# Patient Record
Sex: FEMALE | Race: BLACK OR AFRICAN AMERICAN | NOT HISPANIC OR LATINO | Employment: OTHER | URBAN - METROPOLITAN AREA
[De-identification: names, ages, dates, MRNs, and addresses within clinical notes are randomized per-mention and may not be internally consistent; named-entity substitution may affect disease eponyms.]

---

## 2017-01-16 ENCOUNTER — HOSPITAL ENCOUNTER (OUTPATIENT)
Dept: RADIOLOGY | Facility: HOSPITAL | Age: 62
Discharge: HOME | End: 2017-01-16
Attending: INTERNAL MEDICINE
Payer: COMMERCIAL

## 2017-01-16 ENCOUNTER — OFFICE VISIT (OUTPATIENT)
Dept: INTERNAL MEDICINE | Facility: CLINIC | Age: 62
End: 2017-01-16
Payer: COMMERCIAL

## 2017-01-16 VITALS
BODY MASS INDEX: 35.88 KG/M2 | SYSTOLIC BLOOD PRESSURE: 104 MMHG | WEIGHT: 215.38 LBS | HEART RATE: 84 BPM | DIASTOLIC BLOOD PRESSURE: 62 MMHG | TEMPERATURE: 98 F | HEIGHT: 65 IN

## 2017-01-16 DIAGNOSIS — R50.9 FEVER AND CHILLS: ICD-10-CM

## 2017-01-16 DIAGNOSIS — R05.9 COUGH: ICD-10-CM

## 2017-01-16 DIAGNOSIS — J32.9 SINOBRONCHITIS: ICD-10-CM

## 2017-01-16 DIAGNOSIS — M79.645 THUMB PAIN, LEFT: ICD-10-CM

## 2017-01-16 DIAGNOSIS — R05.9 COUGH: Primary | ICD-10-CM

## 2017-01-16 DIAGNOSIS — R20.2 NUMBNESS AND TINGLING OF LEFT THUMB: ICD-10-CM

## 2017-01-16 DIAGNOSIS — E11.9 TYPE 2 DIABETES MELLITUS WITHOUT COMPLICATION, WITHOUT LONG-TERM CURRENT USE OF INSULIN: Chronic | ICD-10-CM

## 2017-01-16 DIAGNOSIS — I10 ESSENTIAL HYPERTENSION: Chronic | ICD-10-CM

## 2017-01-16 DIAGNOSIS — R20.0 NUMBNESS AND TINGLING OF LEFT THUMB: ICD-10-CM

## 2017-01-16 DIAGNOSIS — J40 SINOBRONCHITIS: ICD-10-CM

## 2017-01-16 PROCEDURE — 99214 OFFICE O/P EST MOD 30 MIN: CPT | Mod: 25,S$GLB,, | Performed by: INTERNAL MEDICINE

## 2017-01-16 PROCEDURE — 3074F SYST BP LT 130 MM HG: CPT | Mod: S$GLB,,, | Performed by: INTERNAL MEDICINE

## 2017-01-16 PROCEDURE — 3060F POS MICROALBUMINURIA REV: CPT | Mod: S$GLB,,, | Performed by: INTERNAL MEDICINE

## 2017-01-16 PROCEDURE — 1159F MED LIST DOCD IN RCRD: CPT | Mod: S$GLB,,, | Performed by: INTERNAL MEDICINE

## 2017-01-16 PROCEDURE — 3078F DIAST BP <80 MM HG: CPT | Mod: S$GLB,,, | Performed by: INTERNAL MEDICINE

## 2017-01-16 PROCEDURE — 99999 PR PBB SHADOW E&M-EST. PATIENT-LVL IV: CPT | Mod: PBBFAC,,, | Performed by: INTERNAL MEDICINE

## 2017-01-16 PROCEDURE — 2022F DILAT RTA XM EVC RTNOPTHY: CPT | Mod: S$GLB,,, | Performed by: INTERNAL MEDICINE

## 2017-01-16 PROCEDURE — 73130 X-RAY EXAM OF HAND: CPT | Mod: 26,LT,, | Performed by: RADIOLOGY

## 2017-01-16 PROCEDURE — 71020 XR CHEST PA AND LATERAL: CPT | Mod: 26,,, | Performed by: RADIOLOGY

## 2017-01-16 PROCEDURE — 94640 AIRWAY INHALATION TREATMENT: CPT | Mod: 51,S$GLB,, | Performed by: INTERNAL MEDICINE

## 2017-01-16 PROCEDURE — 71020 XR CHEST PA AND LATERAL: CPT | Mod: TC,PO

## 2017-01-16 PROCEDURE — 3044F HG A1C LEVEL LT 7.0%: CPT | Mod: S$GLB,,, | Performed by: INTERNAL MEDICINE

## 2017-01-16 PROCEDURE — 73130 X-RAY EXAM OF HAND: CPT | Mod: TC,PO,LT

## 2017-01-16 PROCEDURE — 96372 THER/PROPH/DIAG INJ SC/IM: CPT | Mod: S$GLB,,, | Performed by: INTERNAL MEDICINE

## 2017-01-16 RX ORDER — ALBUTEROL SULFATE 0.83 MG/ML
2.5 SOLUTION RESPIRATORY (INHALATION)
Status: COMPLETED | OUTPATIENT
Start: 2017-01-16 | End: 2017-01-16

## 2017-01-16 RX ORDER — TRIAMCINOLONE ACETONIDE 40 MG/ML
40 INJECTION, SUSPENSION INTRA-ARTICULAR; INTRAMUSCULAR
Status: COMPLETED | OUTPATIENT
Start: 2017-01-16 | End: 2017-01-16

## 2017-01-16 RX ORDER — PROMETHAZINE HYDROCHLORIDE AND CODEINE PHOSPHATE 6.25; 1 MG/5ML; MG/5ML
5 SOLUTION ORAL EVERY 4 HOURS PRN
Qty: 240 ML | Refills: 0 | Status: SHIPPED | OUTPATIENT
Start: 2017-01-16 | End: 2017-01-26

## 2017-01-16 RX ORDER — LEVOCETIRIZINE DIHYDROCHLORIDE 5 MG/1
5 TABLET, FILM COATED ORAL DAILY PRN
Qty: 30 TABLET | Refills: 2 | Status: SHIPPED | OUTPATIENT
Start: 2017-01-16 | End: 2017-04-25

## 2017-01-16 RX ORDER — ALBUTEROL SULFATE 90 UG/1
2 AEROSOL, METERED RESPIRATORY (INHALATION) EVERY 4 HOURS PRN
Qty: 1 EACH | Refills: 1 | Status: SHIPPED | OUTPATIENT
Start: 2017-01-16 | End: 2017-03-13 | Stop reason: ALTCHOICE

## 2017-01-16 RX ORDER — LEVOFLOXACIN 500 MG/1
500 TABLET, FILM COATED ORAL DAILY
Qty: 10 TABLET | Refills: 0 | Status: SHIPPED | OUTPATIENT
Start: 2017-01-16 | End: 2017-01-24 | Stop reason: ALTCHOICE

## 2017-01-16 RX ORDER — TIZANIDINE 4 MG/1
TABLET ORAL
Refills: 0 | COMMUNITY
Start: 2017-01-09 | End: 2017-03-13 | Stop reason: ALTCHOICE

## 2017-01-16 RX ORDER — PREDNISONE 20 MG/1
TABLET ORAL
Qty: 24 TABLET | Refills: 0 | Status: SHIPPED | OUTPATIENT
Start: 2017-01-16 | End: 2017-03-13 | Stop reason: ALTCHOICE

## 2017-01-16 RX ORDER — BENZONATATE 100 MG/1
CAPSULE ORAL
Refills: 0 | COMMUNITY
Start: 2017-01-09 | End: 2017-03-13 | Stop reason: ALTCHOICE

## 2017-01-16 RX ADMIN — TRIAMCINOLONE ACETONIDE 40 MG: 40 INJECTION, SUSPENSION INTRA-ARTICULAR; INTRAMUSCULAR at 09:01

## 2017-01-16 RX ADMIN — ALBUTEROL SULFATE 2.5 MG: 0.83 SOLUTION RESPIRATORY (INHALATION) at 08:01

## 2017-01-16 NOTE — PROGRESS NOTES
"Subjective:       Patient ID: Aracelis Martinez is a 61 y.o. female.    Chief Complaint: Back Pain; Cough; Fever; and Generalized Body Aches    HPI   The patient presents for an 8 day history of fever, chills, and worsening cough.  Cough is intermittently productive.  She is complaining of night sweats and exertional dyspnea.  Appetite is fair.  There is no nausea or vomiting.  No dysuria present.  No diarrhea is noted.  The patient was seen at an urgent care clinic last week and states that the initial testing for flu was negative.  She also received a Z-Luis which produced oral thrush after 3 days of therapy.  Review of Systems   Constitutional: Positive for appetite change, chills, fatigue and fever.   HENT: Positive for postnasal drip, sore throat and trouble swallowing.    Respiratory: Positive for cough, shortness of breath and wheezing.    Cardiovascular: Positive for chest pain. Negative for leg swelling.   Gastrointestinal: Negative for abdominal pain, diarrhea, nausea and vomiting.        Increased belching and gas reported.  The patient denies having any heartburn.   Genitourinary: Negative for dysuria.   Musculoskeletal: Positive for arthralgias, back pain and myalgias.        The patient complains of "spasms" in the left thumb.  Intermittent numbness is noted.   Skin: Negative for rash.   Neurological: Positive for numbness and headaches. Negative for dizziness.       Objective:      Physical Exam   Constitutional: She is oriented to person, place, and time. She appears well-developed and well-nourished. No distress.   The patient is a well-developed ill-appearing female.  The patient has lost 7 pounds since her last office visit of 10/10/16.   HENT:   Head: Normocephalic and atraumatic.   Right Ear: External ear normal.   Left Ear: External ear normal.   Mouth/Throat: Oropharynx is clear and moist.   Eyes: Conjunctivae and EOM are normal. No scleral icterus.   Neck: Normal range of motion. Neck supple. " No JVD present. No thyromegaly present.   Cardiovascular: Normal rate, regular rhythm, normal heart sounds and intact distal pulses.  Exam reveals no gallop and no friction rub.    No murmur heard.  Pulmonary/Chest: Effort normal. No respiratory distress. She has wheezes. She has no rales. She exhibits tenderness.   Scattered expiratory wheezes are noted bilaterally.  No rales are appreciated.   Abdominal: Soft. Bowel sounds are normal. She exhibits no mass. There is no tenderness.   Musculoskeletal: Normal range of motion. She exhibits no tenderness.   Left thumb range of motion is intact.  No localized tenderness or erythema is present.  No clicking is appreciated.  Wrist range of motion is normal.  Normal sensation is noted at this time.   Lymphadenopathy:     She has no cervical adenopathy.   Neurological: She is alert and oriented to person, place, and time. Coordination normal.   Gait is normal.   Skin: Skin is warm and dry. No rash noted.   Psychiatric: She has a normal mood and affect.   Nursing note and vitals reviewed.      Chest x-ray today: Lungs fields are clear.  No pneumonia or effusions are noted.    Left thumb x-ray today: No fracture, dislocation, or bone destruction noted.  Assessment:       1. Cough    2. Fever and chills    3. Thumb pain, left    4. Numbness and tingling of left thumb    5. Sinobronchitis    6. Essential hypertension    7. Type 2 diabetes mellitus without complication, without long-term current use of insulin        Plan:     Aracelis was seen today for sinobronchitis.  An albuterol nebulizer treatment will be administered.  Kenalog will also be administered at this time.  Prescriptions for prednisone, Levaquin, albuterol inhaler were given.  The patient will be referred to orthopedic hand surgery regarding symptoms of left thumb pain/numbness.  She does describe some triggering of the thumb however I suspect she may have a component of carpal tunnel syndrome.  The patient is to  return for routine medical follow-up as scheduled.  For symptoms of increased belching and I suggested patient try Zantac or Prilosec on a daily basis as a therapeutic trial.  She may be having some component of reflux.    Diagnoses and all orders for this visit:    Cough  -     X-Ray Chest PA And Lateral; Future    Fever and chills  -     X-Ray Chest PA And Lateral; Future    Thumb pain, left  -     Cancel: X-Ray Hand 2 View Left; Future  -     Ambulatory consult to Orthopedics    Numbness and tingling of left thumb  -     Ambulatory consult to Orthopedics    Sinobronchitis    Essential hypertension    Type 2 diabetes mellitus without complication, without long-term current use of insulin    Other orders  -     albuterol nebulizer solution 2.5 mg; Take 3 mLs (2.5 mg total) by nebulization one time.  -     albuterol 90 mcg/actuation inhaler; Inhale 2 puffs into the lungs every 4 (four) hours as needed for Wheezing.  -     predniSONE (DELTASONE) 20 MG tablet; Take 3 tabs po qd x 4 days; 2 tabs qd x 4 days; the 1 tab qd until completed.  -     triamcinolone acetonide injection 40 mg; Inject 1 mL (40 mg total) into the muscle one time.  -     levocetirizine (XYZAL) 5 MG tablet; Take 1 tablet (5 mg total) by mouth daily as needed (cold and sinus symptoms).  -     levoFLOXacin (LEVAQUIN) 500 MG tablet; Take 1 tablet (500 mg total) by mouth once daily.

## 2017-01-19 ENCOUNTER — TELEPHONE (OUTPATIENT)
Dept: INTERNAL MEDICINE | Facility: CLINIC | Age: 62
End: 2017-01-19

## 2017-01-19 DIAGNOSIS — I10 UNSPECIFIED ESSENTIAL HYPERTENSION: Primary | ICD-10-CM

## 2017-01-19 DIAGNOSIS — E78.5 HYPERLIPIDEMIA, UNSPECIFIED HYPERLIPIDEMIA TYPE: ICD-10-CM

## 2017-01-19 DIAGNOSIS — E11.9 TYPE II OR UNSPECIFIED TYPE DIABETES MELLITUS WITHOUT MENTION OF COMPLICATION, NOT STATED AS UNCONTROLLED: ICD-10-CM

## 2017-01-19 NOTE — TELEPHONE ENCOUNTER
----- Message from Kayleigh Hobson sent at 1/18/2017  3:16 PM CST -----  Contact: Patient called - [phone]  Doctor appointment and lab have been scheduled.  Please link lab orders to the lab appointment.  Date of doctor appointment:    Physical or EP:  Physical:  [id number]  Date of lab appointment:  Labs: [id number]  Comments:       Thanks Stephanie

## 2017-01-24 ENCOUNTER — TELEPHONE (OUTPATIENT)
Dept: INTERNAL MEDICINE | Facility: CLINIC | Age: 62
End: 2017-01-24

## 2017-01-24 ENCOUNTER — OFFICE VISIT (OUTPATIENT)
Dept: INTERNAL MEDICINE | Facility: CLINIC | Age: 62
End: 2017-01-24
Payer: COMMERCIAL

## 2017-01-24 VITALS
SYSTOLIC BLOOD PRESSURE: 130 MMHG | OXYGEN SATURATION: 98 % | HEART RATE: 76 BPM | DIASTOLIC BLOOD PRESSURE: 68 MMHG | TEMPERATURE: 99 F | HEIGHT: 65 IN | BODY MASS INDEX: 36.29 KG/M2 | WEIGHT: 217.81 LBS

## 2017-01-24 DIAGNOSIS — J20.9 ACUTE BRONCHITIS, UNSPECIFIED ORGANISM: Primary | ICD-10-CM

## 2017-01-24 PROCEDURE — 3078F DIAST BP <80 MM HG: CPT | Mod: S$GLB,,, | Performed by: FAMILY MEDICINE

## 2017-01-24 PROCEDURE — 3075F SYST BP GE 130 - 139MM HG: CPT | Mod: S$GLB,,, | Performed by: FAMILY MEDICINE

## 2017-01-24 PROCEDURE — 99214 OFFICE O/P EST MOD 30 MIN: CPT | Mod: S$GLB,,, | Performed by: FAMILY MEDICINE

## 2017-01-24 PROCEDURE — 1159F MED LIST DOCD IN RCRD: CPT | Mod: S$GLB,,, | Performed by: FAMILY MEDICINE

## 2017-01-24 PROCEDURE — 99999 PR PBB SHADOW E&M-EST. PATIENT-LVL III: CPT | Mod: PBBFAC,,, | Performed by: FAMILY MEDICINE

## 2017-01-24 RX ORDER — HYDROCODONE BITARTRATE AND ACETAMINOPHEN 7.5; 325 MG/1; MG/1
1 TABLET ORAL EVERY 4 HOURS PRN
Qty: 30 TABLET | Refills: 0 | Status: SHIPPED | OUTPATIENT
Start: 2017-01-24 | End: 2017-03-13 | Stop reason: ALTCHOICE

## 2017-01-24 RX ORDER — AMOXICILLIN 500 MG/1
500 TABLET, FILM COATED ORAL EVERY 12 HOURS
Qty: 14 TABLET | Refills: 0 | Status: SHIPPED | OUTPATIENT
Start: 2017-01-24 | End: 2017-02-03

## 2017-01-24 RX ORDER — ONDANSETRON 8 MG/1
8 TABLET, ORALLY DISINTEGRATING ORAL EVERY 6 HOURS PRN
Qty: 30 TABLET | Refills: 1 | Status: SHIPPED | OUTPATIENT
Start: 2017-01-24 | End: 2017-03-13 | Stop reason: ALTCHOICE

## 2017-01-24 NOTE — TELEPHONE ENCOUNTER
Spoke with patient , she's not feeling well , has coughing spells, feels feverish at times .She was given an appt. This morning with Dr. Lopez.

## 2017-01-24 NOTE — TELEPHONE ENCOUNTER
----- Message from Kayleigh Hobson sent at 1/24/2017  6:51 AM CST -----  Contact: Patient - [phone]  Gauri,    Patient called early this morning and ask if Dr. Damico could call her this morning before he starts clinic.  Patient states she just wanted his advise on something.  Patient states she did not go to work yesterday and not feeling very well this morning.  Patient states she try to go to work but felt like she was going to pass out, and last night she states she sweat awfully and also feels like she has a heavy weight on her chest, no chest pain, but states something not right.  Please give patient a call as soon as he can.      Thanks Stephanie

## 2017-01-24 NOTE — TELEPHONE ENCOUNTER
----- Message from Kayleigh Hobson sent at 1/24/2017  6:51 AM CST -----  Contact: Patient - [phone]  Gauri,    Patient called early this morning and ask if Dr. Damico could call her this morning before he starts clinic.  Patient states she just wanted his advise on something.  Patient states she did not go to work yesterday and not feeling very well this morning.  Patient states she try to go to work but felt like she was going to pass out, and last night she states she sweat awfully and also feels like she has a heavy weight on her chest, no chest pain, but states something not right.  Please give patient a call as soon as he can.      Thanks Stephanie

## 2017-01-24 NOTE — PROGRESS NOTES
Subjective:   Patient ID: Aracelis Martinez is a 61 y.o. female.    Chief Complaint: Follow-up (bronchitis not getting better; night sweats; weakness; clammy skin; coughing; etc)      HPI  Cordial 62 yo female with controlled type 2 diabetes mellitus and essential hypertension presents for follow-up of bronchitis. She is taking meds inc Levaquin as directed. She continues to cough and feel bad. Myalgias are present. Fatigue is significant. Pain is 7-8/10 and dull and constant.     We discuss her ho pancreatic cancer. She had surgery and has completed visits to [facility] re this illnes.     Patient queried and denies any further complaints.    ALLERGIES AND MEDICATIONS: updated and reviewed.  Review of patient's allergies indicates:   Allergen Reactions    Azithromycin Other (See Comments)     Yeast infection; pt requests please do not put her on this medication       Current Outpatient Prescriptions:     albuterol 90 mcg/actuation inhaler, Inhale 2 puffs into the lungs every 4 (four) hours as needed for Wheezing., Disp: 1 each, Rfl: 1    amlodipine (NORVASC) 5 MG tablet, TAKE 1 TABLET BY MOUTH ONCE DAILY, Disp: 90 tablet, Rfl: 1    benzonatate (TESSALON) 100 MG capsule, TK 1 C PO BID FOR 10 DAYS, Disp: , Rfl: 0    blood sugar diagnostic (ONETOUCH VERIO) Strp, TEST BLOOD SUGAR LEVELS ONCE DAILY AS DIRECTED, Disp: 30 strip, Rfl: 11    diclofenac sodium (VOLTAREN) 1 % Gel, APPLY 2 TO 4 GM TOPICALLY FOUR TIMES DAILY AS NEEDED, Disp: 600 g, Rfl: 6    doxepin (SINEQUAN) 50 MG capsule, Take 1 capsule (50 mg total) by mouth nightly., Disp: 90 capsule, Rfl: 3    duloxetine (CYMBALTA) 20 MG capsule, TAKE ONE CAPSULE BY MOUTH DAILY FOR CHRONIC PAIN, Disp: 90 capsule, Rfl: 3    levocetirizine (XYZAL) 5 MG tablet, Take 1 tablet (5 mg total) by mouth daily as needed (cold and sinus symptoms)., Disp: 30 tablet, Rfl: 2    levothyroxine (SYNTHROID) 50 MCG tablet, TAKE 1 TABLET BY MOUTH BEFORE BREAKFAST, Disp: 90 tablet,  Rfl: 3    lorazepam (ATIVAN) 1 MG tablet, TAKE 1 TABLET BY MOUTH EVERY 12 HOURS AS NEEDED FOR ANXIETY, Disp: 60 tablet, Rfl: 0    metformin (GLUCOPHAGE) 500 MG tablet, TAKE 1 TABLET BY MOUTH BEFORE DINNER, Disp: 90 tablet, Rfl: 3    ONETOUCH DELICA LANCETS 33 gauge Misc, USE ONE LANCET EVERY DAY, Disp: 200 each, Rfl: 0    predniSONE (DELTASONE) 20 MG tablet, Take 3 tabs po qd x 4 days; 2 tabs qd x 4 days; the 1 tab qd until completed., Disp: 24 tablet, Rfl: 0    promethazine-codeine 6.25-10 mg/5 ml (PHENERGAN WITH CODEINE) 6.25-10 mg/5 mL syrup, Take 5 mLs by mouth every 4 (four) hours as needed for Cough., Disp: 240 mL, Rfl: 0    tizanidine (ZANAFLEX) 4 MG tablet, TAKE 1 TABLET BY MOUTH THREE TIMES DAILY AS NEEDED, Disp: 270 tablet, Rfl: 4    tramadol (ULTRAM) 50 mg tablet, TAKE 1 TO 2 TABLETS BY MOUTH TWICE DAILY AS NEEDED FOR PAIN, Disp: 90 tablet, Rfl: 1    amoxicillin (AMOXIL) 500 MG Tab, Take 1 tablet (500 mg total) by mouth every 12 (twelve) hours., Disp: 14 tablet, Rfl: 0    hydrocodone-acetaminophen 7.5-325mg (NORCO) 7.5-325 mg per tablet, Take 1 tablet by mouth every 4 (four) hours as needed for Pain., Disp: 30 tablet, Rfl: 0    ondansetron (ZOFRAN-ODT) 8 MG TbDL, Take 1 tablet (8 mg total) by mouth every 6 (six) hours as needed., Disp: 30 tablet, Rfl: 1    VIRTUSSIN AC  mg/5 mL syrup, TK 5 ML PO Q 6 TO 8 H PRF COUGH, Disp: , Rfl: 0    Review of Systems   Constitutional: Positive for activity change, appetite change, chills and fatigue. Negative for diaphoresis, fever and unexpected weight change.   HENT: Positive for congestion, ear discharge, ear pain, postnasal drip, rhinorrhea, sinus pressure and sore throat. Negative for dental problem, drooling, facial swelling, hearing loss, mouth sores, nosebleeds, sneezing, tinnitus and trouble swallowing.    Eyes: Negative for photophobia, pain, discharge, itching and visual disturbance.   Respiratory: Positive for cough. Negative for choking,  "shortness of breath, wheezing and stridor.    Cardiovascular: Positive for chest pain (chest tightness aw cough). Negative for palpitations and leg swelling.   Gastrointestinal: Negative for abdominal pain, constipation, diarrhea, nausea and vomiting.   Neurological: Positive for light-headedness and headaches. Negative for seizures and numbness.       Objective:     Vitals:    01/24/17 1019 01/24/17 1045   BP: (!) 140/82 130/68   Pulse: 76    Temp: 98.6 °F (37 °C)    TempSrc: Oral    SpO2: 98%    Weight: 98.8 kg (217 lb 13 oz)    Height: 5' 5" (1.651 m)    PainSc: 0-No pain      Body mass index is 36.25 kg/(m^2).    Physical Exam   Constitutional: She appears well-developed and well-nourished.   HENT:   Head: Normocephalic and atraumatic.   Right Ear: External ear normal.   Left Ear: External ear normal.   Nose: Nose normal.   Mouth/Throat: Posterior oropharyngeal edema and posterior oropharyngeal erythema present. No oropharyngeal exudate.   Eyes: Conjunctivae are normal. Right eye exhibits no discharge. Left eye exhibits no discharge.   Neck: Neck supple. No JVD (right submandibular) present. No thyromegaly present.   Cardiovascular: Normal rate and regular rhythm.    Pulmonary/Chest: Effort normal and breath sounds normal. No respiratory distress. She has no wheezes. She has no rales.   Psychiatric: She has a normal mood and affect. Her speech is normal and behavior is normal. Thought content normal.   Nursing note and vitals reviewed.      Assessment and Plan:   Aracelis was seen today for follow-up.    Diagnoses and all orders for this visit:    Acute bronchitis, unspecified organism--went over all meds with pt and agreed strongly with Dr. Damico's plan. The only suggestion I could make is the possibility that she is sensitive to Levaquin and this is making her feel her symptoms. Educated pt to stop levaquin. She will take amoxil and was given zofran for her occ nausea and Norco 7.5mg (take 1/2 or 1 as directed) for " body aches. She will return if not improving in 3 days but she was educated that full recovery could take weeks. Reassurance given.   Patient expresses understanding of the plan as evidence by brief summary back to me.     -     ondansetron (ZOFRAN-ODT) 8 MG TbDL; Take 1 tablet (8 mg total) by mouth every 6 (six) hours as needed.  -     hydrocodone-acetaminophen 7.5-325mg (NORCO) 7.5-325 mg per tablet; Take 1 tablet by mouth every 4 (four) hours as needed for Pain.  -     amoxicillin (AMOXIL) 500 MG Tab; Take 1 tablet (500 mg total) by mouth every 12 (twelve) hours.  --Mucinex otc as directed.   Cont zyxal as directed.    Essential hypertension, stable. Cont amlodipine.     Acquired hypothyroidism, stable. Cont levothyroxine.    Type 2 diabetes mellitus without complication without long-term current use of insulin, stable despite illness and systemic corticosteroids with this am's fasting accucheck of 130 at home per pt.       No Follow-up on file.    THIS NOTE WILL BE SHARED WITH THE PATIENT.

## 2017-02-06 ENCOUNTER — OFFICE VISIT (OUTPATIENT)
Dept: INTERNAL MEDICINE | Facility: CLINIC | Age: 62
End: 2017-02-06
Payer: COMMERCIAL

## 2017-02-06 ENCOUNTER — HOSPITAL ENCOUNTER (OUTPATIENT)
Dept: RADIOLOGY | Facility: HOSPITAL | Age: 62
Discharge: HOME | End: 2017-02-06
Attending: FAMILY MEDICINE
Payer: COMMERCIAL

## 2017-02-06 VITALS
HEIGHT: 65 IN | BODY MASS INDEX: 36.87 KG/M2 | DIASTOLIC BLOOD PRESSURE: 76 MMHG | HEART RATE: 68 BPM | RESPIRATION RATE: 16 BRPM | SYSTOLIC BLOOD PRESSURE: 120 MMHG | WEIGHT: 221.31 LBS | TEMPERATURE: 99 F

## 2017-02-06 DIAGNOSIS — E11.9 TYPE 2 DIABETES MELLITUS WITHOUT COMPLICATION, WITHOUT LONG-TERM CURRENT USE OF INSULIN: Chronic | ICD-10-CM

## 2017-02-06 DIAGNOSIS — J20.9 ACUTE BRONCHITIS, UNSPECIFIED ORGANISM: Primary | ICD-10-CM

## 2017-02-06 DIAGNOSIS — I10 ESSENTIAL HYPERTENSION: Chronic | ICD-10-CM

## 2017-02-06 DIAGNOSIS — M79.7 FIBROMYALGIA: ICD-10-CM

## 2017-02-06 DIAGNOSIS — M54.50 CHRONIC BILATERAL LOW BACK PAIN WITHOUT SCIATICA: ICD-10-CM

## 2017-02-06 DIAGNOSIS — M47.26 OSTEOARTHRITIS OF SPINE WITH RADICULOPATHY, LUMBAR REGION: ICD-10-CM

## 2017-02-06 DIAGNOSIS — R05.9 COUGH: ICD-10-CM

## 2017-02-06 DIAGNOSIS — G89.29 CHRONIC BILATERAL LOW BACK PAIN WITHOUT SCIATICA: ICD-10-CM

## 2017-02-06 PROCEDURE — 3044F HG A1C LEVEL LT 7.0%: CPT | Mod: S$GLB,,, | Performed by: FAMILY MEDICINE

## 2017-02-06 PROCEDURE — 3078F DIAST BP <80 MM HG: CPT | Mod: S$GLB,,, | Performed by: FAMILY MEDICINE

## 2017-02-06 PROCEDURE — 99999 PR PBB SHADOW E&M-EST. PATIENT-LVL III: CPT | Mod: PBBFAC,,, | Performed by: FAMILY MEDICINE

## 2017-02-06 PROCEDURE — 2022F DILAT RTA XM EVC RTNOPTHY: CPT | Mod: S$GLB,,, | Performed by: FAMILY MEDICINE

## 2017-02-06 PROCEDURE — 71020 XR CHEST PA AND LATERAL: CPT | Mod: 26,,, | Performed by: RADIOLOGY

## 2017-02-06 PROCEDURE — 71020 XR CHEST PA AND LATERAL: CPT | Mod: TC,PO

## 2017-02-06 PROCEDURE — 99215 OFFICE O/P EST HI 40 MIN: CPT | Mod: S$GLB,,, | Performed by: FAMILY MEDICINE

## 2017-02-06 PROCEDURE — 3074F SYST BP LT 130 MM HG: CPT | Mod: S$GLB,,, | Performed by: FAMILY MEDICINE

## 2017-02-06 PROCEDURE — 3060F POS MICROALBUMINURIA REV: CPT | Mod: S$GLB,,, | Performed by: FAMILY MEDICINE

## 2017-02-06 RX ORDER — LORAZEPAM 1 MG/1
0.5 TABLET ORAL EVERY 12 HOURS PRN
Qty: 60 TABLET | Refills: 0 | Status: SHIPPED | OUTPATIENT
Start: 2017-02-06 | End: 2017-04-25

## 2017-02-06 RX ORDER — LORAZEPAM 1 MG/1
0.5 TABLET ORAL EVERY 12 HOURS PRN
Qty: 60 TABLET | Refills: 0 | Status: SHIPPED | OUTPATIENT
Start: 2017-02-06 | End: 2017-02-06 | Stop reason: SDUPTHER

## 2017-02-06 RX ORDER — METHOCARBAMOL 500 MG/1
500 TABLET, FILM COATED ORAL 4 TIMES DAILY
Qty: 40 TABLET | Refills: 0 | Status: SHIPPED | OUTPATIENT
Start: 2017-02-06 | End: 2017-02-16

## 2017-02-06 RX ORDER — DULOXETIN HYDROCHLORIDE 60 MG/1
60 CAPSULE, DELAYED RELEASE ORAL DAILY
Qty: 30 CAPSULE | Refills: 11 | Status: SHIPPED | OUTPATIENT
Start: 2017-02-06 | End: 2017-02-06 | Stop reason: SDUPTHER

## 2017-02-06 RX ORDER — METHOCARBAMOL 500 MG/1
500 TABLET, FILM COATED ORAL 4 TIMES DAILY
Qty: 40 TABLET | Refills: 0 | Status: SHIPPED | OUTPATIENT
Start: 2017-02-06 | End: 2017-02-06 | Stop reason: SDUPTHER

## 2017-02-06 RX ORDER — DULOXETIN HYDROCHLORIDE 60 MG/1
60 CAPSULE, DELAYED RELEASE ORAL DAILY
Qty: 90 CAPSULE | Refills: 3 | Status: SHIPPED | OUTPATIENT
Start: 2017-02-06 | End: 2018-06-14 | Stop reason: SDUPTHER

## 2017-02-06 NOTE — MR AVS SNAPSHOT
[facility]   [facility]  [place] [address]  Phone: [phone]  Fax: [phone]                  Aracelis Martinez   2017 2:40 PM   Office Visit    Description:  Female : 1955   Provider:  Javon Lopez MD   Department:  [facility]           Reason for Visit     Dizziness     Fatigue     Spasms     Cough     Dry Skin           Diagnoses this Visit        Comments    Acute bronchitis, unspecified organism    -  Primary     Fibromyalgia         Type 2 diabetes mellitus without complication, without long-term current use of insulin         Essential hypertension         Cough         Chronic bilateral low back pain without sciatica         Osteoarthritis of spine with radiculopathy, lumbar region                To Do List           Future Appointments        Provider Department Dept Phone    2017 7:45 AM LAB, [facility] Laboratory [phone]    2017 8:00 AM SPECIMEN, [facility] Specimen [facility] [phone]    2017 1:00 PM Alen Damico MD [facility] [phone]      Goals (5 Years of Data)     None      Follow-Up and Disposition     Return in about 2 weeks (around 2017).       These Medications        Disp Refills Start End    methocarbamol (ROBAXIN) 500 MG Tab 40 tablet 0 2017    Take 1 tablet (500 mg total) by mouth 4 (four) times daily. - Oral    Pharmacy: [facility] [address] [place] [address] AT [facility] Ph #: [phone]       lorazepam (ATIVAN) 1 MG tablet 60 tablet 0 2017     Take 0.5 tablets (0.5 mg total) by mouth every 12 (twelve) hours as needed (anxiety or sleep.). - Oral    Pharmacy: [facility] [address] [place] [address] AT [facility] Ph #: [phone]       duloxetine (CYMBALTA) 60 MG capsule 90 capsule 3 2017    Take 1 capsule (60 mg total) by  mouth once daily. - Oral    Pharmacy: [facility] - [facility] [address] AT [facility] #: [phone]         [facility]     [facility] Nurse [facility] Line - 24/7 Assistance  Registered nurses in the [facility] Center provide clinical advisement, health education, appointment booking, and other advisory services.  Call for this free service at [phone].             Medications           Message regarding Medications     Verify the changes and/or additions to your medication regime listed below are the same as discussed with your clinician today.  If any of these changes or additions are incorrect, please notify your healthcare provider.        START taking these NEW medications        Refills    methocarbamol (ROBAXIN) 500 MG Tab 0    Sig: Take 1 tablet (500 mg total) by mouth 4 (four) times daily.    Class: Normal    Route: Oral    duloxetine (CYMBALTA) 60 MG capsule 3    Sig: Take 1 capsule (60 mg total) by mouth once daily.    Class: Normal    Route: Oral      CHANGE how you are taking these medications     Start Taking Instead of    lorazepam (ATIVAN) 1 MG tablet lorazepam (ATIVAN) 1 MG tablet    Dosage:  Take 0.5 tablets (0.5 mg total) by mouth every 12 (twelve) hours as needed (anxiety or sleep.). Dosage:  TAKE 1 TABLET BY MOUTH EVERY 12 HOURS AS NEEDED FOR ANXIETY    Reason for Change:  Reorder       STOP taking these medications     tramadol (ULTRAM) 50 mg tablet TAKE 1 TO 2 TABLETS BY MOUTH TWICE DAILY AS NEEDED FOR PAIN    tizanidine (ZANAFLEX) 4 MG tablet TAKE 1 TABLET BY MOUTH THREE TIMES DAILY AS NEEDED           Verify that the below list of medications is an accurate representation of the medications you are currently taking.  If none reported, the list may be blank. If incorrect, please contact your healthcare provider. Carry this list with you in case of emergency.           Current Medications     albuterol 90 mcg/actuation inhaler  "Inhale 2 puffs into the lungs every 4 (four) hours as needed for Wheezing.    amlodipine (NORVASC) 5 MG tablet TAKE 1 TABLET BY MOUTH ONCE DAILY    benzonatate (TESSALON) 100 MG capsule TK 1 C PO BID FOR 10 DAYS    blood sugar diagnostic (ONETOUCH VERIO) Strp TEST BLOOD SUGAR LEVELS ONCE DAILY AS DIRECTED    diclofenac sodium (VOLTAREN) 1 % Gel APPLY 2 TO 4 GM TOPICALLY FOUR TIMES DAILY AS NEEDED    doxepin (SINEQUAN) 50 MG capsule Take 1 capsule (50 mg total) by mouth nightly.    duloxetine (CYMBALTA) 60 MG capsule Take 1 capsule (60 mg total) by mouth once daily.    hydrocodone-acetaminophen 7.5-325mg (NORCO) 7.5-325 mg per tablet Take 1 tablet by mouth every 4 (four) hours as needed for Pain.    levocetirizine (XYZAL) 5 MG tablet Take 1 tablet (5 mg total) by mouth daily as needed (cold and sinus symptoms).    levothyroxine (SYNTHROID) 50 MCG tablet TAKE 1 TABLET BY MOUTH BEFORE BREAKFAST    lorazepam (ATIVAN) 1 MG tablet Take 0.5 tablets (0.5 mg total) by mouth every 12 (twelve) hours as needed (anxiety or sleep.).    metformin (GLUCOPHAGE) 500 MG tablet TAKE 1 TABLET BY MOUTH BEFORE DINNER    methocarbamol (ROBAXIN) 500 MG Tab Take 1 tablet (500 mg total) by mouth 4 (four) times daily.    ondansetron (ZOFRAN-ODT) 8 MG TbDL Take 1 tablet (8 mg total) by mouth every 6 (six) hours as needed.    ONETOUCH DELICA LANCETS 33 gauge Misc USE ONE LANCET EVERY DAY    predniSONE (DELTASONE) 20 MG tablet Take 3 tabs po qd x 4 days; 2 tabs qd x 4 days; the 1 tab qd until completed.    VIRTUSSIN AC  mg/5 mL syrup TK 5 ML PO Q 6 TO 8 H PRF COUGH           Clinical Reference Information           Your Vitals Were     BP Pulse Temp Resp Height Weight    120/76 68 98.5 °F (36.9 °C) (Oral) 16 5' 5" (1.651 m) 100.4 kg (221 lb 5.5 oz)    BMI                36.83 kg/m2          Blood Pressure          Most Recent Value    BP  120/76      Allergies as of 2/6/2017     Azithromycin      Immunizations Administered on Date of " Encounter - 2/6/2017     None      Orders Placed During Today's Visit     Future Labs/Procedures Expected by Expires    X-Ray Chest PA And Lateral  2/6/2017 2/6/2018      Language Assistance Services     ATTENTION: Language assistance services are available, free of charge. Please call [phone].      ATENCIÓN: Si habla misti, tiene a wood disposición servicios gratuitos de asistencia lingüística. Llame al [phone].     CHÚ Ý: N?u b?n nói Ti?ng Vi?t, có các d?ch v? h? tr? ngôn ng? mi?n phí dành cho b?n. G?i s? [phone].         [facility] complies with applicable Federal civil rights laws and does not discriminate on the basis of race, color, national origin, age, disability, or sex.

## 2017-02-06 NOTE — PROGRESS NOTES
"Subjective:   Patient ID: Aracelis Martinez is a 61 y.o. female.    Chief Complaint: Dizziness; Fatigue; Spasms; Cough; and Dry Skin      HPI  60 yo female with fibromyalgia and chronic low back pain and type 2 diabetes mellitus here with  complaining of continued cough with productive sputum that is "grey." She denies fevers or chills. Complains of generalized muscle pains and "feeling so bad and weak." Complains of occ low back muscle spasms. She expresses that she is very frustrated about feeling weak.     She admits she thinks she is more depressed.  agrees. She denies suicidal thoughts. She says she is not sure if she is "just depressed or down [depressed] because I'm sick for so long..."    Also complains of generally dry skin.    She asks if she should miss work for weeks more and I state I cannot make such a determination now.    Patient queried and denies any further complaints.    ALLERGIES AND MEDICATIONS: updated and reviewed.  Review of patient's allergies indicates:   Allergen Reactions    Azithromycin Other (See Comments)     Yeast infection; pt requests please do not put her on this medication       Current Outpatient Prescriptions:     albuterol 90 mcg/actuation inhaler, Inhale 2 puffs into the lungs every 4 (four) hours as needed for Wheezing., Disp: 1 each, Rfl: 1    amlodipine (NORVASC) 5 MG tablet, TAKE 1 TABLET BY MOUTH ONCE DAILY, Disp: 90 tablet, Rfl: 1    benzonatate (TESSALON) 100 MG capsule, TK 1 C PO BID FOR 10 DAYS, Disp: , Rfl: 0    blood sugar diagnostic (ONETOUCH VERIO) Strp, TEST BLOOD SUGAR LEVELS ONCE DAILY AS DIRECTED, Disp: 30 strip, Rfl: 11    diclofenac sodium (VOLTAREN) 1 % Gel, APPLY 2 TO 4 GM TOPICALLY FOUR TIMES DAILY AS NEEDED, Disp: 600 g, Rfl: 6    doxepin (SINEQUAN) 50 MG capsule, Take 1 capsule (50 mg total) by mouth nightly., Disp: 90 capsule, Rfl: 3    hydrocodone-acetaminophen 7.5-325mg (NORCO) 7.5-325 mg per tablet, Take 1 tablet by mouth every " 4 (four) hours as needed for Pain., Disp: 30 tablet, Rfl: 0    levocetirizine (XYZAL) 5 MG tablet, Take 1 tablet (5 mg total) by mouth daily as needed (cold and sinus symptoms)., Disp: 30 tablet, Rfl: 2    levothyroxine (SYNTHROID) 50 MCG tablet, TAKE 1 TABLET BY MOUTH BEFORE BREAKFAST, Disp: 90 tablet, Rfl: 3    lorazepam (ATIVAN) 1 MG tablet, Take 0.5 tablets (0.5 mg total) by mouth every 12 (twelve) hours as needed (anxiety or sleep.)., Disp: 60 tablet, Rfl: 0    metformin (GLUCOPHAGE) 500 MG tablet, TAKE 1 TABLET BY MOUTH BEFORE DINNER, Disp: 90 tablet, Rfl: 3    ondansetron (ZOFRAN-ODT) 8 MG TbDL, Take 1 tablet (8 mg total) by mouth every 6 (six) hours as needed., Disp: 30 tablet, Rfl: 1    ONETOUCH DELICA LANCETS 33 gauge Misc, USE ONE LANCET EVERY DAY, Disp: 200 each, Rfl: 0    predniSONE (DELTASONE) 20 MG tablet, Take 3 tabs po qd x 4 days; 2 tabs qd x 4 days; the 1 tab qd until completed., Disp: 24 tablet, Rfl: 0    VIRTUSSIN AC  mg/5 mL syrup, TK 5 ML PO Q 6 TO 8 H PRF COUGH, Disp: , Rfl: 0    duloxetine (CYMBALTA) 60 MG capsule, Take 1 capsule (60 mg total) by mouth once daily., Disp: 90 capsule, Rfl: 3    methocarbamol (ROBAXIN) 500 MG Tab, Take 1 tablet (500 mg total) by mouth 4 (four) times daily., Disp: 40 tablet, Rfl: 0    Review of Systems   Constitutional: Positive for fatigue. Negative for activity change, appetite change, chills and fever.   HENT: Positive for congestion, postnasal drip and rhinorrhea. Negative for ear discharge, ear pain, hearing loss, mouth sores, nosebleeds, sinus pressure, sneezing, sore throat, tinnitus and trouble swallowing.    Eyes: Negative for pain and itching.   Respiratory: Positive for cough. Negative for shortness of breath and stridor.    Cardiovascular: Negative for chest pain and palpitations.   Gastrointestinal: Negative for abdominal distention, abdominal pain, anal bleeding, blood in stool, constipation, diarrhea, nausea and vomiting.  "  Endocrine: Negative for cold intolerance and heat intolerance.   Genitourinary: Negative for dysuria, flank pain and frequency.   Musculoskeletal: Positive for arthralgias and back pain. Negative for gait problem.   Allergic/Immunologic: Negative for environmental allergies.   Neurological: Positive for dizziness. Negative for seizures, facial asymmetry, speech difficulty, light-headedness, numbness and headaches.   Hematological: Does not bruise/bleed easily.   Psychiatric/Behavioral: Positive for dysphoric mood and sleep disturbance. Negative for suicidal ideas.       Objective:     Vitals:    02/06/17 1439   BP: 120/76   Pulse: 68   Resp: 16   Temp: 98.5 °F (36.9 °C)   TempSrc: Oral   Weight: 100.4 kg (221 lb 5.5 oz)   Height: 5' 5" (1.651 m)   PainSc:   6   PainLoc: Back     Body mass index is 36.83 kg/(m^2).    Physical Exam   Constitutional: She is oriented to person, place, and time. She appears well-developed and well-nourished. She is cooperative. She does not have a sickly appearance. No distress.   HENT:   Head: Normocephalic and atraumatic.   Right Ear: Hearing, tympanic membrane, external ear and ear canal normal. No tenderness.   Left Ear: Hearing, tympanic membrane, external ear and ear canal normal. No tenderness.   Nose: Nose normal.   Mouth/Throat: Oropharynx is clear and moist. Normal dentition. No oropharyngeal exudate, posterior oropharyngeal edema or posterior oropharyngeal erythema.   Eyes: Conjunctivae and lids are normal. Right eye exhibits no discharge. Left eye exhibits no discharge. Right conjunctiva is not injected. Left conjunctiva is not injected. No scleral icterus. Right eye exhibits normal extraocular motion. Left eye exhibits normal extraocular motion.   Neck: Normal range of motion. Neck supple. No JVD present. Carotid bruit is not present. No tracheal deviation and no edema present. No thyromegaly present.   Cardiovascular: Normal rate, regular rhythm, normal heart sounds and " normal pulses.  Exam reveals no friction rub.    No murmur heard.  Pulmonary/Chest: Effort normal and breath sounds normal. No accessory muscle usage. No respiratory distress. She has no wheezes. She has no rhonchi. She has no rales.   Musculoskeletal: She exhibits no edema.   Feet:   Right Foot:   Protective Sensation: 5 sites tested. 5 sites sensed.   Skin Integrity: Positive for dry skin. Negative for ulcer, blister, skin breakdown or callus.   Left Foot:   Protective Sensation: 5 sites tested. 5 sites sensed.   Skin Integrity: Positive for dry skin. Negative for ulcer, blister, skin breakdown or callus.   Lymphadenopathy:        Head (right side): No submandibular adenopathy present.        Head (left side): No submandibular adenopathy present.     She has no cervical adenopathy.   Neurological: She is alert and oriented to person, place, and time.   Skin: Skin is warm and dry. She is not diaphoretic.   Psychiatric: Her speech is normal. Thought content normal. Her mood appears not anxious. Her affect is not angry, not labile and not inappropriate. She is withdrawn. She exhibits a depressed mood.   Has head on desk. Does not make eye contact readily. Flat affect   Nursing note and vitals reviewed.      Assessment and Plan:   Aracelis was seen today for dizziness, fatigue, spasms, cough and dry skin.    Diagnoses and all orders for this visit:    Acute bronchitis, unspecified organism--resolving. Lungs clear; some residual cough to be expected. Will check repeat cxr to be safe. Cont mucinex otc as directed. Reassure pt if cxr ok.    Fibromyalgia, increase Cymbalta    Major depressive disorder, increase Cymbalta. Declines psychiatry/psychology.    Insomnia--rec use of benadryl 12.5mg or 25mg qhs. Use ativan only as last resort. Pt and  express understanding as evidenced by summary back to me.    Type 2 diabetes mellitus without complication, without long-term current use of insulin, stable. Cont meds. Needs to  have labs very soon for Dr. Damico.    Essential hypertension, stable. Cont amlodipine,      Chronic bilateral low back pain without sciatica--conservative pain med use    Time spent in the evaluation and management of this patient exceeded 60 min and greater than 50% of this time was in face-to-face education regarding diagnoses, medications, plan, and follow-up.      Return in about 2 weeks (around 2/20/2017).    THIS NOTE WILL BE SHARED WITH THE PATIENT.      62 yo

## 2017-02-10 ENCOUNTER — OFFICE VISIT (OUTPATIENT)
Dept: INTERNAL MEDICINE | Facility: CLINIC | Age: 62
End: 2017-02-10
Payer: COMMERCIAL

## 2017-02-10 VITALS
WEIGHT: 218.94 LBS | RESPIRATION RATE: 18 BRPM | TEMPERATURE: 99 F | DIASTOLIC BLOOD PRESSURE: 72 MMHG | BODY MASS INDEX: 35.19 KG/M2 | SYSTOLIC BLOOD PRESSURE: 149 MMHG | HEART RATE: 74 BPM | HEIGHT: 66 IN

## 2017-02-10 DIAGNOSIS — F32.A DEPRESSIVE DISORDER: ICD-10-CM

## 2017-02-10 DIAGNOSIS — R53.83 FATIGUE, UNSPECIFIED TYPE: ICD-10-CM

## 2017-02-10 DIAGNOSIS — M79.7 FIBROMYALGIA: Primary | ICD-10-CM

## 2017-02-10 DIAGNOSIS — M62.830 LUMBAR PARASPINAL MUSCLE SPASM: ICD-10-CM

## 2017-02-10 DIAGNOSIS — G47.9 SLEEP DISTURBANCE: ICD-10-CM

## 2017-02-10 DIAGNOSIS — M54.50 BILATERAL LOW BACK PAIN WITHOUT SCIATICA, UNSPECIFIED CHRONICITY: ICD-10-CM

## 2017-02-10 PROCEDURE — 3078F DIAST BP <80 MM HG: CPT | Mod: S$GLB,,, | Performed by: INTERNAL MEDICINE

## 2017-02-10 PROCEDURE — 99999 PR PBB SHADOW E&M-EST. PATIENT-LVL III: CPT | Mod: PBBFAC,,, | Performed by: INTERNAL MEDICINE

## 2017-02-10 PROCEDURE — 99214 OFFICE O/P EST MOD 30 MIN: CPT | Mod: S$GLB,,, | Performed by: INTERNAL MEDICINE

## 2017-02-10 PROCEDURE — 3077F SYST BP >= 140 MM HG: CPT | Mod: S$GLB,,, | Performed by: INTERNAL MEDICINE
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
## 2017-03-13 ENCOUNTER — LAB VISIT (OUTPATIENT)
Dept: LAB | Facility: HOSPITAL | Age: 62
End: 2017-03-13
Attending: INTERNAL MEDICINE
Payer: COMMERCIAL

## 2017-03-13 ENCOUNTER — OFFICE VISIT (OUTPATIENT)
Dept: INTERNAL MEDICINE | Facility: CLINIC | Age: 62
End: 2017-03-13
Payer: COMMERCIAL

## 2017-03-13 VITALS
WEIGHT: 227.06 LBS | SYSTOLIC BLOOD PRESSURE: 136 MMHG | DIASTOLIC BLOOD PRESSURE: 84 MMHG | HEIGHT: 66 IN | TEMPERATURE: 98 F | BODY MASS INDEX: 36.49 KG/M2 | HEART RATE: 72 BPM

## 2017-03-13 DIAGNOSIS — M79.7 FIBROMYALGIA: Primary | ICD-10-CM

## 2017-03-13 DIAGNOSIS — R53.83 FATIGUE, UNSPECIFIED TYPE: ICD-10-CM

## 2017-03-13 DIAGNOSIS — Z11.59 NEED FOR HEPATITIS C SCREENING TEST: ICD-10-CM

## 2017-03-13 DIAGNOSIS — E11.9 TYPE 2 DIABETES MELLITUS WITHOUT COMPLICATION, WITHOUT LONG-TERM CURRENT USE OF INSULIN: Chronic | ICD-10-CM

## 2017-03-13 DIAGNOSIS — I10 ESSENTIAL HYPERTENSION: Chronic | ICD-10-CM

## 2017-03-13 LAB
ALBUMIN SERPL BCP-MCNC: 3.9 G/DL
ALP SERPL-CCNC: 85 U/L
ALT SERPL W/O P-5'-P-CCNC: 15 U/L
ANION GAP SERPL CALC-SCNC: 9 MMOL/L
AST SERPL-CCNC: 13 U/L
BASOPHILS # BLD AUTO: 0.02 K/UL
BASOPHILS NFR BLD: 0.3 %
BILIRUB SERPL-MCNC: 0.6 MG/DL
BUN SERPL-MCNC: 10 MG/DL
CALCIUM SERPL-MCNC: 10 MG/DL
CHLORIDE SERPL-SCNC: 102 MMOL/L
CHOLEST/HDLC SERPL: 3.1 {RATIO}
CO2 SERPL-SCNC: 29 MMOL/L
CREAT SERPL-MCNC: 0.8 MG/DL
DIFFERENTIAL METHOD: ABNORMAL
EOSINOPHIL # BLD AUTO: 0.2 K/UL
EOSINOPHIL NFR BLD: 2.3 %
ERYTHROCYTE [DISTWIDTH] IN BLOOD BY AUTOMATED COUNT: 15.9 %
EST. GFR  (AFRICAN AMERICAN): >60 ML/MIN/1.73 M^2
EST. GFR  (NON AFRICAN AMERICAN): >60 ML/MIN/1.73 M^2
GLUCOSE SERPL-MCNC: 96 MG/DL
HCT VFR BLD AUTO: 43.7 %
HDL/CHOLESTEROL RATIO: 31.9 %
HDLC SERPL-MCNC: 191 MG/DL
HDLC SERPL-MCNC: 61 MG/DL
HGB BLD-MCNC: 14.2 G/DL
LDLC SERPL CALC-MCNC: 110.2 MG/DL
LYMPHOCYTES # BLD AUTO: 3.1 K/UL
LYMPHOCYTES NFR BLD: 43.5 %
MCH RBC QN AUTO: 27 PG
MCHC RBC AUTO-ENTMCNC: 32.5 %
MCV RBC AUTO: 83 FL
MONOCYTES # BLD AUTO: 0.6 K/UL
MONOCYTES NFR BLD: 7.8 %
NEUTROPHILS # BLD AUTO: 3.2 K/UL
NEUTROPHILS NFR BLD: 45.7 %
NONHDLC SERPL-MCNC: 130 MG/DL
PLATELET # BLD AUTO: 395 K/UL
PMV BLD AUTO: 9 FL
POTASSIUM SERPL-SCNC: 4.2 MMOL/L
PROT SERPL-MCNC: 7.7 G/DL
RBC # BLD AUTO: 5.26 M/UL
SODIUM SERPL-SCNC: 140 MMOL/L
TRIGL SERPL-MCNC: 99 MG/DL
WBC # BLD AUTO: 7.06 K/UL

## 2017-03-13 PROCEDURE — 80053 COMPREHEN METABOLIC PANEL: CPT

## 2017-03-13 PROCEDURE — 86803 HEPATITIS C AB TEST: CPT

## 2017-03-13 PROCEDURE — 83036 HEMOGLOBIN GLYCOSYLATED A1C: CPT

## 2017-03-13 PROCEDURE — 99213 OFFICE O/P EST LOW 20 MIN: CPT | Mod: S$GLB,,, | Performed by: INTERNAL MEDICINE

## 2017-03-13 PROCEDURE — 85025 COMPLETE CBC W/AUTO DIFF WBC: CPT

## 2017-03-13 PROCEDURE — 99999 PR PBB SHADOW E&M-EST. PATIENT-LVL III: CPT | Mod: PBBFAC,,, | Performed by: INTERNAL MEDICINE

## 2017-03-13 PROCEDURE — 3079F DIAST BP 80-89 MM HG: CPT | Mod: S$GLB,,, | Performed by: INTERNAL MEDICINE

## 2017-03-13 PROCEDURE — 2022F DILAT RTA XM EVC RTNOPTHY: CPT | Mod: S$GLB,,, | Performed by: INTERNAL MEDICINE

## 2017-03-13 PROCEDURE — 3075F SYST BP GE 130 - 139MM HG: CPT | Mod: S$GLB,,, | Performed by: INTERNAL MEDICINE

## 2017-03-13 PROCEDURE — 1160F RVW MEDS BY RX/DR IN RCRD: CPT | Mod: S$GLB,,, | Performed by: INTERNAL MEDICINE

## 2017-03-13 PROCEDURE — 3044F HG A1C LEVEL LT 7.0%: CPT | Mod: S$GLB,,, | Performed by: INTERNAL MEDICINE

## 2017-03-13 PROCEDURE — 80061 LIPID PANEL: CPT

## 2017-03-13 PROCEDURE — 3060F POS MICROALBUMINURIA REV: CPT | Mod: S$GLB,,, | Performed by: INTERNAL MEDICINE

## 2017-03-13 PROCEDURE — 36415 COLL VENOUS BLD VENIPUNCTURE: CPT | Mod: PO

## 2017-03-13 RX ORDER — AMLODIPINE BESYLATE 10 MG/1
10 TABLET ORAL DAILY
Qty: 30 TABLET | Refills: 11 | Status: SHIPPED | OUTPATIENT
Start: 2017-03-13 | End: 2018-02-11 | Stop reason: SDUPTHER

## 2017-03-13 RX ORDER — METHOCARBAMOL 500 MG/1
500 TABLET, FILM COATED ORAL 4 TIMES DAILY PRN
Refills: 0 | COMMUNITY
Start: 2017-03-01 | End: 2017-04-25

## 2017-03-14 LAB
ESTIMATED AVG GLUCOSE: 146 MG/DL
HBA1C MFR BLD HPLC: 6.7 %
HCV AB SERPL QL IA: NEGATIVE

## 2017-03-19 NOTE — PROGRESS NOTES
Subjective:       Patient ID: Aracelis Martinez is a 61 y.o. female.    Chief Complaint: Follow-up (1 mo follow up)    HPI   The patient presents for follow-up of fibromyalgia symptoms.  Cymbalta therapy was continued.  She is feeling better.  Fibromyalgia has home down in the past 3-4 days.  She initially noted a headache with Cymbalta.  She is also using Robaxin as needed for pain and muscle spasms.  She currently uses a half tablet of lorazepam.  She is sleeping better.  She is averaging 7-8 hours of sleep at night.  Initial drowsiness in the morning has improved.  Review of Systems   Constitutional: Positive for fatigue. Negative for activity change, appetite change and unexpected weight change.   Eyes: Negative for visual disturbance.   Respiratory: Negative for cough and shortness of breath.    Cardiovascular: Negative for chest pain, palpitations and leg swelling.   Gastrointestinal: Negative for abdominal pain, blood in stool, constipation and diarrhea.   Genitourinary: Negative for dysuria and hematuria.   Musculoskeletal: Positive for arthralgias and myalgias. Negative for neck pain and neck stiffness.   Skin: Negative for rash.   Neurological: Negative for dizziness, syncope and headaches.   Psychiatric/Behavioral: Negative for sleep disturbance. The patient is nervous/anxious.        Objective:      Physical Exam   Constitutional: She is oriented to person, place, and time. She appears well-developed and well-nourished. No distress.   The patient has gained 9 pounds since 2/10/2017.   HENT:   Head: Normocephalic and atraumatic.   Eyes: Conjunctivae and EOM are normal. No scleral icterus.   Neck: Normal range of motion. No JVD present. No thyromegaly present.   Mild trapezius muscle tenderness is present on palpation.   Cardiovascular: Normal rate, regular rhythm, normal heart sounds and intact distal pulses.  Exam reveals no gallop and no friction rub.    No murmur heard.  Pulmonary/Chest: Effort normal  and breath sounds normal. No respiratory distress. She has no wheezes. She has no rales. She exhibits tenderness.   Abdominal: Soft. Bowel sounds are normal. She exhibits no mass. There is no tenderness.   Musculoskeletal: Normal range of motion. She exhibits tenderness.   Tenderness is present along the inferior border of both scapulae on palpation.  SI joint tenderness is present on palpation.  Tenderness is noted along the lateral aspects of both hips on palpation.   Lymphadenopathy:     She has no cervical adenopathy.   Neurological: She is alert and oriented to person, place, and time.   Skin: Skin is warm and dry. No rash noted.   Psychiatric: She has a normal mood and affect. Her behavior is normal.   Nursing note and vitals reviewed.      Assessment:       1. Fibromyalgia    2. Essential hypertension    3. Fatigue, unspecified type    4. Type 2 diabetes mellitus without complication, without long-term current use of insulin        Plan:       Aracelis was seen today for follow-up.  The dose of amlodipine will be increased to 10 mg daily.  Cymbalta will be continued at 60 mg daily.  Fasting blood tests will be obtained today.  The patient will return to work next week as discussed.  A follow-up visit in 2 months is recommended.    Diagnoses and all orders for this visit:    Fibromyalgia    Essential hypertension    Fatigue, unspecified type    Type 2 diabetes mellitus without complication, without long-term current use of insulin  -     CBC auto differential; Future  -     Comprehensive metabolic panel; Future  -     Lipid panel; Future  -     Hemoglobin A1c; Future    Other orders  -     amlodipine (NORVASC) 10 MG tablet; Take 1 tablet (10 mg total) by mouth once daily.

## 2017-03-26 RX ORDER — DOXEPIN HYDROCHLORIDE 50 MG/1
CAPSULE ORAL
Qty: 90 CAPSULE | Refills: 0 | Status: SHIPPED | OUTPATIENT
Start: 2017-03-26 | End: 2017-05-25 | Stop reason: SDUPTHER

## 2017-03-31 ENCOUNTER — PATIENT MESSAGE (OUTPATIENT)
Dept: INTERNAL MEDICINE | Facility: CLINIC | Age: 62
End: 2017-03-31

## 2017-03-31 DIAGNOSIS — Z12.31 OTHER SCREENING MAMMOGRAM: ICD-10-CM

## 2017-04-06 ENCOUNTER — OFFICE VISIT (OUTPATIENT)
Dept: INTERNAL MEDICINE | Facility: CLINIC | Age: 62
End: 2017-04-06
Payer: COMMERCIAL

## 2017-04-06 VITALS
DIASTOLIC BLOOD PRESSURE: 75 MMHG | WEIGHT: 228.38 LBS | TEMPERATURE: 99 F | HEART RATE: 76 BPM | HEIGHT: 65 IN | BODY MASS INDEX: 38.05 KG/M2 | RESPIRATION RATE: 20 BRPM | SYSTOLIC BLOOD PRESSURE: 137 MMHG

## 2017-04-06 DIAGNOSIS — R22.31 NODULE OF FINGER OF RIGHT HAND: Primary | ICD-10-CM

## 2017-04-06 DIAGNOSIS — E11.9 TYPE 2 DIABETES MELLITUS WITHOUT COMPLICATION, WITHOUT LONG-TERM CURRENT USE OF INSULIN: Chronic | ICD-10-CM

## 2017-04-06 PROCEDURE — 99999 PR PBB SHADOW E&M-EST. PATIENT-LVL III: CPT | Mod: PBBFAC,,, | Performed by: INTERNAL MEDICINE

## 2017-04-06 PROCEDURE — 1160F RVW MEDS BY RX/DR IN RCRD: CPT | Mod: S$GLB,,, | Performed by: INTERNAL MEDICINE

## 2017-04-06 PROCEDURE — 3078F DIAST BP <80 MM HG: CPT | Mod: S$GLB,,, | Performed by: INTERNAL MEDICINE

## 2017-04-06 PROCEDURE — 99212 OFFICE O/P EST SF 10 MIN: CPT | Mod: S$GLB,,, | Performed by: INTERNAL MEDICINE

## 2017-04-06 PROCEDURE — 3044F HG A1C LEVEL LT 7.0%: CPT | Mod: S$GLB,,, | Performed by: INTERNAL MEDICINE

## 2017-04-06 PROCEDURE — 3075F SYST BP GE 130 - 139MM HG: CPT | Mod: S$GLB,,, | Performed by: INTERNAL MEDICINE

## 2017-04-06 PROCEDURE — 3060F POS MICROALBUMINURIA REV: CPT | Mod: S$GLB,,, | Performed by: INTERNAL MEDICINE

## 2017-04-06 RX ORDER — TRAMADOL HYDROCHLORIDE 50 MG/1
TABLET ORAL
Refills: 1 | COMMUNITY
Start: 2017-03-25 | End: 2017-06-20

## 2017-04-06 NOTE — PROGRESS NOTES
Subjective:       Patient ID: Aracelis Martinez is a 61 y.o. female.    Chief Complaint: Cyst (on right hand index and middle finger)    HPI   The patient presents with complaints of painful nodules involving the second and third fingers of the right hand.  These lesions have been present for 2-3 months now.  The initial lesion involved the middle finger.  She relates that the lesion drained sticky fluid and formed a scab.  It remains tender.  The second lesion has been present for 2 months.  Both areas are tender to the touch.  She does not complain of any increased finger joint stiffness except at the site of the nodules.    The patient has type 2 diabetes mellitus.  She feels her blood sugar levels have been stable.  She has return to work.  Her work hours from 8 AM to 3 PM.  She is resting well at night.  She does note some muscle spasms at the end of the day.  Review of Systems   Constitutional: Positive for activity change and fatigue. Negative for chills, fever and unexpected weight change.   Musculoskeletal: Negative for arthralgias and joint swelling.   Skin: Negative for rash.   Hematological: Negative for adenopathy. Does not bruise/bleed easily.       Objective:      Physical Exam   Constitutional: She is oriented to person, place, and time. She appears well-developed and well-nourished. No distress.   Musculoskeletal:   A tender nodular mass is noted involving the right index finger near the DIP joint on the dorsal aspect of the hand.  No surrounding erythema is noted.  The DIP joint is tender on flexion.  A crusted nodular lesion is noted on the dorsal aspect of the right third finger.  Some paronychial hyperpigmentation is present.  Mild tenderness is noted with flexion of the DIP joint.  No other hand lesions are present.   Neurological: She is alert and oriented to person, place, and time.   Sensory examination is intact in the right hand.  No muscle atrophy is noted.   Skin: Skin is warm and dry. No  rash noted.   Nursing note and vitals reviewed.      Assessment:       1. Nodule of finger of right hand    2. Type 2 diabetes mellitus without complication, without long-term current use of insulin        Plan:           Aracelis was seen today for a full finger nodules.  X-rays of the right hand will be obtained.  Orthopedic Hand Surgery consultation will be obtained for further assessment and management.  The patient is to return to clinic as needed.    Diagnoses and all orders for this visit:    Nodule of finger of right hand  -     X-Ray Hand Complete Right; Future  -     Ambulatory consult to Orthopedics    Type 2 diabetes mellitus without complication, without long-term current use of insulin

## 2017-04-24 ENCOUNTER — TELEPHONE (OUTPATIENT)
Dept: ORTHOPEDICS | Facility: CLINIC | Age: 62
End: 2017-04-24

## 2017-04-24 NOTE — TELEPHONE ENCOUNTER
Aracelis Martinez reminded of appointment on 4/25/17 with Dr. SHON Schmidt w/time and location. Notified of need for xray before OV w/date, time, and location of appts.  Pt verbalized understanding.

## 2017-04-25 ENCOUNTER — HOSPITAL ENCOUNTER (OUTPATIENT)
Dept: RADIOLOGY | Facility: OTHER | Age: 62
Discharge: HOME | End: 2017-04-25
Attending: INTERNAL MEDICINE
Payer: COMMERCIAL

## 2017-04-25 ENCOUNTER — OFFICE VISIT (OUTPATIENT)
Dept: ORTHOPEDICS | Facility: CLINIC | Age: 62
End: 2017-04-25
Payer: COMMERCIAL

## 2017-04-25 VITALS
WEIGHT: 228.38 LBS | BODY MASS INDEX: 38.05 KG/M2 | HEART RATE: 76 BPM | HEIGHT: 65 IN | DIASTOLIC BLOOD PRESSURE: 78 MMHG | SYSTOLIC BLOOD PRESSURE: 146 MMHG | RESPIRATION RATE: 18 BRPM

## 2017-04-25 DIAGNOSIS — R22.31 NODULE OF FINGER OF RIGHT HAND: ICD-10-CM

## 2017-04-25 DIAGNOSIS — M67.449 MUCOUS CYST OF FINGER: ICD-10-CM

## 2017-04-25 DIAGNOSIS — M79.644 FINGER PAIN, RIGHT: Primary | ICD-10-CM

## 2017-04-25 PROCEDURE — 3077F SYST BP >= 140 MM HG: CPT | Mod: S$GLB,,, | Performed by: ORTHOPAEDIC SURGERY

## 2017-04-25 PROCEDURE — 99999 PR PBB SHADOW E&M-EST. PATIENT-LVL III: CPT | Mod: PBBFAC,,, | Performed by: ORTHOPAEDIC SURGERY

## 2017-04-25 PROCEDURE — 3078F DIAST BP <80 MM HG: CPT | Mod: S$GLB,,, | Performed by: ORTHOPAEDIC SURGERY

## 2017-04-25 PROCEDURE — 73130 X-RAY EXAM OF HAND: CPT | Mod: 26,RT,, | Performed by: RADIOLOGY

## 2017-04-25 PROCEDURE — 99204 OFFICE O/P NEW MOD 45 MIN: CPT | Mod: S$GLB,,, | Performed by: ORTHOPAEDIC SURGERY

## 2017-04-25 PROCEDURE — 73130 X-RAY EXAM OF HAND: CPT | Mod: TC,RT

## 2017-04-25 PROCEDURE — 1160F RVW MEDS BY RX/DR IN RCRD: CPT | Mod: S$GLB,,, | Performed by: ORTHOPAEDIC SURGERY

## 2017-04-25 NOTE — LETTER
April 27, 2017      Alen Damico MD  [address] [facility] [address]           [facility]  [address], [address]  [facility] [address]  Phone: [phone]          Patient: Aracelis Martinez   MR Number: [id number]   YOB: 1955   Date of Visit: 4/25/2017       Dear Dr. Alen Damico:    Thank you for referring Aracelis Martinez to me for evaluation. Attached you will find relevant portions of my assessment and plan of care.    If you have questions, please do not hesitate to call me. I look forward to following Aracelis Martinez along with you.    Sincerely,    Anabel Castellano PA-C    Enclosure  CC:  No Recipients    If you would like to receive this communication electronically, please contact [email or web address] or [phone] to request more information on [employer] Link access.    For providers and/or their staff who would like to refer a patient to [facility], please contact us through our one-stop-shop provider referral line, [facility], at [phone].    If you feel you have received this communication in error or would no longer like to receive these types of communications, please e-mail [email or web address]

## 2017-04-27 DIAGNOSIS — R22.31 FINGER MASS, RIGHT: Primary | ICD-10-CM

## 2017-04-27 RX ORDER — MUPIROCIN 20 MG/G
1 OINTMENT TOPICAL
Status: CANCELLED | OUTPATIENT
Start: 2017-04-27

## 2017-04-28 NOTE — H&P
SUPERVISING PHYSICIAN:  Sowmya Schmidt M.D.    CHIEF COMPLAINT:  Pain of the right index finger.    HISTORY OF PRESENT ILLNESS:  Ms. Martinez is a very pleasant 62-year-old   right-hand dominant female presenting today for evaluation of her right index   finger pain, something popped up on her finger, it was a mass, it was draining   some clear gel and it was very painful.  She feels her index finger is very   stiff and difficult to bend.  She denies nausea, vomiting, fever or chills.  She   denies any trauma.  She denies any previous infection in the finger.  She   reports she is very active.    Past Medical History:   Diagnosis Date    Arthritis     Chronic back pain     Diabetes mellitus 2014    Diverticulosis     Hypertension     Neuroendocrine tumor of pancreas 5/3/2016    Renal cyst     left    Thyroid disease        Past Surgical History:   Procedure Laterality Date     SECTION      HYSTERECTOMY      KNEE ARTHROSCOPY  2014    LATS/left    TONSILLECTOMY         Social History     Social History    Marital status:      Spouse name: N/A    Number of children: N/A    Years of education: N/A     Occupational History    Not on file.     Social History Main Topics    Smoking status: Former Smoker     Packs/day: 1.50     Years: 10.00     Types: Cigarettes     Quit date: 1982    Smokeless tobacco: Never Used    Alcohol use Yes      Comment: occasional use    Drug use: No    Sexual activity: Yes     Partners: Male     Birth control/ protection: None     Other Topics Concern    Not on file     Social History Narrative       Current Outpatient Prescriptions on File Prior to Visit   Medication Sig Dispense Refill    amlodipine (NORVASC) 10 MG tablet Take 1 tablet (10 mg total) by mouth once daily. 30 tablet 11    blood sugar diagnostic (ONETOUCH VERIO) Strp TEST BLOOD SUGAR LEVELS ONCE DAILY AS DIRECTED 30 strip 11    diclofenac sodium (VOLTAREN) 1 % Gel APPLY 2 TO 4  "GM TOPICALLY FOUR TIMES DAILY AS NEEDED 600 g 6    doxepin (SINEQUAN) 50 MG capsule TAKE 1 CAPSULE(50 MG) BY MOUTH NIGHTLY 90 capsule 0    duloxetine (CYMBALTA) 60 MG capsule Take 1 capsule (60 mg total) by mouth once daily. 90 capsule 3    metformin (GLUCOPHAGE) 500 MG tablet TAKE 1 TABLET BY MOUTH BEFORE DINNER 90 tablet 3    ONETOUCH DELICA LANCETS 33 gauge Misc USE ONE LANCET EVERY  each 0    tramadol (ULTRAM) 50 mg tablet TK 1 TO 2 TS PO BID PRN P  1     No current facility-administered medications on file prior to visit.        Review of patient's allergies indicates:   Allergen Reactions    Azithromycin Other (See Comments)     Yeast infection; pt requests please do not put her on this medication       Review of Systems:  Constitutional: no fever or chills  ENT: no nasal congestion or sore throat  Respiratory: no cough or shortness of breath  Cardiovascular: no chest pain or palpitations  Gastrointestinal: no nausea or vomiting  Genitourinary: no hematuria or dysuria  Integument/Breast: no rash or pruritis  Hematologic/Lymphatic: no easy bruising or lymphadenopathy  Musculoskeletal: see HPI  Neurological: no seizures or tremors  Behavioral/Psych: no auditory or visual hallucinations      PHYSICAL EXAM    Vitals:    04/25/17 1335   BP: (!) 146/78   Pulse: 76   Resp: 18   Weight: 103.6 kg (228 lb 6.3 oz)   Height: 5' 5" (1.651 m)   PainSc:   4   PainLoc: Finger       GENERAL:  Well developed, well nourished, no acute distress.  CARDIOVASCULAR:  Regular rate and rhythm.  LUNGS:  Respirations equal and unlabored.  BEHAVIORAL AND PSYCHIATRIC:  Mood and affect appropriate.  NEUROLOGIC:  No tremor.  HEENT:  Normocephalic, atraumatic.  MUSCULOSKELETAL:  Upper extremity exam:  Distally, she is neurovascularly   intact.  AIN, PIN nerves are intact.  Sensation over the radial, ulnar and   median nerves are equivocal.  She has severe tenderness over the DIP joint of   the right middle finger.  There is a " mucous cyst present.  No drainage.  There   is slight ulceration.  She has limitation in range of motion, but she is able to   flex it about 20 degrees.  She does have a slight extension lag with mucous   cyst as well as edema over the dorsal aspect of the DIP joint.    X-rays show a significant osteophyte over the dorsal aspect of the distal   phalanx of the right index DIP.  There is slight extension lag seen on x-ray.    ASSESSMENT:  DIP mucous cyst and severe osteoarthritis.    PLAN:  I discussed Ms. Martinez at this time she does have osteoarthritis of the   DIP joint of the finger.  We can do a mucous cyst excision and DIP fusion to   help her with pain and prevent this from coming back; however, we could also do   an exostectomy and excision of the mucous cyst; however, we also discussed she   may have a DIP lag and that this may recur.  The patient is not interested in   fusion at this time.  She wishes to proceed with the excision of the mucous   cyst, but she does understand that it may come back.  She does understand she   will likely be splinted for six weeks due to an extension lag.  Follow up with   me postoperatively.    DICTATED BY:  GARCÍA Carroll  dd: 04/27/2017 17:46:24 (CDT)  td: 04/28/2017 10:56:22 (CDT)  Doc ID   #[id number]  Job ID #[id number]    CC:

## 2017-04-28 NOTE — PROGRESS NOTES
CHIEF COMPLAINT:  Right index and middle finger pain.    I have examined the patient and agreed with the physician's assistant note.  The   plan at this time we discussed at great length, excising the mucous cyst with   an exostectomy versus observation versus STIVEN fusion.  The patient does state   that it is painful; however, it is more painful in the area of the cyst, not   necessarily the joint, not painful with range of motion.    Her radiographs were reviewed with the patient.  It does show that she is pretty   significant osteophyte associated with the mucous cyst.  At the end of the   conversation, she did elect for exostectomy with mucous cyst excision.  We will   leave fusion on the table, if the patient continues to have pain.      LES/HN  dd: 04/27/2017 07:52:17 (CDT)  td: 04/28/2017 00:38:54 (CDT)  Doc ID   #[id number]  Job ID #[id number]    CC:

## 2017-04-28 NOTE — PROGRESS NOTES
SUPERVISING PHYSICIAN:  Sowmya Schmidt M.D.    CHIEF COMPLAINT:  Pain of the right index finger.    HISTORY OF PRESENT ILLNESS:  Ms. Martinez is a very pleasant 62-year-old   right-hand dominant female presenting today for evaluation of her right index   finger pain, something popped up on her finger, it was a mass, it was draining   some clear gel and it was very painful.  She feels her index finger is very   stiff and difficult to bend.  She denies nausea, vomiting, fever or chills.  She   denies any trauma.  She denies any previous infection in the finger.  She   reports she is very active.    Past Medical History:   Diagnosis Date    Arthritis     Chronic back pain     Diabetes mellitus 2014    Diverticulosis     Hypertension     Neuroendocrine tumor of pancreas 5/3/2016    Renal cyst     left    Thyroid disease        Past Surgical History:   Procedure Laterality Date     SECTION      HYSTERECTOMY      KNEE ARTHROSCOPY  2014    LATS/left    TONSILLECTOMY         Social History     Social History    Marital status:      Spouse name: N/A    Number of children: N/A    Years of education: N/A     Occupational History    Not on file.     Social History Main Topics    Smoking status: Former Smoker     Packs/day: 1.50     Years: 10.00     Types: Cigarettes     Quit date: 1982    Smokeless tobacco: Never Used    Alcohol use Yes      Comment: occasional use    Drug use: No    Sexual activity: Yes     Partners: Male     Birth control/ protection: None     Other Topics Concern    Not on file     Social History Narrative       Current Outpatient Prescriptions on File Prior to Visit   Medication Sig Dispense Refill    amlodipine (NORVASC) 10 MG tablet Take 1 tablet (10 mg total) by mouth once daily. 30 tablet 11    blood sugar diagnostic (ONETOUCH VERIO) Strp TEST BLOOD SUGAR LEVELS ONCE DAILY AS DIRECTED 30 strip 11    diclofenac sodium (VOLTAREN) 1 % Gel APPLY 2 TO 4  "GM TOPICALLY FOUR TIMES DAILY AS NEEDED 600 g 6    doxepin (SINEQUAN) 50 MG capsule TAKE 1 CAPSULE(50 MG) BY MOUTH NIGHTLY 90 capsule 0    duloxetine (CYMBALTA) 60 MG capsule Take 1 capsule (60 mg total) by mouth once daily. 90 capsule 3    metformin (GLUCOPHAGE) 500 MG tablet TAKE 1 TABLET BY MOUTH BEFORE DINNER 90 tablet 3    ONETOUCH DELICA LANCETS 33 gauge Misc USE ONE LANCET EVERY  each 0    tramadol (ULTRAM) 50 mg tablet TK 1 TO 2 TS PO BID PRN P  1     No current facility-administered medications on file prior to visit.        Review of patient's allergies indicates:   Allergen Reactions    Azithromycin Other (See Comments)     Yeast infection; pt requests please do not put her on this medication       Review of Systems:  Constitutional: no fever or chills  ENT: no nasal congestion or sore throat  Respiratory: no cough or shortness of breath  Cardiovascular: no chest pain or palpitations  Gastrointestinal: no nausea or vomiting  Genitourinary: no hematuria or dysuria  Integument/Breast: no rash or pruritis  Hematologic/Lymphatic: no easy bruising or lymphadenopathy  Musculoskeletal: see HPI  Neurological: no seizures or tremors  Behavioral/Psych: no auditory or visual hallucinations      PHYSICAL EXAM    Vitals:    04/25/17 1335   BP: (!) 146/78   Pulse: 76   Resp: 18   Weight: 103.6 kg (228 lb 6.3 oz)   Height: 5' 5" (1.651 m)   PainSc:   4   PainLoc: Finger       GENERAL:  Well developed, well nourished, no acute distress.  CARDIOVASCULAR:  Regular rate and rhythm.  LUNGS:  Respirations equal and unlabored.  BEHAVIORAL AND PSYCHIATRIC:  Mood and affect appropriate.  NEUROLOGIC:  No tremor.  HEENT:  Normocephalic, atraumatic.  MUSCULOSKELETAL:  Upper extremity exam:  Distally, she is neurovascularly   intact.  AIN, PIN nerves are intact.  Sensation over the radial, ulnar and   median nerves are equivocal.  She has severe tenderness over the DIP joint of   the right middle finger.  There is a " mucous cyst present.  No drainage.  There   is slight ulceration.  She has limitation in range of motion, but she is able to   flex it about 20 degrees.  She does have a slight extension lag with mucous   cyst as well as edema over the dorsal aspect of the DIP joint.    X-rays show a significant osteophyte over the dorsal aspect of the distal   phalanx of the right index DIP.  There is slight extension lag seen on x-ray.    ASSESSMENT:  DIP mucous cyst and severe osteoarthritis.    PLAN:  I discussed Ms. Martinez at this time she does have osteoarthritis of the   DIP joint of the finger.  We can do a mucous cyst excision and DIP fusion to   help her with pain and prevent this from coming back; however, we could also do   an exostectomy and excision of the mucous cyst; however, we also discussed she   may have a DIP lag and that this may recur.  The patient is not interested in   fusion at this time.  She wishes to proceed with the excision of the mucous   cyst, but she does understand that it may come back.  She does understand she   will likely be splinted for six weeks due to an extension lag.  Follow up with   me postoperatively.    DICTATED BY:  GARCÍA Carroll  dd: 04/27/2017 17:46:24 (CDT)  td: 04/28/2017 10:56:22 (CDT)  Doc ID   #[id number]  Job ID #[id number]    CC:

## 2017-05-01 ENCOUNTER — OFFICE VISIT (OUTPATIENT)
Dept: INTERNAL MEDICINE | Facility: CLINIC | Age: 62
End: 2017-05-01
Payer: COMMERCIAL

## 2017-05-01 ENCOUNTER — HOSPITAL ENCOUNTER (OUTPATIENT)
Dept: RADIOLOGY | Facility: HOSPITAL | Age: 62
Discharge: HOME | End: 2017-05-01
Attending: INTERNAL MEDICINE
Payer: COMMERCIAL

## 2017-05-01 VITALS
HEART RATE: 88 BPM | BODY MASS INDEX: 37.5 KG/M2 | SYSTOLIC BLOOD PRESSURE: 120 MMHG | DIASTOLIC BLOOD PRESSURE: 64 MMHG | TEMPERATURE: 98 F | HEIGHT: 65 IN | WEIGHT: 225.06 LBS

## 2017-05-01 DIAGNOSIS — M79.601 PAIN OF RIGHT UPPER EXTREMITY: Primary | ICD-10-CM

## 2017-05-01 DIAGNOSIS — G89.29 CHRONIC LOW BACK PAIN WITH SCIATICA, SCIATICA LATERALITY UNSPECIFIED, UNSPECIFIED BACK PAIN LATERALITY: ICD-10-CM

## 2017-05-01 DIAGNOSIS — M54.40 CHRONIC LOW BACK PAIN WITH SCIATICA, SCIATICA LATERALITY UNSPECIFIED, UNSPECIFIED BACK PAIN LATERALITY: ICD-10-CM

## 2017-05-01 DIAGNOSIS — M79.601 PAIN OF RIGHT UPPER EXTREMITY: ICD-10-CM

## 2017-05-01 PROCEDURE — 73090 X-RAY EXAM OF FOREARM: CPT | Mod: 26,RT,, | Performed by: RADIOLOGY

## 2017-05-01 PROCEDURE — 3074F SYST BP LT 130 MM HG: CPT | Mod: S$GLB,,, | Performed by: INTERNAL MEDICINE

## 2017-05-01 PROCEDURE — 72080 X-RAY EXAM THORACOLMB 2/> VW: CPT | Mod: 26,,, | Performed by: RADIOLOGY

## 2017-05-01 PROCEDURE — 99999 PR PBB SHADOW E&M-EST. PATIENT-LVL III: CPT | Mod: PBBFAC,,, | Performed by: INTERNAL MEDICINE

## 2017-05-01 PROCEDURE — 3078F DIAST BP <80 MM HG: CPT | Mod: S$GLB,,, | Performed by: INTERNAL MEDICINE

## 2017-05-01 PROCEDURE — 72080 X-RAY EXAM THORACOLMB 2/> VW: CPT | Mod: TC,PO

## 2017-05-01 PROCEDURE — 1160F RVW MEDS BY RX/DR IN RCRD: CPT | Mod: S$GLB,,, | Performed by: INTERNAL MEDICINE

## 2017-05-01 PROCEDURE — 73090 X-RAY EXAM OF FOREARM: CPT | Mod: TC,PO,RT

## 2017-05-01 PROCEDURE — 99213 OFFICE O/P EST LOW 20 MIN: CPT | Mod: S$GLB,,, | Performed by: INTERNAL MEDICINE

## 2017-05-01 RX ORDER — METHOCARBAMOL 750 MG/1
750 TABLET, FILM COATED ORAL 3 TIMES DAILY PRN
Qty: 60 TABLET | Refills: 1 | Status: SHIPPED | OUTPATIENT
Start: 2017-05-01 | End: 2017-05-11

## 2017-05-08 ENCOUNTER — TELEPHONE (OUTPATIENT)
Dept: ORTHOPEDICS | Facility: CLINIC | Age: 62
End: 2017-05-08

## 2017-05-08 NOTE — PROGRESS NOTES
Subjective:       Patient ID: Aracelis Martinez is a 62 y.o. female.    Chief Complaint: Fall (arm pain and back pain)    HPI   The patient experienced a fall in her bathtub 10 days ago.  She landed on her back.  She also injured her right forearm in the process of falling.  She continues to complain of pain at both sites.  Review of Systems   Constitutional: Negative for activity change and fever.   Musculoskeletal: Positive for arthralgias. Negative for joint swelling, neck pain and neck stiffness.   Skin: Negative for rash and wound.   Neurological: Negative for weakness and numbness.       Objective:      Physical Exam   Constitutional: She is oriented to person, place, and time. She appears well-developed and well-nourished. No distress.   HENT:   Head: Normocephalic and atraumatic.   Eyes: EOM are normal.   Neck: Normal range of motion. Neck supple.   Musculoskeletal: Normal range of motion. She exhibits tenderness.   Forearm range of motion is intact with normal pronation and supination present.  Slight tenderness is noted of the proximal right forearm.  Negative straight leg raising test bilaterally.  Hip range of motion is intact.   Neurological: She is alert and oriented to person, place, and time. She exhibits normal muscle tone. Coordination normal.   Skin: Skin is warm and dry.   No skin wounds are present.   Nursing note and vitals reviewed.      X-rays of the right forearm were negative for fracture or dislocation.  X-rays of the thoracolumbar spine showed degenerative changes but no fracture, bone destruction, or dislocation.  Assessment:       1. Pain of right upper extremity    2. Chronic low back pain with sciatica, sciatica laterality unspecified, unspecified back pain laterality        Plan:         Aracelis was seen today for fall.  The patient was advised to continue tramadol as needed for pain.  Robaxin was added for treatment of muscle spasm.  Use of topical cold alternating with heat to the  affected soft tissue areas was discussed.  The patient is to return to clinic as needed.    Diagnoses and all orders for this visit:    Pain of right upper extremity  -     X-Ray Forearm Right; Future    Chronic low back pain with sciatica, sciatica laterality unspecified, unspecified back pain laterality  -     X-Ray Thoracolumbar Spine AP Lateral; Future    Other orders  -     methocarbamol (ROBAXIN) 750 MG Tab; Take 1 tablet (750 mg total) by mouth 3 (three) times daily as needed (muscle pain and spasm).

## 2017-05-25 RX ORDER — DOXEPIN HYDROCHLORIDE 50 MG/1
50 CAPSULE ORAL NIGHTLY
Qty: 90 CAPSULE | Refills: 3 | Status: SHIPPED | OUTPATIENT
Start: 2017-05-25 | End: 2018-04-17 | Stop reason: SDUPTHER

## 2017-05-25 RX ORDER — LANCETS 33 GAUGE
1 EACH MISCELLANEOUS DAILY
Qty: 200 EACH | Refills: 1 | Status: SHIPPED | OUTPATIENT
Start: 2017-05-25 | End: 2018-03-07 | Stop reason: SDUPTHER

## 2017-06-02 ENCOUNTER — PATIENT MESSAGE (OUTPATIENT)
Dept: INTERNAL MEDICINE | Facility: CLINIC | Age: 62
End: 2017-06-02

## 2017-06-02 RX ORDER — PANTOPRAZOLE SODIUM 40 MG/1
40 TABLET, DELAYED RELEASE ORAL DAILY
Qty: 30 TABLET | Refills: 4 | Status: SHIPPED | OUTPATIENT
Start: 2017-06-02 | End: 2017-12-12 | Stop reason: SDUPTHER

## 2017-06-20 ENCOUNTER — OFFICE VISIT (OUTPATIENT)
Dept: INTERNAL MEDICINE | Facility: CLINIC | Age: 62
End: 2017-06-20
Payer: COMMERCIAL

## 2017-06-20 VITALS
TEMPERATURE: 99 F | SYSTOLIC BLOOD PRESSURE: 136 MMHG | HEART RATE: 80 BPM | DIASTOLIC BLOOD PRESSURE: 78 MMHG | WEIGHT: 226 LBS | HEIGHT: 65 IN | BODY MASS INDEX: 37.65 KG/M2 | RESPIRATION RATE: 16 BRPM

## 2017-06-20 DIAGNOSIS — K11.20 SIALADENITIS: Primary | ICD-10-CM

## 2017-06-20 DIAGNOSIS — E11.9 TYPE 2 DIABETES MELLITUS WITHOUT COMPLICATION, WITHOUT LONG-TERM CURRENT USE OF INSULIN: Chronic | ICD-10-CM

## 2017-06-20 DIAGNOSIS — I10 ESSENTIAL HYPERTENSION: Chronic | ICD-10-CM

## 2017-06-20 PROCEDURE — 99212 OFFICE O/P EST SF 10 MIN: CPT | Mod: S$GLB,,, | Performed by: INTERNAL MEDICINE

## 2017-06-20 PROCEDURE — 3044F HG A1C LEVEL LT 7.0%: CPT | Mod: S$GLB,,, | Performed by: INTERNAL MEDICINE

## 2017-06-20 PROCEDURE — 99999 PR PBB SHADOW E&M-EST. PATIENT-LVL III: CPT | Mod: PBBFAC,,, | Performed by: INTERNAL MEDICINE

## 2017-06-20 RX ORDER — AMOXICILLIN AND CLAVULANATE POTASSIUM 875; 125 MG/1; MG/1
1 TABLET, FILM COATED ORAL EVERY 12 HOURS
Qty: 20 TABLET | Refills: 0 | Status: SHIPPED | OUTPATIENT
Start: 2017-06-20 | End: 2018-06-14

## 2017-06-20 RX ORDER — TRAMADOL HYDROCHLORIDE 50 MG/1
TABLET ORAL
Qty: 60 TABLET | Refills: 2 | Status: SHIPPED | OUTPATIENT
Start: 2017-06-20 | End: 2017-10-31 | Stop reason: SDUPTHER

## 2017-06-20 NOTE — PROGRESS NOTES
Subjective:       Patient ID: Aracelis Martinez is a 62 y.o. female.    Chief Complaint: Neck Pain (left sided neck pain close to ear since 6/16/17)    HPI   The patient presents with a 5 day history of swollen tender left sided neck glands.  She denies experiencing any chills or fever, sinus congestion, or sore throat.  No ear pain is noted.  He has been tolerating her blood pressure medication well.  She feels her blood sugar levels have been stable.    Review of Systems   Constitutional: Negative for appetite change, fatigue, fever and unexpected weight change.   HENT: Negative for congestion, facial swelling, postnasal drip, sinus pressure and voice change.    Respiratory: Negative for chest tightness, shortness of breath and wheezing.    Cardiovascular: Negative for chest pain and palpitations.   Gastrointestinal: Negative for abdominal pain, constipation and diarrhea.   Genitourinary: Negative for dysuria and hematuria.   Musculoskeletal: Negative for arthralgias, myalgias, neck pain and neck stiffness.   Skin: Negative for rash.   Neurological: Positive for facial asymmetry. Negative for dizziness.   Psychiatric/Behavioral: Negative for confusion.       Objective:      Physical Exam   Constitutional: She is oriented to person, place, and time. She appears well-developed and well-nourished. No distress.   HENT:   Head: Normocephalic and atraumatic.   Right Ear: External ear normal.   Left Ear: External ear normal.   Pharynx is injected but without exudate. Sinuses are tender to palpation.   Eyes: Conjunctivae are normal. Right eye exhibits no discharge. Left eye exhibits no discharge. No scleral icterus.   Neck: Normal range of motion. Neck supple. No JVD present. No thyromegaly present.   A tender swollen left submandibular gland and left anterior cervical node are palpable.  Parotid gland is not tender or enlarged.  There is no supraclavicular adenopathy./   Cardiovascular: Normal rate, regular rhythm and  normal heart sounds.  Exam reveals no gallop and no friction rub.    No murmur heard.  Pulmonary/Chest: Effort normal and breath sounds normal. No respiratory distress. She has no wheezes. She has no rales.   Lymphadenopathy:     She has cervical adenopathy.   Neurological: She is alert and oriented to person, place, and time.   Skin: Skin is warm and dry. No rash noted.   Nursing note and vitals reviewed.      Assessment:       1. Sialadenitis    2. Essential hypertension    3. Type 2 diabetes mellitus without complication, without long-term current use of insulin        Plan:       Aracelis was seen today for sialoadenitis.  Augmentin will be ordered.  The patient should continue Tylenol, local warm compresses, and use of sour lemon drops.  If symptoms do not resolve over the next few days ENT consultation will be obtained for further evaluation.  The patient is to return to clinic as needed.    Diagnoses and all orders for this visit:    Sialadenitis    Essential hypertension    Type 2 diabetes mellitus without complication, without long-term current use of insulin    Other orders  -     amoxicillin-clavulanate 875-125mg (AUGMENTIN) 875-125 mg per tablet; Take 1 tablet by mouth every 12 (twelve) hours.  -     tramadol (ULTRAM) 50 mg tablet; Take 1-2 tabs po bid for pain

## 2017-07-27 ENCOUNTER — PATIENT MESSAGE (OUTPATIENT)
Dept: INTERNAL MEDICINE | Facility: CLINIC | Age: 62
End: 2017-07-27

## 2017-07-27 RX ORDER — SCOLOPAMINE TRANSDERMAL SYSTEM 1 MG/1
1 PATCH, EXTENDED RELEASE TRANSDERMAL
Qty: 6 PATCH | Refills: 1 | Status: SHIPPED | OUTPATIENT
Start: 2017-07-27 | End: 2018-06-14

## 2017-07-27 RX ORDER — SCOLOPAMINE TRANSDERMAL SYSTEM 1 MG/1
1 PATCH, EXTENDED RELEASE TRANSDERMAL
Qty: 6 PATCH | Refills: 0 | Status: SHIPPED | OUTPATIENT
Start: 2017-07-27 | End: 2017-08-26

## 2017-07-28 ENCOUNTER — PATIENT MESSAGE (OUTPATIENT)
Dept: INTERNAL MEDICINE | Facility: CLINIC | Age: 62
End: 2017-07-28

## 2017-08-11 ENCOUNTER — PATIENT MESSAGE (OUTPATIENT)
Dept: INTERNAL MEDICINE | Facility: CLINIC | Age: 62
End: 2017-08-11

## 2017-09-01 RX ORDER — LEVOTHYROXINE SODIUM 50 UG/1
TABLET ORAL
Qty: 90 TABLET | Refills: 3 | Status: SHIPPED | OUTPATIENT
Start: 2017-09-01 | End: 2018-07-23 | Stop reason: SDUPTHER

## 2017-10-31 ENCOUNTER — OFFICE VISIT (OUTPATIENT)
Dept: ORTHOPEDICS | Facility: CLINIC | Age: 62
End: 2017-10-31
Payer: COMMERCIAL

## 2017-10-31 ENCOUNTER — HOSPITAL ENCOUNTER (OUTPATIENT)
Dept: RADIOLOGY | Facility: HOSPITAL | Age: 62
Discharge: HOME | End: 2017-10-31
Attending: PHYSICIAN ASSISTANT
Payer: COMMERCIAL

## 2017-10-31 VITALS — WEIGHT: 224.88 LBS | HEIGHT: 64 IN | BODY MASS INDEX: 38.39 KG/M2

## 2017-10-31 DIAGNOSIS — M17.12 PRIMARY OSTEOARTHRITIS OF LEFT KNEE: ICD-10-CM

## 2017-10-31 DIAGNOSIS — M17.0 PRIMARY OSTEOARTHRITIS OF BOTH KNEES: Primary | ICD-10-CM

## 2017-10-31 DIAGNOSIS — M25.569 KNEE PAIN, UNSPECIFIED CHRONICITY, UNSPECIFIED LATERALITY: ICD-10-CM

## 2017-10-31 DIAGNOSIS — M17.0 PRIMARY OSTEOARTHRITIS OF BOTH KNEES: ICD-10-CM

## 2017-10-31 PROCEDURE — 73564 X-RAY EXAM KNEE 4 OR MORE: CPT | Mod: 26,59,LT, | Performed by: RADIOLOGY

## 2017-10-31 PROCEDURE — 99999 PR PBB SHADOW E&M-EST. PATIENT-LVL III: CPT | Mod: PBBFAC,,, | Performed by: PHYSICIAN ASSISTANT

## 2017-10-31 PROCEDURE — 73564 X-RAY EXAM KNEE 4 OR MORE: CPT | Mod: 26,RT,, | Performed by: RADIOLOGY

## 2017-10-31 PROCEDURE — 99213 OFFICE O/P EST LOW 20 MIN: CPT | Mod: 25,S$GLB,, | Performed by: PHYSICIAN ASSISTANT

## 2017-10-31 PROCEDURE — 20610 DRAIN/INJ JOINT/BURSA W/O US: CPT | Mod: 50,S$GLB,, | Performed by: PHYSICIAN ASSISTANT

## 2017-10-31 PROCEDURE — 73564 X-RAY EXAM KNEE 4 OR MORE: CPT | Mod: TC,50

## 2017-10-31 RX ORDER — MELOXICAM 15 MG/1
TABLET ORAL
Qty: 90 TABLET | Refills: 0 | Status: SHIPPED | OUTPATIENT
Start: 2017-10-31 | End: 2018-06-14

## 2017-10-31 RX ORDER — TRIAMCINOLONE ACETONIDE 40 MG/ML
80 INJECTION, SUSPENSION INTRA-ARTICULAR; INTRAMUSCULAR
Status: COMPLETED | OUTPATIENT
Start: 2017-10-31 | End: 2017-10-31

## 2017-10-31 RX ORDER — TRAMADOL HYDROCHLORIDE 50 MG/1
TABLET ORAL
Qty: 60 TABLET | Refills: 0 | Status: SHIPPED | OUTPATIENT
Start: 2017-10-31 | End: 2017-12-12 | Stop reason: SDUPTHER

## 2017-10-31 RX ORDER — MELOXICAM 15 MG/1
15 TABLET ORAL DAILY
Qty: 30 TABLET | Refills: 0 | Status: SHIPPED | OUTPATIENT
Start: 2017-10-31 | End: 2017-10-31 | Stop reason: SDUPTHER

## 2017-10-31 RX ADMIN — TRIAMCINOLONE ACETONIDE 80 MG: 40 INJECTION, SUSPENSION INTRA-ARTICULAR; INTRAMUSCULAR at 09:10

## 2017-10-31 NOTE — PROGRESS NOTES
"  SUBJECTIVE:     Chief Complaint : bilateral knee pain    History of Present Illness:  Aracelis Martinez is a 62 y.o. female seen in clinic today with a chief complaint of bilateral knee pain. Patient fell over concrete slab in parking lot onto her knees on Oct 17 (2 weeks ago). She is doing well but her knees are swollen.  She has tried ice.  She has chronic left knee pain. Knee scoped in 2014 by Dr. [facility] revealing 3-4 damage MFC. She has been doing well for the past year after having Euflexxa injections November of 2016.  She has also had steroid in the past. Currently she has moderate knee pain with some swelling of right knee. No mechanical symptoms or instability. No hip or groin pain.     Past Medical History:   Diagnosis Date    Arthritis     Chronic back pain     Diabetes mellitus 07/2014    Diverticulosis     Hypertension     Neuroendocrine tumor of pancreas 5/3/2016    Renal cyst     left    Thyroid disease        Review of Systems:  Constitutional: no fever or chills  ENT: no nasal congestion or sore throat  Respiratory: no cough or shortness of breath  Cardiovascular: no chest pain or palpitations  Gastrointestinal: no nausea or vomiting, tolerating diet    OBJECTIVE:     PHYSICAL EXAM:  Height 5' 4" (1.626 m), weight 102 kg (224 lb 13.9 oz).   General Appearance: WDWN, NAD  Gait: Normal  Neuro/Psych: Mood & affect appropriate  Lungs: Respirations equal and unlabored.   CV: 2+ bilateral upper and lower extremity pulses.   Skin: Intact throughout LE  Extremities: No LE edema    Right Knee Exam  Range of Motion:0-120 active   Effusion: yes  Condition of skin:intact  Location of tenderness:Medial joint line   Strength:5 of 5 quadriceps strength and 5 of 5 hamstring strength  Stability:stable to testing  Olga: painful    Left Knee Exam  Range of Motion:0-125 active   Effusion:not significant  Condition of skin:intact  Location of tenderness:Medial joint line   Strength:5 of 5 quadriceps " strength and 5 of 5 hamstring strength  Stability:stable to testing  Olga: negative/negative    Alignment: Mild varus    Right Hip Examination: no pain with PROM     Left Hip Examination: no pain with PROM     RADIOGRAPHS: AP, lateral and merchant knee x-rays ordered and images reviewed today by me reveal advanced degenerative changes medial compartment both knees. No fracture.    ASSESSMENT/PLAN:   Primary osteoarthritis both knees  Fall  - Discussed options. Offered to drain right knee. Pain prefers injection only. Steroid injections in both knees.  - Mobic 15 mg daily x 2 weeks.  - Call if symptoms worsen or fail to improve. RTC PRN.    Knee Injection Procedure Note    Pre-operative Diagnosis: bilateral knee degenerative arthritis    Post-operative Diagnosis: same    Indications: bilateral knee pain    Anesthesia: none    Procedure Details     Verbal consent was obtained for the procedure. The injection site was identified and the skin was prepared with alcohol. The bilateral knee was injected from an anterolateral approach with 1 ml of Kenalog and 2 ml Lidocaine under sterile technique using a 22 gauge needle. The needle was removed and the area cleansed and dressed.    Complications:  None; patient tolerated the procedure well.    she was advised to rest the knee today, using ice and elevation as needed for comfort and swelling. she did receive immediate relief of the knee pain. she was told this would be short lived and is secondary to the lidocaine. she may have an increase in discomfort tonight followed by steady improvement over the next several days. It may take 1-3 weeks following the injection to get the full benefit of the medication.

## 2017-11-13 RX ORDER — METFORMIN HYDROCHLORIDE 500 MG/1
TABLET ORAL
Qty: 90 TABLET | Refills: 3 | Status: SHIPPED | OUTPATIENT
Start: 2017-11-13 | End: 2017-11-13 | Stop reason: SDUPTHER

## 2017-11-14 ENCOUNTER — PATIENT MESSAGE (OUTPATIENT)
Dept: ORTHOPEDICS | Facility: CLINIC | Age: 62
End: 2017-11-14

## 2017-11-14 DIAGNOSIS — M17.0 PRIMARY OSTEOARTHRITIS OF BOTH KNEES: Primary | ICD-10-CM

## 2017-11-14 RX ORDER — HYALURONATE SODIUM 20 MG/2 ML
40 SYRINGE (ML) INTRAARTICULAR WEEKLY
Status: SHIPPED | OUTPATIENT
Start: 2017-11-14 | End: 2017-12-05

## 2017-11-14 RX ORDER — METFORMIN HYDROCHLORIDE 500 MG/1
TABLET ORAL
Qty: 90 TABLET | Refills: 3 | Status: SHIPPED | OUTPATIENT
Start: 2017-11-14 | End: 2018-10-18

## 2017-12-08 RX ORDER — INSULIN PUMP SYRINGE, 3 ML
EACH MISCELLANEOUS
Qty: 1 EACH | Refills: 0 | Status: SHIPPED | OUTPATIENT
Start: 2017-12-08 | End: 2019-11-12

## 2017-12-14 RX ORDER — PANTOPRAZOLE SODIUM 40 MG/1
TABLET, DELAYED RELEASE ORAL
Qty: 30 TABLET | Refills: 6 | Status: SHIPPED | OUTPATIENT
Start: 2017-12-14 | End: 2017-12-14 | Stop reason: SDUPTHER

## 2017-12-14 RX ORDER — TRAMADOL HYDROCHLORIDE 50 MG/1
TABLET ORAL
Qty: 60 TABLET | Refills: 1 | Status: SHIPPED | OUTPATIENT
Start: 2017-12-14 | End: 2018-06-14 | Stop reason: SDUPTHER

## 2017-12-15 RX ORDER — PANTOPRAZOLE SODIUM 40 MG/1
TABLET, DELAYED RELEASE ORAL
Qty: 90 TABLET | Refills: 3 | Status: SHIPPED | OUTPATIENT
Start: 2017-12-15 | End: 2019-01-05 | Stop reason: SDUPTHER

## 2018-01-26 DIAGNOSIS — E11.9 TYPE 2 DIABETES MELLITUS WITHOUT COMPLICATION: ICD-10-CM

## 2018-01-30 DIAGNOSIS — M17.0 PRIMARY OSTEOARTHRITIS OF BOTH KNEES: Primary | ICD-10-CM

## 2018-01-30 RX ORDER — HYALURONATE SODIUM 20 MG/2 ML
40 SYRINGE (ML) INTRAARTICULAR WEEKLY
Status: SHIPPED | OUTPATIENT
Start: 2018-01-30 | End: 2018-02-20

## 2018-02-09 DIAGNOSIS — E11.9 TYPE 2 DIABETES MELLITUS WITHOUT COMPLICATION: ICD-10-CM

## 2018-02-12 RX ORDER — AMLODIPINE BESYLATE 10 MG/1
TABLET ORAL
Qty: 90 TABLET | Refills: 3 | Status: SHIPPED | OUTPATIENT
Start: 2018-02-12 | End: 2018-06-14 | Stop reason: SDUPTHER

## 2018-02-16 ENCOUNTER — OFFICE VISIT (OUTPATIENT)
Dept: ORTHOPEDICS | Facility: CLINIC | Age: 63
End: 2018-02-16
Payer: COMMERCIAL

## 2018-02-16 VITALS — BODY MASS INDEX: 38.39 KG/M2 | HEIGHT: 64 IN | WEIGHT: 224.88 LBS

## 2018-02-16 DIAGNOSIS — M17.0 PRIMARY OSTEOARTHRITIS OF BOTH KNEES: Primary | ICD-10-CM

## 2018-02-16 PROCEDURE — 99999 PR PBB SHADOW E&M-EST. PATIENT-LVL III: CPT | Mod: PBBFAC,,, | Performed by: PHYSICIAN ASSISTANT

## 2018-02-16 PROCEDURE — 3008F BODY MASS INDEX DOCD: CPT | Mod: S$GLB,,, | Performed by: PHYSICIAN ASSISTANT

## 2018-02-16 PROCEDURE — 99213 OFFICE O/P EST LOW 20 MIN: CPT | Mod: 25,SA,S$GLB, | Performed by: PHYSICIAN ASSISTANT

## 2018-02-16 PROCEDURE — 20610 DRAIN/INJ JOINT/BURSA W/O US: CPT | Mod: 59,LT,S$GLB, | Performed by: PHYSICIAN ASSISTANT

## 2018-02-16 RX ORDER — TRIAMCINOLONE ACETONIDE 40 MG/ML
80 INJECTION, SUSPENSION INTRA-ARTICULAR; INTRAMUSCULAR
Status: COMPLETED | OUTPATIENT
Start: 2018-02-16 | End: 2018-02-16

## 2018-02-16 RX ADMIN — TRIAMCINOLONE ACETONIDE 80 MG: 40 INJECTION, SUSPENSION INTRA-ARTICULAR; INTRAMUSCULAR at 11:02

## 2018-02-16 NOTE — PROGRESS NOTES
"  SUBJECTIVE:     Chief Complaint : bilateral knee pain    History of Present Illness:  Aracelis Martinez is a 62 y.o. female seen in clinic today with a chief complaint of bilateral knee pain. Pt has chronic knee pain. She had bilateral steroid injections 10/31/2017 that worked well.  Left knee has been hurting longer but right is now worse. Right is swollen.  Knee scoped in 2014 by Dr. [facility] revealing 3-4 damage MFC. She had Euflexxa injections November of 2016.   No mechanical symptoms or instability. No hip or groin pain. Takes tramadol for knee pain.     Past Medical History:   Diagnosis Date    Arthritis     Chronic back pain     Diabetes mellitus 07/2014    Diverticulosis     Hypertension     Neuroendocrine tumor of pancreas 5/3/2016    Renal cyst     left    Thyroid disease        Review of Systems:  Constitutional: no fever or chills  ENT: no nasal congestion or sore throat  Respiratory: no cough or shortness of breath  Cardiovascular: no chest pain or palpitations  Gastrointestinal: no nausea or vomiting, tolerating diet    OBJECTIVE:     PHYSICAL EXAM:  Height 5' 4" (1.626 m), weight 102 kg (224 lb 13.9 oz).   General Appearance: WDWN, NAD  Gait: Normal  Neuro/Psych: Mood & affect appropriate  Lungs: Respirations equal and unlabored.   CV: 2+ bilateral upper and lower extremity pulses.   Skin: Intact throughout LE  Extremities: No LE edema    Right Knee Exam  Range of Motion:0-120 active   Effusion: yes  Condition of skin:intact  Location of tenderness:Medial joint line   Strength:5 of 5 quadriceps strength and 5 of 5 hamstring strength  Stability:stable to testing  Olga: painful    Left Knee Exam  Range of Motion:0-125 active   Effusion:not significant  Condition of skin:intact  Location of tenderness:Medial joint line   Strength:5 of 5 quadriceps strength and 5 of 5 hamstring strength  Stability:stable to testing  Olga: negative/negative    Alignment: Mild varus    Right Hip " Examination: no pain with PROM     Left Hip Examination: no pain with PROM     RADIOGRAPHS: AP, lateral and merchant knee x-rays reviewed today by me reveal advanced degenerative changes medial compartment both knees. No fracture.    ASSESSMENT/PLAN:   Primary osteoarthritis both knees    Knee Injection Procedure Note    Pre-operative Diagnosis: left knee degenerative arthritis    Post-operative Diagnosis: same    Indications: left knee pain    Anesthesia: none    Procedure Details     Verbal consent was obtained for the procedure. The injection site was identified and the skin was prepared with alcohol. The left knee was injected from an anterolateral approach with 1 ml of Kenalog and 3 ml Lidocaine under sterile technique using a 22 gauge needle. The needle was removed and the area cleansed and dressed.    Complications:  None; patient tolerated the procedure well.    she was advised to rest the knee today, using ice and elevation as needed for comfort and swelling. she did receive immediate relief of the knee pain. she was told this would be short lived and is secondary to the lidocaine. she may have an increase in discomfort tonight followed by steady improvement over the next several days. It may take 1-3 weeks following the injection to get the full benefit of the medication.    Knee Arthrocentesis With Injection Procedure Note  Diagnosis: right knee osteoarthritis with effusion  Indications: Knee pain  Procedure Details: Verbal consent obtained and allergies reviewed. Area prepped with alcohol.  An 18 gauge needle was inserted into superolateral aspect of knee. 5 ml of clear yellow was removed from the joint and discarded.  The joint was then injected through the same needle with 1 mg of Kenalog and 3 ml 1% lidocaine. The needle was removed and the area was cleansed and dressed.    Complications: None.

## 2018-02-26 ENCOUNTER — PATIENT MESSAGE (OUTPATIENT)
Dept: ORTHOPEDICS | Facility: CLINIC | Age: 63
End: 2018-02-26

## 2018-02-28 DIAGNOSIS — M17.0 PRIMARY OSTEOARTHRITIS OF BOTH KNEES: Primary | ICD-10-CM

## 2018-02-28 RX ORDER — HYALURONATE SODIUM 20 MG/2 ML
40 SYRINGE (ML) INTRAARTICULAR WEEKLY
Status: COMPLETED | OUTPATIENT
Start: 2018-03-12 | End: 2018-03-27

## 2018-03-07 RX ORDER — LANCETS 33 GAUGE
1 EACH MISCELLANEOUS DAILY
Qty: 200 EACH | Refills: 1 | Status: SHIPPED | OUTPATIENT
Start: 2018-03-07 | End: 2019-09-20 | Stop reason: SDUPTHER

## 2018-03-14 ENCOUNTER — PATIENT MESSAGE (OUTPATIENT)
Dept: ORTHOPEDICS | Facility: CLINIC | Age: 63
End: 2018-03-14

## 2018-03-14 RX ORDER — AMLODIPINE BESYLATE 10 MG/1
TABLET ORAL
Qty: 30 TABLET | Refills: 10 | Status: SHIPPED | OUTPATIENT
Start: 2018-03-14 | End: 2019-03-12 | Stop reason: SDUPTHER

## 2018-03-16 ENCOUNTER — OFFICE VISIT (OUTPATIENT)
Dept: ORTHOPEDICS | Facility: CLINIC | Age: 63
End: 2018-03-16
Payer: COMMERCIAL

## 2018-03-16 VITALS — HEIGHT: 64 IN | BODY MASS INDEX: 38.39 KG/M2 | WEIGHT: 224.88 LBS

## 2018-03-16 DIAGNOSIS — M17.0 PRIMARY OSTEOARTHRITIS OF BOTH KNEES: Primary | ICD-10-CM

## 2018-03-16 PROCEDURE — 99499 UNLISTED E&M SERVICE: CPT | Mod: SA,S$GLB,, | Performed by: PHYSICIAN ASSISTANT

## 2018-03-16 PROCEDURE — 20610 DRAIN/INJ JOINT/BURSA W/O US: CPT | Mod: RT,S$GLB,, | Performed by: PHYSICIAN ASSISTANT

## 2018-03-16 PROCEDURE — 99999 PR PBB SHADOW E&M-EST. PATIENT-LVL III: CPT | Mod: PBBFAC,,, | Performed by: PHYSICIAN ASSISTANT

## 2018-03-16 RX ADMIN — Medication 40 MG: at 01:03

## 2018-03-16 NOTE — PROGRESS NOTES
Aracelis Martinez presents to clinic today for the first bilateral knee Euflexxa injection.    Exam demonstrates the no effusion in the bilateral knee, and the skin is intact.    Diagnosis: osteoarthritis knee    After time out was performed and patient ID, side, and site were verified, the right knee and the left knee were sterilly prepped in the standard fashion.  A 22-gauge needle was introduced into the right knee and the left knee joint from an luis-lateral site without complication. The right knee and the left knee were then injected with 2 ml of Euflexxa each. Sterile dressing was applied.  The patient was instructed to resume activities as tolerated and to call with any problems.     We will see Aracelis Martinez back next week for the second injection.

## 2018-03-23 ENCOUNTER — OFFICE VISIT (OUTPATIENT)
Dept: ORTHOPEDICS | Facility: CLINIC | Age: 63
End: 2018-03-23
Payer: COMMERCIAL

## 2018-03-23 VITALS — WEIGHT: 224.88 LBS | HEIGHT: 64 IN | BODY MASS INDEX: 38.39 KG/M2

## 2018-03-23 DIAGNOSIS — M17.0 PRIMARY OSTEOARTHRITIS OF BOTH KNEES: Primary | ICD-10-CM

## 2018-03-23 PROCEDURE — 99499 UNLISTED E&M SERVICE: CPT | Mod: SA,S$GLB,, | Performed by: PHYSICIAN ASSISTANT

## 2018-03-23 PROCEDURE — 99999 PR PBB SHADOW E&M-EST. PATIENT-LVL III: CPT | Mod: PBBFAC,,, | Performed by: PHYSICIAN ASSISTANT

## 2018-03-23 PROCEDURE — 20610 DRAIN/INJ JOINT/BURSA W/O US: CPT | Mod: RT,S$GLB,, | Performed by: PHYSICIAN ASSISTANT

## 2018-03-23 RX ADMIN — Medication 40 MG: at 11:03

## 2018-03-23 NOTE — PROGRESS NOTES
Aracelis Martinez presents to clinic today for the second bilateral knee Euflexxa injection.    Exam demonstrates the no effusion in the bilateral knee, and the skin is intact.    Diagnosis: osteoarthritis knee    After time out was performed and patient ID, side, and site were verified, the right knee and the left knee were sterilly prepped in the standard fashion.  A 22-gauge needle was introduced into the right knee and the left knee joint from an luis-lateral site without complication. The right knee and the left knee were then injected with 2 ml of Euflexxa each. Sterile dressing was applied.  The patient was instructed to resume activities as tolerated and to call with any problems.     We will see Aracelis Martinez back next week for the third injection.

## 2018-03-27 ENCOUNTER — OFFICE VISIT (OUTPATIENT)
Dept: ORTHOPEDICS | Facility: CLINIC | Age: 63
End: 2018-03-27
Payer: COMMERCIAL

## 2018-03-27 VITALS — BODY MASS INDEX: 38.39 KG/M2 | HEIGHT: 64 IN | WEIGHT: 224.88 LBS

## 2018-03-27 DIAGNOSIS — M17.0 PRIMARY OSTEOARTHRITIS OF BOTH KNEES: Primary | ICD-10-CM

## 2018-03-27 PROCEDURE — 20610 DRAIN/INJ JOINT/BURSA W/O US: CPT | Mod: RT,S$GLB,, | Performed by: PHYSICIAN ASSISTANT

## 2018-03-27 PROCEDURE — 99999 PR PBB SHADOW E&M-EST. PATIENT-LVL III: CPT | Mod: PBBFAC,,, | Performed by: PHYSICIAN ASSISTANT

## 2018-03-27 PROCEDURE — 99499 UNLISTED E&M SERVICE: CPT | Mod: SA,S$GLB,, | Performed by: PHYSICIAN ASSISTANT

## 2018-03-27 RX ADMIN — Medication 40 MG: at 12:03

## 2018-03-27 NOTE — PROGRESS NOTES
Aracelis Martinez presents to clinic today for the third bilateral knee Euflexxa injection.    Exam demonstrates the no effusion in the bilateral knee, and the skin is intact.    Diagnosis: osteoarthritis knee    After time out was performed and patient ID, side, and site were verified, the right knee and the left knee were sterilly prepped in the standard fashion.  A 22-gauge needle was introduced into the right knee and the left knee joint from an luis-lateral site without complication. The right knee and the left knee were then injected with 2 ml of Euflexxa each. Sterile dressing was applied.  The patient was instructed to resume activities as tolerated and to call with any problems.     F/u prn.

## 2018-04-03 ENCOUNTER — TELEPHONE (OUTPATIENT)
Dept: INTERNAL MEDICINE | Facility: CLINIC | Age: 63
End: 2018-04-03

## 2018-04-03 DIAGNOSIS — Z00.00 ROUTINE GENERAL MEDICAL EXAMINATION AT A HEALTH CARE FACILITY: Primary | ICD-10-CM

## 2018-04-03 NOTE — TELEPHONE ENCOUNTER
----- Message from Kayleigh Hobson sent at 4/3/2018  1:44 PM CDT -----  Contact: Patient called - [phone]  Doctor appointment and lab have been scheduled.  Please link lab orders to the lab appointment.  Date of doctor appointment:    Physical or EP:  Physical:  [id number]  Date of lab appointment:  Labs: [id number]  Comments:     Thanks Stephanie

## 2018-04-18 RX ORDER — DOXEPIN HYDROCHLORIDE 50 MG/1
CAPSULE ORAL
Qty: 90 CAPSULE | Refills: 2 | Status: SHIPPED | OUTPATIENT
Start: 2018-04-18 | End: 2018-06-14 | Stop reason: SDUPTHER

## 2018-06-05 ENCOUNTER — LAB VISIT (OUTPATIENT)
Dept: LAB | Facility: HOSPITAL | Age: 63
End: 2018-06-05
Attending: INTERNAL MEDICINE
Payer: COMMERCIAL

## 2018-06-05 DIAGNOSIS — Z00.00 ROUTINE GENERAL MEDICAL EXAMINATION AT A HEALTH CARE FACILITY: ICD-10-CM

## 2018-06-05 LAB
ALBUMIN SERPL BCP-MCNC: 3.7 G/DL
ALP SERPL-CCNC: 85 U/L
ALT SERPL W/O P-5'-P-CCNC: 15 U/L
ANION GAP SERPL CALC-SCNC: 9 MMOL/L
AST SERPL-CCNC: 14 U/L
BASOPHILS # BLD AUTO: 0.03 K/UL
BASOPHILS NFR BLD: 0.5 %
BILIRUB SERPL-MCNC: 0.7 MG/DL
BUN SERPL-MCNC: 9 MG/DL
CALCIUM SERPL-MCNC: 9.4 MG/DL
CHLORIDE SERPL-SCNC: 106 MMOL/L
CHOLEST SERPL-MCNC: 163 MG/DL
CHOLEST/HDLC SERPL: 3.9 {RATIO}
CO2 SERPL-SCNC: 25 MMOL/L
CREAT SERPL-MCNC: 0.8 MG/DL
DIFFERENTIAL METHOD: ABNORMAL
EOSINOPHIL # BLD AUTO: 0.2 K/UL
EOSINOPHIL NFR BLD: 2.6 %
ERYTHROCYTE [DISTWIDTH] IN BLOOD BY AUTOMATED COUNT: 15.3 %
EST. GFR  (AFRICAN AMERICAN): >60 ML/MIN/1.73 M^2
EST. GFR  (NON AFRICAN AMERICAN): >60 ML/MIN/1.73 M^2
GLUCOSE SERPL-MCNC: 127 MG/DL
HCT VFR BLD AUTO: 45.6 %
HDLC SERPL-MCNC: 42 MG/DL
HDLC SERPL: 25.8 %
HGB BLD-MCNC: 14.4 G/DL
IMM GRANULOCYTES # BLD AUTO: 0.01 K/UL
IMM GRANULOCYTES NFR BLD AUTO: 0.2 %
LDLC SERPL CALC-MCNC: 102.6 MG/DL
LYMPHOCYTES # BLD AUTO: 3.5 K/UL
LYMPHOCYTES NFR BLD: 53 %
MCH RBC QN AUTO: 26.7 PG
MCHC RBC AUTO-ENTMCNC: 31.6 G/DL
MCV RBC AUTO: 84 FL
MONOCYTES # BLD AUTO: 0.6 K/UL
MONOCYTES NFR BLD: 8.8 %
NEUTROPHILS # BLD AUTO: 2.3 K/UL
NEUTROPHILS NFR BLD: 34.9 %
NONHDLC SERPL-MCNC: 121 MG/DL
NRBC BLD-RTO: 0 /100 WBC
PLATELET # BLD AUTO: 424 K/UL
PMV BLD AUTO: 9.7 FL
POTASSIUM SERPL-SCNC: 3.9 MMOL/L
PROT SERPL-MCNC: 7 G/DL
RBC # BLD AUTO: 5.4 M/UL
SODIUM SERPL-SCNC: 140 MMOL/L
TRIGL SERPL-MCNC: 92 MG/DL
TSH SERPL DL<=0.005 MIU/L-ACNC: 3.81 UIU/ML
WBC # BLD AUTO: 6.58 K/UL

## 2018-06-05 PROCEDURE — 36415 COLL VENOUS BLD VENIPUNCTURE: CPT | Mod: PO

## 2018-06-05 PROCEDURE — 80053 COMPREHEN METABOLIC PANEL: CPT

## 2018-06-05 PROCEDURE — 84443 ASSAY THYROID STIM HORMONE: CPT

## 2018-06-05 PROCEDURE — 80061 LIPID PANEL: CPT

## 2018-06-05 PROCEDURE — 85025 COMPLETE CBC W/AUTO DIFF WBC: CPT

## 2018-06-14 ENCOUNTER — OFFICE VISIT (OUTPATIENT)
Dept: INTERNAL MEDICINE | Facility: CLINIC | Age: 63
End: 2018-06-14
Payer: COMMERCIAL

## 2018-06-14 ENCOUNTER — HOSPITAL ENCOUNTER (OUTPATIENT)
Dept: RADIOLOGY | Facility: HOSPITAL | Age: 63
Discharge: HOME | End: 2018-06-14
Attending: INTERNAL MEDICINE
Payer: COMMERCIAL

## 2018-06-14 VITALS
DIASTOLIC BLOOD PRESSURE: 70 MMHG | TEMPERATURE: 99 F | RESPIRATION RATE: 16 BRPM | BODY MASS INDEX: 38.14 KG/M2 | HEART RATE: 70 BPM | WEIGHT: 222.25 LBS | SYSTOLIC BLOOD PRESSURE: 126 MMHG

## 2018-06-14 DIAGNOSIS — E78.5 HYPERLIPIDEMIA, UNSPECIFIED HYPERLIPIDEMIA TYPE: Chronic | ICD-10-CM

## 2018-06-14 DIAGNOSIS — K63.5 POLYP OF COLON, UNSPECIFIED PART OF COLON, UNSPECIFIED TYPE: ICD-10-CM

## 2018-06-14 DIAGNOSIS — I10 ESSENTIAL HYPERTENSION: Chronic | ICD-10-CM

## 2018-06-14 DIAGNOSIS — M25.532 WRIST PAIN, LEFT: ICD-10-CM

## 2018-06-14 DIAGNOSIS — M19.90 ARTHRITIS: ICD-10-CM

## 2018-06-14 DIAGNOSIS — Z12.11 ENCOUNTER FOR SCREENING COLONOSCOPY: ICD-10-CM

## 2018-06-14 DIAGNOSIS — Z00.00 WELL ADULT EXAM: Primary | ICD-10-CM

## 2018-06-14 DIAGNOSIS — E11.9 TYPE 2 DIABETES MELLITUS WITHOUT COMPLICATION, WITHOUT LONG-TERM CURRENT USE OF INSULIN: Chronic | ICD-10-CM

## 2018-06-14 PROCEDURE — 3078F DIAST BP <80 MM HG: CPT | Mod: CPTII,S$GLB,, | Performed by: INTERNAL MEDICINE

## 2018-06-14 PROCEDURE — 3074F SYST BP LT 130 MM HG: CPT | Mod: CPTII,S$GLB,, | Performed by: INTERNAL MEDICINE

## 2018-06-14 PROCEDURE — 73110 X-RAY EXAM OF WRIST: CPT | Mod: 26,LT,, | Performed by: RADIOLOGY

## 2018-06-14 PROCEDURE — 99999 PR PBB SHADOW E&M-EST. PATIENT-LVL III: CPT | Mod: PBBFAC,,, | Performed by: INTERNAL MEDICINE

## 2018-06-14 PROCEDURE — 3044F HG A1C LEVEL LT 7.0%: CPT | Mod: CPTII,S$GLB,, | Performed by: INTERNAL MEDICINE

## 2018-06-14 PROCEDURE — 99396 PREV VISIT EST AGE 40-64: CPT | Mod: S$GLB,,, | Performed by: INTERNAL MEDICINE

## 2018-06-14 PROCEDURE — 73110 X-RAY EXAM OF WRIST: CPT | Mod: TC,PO,LT

## 2018-06-14 RX ORDER — DOXEPIN HYDROCHLORIDE 100 MG/1
100 CAPSULE ORAL NIGHTLY
Qty: 90 CAPSULE | Refills: 3 | Status: SHIPPED | OUTPATIENT
Start: 2018-06-14 | End: 2019-07-04 | Stop reason: SDUPTHER

## 2018-06-14 RX ORDER — DULOXETIN HYDROCHLORIDE 30 MG/1
30 CAPSULE, DELAYED RELEASE ORAL DAILY
Qty: 90 CAPSULE | Refills: 3 | Status: SHIPPED | OUTPATIENT
Start: 2018-06-14 | End: 2019-06-18 | Stop reason: SDUPTHER

## 2018-06-14 RX ORDER — TRAMADOL HYDROCHLORIDE 50 MG/1
TABLET ORAL
Qty: 90 TABLET | Refills: 2 | Status: SHIPPED | OUTPATIENT
Start: 2018-06-14 | End: 2019-02-25 | Stop reason: SDUPTHER

## 2018-06-14 NOTE — PROGRESS NOTES
Subjective:       Patient ID: Aracelis Martinez is a 63 y.o. female.    Chief Complaint: Annual Exam; Hypertension; and Hyperlipidemia    HPI     The patient presents for annual physical examination and follow-up.  The active medical conditions include type 2 diabetes mellitus, hypertension, chronic lower back pain, and osteoarthritis of the knees.  The the pain and swelling are noted in both knees.  She has had Euflexxa injections.    Blood sugar levels have generally been stable.  Her blood sugar was 125 today.    Chronic lower back pain symptoms are unchanged.  Symptoms are exacerbated by prolonged standing and bending.     The patient is not sleeping well at night.  She aches experiencing multiple or early awakenings.  She is currently on doxepin.    The patient has a history of colon polyps.  Last colonoscopy was on 07/28/2014.  A follow-up examination in 5 years was recommended.  Review of Systems   Constitutional: Negative for fatigue, fever and unexpected weight change.   HENT: Negative for congestion, postnasal drip, rhinorrhea and sore throat.    Eyes: Negative for visual disturbance.   Respiratory: Negative for cough, chest tightness, shortness of breath and wheezing.    Cardiovascular: Negative for chest pain, palpitations and leg swelling.   Gastrointestinal: Negative for abdominal pain and blood in stool.   Genitourinary: Negative for dysuria, frequency and hematuria.   Musculoskeletal: Positive for arthralgias and back pain. Negative for joint swelling and myalgias.   Skin: Negative for rash.   Neurological: Negative for dizziness, syncope, weakness, numbness and headaches.   Psychiatric/Behavioral: Positive for sleep disturbance. The patient is not nervous/anxious.        Objective:      Physical Exam   Constitutional: She is oriented to person, place, and time. Vital signs are normal. She appears well-developed and well-nourished. No distress.   The patient has lost 3 lb since 06/20/2017.   HENT:    Head: Normocephalic and atraumatic.   Right Ear: External ear normal.   Left Ear: External ear normal.   Nose: Nose normal.   Mouth/Throat: Oropharynx is clear and moist. No oropharyngeal exudate.   Eyes: Conjunctivae and EOM are normal. Pupils are equal, round, and reactive to light. No scleral icterus.   Neck: Normal range of motion. Neck supple. No JVD present. Carotid bruit is not present. No thyromegaly present.   Cardiovascular: Normal rate, regular rhythm, normal heart sounds, intact distal pulses and normal pulses.  Exam reveals no gallop and no friction rub.    No murmur heard.  Pulmonary/Chest: Effort normal and breath sounds normal. No respiratory distress. She has no wheezes. She has no rales.   Abdominal: Soft. Bowel sounds are normal. She exhibits no abdominal bruit and no mass. There is no splenomegaly or hepatomegaly. There is no tenderness. No hernia.   Musculoskeletal: She exhibits tenderness. She exhibits no edema.        Right shoulder: She exhibits no effusion and no deformity.   Knee joints are tender bilaterally on range-of-motion testing.  No effusion is appreciated.  The left wrist is tender on flexion.   Lymphadenopathy:     She has no cervical adenopathy.     She has no axillary adenopathy.        Right: No supraclavicular adenopathy present.        Left: No supraclavicular adenopathy present.   Neurological: She is alert and oriented to person, place, and time. She has normal strength. No cranial nerve deficit.   Skin: Skin is warm and dry. No rash noted.   Psychiatric: She has a normal mood and affect. Her speech is normal and behavior is normal.   Nursing note and vitals reviewed.      Results for orders placed or performed in visit on 06/05/18   Urinalysis   Result Value Ref Range    Specimen UA Urine, Clean Catch     Color, UA Anabell Yellow, Straw, Anabell    Appearance, UA Clear Clear    pH, UA 7.0 5.0 - 8.0    Specific Gravity, UA 1.010 1.005 - 1.030    Protein, UA Negative Negative     Glucose, UA Negative Negative    Ketones, UA Negative Negative    Bilirubin (UA) Negative Negative    Occult Blood UA Negative Negative    Nitrite, UA Negative Negative    Urobilinogen, UA Negative <2.0 EU/dL    Leukocytes, UA Negative Negative   Microalbumin/creatinine urine ratio   Result Value Ref Range    Microalbum.,U,Random 14.0 ug/mL    Creatinine, Random Ur 76.0 15.0 - 325.0 mg/dL    Microalb Creat Ratio 18.4 0.0 - 30.0 ug/mg     Other labs were reviewed with the patient.    Assessment:       1. Well adult exam    2. Type 2 diabetes mellitus without complication, without long-term current use of insulin    3. Essential hypertension    4. Hyperlipidemia, unspecified hyperlipidemia type    5. Arthritis    6. Wrist pain, left    7. Encounter for screening colonoscopy    8. Polyp of colon, unspecified part of colon, unspecified type        Plan:       Aracelis was seen today for annual exam, hypertension and hyperlipidemia.  X-rays of the left wrist will be obtained.  The dose of doxepin will be increased to 100 mg at bedtime.  Tramadol will be renewed for pain.  Hemoglobin A1c will be obtained today.  Patient was also advised to restart Cymbalta 30 mg daily.  A screening colonoscopy will be ordered.  A prescription for the Shingrix shingles vaccine was given to the patient to take to her pharmacy.  A follow-up visit in 4 months is recommended.    Diagnoses and all orders for this visit:    Well adult exam    Type 2 diabetes mellitus without complication, without long-term current use of insulin  -     Hemoglobin A1c; Future    Essential hypertension    Hyperlipidemia, unspecified hyperlipidemia type    Arthritis    Wrist pain, left  -     Cancel: X-Ray Wrist 2 View Left; Future    Encounter for screening colonoscopy  -     Case request GI: COLONOSCOPY    Polyp of colon, unspecified part of colon, unspecified type  -     Case request GI: COLONOSCOPY    Other orders  -     traMADol (ULTRAM) 50 mg tablet; TAKE 1 TO  2 TABLETS BY MOUTH TWICE DAILY  -     doxepin (SINEQUAN) 100 MG capsule; Take 1 capsule (100 mg total) by mouth nightly.  -     DULoxetine (CYMBALTA) 30 MG capsule; Take 1 capsule (30 mg total) by mouth once daily.  -     varicella-zoster [id number], PF, (SHINGRIX, PF,) 50 mcg/0.5 mL injection; Inject 0.5 mLs into the muscle once.

## 2018-07-24 RX ORDER — LEVOTHYROXINE SODIUM 50 UG/1
TABLET ORAL
Qty: 90 TABLET | Refills: 3 | Status: SHIPPED | OUTPATIENT
Start: 2018-07-24 | End: 2019-11-12

## 2018-08-29 RX ORDER — LEVOTHYROXINE SODIUM 50 UG/1
TABLET ORAL
Qty: 90 TABLET | Refills: 3 | Status: SHIPPED | OUTPATIENT
Start: 2018-08-29 | End: 2018-10-02 | Stop reason: SDUPTHER

## 2018-10-02 ENCOUNTER — CLINICAL SUPPORT (OUTPATIENT)
Dept: CARDIOLOGY | Facility: CLINIC | Age: 63
End: 2018-10-02
Attending: INTERNAL MEDICINE
Payer: COMMERCIAL

## 2018-10-02 ENCOUNTER — HOSPITAL ENCOUNTER (OUTPATIENT)
Dept: RADIOLOGY | Facility: HOSPITAL | Age: 63
Discharge: HOME | End: 2018-10-02
Attending: INTERNAL MEDICINE
Payer: COMMERCIAL

## 2018-10-02 ENCOUNTER — OFFICE VISIT (OUTPATIENT)
Dept: INTERNAL MEDICINE | Facility: CLINIC | Age: 63
End: 2018-10-02
Payer: COMMERCIAL

## 2018-10-02 VITALS
BODY MASS INDEX: 39.17 KG/M2 | DIASTOLIC BLOOD PRESSURE: 64 MMHG | RESPIRATION RATE: 15 BRPM | HEART RATE: 75 BPM | WEIGHT: 228.19 LBS | SYSTOLIC BLOOD PRESSURE: 112 MMHG | TEMPERATURE: 99 F | OXYGEN SATURATION: 95 %

## 2018-10-02 DIAGNOSIS — M79.672 LEFT FOOT PAIN: ICD-10-CM

## 2018-10-02 DIAGNOSIS — G89.29 CHRONIC PAIN OF RIGHT KNEE: Primary | ICD-10-CM

## 2018-10-02 DIAGNOSIS — R42 POSTURAL LIGHTHEADEDNESS: ICD-10-CM

## 2018-10-02 DIAGNOSIS — I10 ESSENTIAL HYPERTENSION: Chronic | ICD-10-CM

## 2018-10-02 DIAGNOSIS — R22.31 MASS OF SKIN OF FINGER OF RIGHT HAND: ICD-10-CM

## 2018-10-02 DIAGNOSIS — M25.561 CHRONIC PAIN OF RIGHT KNEE: Primary | ICD-10-CM

## 2018-10-02 DIAGNOSIS — E11.9 TYPE 2 DIABETES MELLITUS WITHOUT COMPLICATION, WITHOUT LONG-TERM CURRENT USE OF INSULIN: Chronic | ICD-10-CM

## 2018-10-02 DIAGNOSIS — R14.2 BELCHING: ICD-10-CM

## 2018-10-02 LAB — INTERNAL CAROTID STENOSIS: NORMAL

## 2018-10-02 PROCEDURE — 3078F DIAST BP <80 MM HG: CPT | Mod: CPTII,S$GLB,, | Performed by: INTERNAL MEDICINE

## 2018-10-02 PROCEDURE — 73630 X-RAY EXAM OF FOOT: CPT | Mod: 26,LT,, | Performed by: RADIOLOGY

## 2018-10-02 PROCEDURE — 3008F BODY MASS INDEX DOCD: CPT | Mod: CPTII,S$GLB,, | Performed by: INTERNAL MEDICINE

## 2018-10-02 PROCEDURE — 3074F SYST BP LT 130 MM HG: CPT | Mod: CPTII,S$GLB,, | Performed by: INTERNAL MEDICINE

## 2018-10-02 PROCEDURE — 3045F PR MOST RECENT HEMOGLOBIN A1C LEVEL 7.0-9.0%: CPT | Mod: CPTII,S$GLB,, | Performed by: INTERNAL MEDICINE

## 2018-10-02 PROCEDURE — 93880 EXTRACRANIAL BILAT STUDY: CPT | Mod: S$GLB,,, | Performed by: INTERNAL MEDICINE

## 2018-10-02 PROCEDURE — 99999 PR PBB SHADOW E&M-EST. PATIENT-LVL IV: CPT | Mod: PBBFAC,,, | Performed by: INTERNAL MEDICINE

## 2018-10-02 PROCEDURE — 73630 X-RAY EXAM OF FOOT: CPT | Mod: TC,PO,LT

## 2018-10-02 PROCEDURE — 99214 OFFICE O/P EST MOD 30 MIN: CPT | Mod: S$GLB,,, | Performed by: INTERNAL MEDICINE

## 2018-10-03 ENCOUNTER — TELEPHONE (OUTPATIENT)
Dept: ORTHOPEDICS | Facility: CLINIC | Age: 63
End: 2018-10-03

## 2018-10-03 DIAGNOSIS — M79.641 RIGHT HAND PAIN: Primary | ICD-10-CM

## 2018-10-04 ENCOUNTER — HOSPITAL ENCOUNTER (OUTPATIENT)
Dept: RADIOLOGY | Facility: OTHER | Age: 63
Discharge: HOME | End: 2018-10-04
Attending: PHYSICIAN ASSISTANT
Payer: COMMERCIAL

## 2018-10-04 ENCOUNTER — OFFICE VISIT (OUTPATIENT)
Dept: ORTHOPEDICS | Facility: CLINIC | Age: 63
End: 2018-10-04
Payer: COMMERCIAL

## 2018-10-04 VITALS
HEART RATE: 81 BPM | HEIGHT: 66 IN | DIASTOLIC BLOOD PRESSURE: 73 MMHG | WEIGHT: 227 LBS | BODY MASS INDEX: 36.48 KG/M2 | SYSTOLIC BLOOD PRESSURE: 116 MMHG

## 2018-10-04 VITALS
DIASTOLIC BLOOD PRESSURE: 68 MMHG | SYSTOLIC BLOOD PRESSURE: 122 MMHG | BODY MASS INDEX: 36.56 KG/M2 | WEIGHT: 227.5 LBS | HEIGHT: 66 IN

## 2018-10-04 DIAGNOSIS — Z98.890 STATUS POST ARTHROSCOPY OF LEFT KNEE: ICD-10-CM

## 2018-10-04 DIAGNOSIS — G89.29 CHRONIC PAIN OF LEFT KNEE: ICD-10-CM

## 2018-10-04 DIAGNOSIS — M77.8 BONE SPUR OF FINGER IP JOINT: Primary | ICD-10-CM

## 2018-10-04 DIAGNOSIS — M17.0 PRIMARY OSTEOARTHRITIS OF BOTH KNEES: Primary | ICD-10-CM

## 2018-10-04 DIAGNOSIS — M79.641 RIGHT HAND PAIN: ICD-10-CM

## 2018-10-04 DIAGNOSIS — M67.441 DIGITAL MUCOUS CYST OF FINGER OF RIGHT HAND: ICD-10-CM

## 2018-10-04 DIAGNOSIS — M25.562 CHRONIC PAIN OF LEFT KNEE: ICD-10-CM

## 2018-10-04 DIAGNOSIS — R60.0 LOWER LEG EDEMA: ICD-10-CM

## 2018-10-04 PROCEDURE — 3008F BODY MASS INDEX DOCD: CPT | Mod: CPTII,S$GLB,, | Performed by: NEUROMUSCULOSKELETAL MEDICINE & OMM

## 2018-10-04 PROCEDURE — 99999 PR PBB SHADOW E&M-EST. PATIENT-LVL III: CPT | Mod: PBBFAC,,, | Performed by: NEUROMUSCULOSKELETAL MEDICINE & OMM

## 2018-10-04 PROCEDURE — 3074F SYST BP LT 130 MM HG: CPT | Mod: CPTII,S$GLB,, | Performed by: PHYSICIAN ASSISTANT

## 2018-10-04 PROCEDURE — 3074F SYST BP LT 130 MM HG: CPT | Mod: CPTII,S$GLB,, | Performed by: NEUROMUSCULOSKELETAL MEDICINE & OMM

## 2018-10-04 PROCEDURE — 99204 OFFICE O/P NEW MOD 45 MIN: CPT | Mod: S$GLB,,, | Performed by: NEUROMUSCULOSKELETAL MEDICINE & OMM

## 2018-10-04 PROCEDURE — 73130 X-RAY EXAM OF HAND: CPT | Mod: TC,FY,RT

## 2018-10-04 PROCEDURE — 3078F DIAST BP <80 MM HG: CPT | Mod: CPTII,S$GLB,, | Performed by: NEUROMUSCULOSKELETAL MEDICINE & OMM

## 2018-10-04 PROCEDURE — 99999 PR PBB SHADOW E&M-EST. PATIENT-LVL III: CPT | Mod: PBBFAC,,, | Performed by: PHYSICIAN ASSISTANT

## 2018-10-04 PROCEDURE — 3008F BODY MASS INDEX DOCD: CPT | Mod: CPTII,S$GLB,, | Performed by: PHYSICIAN ASSISTANT

## 2018-10-04 PROCEDURE — 3078F DIAST BP <80 MM HG: CPT | Mod: CPTII,S$GLB,, | Performed by: PHYSICIAN ASSISTANT

## 2018-10-04 PROCEDURE — 99214 OFFICE O/P EST MOD 30 MIN: CPT | Mod: S$GLB,,, | Performed by: PHYSICIAN ASSISTANT

## 2018-10-04 PROCEDURE — 73130 X-RAY EXAM OF HAND: CPT | Mod: 26,RT,, | Performed by: RADIOLOGY

## 2018-10-04 RX ORDER — MUPIROCIN 20 MG/G
OINTMENT TOPICAL
Status: CANCELLED | OUTPATIENT
Start: 2018-10-04

## 2018-10-04 NOTE — PROGRESS NOTES
Subjective:      Patient ID: Aracelis Martinez is a 63 y.o. female.    Chief Complaint: Pain of the Right Hand      HPI  Aracelis Martinez is a right hand dominant 63 y.o. female presenting today for painful mass at the distal right long finger.  There was not a history of trauma.  Onset of symptoms began 2 months ago.  She reports that she noticed some darkening near the nail at the right long finger, she then noticed a tender mass.  She reports that she picked at it and noticed a red lump.  She previously seen Dr. Schmidt in 2017 to discuss removal of the bone spur at the right index finger DIP.  She reports that she ultimately to the did not have surgery at that time. She presents today because she is interested in pursuing surgical treatment of both the index and long fingers.  She denies any numbness or tingling in the fingers.  She denies any difficulty with finger motion.      Review of patient's allergies indicates:   Allergen Reactions    Azithromycin Other (See Comments)     Yeast infection; pt requests please do not put her on this medication         Current Outpatient Medications   Medication Sig Dispense Refill    amLODIPine (NORVASC) 10 MG tablet TAKE 1 TABLET BY MOUTH DAILY 30 tablet 10    blood sugar diagnostic (ONETOUCH VERIO) Strp TEST BLOOD SUGAR LEVELS ONCE DAILY AS DIRECTED 30 strip 11    blood sugar diagnostic Strp Test blood sugar once daily 100 strip 3    blood-glucose meter kit Use as instructed 1 each 0    doxepin (SINEQUAN) 100 MG capsule Take 1 capsule (100 mg total) by mouth nightly. 90 capsule 3    DULoxetine (CYMBALTA) 30 MG capsule Take 1 capsule (30 mg total) by mouth once daily. 90 capsule 3    lancets (ONETOUCH DELICA LANCETS) 33 gauge Misc 1 lancet by Misc.(Non-Drug; Combo Route) route once daily. 200 each 1    levothyroxine (SYNTHROID) 50 MCG tablet TAKE 1 TABLET BY MOUTH BEFORE BREAKFAST 90 tablet 3    metFORMIN (GLUCOPHAGE) 500 MG tablet TAKE 1 TABLET BY MOUTH ONCE  "DAILY BEFORE DINNER 90 tablet 3    pantoprazole (PROTONIX) 40 MG tablet TAKE 1 TABLET BY MOUTH EVERY DAY FOR PREVENTION OF REFLUX 90 tablet 3    traMADol (ULTRAM) 50 mg tablet TAKE 1 TO 2 TABLETS BY MOUTH TWICE DAILY 90 tablet 2     No current facility-administered medications for this visit.        Past Medical History:   Diagnosis Date    Arthritis     Chronic back pain     Diabetes mellitus 2014    Diverticulosis     Hypertension     Neuroendocrine tumor of pancreas 5/3/2016    Renal cyst     left    Thyroid disease        Past Surgical History:   Procedure Laterality Date    ARTHROSCOPY, KNEE Left 2014    [facility] OR 2ND FLR     SECTION      COLONOSCOPY N/A 2014    Performed by Cem Lamar MD at [facility] ENDO (4TH FLR)    ESOPHAGOGASTRODUODENOSCOPY (EGD) N/A 2015    Performed by Jarod Zapata MD at [facility] ENDO (4TH FLR)    HYSTERECTOMY      INJECTION-FACET Bilateral 2016    [facility] PAIN [facility]GT    KNEE ARTHROSCOPY  2014    LATS/left    TONSILLECTOMY      ULTRASOUND-ENDOSCOPIC-UPPER N/A 2015    Performed by Yossi Nicole MD at [facility] ENDO (2ND FLR)         Review of Systems:  Review of Systems   Constitution: Negative for chills and fever.   Skin: Negative for rash and suspicious lesions.   Musculoskeletal:        See HPI   Neurological: Negative for dizziness, headaches, light-headedness, numbness and paresthesias.   Psychiatric/Behavioral: Negative for depression. The patient is not nervous/anxious.          OBJECTIVE:     PHYSICAL EXAM:  Height: 5' 6" (167.6 cm) Weight: 103 kg (227 lb)  Vitals:    10/04/18 1412   BP: 116/73   Pulse: 81   Weight: 103 kg (227 lb)   Height: 5' 6" (1.676 m)   PainSc:   3     General    Vitals reviewed.  Constitutional: She is oriented to person, place, and time. She appears well-developed and well-nourished.   HENT:   Head: Normocephalic and atraumatic.   Neck: " Normal range of motion.   Cardiovascular: Normal rate.    Pulmonary/Chest: Effort normal. No respiratory distress.   Neurological: She is alert and oriented to person, place, and time.   Psychiatric: She has a normal mood and affect. Her behavior is normal. Judgment and thought content normal.             Musculoskeletal:  Nail plate deformity - ridge noted in line with patient's finger mass.  Small protruding mass at the distal right long finger near the nail fold.  Tender to palpation.  Heberden's node right index DIP.  No separate discrete cyst appreciated.  Good finger range of motion, flexion extension all joints of the right long and index finger were assessed in isolation.  Neurovascularly intact-good sensation and motor function, good capillary refill, 2+ radial pulses.    RADIOGRAPHS:  Right Hand X-Ray, 10/4/18  FINDINGS:  There is no acute fracture or subluxation.  Degenerative changes are again noted in the 2nd and 3rd digits.  The soft tissues are grossly unremarkable.      Impression     No acute fracture     Comments: I have personally reviewed the imaging and I agree with the above radiologist's report.    ASSESSMENT/PLAN:   Aracelis was seen today for pain.    Diagnoses and all orders for this visit:    Bone spur of finger IP joint  -     Place in Outpatient; Standing  -     Vital signs; Standing  -     Insert peripheral IV; Standing  -     Patient may leave on underwear for knee, ankle, and upper extremity surgery; Standing  -     Cleanse with Chlorhexidine (CHG); Standing  -     Diet NPO; Standing  -     Place RENETTA hose; Standing  -     Chlorohexidine Gluconate Bath; Standing    Digital mucous cyst of finger of right hand  -     Place in Outpatient; Standing  -     Vital signs; Standing  -     Insert peripheral IV; Standing  -     Patient may leave on underwear for knee, ankle, and upper extremity surgery; Standing  -     Cleanse with Chlorhexidine (CHG); Standing  -     Diet NPO; Standing  -     Place  RENETTA hose; Standing  -     Chlorohexidine Gluconate Bath; Standing    Other orders  -     IP VTE LOW RISK PATIENT; Standing  -     ceFAZolin (ANCEF) 2 g in dextrose 5 % 50 mL IVPB; Inject 2 g into the vein On call Procedure (Surgery).  -     mupirocin 2 % ointment; by Nasal route On call Procedure (surgery).           - We talked at length about the anatomy and pathophysiology of   Encounter Diagnoses   Name Primary?    Bone spur of finger IP joint Yes    Digital mucous cyst of finger of right hand        - discussed indications and risks digital mucous cyst excision with exostectomy.  Patient is interested in excess ectomy of both the right long DIP and right index DIP.  We discussed that the nail plate deformity may resolve with surgery, however that is not guaranteed.  We also discussed that there is the possibility of cyst recurrence.  She has not have any pain with finger motion, so joint fusion is not indicated at this time, even though it was previously discussed in 2017.  - her questions were answered.  Consent forms were signed today in clinic.  Discussed postoperative splinting and likelihood for postoperative occupational therapy.  - call with questions or concerns    Disclaimer: This note has been generated using voice-recognition software. There may be typographical errors that have been missed during proof-reading.

## 2018-10-04 NOTE — PROGRESS NOTES
"Subjective:     Aracelis Martinez     Chief Complaint   Patient presents with    Right Knee - Pain    Left Knee - Pain       HPI    Aracelis is a 63 y.o. female coming in today for bilateral knee pain that began 4 year(s) ago. Pt. describes the right knee pain as a 6/10 throbbing pain that does not radiate. She describes the left knee pain as a 6/10 without radiation. The left knee feels like it "goes out of joint" and pops when she is walking. There was a fall/injury/ or trauma associated with the onset of symptoms- in 2005 fell down some stairs. She sprained both ankles and thinks she hurt her left knee at that time. Had a left knee arthroscopic meniscectomy in 2014. She has had several rounds of cortisone and viscosupplementation in both knees. She did have 50-60% improvement with these but her pain has increased over the last month. The last injections were 3/2018. The pain is better with rest and worse with stairs. She also notes  left knee and foot swelling that started a week ago. She does have to sit and rest if she is walking a distance. Pt also notes chronic low back pain.     Joint instability? Yes, left   Mechanical locking/clicking? No   Affecting ADL's? Yes  Affecting sleep? Yes     Occupation: retired. Enjoys painting, cooking, and gardening. She also makes jewelry.     Review of Systems   Constitutional: Negative for chills and fever.   HENT: Negative for hearing loss and tinnitus.    Eyes: Negative for blurred vision and photophobia.   Respiratory: Negative for cough and shortness of breath.    Cardiovascular: Positive for leg swelling (left more than right). Negative for chest pain.   Gastrointestinal: Negative for abdominal pain, heartburn, nausea and vomiting.   Genitourinary: Negative for dysuria and hematuria.   Musculoskeletal: Positive for back pain, joint pain and myalgias. Negative for falls and neck pain.   Skin: Negative for rash.   Neurological: Positive for dizziness. Negative for tingling, " focal weakness, weakness and headaches.   Endo/Heme/Allergies: Negative for environmental allergies. Bruises/bleeds easily.   Psychiatric/Behavioral: Negative for depression. The patient is not nervous/anxious.        PAST MEDICAL HISTORY:   Past Medical History:   Diagnosis Date    Arthritis     Chronic back pain     Diabetes mellitus 2014    Diverticulosis     Hypertension     Neuroendocrine tumor of pancreas 5/3/2016    Renal cyst     left    Thyroid disease      PAST SURGICAL HISTORY:   Past Surgical History:   Procedure Laterality Date    ARTHROSCOPY, KNEE Left 2014    [facility] OR 2ND FLR     SECTION      COLONOSCOPY N/A 2014    Performed by Cem Lamar MD at [facility] ENDO (4TH FLR)    ESOPHAGOGASTRODUODENOSCOPY (EGD) N/A 2015    Performed by Jarod Zapata MD at [facility] ENDO (4TH FLR)    HYSTERECTOMY      INJECTION-FACET Bilateral 2016    [facility]hu [facility] at [facility] PAIN MGT    KNEE ARTHROSCOPY  2014    LATS/left    TONSILLECTOMY      ULTRASOUND-ENDOSCOPIC-UPPER N/A 2015    Performed by Yossi Nicole MD at [facility] ENDO (2ND FLR)     FAMILY HISTORY:   Family History   Problem Relation Age of Onset    Hypertension Mother     Diabetes Mother     Heart disease Mother     Stroke Mother     Hypertension Sister     Stroke Sister     Hypertension Brother      SOCIAL HISTORY:   Social History     Socioeconomic History    Marital status:      Spouse name: Not on file    Number of children: Not on file    Years of education: Not on file    Highest education level: Not on file   Social Needs    Financial resource strain: Not on file    Food insecurity - worry: Not on file    Food insecurity - inability: Not on file    Transportation needs - medical: Not on file    Transportation needs - non-medical: Not on file   Occupational History    Not on file   Tobacco Use    Smoking status: Former Smoker      Packs/day: 1.50     Years: 10.00     Pack years: 15.00     Types: Cigarettes     Last attempt to quit: 1982     Years since quittin.7    Smokeless tobacco: Never Used   Substance and Sexual Activity    Alcohol use: Yes     Comment: occasional use    Drug use: No    Sexual activity: Yes     Partners: Male     Birth control/protection: None   Other Topics Concern    Not on file   Social History Narrative    Not on file       MEDICATIONS:   Current Outpatient Medications:     amLODIPine (NORVASC) 10 MG tablet, TAKE 1 TABLET BY MOUTH DAILY, Disp: 30 tablet, Rfl: 10    blood sugar diagnostic (ONETOUCH VERIO) Strp, TEST BLOOD SUGAR LEVELS ONCE DAILY AS DIRECTED, Disp: 30 strip, Rfl: 11    blood sugar diagnostic Strp, Test blood sugar once daily, Disp: 100 strip, Rfl: 3    blood-glucose meter kit, Use as instructed, Disp: 1 each, Rfl: 0    doxepin (SINEQUAN) 100 MG capsule, Take 1 capsule (100 mg total) by mouth nightly., Disp: 90 capsule, Rfl: 3    DULoxetine (CYMBALTA) 30 MG capsule, Take 1 capsule (30 mg total) by mouth once daily., Disp: 90 capsule, Rfl: 3    lancets (ONETOUCH DELICA LANCETS) 33 gauge Misc, 1 lancet by Misc.(Non-Drug; Combo Route) route once daily., Disp: 200 each, Rfl: 1    levothyroxine (SYNTHROID) 50 MCG tablet, TAKE 1 TABLET BY MOUTH BEFORE BREAKFAST, Disp: 90 tablet, Rfl: 3    metFORMIN (GLUCOPHAGE) 500 MG tablet, TAKE 1 TABLET BY MOUTH ONCE DAILY BEFORE DINNER, Disp: 90 tablet, Rfl: 3    pantoprazole (PROTONIX) 40 MG tablet, TAKE 1 TABLET BY MOUTH EVERY DAY FOR PREVENTION OF REFLUX, Disp: 90 tablet, Rfl: 3    traMADol (ULTRAM) 50 mg tablet, TAKE 1 TO 2 TABLETS BY MOUTH TWICE DAILY, Disp: 90 tablet, Rfl: 2  ALLERGIES:   Review of patient's allergies indicates:   Allergen Reactions    Azithromycin Other (See Comments)     Yeast infection; pt requests please do not put her on this medication     IMAGIN. X-ray images of of Bilateral knees from 10/31/17 reviewed (JL  "standing, AP flexion, and merchant views)  2. X-ray images were reviewed personally by me and then directly with patient.  3. FINDINGS: X-ray images obtained demonstrate sclerosis and osteophyte formation, most prominent at the tibiofemoral joints. There is joint space narrowing of the medial tibiofemoral joint bilaterally. Patellofemoral joint space appears preserved.   4. IMPRESSION: Kellgren-Bryn grade 3 bilateral knee OA    Office note from Leonila Brush PA-C from 3/27/18 reviewed: last bilateral Euflexxa injections received at this visit  Surgical note from Dr. [facility] on 4/16/14 reviewed: left knee arthroscopy with medial meniscoplasty. Evidence of cartilage lose over weight-bearing portion of medial femoral condyle.      Objective:     VITAL SIGNS: /68   Ht 5' 6" (1.676 m)   Wt 103.2 kg (227 lb 8.2 oz)   BMI 36.72 kg/m²    General    Nursing note and vitals reviewed.  Constitutional: She is oriented to person, place, and time. She appears well-developed and well-nourished.   HENT:   Head: Normocephalic and atraumatic.   no nasal discharge, no external ear redness or discharge   Eyes:   EOM is full and smooth  No eye redness or discharge   Neck: Neck supple. No tracheal deviation present.   Cardiovascular: Normal rate.    2+ Radial pulse bilaterally  2+ Dorsalis Pedis pulse bilaterally  1+ LE edema appreciated bilaterally   Pulmonary/Chest: Effort normal. No respiratory distress.   Abdominal: She exhibits no distension.   No rigidity   Neurological: She is alert and oriented to person, place, and time. She exhibits normal muscle tone. Coordination normal.   See details below   Psychiatric: She has a normal mood and affect. Her behavior is normal.               MUSCULOSKELETAL EXAM    BILATERAL KNEE EXAMINATION       Observation:  1+ pitting edema of lower legs and feet noted bilaterally  No muscle atrophy of the thighs and calves noted.  No obvious bony deformities noted.   Genu valgus/varum " noted.  No recurvatum noted.    No tibial internal/external torsion.         Tenderness:  Patella - none    Lateral joint line - bilat  Quad tendon - none   Medial joint line - bilat, L>R  Patellar tendon - none   Medial plica - none  Tibial tubercle - none   Lateral plica - none  Pes anserine - none   MCL prox - none  Distal ITB - none   MCL distal - none  MFC - none    LCL prox - none  LFC - none    LCL distal - none  Tibial plateau - bilat   Fibula - none    No obvious bursae, plicae, popliteal cysts, or tendon derangement palpated. Mild patellar crepitus right, moderate left.            ROM:   Active extension to 0° on left without hyperextension, lag or patellar J sign. + crepitus  Active extension to 0° on right without hyperextension, lag, or  patellar J sign.  + crepitus  Active flexion to 135° on left and 135° on right    Strength(* = with pain):  Knee Flexion - 5/5 on left* and 5/5 on right  Knee Extension - 5/5 on left and 5/5 on right  Hip Flexion - 5/5 on left and 5/5 on right  Hip Extension - 5/5 on left and 5/5 on right  Ankle dorsiflexion - 5/5 on left and 5/5 on right  Ankle Plantarflexion - 5/5 on left and 5/5 on right    Patellofemoral Exam:  Patellar ballottement - negative  Bulge sign - negative  Patellar grind - positive bilaterally, L>R    No patellar laxity with medial and lateral translation   No apprehension with medial and lateral patellar translation.     Meniscus Testing:     No pain with terminal extension and flexion on right. + left knee pain with terminal flexion, no pain with terminal extension  Olgas test - negative     Ligament Testing:  Lachman's test - negative  No laxity with anterior drawer.  No laxity with posterior drawer.    No laxity with varus testing at 0 and 30 degrees.  No laxity with valgus testing at 0 and 30 degrees.      Neurovascular Examination:   Normal gait without antalgia.  Sensation intact to light touch in the obturator,  lateral/intermediate/medial/posterior femoral cutaneous, saphenous, and common peroneal nerves bilaterally.  Motor Function:    Fully intact motor function at hip, knee, foot and ankle.  DTRs: 2+/4 reflexes at L4 and S1 dermatomes.   Pulses intact at the DP and PT arteries bilaterally.    Capillary refill intact <2 seconds in all toes bilaterally.      Assessment:      Encounter Diagnoses   Name Primary?    Primary osteoarthritis of both knees Yes    Chronic pain of left knee     Status post arthroscopy of left knee 4/16/2014     Lower leg edema           Plan:   1. Bilateral Knee OA , with symptoms worse on left knee.  X-ray images of bilateral knee taken on 10/31/17 showed Kellgren-Bryn grade 3 knee OA bilaterally. Images were personally reviewed with patient.   - Repeat bilateral knee x-rays ordered today for continued monitoring of bilateral knee OA  - Last Hyaluronic acid injections (Euflexxa) received on 3/27/18 to both knees - noting 50-60% improvement, but left knee pain has returned. Plan to repeat Euflexxa injections for left knee pain.   - Discussed option of platelet-rich plasma injections for non-surgical management of knee OA     2. Pt. Given the following HEP:  A) Bilateral seated quadriceps strengthening exercise: Straight leg raises with hip neural, hip externally rotated, and then hip internally rotated. 10-15 reps in each plane, twice daily.  - The patient was taught a homegoing physical therapy regimen as described above. The patient demonstrated understanding of the exercises and proper technique of their execution.     3. Recommend decreasing salt intake in diet, leg elevation, and foot dorsiflexion/plantarflexion exercises for lower leg edema. If not improving, recommend follow-up with PCP    4. F/u in 2 weeks to start Euflexxa injection series    5. Patient agreeable to today's plan and all questions were answered

## 2018-10-04 NOTE — LETTER
October 4, 2018      Alen Damico MD  [address] [facility] [address]           [facility]  [address] [facility] [address]  Phone: [phone]          Patient: Aracelis Martinez   MR Number: [id number]   YOB: 1955   Date of Visit: 10/4/2018       Dear Dr. Alen Damico:    Thank you for referring Aracelis Martinez to me for evaluation. Attached you will find relevant portions of my assessment and plan of care.    If you have questions, please do not hesitate to call me. I look forward to following Aracelis Martinez along with you.    Sincerely,    Bren Deng,     Enclosure  CC:  No Recipients    If you would like to receive this communication electronically, please contact [email or web address] or [phone] to request more information on [employer] access.    For providers and/or their staff who would like to refer a patient to [facility], please contact us through our one-stop-shop provider referral line, [facility], at [phone].    If you feel you have received this communication in error or would no longer like to receive these types of communications, please e-mail [email or web address]

## 2018-10-04 NOTE — LETTER
October 4, 2018      Alen Damico MD  [address] [facility] [address]           [facility]  [address], [address]  [facility] [address]  Phone: [phone]          Patient: Aracelis Martinez   MR Number: [id number]   YOB: 1955   Date of Visit: 10/4/2018       Dear Dr. Alen Damico:    Thank you for referring Aracelis Martinez to me for evaluation. Attached you will find relevant portions of my assessment and plan of care.    If you have questions, please do not hesitate to call me. I look forward to following Aracelis Martinez along with you.    Sincerely,    ROSS Lee    Enclosure  CC:  No Recipients    If you would like to receive this communication electronically, please contact [email or web address] or [phone] to request more information on [employer] Link access.    For providers and/or their staff who would like to refer a patient to [facility], please contact us through our one-stop-shop provider referral line, [facility], at [phone].    If you feel you have received this communication in error or would no longer like to receive these types of communications, please e-mail [email or web address]

## 2018-10-08 ENCOUNTER — TELEPHONE (OUTPATIENT)
Dept: INTERNAL MEDICINE | Facility: CLINIC | Age: 63
End: 2018-10-08

## 2018-10-08 NOTE — TELEPHONE ENCOUNTER
----- Message from Alen Damico MD sent at 10/8/2018 12:01 AM CDT -----  The carotid ultrasound was negative for any significant carotid artery blockages.  The study was normal.

## 2018-10-08 NOTE — PROGRESS NOTES
Subjective:       Patient ID: Aracelis Martinez is a 63 y.o. female.    Chief Complaint: Foot Pain (left ); Gastroesophageal Reflux (belching all the time); Arm Pain (,right tenderness in shoulder upper arm area; wrist); Hand Pain (middle right finger); and Dizziness    HPI     The patient presents with multiple complaints today.  She has been noting increased belching.  She feels reflux symptoms however are stable.  She takes her Protonix every morning.  She has bilateral lumbar back spasms at times.  She also has chronic right knee pain which she describes as a throbbing type pain.  Symptoms are more prominent at night.  She has had knee injections bilaterally with some improvement.  We discussed obtaining orthopedic follow-up evaluation.  She denies having any joint swelling.  She has noted postural lightheadedness after prolonged sitting.  Pain noted at the top of the left foot for the past 1 and a half weeks.    The patient is taking doxepin every night at bedtime.  She is using Cymbalta only intermittently due to the cost of the medication.  She is tolerating her blood pressure medication well.  The blood sugar levels have been stable.    Review of Systems   Musculoskeletal: Positive for arthralgias and back pain.   Neurological: Positive for light-headedness.       Objective:      Physical Exam   Constitutional: She is oriented to person, place, and time. She appears well-developed and well-nourished. No distress.   HENT:   Head: Normocephalic and atraumatic.   Eyes: Conjunctivae and EOM are normal. No scleral icterus.   Neck: Normal range of motion. Neck supple. No JVD present. No thyromegaly present.   Cardiovascular: Normal rate, regular rhythm, normal heart sounds and intact distal pulses. Exam reveals no gallop and no friction rub.   No murmur heard.  Pulmonary/Chest: Effort normal and breath sounds normal. No respiratory distress. She has no wheezes. She has no rales.   Abdominal: Soft. Bowel sounds are  normal. She exhibits no mass. There is no tenderness.   Musculoskeletal: Normal range of motion. She exhibits tenderness.   Tenderness is noted of the tarsal metatarsal area of the left foot on palpation.  No localized swelling is appreciated.    Right 3rd finger shows tender hyperpigmented mass present   Lymphadenopathy:     She has no cervical adenopathy.   Neurological: She is alert and oriented to person, place, and time.   Skin: Skin is warm and dry. No rash noted.   Nursing note and vitals reviewed.      Assessment:       1. Chronic pain of right knee    2. Mass of skin of finger of right hand    3. Left foot pain    4. Postural lightheadedness    5. Belching    6. Type 2 diabetes mellitus without complication, without long-term current use of insulin    7. Essential hypertension        Plan:       Aracelis was seen today for foot pain, gastroesophageal reflux, arm pain, hand pain and dizziness. Orthopedic hand surgery consultation will be obtained regarding right finger skin mass.  Sports medicine consultation will be obtained regarding right knee pain.  Left foot x-rays will be obtained.  The patient was advised that she may need to see a podiatrist for follow-up of continued foot pain.  A carotid ultrasound will be obtained along with additional lab studies today.      Diagnoses and all orders for this visit:    Chronic pain of right knee  -     Ambulatory consult to Sports Medicine    Mass of skin of finger of right hand  -     Ambulatory referral to Hand Surgery    Left foot pain  -     X-Ray Foot Complete Left; Future    Postural lightheadedness  -     CAR Ultrasound doppler carotid bilateral; Future    Belching    Type 2 diabetes mellitus without complication, without long-term current use of insulin  -     Hemoglobin A1c; Future    Essential hypertension  -     CAR Ultrasound doppler carotid bilateral; Future  -     CBC auto differential; Future  -     Comprehensive metabolic panel; Future

## 2018-10-17 DIAGNOSIS — M79.644 FINGER PAIN, RIGHT: Primary | ICD-10-CM

## 2018-10-18 ENCOUNTER — HOSPITAL ENCOUNTER (OUTPATIENT)
Dept: RADIOLOGY | Facility: HOSPITAL | Age: 63
Discharge: HOME | End: 2018-10-18
Attending: NEUROMUSCULOSKELETAL MEDICINE & OMM
Payer: COMMERCIAL

## 2018-10-18 ENCOUNTER — OFFICE VISIT (OUTPATIENT)
Dept: INTERNAL MEDICINE | Facility: CLINIC | Age: 63
End: 2018-10-18
Payer: COMMERCIAL

## 2018-10-18 ENCOUNTER — OFFICE VISIT (OUTPATIENT)
Dept: ORTHOPEDICS | Facility: CLINIC | Age: 63
End: 2018-10-18
Payer: COMMERCIAL

## 2018-10-18 VITALS
HEART RATE: 71 BPM | HEIGHT: 66 IN | DIASTOLIC BLOOD PRESSURE: 70 MMHG | TEMPERATURE: 99 F | BODY MASS INDEX: 36.07 KG/M2 | WEIGHT: 224.44 LBS | SYSTOLIC BLOOD PRESSURE: 119 MMHG

## 2018-10-18 VITALS
DIASTOLIC BLOOD PRESSURE: 80 MMHG | BODY MASS INDEX: 36.17 KG/M2 | HEIGHT: 66 IN | SYSTOLIC BLOOD PRESSURE: 122 MMHG | WEIGHT: 225.06 LBS

## 2018-10-18 DIAGNOSIS — I10 ESSENTIAL HYPERTENSION: Chronic | ICD-10-CM

## 2018-10-18 DIAGNOSIS — Z98.890 POST-OPERATIVE STATE: Primary | ICD-10-CM

## 2018-10-18 DIAGNOSIS — M17.0 PRIMARY OSTEOARTHRITIS OF BOTH KNEES: ICD-10-CM

## 2018-10-18 DIAGNOSIS — M17.12 PRIMARY OSTEOARTHRITIS OF LEFT KNEE: Primary | ICD-10-CM

## 2018-10-18 DIAGNOSIS — E11.9 TYPE 2 DIABETES MELLITUS WITHOUT COMPLICATION, WITHOUT LONG-TERM CURRENT USE OF INSULIN: Primary | Chronic | ICD-10-CM

## 2018-10-18 DIAGNOSIS — G89.29 CHRONIC BILATERAL LOW BACK PAIN WITHOUT SCIATICA: ICD-10-CM

## 2018-10-18 DIAGNOSIS — M54.50 CHRONIC BILATERAL LOW BACK PAIN WITHOUT SCIATICA: ICD-10-CM

## 2018-10-18 DIAGNOSIS — E78.5 HYPERLIPIDEMIA, UNSPECIFIED HYPERLIPIDEMIA TYPE: Chronic | ICD-10-CM

## 2018-10-18 DIAGNOSIS — Z01.818 PREOP EXAM FOR INTERNAL MEDICINE: ICD-10-CM

## 2018-10-18 PROCEDURE — 73564 X-RAY EXAM KNEE 4 OR MORE: CPT | Mod: 26,LT,, | Performed by: RADIOLOGY

## 2018-10-18 PROCEDURE — 3078F DIAST BP <80 MM HG: CPT | Mod: CPTII,S$GLB,, | Performed by: INTERNAL MEDICINE

## 2018-10-18 PROCEDURE — 99999 PR PBB SHADOW E&M-EST. PATIENT-LVL III: CPT | Mod: PBBFAC,,, | Performed by: INTERNAL MEDICINE

## 2018-10-18 PROCEDURE — 99499 UNLISTED E&M SERVICE: CPT | Mod: S$GLB,,, | Performed by: NEUROMUSCULOSKELETAL MEDICINE & OMM

## 2018-10-18 PROCEDURE — 3008F BODY MASS INDEX DOCD: CPT | Mod: CPTII,S$GLB,, | Performed by: INTERNAL MEDICINE

## 2018-10-18 PROCEDURE — 99214 OFFICE O/P EST MOD 30 MIN: CPT | Mod: S$GLB,,, | Performed by: INTERNAL MEDICINE

## 2018-10-18 PROCEDURE — 73564 X-RAY EXAM KNEE 4 OR MORE: CPT | Mod: 26,RT,, | Performed by: RADIOLOGY

## 2018-10-18 PROCEDURE — 93005 ELECTROCARDIOGRAM TRACING: CPT | Mod: S$GLB,,, | Performed by: INTERNAL MEDICINE

## 2018-10-18 PROCEDURE — 99999 PR PBB SHADOW E&M-EST. PATIENT-LVL III: CPT | Mod: PBBFAC,,, | Performed by: NEUROMUSCULOSKELETAL MEDICINE & OMM

## 2018-10-18 PROCEDURE — 73564 X-RAY EXAM KNEE 4 OR MORE: CPT | Mod: TC,50,PO

## 2018-10-18 PROCEDURE — 93010 ELECTROCARDIOGRAM REPORT: CPT | Mod: S$GLB,,, | Performed by: INTERNAL MEDICINE

## 2018-10-18 PROCEDURE — 20611 DRAIN/INJ JOINT/BURSA W/US: CPT | Mod: LT,S$GLB,, | Performed by: NEUROMUSCULOSKELETAL MEDICINE & OMM

## 2018-10-18 PROCEDURE — 3045F PR MOST RECENT HEMOGLOBIN A1C LEVEL 7.0-9.0%: CPT | Mod: CPTII,S$GLB,, | Performed by: INTERNAL MEDICINE

## 2018-10-18 PROCEDURE — 3074F SYST BP LT 130 MM HG: CPT | Mod: CPTII,S$GLB,, | Performed by: INTERNAL MEDICINE

## 2018-10-18 RX ORDER — METFORMIN HYDROCHLORIDE 500 MG/1
500 TABLET ORAL 2 TIMES DAILY WITH MEALS
Qty: 180 TABLET | Refills: 3 | Status: SHIPPED | OUTPATIENT
Start: 2018-10-18 | End: 2019-11-01 | Stop reason: SDUPTHER

## 2018-10-18 RX ORDER — ONDANSETRON 4 MG/1
4 TABLET, ORALLY DISINTEGRATING ORAL EVERY 6 HOURS PRN
Qty: 20 TABLET | Refills: 0 | Status: SHIPPED | OUTPATIENT
Start: 2018-10-18 | End: 2019-11-12 | Stop reason: SDUPTHER

## 2018-10-18 NOTE — PROGRESS NOTES
"Subjective:     Aracelis Martinez    Chief Complaint   Patient presents with    Left Knee - Pain    Arthritis       HPI    Aracelis is a 63 y.o. female coming in today for her first Euflexxa injection in her left knee.    Objective:     VITAL SIGNS: /80   Ht 5' 6" (1.676 m)   Wt 102.1 kg (225 lb 1.4 oz)   BMI 36.33 kg/m²        Euflexxa Injection Procedure #1     A time out was performed, including verification of patient ID, procedure, site and side, availability of information and equipment, review of safety issues, and agreement with consent, the procedure site was marked.    Location: Knee joint, left     Procedure: The patient was prepped aseptically with alcohol and chlorhexidine. Ethyl Chloride spray was used prior to skin puncture to help numb the superficial skin. After cold spray was applied, 2 cc's of 1% lidocaine was injected into the skin and superficial tissue at the injection site using a 22 G, 3.5" needle to form an anesthetic tunnel and ensure proper needle placement into the knee joint space. Using a hemostat, the syringe was exchanged with the Euflexxa syringe, and 2cc of Euflexxa was injected into the knee joint. The patient was in the supine position during the duration of this procedure and the injection approach was from the superolateral aspect.     Ultrasound guidance was used for needle localization. Images were saved and stored for documentation. The knee joint was well visualized. Dynamic visualization of the 22g x 3.5 needles was continuous throughout the procedures.      Patient tolerance: The patient tolerated the procedure well with no immediate complications. There were no adverse reactions to the medication. Patient was instructed to apply ice to the joint for up to 20 minutes at a time and avoid strenuous activities for 24-36 hours following the injection. The patient was warned of possible blood pressure changes during that time. Following that time, the patient can resume " activities as prior to the injection.     The patient was reminded to call the clinic immediately for any adverse side effects as explained in clinic today.     Euflexxa:  NDC: [address]-9  Lot: [id number]  Exp: 10/21/19      Assessment:      Encounter Diagnosis   Name Primary?    Primary osteoarthritis of left knee Yes          Plan:     1. First Euflexxa injection of left knee received today (see procedure note above)  2. Follow-up in 1 week for 2nd injection of 3 injection series  3. Patient agreeable to today's plan and all questions were answered

## 2018-10-19 ENCOUNTER — TELEPHONE (OUTPATIENT)
Dept: ORTHOPEDICS | Facility: CLINIC | Age: 63
End: 2018-10-19

## 2018-10-19 NOTE — TELEPHONE ENCOUNTER
Aracelis Martinez notified of arrival time 0630 for surgery on 10/22/18 with Dr. SHON Schmidt at [facility]. Post Op appointment made, slip in mail.

## 2018-10-22 ENCOUNTER — ANESTHESIA (OUTPATIENT)
Dept: SURGERY | Facility: OTHER | Age: 63
End: 2018-10-22
Payer: COMMERCIAL

## 2018-10-22 ENCOUNTER — HOSPITAL ENCOUNTER (OUTPATIENT)
Facility: OTHER | Age: 63
Discharge: HOME | End: 2018-10-22
Attending: ORTHOPAEDIC SURGERY | Admitting: ORTHOPAEDIC SURGERY
Payer: COMMERCIAL

## 2018-10-22 ENCOUNTER — ANESTHESIA EVENT (OUTPATIENT)
Dept: SURGERY | Facility: OTHER | Age: 63
End: 2018-10-22
Payer: COMMERCIAL

## 2018-10-22 VITALS
RESPIRATION RATE: 16 BRPM | SYSTOLIC BLOOD PRESSURE: 131 MMHG | HEIGHT: 66 IN | DIASTOLIC BLOOD PRESSURE: 82 MMHG | HEART RATE: 62 BPM | BODY MASS INDEX: 35.68 KG/M2 | OXYGEN SATURATION: 95 % | TEMPERATURE: 98 F | WEIGHT: 222 LBS

## 2018-10-22 DIAGNOSIS — M79.644 FINGER PAIN, RIGHT: Primary | ICD-10-CM

## 2018-10-22 DIAGNOSIS — M77.8 BONE SPUR OF FINGER IP JOINT: ICD-10-CM

## 2018-10-22 DIAGNOSIS — M67.441 DIGITAL MUCOUS CYST OF FINGER OF RIGHT HAND: ICD-10-CM

## 2018-10-22 LAB — POCT GLUCOSE: 144 MG/DL (ref 70–110)

## 2018-10-22 PROCEDURE — 37000009 HC ANESTHESIA EA ADD 15 MINS: Performed by: ORTHOPAEDIC SURGERY

## 2018-10-22 PROCEDURE — 25000003 PHARM REV CODE 250: Performed by: ANESTHESIOLOGY

## 2018-10-22 PROCEDURE — 25000003 PHARM REV CODE 250: Performed by: PHYSICIAN ASSISTANT

## 2018-10-22 PROCEDURE — 63600175 PHARM REV CODE 636 W HCPCS: Performed by: NURSE ANESTHETIST, CERTIFIED REGISTERED

## 2018-10-22 PROCEDURE — 36000707: Performed by: ORTHOPAEDIC SURGERY

## 2018-10-22 PROCEDURE — 36000706: Performed by: ORTHOPAEDIC SURGERY

## 2018-10-22 PROCEDURE — 82962 GLUCOSE BLOOD TEST: CPT | Performed by: ORTHOPAEDIC SURGERY

## 2018-10-22 PROCEDURE — 63600175 PHARM REV CODE 636 W HCPCS: Performed by: ORTHOPAEDIC SURGERY

## 2018-10-22 PROCEDURE — 71000015 HC POSTOP RECOV 1ST HR: Performed by: ORTHOPAEDIC SURGERY

## 2018-10-22 PROCEDURE — 25000003 PHARM REV CODE 250: Performed by: ORTHOPAEDIC SURGERY

## 2018-10-22 PROCEDURE — 37000008 HC ANESTHESIA 1ST 15 MINUTES: Performed by: ORTHOPAEDIC SURGERY

## 2018-10-22 PROCEDURE — 26210 REMOVAL OF FINGER LESION: CPT | Mod: 59,F6,, | Performed by: ORTHOPAEDIC SURGERY

## 2018-10-22 RX ORDER — SODIUM CHLORIDE 9 MG/ML
INJECTION, SOLUTION INTRAVENOUS CONTINUOUS
Status: DISCONTINUED | OUTPATIENT
Start: 2018-10-22 | End: 2018-10-22 | Stop reason: HOSPADM

## 2018-10-22 RX ORDER — CEFAZOLIN SODIUM 2 G/50ML
2 SOLUTION INTRAVENOUS
Status: DISCONTINUED | OUTPATIENT
Start: 2018-10-22 | End: 2018-10-22

## 2018-10-22 RX ORDER — PROPOFOL 10 MG/ML
VIAL (ML) INTRAVENOUS
Status: DISCONTINUED | OUTPATIENT
Start: 2018-10-22 | End: 2018-10-22

## 2018-10-22 RX ORDER — MIDAZOLAM HYDROCHLORIDE 1 MG/ML
INJECTION INTRAMUSCULAR; INTRAVENOUS
Status: DISCONTINUED | OUTPATIENT
Start: 2018-10-22 | End: 2018-10-22

## 2018-10-22 RX ORDER — BUPIVACAINE HYDROCHLORIDE 2.5 MG/ML
INJECTION, SOLUTION EPIDURAL; INFILTRATION; INTRACAUDAL
Status: DISCONTINUED | OUTPATIENT
Start: 2018-10-22 | End: 2018-10-22 | Stop reason: HOSPADM

## 2018-10-22 RX ORDER — CEFAZOLIN SODIUM 1 G/3ML
2 INJECTION, POWDER, FOR SOLUTION INTRAMUSCULAR; INTRAVENOUS
Status: DISCONTINUED | OUTPATIENT
Start: 2018-10-22 | End: 2018-10-22 | Stop reason: HOSPADM

## 2018-10-22 RX ORDER — FENTANYL CITRATE 50 UG/ML
INJECTION, SOLUTION INTRAMUSCULAR; INTRAVENOUS
Status: DISCONTINUED | OUTPATIENT
Start: 2018-10-22 | End: 2018-10-22

## 2018-10-22 RX ORDER — PROPOFOL 10 MG/ML
VIAL (ML) INTRAVENOUS CONTINUOUS PRN
Status: DISCONTINUED | OUTPATIENT
Start: 2018-10-22 | End: 2018-10-22

## 2018-10-22 RX ORDER — SODIUM CHLORIDE 0.9 % (FLUSH) 0.9 %
5 SYRINGE (ML) INJECTION
Status: DISCONTINUED | OUTPATIENT
Start: 2018-10-22 | End: 2018-10-22 | Stop reason: HOSPADM

## 2018-10-22 RX ORDER — MUPIROCIN 20 MG/G
OINTMENT TOPICAL
Status: DISCONTINUED | OUTPATIENT
Start: 2018-10-22 | End: 2018-10-22 | Stop reason: HOSPADM

## 2018-10-22 RX ORDER — ONDANSETRON 8 MG/1
8 TABLET, ORALLY DISINTEGRATING ORAL EVERY 8 HOURS PRN
Status: DISCONTINUED | OUTPATIENT
Start: 2018-10-22 | End: 2018-10-22 | Stop reason: HOSPADM

## 2018-10-22 RX ORDER — ACETAMINOPHEN 325 MG/1
650 TABLET ORAL EVERY 4 HOURS PRN
Status: DISCONTINUED | OUTPATIENT
Start: 2018-10-22 | End: 2018-10-22 | Stop reason: HOSPADM

## 2018-10-22 RX ORDER — LIDOCAINE HCL/PF 100 MG/5ML
SYRINGE (ML) INTRAVENOUS
Status: DISCONTINUED | OUTPATIENT
Start: 2018-10-22 | End: 2018-10-22

## 2018-10-22 RX ORDER — SODIUM CHLORIDE, SODIUM LACTATE, POTASSIUM CHLORIDE, CALCIUM CHLORIDE 600; 310; 30; 20 MG/100ML; MG/100ML; MG/100ML; MG/100ML
INJECTION, SOLUTION INTRAVENOUS CONTINUOUS PRN
Status: DISCONTINUED | OUTPATIENT
Start: 2018-10-22 | End: 2018-10-22

## 2018-10-22 RX ORDER — BACITRACIN ZINC 500 UNIT/G
OINTMENT (GRAM) TOPICAL
Status: DISCONTINUED | OUTPATIENT
Start: 2018-10-22 | End: 2018-10-22 | Stop reason: HOSPADM

## 2018-10-22 RX ORDER — HYDROCODONE BITARTRATE AND ACETAMINOPHEN 5; 325 MG/1; MG/1
1 TABLET ORAL EVERY 4 HOURS PRN
Status: DISCONTINUED | OUTPATIENT
Start: 2018-10-22 | End: 2018-10-22 | Stop reason: HOSPADM

## 2018-10-22 RX ORDER — LIDOCAINE HYDROCHLORIDE 10 MG/ML
INJECTION, SOLUTION EPIDURAL; INFILTRATION; INTRACAUDAL; PERINEURAL
Status: DISCONTINUED | OUTPATIENT
Start: 2018-10-22 | End: 2018-10-22 | Stop reason: HOSPADM

## 2018-10-22 RX ORDER — HYDROCODONE BITARTRATE AND ACETAMINOPHEN 10; 325 MG/1; MG/1
1 TABLET ORAL EVERY 4 HOURS PRN
Status: DISCONTINUED | OUTPATIENT
Start: 2018-10-22 | End: 2018-10-22 | Stop reason: HOSPADM

## 2018-10-22 RX ORDER — CEFAZOLIN SODIUM 2 G/50ML
2 SOLUTION INTRAVENOUS
Status: DISCONTINUED | OUTPATIENT
Start: 2018-10-22 | End: 2018-10-22 | Stop reason: RX

## 2018-10-22 RX ADMIN — MUPIROCIN: 20 OINTMENT TOPICAL at 08:10

## 2018-10-22 RX ADMIN — MIDAZOLAM HYDROCHLORIDE 2 MG: 1 INJECTION, SOLUTION INTRAMUSCULAR; INTRAVENOUS at 08:10

## 2018-10-22 RX ADMIN — FENTANYL CITRATE 50 MCG: 50 INJECTION, SOLUTION INTRAMUSCULAR; INTRAVENOUS at 08:10

## 2018-10-22 RX ADMIN — SODIUM CHLORIDE, SODIUM LACTATE, POTASSIUM CHLORIDE, AND CALCIUM CHLORIDE: .6; .31; .03; .02 INJECTION, SOLUTION INTRAVENOUS at 08:10

## 2018-10-22 RX ADMIN — FENTANYL CITRATE 25 MCG: 50 INJECTION, SOLUTION INTRAMUSCULAR; INTRAVENOUS at 09:10

## 2018-10-22 RX ADMIN — CEFAZOLIN 2 G: 330 INJECTION, POWDER, FOR SOLUTION INTRAMUSCULAR; INTRAVENOUS at 08:10

## 2018-10-22 RX ADMIN — LIDOCAINE HYDROCHLORIDE 50 MG: 20 INJECTION, SOLUTION INTRAVENOUS at 08:10

## 2018-10-22 RX ADMIN — PROPOFOL 20 MG: 10 INJECTION, EMULSION INTRAVENOUS at 09:10

## 2018-10-22 RX ADMIN — PROPOFOL 30 MG: 10 INJECTION, EMULSION INTRAVENOUS at 08:10

## 2018-10-22 RX ADMIN — PROPOFOL 50 MCG/KG/MIN: 10 INJECTION, EMULSION INTRAVENOUS at 08:10

## 2018-10-22 NOTE — TRANSFER OF CARE
"Anesthesia Transfer of Care Note    Patient: Aracelis Martinez    Procedure(s) Performed: Procedure(s) (LRB):  EXCISION, GANGLION CYST, HAND- RIGHT, LONG AND INDEX FINGER (Right)    Patient location: [facility]    Anesthesia Type: general    Transport from OR: Transported from OR on room air with adequate spontaneous ventilation    Post pain: adequate analgesia    Post assessment: no apparent anesthetic complications and tolerated procedure well    Post vital signs: stable    Level of consciousness: awake, alert and oriented    Nausea/Vomiting: no nausea/vomiting    Complications: none    Transfer of care protocol was followed      Last vitals:   Visit Vitals  /68 (BP Location: Right arm, Patient Position: Lying)   Pulse 70   Temp 36.4 °C (97.6 °F) (Oral)   Resp 16   Ht 5' 6" (1.676 m)   Wt 100.7 kg (222 lb)   SpO2 95%   Breastfeeding? No   BMI 35.83 kg/m²     "

## 2018-10-22 NOTE — ANESTHESIA POSTPROCEDURE EVALUATION
"Anesthesia Post Evaluation    Patient: Aracelis Martinez    Procedure(s) Performed: Procedure(s) (LRB):  EXCISION, GANGLION CYST, HAND- RIGHT, LONG AND INDEX FINGER (Right)    Final Anesthesia Type: general  Patient location during evaluation: [facility]  Patient participation: Yes- Able to Participate  Level of consciousness: awake and alert and oriented  Post-procedure vital signs: reviewed and not stable  Pain management: adequate  Airway patency: patent  PONV status at discharge: No PONV  Anesthetic complications: no      Cardiovascular status: hemodynamically stable  Respiratory status: room air, spontaneous ventilation and unassisted  Hydration status: euvolemic  Follow-up not needed.        Visit Vitals  /68 (BP Location: Right arm, Patient Position: Lying)   Pulse 70   Temp 36.4 °C (97.6 °F) (Oral)   Resp 16   Ht 5' 6" (1.676 m)   Wt 100.7 kg (222 lb)   SpO2 95%   Breastfeeding? No   BMI 35.83 kg/m²       Pain/Stefan Score: Pain Assessment Performed: Yes (10/22/2018  8:06 AM)  Presence of Pain: denies (10/22/2018  8:06 AM)        "

## 2018-10-22 NOTE — PLAN OF CARE
Aracelis Martinez has met all discharge criteria from Phase II. Vital Signs are stable, ambulating  without difficulty. Discharge instructions given, patient verbalized understanding. Discharged from facility via wheelchair in stable condition.

## 2018-10-22 NOTE — PROGRESS NOTES
Subjective:       Patient ID: Aracelis Martinez is a 63 y.o. female.    Chief Complaint: Follow-up (4 mos) and Nausea    HPI     The patient presents for follow-up of medical conditions which include type 2 diabetes mellitus, hypertension, hyperlipidemia, and osteoarthritis.  She is scheduled for right hand surgery on 10/22/2018 with Dr. Rodriguez.  She is scheduled for a knee injection today.  Past surgical history was reviewed.  She there is no history of anesthesia allergies or complications.  The patient denies having any recent shortness of breath, chest pain, or decrease in exercise tolerance.  She has not experienced any chills, fever, or cough.  No abnormal bleeding or bruising is noted.  Blood sugar levels have ranged from 128-142 recently.  No hypoglycemia has been noted.  Review of Systems   Constitutional: Negative for activity change, appetite change and unexpected weight change.   Eyes: Negative for visual disturbance.   Respiratory: Negative for shortness of breath.    Cardiovascular: Negative for chest pain, palpitations and leg swelling.   Gastrointestinal: Negative for abdominal pain, blood in stool and diarrhea.   Genitourinary: Negative for dysuria, frequency, hematuria and urgency.   Musculoskeletal: Positive for arthralgias.   Neurological: Positive for light-headedness (  occasional postural lightheadedness is noted.). Negative for weakness, numbness and headaches.   Psychiatric/Behavioral: Negative for sleep disturbance.       Objective:      Physical Exam   Constitutional: She is oriented to person, place, and time. She appears well-developed and well-nourished. No distress.   The patient has lost 4 lb since 10/02/2018.   HENT:   Head: Normocephalic and atraumatic.   Eyes: Conjunctivae and EOM are normal. Pupils are equal, round, and reactive to light. No scleral icterus.   Neck: Normal range of motion. Neck supple. No JVD present. No thyromegaly present.   Cardiovascular: Normal rate, regular  rhythm, normal heart sounds and intact distal pulses. Exam reveals no gallop and no friction rub.   No murmur heard.  Pulmonary/Chest: Effort normal and breath sounds normal. No respiratory distress. She has no wheezes. She has no rales.   Abdominal: Soft. Bowel sounds are normal. She exhibits no mass. There is no tenderness.   Musculoskeletal: Normal range of motion. She exhibits no edema or tenderness.   Lymphadenopathy:     She has no cervical adenopathy.   Neurological: She is alert and oriented to person, place, and time. No cranial nerve deficit.   Sensory exam is intact in both feet on monofilament  testing.   Skin: Skin is warm and dry. No rash noted.   No foot lesions are present.   Psychiatric: She has a normal mood and affect. Her behavior is normal.   Nursing note and vitals reviewed.      Results for orders placed or performed in visit on 10/02/18   CAR Ultrasound doppler carotid bilateral   Result Value Ref Range    Internal Carotid Stenosis 0-19%        EKG today shows a normal sinus rhythm with ventricular rate of 65.  Possible left atrial enlargement.  Septal infarct cited on or before 2/ 18/2016. When compared to prior EKG of 02/18/2016 no significant change is found.    Assessment:       1. Type 2 diabetes mellitus without complication, without long-term current use of insulin    2. Essential hypertension    3. Hyperlipidemia, unspecified hyperlipidemia type    4. Chronic bilateral low back pain without sciatica    5. Preop exam for internal medicine        Plan:       Aracelis was seen today for follow-up. The dose of metformin will be increased to 500 mg twice daily for better blood sugar control.  Labs will be repeated in 4 months.  The patient is medically stable for hand surgery as planned.  She is medically cleared.  She will follow up with her Sports Medicine specialist regarding chronic right knee pain.  A routine follow-up visit in 4 months will be obtained.    Diagnoses and all orders for  this visit:    Type 2 diabetes mellitus without complication, without long-term current use of insulin  -     Hemoglobin A1c; Future    Essential hypertension    Hyperlipidemia, unspecified hyperlipidemia type  -     Comprehensive metabolic panel; Future  -     Lipid panel; Future    Chronic bilateral low back pain without sciatica    Preop exam for internal medicine  -     IN OFFICE EKG 12-LEAD (to [place])    Other orders  -     metFORMIN (GLUCOPHAGE) 500 MG tablet; Take 1 tablet (500 mg total) by mouth 2 (two) times daily with meals.

## 2018-10-22 NOTE — H&P
Subjective:       Patient ID: Aracelis Martinez is a 63 y.o. female.     Chief Complaint: Pain of the Right Hand        HPI  Aracelis Martinez is a right hand dominant 63 y.o. female presenting today for painful mass at the distal right long finger.  There was not a history of trauma.  Onset of symptoms began 2 months ago.  She reports that she noticed some darkening near the nail at the right long finger, she then noticed a tender mass.  She reports that she picked at it and noticed a red lump.  She previously seen Dr. Schmidt in 2017 to discuss removal of the bone spur at the right index finger DIP.  She reports that she ultimately to the did not have surgery at that time. She presents today because she is interested in pursuing surgical treatment of both the index and long fingers.  She denies any numbness or tingling in the fingers.  She denies any difficulty with finger motion.              Review of patient's allergies indicates:   Allergen Reactions    Azithromycin Other (See Comments)       Yeast infection; pt requests please do not put her on this medication          Current Medications          Current Outpatient Medications   Medication Sig Dispense Refill    amLODIPine (NORVASC) 10 MG tablet TAKE 1 TABLET BY MOUTH DAILY 30 tablet 10    blood sugar diagnostic (ONETOUCH VERIO) Strp TEST BLOOD SUGAR LEVELS ONCE DAILY AS DIRECTED 30 strip 11    blood sugar diagnostic Strp Test blood sugar once daily 100 strip 3    blood-glucose meter kit Use as instructed 1 each 0    doxepin (SINEQUAN) 100 MG capsule Take 1 capsule (100 mg total) by mouth nightly. 90 capsule 3    DULoxetine (CYMBALTA) 30 MG capsule Take 1 capsule (30 mg total) by mouth once daily. 90 capsule 3    lancets (ONETOUCH DELICA LANCETS) 33 gauge Misc 1 lancet by Misc.(Non-Drug; Combo Route) route once daily. 200 each 1    levothyroxine (SYNTHROID) 50 MCG tablet TAKE 1 TABLET BY MOUTH BEFORE BREAKFAST 90 tablet 3    metFORMIN (GLUCOPHAGE)  "500 MG tablet TAKE 1 TABLET BY MOUTH ONCE DAILY BEFORE DINNER 90 tablet 3    pantoprazole (PROTONIX) 40 MG tablet TAKE 1 TABLET BY MOUTH EVERY DAY FOR PREVENTION OF REFLUX 90 tablet 3    traMADol (ULTRAM) 50 mg tablet TAKE 1 TO 2 TABLETS BY MOUTH TWICE DAILY 90 tablet 2      No current facility-administered medications for this visit.                  Past Medical History:   Diagnosis Date    Arthritis      Chronic back pain      Diabetes mellitus 2014    Diverticulosis      Hypertension      Neuroendocrine tumor of pancreas 5/3/2016    Renal cyst       left    Thyroid disease                 Past Surgical History:   Procedure Laterality Date    ARTHROSCOPY, KNEE Left 2014     Performed by [facility] at [facility] OR 2ND FLR     SECTION        COLONOSCOPY N/A 2014     Performed by Cem Lamar MD at [facility] ENDO (4TH FLR)    ESOPHAGOGASTRODUODENOSCOPY (EGD) N/A 2015     Performed by Jarod Zapata MD at [facility] ENDO (4TH FLR)    HYSTERECTOMY        INJECTION-FACET Bilateral 2016     Performed by Sindhu Norman [facility] at [facility] [place] PAIN MGT    KNEE ARTHROSCOPY   2014     LATS/left    TONSILLECTOMY        ULTRASOUND-ENDOSCOPIC-UPPER N/A 2015     Performed by Yossi Nicole MD at [facility] ENDO (2ND FLR)            Review of Systems:  Review of Systems   Constitution: Negative for chills and fever.   Skin: Negative for rash and suspicious lesions.   Musculoskeletal:        See HPI   Neurological: Negative for dizziness, headaches, light-headedness, numbness and paresthesias.   Psychiatric/Behavioral: Negative for depression. The patient is not nervous/anxious.             OBJECTIVE:      PHYSICAL EXAM:  Height: 5' 6" (167.6 cm) Weight: 103 kg (227 lb)      Vitals:     10/04/18 1412   BP: 116/73   Pulse: 81   Weight: 103 kg (227 lb)   Height: 5' 6" (1.676 m)   PainSc:   3      General     Vitals reviewed.  Constitutional: She is oriented to person, place, and time. She " appears well-developed and well-nourished.   HENT:   Head: Normocephalic and atraumatic.   Neck: Normal range of motion.   Cardiovascular: Normal rate.    Pulmonary/Chest: Effort normal. No respiratory distress.   Neurological: She is alert and oriented to person, place, and time.   Psychiatric: She has a normal mood and affect. Her behavior is normal. Judgment and thought content normal.                  Musculoskeletal:  Nail plate deformity - ridge noted in line with patient's finger mass.  Small protruding mass at the distal right long finger near the nail fold.  Tender to palpation.  Heberden's node right index DIP.  No separate discrete cyst appreciated.  Good finger range of motion, flexion extension all joints of the right long and index finger were assessed in isolation.  Neurovascularly intact-good sensation and motor function, good capillary refill, 2+ radial pulses.     RADIOGRAPHS:  Right Hand X-Ray, 10/4/18       FINDINGS:  There is no acute fracture or subluxation.  Degenerative changes are again noted in the 2nd and 3rd digits.  The soft tissues are grossly unremarkable.       Impression       No acute fracture      Comments: I have personally reviewed the imaging and I agree with the above radiologist's report.     ASSESSMENT/PLAN:   Aracelis was seen today for pain.     Diagnoses and all orders for this visit:     Bone spur of finger IP joint  -     Place in Outpatient; Standing  -     Vital signs; Standing  -     Insert peripheral IV; Standing  -     Patient may leave on underwear for knee, ankle, and upper extremity surgery; Standing  -     Cleanse with Chlorhexidine (CHG); Standing  -     Diet NPO; Standing  -     Place RENETTA hose; Standing  -     Chlorohexidine Gluconate Bath; Standing     Digital mucous cyst of finger of right hand  -     Place in Outpatient; Standing  -     Vital signs; Standing  -     Insert peripheral IV; Standing  -     Patient may leave on underwear for knee, ankle, and upper  extremity surgery; Standing  -     Cleanse with Chlorhexidine (CHG); Standing  -     Diet NPO; Standing  -     Place RENETTA hose; Standing  -     Chlorohexidine Gluconate Bath; Standing     Other orders  -     IP VTE LOW RISK PATIENT; Standing  -     ceFAZolin (ANCEF) 2 g in dextrose 5 % 50 mL IVPB; Inject 2 g into the vein On call Procedure (Surgery).  -     mupirocin 2 % ointment; by Nasal route On call Procedure (surgery).              - We talked at length about the anatomy and pathophysiology of        Encounter Diagnoses   Name Primary?    Bone spur of finger IP joint Yes    Digital mucous cyst of finger of right hand           - discussed indications and risks digital mucous cyst excision with exostectomy.  Patient is interested in excess ectomy of both the right long DIP and right index DIP.  We discussed that the nail plate deformity may resolve with surgery, however that is not guaranteed.  We also discussed that there is the possibility of cyst recurrence.  She has not have any pain with finger motion, so joint fusion is not indicated at this time, even though it was previously discussed in 2017.  - her questions were answered.  Consent forms were signed today in clinic.  Discussed postoperative splinting and likelihood for postoperative occupational therapy.

## 2018-10-22 NOTE — DISCHARGE INSTRUCTIONS
Anesthesia: Monitored Anesthesia Care (MAC)    Anesthesia Safety  · Have an adult family member or friend drive you home after the procedure.  · For the first 24 hours after your surgery:  ¨ Do not drive or use heavy equipment.  ¨ Do not make important decisions or sign documents.  ¨ Avoid alcohol.  ¨ Have someone stay with you, if possible. They can watch for problems and help keep you safe.

## 2018-10-22 NOTE — ANESTHESIA PREPROCEDURE EVALUATION
10/22/2018  Aracelis Martinez is a 63 y.o., female.    Anesthesia Evaluation    I have reviewed the Patient Summary Reports.    I have reviewed the Nursing Notes.   I have reviewed the Medications.     Review of Systems  Anesthesia Hx:  No problems with previous Anesthesia  Denies Family Hx of Anesthesia complications.   Denies Personal Hx of Anesthesia complications.   Social:  Non-Smoker    Hematology/Oncology:  Hematology Normal   Oncology Normal     EENT/Dental:EENT/Dental Normal   Cardiovascular:   Exercise tolerance: good Hypertension    Pulmonary:  Pulmonary Normal    Renal/:   Chronic Renal Disease Renal cyst   Musculoskeletal:   Arthritis   Spine Disorders: lumbar Chronic Pain    Neurological:   Neuromuscular Disease, fibromyqalgia   Endocrine:   Diabetes, type 2    Dermatological:  Skin Normal    Psych:  Psychiatric Normal           Physical Exam  General:  Obesity    Airway/Jaw/Neck:  Airway Findings: Mouth Opening: Normal Tongue: Normal  General Airway Assessment: Adult  Mallampati: I  TM Distance: Normal, at least 6 cm  Jaw/Neck Findings:  Neck ROM: Normal ROM      Dental:  Dental Findings: In tact             Anesthesia Plan  Type of Anesthesia, risks & benefits discussed:  Anesthesia Type:  MAC  Patient's Preference:   Intra-op Monitoring Plan:   Intra-op Monitoring Plan Comments:   Post Op Pain Control Plan:   Post Op Pain Control Plan Comments:   Induction:   IV  Beta Blocker:         Informed Consent: Patient understands risks and agrees with Anesthesia plan.  Questions answered. Anesthesia consent signed with patient.  ASA Score: 3     Day of Surgery Review of History & Physical:    H&P update referred to the surgeon.         Ready For Surgery From Anesthesia Perspective.

## 2018-10-22 NOTE — BRIEF OP NOTE
[facility]  Brief Operative Note     SUMMARY     Surgery Date: 10/22/2018     Surgeon(s) and Role:     * Sowmya Schmidt MD - Primary    Assisting Surgeon: None    Pre-op Diagnosis:  Finger pain, right [M79.644]    Post-op Diagnosis:  Post-Op Diagnosis Codes:     * Finger pain, right [M79.644]    Procedure(s) (LRB):  EXCISION, GANGLION CYST, HAND- RIGHT, LONG AND INDEX FINGER (Right)    Anesthesia: Local MAC    Description of the findings of the procedure: see op note4    Findings/Key Components: see op note     Estimated Blood Loss: * No values recorded between 10/22/2018  9:17 AM and 10/22/2018  9:51 AM *         Specimens:   Specimen (12h ago, onward)    None          Discharge Note    SUMMARY     Admit Date: 10/22/2018    Discharge Date and Time:  10/22/2018 10:01 AM    Hospital Course (synopsis of major diagnoses, care, treatment, and services provided during the course of the hospital stay):     On the day of surgery, Aracelis Martinez arrived to the day of surgery center. The patient was identified in the pre-operative area and the operative site was marked. All consents were verified. The patient was taken to the operative suite and underwent a right index and long mucous cyst excision without complication. The patient tolerated the procedure well and was then taken to the PACU and monitored post-operatively. The patient's pain was controlled with oral medications and the decision was made to discharge the patient home with close follow up.     Follow up appointment scheduled for 2 weeks with [facility] hand  Weight Bearing Status: As tolerated   Prescriptions:   Diet: Regular          Final Diagnosis: Post-Op Diagnosis Codes:     * Finger pain, right [M79.644]    Disposition: Home or Self Care    Follow Up/Patient Instructions:     Medications:  Reconciled Home Medications:      Medication List      START taking these medications    acetaminophen-codeine 300-30mg 300-30 mg Tab  Commonly  known as:  TYLENOL #3  Take 1 tablet by mouth every 6 (six) hours as needed.        CONTINUE taking these medications    amLODIPine 10 MG tablet  Commonly known as:  NORVASC  TAKE 1 TABLET BY MOUTH DAILY     * blood sugar diagnostic Strp  Commonly known as:  ONETOUCH VERIO  TEST BLOOD SUGAR LEVELS ONCE DAILY AS DIRECTED     * blood sugar diagnostic Strp  Test blood sugar once daily     blood-glucose meter kit  Use as instructed     doxepin 100 MG capsule  Commonly known as:  SINEQUAN  Take 1 capsule (100 mg total) by mouth nightly.     DULoxetine 30 MG capsule  Commonly known as:  CYMBALTA  Take 1 capsule (30 mg total) by mouth once daily.     lancets 33 gauge Misc  Commonly known as:  ONETOUCH DELICA LANCETS  1 lancet by Misc.(Non-Drug; Combo Route) route once daily.     levothyroxine 50 MCG tablet  Commonly known as:  SYNTHROID  TAKE 1 TABLET BY MOUTH BEFORE BREAKFAST     metFORMIN 500 MG tablet  Commonly known as:  GLUCOPHAGE  Take 1 tablet (500 mg total) by mouth 2 (two) times daily with meals.     ondansetron 4 MG Tbdl  Commonly known as:  ZOFRAN-ODT  Take 1 tablet (4 mg total) by mouth every 6 (six) hours as needed (nausea).     pantoprazole 40 MG tablet  Commonly known as:  PROTONIX  TAKE 1 TABLET BY MOUTH EVERY DAY FOR PREVENTION OF REFLUX     traMADol 50 mg tablet  Commonly known as:  ULTRAM  TAKE 1 TO 2 TABLETS BY MOUTH TWICE DAILY         * This list has 2 medication(s) that are the same as other medications prescribed for you. Read the directions carefully, and ask your doctor or other care provider to review them with you.              Discharge Procedure Orders   Diet general     Call MD for:  temperature >100.4     Call MD for:  persistent nausea and vomiting     Call MD for:  severe uncontrolled pain     Call MD for:  difficulty breathing, headache or visual disturbances     Call MD for:  redness, tenderness, or signs of infection (pain, swelling, redness, odor or green/yellow discharge around  incision site)     Call MD for:  hives     Call MD for:  persistent dizziness or light-headedness     Call MD for:  extreme fatigue     Keep surgical extremity elevated     No driving, operating heavy equipment or signing legal documents while taking pain medication.     Leave dressing on - Keep it clean, dry, and intact until clinic visit     Follow-up Information     [facility] [address] In 2 weeks.    Specialty:  Outpatient Rehab  Why:  For suture removal, For wound re-check  Contact information:  [address] [facility] [place]  [facility] [address] [phone]  Additional information:  9th Floor

## 2018-10-24 NOTE — OP NOTE
DATE OF PROCEDURE:  10/22/2018.    SERVICE:  Orthopedics.    ATTENDING SURGEON:  Sowmya Schmidt M.D.    RESIDENT SURGEON:  Dr. Mohan Sinclair.    PREOPERATIVE DIAGNOSES:  1.  Right long finger mucous cyst with osteophyte.  2.  Right index finger with mucous cyst and osteophyte.    POSTOPERATIVE DIAGNOSES:  1.  Right long finger mucous cyst with osteophyte.  2.  Right index finger with mucous cyst and osteophyte.    PROCEDURES:  1.  Right long finger mucous cyst excision and exostectomy.  2.  Right index finger mucous cyst excision and exostectomy.  3.  Splint application.    ANESTHESIA:  Local placed by surgeon and MAC.    FLUIDS:  Lactated Ringers.    BLOOD LOSS:  None.  No blood was given.    TOURNIQUET TIME:  Less than 30 minutes.    PACKS AND DRAINS:  None.    IMPLANTS:  None.    SPECIMENS:  None.    COMPLICATIONS:  None.    INDICATIONS FOR PROCEDURE:  Ms. Martinez is a 63-year-old female who has an   enlarging mucous cyst painful at the DIP with nail plate deformity especially   her right index and right long finger.  After much discussion, she elected for   surgical intervention.  Risks and benefits were explained to the patient in   clinic in preoperatively.  Consents were signed preoperatively.    PROCEDURE IN DETAIL:  After correct site was marked with the patient's   participation in the holding area, the patient was brought to the Operating   Room, placed in the supine position, underwent MAC anesthesia.  A well-padded   nonsterile tourniquet was placed on the right forearm in a well-padded position.    A timeout was conducted for the correct site, procedure and the patient to be   indicated.  IV antibiotics were given to the patient preoperatively.  Under   sterile conditions, an injection of lidocaine 1% was injected as a block for the   index and the long finger.  Once this was performed, the arm was prepped and   draped in normal sterile fashion.  T-type incisions were marked out on the   dorsum of the  DIP joint, both on the right index finger and long finger.  The   arm was exsanguinated with Esmarch.  Tourniquet insufflated to 250 mmHg.    Incision was made.  Careful dissection to the long finger was performed,   elevating the skin flaps.  Immediately mucous cyst was seen excised and passed   off to the back table.  There were large osteophytes that were present and these   were rongeured off and then a rasp was used to smooth them out underneath the   extensor tendon as well as the radial and ulnar aspects of the DIP joint.  The   area was irrigated with copious amounts of normal saline.  Nylon closed the   skin.  Our attention was turned to the index finger.  Again a T-type incision   was made over the DIP joint.  Skin hooks were used to elevate the skin flaps.    Mucous cyst was excised.  It was very small in nature.  Once that was completed,   the osteophytes were removed with a rongeur and rasp was used to smooth out the   area.  This area was irrigated with copious amounts of normal saline.  Nylon   closed the skin.  Sterile dressing was applied.  Tourniquet was deflated.  Brisk   capillary refill ensued.  The patient was placed in a well-padded splint,   tolerated the procedure well and was brought to recovery area in stable   condition.    POSTOPERATIVE PLAN FOR THIS PATIENT:  She is to keep the dressing clean, dry and   intact.  We will see her back in 2 weeks' time, sutures are to be removed.  If   she does not have mallet deformities then we can remove the splint entirely.      LES/HN  dd: 10/24/2018 08:28:56 (CDT)  td: 10/24/2018 09:27:01 (YULIA)  Doc ID   #[id number]  Job ID #[id number]    CC:

## 2018-10-25 ENCOUNTER — OFFICE VISIT (OUTPATIENT)
Dept: ORTHOPEDICS | Facility: CLINIC | Age: 63
End: 2018-10-25
Payer: COMMERCIAL

## 2018-10-25 VITALS
HEIGHT: 66 IN | BODY MASS INDEX: 36.07 KG/M2 | SYSTOLIC BLOOD PRESSURE: 110 MMHG | WEIGHT: 224.44 LBS | DIASTOLIC BLOOD PRESSURE: 80 MMHG

## 2018-10-25 DIAGNOSIS — M17.12 PRIMARY OSTEOARTHRITIS OF LEFT KNEE: Primary | ICD-10-CM

## 2018-10-25 PROCEDURE — 99499 UNLISTED E&M SERVICE: CPT | Mod: S$GLB,,, | Performed by: NEUROMUSCULOSKELETAL MEDICINE & OMM

## 2018-10-25 PROCEDURE — 99999 PR PBB SHADOW E&M-EST. PATIENT-LVL II: CPT | Mod: PBBFAC,,, | Performed by: NEUROMUSCULOSKELETAL MEDICINE & OMM

## 2018-10-25 PROCEDURE — 20611 DRAIN/INJ JOINT/BURSA W/US: CPT | Mod: LT,S$GLB,, | Performed by: NEUROMUSCULOSKELETAL MEDICINE & OMM

## 2018-10-25 NOTE — PROGRESS NOTES
"Subjective:     Aracelis Martinez     Chief Complaint   Patient presents with    Left Knee - Pain    Arthritis       Aracelis is a 63 y.o. female coming in today for their 2nd Euflexxa injection to the left knee.   Objective:     VITAL SIGNS: /80   Ht 5' 6" (1.676 m)   Wt 101.8 kg (224 lb 6.9 oz)   BMI 36.22 kg/m²      Euflexxa Injection Procedure #2     A time out was performed, including verification of patient ID, procedure, site and side, availability of information and equipment, review of safety issues, and agreement with consent, the procedure site was marked.    Location: Knee joint, left     Procedure: The patient was prepped aseptically with alcohol and chlorhexidine. Ethyl Chloride spray was used prior to skin puncture to help numb the superficial skin. After cold spray was applied, 2 cc's of 1% lidocaine was injected into the skin and superficial tissue at the injection site using a 22 G, 3.5" needle to form an anesthetic tunnel and ensure proper needle placement into the knee joint space. Using a hemostat, the syringe was exchanged with the Euflexxa syringe, and 2cc of Euflexxa was injected into the knee joint. The patient was in the supine position during the duration of this procedure and the injection approach was from the superolateral aspect.     Ultrasound guidance was used for needle localization. Images were saved and stored for documentation. The knee joint was well visualized. Dynamic visualization of the 22g x 3.5 needles was continuous throughout the procedures.      Patient tolerance: The patient tolerated the procedure well with no immediate complications. There were no adverse reactions to the medication. Patient was instructed to apply ice to the joint for up to 20 minutes at a time and avoid strenuous activities for 24-36 hours following the injection. The patient was warned of possible blood pressure changes during that time. Following that time, the patient can resume activities " as prior to the injection.     The patient was reminded to call the clinic immediately for any adverse side effects as explained in clinic today.     Euflexxa:  NCD: [id number]  Lot: [id number]  Exp: 2019-10-08      Assessment:      Encounter Diagnosis   Name Primary?    Primary osteoarthritis of left knee Yes          Plan:     1.second Euflexxa injection of left knee received today (see procedure note above)  2. Follow-up in 1 week for 3rd injection of 3 injection series  3. Patient agreeable to today's plan and all questions were answered

## 2018-11-01 ENCOUNTER — OFFICE VISIT (OUTPATIENT)
Dept: ORTHOPEDICS | Facility: CLINIC | Age: 63
End: 2018-11-01
Payer: COMMERCIAL

## 2018-11-01 VITALS
SYSTOLIC BLOOD PRESSURE: 130 MMHG | WEIGHT: 223.75 LBS | BODY MASS INDEX: 35.96 KG/M2 | DIASTOLIC BLOOD PRESSURE: 78 MMHG | HEIGHT: 66 IN

## 2018-11-01 DIAGNOSIS — M17.12 PRIMARY OSTEOARTHRITIS OF LEFT KNEE: Primary | ICD-10-CM

## 2018-11-01 PROCEDURE — 99499 UNLISTED E&M SERVICE: CPT | Mod: S$GLB,,, | Performed by: NEUROMUSCULOSKELETAL MEDICINE & OMM

## 2018-11-01 PROCEDURE — 99999 PR PBB SHADOW E&M-EST. PATIENT-LVL II: CPT | Mod: PBBFAC,,, | Performed by: NEUROMUSCULOSKELETAL MEDICINE & OMM

## 2018-11-01 PROCEDURE — 20611 DRAIN/INJ JOINT/BURSA W/US: CPT | Mod: LT,S$GLB,, | Performed by: NEUROMUSCULOSKELETAL MEDICINE & OMM

## 2018-11-01 NOTE — PROGRESS NOTES
"Subjective:     Aracelis Martinez     Chief Complaint   Patient presents with    Left Knee - Pain    Arthritis       Aracelis is a 63 y.o. female coming in today for their 3rd Euflexxa injection to the left knee.   Objective:     VITAL SIGNS: /78   Ht 5' 6" (1.676 m)   Wt 101.5 kg (223 lb 12.3 oz)   BMI 36.12 kg/m²      Euflexxa Injection Procedure #3     A time out was performed, including verification of patient ID, procedure, site and side, availability of information and equipment, review of safety issues, and agreement with consent, the procedure site was marked.    Location: Knee joint, left     Procedure: The patient was prepped aseptically with alcohol and chlorhexidine. Ethyl Chloride spray was used prior to skin puncture to help numb the superficial skin. After cold spray was applied, 2 cc's of 1% lidocaine was injected into the skin and superficial tissue at the injection site using a 22 G, 3.5" needle to form an anesthetic tunnel and ensure proper needle placement into the knee joint space. Using a hemostat, the syringe was exchanged with the Euflexxa syringe, and 2cc of Euflexxa was injected into the knee joint. The patient was in the supine position during the duration of this procedure and the injection approach was from the superolateral aspect.     Ultrasound guidance was used for needle localization. Images were saved and stored for documentation. The knee joint was well visualized. Dynamic visualization of the 22g x 3.5 needles was continuous throughout the procedures.      Patient tolerance: The patient tolerated the procedure well with no immediate complications. There were no adverse reactions to the medication. Patient was instructed to apply ice to the joint for up to 20 minutes at a time and avoid strenuous activities for 24-36 hours following the injection. The patient was warned of possible blood pressure changes during that time. Following that time, the patient can resume " activities as prior to the injection.     The patient was reminded to call the clinic immediately for any adverse side effects as explained in clinic today.     Euflexxa:  NCD: [id number]  Lot: [id number]  Exp: 10/8/19      Assessment:      Encounter Diagnosis   Name Primary?    Primary osteoarthritis of left knee Yes          Plan:     1.third Euflexxa injection of left knee received today (see procedure note above)  2. Follow-up in 12 weeks if pain persistent  3. Patient agreeable to today's plan and all questions were answered

## 2018-11-02 DIAGNOSIS — Z12.39 BREAST CANCER SCREENING: ICD-10-CM

## 2018-11-05 ENCOUNTER — OFFICE VISIT (OUTPATIENT)
Dept: ORTHOPEDICS | Facility: CLINIC | Age: 63
End: 2018-11-05
Payer: COMMERCIAL

## 2018-11-05 VITALS
HEIGHT: 66 IN | HEART RATE: 82 BPM | WEIGHT: 233 LBS | DIASTOLIC BLOOD PRESSURE: 76 MMHG | SYSTOLIC BLOOD PRESSURE: 137 MMHG | BODY MASS INDEX: 37.45 KG/M2

## 2018-11-05 DIAGNOSIS — Z47.89 ORTHOPEDIC AFTERCARE: ICD-10-CM

## 2018-11-05 DIAGNOSIS — M77.8 BONE SPUR OF FINGER IP JOINT: Primary | ICD-10-CM

## 2018-11-05 PROCEDURE — 99999 PR PBB SHADOW E&M-EST. PATIENT-LVL IV: CPT | Mod: PBBFAC,,, | Performed by: PHYSICIAN ASSISTANT

## 2018-11-05 PROCEDURE — 99024 POSTOP FOLLOW-UP VISIT: CPT | Mod: S$GLB,,, | Performed by: PHYSICIAN ASSISTANT

## 2018-11-05 NOTE — PROGRESS NOTES
"Ms. Martinez is here today for a post-operative visit.  She is 14 days status post Right long finger mucous cyst excision and exostectomy and Right index finger mucous cyst excision and exostectomy by Dr. Schmidt on 10/22/18. She reports that she is doing well.  Pain is mild, 3/10.  She is not taking pain medication.  She denies fever, chills, and sweats since the time of the surgery.     Physical exam:    Vitals:    11/05/18 0816   BP: 137/76   Pulse: 82   Weight: 105.7 kg (233 lb)   Height: 5' 6" (1.676 m)   PainSc:   3     Vital signs are stable, patient is afebrile.  Patient is well dressed and well groomed, no acute distress.  Alert and oriented to person, place, and time.  Post op dressing taken down.  Incision is clean, dry and intact. Mild edema of index and long fingers as well as hand. There is no erythema or exudate.  There is no sign of any infection. She is NVI. Sutures removed without difficulty.  Decreased ROM of the index and long fingers, no mallet deformity appreciated.    Assessment: 14 days status post Right long finger mucous cyst excision and exostectomy and Right index finger mucous cyst excision and exostectomy        Plan:  Aracelis was seen today for post-op evaluation.    Diagnoses and all orders for this visit:    Bone spur of finger IP joint    Orthopedic aftercare        - PO instruction reviewed and provided to patient  - Orders placed for OT  - Discussed scar massage  - Elevate hand  - Follow up in 4 weeks  - Call with questions or concerns    "

## 2018-11-09 ENCOUNTER — CLINICAL SUPPORT (OUTPATIENT)
Dept: REHABILITATION | Facility: HOSPITAL | Age: 63
End: 2018-11-09
Attending: ORTHOPAEDIC SURGERY
Payer: COMMERCIAL

## 2018-11-09 DIAGNOSIS — M25.441 EFFUSION OF RIGHT HAND: ICD-10-CM

## 2018-11-09 DIAGNOSIS — M25.641 FINGER STIFFNESS, RIGHT: ICD-10-CM

## 2018-11-09 DIAGNOSIS — M25.60 RANGE OF MOTION DEFICIT: ICD-10-CM

## 2018-11-09 PROCEDURE — 97110 THERAPEUTIC EXERCISES: CPT | Mod: PO

## 2018-11-09 PROCEDURE — 97165 OT EVAL LOW COMPLEX 30 MIN: CPT | Mod: PO

## 2018-11-09 NOTE — PATIENT INSTRUCTIONS
"[facility] - OCCUPATIONAL THERAPY  HOME EXERCISE PROGRAM     Complete the following massages for 3 minutes each, 4x/day.                       Complete the following exercises with 10 repetitions each, 4x/day.     AROM: DIP Flexion / Extension  Pinch middle knuckle to prevent bending. Bend end knuckle until stretch is felt.   Hold 3 seconds. Relax. Straighten finger as far as possible.      AROM: PIP Flexion / Extension  Pinch bottom knuckle  to prevent bending. Actively bend middle knuckle until stretch is felt.   Hold 3 seconds. Relax. Straighten finger as far as possible.      AROM: Isolated PIP Flexion  Bend only middle joint of your finger, keeping other fingers straight with other hand.      AROM: Isolated MCP Flexion / Extension ("Wave")   Bend only your large, bottom knuckles. Hold 3 seconds. Keep the tips of your fingers straight. Straighten fingers.      AROM: Isolated IPJ Flexion / Extension ("Hook")  Bend only your middle and end knuckles. Hold 3 seconds.   Straighten your fingers.         AROM: Composite Flexion / Extension ("Full Fist")  Bend every joint in your hand into a fist. Hold 3 seconds. Straighten your fingers.     Copyright © I. All rights reserved.     "

## 2018-11-09 NOTE — PLAN OF CARE
"[facility] Occupational Therapy  Initial Evaluation     Name: Aracelis Martinez  Clinic Number: [id number]    Medical Diagnosis: Right IF/LF mucous cyst with osteophytes s/p excision and exostectomy  Date of Onset: ~1 year  Date of Surgery: 10/22/2018  Therapy Diagnosis:   Encounter Diagnoses   Name Primary?    Effusion of right hand     Finger stiffness, right     Range of motion deficit      Precautions: Standard and Diabetes    Physician: Sowmya Schmidt, *  Physician Orders: Eval and treat, modalities PRN   Date of Return to MD: 12/3/2018    Evaluation Date: 2018  Plan of Care Certification Period: 2018-19    Visit #: 1/ Visits authorized: 40 PCY  Insurance Authorization period Expiration: 2018    Time In: 3PM  Time Out: 3:50PM  Total Billable Time: 50 minutes  Charges for this Visit: Eval-low x 1, TE x 1      Subjective     Involved Side:  right  Dominant Side: Right  Imaging: N/A  Mechanism of Injury: Insidious  History of Current Condition: Noted pain with changes to dorsal DIPJs. Underwent surgical intervention 2.5 weeks ago.     Pain:  Functional Pain Scale Rating 0-10:   5/10 at current  5/10 at best  7/10 at worst  Location: Right IF/LF dorsal DIPJs and throughout entire right hand  Description: Aching, Throbbing and Tight  Aggravating Factors: bending fingers  Easing Factors: "sometimes massaging helps"      Past Medical History/Physical Systems Review:   Aracelis Martinez  has a past medical history of Arthritis, Chronic back pain, Diabetes mellitus, Diverticulosis, Hypertension, Neuroendocrine tumor of pancreas, Renal cyst, and Thyroid disease.    Aracelis Martinez  has a past surgical history that includes Hysterectomy; Tonsillectomy; Knee arthroscopy (2014);  section; EXCISION, GANGLION CYST, HAND- RIGHT, LONG AND INDEX FINGER (Right, 10/22/2018); INJECTION-FACET (Bilateral, 2016); ULTRASOUND-ENDOSCOPIC-UPPER (N/A, 2015); " ESOPHAGOGASTRODUODENOSCOPY (EGD) (N/A, 4/22/2015); COLONOSCOPY (N/A, 7/28/2014); and ARTHROSCOPY, KNEE (Left, 4/16/2014).    Aracelis has a current medication list which includes the following prescription(s): acetaminophen-codeine 300-30mg, amlodipine, blood sugar diagnostic, blood sugar diagnostic, blood-glucose meter, doxepin, duloxetine, lancets, levothyroxine, metformin, ondansetron, pantoprazole, and tramadol.    Review of patient's allergies indicates:   Allergen Reactions    Azithromycin Other (See Comments)     Yeast infection; pt requests please do not put her on this medication          Patient's Goals for Therapy: See Assessment chart below.    Occupation:  See Assessment chart below.     Functional Limitations/Social History: See Assessment chart below.       Objective     Observation/Appearance: Surgical incisions healing well. No outward signs of infection. Sutures removed with scant scabs remaining. TTP to distal IF/LF.       Edema. Measured in centimeters.   11/9/2018 11/9/2018    Left Right   Index:       P1 6.9 7.7    PIP 6.2 7.7   P2 5.7 6.4    DIP 5.1 6.1   P3 4.8 5.3   Long:       P1 6.5 7.4    PIP 6.1 7.2   P2 5.8 6.5    DIP 5.3 5.8   P3 4.8 5.4       Hand ROM. Measured in degrees.   11/9/2018 11/9/2018    Left Right        Index: MP  0/68 0/62              PIP     0/105 0/33              DIP 0/80 15/15              SANCHES 253 95        Long:  MP 0/85 0/74              PIP 0/105 0/51              DIP 0/80 8/24              SANCHES 270 141       Sensation  Intact      CMS Impairment/Limitation/Restriction for FOTO Survey  Therapist reviewed FOTO scores for Aracelis Martienz.  FOTO documents entered into [employer] - see Media section.    Intake Limitation Score: 58% - 11/9/2018       Treatment     Treatment Time In: 3:25pm  Treatment Time Out: 3:50pm  Total Treatment time separate from Evaluation time:25min    Aracelis received the following supervised modalities after being cleared for contradictions for 8 minutes:    MHP Right hand  For increased circulation and increased tissue elasticity prior to therapeutic exercises/activities.        Aracelis received the following manual therapy techniques for 5 minutes:   Retrograde Massage To increase blood flow and decrease edema   Deep Friction Scar Massage To healed surgical incision(s)  To increase tissue elasticity and decrease scar adhesions.        Aracelis received therapeutic exercises for 12 minutes including:    AROM - Isolated Motions IF/LF IPJ blocks, FDS glides   X 10 reps each    AROM - Tendon Glides Wave, Hook, Straight Fist, Comp Fist  X 10 reps each        Home Exercise Program/Education:  Issued HEP (see patient instructions in EMR) and educated on modality use for pain management . Exercises were reviewed and Aracelis was able to demonstrate them prior to the end of the session.   Pt received a written copy of exercises to perform at home. Aracelis demonstrated good  understanding of the education provided.  Pt was advised to perform these exercises free of pain, and to stop performing them if pain occurs.    Patient/Family Education: role of OT, goals for OT, scheduling/cancellations - pt verbalized understanding. Discussed insurance limitations with patient.      Assessment     Aracelis Martinez is a 63 y.o. female referred to outpatient occupational/hand therapy and presents with a medical diagnosis of right index and long mucous cyst s/p excision, resulting in Decreased ROM, Decreased  strength, Decreased pinch strength, Decreased muscle strength, Decreased functional hand use, Increased pain, Edema, Joint Stiffness and Scar Adhesions and demonstrates limitations as described in the chart below. Following brief medical record review it is determined that pt will benefit from occupational therapy services in order to maximize pain free and/or functional use of right dominant hand. The following goals were discussed with the patient and patient is in agreement with them as to be  "addressed in the treatment plan. The patient's rehab potential is Excellent.     Anticipated barriers to occupational therapy: none  Pt has no cultural, educational or language barriers to learning provided.    Profile and History Assessment of Occupational Performance Level of Clinical Decision Making Complexity Score   Occupational Profile:   Aracelis Martinez is a 63 y.o. female who lives with their spouse and is currently retired.    Aracelis Martinez has difficulty with  feeding, bathing, grooming and dressing  finance management, driving/transportation management, shopping, phone/computer use, housework/household chores and medication management  Gardening, Driving, Needlework/Sewing and Crafts  affecting his/her daily functional abilities. His/her main goal for therapy is "to get my fingers back to working".     Previous functional status: Independent with all ADLs.     Comorbidities:   See PMH and Physical Systems Review Section above.    Medical and Therapy History Review:   Brief               Performance Deficits    Physical:  Joint Mobility  Joint Stability  Muscle Power/Strength  Muscle Endurance  Skin Integrity/Scar Formation  Edema   Strength  Pinch Strength  Fine Motor Coordination  Pain    Cognitive:  No Deficits    Psychosocial:    No Deficits     Clinical Decision Making:  low    Assessment Process:  Problem-Focused Assessments    Modification/Need for Assistance:  Not Necessary    Intervention Selection:  Several Treatment Options       low  Based on PMHX, co morbidities , data from assessments and functional level of assistance required with task and clinical presentation directly impacting function.         Goals   The following goals were discussed with the patient and patient is in agreement with them as to be addressed in the treatment plan.   Long Term Goals (LTGs); to be met by discharge.  LTG #1: Pt will report a pain level of 1 out of 10 with household chores   LTG #2: Pt will demo " improved FOTO score by 20 points.   LTG #3: Pt will return to prior level of function for ADLs and household management.     Short Term Goals (STGs); to be met within 4 weeks (12/9/2018).  STG #1a: Pt will report 3 out of 10 pain level with dressing tasks.  STG #2a: Pt will report/demo only minimal difficulty with light laundry tasks.  STG #2b: Pt will report/demo independence with opening/closing doors.  STG #3a: Pt will demonstrate independence with issued HEP.   STG #3b: Pt will demo improved right IF and LF SANCHES by 50 degrees each needed to aid with manipulating items in dominant hand.    Plan     Outpatient occupational therapy 2 times weekly for 8 weeks during the certification period from 11/9/2018 to 1/9/2019.    Treatment to include: Paraffin, Fluidotherapy, Manual therapy/joint mobilizations, Modalities for pain management, US 3 mhz, Therapeutic exercises/activities., Strengthening, Orthotic Fabrication/Fit/Training, Edema Control, Scar Management, Wound Care and Joint Protection, as well as any other treatments deemed necessary based on the patient's needs or progress.     Therapist: DAVID Pitts, QI, CHT     I certify the need for these services furnished under this plan of treatment and while under my care    ____________________________________                         __________________  Physician/Referring Practitioner                                               Date of Signature

## 2018-11-27 ENCOUNTER — CLINICAL SUPPORT (OUTPATIENT)
Dept: REHABILITATION | Facility: HOSPITAL | Age: 63
End: 2018-11-27
Attending: ORTHOPAEDIC SURGERY
Payer: COMMERCIAL

## 2018-11-27 DIAGNOSIS — M25.641 FINGER STIFFNESS, RIGHT: ICD-10-CM

## 2018-11-27 DIAGNOSIS — M25.60 RANGE OF MOTION DEFICIT: ICD-10-CM

## 2018-11-27 DIAGNOSIS — M25.441 EFFUSION OF RIGHT HAND: ICD-10-CM

## 2018-11-27 PROCEDURE — 97110 THERAPEUTIC EXERCISES: CPT | Mod: PO

## 2018-11-27 PROCEDURE — 97018 PARAFFIN BATH THERAPY: CPT | Mod: PO

## 2018-11-27 PROCEDURE — 97140 MANUAL THERAPY 1/> REGIONS: CPT | Mod: PO

## 2018-11-27 NOTE — PROGRESS NOTES
"                            Occupational Therapy Daily Treatment Note     Date: 11/27/2018  Name: Aracelis Martinez  Clinic Number: [id number]    Therapy Diagnosis:   Encounter Diagnoses   Name Primary?    Effusion of right hand     Finger stiffness, right     Range of motion deficit      Physician: Sowmya Schmidt, *  Physician Orders: Eval and treat, modalities PRN   Date of Return to MD: 12/3/2018     Evaluation Date: 11/9/2018  Plan of Care Certification Period: 11/9/2018-1/9/19     Visit #: 2  Visits authorized: 40 PCY  Insurance Authorization period Expiration: 12/31/2018     Time In: 11am  Time Out: 12pm  Total Billable Time: 60 minutes    Precautions: Standard    Subjective     Pt reports/Reposnse to previous tx: "I have been doing my exercises but I really hate pain"  She was compliant with home exercise program given last session.     Pain: 4/10  Location: right index and middle fingers     Objective     Hand ROM. Measured in degrees.    11/9/2018 11/9/2018 11/27/18 - Post Tx      Left Right Right            Index: MP  0/68 0/62 0/75              PIP     0/105 0/33 0/70              DIP 0/80 15/15 12/25              SANCHES 253 95             Long:  MP 0/85 0/74 0/75              PIP 0/105 0/51 0/85              DIP 0/80 8/24 5/40              SANCHES 270 141          Aracelis received the following supervised modalities after being cleared for contradictions for 10 minutes:   -Patient received paraffin bath to R hand(s) for 10 minutes to increase blood flow, circulation, pain management and for tissue elasticity prior to therex.     Aracelis received the following manual therapy techniques for 25 minutes:   -PROM with grade I joint mobilizations for isolated MP and IP flexion followed by composite flexion of index and middle fingers  -Place and hold for flat fisting     Aracelis received therapeutic exercises for 25 minutes including:  -IP joint blocking 2 x 15 reps  -Reverse blocking 2 x 15 reps  -Towel scrunches (raking) " 2 x 15 reps  -Desensitization with towel x 5 min to dorsal DIPs    Home Exercises and Education Provided     Education provided:   - Progress towards goals     Written Home Exercises Provided: Continue previous HEP  Exercises were reviewed and Aracelis was able to demonstrate them prior to the end of the session.  Aracelis demonstrated good  understanding of the HEP provided.   .   See EMR under Patient Instructions for exercises provided on initial evaluation. .       Assessment     Pt would continue to benefit from skilled OT. She has been compliant with HEP as evident by increases in flexion noted today. Aracelis is progressing well towards her goals and there are no updates to goals at this time. Pt prognosis is Good. Pt will continue to benefit from skilled outpatient occupational therapy to address the deficits listed in the problem list on initial evalution provide pt/family education and to maximize pt's level of independence in the home and community environment.     Anticipated barriers to occupational therapy: None  Pt's spiritual, cultural and educational needs considered and pt agreeable to plan of care and goals.    Goals:  Long Term Goals (LTGs); to be met by discharge.  LTG #1: Pt will report a pain level of 1 out of 10 with household chores        LTG #2: Pt will demo improved FOTO score by 20 points.   LTG #3: Pt will return to prior level of function for ADLs and household management.      Short Term Goals (STGs); to be met within 4 weeks (12/9/2018).  STG #1a: Pt will report 3 out of 10 pain level with dressing tasks.  STG #2a: Pt will report/demo only minimal difficulty with light laundry tasks.  STG #2b: Pt will report/demo independence with opening/closing doors.  STG #3a: Pt will demonstrate independence with issued HEP.   STG #3b: Pt will demo improved right IF and LF SANCHES by 50 degrees each needed to aid with manipulating items in dominant hand.    Plan     Updates/Grading for next session: [place] wrapped  fist into paraffin     Anat Duncan, OT

## 2018-11-29 ENCOUNTER — CLINICAL SUPPORT (OUTPATIENT)
Dept: REHABILITATION | Facility: HOSPITAL | Age: 63
End: 2018-11-29
Attending: ORTHOPAEDIC SURGERY
Payer: COMMERCIAL

## 2018-11-29 DIAGNOSIS — M25.441 EFFUSION OF RIGHT HAND: ICD-10-CM

## 2018-11-29 DIAGNOSIS — M25.60 RANGE OF MOTION DEFICIT: ICD-10-CM

## 2018-11-29 DIAGNOSIS — M25.641 FINGER STIFFNESS, RIGHT: ICD-10-CM

## 2018-11-29 PROCEDURE — 97018 PARAFFIN BATH THERAPY: CPT | Mod: PO

## 2018-11-29 PROCEDURE — 97110 THERAPEUTIC EXERCISES: CPT | Mod: PO

## 2018-11-29 PROCEDURE — 97140 MANUAL THERAPY 1/> REGIONS: CPT | Mod: PO

## 2018-11-29 PROCEDURE — 97022 WHIRLPOOL THERAPY: CPT | Mod: PO

## 2018-11-30 DIAGNOSIS — Z12.31 ENCOUNTER FOR SCREENING MAMMOGRAM FOR MALIGNANT NEOPLASM OF BREAST: ICD-10-CM

## 2018-11-30 DIAGNOSIS — E11.9 TYPE 2 DIABETES MELLITUS WITHOUT COMPLICATION, WITHOUT LONG-TERM CURRENT USE OF INSULIN: Primary | Chronic | ICD-10-CM

## 2018-12-03 ENCOUNTER — OFFICE VISIT (OUTPATIENT)
Dept: ORTHOPEDICS | Facility: CLINIC | Age: 63
End: 2018-12-03
Payer: COMMERCIAL

## 2018-12-03 VITALS
WEIGHT: 233 LBS | SYSTOLIC BLOOD PRESSURE: 124 MMHG | HEART RATE: 73 BPM | DIASTOLIC BLOOD PRESSURE: 75 MMHG | BODY MASS INDEX: 37.45 KG/M2 | HEIGHT: 66 IN

## 2018-12-03 DIAGNOSIS — M77.8 BONE SPUR OF FINGER IP JOINT: Primary | ICD-10-CM

## 2018-12-03 DIAGNOSIS — M25.641 FINGER STIFFNESS, RIGHT: ICD-10-CM

## 2018-12-03 DIAGNOSIS — M25.60 RANGE OF MOTION DEFICIT: ICD-10-CM

## 2018-12-03 PROCEDURE — 99999 PR PBB SHADOW E&M-EST. PATIENT-LVL IV: CPT | Mod: PBBFAC,,, | Performed by: PHYSICIAN ASSISTANT

## 2018-12-03 PROCEDURE — 99024 POSTOP FOLLOW-UP VISIT: CPT | Mod: S$GLB,,, | Performed by: PHYSICIAN ASSISTANT

## 2018-12-03 NOTE — PROGRESS NOTES
"Ms. Martinez is here today for a post-operative visit.  She is 42 days status post Right long finger mucous cyst excision and exostectomy and Right index finger mucous cyst excision and exostectomy by Dr. Schmidt on 10/22/18. She reports that she is doing well.  Pain is mild-moderate, 5/10.  She reports that her pain is most significant at night.  She has started OT, she is wearing compression sleeves for the fingers.  She denies fever, chills, and sweats since the time of the surgery.     Physical exam:    Vitals:    12/03/18 1015   BP: 124/75   Pulse: 73   Weight: 105.7 kg (233 lb)   Height: 5' 6" (1.676 m)   PainSc:   5     Vital signs are stable, patient is afebrile.  Patient is well dressed and well groomed, no acute distress.  Alert and oriented to person, place, and time.  Incisions healing well - clean, dry and intact. Mild edema of index and long fingers as well as hand. Skin slightly shinny left hand, mild increased hair growth.  There is no erythema or exudate.  There is no sign of any infection. She is NVI.  Decreased ROM of the index and long fingers, mild decreased ring finger motion. No mallet deformity appreciated.  Long finger nail deformity still present, discussed that this may correct with time.    Assessment: 42 days status post Right long finger mucous cyst excision and exostectomy and Right index finger mucous cyst excision and exostectomy        Plan:  Aracelis was seen today for post-op evaluation.    Diagnoses and all orders for this visit:    Bone spur of finger IP joint  -     Ambulatory Referral to Pain Clinic    Finger stiffness, right  -     Ambulatory Referral to Pain Clinic    Range of motion deficit  -     Ambulatory Referral to Pain Clinic    Other orders  -     diclofenac sodium (PENNSAID) 2 % SoPk; Apply 2 Pump topically 2 (two) times daily. Apply 2 pumps to affected area twice a day as needed        - Continue OT  - Referral to pain management  - Discussed scar massage  - Elevate " hand  - Pennsaid sent to pharmacy  - Follow up in 6 weeks  - Call with questions or concerns

## 2018-12-05 ENCOUNTER — CLINICAL SUPPORT (OUTPATIENT)
Dept: REHABILITATION | Facility: HOSPITAL | Age: 63
End: 2018-12-05
Attending: ORTHOPAEDIC SURGERY
Payer: COMMERCIAL

## 2018-12-05 DIAGNOSIS — M25.60 RANGE OF MOTION DEFICIT: ICD-10-CM

## 2018-12-05 DIAGNOSIS — M25.641 FINGER STIFFNESS, RIGHT: ICD-10-CM

## 2018-12-05 DIAGNOSIS — M25.441 EFFUSION OF RIGHT HAND: ICD-10-CM

## 2018-12-05 PROCEDURE — 97110 THERAPEUTIC EXERCISES: CPT | Mod: PO

## 2018-12-05 PROCEDURE — 97018 PARAFFIN BATH THERAPY: CPT | Mod: PO

## 2018-12-05 PROCEDURE — 97140 MANUAL THERAPY 1/> REGIONS: CPT | Mod: PO

## 2018-12-05 NOTE — PROGRESS NOTES
"                            Occupational Therapy Daily Treatment Note     Date: 12/5/2018  Name: Aracelis Martinez  Clinic Number: [id number]    Therapy Diagnosis:   Encounter Diagnoses   Name Primary?    Effusion of right hand     Finger stiffness, right     Range of motion deficit      Physician: Sowmya Schmidt, *  Physician Orders: Eval and treat, modalities PRN   Date of Return to MD: 12/3/2018     Evaluation Date: 11/9/2018  Plan of Care Certification Period: 1/9/19     Visit #: 4  Visits authorized: 40 PCY  Insurance Authorization period Expiration: 12/31/2018     Time In: 11am  Time Out: 12pm  Total Billable Time: 45 minutes    Precautions: Standard    Subjective     Pt reports/Reposnse to previous tx: "I am working it but I still have pain at night in my palm"  She was compliant with home exercise program given last session.     Pain: 4/10  Location: right index and middle fingers and into palm at night     Objective     Aracelis received the following supervised modalities after being cleared for contradictions for 10 minutes:   -Patient received paraffin bath to R hand(s) for 10 minutes to increase blood flow, circulation, pain management and for tissue elasticity prior to therex.     Aracelis received the following manual therapy techniques for 25 minutes:   -PROM with grade I joint mobilizations for isolated MP and IP flexion followed by composite flexion of index and middle fingers  -Place and hold for flat fisting     Aracelis received therapeutic exercises for 25 minutes including:  -IP joint blocking 2 x 15 reps  -Reverse blocking 2 x 15 reps  -Nghia taping index and middle - composite fisting 2 x 20 reps   -While in fluidotherapy   -TGE positions A-E, isolated x 2 min each     Home Exercises and Education Provided     Education provided:   - Progress towards goals     Written Home Exercises Provided: Continue previous HEP - add nghia tape to index and middle finger while fisting  Exercises were reviewed and " Aracelis was able to demonstrate them prior to the end of the session.  Aracelis demonstrated good  understanding of the HEP provided.   .   See EMR under Patient Instructions for exercises provided on initial evaluation. .       Assessment     Pt would continue to benefit from skilled OT. Pt tolerated all treatment well and demo understanding of added nghia tape to HEP. She was able to reach distal pads of index and middle finger to thenar region today following all interventions.  Aracelis is progressing well towards her goals and there are no updates to goals at this time. Pt prognosis is Good. Pt will continue to benefit from skilled outpatient occupational therapy to address the deficits listed in the problem list on initial evalution provide pt/family education and to maximize pt's level of independence in the home and community environment.     Anticipated barriers to occupational therapy: None  Pt's spiritual, cultural and educational needs considered and pt agreeable to plan of care and goals.    Goals:  Long Term Goals (LTGs); to be met by discharge.  LTG #1: Pt will report a pain level of 1 out of 10 with household chores        LTG #2: Pt will demo improved FOTO score by 20 points.   LTG #3: Pt will return to prior level of function for ADLs and household management.      Short Term Goals (STGs); to be met within 4 weeks (12/9/2018).  STG #1a: Pt will report 3 out of 10 pain level with dressing tasks.  STG #2a: Pt will report/demo only minimal difficulty with light laundry tasks.  STG #2b: Pt will report/demo independence with opening/closing doors.  STG #3a: Pt will demonstrate independence with issued HEP.   STG #3b: Pt will demo improved right IF and LF SANCHES by 50 degrees each needed to aid with manipulating items in dominant hand.    Plan     Updates/Grading for next session: Coban wrapped fist into paraffin     Anat Duncan, OT

## 2018-12-13 NOTE — PROGRESS NOTES
"                            Occupational Therapy Daily Treatment Note     Date: 12/14/2018  Name: Aracelis Martinez  Clinic Number: [id number]    Therapy Diagnosis:   Encounter Diagnoses   Name Primary?    Effusion of right hand     Finger stiffness, right     Range of motion deficit      Physician: Sowmya Schmidt, *  Physician Orders: Eval and treat, modalities PRN   Date of Return to MD: 12/3/2018     Evaluation Date: 11/9/2018  Plan of Care Certification Period: 1/9/19     Visit #: 5  Visits authorized: 40 PCY  Insurance Authorization period Expiration: 12/31/2018     Time In: 11am  Time Out: 12pm  Total Billable Time: 45 minutes    Precautions: Standard    Subjective     Pt reports/Reposnse to previous tx: "I am working it but I still have pain at night in my palm"  She was compliant with home exercise program given last session.     Pain: 4/10  Location: right index and middle fingers and into palm at night     Objective     Aracelis received the following supervised modalities after being cleared for contradictions for 10 minutes:   -Patient received paraffin bath to R hand(s) for 10 minutes to increase blood flow, circulation, pain management and for tissue elasticity prior to therex.     Aracelis received the following manual therapy techniques for 25 minutes:   -PROM with grade I joint mobilizations for isolated MP and IP flexion followed by composite flexion of index and middle fingers  -Place and hold for flat fisting     Aracelis received therapeutic exercises for 25 minutes including:  -IP joint blocking 2 x 15 reps  -Reverse blocking 2 x 15 reps  -Kwan taping index and middle - composite fisting 2 x 20 reps   -While in fluidotherapy   -TGE positions A-E, isolated x 2 min each     Home Exercises and Education Provided     Education provided:   - Progress towards goals     Written Home Exercises Provided: Continue previous HEP - added flexion glove to HEP today.   Exercises were reviewed and Aracelis was able to " demonstrate them prior to the end of the session.  Aracelis demonstrated good  understanding of the HEP provided.   .   See EMR under Patient Instructions for exercises provided on initial evaluation. .       Assessment     Pt would continue to benefit from skilled OT. She was able to reach distal pads of index and middle finger to thenar region today following all interventions.  Aracelis is progressing well towards her goals and there are no updates to goals at this time. Pt prognosis is Good. Pt will continue to benefit from skilled outpatient occupational therapy to address the deficits listed in the problem list on initial evalution provide pt/family education and to maximize pt's level of independence in the home and community environment.     Anticipated barriers to occupational therapy: None  Pt's spiritual, cultural and educational needs considered and pt agreeable to plan of care and goals.    Goals:  Long Term Goals (LTGs); to be met by discharge.  LTG #1: Pt will report a pain level of 1 out of 10 with household chores        LTG #2: Pt will demo improved FOTO score by 20 points.   LTG #3: Pt will return to prior level of function for ADLs and household management.      Short Term Goals (STGs); to be met within 4 weeks (12/9/2018).  STG #1a: Pt will report 3 out of 10 pain level with dressing tasks.  STG #2a: Pt will report/demo only minimal difficulty with light laundry tasks.  STG #2b: Pt will report/demo independence with opening/closing doors.  STG #3a: Pt will demonstrate independence with issued HEP.   STG #3b: Pt will demo improved right IF and LF SANCHES by 50 degrees each needed to aid with manipulating items in dominant hand.    Plan     Updates/Grading for next session: Coban wrapped fist into paraffin     Anat Duncan, OT

## 2018-12-14 ENCOUNTER — CLINICAL SUPPORT (OUTPATIENT)
Dept: REHABILITATION | Facility: HOSPITAL | Age: 63
End: 2018-12-14
Attending: ORTHOPAEDIC SURGERY
Payer: COMMERCIAL

## 2018-12-14 DIAGNOSIS — M25.441 EFFUSION OF RIGHT HAND: ICD-10-CM

## 2018-12-14 DIAGNOSIS — M25.60 RANGE OF MOTION DEFICIT: ICD-10-CM

## 2018-12-14 DIAGNOSIS — M25.641 FINGER STIFFNESS, RIGHT: ICD-10-CM

## 2018-12-14 PROCEDURE — 97140 MANUAL THERAPY 1/> REGIONS: CPT | Mod: PO

## 2018-12-14 PROCEDURE — 97110 THERAPEUTIC EXERCISES: CPT | Mod: PO

## 2018-12-14 PROCEDURE — 97018 PARAFFIN BATH THERAPY: CPT | Mod: PO,59

## 2018-12-19 ENCOUNTER — OFFICE VISIT (OUTPATIENT)
Dept: PAIN MEDICINE | Facility: CLINIC | Age: 63
End: 2018-12-19
Attending: ANESTHESIOLOGY
Payer: COMMERCIAL

## 2018-12-19 VITALS
SYSTOLIC BLOOD PRESSURE: 120 MMHG | DIASTOLIC BLOOD PRESSURE: 76 MMHG | BODY MASS INDEX: 36.14 KG/M2 | TEMPERATURE: 98 F | HEART RATE: 84 BPM | HEIGHT: 66 IN | RESPIRATION RATE: 18 BRPM | WEIGHT: 224.88 LBS

## 2018-12-19 DIAGNOSIS — M79.644 FINGER PAIN, RIGHT: Primary | ICD-10-CM

## 2018-12-19 DIAGNOSIS — M25.441 EFFUSION OF RIGHT HAND: ICD-10-CM

## 2018-12-19 PROCEDURE — 99999 PR PBB SHADOW E&M-EST. PATIENT-LVL III: CPT | Mod: PBBFAC,,, | Performed by: ANESTHESIOLOGY

## 2018-12-19 PROCEDURE — 99245 OFF/OP CONSLTJ NEW/EST HI 55: CPT | Mod: S$GLB,,, | Performed by: ANESTHESIOLOGY

## 2018-12-19 NOTE — PROGRESS NOTES
Subjective:      Patient ID: Aracelis Martinez is a 63 y.o. female.    Chief Complaint: No chief complaint on file.    Referred by: Vera Norman PA     Pain Scales  Best: 5/10  Worst: 9/10  Usually: 5/10  Today: 5/10    Hand Pain    Pertinent negatives include no fever or itching.     Mrs. Martinez is a 63 year old female who presents to clinic with right 2nd and 3rd digit pain. Pain has been ongoing since undergoing a cyst removal in 2018. She reports a throbbing sensation that starts at her wrist and shoots into the first 3 digits. She has been through therapy which has been helping with ROM but pain continues. She has starting using Pennsaid which also provides some relief. No pain above the wrist.    Past Medical History:   Diagnosis Date    Arthritis     Chronic back pain     Diabetes mellitus 2014    Diverticulosis     Hypertension     Neuroendocrine tumor of pancreas 5/3/2016    Renal cyst     left    Thyroid disease        Past Surgical History:   Procedure Laterality Date    ARTHROSCOPY, KNEE Left 2014    [facility] OR 2ND FLR     SECTION      COLONOSCOPY N/A 2014    Performed by Cem Lamar MD at [facility] ENDO (4TH FLR)    ESOPHAGOGASTRODUODENOSCOPY (EGD) N/A 2015    Performed by Jarod Zapata MD at [facility] ENDO (4TH FLR)    EXCISION OF GANGLION CYST OF HAND Right 10/22/2018    Procedure: EXCISION, GANGLION CYST, HAND- RIGHT, LONG AND INDEX FINGER;  Surgeon: Sowmya Schmidt MD;  Location: [facility] OR;  Service: Orthopedics;  Laterality: Right;  Stretcher; Supine; Hand Pan 1 & [place] 2; Dr. Loja's Rasp    EXCISION, GANGLION CYST, HAND- RIGHT, LONG AND INDEX FINGER Right 10/22/2018    Performed by Sowmya Schmidt MD at [facility] OR    HYSTERECTOMY      INJECTION-FACET Bilateral 2016    Performed by Sindhu Norman MD at [facility] PAIN MGT    KNEE ARTHROSCOPY  2014    LATS/left    TONSILLECTOMY       ULTRASOUND-ENDOSCOPIC-UPPER N/A 5/21/2015    Performed by Yossi Nicole [facility] at [facility] (2ND FLR)       Review of patient's allergies indicates:   Allergen Reactions    Azithromycin Other (See Comments)     Yeast infection; pt requests please do not put her on this medication       Current Outpatient Medications   Medication Sig Dispense Refill    amLODIPine (NORVASC) 10 MG tablet TAKE 1 TABLET BY MOUTH DAILY 30 tablet 10    blood sugar diagnostic (ONETOUCH VERIO) Strp TEST BLOOD SUGAR LEVELS ONCE DAILY AS DIRECTED 30 strip 11    blood sugar diagnostic Strp Test blood sugar once daily 100 strip 3    diclofenac sodium (PENNSAID) 2 % SoPk Apply 2 Pump topically 2 (two) times daily. Apply 2 pumps to affected area twice a day as needed 112 g 2    doxepin (SINEQUAN) 100 MG capsule Take 1 capsule (100 mg total) by mouth nightly. 90 capsule 3    DULoxetine (CYMBALTA) 30 MG capsule Take 1 capsule (30 mg total) by mouth once daily. 90 capsule 3    lancets (ONETOUCH DELICA LANCETS) 33 gauge Misc 1 lancet by Misc.(Non-Drug; Combo Route) route once daily. 200 each 1    levothyroxine (SYNTHROID) 50 MCG tablet TAKE 1 TABLET BY MOUTH BEFORE BREAKFAST 90 tablet 3    metFORMIN (GLUCOPHAGE) 500 MG tablet Take 1 tablet (500 mg total) by mouth 2 (two) times daily with meals. 180 tablet 3    pantoprazole (PROTONIX) 40 MG tablet TAKE 1 TABLET BY MOUTH EVERY DAY FOR PREVENTION OF REFLUX 90 tablet 3    traMADol (ULTRAM) 50 mg tablet TAKE 1 TO 2 TABLETS BY MOUTH TWICE DAILY 90 tablet 2    acetaminophen-codeine 300-30mg (TYLENOL #3) 300-30 mg Tab Take 1 tablet by mouth every 6 (six) hours as needed. 20 tablet 0    blood-glucose meter kit Use as instructed 1 each 0    ondansetron (ZOFRAN-ODT) 4 MG TbDL Take 1 tablet (4 mg total) by mouth every 6 (six) hours as needed (nausea). 20 tablet 0     No current facility-administered medications for this visit.        Family History   Problem Relation Age of Onset    Hypertension  Mother     Diabetes Mother     Heart disease Mother     Stroke Mother     Hypertension Sister     Stroke Sister     Hypertension Brother        Social History     Socioeconomic History    Marital status:      Spouse name: Not on file    Number of children: Not on file    Years of education: Not on file    Highest education level: Not on file   Social Needs    Financial resource strain: Not on file    Food insecurity - worry: Not on file    Food insecurity - inability: Not on file    Transportation needs - medical: Not on file    Transportation needs - non-medical: Not on file   Occupational History    Not on file   Tobacco Use    Smoking status: Former Smoker     Packs/day: 1.50     Years: 10.00     Pack years: 15.00     Types: Cigarettes     Last attempt to quit: 1982     Years since quittin.9    Smokeless tobacco: Never Used   Substance and Sexual Activity    Alcohol use: Yes     Comment: occasional use    Drug use: No    Sexual activity: Yes     Partners: Male     Birth control/protection: None   Other Topics Concern    Not on file   Social History Narrative    Not on file           Review of Systems   Constitution: Negative for chills, fever, malaise/fatigue, weight gain and weight loss.   HENT: Negative for ear pain and hoarse voice.    Eyes: Negative for blurred vision, pain and visual disturbance.   Cardiovascular: Negative for chest pain, dyspnea on exertion and irregular heartbeat.   Respiratory: Negative for cough, shortness of breath and wheezing.    Endocrine: Negative for cold intolerance and heat intolerance.   Hematologic/Lymphatic: Negative for adenopathy and bleeding problem. Does not bruise/bleed easily.   Skin: Negative for color change, itching and rash.   Musculoskeletal: Negative for back pain and neck pain.   Gastrointestinal: Negative for change in bowel habit, diarrhea, hematemesis, hematochezia, melena and vomiting.   Genitourinary: Negative for flank  "pain, frequency, hematuria and urgency.   Neurological: Negative for difficulty with concentration, dizziness, headaches, loss of balance and seizures.   Psychiatric/Behavioral: Negative for altered mental status, depression and suicidal ideas. The patient is not nervous/anxious.    Allergic/Immunologic: Negative for HIV exposure.           Objective:   /76   Pulse 84   Temp 97.9 °F (36.6 °C) (Oral)   Resp 18   Ht 5' 6" (1.676 m)   Wt 102 kg (224 lb 14.4 oz)   BMI 36.30 kg/m²   Pain Disability Index Review:  Last 3 PDI Scores 12/19/2018   Pain Disability Index (PDI) 39     Normocephalic.  Atraumatic.  Affect appropriate.  Breathing unlabored.  Extra ocular muscles intact.           Ortho/SPM Exam      R hand: limited flexion of 2nd and 3rd digits, red and swollen, tenderness to palpation over wrist and garza aspect of the hand   +Tinel over median nerve    Assessment:       Encounter Diagnoses   Name Primary?    Finger pain, right Yes    Effusion of right hand          Plan:   We discussed with the patient the assessment and recommendations. The following is the plan we agreed on:  1. It is too soon post-op to evaluate for CRPS. Given patient's symptoms and exam, I feel some of her pain is coming from a median neuropathy which is likely being caused by the swelling in her hand.  2. Recommend night time neutral wrist splint as well as a compression glove to be worn during the day when she is not exercising or doing therapy.   3. Recommend right hand elevation to aid in swelling  4. RTC in 3 months or as needed for further evaluation.       Diagnoses and all orders for this visit:    Finger pain, right    Effusion of right hand     Krish Pablo MD  PGY-4      I have personally taken the history and examined this patient and agree with the resident's note as stated above.    "

## 2018-12-19 NOTE — LETTER
December 19, 2018      [facility]  [address]  [address]  [facility] [address]           [facility]  [address]  [facility] [address]  Phone: [phone]  Fax: [phone]          Patient: Aracelis Martinez   MR Number: [id number]   YOB: 1955   Date of Visit: 12/19/2018       Dear Vera Norman:    Thank you for referring Aracelis Martinez to me for evaluation. Attached you will find relevant portions of my assessment and plan of care.    If you have questions, please do not hesitate to call me. I look forward to following Aracelis Martinez along with you.    Sincerely,    Sindhu Norman MD    Enclosure  CC:  No Recipients    If you would like to receive this communication electronically, please contact [email or web address] or [phone] to request more information on [employer] access.    For providers and/or their staff who would like to refer a patient to [facility], please contact us through our one-stop-shop provider referral line, [facility], at [phone].    If you feel you have received this communication in error or would no longer like to receive these types of communications, please e-mail [email or web address]

## 2018-12-20 ENCOUNTER — CLINICAL SUPPORT (OUTPATIENT)
Dept: REHABILITATION | Facility: HOSPITAL | Age: 63
End: 2018-12-20
Attending: ORTHOPAEDIC SURGERY
Payer: COMMERCIAL

## 2018-12-20 DIAGNOSIS — M25.641 FINGER STIFFNESS, RIGHT: ICD-10-CM

## 2018-12-20 DIAGNOSIS — M25.441 EFFUSION OF RIGHT HAND: ICD-10-CM

## 2018-12-20 DIAGNOSIS — M25.60 RANGE OF MOTION DEFICIT: ICD-10-CM

## 2018-12-20 PROCEDURE — 97140 MANUAL THERAPY 1/> REGIONS: CPT | Mod: PO

## 2018-12-20 PROCEDURE — 97018 PARAFFIN BATH THERAPY: CPT | Mod: PO

## 2018-12-20 PROCEDURE — 97110 THERAPEUTIC EXERCISES: CPT | Mod: PO

## 2018-12-20 NOTE — PATIENT INSTRUCTIONS
"Stress Loading Home Program    Stress loading, which consists of "scrubbing" and "carrying", reverses the abnormal sensory process by stimulating large fiber receptors.  This program is intended to decrease your pain and swelling.      Scrubbing (compression)   Use a coarse bristled brush to scrub back and forth for 3-5 minutes.    Apply as much pressure as you can tolerate.    Have your shoulder directly over your hand.   Complete this exercise 3 times per day.    Carrying (distraction)   Carry a weight of about 2 lb(s) (i.e. briefcase, purse, or free weight) with arm extended.    Carry weight for 3 minutes 3 times per day.     If you experience more than a mild increase in pain and swelling, stop exercises, and discuss this with your therapist at your next treatment session.     "

## 2018-12-20 NOTE — PROGRESS NOTES
"                            Occupational Therapy Daily Treatment Note     Date: 12/20/2018  Name: Aracelis Martinez  Clinic Number: [id number]    Therapy Diagnosis:   Encounter Diagnoses   Name Primary?    Effusion of right hand     Finger stiffness, right     Range of motion deficit      Physician: Sowmya Schmidt, *  Physician Orders: Eval and treat, modalities PRN   Date of Return to MD: 12/3/2018     Evaluation Date: 11/9/2018  Plan of Care Certification Period: 1/9/19     Visit #: 6  Visits authorized: 40 PCY  Insurance Authorization period Expiration: 12/31/2018     Time In: 11am  Time Out: 12pm  Total Billable Time: 45 minutes    Precautions: Standard    Subjective     Pt reports/Reposnse to previous tx: "I am working it but I still have pain at night in my palm"  She was compliant with home exercise program given last session.     Pain: 4/10  Location: right index and middle fingers and into palm at night     Objective     Aracelis received the following supervised modalities after being cleared for contradictions for 10 minutes:   -Patient received paraffin bath to R hand(s) for 10 minutes to increase blood flow, circulation, pain management and for tissue elasticity prior to therex.     Aracelis received the following manual therapy techniques for 25 minutes:   -PROM with grade I joint mobilizations for isolated MP and IP flexion followed by composite flexion of index and middle fingers  -Place and hold for flat fisting     Aracelis received therapeutic exercises for 25 minutes including:  -IP joint blocking 2 x 15 reps  -Reverse blocking 2 x 15 reps  -Kwan taping index and middle - composite fisting 2 x 20 reps   -While in fluidotherapy   -TGE positions A-E, isolated x 2 min each   -Scrub and Carry program     Home Exercises and Education Provided     Education provided:   - Progress towards goals     Written Home Exercises Provided: Continue previous HEP - added scrub and carry program today .   Exercises were " reviewed and Aracelis was able to demonstrate them prior to the end of the session.  Aracelis demonstrated good  understanding of the HEP provided.   .   See EMR under Patient Instructions for exercises provided on initial evaluation. .       Assessment     Pt would continue to benefit from skilled OT. She was able to reach distal pads of index and middle finger to thenar region today following all interventions. Tolerated scrub and carry well with no adverse effects.  Aracelis is progressing well towards her goals and there are no updates to goals at this time. Pt prognosis is Good. Pt will continue to benefit from skilled outpatient occupational therapy to address the deficits listed in the problem list on initial evalution provide pt/family education and to maximize pt's level of independence in the home and community environment.     Anticipated barriers to occupational therapy: None  Pt's spiritual, cultural and educational needs considered and pt agreeable to plan of care and goals.    Goals:  Long Term Goals (LTGs); to be met by discharge.  LTG #1: Pt will report a pain level of 1 out of 10 with household chores        LTG #2: Pt will demo improved FOTO score by 20 points.   LTG #3: Pt will return to prior level of function for ADLs and household management.      Short Term Goals (STGs); to be met within 4 weeks (12/9/2018).  STG #1a: Pt will report 3 out of 10 pain level with dressing tasks.  STG #2a: Pt will report/demo only minimal difficulty with light laundry tasks.  STG #2b: Pt will report/demo independence with opening/closing doors.  STG #3a: Pt will demonstrate independence with issued HEP.   STG #3b: Pt will demo improved right IF and LF SANCHES by 50 degrees each needed to aid with manipulating items in dominant hand.    Plan     Updates/Grading for next session: Coban wrapped fist into paraffin     Anat Duncan, OT , CHT

## 2019-01-02 ENCOUNTER — DOCUMENTATION ONLY (OUTPATIENT)
Dept: REHABILITATION | Facility: HOSPITAL | Age: 64
End: 2019-01-02

## 2019-01-02 DIAGNOSIS — M25.641 FINGER STIFFNESS, RIGHT: ICD-10-CM

## 2019-01-02 DIAGNOSIS — M25.60 RANGE OF MOTION DEFICIT: ICD-10-CM

## 2019-01-02 DIAGNOSIS — M25.441 EFFUSION OF RIGHT HAND: ICD-10-CM

## 2019-01-02 NOTE — PROGRESS NOTES
No Show Note/Documenation    Patient: Aracelis Martinez  Date of Session: 1/2/2019  Diagnosis:   1. Effusion of right hand     2. Finger stiffness, right     3. Range of motion deficit       MRN: [id number]    Aracelis Martinez did not attend his/her scheduled therapy appointment today. She did not call to cancel nor reschedule. This is the 4th appointment that she has not attended. No charges have been posted today.         Anat Duncan, OT

## 2019-01-05 RX ORDER — PANTOPRAZOLE SODIUM 40 MG/1
TABLET, DELAYED RELEASE ORAL
Qty: 90 TABLET | Refills: 0 | Status: SHIPPED | OUTPATIENT
Start: 2019-01-05 | End: 2019-04-08 | Stop reason: SDUPTHER

## 2019-02-04 ENCOUNTER — OFFICE VISIT (OUTPATIENT)
Dept: ORTHOPEDICS | Facility: CLINIC | Age: 64
End: 2019-02-04
Payer: COMMERCIAL

## 2019-02-04 VITALS
WEIGHT: 224 LBS | DIASTOLIC BLOOD PRESSURE: 78 MMHG | SYSTOLIC BLOOD PRESSURE: 121 MMHG | HEIGHT: 66 IN | BODY MASS INDEX: 36 KG/M2 | HEART RATE: 77 BPM

## 2019-02-04 DIAGNOSIS — M25.641 FINGER STIFFNESS, RIGHT: ICD-10-CM

## 2019-02-04 DIAGNOSIS — M77.8 BONE SPUR OF FINGER IP JOINT: Primary | ICD-10-CM

## 2019-02-04 PROCEDURE — 99024 POSTOP FOLLOW-UP VISIT: CPT | Mod: S$GLB,,, | Performed by: PHYSICIAN ASSISTANT

## 2019-02-04 PROCEDURE — 99024 PR POST-OP FOLLOW-UP VISIT: ICD-10-PCS | Mod: S$GLB,,, | Performed by: PHYSICIAN ASSISTANT

## 2019-02-04 PROCEDURE — 99999 PR PBB SHADOW E&M-EST. PATIENT-LVL IV: ICD-10-PCS | Mod: PBBFAC,,, | Performed by: PHYSICIAN ASSISTANT

## 2019-02-04 PROCEDURE — 99999 PR PBB SHADOW E&M-EST. PATIENT-LVL IV: CPT | Mod: PBBFAC,,, | Performed by: PHYSICIAN ASSISTANT

## 2019-02-04 NOTE — PROGRESS NOTES
"Ms. Martinez is here today for a post-operative visit.  She is 3 months status post Right long finger mucous cyst excision and exostectomy and Right index finger mucous cyst excision and exostectomy by Dr. Schmidt on 10/22/18. She reports that she is doing well.  Pain is mild-moderate, 5/10.  She reports that her pain is most significant at night.  She attended OT intermittently, she is wearing compression sleeves for the fingers.  She is regularly performing HEP, she reports that her fingers have the most mobility after warming them up with hot water.  She denies fever, chills, and sweats since the time of the surgery.     Physical exam:    Vitals:    02/04/19 1330   BP: 121/78   Pulse: 77   Weight: 101.6 kg (224 lb)   Height: 5' 6" (1.676 m)   PainSc:   5     Vital signs are stable, patient is afebrile.  Patient is well dressed and well groomed, no acute distress.  Alert and oriented to person, place, and time.  Incisions healing well - clean, dry and intact. Mild edema of index and long fingers as well as hand. Improved appearance of skin, less shiny than prior.  There is no erythema or exudate.  There is no sign of any infection. She is NVI.  Decreased ROM of the index and long fingers, mildly improved. No mallet deformity appreciated.  Long finger nail deformity growing out.    Assessment: 3 months status post Right long finger mucous cyst excision and exostectomy and Right index finger mucous cyst excision and exostectomy        Plan:  Aracelis was seen today for pain and swelling.    Diagnoses and all orders for this visit:    Bone spur of finger IP joint  -     Ambulatory Referral to Physical/Occupational Therapy    Finger stiffness, right  -     Ambulatory Referral to Physical/Occupational Therapy        - Continue OT, new order placed  - Continue scar massage  - Compression glove/finger sleeves  - Follow up as needed  - Call with questions or concerns    "

## 2019-02-05 ENCOUNTER — TELEPHONE (OUTPATIENT)
Dept: ENDOSCOPY | Facility: HOSPITAL | Age: 64
End: 2019-02-05

## 2019-02-05 DIAGNOSIS — Z12.11 SPECIAL SCREENING FOR MALIGNANT NEOPLASMS, COLON: Primary | ICD-10-CM

## 2019-02-05 RX ORDER — SODIUM, POTASSIUM,MAG SULFATES 17.5-3.13G
1 SOLUTION, RECONSTITUTED, ORAL ORAL ONCE
Qty: 1 BOTTLE | Refills: 0 | Status: SHIPPED | OUTPATIENT
Start: 2019-02-05 | End: 2019-02-05

## 2019-02-12 RX ORDER — METFORMIN HYDROCHLORIDE 500 MG/1
TABLET ORAL
Qty: 90 TABLET | Refills: 3 | Status: SHIPPED | OUTPATIENT
Start: 2019-02-12 | End: 2019-02-25 | Stop reason: SDUPTHER

## 2019-02-15 ENCOUNTER — LAB VISIT (OUTPATIENT)
Dept: LAB | Facility: HOSPITAL | Age: 64
End: 2019-02-15
Attending: INTERNAL MEDICINE
Payer: COMMERCIAL

## 2019-02-15 DIAGNOSIS — E11.9 TYPE 2 DIABETES MELLITUS WITHOUT COMPLICATION: ICD-10-CM

## 2019-02-15 LAB
ESTIMATED AVG GLUCOSE: 200 MG/DL
HBA1C MFR BLD HPLC: 8.6 %

## 2019-02-15 PROCEDURE — 83036 HEMOGLOBIN GLYCOSYLATED A1C: CPT

## 2019-02-15 PROCEDURE — 36415 COLL VENOUS BLD VENIPUNCTURE: CPT | Mod: PO

## 2019-02-25 ENCOUNTER — OFFICE VISIT (OUTPATIENT)
Dept: INTERNAL MEDICINE | Facility: CLINIC | Age: 64
End: 2019-02-25
Payer: COMMERCIAL

## 2019-02-25 VITALS
WEIGHT: 226.19 LBS | DIASTOLIC BLOOD PRESSURE: 70 MMHG | HEIGHT: 66 IN | TEMPERATURE: 99 F | HEART RATE: 84 BPM | BODY MASS INDEX: 36.35 KG/M2 | SYSTOLIC BLOOD PRESSURE: 113 MMHG | OXYGEN SATURATION: 98 %

## 2019-02-25 DIAGNOSIS — E78.5 HYPERLIPIDEMIA, UNSPECIFIED HYPERLIPIDEMIA TYPE: Chronic | ICD-10-CM

## 2019-02-25 DIAGNOSIS — M25.641 FINGER STIFFNESS, RIGHT: ICD-10-CM

## 2019-02-25 DIAGNOSIS — R10.10 PAIN OF UPPER ABDOMEN: ICD-10-CM

## 2019-02-25 DIAGNOSIS — E11.9 TYPE 2 DIABETES MELLITUS WITHOUT COMPLICATION, WITHOUT LONG-TERM CURRENT USE OF INSULIN: Primary | Chronic | ICD-10-CM

## 2019-02-25 DIAGNOSIS — G89.29 CHRONIC BILATERAL LOW BACK PAIN WITHOUT SCIATICA: ICD-10-CM

## 2019-02-25 DIAGNOSIS — M19.90 ARTHRITIS: ICD-10-CM

## 2019-02-25 DIAGNOSIS — I10 ESSENTIAL HYPERTENSION: Chronic | ICD-10-CM

## 2019-02-25 DIAGNOSIS — Z90.411 HISTORY OF PARTIAL PANCREATECTOMY: ICD-10-CM

## 2019-02-25 DIAGNOSIS — D3A.8 NEUROENDOCRINE TUMOR OF PANCREAS: ICD-10-CM

## 2019-02-25 DIAGNOSIS — M54.50 CHRONIC BILATERAL LOW BACK PAIN WITHOUT SCIATICA: ICD-10-CM

## 2019-02-25 PROCEDURE — 3045F PR MOST RECENT HEMOGLOBIN A1C LEVEL 7.0-9.0%: ICD-10-PCS | Mod: CPTII,S$GLB,, | Performed by: INTERNAL MEDICINE

## 2019-02-25 PROCEDURE — 99214 PR OFFICE/OUTPT VISIT, EST, LEVL IV, 30-39 MIN: ICD-10-PCS | Mod: S$GLB,,, | Performed by: INTERNAL MEDICINE

## 2019-02-25 PROCEDURE — 99999 PR PBB SHADOW E&M-EST. PATIENT-LVL IV: ICD-10-PCS | Mod: PBBFAC,,, | Performed by: INTERNAL MEDICINE

## 2019-02-25 PROCEDURE — 3078F DIAST BP <80 MM HG: CPT | Mod: CPTII,S$GLB,, | Performed by: INTERNAL MEDICINE

## 2019-02-25 PROCEDURE — 3078F PR MOST RECENT DIASTOLIC BLOOD PRESSURE < 80 MM HG: ICD-10-PCS | Mod: CPTII,S$GLB,, | Performed by: INTERNAL MEDICINE

## 2019-02-25 PROCEDURE — 99999 PR PBB SHADOW E&M-EST. PATIENT-LVL IV: CPT | Mod: PBBFAC,,, | Performed by: INTERNAL MEDICINE

## 2019-02-25 PROCEDURE — 3074F SYST BP LT 130 MM HG: CPT | Mod: CPTII,S$GLB,, | Performed by: INTERNAL MEDICINE

## 2019-02-25 PROCEDURE — 3008F BODY MASS INDEX DOCD: CPT | Mod: CPTII,S$GLB,, | Performed by: INTERNAL MEDICINE

## 2019-02-25 PROCEDURE — 3074F PR MOST RECENT SYSTOLIC BLOOD PRESSURE < 130 MM HG: ICD-10-PCS | Mod: CPTII,S$GLB,, | Performed by: INTERNAL MEDICINE

## 2019-02-25 PROCEDURE — 3045F PR MOST RECENT HEMOGLOBIN A1C LEVEL 7.0-9.0%: CPT | Mod: CPTII,S$GLB,, | Performed by: INTERNAL MEDICINE

## 2019-02-25 PROCEDURE — 99214 OFFICE O/P EST MOD 30 MIN: CPT | Mod: S$GLB,,, | Performed by: INTERNAL MEDICINE

## 2019-02-25 PROCEDURE — 3008F PR BODY MASS INDEX (BMI) DOCUMENTED: ICD-10-PCS | Mod: CPTII,S$GLB,, | Performed by: INTERNAL MEDICINE

## 2019-02-25 RX ORDER — TRAMADOL HYDROCHLORIDE 50 MG/1
TABLET ORAL
Qty: 90 TABLET | Refills: 2 | Status: SHIPPED | OUTPATIENT
Start: 2019-02-25 | End: 2019-10-30 | Stop reason: SDUPTHER

## 2019-02-25 NOTE — PROGRESS NOTES
Subjective:       Patient ID: Aracelis Martinez is a 63 y.o. female.    Chief Complaint: Follow-up (4 month)    HPI   The patient presents for follow-up conditions which include type 2 diabetes mellitus, hypertension, arthritis, chronic lower back pain, and hyperlipidemia.  The patient has been tolerating her medications well without side effects.  Home blood glucose values have ranged from 127-146.  She does check evening blood sugar levels and these have been in the 160-170 range.    The patient has been noting upper abdominal pain and tenderness.  She denies any chronic pain in the area.  Bowel movements are regular.  She has not experienced any nausea or vomiting.  She has reflux which has been controlled with daily use Protonix.  She does complain of excessive belching.  Patient's clinical history is significant for a neuroendocrine tumor of the pancreas which necessitated a partial pancreatectomy.  There was no evidence of tumor recurrence or metastatic disease at the time of her last abdominal CT in 12/2016.    The patient has experienced a flare of lower back pain.  Pain radiates into the right lower extremity.  She denies having any lower extremity weakness or numbness.  She is currently using tramadol for management of pain. There is no bowel or bladder sphincter dysfunction.      Review of Systems   Constitutional: Negative for activity change, appetite change and unexpected weight change.   Eyes: Negative for visual disturbance.   Respiratory: Negative for shortness of breath.    Cardiovascular: Negative for chest pain, palpitations and leg swelling.   Gastrointestinal: Positive for abdominal pain. Negative for abdominal distention, blood in stool, constipation, diarrhea, nausea and vomiting.   Genitourinary: Negative for dysuria, frequency, hematuria and urgency.   Musculoskeletal: Positive for arthralgias and back pain. Negative for gait problem.   Skin: Negative for rash.   Neurological: Negative for  weakness, numbness and headaches.   Psychiatric/Behavioral: Negative for sleep disturbance. The patient is not nervous/anxious.        Objective:      Physical Exam   Constitutional: She is oriented to person, place, and time. She appears well-developed and well-nourished. No distress.   HENT:   Head: Normocephalic and atraumatic.   Eyes: Conjunctivae and EOM are normal. Pupils are equal, round, and reactive to light. No scleral icterus.   Neck: Normal range of motion. Neck supple. No JVD present. No thyromegaly present.   Cardiovascular: Normal rate, regular rhythm, normal heart sounds and intact distal pulses. Exam reveals no gallop and no friction rub.   No murmur heard.  Pulmonary/Chest: Effort normal and breath sounds normal. No respiratory distress. She has no wheezes. She has no rales.   Abdominal: Soft. Bowel sounds are normal. She exhibits no distension and no mass. There is tenderness (Epigastric tenderness is present on palpation.). There is no rebound and no guarding.   Musculoskeletal: She exhibits no edema or tenderness.   Decreased flexion is noted of the 2nd and 3rd fingers of the right hand.  A compression glove is in place.    Lumbar paraspinous muscle tenderness is present bilaterally. Negative straight leg raising test bilaterally. Hip range of motion is intact.   Lymphadenopathy:     She has no cervical adenopathy.   Neurological: She is alert and oriented to person, place, and time. No cranial nerve deficit.   Sensory exam is intact in both feet on monofilament  testing.   Skin: Skin is warm and dry. No rash noted.   No foot lesions are present.   Psychiatric: She has a normal mood and affect. Her behavior is normal.   Nursing note and vitals reviewed.      Results for orders placed or performed in visit on 02/15/19   Hemoglobin A1c   Result Value Ref Range    Hemoglobin A1C 8.6 (H) 4.0 - 5.6 %    Estimated Avg Glucose 200 (H) 68 - 131 mg/dL       Assessment:       1. Type 2 diabetes mellitus  without complication, without long-term current use of insulin    2. Essential hypertension    3. Arthritis    4. Chronic bilateral low back pain without sciatica    5. Hyperlipidemia, unspecified hyperlipidemia type    6. Finger stiffness, right    7. Pain of upper abdomen    8. Neuroendocrine tumor of pancreas    9. History of partial pancreatectomy        Plan:       Aracelis was seen today for follow-up.  Physical therapy will be ordered for the patient's lower back pain symptoms.  A CT of the abdomen with contrast will be obtained in view of the patient's history of pancreatic tumor and recent symptoms of upper abdominal pain. She has not had an imaging study since 2016.  Diet therapy was also discussed regarding her hyperglycemia.  We discussed reducing intake of carbohydrates.  Lower back pain has limited her level of physical activity.  Tramadol will be reordered for pain. A routine follow-up visit in 3 months is recommended.    Diagnoses and all orders for this visit:    Type 2 diabetes mellitus without complication, without long-term current use of insulin    Essential hypertension    Arthritis    Chronic bilateral low back pain without sciatica  -     CT Abdomen With Contrast; Future  -     Creatinine, serum; Future  -     Ambulatory consult to Physical Therapy    Hyperlipidemia, unspecified hyperlipidemia type    Finger stiffness, right    Pain of upper abdomen  -     CT Abdomen With Contrast; Future    Neuroendocrine tumor of pancreas  -     CT Abdomen With Contrast; Future    History of partial pancreatectomy  -     CT Abdomen With Contrast; Future    Other orders  -     traMADol (ULTRAM) 50 mg tablet; TAKE 1 TO 2 TABLETS BY MOUTH TWICE DAILY

## 2019-02-26 ENCOUNTER — CLINICAL SUPPORT (OUTPATIENT)
Dept: REHABILITATION | Facility: HOSPITAL | Age: 64
End: 2019-02-26
Attending: INTERNAL MEDICINE
Payer: COMMERCIAL

## 2019-02-26 DIAGNOSIS — M25.641 STIFFNESS OF RIGHT HAND, NOT ELSEWHERE CLASSIFIED: ICD-10-CM

## 2019-02-26 PROCEDURE — 97018 PARAFFIN BATH THERAPY: CPT | Mod: PO

## 2019-02-26 PROCEDURE — 97110 THERAPEUTIC EXERCISES: CPT | Mod: PO

## 2019-02-26 PROCEDURE — 97165 OT EVAL LOW COMPLEX 30 MIN: CPT | Mod: PO

## 2019-02-26 NOTE — PLAN OF CARE
[facility] Occupational Therapy  Initial Evaluation     Date: 2019  Name: Aracelis Martinez  Clinic Number: [id number]    Therapy Diagnosis: No diagnosis found.  Physician: Vera Norman PA    Physician Orders: Eval and treat, modalities as needed; 1-2x/week for 6+ weeks  Medical Diagnosis:   M77.8 (ICD-10-CM) - Bone spur of finger IP joint  M25.641 (ICD-10-CM) - Finger stiffness, right   Surgical Procedure and Date: R IF mucous cyst excision and exostectomy, 10/22/18  Evaluation Date: 19  Insurance Authorization Period Expiration:   Plan of Care Certification Period: 19  Date of Return to MD: N/a    Visit # / Visits authorized:  *40 OT visits per year  Time In:9:55am  Time Out: 11am  Total Billable Time: 65 minutes    Precautions:  Standard    Subjective     Involved Side: RUE  Dominant Side: Right  Date of Onset: 10/22/18  Mechanism of Injury: Post operative  History of Current Condition: Pt underwent R IF mucous cyst excision and exostectomy 10/22/18. She attended therapy for this following surgery. She reports she became stiff again.  Previous Therapy: Yes    Past Medical History/Physical Systems Review:   Aracelis Martinez  has a past medical history of Arthritis, Chronic back pain, Diabetes mellitus, Diverticulosis, Hypertension, Neuroendocrine tumor of pancreas, Pancreatic cancer, Renal cyst, and Thyroid disease.    Aracelis Martinez  has a past surgical history that includes Hysterectomy; Tonsillectomy; Knee arthroscopy (2014);  section; Excision of ganglion cyst of hand (Right, 10/22/2018); and Pancreas surgery ().    Aracelis has a current medication list which includes the following prescription(s): acetaminophen-codeine 300-30mg, amlodipine, blood sugar diagnostic, blood sugar diagnostic, blood-glucose meter, diclofenac sodium, doxepin, duloxetine, lancets, levothyroxine, metformin, ondansetron, pantoprazole, and tramadol.    Review of patient's  allergies indicates:   Allergen Reactions    Azithromycin Other (See Comments)     Yeast infection; pt requests please do not put her on this medication        Patient's Goals for Therapy: Be able to use RUE for everyday tasks again    Pain:  Functional Pain Scale Rating 0-10:   4/10 on average  Location: Surgery sites at dorsum of IF/MF DIPs  Description: Dull  Easing Factors: hot bath    Occupation:  Retired     Functional Limitations/Social History:    Previous functional status includes: Independent with all ADLs.     Current FunctionalStatus   Home/Living environment : lives with their spouse      Limitation of Functional Status as follows:   ADLs/IADLs:     - Feeding: Difficulty manipulating tableware    - Bathing: I    - Dressing/Grooming: Buttons/zippers    - Driving: I    Objective     Observation/Appearance:  Pt presents with RUE free of dressing. Surgical scars are well healed.    Hand AROM. Measured in degrees.unless noted otherwise   2/26/2019 2/26/2019    Left Right   Index: MP   81              PIP      64              DIP  16/35              SANCHES     Long:  MP  80              PIP  60              DIP  6/43              SANCHES     Ring:   MP                PIP                DIP                SANCHES     Small:  MP                 PIP                 DIP                SANCHES     Composite finger flexion (distance from DPC in cm)     Thumb: MP                  IP         Rad ADD/ABD         Pal ADD/ABD            Opposition (distance from tip of small finger in cm)        Strength: (TARI Dynamometer in lbs.) Average 3 trials, Position II:   Left Right    59 32     Pinch Strength (Measured in psi)   2/26/2019 2/26/2019    Left Right   Key Pinch     3pt Pinch     2pt Pinch         Treatment   Total Treatment time separate from Evaluation time:40    Aracelis received the following supervised modalities after being cleared for contradictions for 15 minutes:   -Patient received paraffin bath to increase blood  flow, circulation, pain management and for tissue elasticity prior to therex. (Fingers wrapped into flexion with coban)      Aracelis received therapeutic exercises for 25 minutes including:  -Initial HEP instruction and review  -Custom hand-based orthotic fabricated and fitted for exercise.      Home Exercise Program/Education:  Issued HEP (see patient instructions in EMR) and educated on modality use for pain management . Exercises were reviewed and Aracelis was able to demonstrate them prior to the end of the session.   Pt received a written copy of exercises to perform at home. Aracelis demonstrated good  understanding of the education provided.  Pt was advised to perform these exercises free of pain, and to stop performing them if pain occurs.    Patient/Family Education: role of OT, goals for OT, scheduling/cancellations - pt verbalized understanding. Discussed insurance limitations with patient.    Additional Education provided: Splint use/care (wear 2 hours during the day and gradually increase duration)    Assessment     Aracelis Martinez is a 63 y.o. female presenting to OT with residual stiffness approximately 4 months following index finger mucous cyst excisoin. She demos IP ROM deficits of all fingers and diffuse edema, which limits functional use of RUE during ADLs and IADLs.      Following medical record review it is determined that pt will benefit from occupational therapy services in order to maximize pain free and/or functional use of right UE. The following goals were discussed with the patient and patient is in agreement with them as to be addressed in the treatment plan. The patient's rehab potential is Excellent.     Anticipated barriers to occupational therapy: none at this time  Pt has no cultural, educational or language barriers to learning provided.    Profile and History Assessment of Occupational Performance Level of Clinical Decision Making Complexity Score   Occupational Profile:   Aracelis Martinez is  a 63 y.o. female who lives with their spouse and is currently retired from working for the city. Aracelis Martinez has difficulty with ADLs and  IADLs  affecting his/her daily functional abilities. His/her main goal for therapy is return to previous functional use of RUE.     Comorbidities:   see medical chart    Medical and Therapy History Review:   Brief     Performance Deficits    Physical:  Joint Mobility  Edema  Fine Motor Coordination    Cognitive:  No Deficits    Psychosocial:    No Deficits     Clinical Decision Making:  high    Assessment Process:  Comprehensive Assessments    Modification/Need for Assistance:  Not Necessary    Intervention Selection:  Limited Treatment Options       low  Based on PMHX, co morbidities , data from assessments and functional level of assistance required with task and clinical presentation directly impacting function.       The following goals were discussed with the patient and patient is in agreement with them as to be addressed in the treatment plan.     Goals:   Short Term (To be completed within 3 weeks):  1)   Patient to be IND with HEP and modalities for pain management  2)   Pt will demo an active hook fist to the palm to increase flexibility of PIP and DIP joints.  3)   Increase composite finger flexion to the DPC to improve functional  during ADLs.      Long Term (by discharge):  1)   Pt will report no pain with composite fist for at least 3 consecutive sessions indicative of improved function participation in ADLs and IADLs.  2)   Pt will return to near to prior level of function for ADLs and household management reporting I with IADLs.      Plan   Certification Period/Plan of care expiration: 2/26/2019 to 4/9/19.    Outpatient Occupational Therapy 2 times weekly for 6 weeks to include the following interventions: Paraffin, Fluidotherapy, Manual therapy/joint mobilizations, Modalities for pain management, Therapeutic exercises/activities., Strengthening, Orthotic  Fabrication/Fit/Training and Edema Control.      Rose Jimenez, OT

## 2019-03-06 ENCOUNTER — HOSPITAL ENCOUNTER (OUTPATIENT)
Dept: RADIOLOGY | Facility: HOSPITAL | Age: 64
Discharge: HOME | End: 2019-03-06
Attending: INTERNAL MEDICINE
Payer: COMMERCIAL

## 2019-03-06 DIAGNOSIS — D3A.8 NEUROENDOCRINE TUMOR OF PANCREAS: ICD-10-CM

## 2019-03-06 DIAGNOSIS — M54.50 CHRONIC BILATERAL LOW BACK PAIN WITHOUT SCIATICA: ICD-10-CM

## 2019-03-06 DIAGNOSIS — Z90.411 HISTORY OF PARTIAL PANCREATECTOMY: ICD-10-CM

## 2019-03-06 DIAGNOSIS — R10.10 PAIN OF UPPER ABDOMEN: ICD-10-CM

## 2019-03-06 DIAGNOSIS — G89.29 CHRONIC BILATERAL LOW BACK PAIN WITHOUT SCIATICA: ICD-10-CM

## 2019-03-06 LAB
CREAT SERPL-MCNC: 0.8 MG/DL (ref 0.5–1.4)
SAMPLE: NORMAL

## 2019-03-06 PROCEDURE — 74160 CT ABDOMEN W/CONTRAST: CPT | Mod: 26,,, | Performed by: RADIOLOGY

## 2019-03-06 PROCEDURE — 25500020 PHARM REV CODE 255: Performed by: INTERNAL MEDICINE

## 2019-03-06 PROCEDURE — 74160 CT ABDOMEN W/CONTRAST: CPT | Mod: TC

## 2019-03-06 PROCEDURE — 74160 CT ABDOMEN WITH CONTRAST: ICD-10-PCS | Mod: 26,,, | Performed by: RADIOLOGY

## 2019-03-06 RX ADMIN — IOHEXOL 100 ML: 350 INJECTION, SOLUTION INTRAVENOUS at 05:03

## 2019-03-12 ENCOUNTER — ANESTHESIA EVENT (OUTPATIENT)
Dept: ENDOSCOPY | Facility: HOSPITAL | Age: 64
End: 2019-03-12
Payer: COMMERCIAL

## 2019-03-13 ENCOUNTER — HOSPITAL ENCOUNTER (OUTPATIENT)
Facility: HOSPITAL | Age: 64
Discharge: HOME | End: 2019-03-13
Attending: INTERNAL MEDICINE | Admitting: INTERNAL MEDICINE
Payer: COMMERCIAL

## 2019-03-13 ENCOUNTER — ANESTHESIA (OUTPATIENT)
Dept: ENDOSCOPY | Facility: HOSPITAL | Age: 64
End: 2019-03-13
Payer: COMMERCIAL

## 2019-03-13 VITALS
SYSTOLIC BLOOD PRESSURE: 138 MMHG | DIASTOLIC BLOOD PRESSURE: 76 MMHG | TEMPERATURE: 98 F | OXYGEN SATURATION: 96 % | HEART RATE: 63 BPM | RESPIRATION RATE: 20 BRPM

## 2019-03-13 DIAGNOSIS — Z12.11 COLON CANCER SCREENING: ICD-10-CM

## 2019-03-13 DIAGNOSIS — Z12.11 SPECIAL SCREENING FOR MALIGNANT NEOPLASMS, COLON: Primary | ICD-10-CM

## 2019-03-13 LAB — POCT GLUCOSE: 183 MG/DL (ref 70–110)

## 2019-03-13 PROCEDURE — 82962 GLUCOSE BLOOD TEST: CPT | Performed by: INTERNAL MEDICINE

## 2019-03-13 PROCEDURE — 27201012 HC FORCEPS, HOT/COLD, DISP: Performed by: INTERNAL MEDICINE

## 2019-03-13 PROCEDURE — 63600175 PHARM REV CODE 636 W HCPCS: Performed by: NURSE ANESTHETIST, CERTIFIED REGISTERED

## 2019-03-13 PROCEDURE — 88305 TISSUE EXAM BY PATHOLOGIST: CPT | Performed by: PATHOLOGY

## 2019-03-13 PROCEDURE — 25000003 PHARM REV CODE 250: Performed by: INTERNAL MEDICINE

## 2019-03-13 PROCEDURE — 45380 COLONOSCOPY AND BIOPSY: CPT | Performed by: INTERNAL MEDICINE

## 2019-03-13 PROCEDURE — E9220 PRA ENDO ANESTHESIA: HCPCS | Mod: 33,,, | Performed by: NURSE ANESTHETIST, CERTIFIED REGISTERED

## 2019-03-13 PROCEDURE — 37000009 HC ANESTHESIA EA ADD 15 MINS: Performed by: INTERNAL MEDICINE

## 2019-03-13 PROCEDURE — E9220 PRA ENDO ANESTHESIA: ICD-10-PCS | Mod: 33,,, | Performed by: NURSE ANESTHETIST, CERTIFIED REGISTERED

## 2019-03-13 PROCEDURE — 45380 COLONOSCOPY AND BIOPSY: CPT | Mod: 33,,, | Performed by: INTERNAL MEDICINE

## 2019-03-13 PROCEDURE — 88305 TISSUE SPECIMEN TO PATHOLOGY - SURGERY: ICD-10-PCS | Mod: 26,,, | Performed by: PATHOLOGY

## 2019-03-13 PROCEDURE — 37000008 HC ANESTHESIA 1ST 15 MINUTES: Performed by: INTERNAL MEDICINE

## 2019-03-13 PROCEDURE — 45380 PR COLONOSCOPY,BIOPSY: ICD-10-PCS | Mod: 33,,, | Performed by: INTERNAL MEDICINE

## 2019-03-13 RX ORDER — AMLODIPINE BESYLATE 10 MG/1
TABLET ORAL
Qty: 30 TABLET | Refills: 11 | Status: SHIPPED | OUTPATIENT
Start: 2019-03-13 | End: 2020-03-14

## 2019-03-13 RX ORDER — SODIUM CHLORIDE 0.9 % (FLUSH) 0.9 %
3 SYRINGE (ML) INJECTION
Status: DISCONTINUED | OUTPATIENT
Start: 2019-03-13 | End: 2019-03-13 | Stop reason: HOSPADM

## 2019-03-13 RX ORDER — LIDOCAINE HCL/PF 100 MG/5ML
SYRINGE (ML) INTRAVENOUS
Status: DISCONTINUED | OUTPATIENT
Start: 2019-03-13 | End: 2019-03-13

## 2019-03-13 RX ORDER — PROPOFOL 10 MG/ML
VIAL (ML) INTRAVENOUS
Status: DISCONTINUED | OUTPATIENT
Start: 2019-03-13 | End: 2019-03-13

## 2019-03-13 RX ORDER — SODIUM CHLORIDE 9 MG/ML
INJECTION, SOLUTION INTRAVENOUS CONTINUOUS
Status: DISCONTINUED | OUTPATIENT
Start: 2019-03-13 | End: 2019-03-13 | Stop reason: HOSPADM

## 2019-03-13 RX ORDER — PROPOFOL 10 MG/ML
VIAL (ML) INTRAVENOUS CONTINUOUS PRN
Status: DISCONTINUED | OUTPATIENT
Start: 2019-03-13 | End: 2019-03-13

## 2019-03-13 RX ADMIN — PROPOFOL 60 MG: 10 INJECTION, EMULSION INTRAVENOUS at 10:03

## 2019-03-13 RX ADMIN — PROPOFOL 40 MG: 10 INJECTION, EMULSION INTRAVENOUS at 10:03

## 2019-03-13 RX ADMIN — LIDOCAINE HYDROCHLORIDE 50 MG: 20 INJECTION, SOLUTION INTRAVENOUS at 10:03

## 2019-03-13 RX ADMIN — SODIUM CHLORIDE: 0.9 INJECTION, SOLUTION INTRAVENOUS at 10:03

## 2019-03-13 RX ADMIN — SODIUM CHLORIDE: 0.9 INJECTION, SOLUTION INTRAVENOUS at 08:03

## 2019-03-13 RX ADMIN — PROPOFOL 150 MCG/KG/MIN: 10 INJECTION, EMULSION INTRAVENOUS at 10:03

## 2019-03-13 NOTE — ANESTHESIA POSTPROCEDURE EVALUATION
Anesthesia Post Evaluation    Patient: Aracelis Martinez    Procedure(s) Performed: Procedure(s) (LRB):  COLONOSCOPY (N/A)    Final Anesthesia Type: general  Patient location during evaluation: GI PACU  Patient participation: Yes- Able to Participate  Level of consciousness: awake and alert  Post-procedure vital signs: reviewed and stable  Pain management: adequate  Airway patency: patent  PONV status at discharge: No PONV  Anesthetic complications: no      Cardiovascular status: blood pressure returned to baseline  Respiratory status: unassisted  Hydration status: euvolemic  Follow-up not needed.        Visit Vitals  /76   Pulse 63   Temp 36.6 °C (97.8 °F)   Resp 20   SpO2 96%       Pain/Stefan Score: Stefan Score: 10 (3/13/2019 11:30 AM)

## 2019-03-13 NOTE — H&P
Short Stay Endoscopy History and Physical    PCP - Alen Damico MD    Procedure - Colonoscopy  ASA - per anesthesia  Mallampati - per anesthesia  History of Anesthesia problems - no  Family history Anesthesia problems - no   Plan of anesthesia - General    HPI:  This is a 63 y.o. female here for evaluation of :   previous polyps. Last exam was     No family hx of crc.      ROS:  Constitutional: No fevers, chills, No weight loss  CV: No chest pain  Pulm: No cough, No shortness of breath  GI: see HPI  Derm: No rash    Medical History:  has a past medical history of Arthritis, Chronic back pain, Diabetes mellitus (2014), Diverticulosis, Hypertension, Neuroendocrine tumor of pancreas (5/3/2016), Pancreatic cancer (), Renal cyst, and Thyroid disease.    Surgical History:  has a past surgical history that includes Hysterectomy; Tonsillectomy; Knee arthroscopy (2014);  section; Excision of ganglion cyst of hand (Right, 10/22/2018); and Pancreas surgery ().    Family History: family history includes Diabetes in her mother; Heart disease in her mother; Hypertension in her brother, mother, and sister; Stroke in her mother and sister.. Otherwise no colon cancer, inflammatory bowel disease, or GI malignancies.    Social History:  reports that she quit smoking about 37 years ago. Her smoking use included cigarettes. She has a 15.00 pack-year smoking history. she has never used smokeless tobacco. She reports that she drinks alcohol. She reports that she does not use drugs.    Review of patient's allergies indicates:   Allergen Reactions    Azithromycin Other (See Comments)     Yeast infection; pt requests please do not put her on this medication       Medications:   Medications Prior to Admission   Medication Sig Dispense Refill Last Dose    amLODIPine (NORVASC) 10 MG tablet TAKE 1 TABLET BY MOUTH DAILY 30 tablet 10 3/12/2019 at Unknown time    doxepin (SINEQUAN) 100 MG capsule Take 1 capsule  (100 mg total) by mouth nightly. 90 capsule 3 3/12/2019 at Unknown time    DULoxetine (CYMBALTA) 30 MG capsule Take 1 capsule (30 mg total) by mouth once daily. 90 capsule 3 Past Week at Unknown time    levothyroxine (SYNTHROID) 50 MCG tablet TAKE 1 TABLET BY MOUTH BEFORE BREAKFAST 90 tablet 3 3/12/2019 at Unknown time    traMADol (ULTRAM) 50 mg tablet TAKE 1 TO 2 TABLETS BY MOUTH TWICE DAILY 90 tablet 2 3/12/2019 at Unknown time    acetaminophen-codeine 300-30mg (TYLENOL #3) 300-30 mg Tab Take 1 tablet by mouth every 6 (six) hours as needed. 20 tablet 0 3/10/2019    blood sugar diagnostic (ONETOUCH VERIO) Strp TEST BLOOD SUGAR LEVELS ONCE DAILY AS DIRECTED 30 strip 11     blood sugar diagnostic Strp Test blood sugar once daily 100 strip 3     blood-glucose meter kit Use as instructed 1 each 0 Taking    diclofenac sodium (PENNSAID) 2 % SoPk Apply 2 Pump topically 2 (two) times daily. Apply 2 pumps to affected area twice a day as needed 112 g 2 Unknown at Unknown time    lancets (ONETOUCH DELICA LANCETS) 33 gauge Misc 1 lancet by Misc.(Non-Drug; Combo Route) route once daily. 200 each 1     metFORMIN (GLUCOPHAGE) 500 MG tablet Take 1 tablet (500 mg total) by mouth 2 (two) times daily with meals. 180 tablet 3 3/10/2019    ondansetron (ZOFRAN-ODT) 4 MG TbDL Take 1 tablet (4 mg total) by mouth every 6 (six) hours as needed (nausea). 20 tablet 0 More than a month at Unknown time    pantoprazole (PROTONIX) 40 MG tablet TAKE 1 TABLET BY MOUTH EVERY DAY AS NEEDED FOR PREVENTION OF REFLUX 90 tablet 0 3/11/2019         Physical Exam:    Vital Signs:   Vitals:    03/13/19 0903   BP: (!) 149/72   Pulse: 83   Resp: 17   Temp: 98.1 °F (36.7 °C)       General Appearance: Well appearing in no acute distress  Eyes:    No scleral icterus  ENT: Neck supple, Lips, mucosa, and tongue normall  Lungs: nonlabored respirations.  Heart:  Regular rate  Abdomen: Soft, non tender, non distended . No hepatosplenomegaly, ascites, or  mass.  Extremities: 2+ pulses, no clubbing, cyanosis or edema  Skin: No rash      Labs:  Lab Results   Component Value Date    WBC 8.06 10/02/2018    HGB 14.1 10/02/2018    HCT 44.0 10/02/2018     (H) 10/02/2018    CHOL 163 06/05/2018    TRIG 92 06/05/2018    HDL 42 06/05/2018    ALT 15 10/02/2018    AST 22 10/02/2018     10/02/2018    K 3.7 10/02/2018     10/02/2018    CREATININE 1.0 10/02/2018    BUN 15 10/02/2018    CO2 22 (L) 10/02/2018    TSH 3.806 06/05/2018    HGBA1C 8.6 (H) 02/15/2019       I have explained the risks and benefits of endoscopy procedures to the patient including but not limited to bleeding, perforation, infection, and death.      Hiren Newberry MD

## 2019-03-13 NOTE — [employer] PATIENT INSTRUCTIONS
Discharge Summary/Instructions after an Endoscopic Procedure  Patient Name: Aracelis Martinez  Patient MRN: [id number]  Patient YOB: 1955 Wednesday, March 13, 2019  Cem Lamar MD  RESTRICTIONS:  During your procedure today, you received medications for sedation.  These   medications may affect your judgment, balance and coordination.  Therefore,   for 24 hours, you have the following restrictions:   - DO NOT drive a car, operate machinery, make legal/financial decisions,   sign important papers or drink alcohol.    ACTIVITY:  Today: no heavy lifting, straining or running due to procedural   sedation/anesthesia.  The following day: return to full activity including work.  DIET:  Eat and drink normally unless instructed otherwise.     TREATMENT FOR COMMON SIDE EFFECTS:  - Mild abdominal pain, nausea, belching, bloating or excessive gas:  rest,   eat lightly and use a heating pad.  - Sore Throat: treat with throat lozenges and/or gargle with warm salt   water.  - Because air was used during the procedure, expelling large amounts of air   from your rectum or belching is normal.  - If a bowel prep was taken, you may not have a bowel movement for 1-3 days.    This is normal.  SYMPTOMS TO WATCH FOR AND REPORT TO YOUR PHYSICIAN:  1. Abdominal pain or bloating, other than gas cramps.  2. Chest pain.  3. Back pain.  4. Signs of infection such as: chills or fever occurring within 24 hours   after the procedure.  5. Rectal bleeding, which would show as bright red, maroon, or black stools.   (A tablespoon of blood from the rectum is not serious, especially if   hemorrhoids are present.)  6. Vomiting.  7. Weakness or dizziness.  GO DIRECTLY TO THE NEAREST EMERGENCY ROOM IF YOU HAVE ANY OF THE FOLLOWING:      Difficulty breathing              Chills and/or fever over 101 F   Persistent vomiting and/or vomiting blood   Severe abdominal pain   Severe chest pain   Black, tarry stools   Bleeding- more than one tablespoon   Any  other symptom or condition that you feel may need urgent attention  Your doctor recommends these additional instructions:  If any biopsies were taken, your doctors clinic will contact you in 1 to 2   weeks with any results.  - Patient has a contact number available for emergencies.  The signs and   symptoms of potential delayed complications were discussed with the   patient.  Return to normal activities tomorrow.  Written discharge   instructions were provided to the patient.   - Discharge patient to home.   - Await pathology results.   - Repeat colonoscopy in 3 years for surveillance.   - The findings and recommendations were discussed with the designated   responsible adult.  For questions, problems or results please call your physician - Cem Lamar MD at Work:  [phone].  [facility], EMERGENCY ROOM PHONE NUMBER: [phone]  IF A COMPLICATION OR EMERGENCY SITUATION ARISES AND YOU ARE UNABLE TO REACH   YOUR PHYSICIAN - GO DIRECTLY TO THE EMERGENCY ROOM.  Cem Lamar MD  3/13/2019 10:53:50 AM  This report has been verified and signed electronically.  [employer]

## 2019-03-13 NOTE — ANESTHESIA PREPROCEDURE EVALUATION
03/13/2019  Aracelis Martinez is a 63 y.o., female.    Anesthesia Evaluation    I have reviewed the Patient Summary Reports.    I have reviewed the Nursing Notes.   I have reviewed the Medications.     Review of Systems  Anesthesia Hx:  No problems with previous Anesthesia    Social:  Former Smoker, Alcohol Use    EENT/Dental:EENT/Dental Normal   Cardiovascular:   Exercise tolerance: poor ECG has been reviewed.    Pulmonary:  Pulmonary Normal    Neurological:   fibromyalgia   Endocrine:   Pancreatic cancer       Physical Exam  General:  Obesity                 Anesthesia Plan  Type of Anesthesia, risks & benefits discussed:  Anesthesia Type:  general  Patient's Preference:   Intra-op Monitoring Plan:   Intra-op Monitoring Plan Comments:   Post Op Pain Control Plan:   Post Op Pain Control Plan Comments:   Induction:   IV  Beta Blocker:  Patient is not currently on a Beta-Blocker (No further documentation required).       Informed Consent: Patient understands risks and agrees with Anesthesia plan.  Questions answered. Anesthesia consent signed with patient.  ASA Score: 2     Day of Surgery Review of History & Physical: I have interviewed and examined the patient. I have reviewed the patient's H&P dated:  There are no significant changes.  H&P update referred to the provider.         Ready For Surgery From Anesthesia Perspective.

## 2019-03-13 NOTE — TRANSFER OF CARE
Anesthesia Transfer of Care Note    Patient: Aracelis Martinez    Procedure(s) Performed: Procedure(s) (LRB):  COLONOSCOPY (N/A)    Patient location: GI    Anesthesia Type: general    Transport from OR: Transported from OR on room air with adequate spontaneous ventilation    Post pain: adequate analgesia    Post assessment: no apparent anesthetic complications    Post vital signs: stable    Level of consciousness: awake    Nausea/Vomiting: no nausea/vomiting    Complications: none    Transfer of care protocol was followed      Last vitals:   Visit Vitals  BP (!) 149/72 (BP Location: Left arm, Patient Position: Lying)   Pulse 83   Temp 36.7 °C (98.1 °F) (Temporal)   Resp 17   SpO2 95%

## 2019-03-19 ENCOUNTER — HOSPITAL ENCOUNTER (OUTPATIENT)
Dept: RADIOLOGY | Facility: HOSPITAL | Age: 64
Discharge: HOME | End: 2019-03-19
Attending: INTERNAL MEDICINE
Payer: COMMERCIAL

## 2019-03-19 DIAGNOSIS — Z12.39 BREAST CANCER SCREENING: ICD-10-CM

## 2019-03-19 PROCEDURE — 77067 SCR MAMMO BI INCL CAD: CPT | Mod: TC,PO

## 2019-03-19 PROCEDURE — 77063 MAMMO DIGITAL SCREENING BILAT WITH TOMOSYNTHESIS_CAD: ICD-10-PCS | Mod: 26,,, | Performed by: RADIOLOGY

## 2019-03-19 PROCEDURE — 77063 BREAST TOMOSYNTHESIS BI: CPT | Mod: 26,,, | Performed by: RADIOLOGY

## 2019-03-19 PROCEDURE — 77067 MAMMO DIGITAL SCREENING BILAT WITH TOMOSYNTHESIS_CAD: ICD-10-PCS | Mod: 26,,, | Performed by: RADIOLOGY

## 2019-03-19 PROCEDURE — 77067 SCR MAMMO BI INCL CAD: CPT | Mod: 26,,, | Performed by: RADIOLOGY

## 2019-03-20 ENCOUNTER — TELEPHONE (OUTPATIENT)
Dept: ENDOSCOPY | Facility: HOSPITAL | Age: 64
End: 2019-03-20

## 2019-03-27 ENCOUNTER — TELEPHONE (OUTPATIENT)
Dept: INTERNAL MEDICINE | Facility: CLINIC | Age: 64
End: 2019-03-27

## 2019-03-27 DIAGNOSIS — K76.9 LESION OF LIVER: Primary | ICD-10-CM

## 2019-03-27 NOTE — TELEPHONE ENCOUNTER
CT scan and was negative except for a nonspecific spot on the liver.  The radiologist suggested an  MRI scan for further characterization of the liver density.

## 2019-04-03 ENCOUNTER — HOSPITAL ENCOUNTER (OUTPATIENT)
Dept: RADIOLOGY | Facility: OTHER | Age: 64
Discharge: HOME | End: 2019-04-03
Attending: INTERNAL MEDICINE
Payer: MEDICARE

## 2019-04-03 DIAGNOSIS — K76.9 LESION OF LIVER: ICD-10-CM

## 2019-04-03 PROCEDURE — 25500020 PHARM REV CODE 255: Performed by: INTERNAL MEDICINE

## 2019-04-03 PROCEDURE — 74183 MRI ABD W/O CNTR FLWD CNTR: CPT | Mod: 26,,, | Performed by: RADIOLOGY

## 2019-04-03 PROCEDURE — A9585 GADOBUTROL INJECTION: HCPCS | Performed by: INTERNAL MEDICINE

## 2019-04-03 PROCEDURE — 74183 MRI ABDOMEN W WO CONTRAST: ICD-10-PCS | Mod: 26,,, | Performed by: RADIOLOGY

## 2019-04-03 PROCEDURE — 74183 MRI ABD W/O CNTR FLWD CNTR: CPT | Mod: TC

## 2019-04-03 RX ORDER — GADOBUTROL 604.72 MG/ML
10 INJECTION INTRAVENOUS
Status: COMPLETED | OUTPATIENT
Start: 2019-04-03 | End: 2019-04-03

## 2019-04-03 RX ADMIN — GADOBUTROL 10 ML: 604.72 INJECTION INTRAVENOUS at 11:04

## 2019-04-08 RX ORDER — PANTOPRAZOLE SODIUM 40 MG/1
TABLET, DELAYED RELEASE ORAL
Qty: 90 TABLET | Refills: 0 | Status: SHIPPED | OUTPATIENT
Start: 2019-04-08 | End: 2019-06-18 | Stop reason: SDUPTHER

## 2019-04-10 RX ORDER — LEVOTHYROXINE SODIUM 50 UG/1
TABLET ORAL
Qty: 90 TABLET | Refills: 3 | Status: SHIPPED | OUTPATIENT
Start: 2019-04-10 | End: 2019-11-12 | Stop reason: SDUPTHER

## 2019-04-18 ENCOUNTER — PATIENT MESSAGE (OUTPATIENT)
Dept: INTERNAL MEDICINE | Facility: CLINIC | Age: 64
End: 2019-04-18

## 2019-06-18 RX ORDER — DULOXETIN HYDROCHLORIDE 30 MG/1
CAPSULE, DELAYED RELEASE ORAL
Qty: 90 CAPSULE | Refills: 0 | Status: SHIPPED | OUTPATIENT
Start: 2019-06-18 | End: 2019-12-01 | Stop reason: SDUPTHER

## 2019-06-18 RX ORDER — PANTOPRAZOLE SODIUM 40 MG/1
TABLET, DELAYED RELEASE ORAL
Qty: 90 TABLET | Refills: 0 | Status: SHIPPED | OUTPATIENT
Start: 2019-06-18 | End: 2019-07-04 | Stop reason: SDUPTHER

## 2019-07-05 RX ORDER — PANTOPRAZOLE SODIUM 40 MG/1
TABLET, DELAYED RELEASE ORAL
Qty: 90 TABLET | Refills: 2 | Status: SHIPPED | OUTPATIENT
Start: 2019-07-05 | End: 2020-09-25

## 2019-07-05 RX ORDER — DOXEPIN HYDROCHLORIDE 100 MG/1
CAPSULE ORAL
Qty: 90 CAPSULE | Refills: 2 | Status: SHIPPED | OUTPATIENT
Start: 2019-07-05 | End: 2019-07-11

## 2019-07-11 ENCOUNTER — LAB VISIT (OUTPATIENT)
Dept: LAB | Facility: HOSPITAL | Age: 64
End: 2019-07-11
Attending: INTERNAL MEDICINE
Payer: MEDICARE

## 2019-07-11 ENCOUNTER — OFFICE VISIT (OUTPATIENT)
Dept: INTERNAL MEDICINE | Facility: CLINIC | Age: 64
End: 2019-07-11
Payer: MEDICARE

## 2019-07-11 VITALS
DIASTOLIC BLOOD PRESSURE: 62 MMHG | WEIGHT: 227.31 LBS | TEMPERATURE: 99 F | BODY MASS INDEX: 36.53 KG/M2 | OXYGEN SATURATION: 97 % | RESPIRATION RATE: 17 BRPM | HEART RATE: 81 BPM | SYSTOLIC BLOOD PRESSURE: 106 MMHG | HEIGHT: 66 IN

## 2019-07-11 DIAGNOSIS — R42 POSTURAL DIZZINESS: ICD-10-CM

## 2019-07-11 DIAGNOSIS — E11.9 TYPE 2 DIABETES MELLITUS WITHOUT COMPLICATION, WITHOUT LONG-TERM CURRENT USE OF INSULIN: Chronic | ICD-10-CM

## 2019-07-11 DIAGNOSIS — I10 ESSENTIAL HYPERTENSION: Chronic | ICD-10-CM

## 2019-07-11 DIAGNOSIS — R09.89 OTHER SPECIFIED SYMPTOMS AND SIGNS INVOLVING THE CIRCULATORY AND RESPIRATORY SYSTEMS: ICD-10-CM

## 2019-07-11 DIAGNOSIS — E11.9 TYPE 2 DIABETES MELLITUS WITHOUT COMPLICATION, WITHOUT LONG-TERM CURRENT USE OF INSULIN: Primary | Chronic | ICD-10-CM

## 2019-07-11 LAB
ALBUMIN SERPL BCP-MCNC: 3.7 G/DL (ref 3.5–5.2)
ALP SERPL-CCNC: 100 U/L (ref 55–135)
ALT SERPL W/O P-5'-P-CCNC: 17 U/L (ref 10–44)
ANION GAP SERPL CALC-SCNC: 10 MMOL/L (ref 8–16)
AST SERPL-CCNC: 16 U/L (ref 10–40)
BASOPHILS # BLD AUTO: 0.04 K/UL (ref 0–0.2)
BASOPHILS NFR BLD: 0.7 % (ref 0–1.9)
BILIRUB SERPL-MCNC: 0.3 MG/DL (ref 0.1–1)
BUN SERPL-MCNC: 13 MG/DL (ref 8–23)
CALCIUM SERPL-MCNC: 9.8 MG/DL (ref 8.7–10.5)
CHLORIDE SERPL-SCNC: 101 MMOL/L (ref 95–110)
CHOLEST SERPL-MCNC: 178 MG/DL (ref 120–199)
CHOLEST/HDLC SERPL: 3.4 {RATIO} (ref 2–5)
CO2 SERPL-SCNC: 27 MMOL/L (ref 23–29)
CORTIS SERPL-MCNC: 6.3 UG/DL
CREAT SERPL-MCNC: 0.9 MG/DL (ref 0.5–1.4)
DIFFERENTIAL METHOD: ABNORMAL
EOSINOPHIL # BLD AUTO: 0.2 K/UL (ref 0–0.5)
EOSINOPHIL NFR BLD: 2.8 % (ref 0–8)
ERYTHROCYTE [DISTWIDTH] IN BLOOD BY AUTOMATED COUNT: 15.6 % (ref 11.5–14.5)
EST. GFR  (AFRICAN AMERICAN): >60 ML/MIN/1.73 M^2
EST. GFR  (NON AFRICAN AMERICAN): >60 ML/MIN/1.73 M^2
ESTIMATED AVG GLUCOSE: 183 MG/DL (ref 68–131)
GLUCOSE SERPL-MCNC: 257 MG/DL (ref 70–110)
HBA1C MFR BLD HPLC: 8 % (ref 4–5.6)
HCT VFR BLD AUTO: 43.6 % (ref 37–48.5)
HDLC SERPL-MCNC: 53 MG/DL (ref 40–75)
HDLC SERPL: 29.8 % (ref 20–50)
HGB BLD-MCNC: 13.6 G/DL (ref 12–16)
IMM GRANULOCYTES # BLD AUTO: 0.01 K/UL (ref 0–0.04)
IMM GRANULOCYTES NFR BLD AUTO: 0.2 % (ref 0–0.5)
LDLC SERPL CALC-MCNC: 104 MG/DL (ref 63–159)
LYMPHOCYTES # BLD AUTO: 3.3 K/UL (ref 1–4.8)
LYMPHOCYTES NFR BLD: 55 % (ref 18–48)
MCH RBC QN AUTO: 26.7 PG (ref 27–31)
MCHC RBC AUTO-ENTMCNC: 31.2 G/DL (ref 32–36)
MCV RBC AUTO: 86 FL (ref 82–98)
MONOCYTES # BLD AUTO: 0.5 K/UL (ref 0.3–1)
MONOCYTES NFR BLD: 7.7 % (ref 4–15)
NEUTROPHILS # BLD AUTO: 2 K/UL (ref 1.8–7.7)
NEUTROPHILS NFR BLD: 33.6 % (ref 38–73)
NONHDLC SERPL-MCNC: 125 MG/DL
NRBC BLD-RTO: 0 /100 WBC
PLATELET # BLD AUTO: 372 K/UL (ref 150–350)
PMV BLD AUTO: 9.9 FL (ref 9.2–12.9)
POTASSIUM SERPL-SCNC: 4 MMOL/L (ref 3.5–5.1)
PROT SERPL-MCNC: 7.1 G/DL (ref 6–8.4)
RBC # BLD AUTO: 5.09 M/UL (ref 4–5.4)
SODIUM SERPL-SCNC: 138 MMOL/L (ref 136–145)
TRIGL SERPL-MCNC: 105 MG/DL (ref 30–150)
TSH SERPL DL<=0.005 MIU/L-ACNC: 1.92 UIU/ML (ref 0.4–4)
WBC # BLD AUTO: 6.07 K/UL (ref 3.9–12.7)

## 2019-07-11 PROCEDURE — 36415 COLL VENOUS BLD VENIPUNCTURE: CPT | Mod: PO

## 2019-07-11 PROCEDURE — 83036 HEMOGLOBIN GLYCOSYLATED A1C: CPT

## 2019-07-11 PROCEDURE — 99999 PR PBB SHADOW E&M-EST. PATIENT-LVL III: CPT | Mod: PBBFAC,,, | Performed by: INTERNAL MEDICINE

## 2019-07-11 PROCEDURE — 80053 COMPREHEN METABOLIC PANEL: CPT

## 2019-07-11 PROCEDURE — 99213 OFFICE O/P EST LOW 20 MIN: CPT | Mod: PBBFAC,PO | Performed by: INTERNAL MEDICINE

## 2019-07-11 PROCEDURE — 82533 TOTAL CORTISOL: CPT

## 2019-07-11 PROCEDURE — 84443 ASSAY THYROID STIM HORMONE: CPT

## 2019-07-11 PROCEDURE — 99214 OFFICE O/P EST MOD 30 MIN: CPT | Mod: S$PBB,,, | Performed by: INTERNAL MEDICINE

## 2019-07-11 PROCEDURE — 80061 LIPID PANEL: CPT

## 2019-07-11 PROCEDURE — 99214 PR OFFICE/OUTPT VISIT, EST, LEVL IV, 30-39 MIN: ICD-10-PCS | Mod: S$PBB,,, | Performed by: INTERNAL MEDICINE

## 2019-07-11 PROCEDURE — 99999 PR PBB SHADOW E&M-EST. PATIENT-LVL III: ICD-10-PCS | Mod: PBBFAC,,, | Performed by: INTERNAL MEDICINE

## 2019-07-11 PROCEDURE — 85025 COMPLETE CBC W/AUTO DIFF WBC: CPT

## 2019-07-11 RX ORDER — TRAZODONE HYDROCHLORIDE 100 MG/1
100 TABLET ORAL NIGHTLY
Qty: 30 TABLET | Refills: 11 | Status: SHIPPED | OUTPATIENT
Start: 2019-07-11 | End: 2020-05-27

## 2019-07-11 NOTE — PROGRESS NOTES
Subjective:       Patient ID: Aracelis Martinez is a 64 y.o. female.    Chief Complaint: Otalgia ((right)) and Dizziness    HPI   The patient presents for evaluation of medical conditions.  Active medical conditions include type 2 diabetes mellitus, hypertension, hyperlipidemia, fibromyalgia, and degenerative joint disease of the knee.    The patient has been noting postural dizziness for several months.  Vague ear discomfort is also noted.    The patient states that doxepin is too expensive and she needs a different medication to help with sleep.  She reports her mood has been stable.  She has been resting well at night with her current medication.    Blood sugar levels have been variable.  Blood sugars have ranged 128 to maximum 162.  No hypoglycemia has been noted. No syncopal episodes have been noted. She is tolerating her blood pressure medication well.    Review of Systems   Constitutional: Negative for activity change, appetite change and unexpected weight change.   Eyes: Negative for visual disturbance.   Respiratory: Negative for shortness of breath.    Cardiovascular: Negative for chest pain, palpitations and leg swelling.   Gastrointestinal: Negative for abdominal pain, blood in stool and diarrhea.   Genitourinary: Negative for dysuria, frequency, hematuria and urgency.   Neurological: Positive for dizziness. Negative for weakness, numbness and headaches.   Psychiatric/Behavioral: Negative for sleep disturbance.       Objective:      Physical Exam   Constitutional: She is oriented to person, place, and time. She appears well-developed and well-nourished. No distress.   HENT:   Head: Normocephalic and atraumatic.   Right Ear: External ear normal.   Left Ear: External ear normal.   Sinuses are nontender to palpation.   Eyes: Pupils are equal, round, and reactive to light. Conjunctivae and EOM are normal. No scleral icterus.   Neck: Normal range of motion. Neck supple. No JVD present. No thyromegaly present.    Cardiovascular: Normal rate, regular rhythm, normal heart sounds and intact distal pulses. Exam reveals no gallop and no friction rub.   No murmur heard.  Pulmonary/Chest: Effort normal and breath sounds normal. No respiratory distress. She has no wheezes. She has no rales.   Abdominal: Soft. Bowel sounds are normal. She exhibits no mass. There is no tenderness.   Musculoskeletal: Normal range of motion. She exhibits no edema or tenderness.   Lymphadenopathy:     She has no cervical adenopathy.   Neurological: She is alert and oriented to person, place, and time. No cranial nerve deficit. Coordination normal.   Sensory exam is intact in both feet on monofilament and vibration testing.   Skin: Skin is warm and dry. No rash noted.   No foot lesions are present.   Psychiatric: She has a normal mood and affect. Her behavior is normal.   Nursing note and vitals reviewed.      Assessment:       1. Type 2 diabetes mellitus without complication, without long-term current use of insulin    2. Essential hypertension    3. Postural dizziness    4. Other specified symptoms and signs involving the circulatory and respiratory systems         Plan:       Aracelis was seen today for dizziness.  Neurology consultation will be obtained regarding recurrent dizziness if laboratory studies are negative.  Carotid ultrasound will be obtained.  Blood tests will be obtained.  Doxepin will be discontinued since this is no longer covered by her insurance plan.  Trazodone will be ordered.  A routine follow-up visit in 3 months is recommended.    Diagnoses and all orders for this visit:    Type 2 diabetes mellitus without complication, without long-term current use of insulin  -     Comprehensive metabolic panel; Future  -     Cortisol; Future  -     CBC auto differential; Future  -     Hemoglobin A1c; Future  -     TSH; Future  -     Lipid panel; Future    Essential hypertension  -     Comprehensive metabolic panel; Future  -     Cortisol;  Future  -     CBC auto differential; Future  -     Hemoglobin A1c; Future  -     TSH; Future  -     Lipid panel; Future    Postural dizziness  -     Comprehensive metabolic panel; Future  -     Cortisol; Future  -     CBC auto differential; Future  -     Hemoglobin A1c; Future  -     TSH; Future  -     Lipid panel; Future  -     CV Ultrasound Bilateral Doppler Carotid; Future    Other specified symptoms and signs involving the circulatory and respiratory systems   -     CV Ultrasound Bilateral Doppler Carotid; Future    Other orders  -     traZODone (DESYREL) 100 MG tablet; Take 1 tablet (100 mg total) by mouth every evening.

## 2019-07-24 ENCOUNTER — CLINICAL SUPPORT (OUTPATIENT)
Dept: CARDIOLOGY | Facility: CLINIC | Age: 64
End: 2019-07-24
Attending: INTERNAL MEDICINE
Payer: MEDICARE

## 2019-07-24 DIAGNOSIS — R09.89 OTHER SPECIFIED SYMPTOMS AND SIGNS INVOLVING THE CIRCULATORY AND RESPIRATORY SYSTEMS: ICD-10-CM

## 2019-07-24 DIAGNOSIS — R42 POSTURAL DIZZINESS: ICD-10-CM

## 2019-07-24 LAB
LEFT CBA DIAS: 8 CM/S
LEFT CBA SYS: 68 CM/S
LEFT CCA DIST DIAS: 12 CM/S
LEFT CCA DIST SYS: 75 CM/S
LEFT CCA MID DIAS: 15 CM/S
LEFT CCA MID SYS: 93 CM/S
LEFT CCA PROX DIAS: 17 CM/S
LEFT CCA PROX SYS: 114 CM/S
LEFT ECA DIAS: 7 CM/S
LEFT ECA SYS: 50 CM/S
LEFT ICA DIST DIAS: 16 CM/S
LEFT ICA DIST SYS: 65 CM/S
LEFT ICA MID DIAS: 18 CM/S
LEFT ICA MID SYS: 69 CM/S
LEFT ICA PROX DIAS: 10 CM/S
LEFT ICA PROX SYS: 61 CM/S
LEFT VERTEBRAL DIAS: 10 CM/S
LEFT VERTEBRAL SYS: 55 CM/S
OHS CV CAROTID RIGHT ICA EDV HIGHEST: 24
OHS CV CAROTID ULTRASOUND LEFT ICA/CCA RATIO: 0.61
OHS CV CAROTID ULTRASOUND RIGHT ICA/CCA RATIO: 1.24
OHS CV PV CAROTID LEFT HIGHEST CCA: 114
OHS CV PV CAROTID LEFT HIGHEST ICA: 69
OHS CV PV CAROTID RIGHT HIGHEST CCA: 70
OHS CV PV CAROTID RIGHT HIGHEST ICA: 87
OHS CV US CAROTID LEFT HIGHEST EDV: 18
RIGHT ARM DIASTOLIC BLOOD PRESSURE: 62 MMHG
RIGHT ARM SYSTOLIC BLOOD PRESSURE: 106 MMHG
RIGHT CBA DIAS: 12 CM/S
RIGHT CBA SYS: 53 CM/S
RIGHT CCA DIST DIAS: 15 CM/S
RIGHT CCA DIST SYS: 63 CM/S
RIGHT CCA MID DIAS: 14 CM/S
RIGHT CCA MID SYS: 67 CM/S
RIGHT CCA PROX DIAS: 12 CM/S
RIGHT CCA PROX SYS: 70 CM/S
RIGHT ECA DIAS: 11 CM/S
RIGHT ECA SYS: 98 CM/S
RIGHT ICA DIST DIAS: 24 CM/S
RIGHT ICA DIST SYS: 87 CM/S
RIGHT ICA MID DIAS: 13 CM/S
RIGHT ICA MID SYS: 46 CM/S
RIGHT ICA PROX DIAS: 13 CM/S
RIGHT ICA PROX SYS: 50 CM/S
RIGHT VERTEBRAL DIAS: 14 CM/S
RIGHT VERTEBRAL SYS: 60 CM/S

## 2019-07-24 PROCEDURE — 93880 EXTRACRANIAL BILAT STUDY: CPT | Mod: PBBFAC | Performed by: INTERNAL MEDICINE

## 2019-07-24 PROCEDURE — 93880 CV US DOPPLER CAROTID (CUPID ONLY): ICD-10-PCS | Mod: 26,S$PBB,, | Performed by: INTERNAL MEDICINE

## 2019-07-31 ENCOUNTER — PATIENT MESSAGE (OUTPATIENT)
Dept: INTERNAL MEDICINE | Facility: CLINIC | Age: 64
End: 2019-07-31

## 2019-07-31 RX ORDER — SCOLOPAMINE TRANSDERMAL SYSTEM 1 MG/1
1 PATCH, EXTENDED RELEASE TRANSDERMAL
Qty: 10 PATCH | Refills: 0 | Status: SHIPPED | OUTPATIENT
Start: 2019-07-31 | End: 2019-08-05 | Stop reason: SDUPTHER

## 2019-08-02 RX ORDER — SCOLOPAMINE TRANSDERMAL SYSTEM 1 MG/1
1 PATCH, EXTENDED RELEASE TRANSDERMAL
Qty: 10 PATCH | Refills: 0 | Status: SHIPPED | OUTPATIENT
Start: 2019-08-02 | End: 2019-11-12

## 2019-09-03 RX ORDER — LEVOTHYROXINE SODIUM 50 UG/1
TABLET ORAL
Qty: 90 TABLET | Refills: 3 | Status: SHIPPED | OUTPATIENT
Start: 2019-09-03 | End: 2020-09-25

## 2019-09-20 RX ORDER — LANCETS 33 GAUGE
1 EACH MISCELLANEOUS DAILY
Qty: 200 EACH | Refills: 1 | Status: SHIPPED | OUTPATIENT
Start: 2019-09-20 | End: 2019-11-12

## 2019-10-23 ENCOUNTER — TELEPHONE (OUTPATIENT)
Dept: INTERNAL MEDICINE | Facility: CLINIC | Age: 64
End: 2019-10-23

## 2019-10-23 NOTE — TELEPHONE ENCOUNTER
----- Message from Roya Randolph sent at 10/23/2019 11:02 AM CDT -----  Contact: Felicita @ [facility] # [phone], ext# [phone]   Ms. Mims is calling in regards to her saying that the patient's ICD 10 was not on the form that was sent for medicare for the patient. She would like for you to give them a call with the information please.

## 2019-10-31 RX ORDER — TRAMADOL HYDROCHLORIDE 50 MG/1
TABLET ORAL
Qty: 90 TABLET | Refills: 0 | Status: SHIPPED | OUTPATIENT
Start: 2019-10-31 | End: 2020-03-27 | Stop reason: SDUPTHER

## 2019-11-01 DIAGNOSIS — E11.9 TYPE 2 DIABETES MELLITUS WITHOUT COMPLICATION: ICD-10-CM

## 2019-11-01 RX ORDER — METFORMIN HYDROCHLORIDE 500 MG/1
TABLET ORAL
Qty: 180 TABLET | Refills: 0 | Status: SHIPPED | OUTPATIENT
Start: 2019-11-01 | End: 2020-01-03

## 2019-11-12 ENCOUNTER — LAB VISIT (OUTPATIENT)
Dept: LAB | Facility: HOSPITAL | Age: 64
End: 2019-11-12
Attending: INTERNAL MEDICINE
Payer: MEDICARE

## 2019-11-12 ENCOUNTER — OFFICE VISIT (OUTPATIENT)
Dept: INTERNAL MEDICINE | Facility: CLINIC | Age: 64
End: 2019-11-12
Payer: MEDICARE

## 2019-11-12 VITALS
OXYGEN SATURATION: 95 % | BODY MASS INDEX: 34.27 KG/M2 | HEART RATE: 80 BPM | RESPIRATION RATE: 15 BRPM | SYSTOLIC BLOOD PRESSURE: 108 MMHG | DIASTOLIC BLOOD PRESSURE: 60 MMHG | WEIGHT: 212.31 LBS | TEMPERATURE: 99 F

## 2019-11-12 DIAGNOSIS — Z90.411 HISTORY OF PARTIAL PANCREATECTOMY: ICD-10-CM

## 2019-11-12 DIAGNOSIS — R10.9 ABDOMINAL PAIN, UNSPECIFIED ABDOMINAL LOCATION: ICD-10-CM

## 2019-11-12 DIAGNOSIS — E78.5 HYPERLIPIDEMIA, UNSPECIFIED HYPERLIPIDEMIA TYPE: ICD-10-CM

## 2019-11-12 DIAGNOSIS — R19.7 DIARRHEA, UNSPECIFIED TYPE: ICD-10-CM

## 2019-11-12 DIAGNOSIS — E11.9 TYPE 2 DIABETES MELLITUS WITHOUT COMPLICATION: ICD-10-CM

## 2019-11-12 DIAGNOSIS — E11.9 TYPE 2 DIABETES MELLITUS WITHOUT COMPLICATION, WITHOUT LONG-TERM CURRENT USE OF INSULIN: ICD-10-CM

## 2019-11-12 DIAGNOSIS — R42 POSTURAL DIZZINESS: ICD-10-CM

## 2019-11-12 DIAGNOSIS — Z98.890 POST-OPERATIVE STATE: ICD-10-CM

## 2019-11-12 DIAGNOSIS — I10 ESSENTIAL HYPERTENSION: ICD-10-CM

## 2019-11-12 DIAGNOSIS — R11.0 NAUSEA: ICD-10-CM

## 2019-11-12 DIAGNOSIS — R14.2 BELCHING: ICD-10-CM

## 2019-11-12 DIAGNOSIS — E11.9 TYPE 2 DIABETES MELLITUS WITHOUT COMPLICATION, WITHOUT LONG-TERM CURRENT USE OF INSULIN: Primary | ICD-10-CM

## 2019-11-12 LAB
ALBUMIN SERPL BCP-MCNC: 4.1 G/DL (ref 3.5–5.2)
ALP SERPL-CCNC: 93 U/L (ref 55–135)
ALT SERPL W/O P-5'-P-CCNC: 17 U/L (ref 10–44)
ANION GAP SERPL CALC-SCNC: 9 MMOL/L (ref 8–16)
AST SERPL-CCNC: 18 U/L (ref 10–40)
BASOPHILS # BLD AUTO: 0.04 K/UL (ref 0–0.2)
BASOPHILS NFR BLD: 0.6 % (ref 0–1.9)
BILIRUB SERPL-MCNC: 0.8 MG/DL (ref 0.1–1)
BUN SERPL-MCNC: 18 MG/DL (ref 8–23)
CALCIUM SERPL-MCNC: 10.1 MG/DL (ref 8.7–10.5)
CHLORIDE SERPL-SCNC: 102 MMOL/L (ref 95–110)
CO2 SERPL-SCNC: 28 MMOL/L (ref 23–29)
CREAT SERPL-MCNC: 1 MG/DL (ref 0.5–1.4)
DIFFERENTIAL METHOD: ABNORMAL
EOSINOPHIL # BLD AUTO: 0.2 K/UL (ref 0–0.5)
EOSINOPHIL NFR BLD: 2.2 % (ref 0–8)
ERYTHROCYTE [DISTWIDTH] IN BLOOD BY AUTOMATED COUNT: 14.8 % (ref 11.5–14.5)
EST. GFR  (AFRICAN AMERICAN): >60 ML/MIN/1.73 M^2
EST. GFR  (NON AFRICAN AMERICAN): 59.3 ML/MIN/1.73 M^2
ESTIMATED AVG GLUCOSE: 200 MG/DL (ref 68–131)
ESTIMATED AVG GLUCOSE: 200 MG/DL (ref 68–131)
GLUCOSE SERPL-MCNC: 179 MG/DL (ref 70–110)
HBA1C MFR BLD HPLC: 8.6 % (ref 4–5.6)
HBA1C MFR BLD HPLC: 8.6 % (ref 4–5.6)
HCT VFR BLD AUTO: 50 % (ref 37–48.5)
HGB BLD-MCNC: 15.8 G/DL (ref 12–16)
IMM GRANULOCYTES # BLD AUTO: 0.01 K/UL (ref 0–0.04)
IMM GRANULOCYTES NFR BLD AUTO: 0.1 % (ref 0–0.5)
LIPASE SERPL-CCNC: 6 U/L (ref 4–60)
LYMPHOCYTES # BLD AUTO: 3.5 K/UL (ref 1–4.8)
LYMPHOCYTES NFR BLD: 51.7 % (ref 18–48)
MCH RBC QN AUTO: 27.1 PG (ref 27–31)
MCHC RBC AUTO-ENTMCNC: 31.6 G/DL (ref 32–36)
MCV RBC AUTO: 86 FL (ref 82–98)
MONOCYTES # BLD AUTO: 0.5 K/UL (ref 0.3–1)
MONOCYTES NFR BLD: 7.3 % (ref 4–15)
NEUTROPHILS # BLD AUTO: 2.5 K/UL (ref 1.8–7.7)
NEUTROPHILS NFR BLD: 38.1 % (ref 38–73)
NRBC BLD-RTO: 0 /100 WBC
PLATELET # BLD AUTO: 373 K/UL (ref 150–350)
PMV BLD AUTO: 9.9 FL (ref 9.2–12.9)
POTASSIUM SERPL-SCNC: 3.7 MMOL/L (ref 3.5–5.1)
PROT SERPL-MCNC: 7.9 G/DL (ref 6–8.4)
RBC # BLD AUTO: 5.83 M/UL (ref 4–5.4)
SODIUM SERPL-SCNC: 139 MMOL/L (ref 136–145)
TSH SERPL DL<=0.005 MIU/L-ACNC: 1.85 UIU/ML (ref 0.4–4)
WBC # BLD AUTO: 6.67 K/UL (ref 3.9–12.7)

## 2019-11-12 PROCEDURE — 85025 COMPLETE CBC W/AUTO DIFF WBC: CPT

## 2019-11-12 PROCEDURE — 99214 PR OFFICE/OUTPT VISIT, EST, LEVL IV, 30-39 MIN: ICD-10-PCS | Mod: S$PBB,,, | Performed by: INTERNAL MEDICINE

## 2019-11-12 PROCEDURE — 99999 PR PBB SHADOW E&M-EST. PATIENT-LVL III: ICD-10-PCS | Mod: PBBFAC,,, | Performed by: INTERNAL MEDICINE

## 2019-11-12 PROCEDURE — 99214 OFFICE O/P EST MOD 30 MIN: CPT | Mod: S$PBB,,, | Performed by: INTERNAL MEDICINE

## 2019-11-12 PROCEDURE — 80053 COMPREHEN METABOLIC PANEL: CPT

## 2019-11-12 PROCEDURE — 83036 HEMOGLOBIN GLYCOSYLATED A1C: CPT

## 2019-11-12 PROCEDURE — 99999 PR PBB SHADOW E&M-EST. PATIENT-LVL III: CPT | Mod: PBBFAC,,, | Performed by: INTERNAL MEDICINE

## 2019-11-12 PROCEDURE — 83690 ASSAY OF LIPASE: CPT

## 2019-11-12 PROCEDURE — 84443 ASSAY THYROID STIM HORMONE: CPT

## 2019-11-12 PROCEDURE — 99213 OFFICE O/P EST LOW 20 MIN: CPT | Mod: PBBFAC,PO | Performed by: INTERNAL MEDICINE

## 2019-11-12 PROCEDURE — 36415 COLL VENOUS BLD VENIPUNCTURE: CPT | Mod: PO

## 2019-11-12 RX ORDER — INSULIN PUMP SYRINGE, 3 ML
EACH MISCELLANEOUS
Qty: 1 EACH | Refills: 0 | Status: SHIPPED | OUTPATIENT
Start: 2019-11-12 | End: 2022-04-19 | Stop reason: SDUPTHER

## 2019-11-12 RX ORDER — ONDANSETRON 4 MG/1
4 TABLET, ORALLY DISINTEGRATING ORAL EVERY 6 HOURS PRN
Qty: 30 TABLET | Refills: 3 | Status: SHIPPED | OUTPATIENT
Start: 2019-11-12 | End: 2020-01-14

## 2019-11-12 RX ORDER — LANCETS
EACH MISCELLANEOUS
Qty: 100 EACH | Refills: 3 | Status: SHIPPED | OUTPATIENT
Start: 2019-11-12 | End: 2022-01-19 | Stop reason: SDUPTHER

## 2019-11-12 NOTE — PROGRESS NOTES
Subjective:       Patient ID: Aracelis Martinez is a 64 y.o. female.    Chief Complaint: Gastroesophageal Reflux; Abdominal Pain; and Nausea    HPI   The patient presents for evaluation of nausea, diarrhea, and increased belching.  Symptoms have been present for over 2 weeks.  She states that belching has become excessive.  She is not experiencing heartburn or reflux of food into the esophagus.  She is currently using Protonix every morning.    She has had diarrhea every other day for the past 2 weeks.  She describes the stools as mushy in character.  No blood has been noted in the stools.  She has experienced nocturnal awakenings to have bowel movements.  Her appetite has decreased.  She has noted nausea after meals.  She reports having lower abdominal discomfort which is not affected by eating or having bowel movements.  She has experienced 2 episodes of vomiting which occurred several hours after eating.  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    The patient reports she has not been able to monitor blood sugar levels.  She she needs strips that will be covered by her insurance plan. Active medical conditions also include hypertension, hyperlipidemia, fibromyalgia, and osteoarthritis of the knees.    Review of Systems   Constitutional: Positive for appetite change and unexpected weight change. Negative for activity change.   Eyes: Negative for visual disturbance.   Respiratory: Negative for shortness of breath.    Cardiovascular: Negative for chest pain, palpitations and leg swelling.   Gastrointestinal: Positive for abdominal pain (Mild lower abdominal discomfort is noted.), diarrhea, nausea and vomiting. Negative for blood in stool.   Genitourinary: Negative for dysuria, frequency, hematuria and urgency.   Neurological: Negative for weakness, numbness and headaches.   Psychiatric/Behavioral: Negative for sleep disturbance.        Objective:      Physical Exam   Constitutional: She is oriented to person, place, and time. She appears well-developed and well-nourished. No distress.   HENT:   Head: Normocephalic and atraumatic.   Eyes: Pupils are equal, round, and reactive to light. Conjunctivae and EOM are normal. No scleral icterus.   Neck: Normal range of motion. Neck supple. No JVD present. No thyromegaly present.   Cardiovascular: Normal rate, regular rhythm, normal heart sounds and intact distal pulses. Exam reveals no gallop and no friction rub.   No murmur heard.  Pulmonary/Chest: Effort normal and breath sounds normal. No respiratory distress. She has no wheezes. She has no rales.   Abdominal: Soft. Bowel sounds are normal. She exhibits no distension and no mass. There is tenderness (Mild hypogastric tenderness is present without rebound.). There is no guarding.   Musculoskeletal: Normal range of motion. She exhibits no edema or tenderness.   Lymphadenopathy:     She has no cervical adenopathy.   Neurological: She is alert and oriented to person, place, and time. No cranial nerve deficit.   Sensory exam is intact in both feet on monofilament and vibration testing.   Skin: Skin is warm and dry. No rash noted.   No foot lesions are present.   Psychiatric: She has a normal mood and affect. Her behavior is normal.   Nursing note and vitals reviewed.      Assessment:       1. Type 2 diabetes mellitus without complication, without long-term current use of insulin    2. Essential hypertension    3. Postural dizziness    4. Hyperlipidemia, unspecified hyperlipidemia type    5. Nausea    6. History of partial pancreatectomy    7. Belching    8. Abdominal pain, unspecified abdominal location    9. Diarrhea, unspecified type    10. Post-operative state        Plan:       Aracelis was seen today for gastroesophageal reflux, abdominal pain and nausea.  EGD will be ordered.  GI consultation also be obtained.  CT of the abdomen may be indicated if  symptoms persist.  Zofran will be renewed.  Prescription orders were sent for a new glucometer and supplies.    Stool studies will be obtained.  Blood tests will be obtained today.  The patient is to return to clinic in 6 weeks for follow-up evaluation.    Diagnoses and all orders for this visit:    Type 2 diabetes mellitus without complication, without long-term current use of insulin  -     Lipase; Future  -     Comprehensive metabolic panel; Future  -     TSH; Future  -     CBC auto differential; Future  -     Hemoglobin A1c; Future    Essential hypertension    Postural dizziness    Hyperlipidemia, unspecified hyperlipidemia type    Nausea  -     Ambulatory Referral to Gastroenterology  -     Case request GI: ESOPHAGOGASTRODUODENOSCOPY (EGD)  -     Lipase; Future  -     Comprehensive metabolic panel; Future  -     TSH; Future  -     CBC auto differential; Future  -     Hemoglobin A1c; Future    History of partial pancreatectomy    Belching  -     Ambulatory Referral to Gastroenterology  -     Case request GI: ESOPHAGOGASTRODUODENOSCOPY (EGD)    Abdominal pain, unspecified abdominal location  -     Ambulatory Referral to Gastroenterology  -     Case request GI: ESOPHAGOGASTRODUODENOSCOPY (EGD)  -     Lipase; Future  -     Comprehensive metabolic panel; Future  -     TSH; Future  -     CBC auto differential; Future  -     Hemoglobin A1c; Future  -     Stool culture; Future  -     Clostridium difficile EIA; Future  -     Stool Exam-Ova,Cysts,Parasites; Future  -     WBC, Stool; Future    Diarrhea, unspecified type    Post-operative state  -     ondansetron (ZOFRAN-ODT) 4 MG TbDL; Take 1 tablet (4 mg total) by mouth every 6 (six) hours as needed (nausea).    Other orders  -     blood-glucose meter kit; To check BG 1 time daily, to use with insurance preferred meter  -     lancets Misc; To check BG 1 time daily, to use with insurance preferred meter  -     blood sugar diagnostic Strp; To check BG 1 time daily, to use  with insurance preferred meter

## 2019-11-14 ENCOUNTER — PATIENT OUTREACH (OUTPATIENT)
Dept: ADMINISTRATIVE | Facility: HOSPITAL | Age: 64
End: 2019-11-14

## 2019-11-14 DIAGNOSIS — E11.9 DIABETIC EYE EXAM: Primary | ICD-10-CM

## 2019-11-14 DIAGNOSIS — Z01.00 DIABETIC EYE EXAM: Primary | ICD-10-CM

## 2019-11-18 ENCOUNTER — LAB VISIT (OUTPATIENT)
Dept: LAB | Facility: HOSPITAL | Age: 64
End: 2019-11-18
Attending: INTERNAL MEDICINE
Payer: MEDICARE

## 2019-11-18 DIAGNOSIS — R10.9 ABDOMINAL PAIN, UNSPECIFIED ABDOMINAL LOCATION: ICD-10-CM

## 2019-11-18 LAB — WBC #/AREA STL HPF: NORMAL /[HPF]

## 2019-11-18 PROCEDURE — 87427 SHIGA-LIKE TOXIN AG IA: CPT

## 2019-11-18 PROCEDURE — 87045 FECES CULTURE AEROBIC BACT: CPT

## 2019-11-18 PROCEDURE — 87209 SMEAR COMPLEX STAIN: CPT

## 2019-11-18 PROCEDURE — 87046 STOOL CULTR AEROBIC BACT EA: CPT | Mod: 59

## 2019-11-18 PROCEDURE — 89055 LEUKOCYTE ASSESSMENT FECAL: CPT

## 2019-11-19 ENCOUNTER — ANESTHESIA (OUTPATIENT)
Dept: ENDOSCOPY | Facility: HOSPITAL | Age: 64
End: 2019-11-19
Payer: MEDICARE

## 2019-11-19 ENCOUNTER — HOSPITAL ENCOUNTER (OUTPATIENT)
Facility: HOSPITAL | Age: 64
Discharge: HOME | End: 2019-11-19
Attending: INTERNAL MEDICINE | Admitting: INTERNAL MEDICINE
Payer: MEDICARE

## 2019-11-19 ENCOUNTER — ANESTHESIA EVENT (OUTPATIENT)
Dept: ENDOSCOPY | Facility: HOSPITAL | Age: 64
End: 2019-11-19
Payer: MEDICARE

## 2019-11-19 VITALS
DIASTOLIC BLOOD PRESSURE: 68 MMHG | TEMPERATURE: 98 F | WEIGHT: 211 LBS | HEART RATE: 60 BPM | BODY MASS INDEX: 35.16 KG/M2 | SYSTOLIC BLOOD PRESSURE: 116 MMHG | OXYGEN SATURATION: 99 % | HEIGHT: 65 IN | RESPIRATION RATE: 18 BRPM

## 2019-11-19 DIAGNOSIS — R11.0 NAUSEA: ICD-10-CM

## 2019-11-19 LAB
E COLI SXT1 STL QL IA: NEGATIVE
E COLI SXT2 STL QL IA: NEGATIVE
O+P STL TRI STN: NORMAL
POCT GLUCOSE: 155 MG/DL (ref 70–110)

## 2019-11-19 PROCEDURE — 43239 EGD BIOPSY SINGLE/MULTIPLE: CPT | Performed by: INTERNAL MEDICINE

## 2019-11-19 PROCEDURE — E9220 PRA ENDO ANESTHESIA: ICD-10-PCS | Mod: ,,, | Performed by: NURSE ANESTHETIST, CERTIFIED REGISTERED

## 2019-11-19 PROCEDURE — 43239 EGD BIOPSY SINGLE/MULTIPLE: CPT | Mod: ,,, | Performed by: INTERNAL MEDICINE

## 2019-11-19 PROCEDURE — 37000009 HC ANESTHESIA EA ADD 15 MINS: Performed by: INTERNAL MEDICINE

## 2019-11-19 PROCEDURE — 37000008 HC ANESTHESIA 1ST 15 MINUTES: Performed by: INTERNAL MEDICINE

## 2019-11-19 PROCEDURE — 63600175 PHARM REV CODE 636 W HCPCS: Performed by: INTERNAL MEDICINE

## 2019-11-19 PROCEDURE — E9220 PRA ENDO ANESTHESIA: HCPCS | Mod: ,,, | Performed by: NURSE ANESTHETIST, CERTIFIED REGISTERED

## 2019-11-19 PROCEDURE — 82962 GLUCOSE BLOOD TEST: CPT | Performed by: INTERNAL MEDICINE

## 2019-11-19 PROCEDURE — 88305 TISSUE EXAM BY PATHOLOGIST: ICD-10-PCS | Mod: 26,,, | Performed by: PATHOLOGY

## 2019-11-19 PROCEDURE — 88305 TISSUE EXAM BY PATHOLOGIST: CPT | Performed by: PATHOLOGY

## 2019-11-19 PROCEDURE — 88305 TISSUE EXAM BY PATHOLOGIST: CPT | Mod: 26,,, | Performed by: PATHOLOGY

## 2019-11-19 PROCEDURE — 63600175 PHARM REV CODE 636 W HCPCS: Performed by: NURSE ANESTHETIST, CERTIFIED REGISTERED

## 2019-11-19 PROCEDURE — 43239 PR EGD, FLEX, W/BIOPSY, SGL/MULTI: ICD-10-PCS | Mod: ,,, | Performed by: INTERNAL MEDICINE

## 2019-11-19 PROCEDURE — 27201012 HC FORCEPS, HOT/COLD, DISP: Performed by: INTERNAL MEDICINE

## 2019-11-19 PROCEDURE — 25000003 PHARM REV CODE 250: Performed by: NURSE ANESTHETIST, CERTIFIED REGISTERED

## 2019-11-19 RX ORDER — SODIUM CHLORIDE 9 MG/ML
INJECTION, SOLUTION INTRAVENOUS CONTINUOUS
Status: DISCONTINUED | OUTPATIENT
Start: 2019-11-19 | End: 2019-11-19 | Stop reason: HOSPADM

## 2019-11-19 RX ORDER — LIDOCAINE HCL/PF 100 MG/5ML
SYRINGE (ML) INTRAVENOUS
Status: DISCONTINUED | OUTPATIENT
Start: 2019-11-19 | End: 2019-11-19

## 2019-11-19 RX ORDER — PROPOFOL 10 MG/ML
VIAL (ML) INTRAVENOUS
Status: DISCONTINUED | OUTPATIENT
Start: 2019-11-19 | End: 2019-11-19

## 2019-11-19 RX ORDER — GLYCOPYRROLATE 0.2 MG/ML
INJECTION INTRAMUSCULAR; INTRAVENOUS
Status: DISCONTINUED | OUTPATIENT
Start: 2019-11-19 | End: 2019-11-19

## 2019-11-19 RX ORDER — PROPOFOL 10 MG/ML
VIAL (ML) INTRAVENOUS CONTINUOUS PRN
Status: DISCONTINUED | OUTPATIENT
Start: 2019-11-19 | End: 2019-11-19

## 2019-11-19 RX ADMIN — PROPOFOL 80 MG: 10 INJECTION, EMULSION INTRAVENOUS at 08:11

## 2019-11-19 RX ADMIN — GLYCOPYRROLATE 0.1 MG: 0.2 INJECTION, SOLUTION INTRAMUSCULAR; INTRAVENOUS at 08:11

## 2019-11-19 RX ADMIN — LIDOCAINE HYDROCHLORIDE 100 MG: 20 INJECTION, SOLUTION INTRAVENOUS at 08:11

## 2019-11-19 RX ADMIN — SODIUM CHLORIDE: 0.9 INJECTION, SOLUTION INTRAVENOUS at 08:11

## 2019-11-19 RX ADMIN — PROPOFOL 150 MCG/KG/MIN: 10 INJECTION, EMULSION INTRAVENOUS at 08:11

## 2019-11-19 NOTE — ANESTHESIA RELEASE NOTE
"Abdominal complaints with vasovagal BP, Trendelenburg with slow recovery. Awake and alert, following simple commands. Current VS are BP (!) 91/46   Pulse 63   Temp 36.5 °C (97.7 °F) (Temporal)   Resp 18   Ht 5' 5" (1.651 m)   Wt 95.7 kg (211 lb)   SpO2 (!) 94%   Breastfeeding? No   BMI 35.11 kg/m² and are improving. Chest examination clear to auscultation. Heart tones strong. Complains of dry mouth, PO fluids soon, IV fluids ongoing. Reassurance given.  "

## 2019-11-19 NOTE — ANESTHESIA POSTPROCEDURE EVALUATION
Anesthesia Post Evaluation    Patient: Aracelis Martinez    Procedure(s) Performed: Procedure(s) (LRB):  ESOPHAGOGASTRODUODENOSCOPY (EGD) (N/A)    Final Anesthesia Type: general    Patient location during evaluation: GI PACU  Patient participation: Yes- Able to Participate  Level of consciousness: awake and alert and oriented  Post-procedure vital signs: reviewed and stable  Pain management: adequate  Airway patency: patent    PONV status at discharge: No PONV  Anesthetic complications: yes  Perioperative Events: other periop events (see comments)  Jane-operative Events Comments: Vasovagal hypotensive episode due to abdominal cramps, resolution with IV, PO fluids.  Cardiovascular status: stable  Respiratory status: unassisted, spontaneous ventilation and room air  Hydration status: euvolemic  Follow-up not needed.          Vitals Value Taken Time   /68 11/19/2019  9:15 AM   Temp 36.5 °C (97.7 °F) 11/19/2019  7:54 AM   Pulse 60 11/19/2019  9:15 AM   Resp 18 11/19/2019  9:15 AM   SpO2 99 % 11/19/2019  9:15 AM         Event Time     Out of Recovery 09:20:48          Pain/Stefan Score: Stefan Score: 9 (11/19/2019  9:13 AM)

## 2019-11-19 NOTE — ANESTHESIA PREPROCEDURE EVALUATION
2019  Aracelis Martinez is a 64 y.o., female.  Past Medical History:   Diagnosis Date    Arthritis     Chronic back pain     Diabetes mellitus 2014    Diverticulosis     Hypertension     Neuroendocrine tumor of pancreas 5/3/2016    Pancreatic cancer 2015    Renal cyst     left    Thyroid disease      Past Surgical History:   Procedure Laterality Date     SECTION      COLONOSCOPY N/A 3/13/2019    Procedure: COLONOSCOPY;  Surgeon: Cem Lamar MD;  Location: [facility] ([address] [place] [address]);  Service: Endoscopy;  Laterality: N/A;  previous order cx    EXCISION OF GANGLION CYST OF HAND Right 10/22/2018    Procedure: EXCISION, GANGLION CYST, HAND- RIGHT, LONG AND INDEX FINGER;  Surgeon: Sowmya Schmidt MD;  Location: [facility];  Service: Orthopedics;  Laterality: Right;  Stretcher; Supine; Hand Pan 1 & [place] 2; Dr. Loja's Rasp    HYSTERECTOMY      KNEE ARTHROSCOPY  2014    LATS/left    PANCREAS SURGERY  2015    TONSILLECTOMY           Anesthesia Evaluation    I have reviewed the Patient Summary Reports.     I have reviewed the Medications.     Review of Systems  Anesthesia Hx:  No problems with previous Anesthesia Denies Hx of Anesthetic complications  Neg history of prior surgery. Denies Family Hx of Anesthesia complications.   Denies Personal Hx of Anesthesia complications.   Social:  Former Smoker    Hematology/Oncology:  Hematology Normal   Oncology Normal     EENT/Dental:EENT/Dental Normal   Cardiovascular:   Exercise tolerance: good Hypertension    Pulmonary:  Pulmonary Normal    Renal/:  Renal/ Normal     Hepatic/GI:  Hepatic/GI Normal    Musculoskeletal:   Arthritis     Neurological:  Neurology Normal    Endocrine:   Diabetes    Dermatological:  Skin Normal    Psych:  Psychiatric Normal           Physical Exam  General:  Well nourished    Airway/Jaw/Neck:  Airway Findings:  Mouth Opening: Normal Tongue: Normal  General Airway Assessment: Adult  Mallampati: II  TM Distance: Normal, at least 6 cm      Dental:  Dental Findings: In tact   Chest/Lungs:  Chest/Lungs Findings: Clear to auscultation, Normal Respiratory Rate     Heart/Vascular:  Heart Findings: Rate: Normal  Rhythm: Regular Rhythm  Sounds: Normal  Heart murmur: negative Vascular Findings: Normal    Abdomen:  Abdomen Findings:  Normal, Nontender, Soft     Musculoskeletal:  Musculoskeletal Findings: Normal   Skin:  Skin Findings: Normal    Mental Status:  Mental Status Findings:  Cooperative, Alert and Oriented         Anesthesia Plan  Type of Anesthesia, risks & benefits discussed:  Anesthesia Type:  general  Patient's Preference:   Intra-op Monitoring Plan: standard ASA monitors  Intra-op Monitoring Plan Comments:   Post Op Pain Control Plan:   Post Op Pain Control Plan Comments:   Induction:   IV  Beta Blocker:  Patient is not currently on a Beta-Blocker (No further documentation required).       Informed Consent: Patient understands risks and agrees with Anesthesia plan.  Questions answered. Anesthesia consent signed with patient.  ASA Score: 2     Day of Surgery Review of History & Physical:    H&P update referred to the provider.         Ready For Surgery From Anesthesia Perspective.

## 2019-11-19 NOTE — TRANSFER OF CARE
"Anesthesia Transfer of Care Note    Patient: Aracelis Martinez    Procedure(s) Performed: Procedure(s) (LRB):  ESOPHAGOGASTRODUODENOSCOPY (EGD) (N/A)    Patient location: GI    Anesthesia Type: general    Transport from OR: Transported from OR on 2-3 L/min O2 by NC with adequate spontaneous ventilation    Post pain: adequate analgesia    Post assessment: no apparent anesthetic complications and tolerated procedure well    Post vital signs: stable    Level of consciousness: awake, alert and oriented    Nausea/Vomiting: no nausea/vomiting    Complications: none    Transfer of care protocol was followed      Last vitals:   Visit Vitals  BP (!) 146/70 (BP Location: Left arm, Patient Position: Sitting)   Pulse 73   Temp 36.5 °C (97.7 °F) (Temporal)   Resp 16   Ht 5' 5" (1.651 m)   Wt 95.7 kg (211 lb)   SpO2 98%   Breastfeeding? No   BMI 35.11 kg/m²     "

## 2019-11-19 NOTE — DISCHARGE INSTRUCTIONS

## 2019-11-19 NOTE — H&P
Short Stay Endoscopy History and Physical    PCP - Alen Damico MD     Procedure - EGD  ASA - per anesthesia  Mallampati - per anesthesia  History of Anesthesia problems - no  Family history Anesthesia problems -  no   Plan of anesthesia - General    HPI:  This is a 64 y.o. female here for evaluation of nausea, belching, abdominal pain.      Reflux - no  Dysphagia - no  Abdominal pain - yes, lower abdomen  Diarrhea - no    ROS:  Constitutional: No fevers, chills, No weight loss  CV: No chest pain  Pulm: No cough, No shortness of breath  Ophtho: No vision changes  GI: see HPI  Derm: No rash    Medical History:  has a past medical history of Arthritis, Chronic back pain, Diabetes mellitus (2014), Diverticulosis, Hypertension, Neuroendocrine tumor of pancreas (5/3/2016), Pancreatic cancer (), Renal cyst, and Thyroid disease.    Surgical History:  has a past surgical history that includes Hysterectomy; Tonsillectomy; Knee arthroscopy (2014);  section; Excision of ganglion cyst of hand (Right, 10/22/2018); Pancreas surgery (); and Colonoscopy (N/A, 3/13/2019).    Family History: family history includes Breast cancer in her sister; Diabetes in her mother; Heart disease in her mother; Hypertension in her brother, mother, and sister; Stroke in her mother and sister.. Otherwise no colon cancer, inflammatory bowel disease, or GI malignancies.    Social History:  reports that she quit smoking about 37 years ago. Her smoking use included cigarettes. She has a 15.00 pack-year smoking history. She has never used smokeless tobacco. She reports that she drinks alcohol. She reports that she does not use drugs.    Review of patient's allergies indicates:   Allergen Reactions    Azithromycin Other (See Comments)     Yeast infection; pt requests please do not put her on this medication       Medications:   Medications Prior to Admission   Medication Sig Dispense Refill Last Dose    amLODIPine (NORVASC)  10 MG tablet TAKE 1 TABLET BY MOUTH DAILY 30 tablet 11 11/18/2019 at Unknown time    blood sugar diagnostic Strp To check BG 1 time daily, to use with insurance preferred meter 100 strip 3 11/18/2019 at Unknown time    blood-glucose meter kit To check BG 1 time daily, to use with insurance preferred meter 1 each 0 11/18/2019 at Unknown time    DULoxetine (CYMBALTA) 30 MG capsule TAKE 1 CAPSULE(30 MG) BY MOUTH EVERY DAY 90 capsule 0 11/18/2019 at Unknown time    lancets Misc To check BG 1 time daily, to use with insurance preferred meter 100 each 3 11/18/2019 at Unknown time    levothyroxine (SYNTHROID) 50 MCG tablet TAKE 1 TABLET BY MOUTH BEFORE BREAKFAST 90 tablet 3 11/18/2019 at Unknown time    metFORMIN (GLUCOPHAGE) 500 MG tablet TAKE 1 TABLET BY MOUTH TWICE DAILY WITH MEALS 180 tablet 0 11/18/2019 at Unknown time    ondansetron (ZOFRAN-ODT) 4 MG TbDL Take 1 tablet (4 mg total) by mouth every 6 (six) hours as needed (nausea). 30 tablet 3 Past Month at Unknown time    pantoprazole (PROTONIX) 40 MG tablet TAKE 1 TABLET BY MOUTH DAILY 90 tablet 2 11/18/2019 at Unknown time    traMADol (ULTRAM) 50 mg tablet TAKE 1 TO 2 TABLETS BY MOUTH TWICE DAILY 90 tablet 0 Past Month at Unknown time    traZODone (DESYREL) 100 MG tablet Take 1 tablet (100 mg total) by mouth every evening. 30 tablet 11 11/18/2019 at Unknown time    acetaminophen-codeine 300-30mg (TYLENOL #3) 300-30 mg Tab Take 1 tablet by mouth every 6 (six) hours as needed. 20 tablet 0 More than a month at Unknown time    diclofenac sodium (PENNSAID) 2 % SoPk Apply 2 Pump topically 2 (two) times daily. Apply 2 pumps to affected area twice a day as needed 112 g 2 More than a month at Unknown time       Physical Exam:    Vital Signs:   Vitals:    11/19/19 0754   BP: (!) 146/70   Pulse: 73   Resp: 16   Temp: 97.7 °F (36.5 °C)       General Appearance: Well appearing in no acute distress  Eyes:    No scleral icterus  ENT: Neck supple, Lips, mucosa, and  tongue normal; teeth and gums normal  Lungs: CTA anteriorly  Heart:  Regular rate, S1, S2 normal, no murmurs heard.  Abdomen: Soft, non tender, non distended with normal bowel sounds. No hepatosplenomegaly, ascites, or mass.  Extremities: No edema  Skin: No rash    Labs:  Lab Results   Component Value Date    WBC 6.67 11/12/2019    HGB 15.8 11/12/2019    HCT 50.0 (H) 11/12/2019     (H) 11/12/2019    CHOL 178 07/11/2019    TRIG 105 07/11/2019    HDL 53 07/11/2019    ALT 17 11/12/2019    AST 18 11/12/2019     11/12/2019    K 3.7 11/12/2019     11/12/2019    CREATININE 1.0 11/12/2019    BUN 18 11/12/2019    CO2 28 11/12/2019    TSH 1.850 11/12/2019    HGBA1C 8.6 (H) 11/12/2019    HGBA1C 8.6 (H) 11/12/2019       I have explained the risks and benefits of endoscopy procedures to the patient including but not limited to bleeding, perforation, infection, and death.  The patient was asked if they understand and allowed to ask any further questions to their satisfaction.      Curtis Trimble MD PGY-VI  Gastroenterology Fellow  [facility]

## 2019-11-19 NOTE — ANESTHESIA RELEASE NOTE
"Awake and alert and following commands. Sitting up and tolerating PO fluids without difficulty, going to get an [employer] [place], recovery of BP; current VS are /68   Pulse 60   Temp 36.5 °C (97.7 °F) (Temporal)   Resp 18   Ht 5' 5" (1.651 m)   Wt 95.7 kg (211 lb)   SpO2 99%   Breastfeeding? No   BMI 35.11 kg/m² and are stable. Vasovagal BP due to abdominal cramps earlier in PACU. Reassurance given.  "

## 2019-11-21 LAB — BACTERIA STL CULT: NORMAL

## 2019-11-22 ENCOUNTER — NURSE TRIAGE (OUTPATIENT)
Dept: ADMINISTRATIVE | Facility: CLINIC | Age: 64
End: 2019-11-22

## 2019-11-22 NOTE — TELEPHONE ENCOUNTER
Reason for Disposition   Chest pain lasting longer than 5 minutes and ANY of the following:* Over 50 years old* Over 30 years old and at least one cardiac risk factor (i.e., high blood pressure, diabetes, high cholesterol, obesity, smoker or strong family history of heart disease)* Pain is crushing, pressure-like, or heavy * Took nitroglycerin and chest pain was not relieved* History of heart disease (i.e., angina, heart attack, bypass surgery, angioplasty, CHF)    Additional Information   Negative: Severe difficulty breathing (e.g., struggling for each breath, speaks in single words)   Negative: Passed out (i.e., fainted, collapsed and was not responding)    Protocols used: CHEST PAIN-A-OH  pt transferred form  re belching. Pt states excessive belching going on for years & getting worse. Scope done tues- ok, no belching while sleeping. +nausea. no SOB. dr cazares gave Zofran for nausea. CP comes and goes. sporadic depends on time pt is belching. no vomiting. pt on metformin, FH enlarged heart. Pt with constant belching while on with triager. Pt with DM. rec EMS. urgent message sent to PCP.

## 2019-11-26 ENCOUNTER — TELEPHONE (OUTPATIENT)
Dept: GASTROENTEROLOGY | Facility: CLINIC | Age: 64
End: 2019-11-26

## 2019-11-26 ENCOUNTER — TELEPHONE (OUTPATIENT)
Dept: ENDOSCOPY | Facility: HOSPITAL | Age: 64
End: 2019-11-26

## 2019-11-26 LAB
FINAL PATHOLOGIC DIAGNOSIS: NORMAL
GROSS: NORMAL

## 2019-11-26 NOTE — TELEPHONE ENCOUNTER
----- Message from Jonathan Oneill MD sent at 11/26/2019  9:44 AM CST -----  The stomach biopsy was ok, no bacteria present

## 2019-12-02 RX ORDER — DULOXETIN HYDROCHLORIDE 30 MG/1
CAPSULE, DELAYED RELEASE ORAL
Qty: 90 CAPSULE | Refills: 2 | Status: SHIPPED | OUTPATIENT
Start: 2019-12-02 | End: 2020-09-25

## 2019-12-31 ENCOUNTER — PATIENT OUTREACH (OUTPATIENT)
Dept: ADMINISTRATIVE | Facility: OTHER | Age: 64
End: 2019-12-31

## 2019-12-31 DIAGNOSIS — Z13.5 ENCOUNTER FOR SCREENING FOR DIABETIC RETINOPATHY: Primary | ICD-10-CM

## 2020-01-02 ENCOUNTER — PATIENT OUTREACH (OUTPATIENT)
Dept: ADMINISTRATIVE | Facility: HOSPITAL | Age: 65
End: 2020-01-02

## 2020-01-03 ENCOUNTER — OFFICE VISIT (OUTPATIENT)
Dept: GASTROENTEROLOGY | Facility: CLINIC | Age: 65
End: 2020-01-03
Payer: MEDICARE

## 2020-01-03 ENCOUNTER — OFFICE VISIT (OUTPATIENT)
Dept: INTERNAL MEDICINE | Facility: CLINIC | Age: 65
End: 2020-01-03
Payer: MEDICARE

## 2020-01-03 VITALS
DIASTOLIC BLOOD PRESSURE: 72 MMHG | SYSTOLIC BLOOD PRESSURE: 146 MMHG | BODY MASS INDEX: 35.59 KG/M2 | HEART RATE: 78 BPM | WEIGHT: 213.88 LBS

## 2020-01-03 VITALS
SYSTOLIC BLOOD PRESSURE: 116 MMHG | RESPIRATION RATE: 18 BRPM | TEMPERATURE: 98 F | DIASTOLIC BLOOD PRESSURE: 64 MMHG | HEIGHT: 65 IN | WEIGHT: 214.06 LBS | BODY MASS INDEX: 35.67 KG/M2 | HEART RATE: 70 BPM

## 2020-01-03 DIAGNOSIS — M25.572 LEFT ANKLE PAIN, UNSPECIFIED CHRONICITY: Primary | ICD-10-CM

## 2020-01-03 DIAGNOSIS — R14.2 FUNCTIONAL BELCHING DISORDER: ICD-10-CM

## 2020-01-03 DIAGNOSIS — I10 ESSENTIAL HYPERTENSION: ICD-10-CM

## 2020-01-03 DIAGNOSIS — R14.2 BELCHING: Primary | ICD-10-CM

## 2020-01-03 DIAGNOSIS — E11.9 TYPE 2 DIABETES MELLITUS WITHOUT COMPLICATION, WITHOUT LONG-TERM CURRENT USE OF INSULIN: ICD-10-CM

## 2020-01-03 PROCEDURE — 99999 PR PBB SHADOW E&M-EST. PATIENT-LVL IV: ICD-10-PCS | Mod: PBBFAC,,, | Performed by: INTERNAL MEDICINE

## 2020-01-03 PROCEDURE — 99999 PR PBB SHADOW E&M-EST. PATIENT-LVL IV: CPT | Mod: PBBFAC,,, | Performed by: INTERNAL MEDICINE

## 2020-01-03 PROCEDURE — 99214 PR OFFICE/OUTPT VISIT, EST, LEVL IV, 30-39 MIN: ICD-10-PCS | Mod: S$PBB,,, | Performed by: INTERNAL MEDICINE

## 2020-01-03 PROCEDURE — 99214 OFFICE O/P EST MOD 30 MIN: CPT | Mod: PBBFAC,PO | Performed by: INTERNAL MEDICINE

## 2020-01-03 PROCEDURE — 99214 OFFICE O/P EST MOD 30 MIN: CPT | Mod: S$PBB,,, | Performed by: INTERNAL MEDICINE

## 2020-01-03 PROCEDURE — 99214 OFFICE O/P EST MOD 30 MIN: CPT | Mod: PBBFAC,27,PO | Performed by: INTERNAL MEDICINE

## 2020-01-03 RX ORDER — BACLOFEN 10 MG/1
10 TABLET ORAL 3 TIMES DAILY
Qty: 90 TABLET | Refills: 11 | Status: SHIPPED | OUTPATIENT
Start: 2020-01-03 | End: 2020-05-27

## 2020-01-03 RX ORDER — METFORMIN HYDROCHLORIDE 500 MG/1
TABLET ORAL
Qty: 270 TABLET | Refills: 0 | Status: SHIPPED | OUTPATIENT
Start: 2020-01-03 | End: 2020-01-30

## 2020-01-03 RX ORDER — MELOXICAM 15 MG/1
15 TABLET ORAL DAILY
Qty: 30 TABLET | Refills: 4 | Status: SHIPPED | OUTPATIENT
Start: 2020-01-03 | End: 2020-06-03

## 2020-01-03 NOTE — PROGRESS NOTES
Subjective:       Patient ID: Aracelis Martinez is a 64 y.o. female.    Chief Complaint: belching and Diarrhea    Patient here today to reestablish care.  Patient was previously seen by multiple GI providers at [facility] for similar complaints.  Patient underwent EGD for belching on 2019 with Dr. Oneill.  It appears she was was biopsied for H pylori which was negative.  She also had a colonoscopy performed in 2019 that was notable only for 1 subcentimeter colon polyp.  Three year recall interval was recommended.    Patient reports history of significant belching since the time of her pancreas surgery for neuroendocrine tumor in .  However, over the past several months patient notes increasing frequency in complaints.  She now reports near constant belching throughout the day and only gets relief during sleep.  She has attempted to mitigate complaints by abstinence from carbonated beverages and decreased caffeine intake with only modest improvement.  She has also started a papaya enzyme tablets switched also does not appear to be helping.  She denies use of artificial sweeteners however she does drink at of straws and uses chewing gum regularly.  Otherwise she reports her GERD is fairly well controlled on daily PPI.      Patient is accompanied by her  who corroborates above history.    Past Medical History:   Diagnosis Date    Arthritis     Chronic back pain     Diabetes mellitus 2014    Diverticulosis     Hypertension     Neuroendocrine tumor of pancreas 5/3/2016    Pancreatic cancer 2015    Renal cyst     left    Thyroid disease        Past Surgical History:   Procedure Laterality Date     SECTION      COLONOSCOPY N/A 3/13/2019    Procedure: COLONOSCOPY;  Surgeon: Cem Lamar MD;  Location: [address];  Service: Endoscopy;  Laterality: N/A;  previous order cx    ESOPHAGOGASTRODUODENOSCOPY N/A 2019    Procedure: ESOPHAGOGASTRODUODENOSCOPY (EGD);  Surgeon:  Jonathan Oneill MD;  Location: [facility] ENDO (4TH FLR);  Service: Endoscopy;  Laterality: N/A;    EXCISION OF GANGLION CYST OF HAND Right 10/22/2018    Procedure: EXCISION, GANGLION CYST, HAND- RIGHT, LONG AND INDEX FINGER;  Surgeon: Sowmya Schmidt MD;  Location: [facility];  Service: Orthopedics;  Laterality: Right;  Stretcher; Supine; Hand Pan 1 & [place] 2; Dr. Loja's Rasp    HYSTERECTOMY      KNEE ARTHROSCOPY  2014    LATS/left    PANCREAS SURGERY  2015    TONSILLECTOMY         Social History  Social History     Tobacco Use    Smoking status: Former Smoker     Packs/day: 1.50     Years: 10.00     Pack years: 15.00     Types: Cigarettes     Last attempt to quit: 1982     Years since quittin.0    Smokeless tobacco: Never Used   Substance Use Topics    Alcohol use: Yes     Comment: occasional use    Drug use: No       Family History   Problem Relation Age of Onset    Hypertension Mother     Diabetes Mother     Heart disease Mother     Stroke Mother     Hypertension Sister     Stroke Sister     Breast cancer Sister     Hypertension Brother        Review of Systems   Constitutional: Negative for chills, fever and unexpected weight change.   HENT: Negative for congestion and trouble swallowing.    Eyes: Negative for photophobia and visual disturbance.   Respiratory: Negative for cough and shortness of breath.    Cardiovascular: Negative for chest pain and leg swelling.   Gastrointestinal: Positive for abdominal pain. Negative for abdominal distention, blood in stool, constipation, diarrhea, nausea and vomiting.   Genitourinary: Negative for dysuria and hematuria.   Musculoskeletal: Negative for arthralgias and myalgias.   Skin: Negative for color change and rash.   Neurological: Negative for dizziness, light-headedness and numbness.   Psychiatric/Behavioral: Negative for agitation and confusion.           Objective:      Physical Exam   Constitutional: She is oriented to person, place,  and time. She appears well-developed and well-nourished. She appears distressed.   HENT:   Head: Normocephalic and atraumatic.   Eyes: Pupils are equal, round, and reactive to light. EOM are normal. No scleral icterus.   Neck: Normal range of motion. Neck supple.   Cardiovascular: Normal rate, regular rhythm, normal heart sounds and intact distal pulses.   Pulmonary/Chest: Effort normal and breath sounds normal. No respiratory distress.   Abdominal: Soft. Bowel sounds are normal. She exhibits no distension. There is no tenderness.   Musculoskeletal: Normal range of motion. She exhibits no edema.   Neurological: She is alert and oriented to person, place, and time.   Throughout interview speech was often interrupted by belching.   Skin: Skin is warm and dry. No rash noted. She is not diaphoretic. No erythema.   Psychiatric: She has a normal mood and affect. Her behavior is normal.   Nursing note and vitals reviewed.        Pertinent labs and imaging studies reviewed    Assessment:       1. Belching        Plan:       Etiology uncertain  Although I feel be low yield given negative recent endoscopy I will send for small-bowel follow-through to exclude obstruction given symptoms began after [place]  In the interim patient can continue PPI  Will also start on trial of baclofen.  Patient counseled extensively on CNS side effects, particular hypotonia, drowsiness, confusion, headache    (Portions of this note were dictated using voice recognition software and may contain dictation related errors in spelling/grammar/syntax not found on text review)

## 2020-01-09 ENCOUNTER — HOSPITAL ENCOUNTER (OUTPATIENT)
Dept: RADIOLOGY | Facility: HOSPITAL | Age: 65
Discharge: HOME | End: 2020-01-09
Attending: INTERNAL MEDICINE
Payer: MEDICARE

## 2020-01-09 DIAGNOSIS — R14.2 BELCHING: ICD-10-CM

## 2020-01-09 PROCEDURE — 74240 FL UPPER GI WITH SMALL BOWEL: ICD-10-PCS | Mod: 26,,, | Performed by: RADIOLOGY

## 2020-01-09 PROCEDURE — 74240 X-RAY XM UPR GI TRC 1CNTRST: CPT | Mod: 26,,, | Performed by: RADIOLOGY

## 2020-01-09 PROCEDURE — 74240 X-RAY XM UPR GI TRC 1CNTRST: CPT | Mod: TC,FY

## 2020-01-13 ENCOUNTER — PATIENT OUTREACH (OUTPATIENT)
Dept: ADMINISTRATIVE | Facility: OTHER | Age: 65
End: 2020-01-13

## 2020-01-13 ENCOUNTER — HOSPITAL ENCOUNTER (OUTPATIENT)
Dept: RADIOLOGY | Facility: HOSPITAL | Age: 65
Discharge: HOME | End: 2020-01-13
Attending: INTERNAL MEDICINE
Payer: MEDICARE

## 2020-01-13 DIAGNOSIS — M25.572 LEFT ANKLE PAIN, UNSPECIFIED CHRONICITY: ICD-10-CM

## 2020-01-13 PROCEDURE — 73610 X-RAY EXAM OF ANKLE: CPT | Mod: TC,PO,LT

## 2020-01-13 PROCEDURE — 73610 XR ANKLE COMPLETE 3 VIEW LEFT: ICD-10-PCS | Mod: 26,LT,, | Performed by: RADIOLOGY

## 2020-01-13 PROCEDURE — 73610 X-RAY EXAM OF ANKLE: CPT | Mod: 26,LT,, | Performed by: RADIOLOGY

## 2020-01-13 NOTE — PROGRESS NOTES
Subjective:       Patient ID: Aracelis Martinez is a 64 y.o. female.    Chief Complaint: Joint Swelling (both) and Follow-up    HPI   The patient presents for evaluation of left ankle pain and other medical conditions.    The patient has been noting left ankle pain and swelling for the past 3 weeks.  She has been using a brace to help with the pain. She denies having any recent injury to the left ankle.  No foot pain is noted. Left SI joint pain has also been noted.    Blood sugar levels have ranged from 135-181 fasting.  Blood sugar levels have been fluctuating over the past 2 months.  No hypoglycemia has been noted.    The patient continues to experience persistent belching.  She is currently undergoing evaluation by Gastroenterology.  Her EGD was unremarkable.  She is using papaya enzyme tablets on her own in an effort to alleviate symptoms.    Review of Systems   Constitutional: Negative for activity change, appetite change and unexpected weight change.   Eyes: Negative for visual disturbance.   Respiratory: Negative for shortness of breath.    Cardiovascular: Negative for chest pain, palpitations and leg swelling.   Gastrointestinal: Negative for abdominal pain, blood in stool and diarrhea.   Genitourinary: Negative for dysuria, frequency, hematuria and urgency.   Musculoskeletal: Positive for arthralgias, back pain and joint swelling.   Neurological: Negative for weakness, numbness and headaches.   Psychiatric/Behavioral: Negative for sleep disturbance.       Objective:      Physical Exam   Constitutional: She is oriented to person, place, and time. She appears well-developed and well-nourished. No distress.   HENT:   Head: Normocephalic and atraumatic.   Eyes: Pupils are equal, round, and reactive to light. Conjunctivae and EOM are normal. No scleral icterus.   Neck: Normal range of motion. Neck supple. No JVD present. No thyromegaly present.   Cardiovascular: Normal rate, regular rhythm, normal heart sounds  "and intact distal pulses. Exam reveals no gallop and no friction rub.   No murmur heard.  Pulmonary/Chest: Effort normal and breath sounds normal. No respiratory distress. She has no wheezes. She has no rales.   Abdominal: Soft. Bowel sounds are normal. She exhibits no mass. There is no tenderness.   Musculoskeletal: Normal range of motion. She exhibits tenderness. She exhibits no edema.   Left ankle is tender along the lateral malleolar area.  Mild swelling is also present.   Lymphadenopathy:     She has no cervical adenopathy.   Neurological: She is alert and oriented to person, place, and time. No cranial nerve deficit.   Sensory exam is intact in both feet on monofilament and vibration testing.   Skin: Skin is warm and dry. No rash noted.   No foot lesions are present.   Psychiatric: She has a normal mood and affect. Her behavior is normal.   Nursing note and vitals reviewed.      Results for orders placed or performed during the hospital encounter of 11/19/19   Specimen to Pathology, Surgery Gastrointestinal tract   Result Value Ref Range    Final Pathologic Diagnosis       Stomach, biopsy:  -Normal gastric mucosa.  -No Helicobacter identified on routine stain.      Gross       Patient ID/ Pathology ID:  [id number]    Received in formalin, labeled "stomach," are multiple tan-pink tissue  fragments that measure 6 x 5 x 2 mm in aggregate. The specimen is submitted  entirely in cassette [id number].    Audi: MB     POCT glucose   Result Value Ref Range    POCT Glucose 155 (H) 70 - 110 mg/dL       Assessment:       1. Left ankle pain, unspecified chronicity    2. Functional belching disorder    3. Type 2 diabetes mellitus without complication, without long-term current use of insulin    4. Essential hypertension        Plan:       Aracelis was seen today for joint swelling and follow-up.  The patient will follow up with GI scheduled.  X-rays of the left ankle will be obtained along with orthopedic consultation " regarding left ankle pain and weakness.  The dose of metformin will be increased to 1500 mg a day.  Meloxicam will be ordered for joint pain.  The patient is to return to clinic in 4 months.    Diagnoses and all orders for this visit:    Left ankle pain, unspecified chronicity  -     X-Ray Ankle Complete Left; Future  -     Ambulatory referral/consult to Orthopedics; Future    Functional belching disorder    Type 2 diabetes mellitus without complication, without long-term current use of insulin    Essential hypertension    Other orders  -     metFORMIN (GLUCOPHAGE) 500 MG tablet; Take 1 tab po qAM; 2 tabs po qPM for diabetes.  -     meloxicam (MOBIC) 15 MG tablet; Take 1 tablet (15 mg total) by mouth once daily. For ankle pain and swelling

## 2020-01-14 ENCOUNTER — OFFICE VISIT (OUTPATIENT)
Dept: ORTHOPEDICS | Facility: CLINIC | Age: 65
End: 2020-01-14
Payer: MEDICARE

## 2020-01-14 ENCOUNTER — TELEPHONE (OUTPATIENT)
Dept: INTERNAL MEDICINE | Facility: CLINIC | Age: 65
End: 2020-01-14

## 2020-01-14 VITALS — HEIGHT: 65 IN | WEIGHT: 213 LBS | BODY MASS INDEX: 35.49 KG/M2

## 2020-01-14 DIAGNOSIS — M19.072 ARTHRITIS OF LEFT SUBTALAR JOINT: Primary | ICD-10-CM

## 2020-01-14 DIAGNOSIS — M21.42 ACQUIRED PES PLANOVALGUS, LEFT: ICD-10-CM

## 2020-01-14 PROCEDURE — 99999 PR PBB SHADOW E&M-EST. PATIENT-LVL III: ICD-10-PCS | Mod: PBBFAC,,, | Performed by: ORTHOPAEDIC SURGERY

## 2020-01-14 PROCEDURE — 99213 OFFICE O/P EST LOW 20 MIN: CPT | Mod: S$PBB,,, | Performed by: ORTHOPAEDIC SURGERY

## 2020-01-14 PROCEDURE — 99213 PR OFFICE/OUTPT VISIT, EST, LEVL III, 20-29 MIN: ICD-10-PCS | Mod: S$PBB,,, | Performed by: ORTHOPAEDIC SURGERY

## 2020-01-14 PROCEDURE — 99999 PR PBB SHADOW E&M-EST. PATIENT-LVL III: CPT | Mod: PBBFAC,,, | Performed by: ORTHOPAEDIC SURGERY

## 2020-01-14 PROCEDURE — 99213 OFFICE O/P EST LOW 20 MIN: CPT | Mod: PBBFAC,PO | Performed by: ORTHOPAEDIC SURGERY

## 2020-01-14 NOTE — TELEPHONE ENCOUNTER
----- Message from Gildardo Bah sent at 1/14/2020 12:59 PM CST -----  Contact: Patient [phone]  Patient would like to get medical advice.     Comments: Patient calling to check the status of the Medical clearance letter, will be in the building for another appt today, wants to know if can come to , call to inform.    Please call an advise  Thank you

## 2020-01-14 NOTE — LETTER
January 14, 2020      Alen Damico MD  [address] [facility] [facility]  [place] [address]           [place] [facility]  [address] [facility] [address]  [place] [address]  Phone: [phone]          Patient: Aracelis Martinez   MR Number: [id number]   YOB: 1955   Date of Visit: 1/14/2020       Dear Dr. Alen Damico:    Thank you for referring Aracelis Martinez to me for evaluation. Attached you will find relevant portions of my assessment and plan of care.    If you have questions, please do not hesitate to call me. I look forward to following Aracelis Martinez along with you.    Sincerely,    Omayra Seay MD    Enclosure  CC:  No Recipients    If you would like to receive this communication electronically, please contact [email or web address] or [phone] to request more information on [employer] access.    For providers and/or their staff who would like to refer a patient to [facility], please contact us through our one-stop-shop provider referral line, [facility], at [phone].    If you feel you have received this communication in error or would no longer like to receive these types of communications, please e-mail [email or web address]

## 2020-01-14 NOTE — TELEPHONE ENCOUNTER
Spoke to pt and informed her that clearance was not completed .  informed patient that I would let Dr know that she would need it completed by next week .  Surgery is 01/28/2020

## 2020-01-15 NOTE — PROGRESS NOTES
Subjective:   Chief complaint:   Chief Complaint   Patient presents with    Left Ankle - Pain       HPI:   Aracelis Martinez is a 64 y.o. female who presents today for evaluation of left ankle pain.  Rates pain as 4/10.  Pain has been ongoing for multiple years since fall downstairs in 2005.  Inciting event: injury.  Treatments tried:  Topical CPD, cannabis, anti-inflammatories, RICE modalities and soaks, ankle brace.    Describes periodically flares of increased pain in the ankle and hindfoot.  It is associated with swelling.  They occur 3-4 times per month in last 3-4 days.  Notes pain is significantly improved with the above-mentioned symptomatic modalities.  Does not think symptoms have improved much however since starting Mobic most recently.  Localizes pain to the subtalar greater than ankle joint line.    Current daily smoker.  Retired.  Enjoys traveling with .    ROS:  Musculoskeletal: per HPI  Constitutional: Negative for fever  Cardiovascular: Negative for chest pain  Respiratory: Negative for shortness of breath  Skin: Negative for ulcers or lesions  Neurological: Negative for burning, tingling and numbness  Vascular: Negative for peripheral edema  Heme: Negative for chronic anticoagulation; Negative for history of blood clot, positive maternal family history  Endocrine:  Positive for diabetes  Immune: Negative for inflammatory arthritis  /Nephrology: Negative for ESRD-on hemodialysis       Objective:   Exam:  There were no vitals filed for this visit.  General: No acute distress, well-appearing  Neurologic: Alert and oriented x3  Psychiatric: Appropriate mood and affect, cooperative  Cardiovascular: Regular pulse  Respiratory: Breathing on room air  Skin: No rashes or ulcers  Vascular:  Bilateral feet with palpable pedal pulses.  Musculoskeletal:  Standing examination demonstrates left slightly worse than right pes planovalgus.  There is decreased motion of the left subtalar joint compared to  the right.  Focused exam the left demonstrates ankle range of motion is maintained and not irritable though there is a slight equinus contracture with a positive Silfverskiold.  Manipulation of the subtalar joint is irritable.  Mild tenderness patient over the sinus tarsi.  Fires all motor groups strongly.  No significant swelling today on examination.  Sensation intact light touch nghia localized throughout superficial peroneal, deep peroneal, tibial nerve distributions.    Imaging:  Radiographs were ordered, obtained and personally reviewed today.    Nonweightbearing three views left ankle demonstrates slight tibiotalar joint spurring but otherwise maintaining concentric joint line.  Similarly there is slight spurring of the posterior facet of the subtalar joint but otherwise a maintained joint spaces visualized.    Additional records/labs reviewed:  None new      Assessment:     1. Arthritis of left subtalar joint    2. Acquired pes planovalgus, left         I reviewed imaging, clinical history, and diagnosis as above with the patient. I attempted to use layman's terms to educate the patient as well as utilize foot models and/or pictures.   I personally went through imaging with the patient.  I emphasized the role for non-operative treatment.  Non-operative treatment discussed with patient include: Anti-inflammatory medications or creams, RICE modalities and Braces and/or shoe ware modifications.  Surgery would be reserved for refractory symptoms.    I discussed the connection between the posterior tibial tendon and the medial longitudinal arch and the association with arch collapse.  I reviewed that the patient has a semi-rigid deformity.  In case of refractory symptoms despite treatment, the next step would be consider injections for diagnostic and therapeutic purposes.    I reviewed that surgery to address symptomatic flatfoot is involved and requires at least 6 weeks of non-weight bearing.  In their case,  surgery would likely involve further workup prior to definitive plan.  Further testing needed prior to surgery: weight bearing foot and ankle radiographs.     Plan:       1.  Therapy: None - encouarge low impact activities, no restrictions  2.  Symptomatic treatment: RICE modalities recommended with emphasis on ice and elevation as needed and continue mobic and alternative topical remedies  3.  Restrictions: Advance activity as tolerated, use pain as guide  4.  Brace/orthotics/etc: ASO as needed  5.  Follow-up: PRN    No orders of the defined types were placed in this encounter.      Past Medical History:   Diagnosis Date    Arthritis     Chronic back pain     Diabetes mellitus 2014    Diverticulosis     Hypertension     Neuroendocrine tumor of pancreas 5/3/2016    Pancreatic cancer 2015    Renal cyst     left    Thyroid disease        Past Surgical History:   Procedure Laterality Date     SECTION      COLONOSCOPY N/A 3/13/2019    Procedure: COLONOSCOPY;  Surgeon: Cem Lamar MD;  Location: [facility] ([address] [place] [address]);  Service: Endoscopy;  Laterality: N/A;  previous order cx    ESOPHAGOGASTRODUODENOSCOPY N/A 2019    Procedure: ESOPHAGOGASTRODUODENOSCOPY (EGD);  Surgeon: Jonathan Oneill MD;  Location: [facility] ([address] [place] [address]);  Service: Endoscopy;  Laterality: N/A;    EXCISION OF GANGLION CYST OF HAND Right 10/22/2018    Procedure: EXCISION, GANGLION CYST, HAND- RIGHT, LONG AND INDEX FINGER;  Surgeon: Sowmya Schmidt MD;  Location: [facility];  Service: Orthopedics;  Laterality: Right;  Stretcher; Supine; Hand Pan 1 & [place] 2; Dr. Loja's Rasp    HYSTERECTOMY      KNEE ARTHROSCOPY  2014    LATS/left    PANCREAS SURGERY  2015    TONSILLECTOMY         Family History   Problem Relation Age of Onset    Hypertension Mother     Diabetes Mother     Heart disease Mother     Stroke Mother     Hypertension Sister     Stroke Sister     Breast cancer Sister     Hypertension Brother         Social History     Socioeconomic History    Marital status:      Spouse name: Not on file    Number of children: Not on file    Years of education: Not on file    Highest education level: Not on file   Occupational History    Not on file   Social Needs    Financial resource strain: Not on file    Food insecurity:     Worry: Not on file     Inability: Not on file    Transportation needs:     Medical: Not on file     Non-medical: Not on file   Tobacco Use    Smoking status: Current Some Day Smoker     Packs/day: 1.50     Years: 10.00     Pack years: 15.00     Types: Cigarettes     Last attempt to quit: 1982     Years since quittin.0    Smokeless tobacco: Never Used   Substance and Sexual Activity    Alcohol use: Yes     Comment: occasional use    Drug use: No    Sexual activity: Yes     Partners: Male     Birth control/protection: None   Lifestyle    Physical activity:     Days per week: Not on file     Minutes per session: Not on file    Stress: Not on file   Relationships    Social connections:     Talks on phone: Not on file     Gets together: Not on file     Attends [employer] service: Not on file     Active member of club or organization: Not on file     Attends meetings of clubs or organizations: Not on file     Relationship status: Not on file   Other Topics Concern    Not on file   Social History Narrative    Not on file

## 2020-01-17 ENCOUNTER — TELEPHONE (OUTPATIENT)
Dept: INTERNAL MEDICINE | Facility: CLINIC | Age: 65
End: 2020-01-17

## 2020-01-17 NOTE — TELEPHONE ENCOUNTER
----- Message from Lindsey Coulter sent at 1/17/2020  9:49 AM CST -----  Contact: self [phone]  Patient will like an call back it's personal , please advise.

## 2020-01-17 NOTE — TELEPHONE ENCOUNTER
"She was worried if preop form was faxed.    I assured her I faxed it yesterday to [phone].  She will call the eye dr office and if needs to be sent again will let me know.    (form is in "30 day hold" box.)    "

## 2020-01-17 NOTE — TELEPHONE ENCOUNTER
I faxed preop yesterday to dr holley at [phone].    Office tells her they didn't get so she wants to  copy on monday.    I told her not a problem.  I am refaxing now to their office and copy put at / 6th floor.

## 2020-01-17 NOTE — TELEPHONE ENCOUNTER
----- Message from Lindsey Coulter sent at 1/17/2020  4:06 PM CST -----  Contact: Self [phone]  Patient is calling to follow up with the fax, patient will like to come Monday to  the forms, please advise.

## 2020-01-20 ENCOUNTER — TELEPHONE (OUTPATIENT)
Dept: GASTROENTEROLOGY | Facility: CLINIC | Age: 65
End: 2020-01-20

## 2020-01-20 DIAGNOSIS — R14.2 FUNCTIONAL BELCHING DISORDER: Primary | ICD-10-CM

## 2020-01-30 RX ORDER — METFORMIN HYDROCHLORIDE 500 MG/1
TABLET ORAL
Qty: 270 TABLET | Refills: 0 | Status: SHIPPED | OUTPATIENT
Start: 2020-01-30 | End: 2020-09-10

## 2020-03-04 ENCOUNTER — PATIENT MESSAGE (OUTPATIENT)
Dept: GASTROENTEROLOGY | Facility: CLINIC | Age: 65
End: 2020-03-04

## 2020-03-06 DIAGNOSIS — E11.9 TYPE 2 DIABETES MELLITUS WITHOUT COMPLICATION: ICD-10-CM

## 2020-03-09 ENCOUNTER — TELEPHONE (OUTPATIENT)
Dept: GASTROENTEROLOGY | Facility: CLINIC | Age: 65
End: 2020-03-09

## 2020-03-09 NOTE — TELEPHONE ENCOUNTER
Attempted to contact patient about scheduling and office visit. Left message to give office a call back.

## 2020-03-11 ENCOUNTER — PATIENT MESSAGE (OUTPATIENT)
Dept: ADMINISTRATIVE | Facility: HOSPITAL | Age: 65
End: 2020-03-11

## 2020-03-11 ENCOUNTER — PATIENT OUTREACH (OUTPATIENT)
Dept: ADMINISTRATIVE | Facility: OTHER | Age: 65
End: 2020-03-11

## 2020-03-11 ENCOUNTER — PATIENT OUTREACH (OUTPATIENT)
Dept: ADMINISTRATIVE | Facility: HOSPITAL | Age: 65
End: 2020-03-11

## 2020-03-11 DIAGNOSIS — Z01.00 DIABETIC EYE EXAM: Primary | ICD-10-CM

## 2020-03-11 DIAGNOSIS — E11.9 DIABETIC EYE EXAM: Primary | ICD-10-CM

## 2020-03-11 NOTE — LETTER
AUTHORIZATION FOR RELEASE OF   CONFIDENTIAL INFORMATION    Dear Dr. Karlos Page/[facility],    We are seeing Aracelis Martinez, date of birth 1955, in the clinic at [facility]. Alen Damico MD is the patient's PCP. Aracelis Martinez has an outstanding lab/procedure at the time we reviewed her chart. In order to help keep her health information updated, she has authorized us to request the following medical record(s):        (  )  MAMMOGRAM                                      (  )  COLONOSCOPY      (  )  PAP SMEAR                                          (  )  OUTSIDE LAB RESULTS     (  )  DEXA SCAN                                          ( x ) DIABETIC EYE EXAM            (  )  FOOT EXAM                                          (  )  ENTIRE RECORD     (  )  OUTSIDE IMMUNIZATIONS                 (  )  _______________         Please fax records to [facility], Dwight A Green, MD, [phone]     If you have any questions, please contact [facility] Castellanos at [phone]          Patient Name: Aracelis Martinez  : 1955  Patient Phone #: [phone]

## 2020-03-14 RX ORDER — AMLODIPINE BESYLATE 10 MG/1
TABLET ORAL
Qty: 30 TABLET | Refills: 11 | Status: SHIPPED | OUTPATIENT
Start: 2020-03-14 | End: 2020-09-25

## 2020-03-15 ENCOUNTER — PATIENT MESSAGE (OUTPATIENT)
Dept: GASTROENTEROLOGY | Facility: CLINIC | Age: 65
End: 2020-03-15

## 2020-03-20 ENCOUNTER — PATIENT MESSAGE (OUTPATIENT)
Dept: ADMINISTRATIVE | Facility: OTHER | Age: 65
End: 2020-03-20

## 2020-03-23 DIAGNOSIS — E11.9 TYPE 2 DIABETES MELLITUS: ICD-10-CM

## 2020-03-26 ENCOUNTER — PATIENT MESSAGE (OUTPATIENT)
Dept: INTERNAL MEDICINE | Facility: CLINIC | Age: 65
End: 2020-03-26

## 2020-03-27 RX ORDER — TRAMADOL HYDROCHLORIDE 50 MG/1
TABLET ORAL
Qty: 90 TABLET | Refills: 0 | Status: SHIPPED | OUTPATIENT
Start: 2020-03-27 | End: 2020-09-25

## 2020-04-08 ENCOUNTER — PATIENT MESSAGE (OUTPATIENT)
Dept: INTERNAL MEDICINE | Facility: CLINIC | Age: 65
End: 2020-04-08

## 2020-04-08 DIAGNOSIS — R19.7 DIARRHEA, UNSPECIFIED TYPE: Primary | ICD-10-CM

## 2020-04-09 NOTE — TELEPHONE ENCOUNTER
Spoke to pt and she prefer to wait until Monday to see how she dose over the weekend.  She will call Monday if she has no improvement

## 2020-04-25 ENCOUNTER — PATIENT MESSAGE (OUTPATIENT)
Dept: PRIMARY CARE CLINIC | Facility: CLINIC | Age: 65
End: 2020-04-25

## 2020-04-27 ENCOUNTER — TELEPHONE (OUTPATIENT)
Dept: INTERNAL MEDICINE | Facility: CLINIC | Age: 65
End: 2020-04-27

## 2020-04-27 ENCOUNTER — PATIENT OUTREACH (OUTPATIENT)
Dept: ADMINISTRATIVE | Facility: OTHER | Age: 65
End: 2020-04-27

## 2020-04-27 ENCOUNTER — PATIENT MESSAGE (OUTPATIENT)
Dept: GASTROENTEROLOGY | Facility: CLINIC | Age: 65
End: 2020-04-27

## 2020-04-27 ENCOUNTER — OFFICE VISIT (OUTPATIENT)
Dept: GASTROENTEROLOGY | Facility: CLINIC | Age: 65
End: 2020-04-27
Payer: MEDICARE

## 2020-04-27 DIAGNOSIS — R14.2 BELCHING: Primary | ICD-10-CM

## 2020-04-27 PROCEDURE — 99442 PR PHYSICIAN TELEPHONE EVALUATION 11-20 MIN: ICD-10-PCS | Mod: 95,,, | Performed by: INTERNAL MEDICINE

## 2020-04-27 PROCEDURE — 99442 PR PHYSICIAN TELEPHONE EVALUATION 11-20 MIN: CPT | Mod: 95,,, | Performed by: INTERNAL MEDICINE

## 2020-04-27 NOTE — TELEPHONE ENCOUNTER
Dr cazares referred her to gastro in January and saw dr solomon.  He ordered baclofen three times a day.  She is still on that and still on pantoprazole.    She is belching constantly and did so the entire time we were on phone.  The only time she is not belching is when she lays down to sleep.  She lays down sometimes just to get relief from constant belching.  It is affecting her quality of life now.    Any suggestions on what can be done to help her?     Sending this message to dr cazares and dr solomon.    Thank holly

## 2020-04-27 NOTE — TELEPHONE ENCOUNTER
Spoke with patient to schedule a virtual visit with Dr. Payton. Patient scheduled today for 2:20pm.

## 2020-04-27 NOTE — PROGRESS NOTES
Subjective:       Patient ID: Aracelis Martinez is a 65 y.o. female.    Chief Complaint: No chief complaint on file.    Established Patient - Audio Only Telehealth Visit     The patient location is:  Home  The chief complaint leading to consultation is:  Belching  Visit type: Virtual visit with audio only (telephone)     The reason for the audio only service rather than synchronous audio and video virtual visit was related to technical difficulties or patient preference/necessity.     Each patient to whom I provide medical services by telemedicine is:  (1) informed of the relationship between the physician and patient and the respective role of any other health care provider with respect to management of the patient; and (2) notified that they may decline to receive medical services by telemedicine and may withdraw from such care at any time. Patient verbally consented to receive this service via voice-only telephone call.       HPI:  Telemedicine encountered today for follow-up of belching.  Patient was last seen by me several months ago with similar complaints.  At that time she was started on t.i.d. baclofen.  She reports her symptoms improved however the past 3 weeks have been significantly worsened.  She is now constantly belching throughout the day.  The only time she is not belching is when she has lying down or sleeping.  She continues take baclofen however does not significantly help.  She does now associated nausea with her complaints.       This service was not originating from a related E/M service provided within the previous 7 days nor will  to an E/M service or procedure within the next 24 hours or my soonest available appointment.  Prevailing standard of care was able to be met in this audio-only visit.        Review of Systems   Constitutional: Negative for chills, fever and unexpected weight change.   HENT: Negative for congestion and trouble swallowing.    Respiratory: Negative for cough  and shortness of breath.    Cardiovascular: Negative for chest pain and palpitations.   Gastrointestinal: Positive for abdominal distention. Negative for abdominal pain and blood in stool.         The following portions of the patient's history were reviewed and updated as appropriate: allergies, current medications, past family history, past medical history, past social history, past surgical history and problem list.    Objective:      Pertinent labs and imaging studies reviewed    Assessment:       1. Belching        Plan:       Counseled on diaphragmatic breathing  Will refer to physical therapy for the same  If no improvement with baclofen patient can stop this medication  Will send for manometry to exclude rumination syndrome    (Portions of this note were dictated using voice recognition software and may contain dictation related errors in spelling/grammar/syntax not found on text review)

## 2020-04-27 NOTE — TELEPHONE ENCOUNTER
----- Message from Itz Griffin sent at 4/27/2020 11:40 AM CDT -----  Contact: Patient   Good Morning,     is having some issues with Belching that she would like to discuss with  she states the issue is getting worse. Please advise.

## 2020-05-01 ENCOUNTER — PATIENT MESSAGE (OUTPATIENT)
Dept: INTERNAL MEDICINE | Facility: CLINIC | Age: 65
End: 2020-05-01

## 2020-05-11 ENCOUNTER — PATIENT MESSAGE (OUTPATIENT)
Dept: INTERNAL MEDICINE | Facility: CLINIC | Age: 65
End: 2020-05-11

## 2020-05-11 ENCOUNTER — OFFICE VISIT (OUTPATIENT)
Dept: INTERNAL MEDICINE | Facility: CLINIC | Age: 65
End: 2020-05-11
Payer: MEDICARE

## 2020-05-11 ENCOUNTER — TELEPHONE (OUTPATIENT)
Dept: INTERNAL MEDICINE | Facility: CLINIC | Age: 65
End: 2020-05-11

## 2020-05-11 DIAGNOSIS — I10 ESSENTIAL HYPERTENSION: ICD-10-CM

## 2020-05-11 DIAGNOSIS — E11.9 TYPE 2 DIABETES MELLITUS WITHOUT COMPLICATION, WITHOUT LONG-TERM CURRENT USE OF INSULIN: ICD-10-CM

## 2020-05-11 DIAGNOSIS — R14.2 FUNCTIONAL BELCHING DISORDER: Primary | ICD-10-CM

## 2020-05-11 PROCEDURE — 99213 OFFICE O/P EST LOW 20 MIN: CPT | Mod: 95,,, | Performed by: INTERNAL MEDICINE

## 2020-05-11 PROCEDURE — G0463 HOSPITAL OUTPT CLINIC VISIT: HCPCS | Mod: PO

## 2020-05-11 PROCEDURE — 99211 OFF/OP EST MAY X REQ PHY/QHP: CPT | Mod: PO

## 2020-05-11 PROCEDURE — 99213 PR OFFICE/OUTPT VISIT, EST, LEVL III, 20-29 MIN: ICD-10-PCS | Mod: 95,,, | Performed by: INTERNAL MEDICINE

## 2020-05-11 RX ORDER — TEMAZEPAM 15 MG/1
15 CAPSULE ORAL NIGHTLY PRN
Qty: 30 CAPSULE | Refills: 2 | Status: SHIPPED | OUTPATIENT
Start: 2020-05-11 | End: 2020-06-10

## 2020-05-11 NOTE — TELEPHONE ENCOUNTER
Spoke with patient she was advised that  was running behind. Patient stated that she will wait a little longer.

## 2020-05-11 NOTE — TELEPHONE ENCOUNTER
----- Message from Mansi Whitten sent at 5/11/2020  4:12 PM CDT -----  Contact: Patient   Still waiting for her virtual that was scheduled for 340.    Thank You

## 2020-05-11 NOTE — PROGRESS NOTES
Subjective:       Patient ID: Aracelis Martinez is a 65 y.o. female.    Chief Complaint: intractable belching    HPI   This is a telemedicine virtual visit.  Patient is at home.    The patient's main complaint is intractable belching.  She has been using Gas-X and baclofen without obtaining any relief.  She did have a virtual visit with Gastroenterology.  She has experience nausea intermittently but no vomiting.  She is not experiencing any is dysphagia or heartburn.  She does complain of early satiety however.  She denies having headache ups.  Unspecified weight loss is reported.    She has been experiencing insomnia in spite of using trazodone.  She states she is unable to calm her brain to get to sleep.  She often feels jittery and ttense.  She denies feeling depressed.    Other active medical conditions include hypertension and type 2 diabetes mellitus.  Her average blood sugar level was 154 for this past week.  She states her 30 day average was 134.  No hypoglycemia has been noted.    Review of Systems   Constitutional: Positive for appetite change and unexpected weight change. Negative for chills and fever.   HENT: Negative for sore throat, trouble swallowing and voice change.    Eyes: Negative for visual disturbance.   Respiratory: Negative for cough, choking and shortness of breath.    Cardiovascular: Negative for chest pain.   Gastrointestinal: Positive for nausea. Negative for abdominal distention, abdominal pain, diarrhea and vomiting.   Musculoskeletal: Negative for arthralgias.   Skin: Negative for rash.   Neurological: Negative for dizziness, syncope, weakness and numbness.       Objective:      Physical Exam   Constitutional: She appears well-developed and well-nourished.   The patient is constantly belching during the video visit.   Neurological: She is alert.   Psychiatric: Her behavior is normal. Thought content normal.       No additional physical findings are obtainable during this virtual  visit.  Assessment:       1. Functional belching disorder    2. Type 2 diabetes mellitus without complication, without long-term current use of insulin    3. Essential hypertension        Plan:     Aracelis was seen today for intractable belching.  Additions shin 0 GI consultation will be obtained regarding her trach bow belching.  Restoril will be ordered for sleep.  Trazodone and baclofen will be discontinued since these medications have been reported by the patient to be ineffective.    Total visit:  20 min colon  Diagnoses and all orders for this visit:    Functional belching disorder  -     CBC auto differential; Future  -     Comprehensive metabolic panel; Future  -     Lipid Panel; Future  -     TSH; Future  -     Hemoglobin A1C; Future  -     Ambulatory referral/consult to Gastroenterology; Future    Type 2 diabetes mellitus without complication, without long-term current use of insulin  -     CBC auto differential; Future  -     Comprehensive metabolic panel; Future  -     Lipid Panel; Future  -     TSH; Future  -     Hemoglobin A1C; Future  -     Ambulatory referral/consult to Gastroenterology; Future    Essential hypertension  -     CBC auto differential; Future  -     Comprehensive metabolic panel; Future  -     Lipid Panel; Future  -     TSH; Future  -     Hemoglobin A1C; Future  -     Ambulatory referral/consult to Gastroenterology; Future    Other orders  -     temazepam (RESTORIL) 15 mg Cap; Take 1 capsule (15 mg total) by mouth nightly as needed (insomnia).

## 2020-05-12 ENCOUNTER — LAB VISIT (OUTPATIENT)
Dept: PRIMARY CARE CLINIC | Facility: CLINIC | Age: 65
End: 2020-05-12
Payer: MEDICARE

## 2020-05-12 DIAGNOSIS — Z00.6 RESEARCH STUDY PATIENT: Primary | ICD-10-CM

## 2020-05-12 LAB — SARS-COV-2 IGG SERPLBLD QL IA.RAPID: NEGATIVE

## 2020-05-12 PROCEDURE — 86769 SARS-COV-2 COVID-19 ANTIBODY: CPT

## 2020-05-12 PROCEDURE — U0002 COVID-19 LAB TEST NON-CDC: HCPCS

## 2020-05-12 NOTE — RESEARCH
Date of Consent: 05/12/2020     Sponsor: [facility]    Study Title/IRB Number: Observational study of Sars-CoV2 Immunoglobulin G (IgG) seroprevalence among the [facility] population over time 2020.163  Principle Investigator: Lillian Wilson, PhD    Did the patient need translation services? No   name: N/A    Prior to the Informed Consent (IC) being signed, or any study protocol required data collection, testing, procedure, or intervention being performed, the following was done and/or discussed:   Patient was given a paper copy of the IC for review    Patient was given study FAQ   Purpose of the study and qualifications to participate    Study design and tests or procedures done at this visit   Confidentiality and HIPAA Authorization for Release of Medical Records for the research trial/ subject's rights/research related injury   Risk, Benefits, Alternative Treatments, Compensation and Costs   Participation in the research trial is voluntary and patient may withdraw at anytime   Contact information for study related questions    Patient verbalizes understanding of the above: Yes  Contact information for PI and IRB given to patient: Yes  Patient able to adequately summarize: the purpose of the study, the risks associated with the study, and all procedures, testing, and follow-ups associated with the study: Yes    The consent was discussed verbally with the patient and all questions were answered satisfactorily. Patient gave verbal consent for the Seroprevalence research study with an IRB approval date of 05/08/2020.      The Consent, Consent Witness and name of Clinical Research Coordinator consenting was captured and documented in [employer].    All Inclusion and Exclusion Criteria reviewed, subject meets all Inclusion criteria and does not meet any Exclusion Criteria at this time.     Patient Eligibility was confirmed.    Patient responded to survey questions.    The following biospecimen  collection procedures were collected:    -Nasopharyngeal Swab Collection  -Blood collection

## 2020-05-12 NOTE — PROGRESS NOTES
Date of Consent: 05/12/2020      Sponsor: [facility]    Study Title/IRB Number: Observational study of Sars-CoV2 Immunoglobulin G (IgG) seroprevalence among the [facility] population over time 2020.163  Principle Investigator: Lillian Wilson, PhD    Did the patient need translation services? No   name: N/A    Prior to the Informed Consent (IC) being signed, or any study protocol required data collection, testing, procedure, or intervention being performed, the following was done and/or discussed:   Patient was given a paper copy of the IC for review    Patient was given study FAQ   Purpose of the study and qualifications to participate    Study design and tests or procedures done at this visit   Confidentiality and HIPAA Authorization for Release of Medical Records for the research trial/ subject's rights/research related injury   Risk, Benefits, Alternative Treatments, Compensation and Costs   Participation in the research trial is voluntary and patient may withdraw at anytime   Contact information for study related questions    Patient verbalizes understanding of the above: Yes  Contact information for PI and IRB given to patient: Yes  Patient able to adequately summarize: the purpose of the study, the risks associated with the study, and all procedures, testing, and follow-ups associated with the study: Yes    The consent was discussed verbally with the patient and all questions were answered satisfactorily. Patient gave verbal consent for the Seroprevalence research study with an IRB approval date of 05/08/2020.      The Consent, Consent Witness and name of Clinical Research Coordinator consenting was captured and documented in [employer]Cap.    All Inclusion and Exclusion Criteria reviewed, subject meets all Inclusion criteria and does not meet any Exclusion Criteria at this time.     Patient Eligibility was confirmed.    Patient responded to survey questions.    The following biospecimen  collection procedures were collected:    -Nasopharyngeal Swab Collection  -Blood collection

## 2020-05-13 ENCOUNTER — PATIENT MESSAGE (OUTPATIENT)
Dept: INTERNAL MEDICINE | Facility: CLINIC | Age: 65
End: 2020-05-13

## 2020-05-13 ENCOUNTER — LAB VISIT (OUTPATIENT)
Dept: LAB | Facility: HOSPITAL | Age: 65
End: 2020-05-13
Attending: INTERNAL MEDICINE
Payer: MEDICARE

## 2020-05-13 DIAGNOSIS — R14.2 FUNCTIONAL BELCHING DISORDER: ICD-10-CM

## 2020-05-13 DIAGNOSIS — I10 ESSENTIAL HYPERTENSION: ICD-10-CM

## 2020-05-13 DIAGNOSIS — E11.9 TYPE 2 DIABETES MELLITUS WITHOUT COMPLICATION, WITHOUT LONG-TERM CURRENT USE OF INSULIN: ICD-10-CM

## 2020-05-13 LAB
ALBUMIN SERPL BCP-MCNC: 4.3 G/DL (ref 3.5–5.2)
ALP SERPL-CCNC: 79 U/L (ref 55–135)
ALT SERPL W/O P-5'-P-CCNC: 12 U/L (ref 10–44)
ANION GAP SERPL CALC-SCNC: 13 MMOL/L (ref 8–16)
AST SERPL-CCNC: 15 U/L (ref 10–40)
BASOPHILS # BLD AUTO: 0.03 K/UL (ref 0–0.2)
BASOPHILS NFR BLD: 0.4 % (ref 0–1.9)
BILIRUB SERPL-MCNC: 1 MG/DL (ref 0.1–1)
BUN SERPL-MCNC: 15 MG/DL (ref 8–23)
CALCIUM SERPL-MCNC: 10.2 MG/DL (ref 8.7–10.5)
CHLORIDE SERPL-SCNC: 98 MMOL/L (ref 95–110)
CHOLEST SERPL-MCNC: 210 MG/DL (ref 120–199)
CHOLEST/HDLC SERPL: 4.4 {RATIO} (ref 2–5)
CO2 SERPL-SCNC: 30 MMOL/L (ref 23–29)
CREAT SERPL-MCNC: 0.9 MG/DL (ref 0.5–1.4)
DIFFERENTIAL METHOD: ABNORMAL
EOSINOPHIL # BLD AUTO: 0.2 K/UL (ref 0–0.5)
EOSINOPHIL NFR BLD: 2.8 % (ref 0–8)
ERYTHROCYTE [DISTWIDTH] IN BLOOD BY AUTOMATED COUNT: 14.9 % (ref 11.5–14.5)
EST. GFR  (AFRICAN AMERICAN): >60 ML/MIN/1.73 M^2
EST. GFR  (NON AFRICAN AMERICAN): >60 ML/MIN/1.73 M^2
ESTIMATED AVG GLUCOSE: 143 MG/DL (ref 68–131)
GLUCOSE SERPL-MCNC: 141 MG/DL (ref 70–110)
HBA1C MFR BLD HPLC: 6.6 % (ref 4–5.6)
HCT VFR BLD AUTO: 48 % (ref 37–48.5)
HDLC SERPL-MCNC: 48 MG/DL (ref 40–75)
HDLC SERPL: 22.9 % (ref 20–50)
HGB BLD-MCNC: 15.2 G/DL (ref 12–16)
IMM GRANULOCYTES # BLD AUTO: 0.01 K/UL (ref 0–0.04)
IMM GRANULOCYTES NFR BLD AUTO: 0.1 % (ref 0–0.5)
LDLC SERPL CALC-MCNC: 138.4 MG/DL (ref 63–159)
LYMPHOCYTES # BLD AUTO: 4.2 K/UL (ref 1–4.8)
LYMPHOCYTES NFR BLD: 56.4 % (ref 18–48)
MCH RBC QN AUTO: 27.7 PG (ref 27–31)
MCHC RBC AUTO-ENTMCNC: 31.7 G/DL (ref 32–36)
MCV RBC AUTO: 87 FL (ref 82–98)
MONOCYTES # BLD AUTO: 0.6 K/UL (ref 0.3–1)
MONOCYTES NFR BLD: 8.3 % (ref 4–15)
NEUTROPHILS # BLD AUTO: 2.4 K/UL (ref 1.8–7.7)
NEUTROPHILS NFR BLD: 32 % (ref 38–73)
NONHDLC SERPL-MCNC: 162 MG/DL
NRBC BLD-RTO: 0 /100 WBC
PLATELET # BLD AUTO: 424 K/UL (ref 150–350)
PMV BLD AUTO: 9.8 FL (ref 9.2–12.9)
POTASSIUM SERPL-SCNC: 3.1 MMOL/L (ref 3.5–5.1)
PROT SERPL-MCNC: 8 G/DL (ref 6–8.4)
RBC # BLD AUTO: 5.49 M/UL (ref 4–5.4)
SODIUM SERPL-SCNC: 141 MMOL/L (ref 136–145)
TRIGL SERPL-MCNC: 118 MG/DL (ref 30–150)
TSH SERPL DL<=0.005 MIU/L-ACNC: 2.51 UIU/ML (ref 0.4–4)
WBC # BLD AUTO: 7.39 K/UL (ref 3.9–12.7)

## 2020-05-13 PROCEDURE — 80061 LIPID PANEL: CPT

## 2020-05-13 PROCEDURE — 36415 COLL VENOUS BLD VENIPUNCTURE: CPT | Mod: PO

## 2020-05-13 PROCEDURE — 85025 COMPLETE CBC W/AUTO DIFF WBC: CPT

## 2020-05-13 PROCEDURE — 80053 COMPREHEN METABOLIC PANEL: CPT

## 2020-05-13 PROCEDURE — 84443 ASSAY THYROID STIM HORMONE: CPT

## 2020-05-13 PROCEDURE — 83036 HEMOGLOBIN GLYCOSYLATED A1C: CPT

## 2020-05-13 RX ORDER — HYDROXYZINE PAMOATE 25 MG/1
25 CAPSULE ORAL 2 TIMES DAILY PRN
Qty: 60 CAPSULE | Refills: 1 | Status: SHIPPED | OUTPATIENT
Start: 2020-05-13 | End: 2020-06-30

## 2020-05-14 LAB — SARS-COV-2 RNA RESP QL NAA+PROBE: NOT DETECTED

## 2020-05-20 ENCOUNTER — PATIENT OUTREACH (OUTPATIENT)
Dept: ADMINISTRATIVE | Facility: OTHER | Age: 65
End: 2020-05-20

## 2020-05-20 ENCOUNTER — TELEPHONE (OUTPATIENT)
Dept: GASTROENTEROLOGY | Facility: CLINIC | Age: 65
End: 2020-05-20

## 2020-05-20 NOTE — TELEPHONE ENCOUNTER
----- Message from Adelaida Leonard sent at 5/20/2020  3:08 PM CDT -----  Pt returned your call [phone]

## 2020-05-20 NOTE — TELEPHONE ENCOUNTER
Called and spoke to pt.  Offered to change pt's appt to a virtual visit. Pt accepted and appreciated the call.

## 2020-05-22 ENCOUNTER — OFFICE VISIT (OUTPATIENT)
Dept: GASTROENTEROLOGY | Facility: CLINIC | Age: 65
End: 2020-05-22
Payer: MEDICARE

## 2020-05-22 ENCOUNTER — TELEPHONE (OUTPATIENT)
Dept: GASTROENTEROLOGY | Facility: CLINIC | Age: 65
End: 2020-05-22

## 2020-05-22 VITALS — WEIGHT: 213 LBS | BODY MASS INDEX: 35.49 KG/M2 | HEIGHT: 65 IN

## 2020-05-22 DIAGNOSIS — E11.9 TYPE 2 DIABETES MELLITUS WITHOUT COMPLICATION, WITHOUT LONG-TERM CURRENT USE OF INSULIN: ICD-10-CM

## 2020-05-22 DIAGNOSIS — I10 ESSENTIAL HYPERTENSION: ICD-10-CM

## 2020-05-22 DIAGNOSIS — R14.2 FUNCTIONAL BELCHING DISORDER: ICD-10-CM

## 2020-05-22 DIAGNOSIS — Z85.07 PERSONAL HISTORY OF PANCREATIC CANCER: Primary | ICD-10-CM

## 2020-05-22 PROCEDURE — 99211 OFF/OP EST MAY X REQ PHY/QHP: CPT

## 2020-05-22 PROCEDURE — G0463 HOSPITAL OUTPT CLINIC VISIT: HCPCS

## 2020-05-22 PROCEDURE — 99214 PR OFFICE/OUTPT VISIT, EST, LEVL IV, 30-39 MIN: ICD-10-PCS | Mod: 95,,, | Performed by: INTERNAL MEDICINE

## 2020-05-22 PROCEDURE — 99214 OFFICE O/P EST MOD 30 MIN: CPT | Mod: 95,,, | Performed by: INTERNAL MEDICINE

## 2020-05-22 NOTE — Clinical Note
Please refer patient to see Dr. Delgado for excessive belching and possible rumination syndrome.  Patient ideally would like to be seen pretty soon she is okay with a telemedicine video visit I worker in emergently prior patient of Dr. Zapata

## 2020-05-22 NOTE — LETTER
May 22, 2020      Alen Damico MD  [address] [facility] [address]  [place] [address]           [facility]  [address]  [place] [address]  Phone: [phone]  Fax: [phone]          Patient: Aracelis Martinez   MR Number: [id number]   YOB: 1955   Date of Visit: 5/22/2020       Dear Dr. Alen Damico:    Thank you for referring Aracelis Martinez to me for evaluation. Attached you will find relevant portions of my assessment and plan of care.    If you have questions, please do not hesitate to call me. I look forward to following Aracelis Martinez along with you.    Sincerely,    Shady Costa MD    Enclosure  CC:  No Recipients    If you would like to receive this communication electronically, please contact [email or web address] or [phone] to request more information on [employer] Link access.    For providers and/or their staff who would like to refer a patient to [facility], please contact us through our one-stop-shop provider referral line, [facility], at [phone].    If you feel you have received this communication in error or would no longer like to receive these types of communications, please e-mail [email or web address]

## 2020-05-22 NOTE — TELEPHONE ENCOUNTER
Spoke with Dr. Costa and explained that I am not seeing consult for belching.  Per chart review patient has had a tree ordered by Dr. Adamson to rule out for rumination and belching.  If positive pt can be seen by our Gi dietitian for diaphragmatic breathing

## 2020-05-22 NOTE — PROGRESS NOTES
The patient location is:  at home  The chief complaint leading to consultation is:  Excessive belching    Visit type:  Telemedicine video visit    Face to Face time with patient:  25 minutes of total time spent on the encounter, which includes face to face time and non-face to face time preparing to see the patient (eg, review of tests), Obtaining and/or reviewing separately obtained history, Documenting clinical information in the electronic or other health record, Independently interpreting results (not separately reported) and communicating results to the patient/family/caregiver, or Care coordination (not separately reported).         Each patient to whom he or she provides medical services by telemedicine is:  (1) informed of the relationship between the physician and patient and the respective role of any other health care provider with respect to management of the patient; and (2) notified that he or she may decline to receive medical services by telemedicine and may withdraw from such care at any time.    Notes:           [facility] Consultation Note    Reason for Consult:  The primary encounter diagnosis was Personal history of pancreatic cancer. Diagnoses of Functional belching disorder, Type 2 diabetes mellitus without complication, without long-term current use of insulin, and Essential hypertension were also pertinent to this visit.    PCP:   Alen Damico   [address] [facility] [address] [place] [id number]    Referring MD:  Alen Damico Md  [address] [facility]  [place] [address]    Initial History of Present Illness (HPI):  This is a 65 y.o. female here for telemedicine video visit evaluation of excessive belching.  Patient says Dr. Jarod navarrete is her GI doctor but could not see him she has been having several months history of excessive belching can not stop.  Does not really occur well she sleeps.  She did have a history of a stage I neuroendocrine tumor  removed from her pancreas at [facility] December 29, 2015.  She has not followed currently by any hematologist her oncologist's for her history of pancreatic cancer we do recommend she follow up with Heme-Onc here to determine if she needs any further surveillance.  She denies any abdominal pain she has been on a PPI 40 mg of pantoprazole once daily 45 min for breakfast in the morning with no relief of her belching.  She denies any abdominal distension no chest pain no dysphagia no odynophagia no hoarseness.  No pneumonias.  No chest pain no change in bowel habits has a well-formed bowel movement once daily she says she is lactose intolerant she thinks.  No pacemaker is no defibrillator is a no cough.  She does not think she is under anymore stress since she normally has been.  Her EGD and colonoscopies have been up-to-date.  No new medication appear    Abdominal pain - no  Reflux - some  Dysphagia - no   Bowel habits - normal  GI bleeding - none  NSAID usage - none    Interval HPI 05/22/2020:  The patient's last visit with me was on Visit date not found.      ROS:  Constitutional: No fevers, chills, No weight loss  ENT:   heartburn no dysphagia no odynophagia no hoarseness  CV: No chest pain, no palpitation  Pulm: No cough, No shortness of breath, no wheezing  GI: see HPI  Derm: No rash, no itching  Heme: No lymphadenopathy, No easy bruising  MSK: No significant arthritis requiring NSAID  : No dysuria, No hematuria  Endo: No hot or cold intolerance  Neuro: No syncope, No seizure, no strokes  Psych: No uncontrolled anxiety, No uncontrolled depression    Medical History:  has a past medical history of Arthritis, Chronic back pain, Diabetes mellitus (07/2014), Diverticulosis, Hypertension, Neuroendocrine tumor of pancreas (5/3/2016), Pancreatic cancer (2015), Renal cyst, and Thyroid disease.    Surgical History:  has a past surgical history that includes Hysterectomy; Tonsillectomy; Knee arthroscopy  (2014);  section; Excision of ganglion cyst of hand (Right, 10/22/2018); Pancreas surgery (); Colonoscopy (N/A, 3/13/2019); and Esophagogastroduodenoscopy (N/A, 2019).    Family History: family history includes Breast cancer in her sister; Diabetes in her mother; Heart disease in her mother; Hypertension in her brother, mother, and sister; Stroke in her mother and sister..     Social History:  reports that she has been smoking cigarettes. She has a 15.00 pack-year smoking history. She has never used smokeless tobacco. She reports that she drinks alcohol. She reports that she does not use drugs.    Review of patient's allergies indicates:   Allergen Reactions    Azithromycin Other (See Comments)     Yeast infection; pt requests please do not put her on this medication       Medication List with Changes/Refills   Current Medications    ACETAMINOPHEN-CODEINE 300-30MG (TYLENOL #3) 300-30 MG TAB    Take 1 tablet by mouth every 6 (six) hours as needed.    AMLODIPINE (NORVASC) 10 MG TABLET    TAKE 1 TABLET BY MOUTH DAILY    BACLOFEN (LIORESAL) 10 MG TABLET    Take 1 tablet (10 mg total) by mouth 3 (three) times daily.    BLOOD SUGAR DIAGNOSTIC STRP    To check BG 1 time daily, to use with insurance preferred meter    BLOOD-GLUCOSE METER KIT    To check BG 1 time daily, to use with insurance preferred meter    DULOXETINE (CYMBALTA) 30 MG CAPSULE    TAKE 1 CAPSULE(30 MG) BY MOUTH EVERY DAY    HYDROXYZINE PAMOATE (VISTARIL) 25 MG CAP    Take 1 capsule (25 mg total) by mouth 2 (two) times daily as needed (anxiety).    LANCETS MISC    To check BG 1 time daily, to use with insurance preferred meter    LEVOTHYROXINE (SYNTHROID) 50 MCG TABLET    TAKE 1 TABLET BY MOUTH BEFORE BREAKFAST    METFORMIN (GLUCOPHAGE) 500 MG TABLET    TAKE 1 TABLET BY MOUTH EVERY MORNING AND 2 TABLET BY MOUTH EVERY EVENING FOR DIABETES    PANTOPRAZOLE (PROTONIX) 40 MG TABLET    TAKE 1 TABLET BY MOUTH DAILY    TEMAZEPAM (RESTORIL)  "15 MG CAP    Take 1 capsule (15 mg total) by mouth nightly as needed (insomnia).    TRAMADOL (ULTRAM) 50 MG TABLET    TAKE 1 TO 2 TABLETS BY MOUTH TWICE DAILY    TRAZODONE (DESYREL) 100 MG TABLET    Take 1 tablet (100 mg total) by mouth every evening.         Objective Findings:    Vital Signs:  Ht 5' 5" (1.651 m)   Wt 96.6 kg (213 lb)   BMI 35.45 kg/m²   Body mass index is 35.45 kg/m².    Physical Exam:  Telemedicine video visit  General Appearance: Well appearing in no acute distress  Neurologic:  Alert and oriented x4  Psychiatric:  Normal speech mentation and affect    Labs:  Lab Results   Component Value Date    WBC 7.39 05/13/2020    HGB 15.2 05/13/2020    HCT 48.0 05/13/2020     (H) 05/13/2020    CHOL 210 (H) 05/13/2020    TRIG 118 05/13/2020    HDL 48 05/13/2020    ALT 12 05/13/2020    AST 15 05/13/2020     05/13/2020    K 3.1 (L) 05/13/2020    CL 98 05/13/2020    CREATININE 0.9 05/13/2020    BUN 15 05/13/2020    CO2 30 (H) 05/13/2020    TSH 2.510 05/13/2020    HGBA1C 6.6 (H) 05/13/2020             Medical Decision Making:  EGD and colonoscopy reports reviewed  Up-to-date information on belching discussed with patient.  Referral to Heme-Onc discussed  Chest x-ray and abdominal x-ray discussed  Follow-up EGD discussed      Assessment:  1. Personal history of pancreatic cancer    2. Functional belching disorder    3. Type 2 diabetes mellitus without complication, without long-term current use of insulin    4. Essential hypertension         Recommendations:   1.  Refer to Heme-Onc for personal history of pancreatic cancer to see if she still needs periodic surveillance and follow-up with Heme-Onc.  She had her surgery at [facility] December 29, 2015.  2.  Functional belching disorder will get abdominal x-ray chest x-ray EGD and refer patient to Dr. Delgado for evaluation.  3.  Return to General GI clinic p.r.n.    No follow-ups on file.      Order summary:  Orders Placed This Encounter    US " Abdomen Limited (LIVER)    X-Ray Abdomen Flat And Erect    Lipase    TISSUE TRANSGLUTAMINASE (TTG), IGA    IgA    Ambulatory referral/consult to Hematology / Oncology    Case request GI: EGD (ESOPHAGOGASTRODUODENOSCOPY)         Thank you so much for allowing me to participate in the care of Aracelis Costa MD

## 2020-05-22 NOTE — TELEPHONE ENCOUNTER
----- Message from Ruel West MA sent at 5/22/2020  4:20 PM CDT -----  pls advise. Accept?  ----- Message -----  From: Chris Castillo RN  Sent: 5/22/2020   4:19 PM CDT  To: Danny JORDAN Staff        ----- Message -----  From: Shady Costa MD  Sent: 5/22/2020   4:12 PM CDT  To: Chris Castillo RN    Please refer patient to see Dr. Delgado for excessive belching and possible rumination syndrome.  Patient ideally would like to be seen pretty soon she is okay with a telemedicine video visit I worker in emergently prior patient of Dr. Zapata

## 2020-05-25 ENCOUNTER — TELEPHONE (OUTPATIENT)
Dept: ENDOSCOPY | Facility: HOSPITAL | Age: 65
End: 2020-05-25

## 2020-05-26 ENCOUNTER — TELEPHONE (OUTPATIENT)
Dept: NEUROLOGY | Facility: HOSPITAL | Age: 65
End: 2020-05-26

## 2020-05-26 ENCOUNTER — TELEPHONE (OUTPATIENT)
Dept: ENDOSCOPY | Facility: HOSPITAL | Age: 65
End: 2020-05-26

## 2020-05-26 DIAGNOSIS — K22.70 BARRETT'S ESOPHAGUS WITHOUT DYSPLASIA: Primary | ICD-10-CM

## 2020-05-26 NOTE — TELEPHONE ENCOUNTER
----- Message from Winter Jung RN sent at 5/25/2020  2:15 PM CDT -----  New referral in on the above patient from Dr Costa.    She had stage I neuroend tumor removed 2015 at [facility] and has no had surveillance.    See his note.    Tanesha

## 2020-05-26 NOTE — TELEPHONE ENCOUNTER
New NET referral    Primary Tumor: PNET, dx 2015                                   Referral source/MD: Shady Costa- GI    Treatments:  Surgery- 2015- [facility] -WAITING ON RECORDS    Records:  MRI abd/pel -4/2019- epic  CT abd/pelvis- 3/2019- epic  FNA panc- 5/2015- epic    Left message for patient to return call on voice mail. Patient is active on the portal.    Forwarded to Dr. Castaneda for review.

## 2020-05-27 ENCOUNTER — TELEPHONE (OUTPATIENT)
Dept: INTERNAL MEDICINE | Facility: CLINIC | Age: 65
End: 2020-05-27

## 2020-05-27 RX ORDER — POTASSIUM CHLORIDE 750 MG/1
10 TABLET, EXTENDED RELEASE ORAL 2 TIMES DAILY
Qty: 20 TABLET | Refills: 0 | Status: SHIPPED | OUTPATIENT
Start: 2020-05-27 | End: 2020-10-02

## 2020-05-28 ENCOUNTER — TELEPHONE (OUTPATIENT)
Dept: NEUROLOGY | Facility: HOSPITAL | Age: 65
End: 2020-05-28

## 2020-05-28 NOTE — TELEPHONE ENCOUNTER
Spoke with pt, she reports issues with excessive belching.  Referred here by Dr. Costa for NET follow up.  Confirms that she was diagnosed with a PNET and had surgery at [facility] in 2015, no treatment or surveillance since then.  Last scans in 2019. . Scheduled to do EGD on Tuesday.  Shravan with Dr. Castaneda on Wed, 6/3/20.  Pt requested video visit.  all questions answered.

## 2020-05-28 NOTE — TELEPHONE ENCOUNTER
See prior msg.  Left pt msg on VM.  Requesting path and OR report from 2015 at [facility] and EGD and colon reports.

## 2020-05-28 NOTE — TELEPHONE ENCOUNTER
----- Message from Tia Ferrer sent at 5/26/2020 12:57 PM CDT -----  Contact: Self [phone]  Patient is returning your call.  Please advise.

## 2020-05-28 NOTE — TELEPHONE ENCOUNTER
----- Message from Tia Ferrer sent at 5/28/2020 10:34 AM CDT -----  Contact: Self [phone]  Patient is returning your call.  Please advise.

## 2020-05-29 DIAGNOSIS — E11.9 TYPE 2 DIABETES MELLITUS WITHOUT COMPLICATION: ICD-10-CM

## 2020-06-01 ENCOUNTER — LAB VISIT (OUTPATIENT)
Dept: INTERNAL MEDICINE | Facility: CLINIC | Age: 65
End: 2020-06-01
Payer: MEDICARE

## 2020-06-01 DIAGNOSIS — K22.70 BARRETT'S ESOPHAGUS WITHOUT DYSPLASIA: ICD-10-CM

## 2020-06-01 PROCEDURE — U0003 INFECTIOUS AGENT DETECTION BY NUCLEIC ACID (DNA OR RNA); SEVERE ACUTE RESPIRATORY SYNDROME CORONAVIRUS 2 (SARS-COV-2) (CORONAVIRUS DISEASE [COVID-19]), AMPLIFIED PROBE TECHNIQUE, MAKING USE OF HIGH THROUGHPUT TECHNOLOGIES AS DESCRIBED BY CMS-2020-01-R: HCPCS

## 2020-06-02 ENCOUNTER — ANESTHESIA EVENT (OUTPATIENT)
Dept: ENDOSCOPY | Facility: HOSPITAL | Age: 65
End: 2020-06-02
Payer: MEDICARE

## 2020-06-02 ENCOUNTER — HOSPITAL ENCOUNTER (OUTPATIENT)
Facility: HOSPITAL | Age: 65
Discharge: HOME | End: 2020-06-02
Attending: INTERNAL MEDICINE | Admitting: INTERNAL MEDICINE
Payer: MEDICARE

## 2020-06-02 ENCOUNTER — ANESTHESIA (OUTPATIENT)
Dept: ENDOSCOPY | Facility: HOSPITAL | Age: 65
End: 2020-06-02
Payer: MEDICARE

## 2020-06-02 VITALS
RESPIRATION RATE: 21 BRPM | HEIGHT: 65 IN | WEIGHT: 203 LBS | BODY MASS INDEX: 33.82 KG/M2 | TEMPERATURE: 98 F | OXYGEN SATURATION: 100 % | HEART RATE: 61 BPM | SYSTOLIC BLOOD PRESSURE: 102 MMHG | DIASTOLIC BLOOD PRESSURE: 68 MMHG

## 2020-06-02 DIAGNOSIS — K25.9 GASTRIC ULCER, UNSPECIFIED CHRONICITY, UNSPECIFIED WHETHER GASTRIC ULCER HEMORRHAGE OR PERFORATION PRESENT: Primary | ICD-10-CM

## 2020-06-02 DIAGNOSIS — R14.2 FUNCTIONAL BELCHING DISORDER: ICD-10-CM

## 2020-06-02 DIAGNOSIS — R14.2 BELCHING: ICD-10-CM

## 2020-06-02 LAB
POCT GLUCOSE: 105 MG/DL (ref 70–110)
POCT GLUCOSE: 119 MG/DL (ref 70–110)
SARS-COV-2 RNA RESP QL NAA+PROBE: NOT DETECTED

## 2020-06-02 PROCEDURE — 37000009 HC ANESTHESIA EA ADD 15 MINS: Performed by: INTERNAL MEDICINE

## 2020-06-02 PROCEDURE — 88342 CHG IMMUNOCYTOCHEMISTRY: ICD-10-PCS | Mod: 26,,, | Performed by: PATHOLOGY

## 2020-06-02 PROCEDURE — 88305 TISSUE EXAM BY PATHOLOGIST: CPT | Mod: 26,,, | Performed by: PATHOLOGY

## 2020-06-02 PROCEDURE — 82657 ENZYME CELL ACTIVITY: CPT | Performed by: PATHOLOGY

## 2020-06-02 PROCEDURE — 88342 IMHCHEM/IMCYTCHM 1ST ANTB: CPT | Performed by: PATHOLOGY

## 2020-06-02 PROCEDURE — 88341 IMHCHEM/IMCYTCHM EA ADD ANTB: CPT | Mod: 59 | Performed by: PATHOLOGY

## 2020-06-02 PROCEDURE — 82962 GLUCOSE BLOOD TEST: CPT | Performed by: INTERNAL MEDICINE

## 2020-06-02 PROCEDURE — D9220A PRA ANESTHESIA: ICD-10-PCS | Mod: ANES,,, | Performed by: ANESTHESIOLOGY

## 2020-06-02 PROCEDURE — 37000008 HC ANESTHESIA 1ST 15 MINUTES: Performed by: INTERNAL MEDICINE

## 2020-06-02 PROCEDURE — 88341 IMHCHEM/IMCYTCHM EA ADD ANTB: CPT | Mod: 26,,, | Performed by: PATHOLOGY

## 2020-06-02 PROCEDURE — 43239 EGD BIOPSY SINGLE/MULTIPLE: CPT | Mod: ,,, | Performed by: INTERNAL MEDICINE

## 2020-06-02 PROCEDURE — D9220A PRA ANESTHESIA: Mod: CRNA,,, | Performed by: NURSE ANESTHETIST, CERTIFIED REGISTERED

## 2020-06-02 PROCEDURE — 43239 EGD BIOPSY SINGLE/MULTIPLE: CPT | Performed by: INTERNAL MEDICINE

## 2020-06-02 PROCEDURE — D9220A PRA ANESTHESIA: ICD-10-PCS | Mod: CRNA,,, | Performed by: NURSE ANESTHETIST, CERTIFIED REGISTERED

## 2020-06-02 PROCEDURE — D9220A PRA ANESTHESIA: Mod: ANES,,, | Performed by: ANESTHESIOLOGY

## 2020-06-02 PROCEDURE — 25000003 PHARM REV CODE 250: Performed by: NURSE ANESTHETIST, CERTIFIED REGISTERED

## 2020-06-02 PROCEDURE — 27201012 HC FORCEPS, HOT/COLD, DISP: Performed by: INTERNAL MEDICINE

## 2020-06-02 PROCEDURE — 88342 IMHCHEM/IMCYTCHM 1ST ANTB: CPT | Mod: 26,,, | Performed by: PATHOLOGY

## 2020-06-02 PROCEDURE — 88305 TISSUE EXAM BY PATHOLOGIST: CPT | Mod: 59 | Performed by: PATHOLOGY

## 2020-06-02 PROCEDURE — 88305 TISSUE EXAM BY PATHOLOGIST: ICD-10-PCS | Mod: 26,,, | Performed by: PATHOLOGY

## 2020-06-02 PROCEDURE — 43239 PR EGD, FLEX, W/BIOPSY, SGL/MULTI: ICD-10-PCS | Mod: ,,, | Performed by: INTERNAL MEDICINE

## 2020-06-02 PROCEDURE — 88341 PR IHC OR ICC EACH ADD'L SINGLE ANTIBODY  STAINPR: ICD-10-PCS | Mod: 26,,, | Performed by: PATHOLOGY

## 2020-06-02 PROCEDURE — 25000003 PHARM REV CODE 250: Performed by: INTERNAL MEDICINE

## 2020-06-02 PROCEDURE — 63600175 PHARM REV CODE 636 W HCPCS: Performed by: NURSE ANESTHETIST, CERTIFIED REGISTERED

## 2020-06-02 RX ORDER — ROCURONIUM BROMIDE 10 MG/ML
INJECTION, SOLUTION INTRAVENOUS
Status: DISCONTINUED | OUTPATIENT
Start: 2020-06-02 | End: 2020-06-02

## 2020-06-02 RX ORDER — MIDAZOLAM HYDROCHLORIDE 1 MG/ML
INJECTION, SOLUTION INTRAMUSCULAR; INTRAVENOUS
Status: DISCONTINUED | OUTPATIENT
Start: 2020-06-02 | End: 2020-06-02

## 2020-06-02 RX ORDER — EPHEDRINE SULFATE 50 MG/ML
INJECTION, SOLUTION INTRAVENOUS
Status: DISCONTINUED | OUTPATIENT
Start: 2020-06-02 | End: 2020-06-02

## 2020-06-02 RX ORDER — SUCCINYLCHOLINE CHLORIDE 20 MG/ML
INJECTION INTRAMUSCULAR; INTRAVENOUS
Status: DISCONTINUED | OUTPATIENT
Start: 2020-06-02 | End: 2020-06-02

## 2020-06-02 RX ORDER — SODIUM CHLORIDE 9 MG/ML
INJECTION, SOLUTION INTRAVENOUS CONTINUOUS
Status: DISCONTINUED | OUTPATIENT
Start: 2020-06-02 | End: 2020-06-02 | Stop reason: HOSPADM

## 2020-06-02 RX ORDER — FENTANYL CITRATE 50 UG/ML
INJECTION, SOLUTION INTRAMUSCULAR; INTRAVENOUS
Status: DISCONTINUED | OUTPATIENT
Start: 2020-06-02 | End: 2020-06-02

## 2020-06-02 RX ORDER — SODIUM CHLORIDE 0.9 % (FLUSH) 0.9 %
10 SYRINGE (ML) INJECTION
Status: DISCONTINUED | OUTPATIENT
Start: 2020-06-02 | End: 2020-06-02 | Stop reason: HOSPADM

## 2020-06-02 RX ORDER — PANTOPRAZOLE SODIUM 40 MG/1
40 TABLET, DELAYED RELEASE ORAL
Qty: 90 TABLET | Refills: 3 | Status: SHIPPED | OUTPATIENT
Start: 2020-06-02 | End: 2020-09-25

## 2020-06-02 RX ORDER — LIDOCAINE HYDROCHLORIDE 20 MG/ML
INJECTION INTRAVENOUS
Status: DISCONTINUED | OUTPATIENT
Start: 2020-06-02 | End: 2020-06-02

## 2020-06-02 RX ORDER — PROPOFOL 10 MG/ML
VIAL (ML) INTRAVENOUS
Status: DISCONTINUED | OUTPATIENT
Start: 2020-06-02 | End: 2020-06-02

## 2020-06-02 RX ORDER — ONDANSETRON 2 MG/ML
INJECTION INTRAMUSCULAR; INTRAVENOUS
Status: DISCONTINUED | OUTPATIENT
Start: 2020-06-02 | End: 2020-06-02

## 2020-06-02 RX ADMIN — EPHEDRINE SULFATE 20 MG: 50 INJECTION INTRAVENOUS at 03:06

## 2020-06-02 RX ADMIN — FENTANYL CITRATE 25 MCG: 50 INJECTION, SOLUTION INTRAMUSCULAR; INTRAVENOUS at 02:06

## 2020-06-02 RX ADMIN — MIDAZOLAM HYDROCHLORIDE 2 MG: 1 INJECTION, SOLUTION INTRAMUSCULAR; INTRAVENOUS at 02:06

## 2020-06-02 RX ADMIN — SODIUM CHLORIDE: 0.9 INJECTION, SOLUTION INTRAVENOUS at 01:06

## 2020-06-02 RX ADMIN — SUGAMMADEX 200 MG: 100 INJECTION, SOLUTION INTRAVENOUS at 03:06

## 2020-06-02 RX ADMIN — SUCCINYLCHOLINE CHLORIDE 100 MG: 20 INJECTION, SOLUTION INTRAMUSCULAR; INTRAVENOUS at 02:06

## 2020-06-02 RX ADMIN — ROCURONIUM BROMIDE 20 MG: 10 INJECTION, SOLUTION INTRAVENOUS at 03:06

## 2020-06-02 RX ADMIN — ROCURONIUM BROMIDE 5 MG: 10 INJECTION, SOLUTION INTRAVENOUS at 02:06

## 2020-06-02 RX ADMIN — ONDANSETRON 4 MG: 2 INJECTION INTRAMUSCULAR; INTRAVENOUS at 03:06

## 2020-06-02 RX ADMIN — PROPOFOL 50 MG: 10 INJECTION, EMULSION INTRAVENOUS at 03:06

## 2020-06-02 RX ADMIN — EPHEDRINE SULFATE 10 MG: 50 INJECTION INTRAVENOUS at 03:06

## 2020-06-02 RX ADMIN — PROPOFOL 150 MG: 10 INJECTION, EMULSION INTRAVENOUS at 02:06

## 2020-06-02 RX ADMIN — LIDOCAINE HYDROCHLORIDE 100 MG: 20 INJECTION, SOLUTION INTRAVENOUS at 02:06

## 2020-06-02 RX ADMIN — FENTANYL CITRATE 25 MCG: 50 INJECTION, SOLUTION INTRAMUSCULAR; INTRAVENOUS at 03:06

## 2020-06-02 NOTE — TRANSFER OF CARE
"Anesthesia Transfer of Care Note    Patient: Aracelis Martinez    Procedure(s) Performed: Procedure(s) (LRB):  EGD (ESOPHAGOGASTRODUODENOSCOPY) (N/A)    Patient location: PACU    Anesthesia Type: general    Transport from OR: Transported from OR on 6-10 L/min O2 by face mask with adequate spontaneous ventilation    Post pain: adequate analgesia    Post assessment: no apparent anesthetic complications and tolerated procedure well    Post vital signs: stable    Level of consciousness: awake    Nausea/Vomiting: no nausea/vomiting    Complications: none    Transfer of care protocol was followed      Last vitals:   Visit Vitals  BP (!) 155/87 (BP Location: Right arm, Patient Position: Lying)   Pulse 74   Temp 36.7 °C (98.1 °F) (Oral)   Resp 18   Ht 5' 5" (1.651 m)   Wt 92.1 kg (203 lb)   SpO2 100%   Breastfeeding? No   BMI 33.78 kg/m²     "

## 2020-06-02 NOTE — H&P
[facility]  History & Physical    Subjective:      Chief Complaint/Reason for Admission:    EGD    Aracelis Martinez is a 65 y.o. female.    Past Medical History:   Diagnosis Date    Arthritis     Chronic back pain     Diabetes mellitus 2014    Diverticulosis     Hypertension     Neuroendocrine tumor of pancreas 2015    Pancreatic cancer 2015    Renal cyst     left    Thyroid disease      Past Surgical History:   Procedure Laterality Date     SECTION      COLONOSCOPY N/A 3/13/2019    Procedure: COLONOSCOPY;  Surgeon: Cem Lamar MD;  [facility]  Service: Endoscopy;  Laterality: N/A;  previous order cx    ESOPHAGOGASTRODUODENOSCOPY N/A 2019    Procedure: ESOPHAGOGASTRODUODENOSCOPY (EGD);  Surgeon: Jonathan Oneill MD;  [facility]  Service: Endoscopy;  Laterality: N/A;    EXCISION OF GANGLION CYST OF HAND Right 10/22/2018    Procedure: EXCISION, GANGLION CYST, HAND- RIGHT, LONG AND INDEX FINGER;  Surgeon: Sowmya Schmidt MD;  Location: [facility];  Service: Orthopedics;  Laterality: Right;  Stretcher; Supine; Hand Pan 1 & [place] 2; Dr. Loja's Rasp    HYSTERECTOMY      KNEE ARTHROSCOPY  2014    LATS/left    PANCREAS SURGERY      MD Ritchie    TONSILLECTOMY       Family History   Problem Relation Age of Onset    Hypertension Mother     Diabetes Mother     Heart disease Mother     Stroke Mother     Hypertension Sister     Stroke Sister     Breast cancer Sister     Hypertension Brother      Social History     Tobacco Use    Smoking status: Current Some Day Smoker     Packs/day: 1.50     Years: 10.00     Pack years: 15.00     Types: Cigarettes     Last attempt to quit: 1982     Years since quittin.4    Smokeless tobacco: Never Used   Substance Use Topics    Alcohol use: Yes     Frequency: Monthly or less     Drinks per session: 1 or 2     Binge frequency: Never     Comment: occasional use    Drug  use: No       PTA Medications   Medication Sig    amLODIPine (NORVASC) 10 MG tablet TAKE 1 TABLET BY MOUTH DAILY    DULoxetine (CYMBALTA) 30 MG capsule TAKE 1 CAPSULE(30 MG) BY MOUTH EVERY DAY    levothyroxine (SYNTHROID) 50 MCG tablet TAKE 1 TABLET BY MOUTH BEFORE BREAKFAST    metFORMIN (GLUCOPHAGE) 500 MG tablet TAKE 1 TABLET BY MOUTH EVERY MORNING AND 2 TABLET BY MOUTH EVERY EVENING FOR DIABETES    pantoprazole (PROTONIX) 40 MG tablet TAKE 1 TABLET BY MOUTH DAILY    potassium chloride SA (K-DUR,KLOR-CON) 10 MEQ tablet Take 1 tablet (10 mEq total) by mouth 2 (two) times daily. For potassium replacement.    temazepam (RESTORIL) 15 mg Cap Take 1 capsule (15 mg total) by mouth nightly as needed (insomnia).    acetaminophen-codeine 300-30mg (TYLENOL #3) 300-30 mg Tab Take 1 tablet by mouth every 6 (six) hours as needed.    blood sugar diagnostic Strp To check BG 1 time daily, to use with insurance preferred meter    blood-glucose meter kit To check BG 1 time daily, to use with insurance preferred meter    hydrOXYzine pamoate (VISTARIL) 25 MG Cap Take 1 capsule (25 mg total) by mouth 2 (two) times daily as needed (anxiety).    lancets Misc To check BG 1 time daily, to use with insurance preferred meter    traMADoL (ULTRAM) 50 mg tablet TAKE 1 TO 2 TABLETS BY MOUTH TWICE DAILY     Review of patient's allergies indicates:   Allergen Reactions    Azithromycin Other (See Comments)     Yeast infection; pt requests please do not put her on this medication        Review of Systems   Constitutional: Negative for chills, fever and weight loss.   Respiratory: Negative for shortness of breath and wheezing.    Cardiovascular: Negative for chest pain.   Gastrointestinal: Negative for abdominal pain.       Objective:      Vital Signs (Most Recent)  Temp: 98.1 °F (36.7 °C) (06/02/20 1420)  Pulse: 74 (06/02/20 1420)  Resp: 18 (06/02/20 1420)  BP: (!) 155/87 (06/02/20 1420)  SpO2: 100 % (06/02/20 1420)    Vital Signs  Range (Last 24H):  Temp:  [98.1 °F (36.7 °C)]   Pulse:  [74]   Resp:  [18]   BP: (155)/(87)   SpO2:  [100 %]     Physical Exam   Constitutional: She is oriented to person, place, and time. She appears well-developed and well-nourished.   Cardiovascular: Normal rate.   Pulmonary/Chest: Effort normal.   Abdominal: Soft.   Neurological: She is alert and oriented to person, place, and time.   Skin: Skin is warm and dry.   Psychiatric: She has a normal mood and affect. Her behavior is normal. Judgment and thought content normal.           Assessment:      There are no hospital problems to display for this patient.      Plan:    EGD for Belching and GERD

## 2020-06-02 NOTE — ANESTHESIA PREPROCEDURE EVALUATION
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 female who  has a past medical history of Arthritis, Chronic back pain, Diabetes mellitus (07/2014), Diverticulosis, Hypertension, Neuroendocrine tumor of pancreas (2015), Pancreatic cancer (2015), Renal cyst, and Thyroid disease..   Revised cardiac risk index (RCRI) score is 0  Last EGD under GA w native airway, c/b   Vasovagal hypotensive episode due to abdominal cramps, resolution with IV, PO fluids.    she has a current medication list which includes the following long-term medication(s): amlodipine, blood-glucose meter, duloxetine, lancets, levothyroxine, metformin, pantoprazole, and tramadol.     ALLERGIES:     Review of patient's allergies indicates:   Allergen Reactions    Azithromycin Other (See Comments)     Yeast infection; pt requests please do not put her on this medication     LDA:      Lines/Drains/Airways     Peripheral Intravenous Line                 Peripheral IV - Single Lumen 06/02/20 1422 22 G Right Hand less than 1 day               Anesthesia Evaluation      No history of anesthetic complications   Airway   Mallampati: II  TM distance: Normal, at least 6 cm  Dental    (+) In tact    Pulmonary    Cardiovascular   Exercise tolerance: good  (+) hypertension,     Rate: Normal    Neuro/Psych      GI/Hepatic/Renal      Endo/Other    (+)  diabetes mellitus, arthritis  Abdominal                  MEDICATIONS:     Antibiotics (From admission, onward)    None        VTE Risk Mitigation (From admission, onward)    None        Current Facility-Administered Medications   Medication Dose Route Frequency Provider Last Rate Last Dose    0.9%  NaCl infusion   Intravenous Continuous Shady Costa MD              History:   There are no hospital problems to display for this patient.    Surgical History:    has a past surgical history that includes Hysterectomy; Tonsillectomy; Knee arthroscopy (2014);  section; Excision of ganglion cyst of hand (Right, 10/22/2018); Pancreas surgery (); Colonoscopy (N/A, 3/13/2019); and Esophagogastroduodenoscopy (N/A, 2019).   Social History:    reports that she currently engages in sexual activity and has had partner(s) who are Male. She reports using the following method of birth control/protection: None.  reports that she has been smoking cigarettes. She has a 15.00 pack-year smoking history. She has never used smokeless tobacco. She reports that she drinks alcohol. She reports that she does not use drugs.     OBJECTIVE:     Vital Signs (Most Recent):  Temp: 36.7 °C (98.1 °F) (20 1420)  Pulse: 74 (20 1420)  Resp: 18 (20 1420)  BP: (!) 155/87 (20 1420)  SpO2: 100 % (20 1420) Vital Signs Range (Last 24H):  Temp:  [36.7 °C (98.1 °F)]   Pulse:  [74]   Resp:  [18]   BP: (155)/(87)   SpO2:  [100 %]        Body mass index is 33.78 kg/m².   Wt Readings from Last 4 Encounters:   20 92.1 kg (203 lb)   20 96.6 kg (213 lb)   20 96.6 kg (213 lb)   20 97 kg (213 lb 13.5 oz)     Significant Labs:  Lab Results   Component Value Date    WBC 7.39 2020    HGB 15.2 2020    HCT 48.0 2020     (H) 2020     2020    K 3.1 (L) 2020    CL 98 2020    CREATININE 0.9 2020    BUN 15 2020    CO2 30 (H) 2020      (H) 05/13/2020    CALCIUM 10.2 05/13/2020    MG 2.1 04/15/2009    ALKPHOS 79 05/13/2020    ALT 12 05/13/2020    AST 15 05/13/2020    ALBUMIN 4.3 05/13/2020    HGBA1C 6.6 (H) 05/13/2020     No LMP recorded. Patient has had a hysterectomy.  Recent Results (from the past 60 hour(s))   COVID-19 Routine Screening    Collection Time: 06/01/20  3:16 PM   Result Value Ref Range    SARS-CoV2 (COVID-19) Qualitative PCR Not Detected Not Detected   POCT glucose    Collection Time: 06/02/20  2:24 PM   Result Value Ref Range    POCT Glucose 105 70 - 110 mg/dL     EKG:   Results for orders placed or performed in visit on 10/18/18   IN OFFICE EKG 12-LEAD (to [place])    Collection Time: 10/18/18 11:27 AM    Narrative    Test Reason : Z01.818  Blood Pressure : * mmHG  Vent. Rate : 063 BPM     Atrial Rate : 063 BPM     P-R Int : 162 ms          QRS Dur : 082 ms      QT Int : 456 ms       P-R-T Axes : 065 053 044 degrees     QTc Int : 466 ms    Program found technically poor ECG  Normal sinus rhythm  Possible Left atrial enlargement  Septal infarct (cited on or before 18-FEB-2016)  Abnormal ECG  When compared with ECG of 18-FEB-2016 12:37,  No significant change was found  Confirmed by Gloria West MD ([id number]) on 10/19/2018 9:28:09 AM    Referred By:  OBDULIA           Confirmed By:Gloria West MD     ASSESSMENT/PLAN:     Pre-op Assessment    I have reviewed the Patient Summary Reports.      I have reviewed the Medications.     Review of Systems  Anesthesia Hx:  No problems with previous Anesthesia Denies Hx of Anesthetic complications  Neg history of prior surgery. Denies Family Hx of Anesthesia complications.   Denies Personal Hx of Anesthesia complications.   Social:  Former Smoker    Hematology/Oncology:  Hematology Normal   Oncology Normal     EENT/Dental:EENT/Dental Normal   Cardiovascular:   Exercise tolerance: good Hypertension    Pulmonary:  Pulmonary Normal    Renal/:  Renal/ Normal     Hepatic/GI:  Hepatic/GI  Normal    Musculoskeletal:   Arthritis     Neurological:  Neurology Normal    Endocrine:   Diabetes    Dermatological:  Skin Normal    Psych:  Psychiatric Normal           Physical Exam  General:  Well nourished    Airway/Jaw/Neck:  Airway Findings: Mouth Opening: Normal Tongue: Normal  General Airway Assessment: Adult  Mallampati: II  TM Distance: Normal, at least 6 cm      Dental:  Dental Findings: In tact   Chest/Lungs:  Chest/Lungs Findings: Clear to auscultation, Normal Respiratory Rate     Heart/Vascular:  Heart Findings: Rate: Normal  Rhythm: Regular Rhythm  Sounds: Normal  Heart murmur: negative Vascular Findings: Normal    Abdomen:  Abdomen Findings:  Normal, Nontender, Soft     Musculoskeletal:  Musculoskeletal Findings: Normal   Skin:  Skin Findings: Normal    Mental Status:  Mental Status Findings:  Cooperative, Alert and Oriented         Anesthesia Plan  Type of Anesthesia, risks & benefits discussed:  Anesthesia Type:  general  Patient's Preference:   Intra-op Monitoring Plan: standard ASA monitors  Intra-op Monitoring Plan Comments:   Post Op Pain Control Plan:   Post Op Pain Control Plan Comments:   Induction:   IV  Beta Blocker:  Patient is not currently on a Beta-Blocker (No further documentation required).       Informed Consent: Patient understands risks and agrees with Anesthesia plan.  Questions answered. Anesthesia consent signed with patient.  ASA Score: 2     Day of Surgery Review of History & Physical:    H&P update referred to the provider.         Ready For Surgery From Anesthesia Perspective.

## 2020-06-02 NOTE — PLAN OF CARE
Patient states they are ready to be discharged. Instructions and report given to patient and family. Both verbalize understanding. Patient tolerating po liquids with no difficulty. Patient denies pain. Anesthesia consent and surgical consent in chart upon patient's discharge from [facility].

## 2020-06-02 NOTE — [employer] PATIENT INSTRUCTIONS
Discharge Summary/Instructions after an Endoscopic Procedure  Patient Name: Aracelis Martinez  Patient MRN: [id number]  Patient YOB: 1955 Tuesday, June 2, 2020  Shady Costa MD  RESTRICTIONS:  During your procedure today, you received medications for sedation.  These   medications may affect your judgment, balance and coordination.  Therefore,   for 24 hours, you have the following restrictions:   - DO NOT drive a car, operate machinery, make legal/financial decisions,   sign important papers or drink alcohol.    ACTIVITY:  Today: no heavy lifting, straining or running due to procedural   sedation/anesthesia.  The following day: return to full activity including work.  DIET:  Eat and drink normally unless instructed otherwise.     TREATMENT FOR COMMON SIDE EFFECTS:  - Mild abdominal pain, nausea, belching, bloating or excessive gas:  rest,   eat lightly and use a heating pad.  - Sore Throat: treat with throat lozenges and/or gargle with warm salt   water.  - Because air was used during the procedure, expelling large amounts of air   from your rectum or belching is normal.  - If a bowel prep was taken, you may not have a bowel movement for 1-3 days.    This is normal.  SYMPTOMS TO WATCH FOR AND REPORT TO YOUR PHYSICIAN:  1. Abdominal pain or bloating, other than gas cramps.  2. Chest pain.  3. Back pain.  4. Signs of infection such as: chills or fever occurring within 24 hours   after the procedure.  5. Rectal bleeding, which would show as bright red, maroon, or black stools.   (A tablespoon of blood from the rectum is not serious, especially if   hemorrhoids are present.)  6. Vomiting.  7. Weakness or dizziness.  GO DIRECTLY TO THE NEAREST EMERGENCY ROOM IF YOU HAVE ANY OF THE FOLLOWING:      Difficulty breathing              Chills and/or fever over 101 F   Persistent vomiting and/or vomiting blood   Severe abdominal pain   Severe chest pain   Black, tarry stools   Bleeding- more than one tablespoon   Any  other symptom or condition that you feel may need urgent attention  Your doctor recommends these additional instructions:  If any biopsies were taken, your doctors clinic will contact you in 1 to 2   weeks with any results.  - Discharge patient to home.   - Await pathology results.   - Telephone endoscopist for pathology results in 2 weeks.   - Consider avoiding all non-steroidal anti-inflammatory drugs (aspirin,   ibuprofen, naproxen, etc.), unless needed for cardiovascular protection.    Recommend you discuss with your prescribing doctor (of your aspirin) to see   if cardiovascular benefits of your aspirin outweigh the risks of GI   bleeding.  - Use Protonix (pantoprazole) 40 mg by mouth once daily (or any other full   strength proton pump inhibitor) - best taken 45 minutes before your first   protein containing meal.   - Repeat upper endoscopy in 12 weeks to check healing.   - The findings and recommendations were discussed with the patient.   - Return to primary care physician.  For questions, problems or results please call your physician - Shady Costa MD at Work:  [phone].  [facility], EMERGENCY ROOM PHONE NUMBER: [phone]  IF A COMPLICATION OR EMERGENCY SITUATION ARISES AND YOU ARE UNABLE TO REACH   YOUR PHYSICIAN - GO DIRECTLY TO THE EMERGENCY ROOM.  Shady Costa MD  6/2/2020 3:26:12 PM  This report has been verified and signed electronically.  [employer]

## 2020-06-02 NOTE — DISCHARGE INSTRUCTIONS

## 2020-06-02 NOTE — PROGRESS NOTES
NOLANETS:  [facility]  A collaboration between [facility] and [facility]      PATIENT: Aracelis Martinez  MRN: [id number]  DATE: 6/3/2020    Subjective:      Chief Complaint: Consult for pancreatic neuroendocrine tumor.  Referred by Dr. Shady Costa.    The patient location is: home  The chief complaint leading to consultation is: establish treatment and surveillance plan  Visit type: Virtual visit with synchronous audio and video  Total time spent with patient: 60    Visit type: audiovisual    Face to Face time with patient: 35    45 minutes of total time spent on the encounter, which includes face to face time and non-face to face time preparing to see the patient (eg, review of tests), Obtaining and/or reviewing separately obtained history, Documenting clinical information in the electronic or other health record, Independently interpreting results (not separately reported) and communicating results to the patient/family/caregiver, or Care coordination (not separately reported).     Overview / HPI: This nice lady has a pancreatic neuroendocrine tumor diagnosed 2015. She has not followed currently by any hematologist her oncologist's for her history of pancreatic cancer.      The patient's main complaint is intractable belching.  She has been using Gas-X and baclofen without obtaining any relief.  She has experience nausea intermittently but no vomiting.  She is not experiencing any is dysphagia or heartburn.  She does complain of early satiety however.  She denies having headache ups.  Unspecified weight loss is reported.    Interval History:   She comes to clinic to review the findings on the most recent imaging and blood work and to establish a surveillance program at this juncture.     Recent testing includes tumor markers (2015), Ct abd/pelvis (3/2019), MRI abd (4/2019), Ga 68 PET/CT scan (none), EGD  (2020).    Vitals: There were no vitals taken for this visit. --stable    ECOG Score: 2 - Symptomatic, <50% confined to bed    Oncologic History:   Oncologic History PNET, dx -   Oncologic Treatment 2015- Surgery ([facility])   Pathology 2015- FNA panc- pos for neoplastic cells  2015- panc- neck- well diff net, Ki 67- 6.6%, pT1 pNX pM(n/a)     Past Medical History:  Past Medical History:   Diagnosis Date    Arthritis     Chronic back pain     Diabetes mellitus 2014    Diverticulosis     Hypertension     Neuroendocrine tumor of pancreas     Pancreatic cancer     Renal cyst     left    Thyroid disease        Past Surgical History:  Past Surgical History:   Procedure Laterality Date     SECTION      COLONOSCOPY N/A 3/13/2019    Procedure: COLONOSCOPY;  Surgeon: Cem Lamar MD;  Location: [facility] (4TH FLR);  Service: Endoscopy;  Laterality: N/A;  previous order cx    ESOPHAGOGASTRODUODENOSCOPY N/A 2019    Procedure: ESOPHAGOGASTRODUODENOSCOPY (EGD);  Surgeon: Jonathan Oneill MD;  Location: [facility] (4TH FLR);  Service: Endoscopy;  Laterality: N/A;    ESOPHAGOGASTRODUODENOSCOPY N/A 2020    Procedure: EGD (ESOPHAGOGASTRODUODENOSCOPY);  Surgeon: Shady Costa MD;  Location: [facility] (2ND FLR);  Service: Endoscopy;  Laterality: N/A;  Please schedule patient as soon as possible in the 2nd floor history of a Whipple surgery for neuroendocrine pancreatic tumor many years ago with now constant belching and regurgitation.  May need airway protection.  Rule out celiac sprue rule out lact    EXCISION OF GANGLION CYST OF HAND Right 10/22/2018    Procedure: EXCISION, GANGLION CYST, HAND- RIGHT, LONG AND INDEX FINGER;  Surgeon: Sowmya Schmidt MD;  Location: [facility];  Service: Orthopedics;  Laterality: Right;  Stretcher; Supine; Hand Pan 1 & [place] 2; Dr. Loja's Rasp    HYSTERECTOMY      KNEE ARTHROSCOPY  2014    LATS/left    PANCREAS SURGERY      MD Ritchie     TONSILLECTOMY         Family History:  Family History   Problem Relation Age of Onset    Hypertension Mother     Diabetes Mother     Heart disease Mother     Stroke Mother     Hypertension Sister     Stroke Sister     Breast cancer Sister     Hypertension Brother        Allergies:  Azithromycin    Medications:  Current Outpatient Medications   Medication Sig    acetaminophen-codeine 300-30mg (TYLENOL #3) 300-30 mg Tab Take 1 tablet by mouth every 6 (six) hours as needed.    amLODIPine (NORVASC) 10 MG tablet TAKE 1 TABLET BY MOUTH DAILY    blood sugar diagnostic Strp To check BG 1 time daily, to use with insurance preferred meter    blood-glucose meter kit To check BG 1 time daily, to use with insurance preferred meter    DULoxetine (CYMBALTA) 30 MG capsule TAKE 1 CAPSULE(30 MG) BY MOUTH EVERY DAY    hydrOXYzine pamoate (VISTARIL) 25 MG Cap Take 1 capsule (25 mg total) by mouth 2 (two) times daily as needed (anxiety).    lancets Misc To check BG 1 time daily, to use with insurance preferred meter    levothyroxine (SYNTHROID) 50 MCG tablet TAKE 1 TABLET BY MOUTH BEFORE BREAKFAST    meloxicam (MOBIC) 15 MG tablet TAKE 1 TABLET BY MOUTH EVERY DAY FOR ANKLE PAIN AND SWELLING.    metFORMIN (GLUCOPHAGE) 500 MG tablet TAKE 1 TABLET BY MOUTH EVERY MORNING AND 2 TABLET BY MOUTH EVERY EVENING FOR DIABETES    metoclopramide HCl (REGLAN) 10 MG tablet Take 1 tablet (10 mg total) by mouth 4 (four) times daily.    pantoprazole (PROTONIX) 40 MG tablet TAKE 1 TABLET BY MOUTH DAILY    pantoprazole (PROTONIX) 40 MG tablet Take 1 tablet (40 mg total) by mouth before breakfast.    potassium chloride SA (K-DUR,KLOR-CON) 10 MEQ tablet Take 1 tablet (10 mEq total) by mouth 2 (two) times daily. For potassium replacement.    temazepam (RESTORIL) 15 mg Cap Take 1 capsule (15 mg total) by mouth nightly as needed (insomnia).    traMADoL (ULTRAM) 50 mg tablet TAKE 1 TO 2 TABLETS BY MOUTH TWICE DAILY     No current  facility-administered medications for this visit.         Review of Systems   Constitutional: Positive for activity change and appetite change.   HENT:        Belching   Eyes: Negative.    Respiratory: Negative.    Cardiovascular:        Hypertension   Gastrointestinal: Positive for abdominal distention, abdominal pain and nausea.        Belching   Endocrine:        Diabetes   Genitourinary: Negative.    Musculoskeletal: Positive for arthralgias and back pain.   Skin: Negative.    Allergic/Immunologic: Negative.    Neurological: Negative.    Hematological: Negative.    Psychiatric/Behavioral: Negative.           Objective:      Physical Exam   Constitutional: She is oriented to person, place, and time. She appears well-developed. No distress.   Noticeable belching throughout the entire 35 minute visit   Pulmonary/Chest: No stridor. No respiratory distress.   Neurological: She is alert and oriented to person, place, and time.   Psychiatric: She has a normal mood and affect. Her behavior is normal. Judgment and thought content normal.        Path- FNA- 5/21/15        Pathology- 12/29/15        Laboratory Data:    Neuroendocrine Labs Latest Ref Rng & Units 5/13/2020   TSH 0.400 - 4.000 uIU/mL 2.510   WBC 3.90 - 12.70 K/uL 7.39   HGB 12.0 - 16.0 g/dL 15.2   HCT 37.0 - 48.5 % 48.0   PLATLETS 150 - 350 K/uL 424 (H)   GLUCOSE 70 - 110 mg/dL 141 (H)   BUN 8 - 23 mg/dL 15   CREATININE 0.5 - 1.4 mg/dL 0.9    - 145 mmol/L 141   K 3.5 - 5.1 mmol/L 3.1 (L)   CHLORIDE 95 - 110 mmol/L 98   CO2 23 - 29 mmol/L 30 (H)   CALCIUM 8.7 - 10.5 mg/dL 10.2   PROTEIN, TOTAL 6.0 - 8.4 g/dL 8.0   ALBUMIN 3.5 - 5.2 g/dL 4.3   TOTAL BILIRUBIN 0.1 - 1.0 mg/dL 1.0   DIRECT BILIRUBIN 0.1 - 0.3 mg/dL    ALK PHOSPHATASE 55 - 135 U/L 79   SGOT (AST) 10 - 40 U/L 15   SGPT (ALT) 10 - 44 U/L 12   LIPASE 4 - 60 U/L    SERUM AMYLASE 20 - 110 U/L    TRIGLYCERIDES 30 - 150 mg/dL 118   CHOLERSTEROL 120 - 199 mg/dL 210 (H)   HDL 40 - 75 mg/dL 48   LDL  63.0 - 159.0 mg/dL 138.4   MG 1.6 - 2.6 mg/dl    24 HR CREATININE CLEARANCE 15.0 - 325.0 mg/dL    HEMOGLOBIN A1C 4.0 - 5.6 % 6.6 (H)     Scans:      MRI ABDOMEN W WO CONTRAST-4/3/19    CLINICAL HISTORY:  liver lesion; hx of neuroendocrine tumor of pancreas;  Liver disease, unspecified    TECHNIQUE:  Multiplanar, multisequence MRI of the abdomen was obtained without and with the administration of 10 cc of Gadavist intravenous contrast.    COMPARISON:  CT abdomen pelvis from 03/06/2019    FINDINGS:  Visualized portions of the lungs, heart, and pericardium are normal in appearance.    Liver is enlarged in size measuring 23.2 cm.  Liver demonstrates diffuse dropout of signal on out of phase imaging consistent with hepatic steatosis.  No abnormal areas of enhancement, particularly in the area of question in the superior aspect of the right hepatic lobe seen on exam from 03/06/2019.  No abnormal DWI focus is visualized toward corresponding with the previously questionable area of enhancement.    Gallbladder is normal in appearance.  No evidence of intra or extrahepatic biliary ductal dilatation.  Portal vein is patent.  Postsurgical changes status post partial pancreatectomy.  Spleen, stomach, duodenum, and adrenal glands are normal in appearance.    Left kidney demonstrates a complicated cyst in the inferior aspect of the left kidney.  No definite enhancement is visualized.  This lesion measures approximately 2.2 by 1.6 cm.  No hydronephrosis.  Visualized portions of the bowel, aorta, and osseous structures are normal in appearance.      Impression       In patient with history of neuroendocrine tumor status post partial pancreatectomy, no evidence of local recurrence or metastatic disease is visualized.  The previously identified area of question in the superior aspect of the right hepatic lobe demonstrates no abnormality on MRI examination.  Follow-up as clinically indicated.    Hepatomegaly.    Hepatic  steatosis.    Complicated cyst in the inferior aspect of the left kidney.       CT ABDOMEN WITH CONTRAST -3/6/19    CLINICAL HISTORY:  s/p partial pancreatectomy; neuroendocrine tumor of pancreas;  Upper abdominal pain, unspecified    TECHNIQUE:  Oral contrast was used.  Using (100 cc Omnipaque IV contrast), and helical CT technique, images of the abdomen were obtained during the arterial phase. Standard portal venous phase abdominal CT scan was then performed.    COMPARISON:  CT abdomen 12/13/2016. CT abdomen pelvis 04/16/2015.    FINDINGS:  Heart: Normal in size. No pericardial effusion.    Lung Bases: Well aerated, without consolidation or pleural fluid. Minimal subsegmental atelectatic changes in the left lung lingula.    Liver: Enlarged in size measuring 22 cm in the craniocaudal direction.  There is low-attenuation of the liver parenchyma, consistent with steatosis.  Small focus of enhancement on venous phase measures 0.6 cm (axial series 3, image 27) that is nonspecific although early metastatic disease not excluded.  Portal vein and hepatic veins are patent.    Splenic artery and veins are patent.    Gallbladder: No calcified gallstones.    Bile Ducts: No evidence of dilated ducts.    Pancreas: Postoperative changes of partial pancreatectomy.  Distal pancreas and tail demonstrate mild fatty atrophy and are otherwise unremarkable.    Spleen: Unremarkable.    Adrenals: Unremarkable.    Kidneys: Normal in size and location. Normal concentration of contrast. No hydronephrosis or nephrolithiasis.  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  There are a few scattered right renal hypodensities are too small to definitively characterize but are stable since CT 04/16/2015    GI Tract/Mesentery: No evidence of bowel obstruction or inflammation. There are few scattered mesenteric lymph nodes  around the pancreas, unchanged since prior CT and none meeting enlargement by CT criteria.    Peritoneal Space: No ascites. No free air.    Retroperitoneum:  No significant adenopathy.    Abdominal wall:  Small fat containing umbilical hernia.  Postoperative changes in the ventral wall midline.    Vasculature: No significant atherosclerosis or aneurysm.    Bones: No acute fracture. Mild multilevel degenerative changes of the spine.  Mild retrolisthesis of L5 on S1.  Prominent disc bulge seen at the L5-S1 level.      Impression       No acute abnormality to explain patient's symptoms.    In this patient with a history of pancreatic neuroendocrine tumor there are postoperative changes of partial pancreatectomy without evidence of local recurrence.    Small focus of subcentimeter enhancement in the right hepatic lobe (axial series 3, image 27) for which metastatic disease cannot be definitively excluded.  Further evaluation with nonemergent MRI with contrast is recommended.    Left renal heterogeneous hypodensity stable since CT 04/16/2015 with characteristics of an AML.    Hepatomegaly and steatosis.    This report was flagged in Epic as abnormal.     EGD- 6/2/20  Findings:       The examined duodenum was normal. Biopsies for histology were taken        with a cold forceps for evaluation of celiac disease. Biopsies were        taken with a cold forceps for histology. Estimated blood loss was        minimal.       Two non-bleeding superficial gastric ulcers with a clean ulcer base        (Blair Class III) were found in the prepyloric region of the        stomach. The largest lesion was 3 mm in largest dimension. Biopsies        were taken with a cold forceps for histology. Biopsies were taken        with a cold forceps for histology. Estimated blood loss was minimal.       The cardia and gastric fundus were normal on retroflexion.       The examined esophagus was normal. Z-line was sharp. No esophagitis        and no  columnar esophagus and no lesions seen with NBI or white        light.       The Z-line was regular and was found 39 cm from the incisors.  Impression:           - Normal examined duodenum. Biopsied.                        - Non-bleeding gastric ulcers with a clean ulcer                         base (Blair Class III). Biopsied.                        - Normal esophagus.                        - Z-line regular, 39 cm from the incisors.  Recommendation:       - Discharge patient to home.                        - Await pathology results.                        - Telephone endoscopist for pathology results in 2                         weeks.      Assessment/Impression:         Problem List Items Addressed This Visit        Cardiac/Vascular    Essential hypertension (Chronic)       Endocrine    Type 2 diabetes mellitus (Chronic)       GI    Functional belching disorder    Relevant Orders    Gastrin    Comprehensive metabolic panel      Other Visit Diagnoses     Malignant neoplasm of body of pancreas    -  Primary    Relevant Orders    Pancreastatin    Serotonin serum    5-HIAA Plasma (Neuoendocrine)    Personal history of pancreatic cancer               Plan:       I had a long conversation with the patient about the features of her case that support the treatment and surveillance recommendations outlined below:  1.  She had an early stage, low grade well-differentiated pancreatic neuroendocrine tumor excised in 2015.  We reviewed the overall favorable aspects of this disease including its slow in indolent biologic behavior, patients live a long time even when all the disease cannot be removed, and even when diagnosed at a young age patients are more likely than not going to live to die of something else.  Pancreatic neuroendocrine tumors are unique in that stage for stage grade for grade they are less predictable than a small intestinal primary.  This is the justification for surveillance up to 10 years.  Although  "there is no documentation surrounding the cross sectional imaging that she has had done over the years, she has had "surveillance" since 2015. I reviewed the images from 2018 and 2019 with respect to her PNET. There is no evidence of locoregional recurrence or the development of metastatic disease. With 5 years of "clean" scans, I would recommend cross sectional imaging every 2 years, but NET blood-work now and in one year.  2.  The recent medical decisions have been driven by upper digestive tract complaints of excessive belching.  In fact the belching was noticeable even over the virtual platform and is by far her most challenging symptom.  In a diabetic I would have suggested this symptomatology was related to delayed gastric emptying or gastroparesis, but the clinical exam would suggest that it is likely more complicated than that.  She did have an upper GI with small-bowel follow-through that notes a diffuse esophageal dysmotility syndrome as well as gastroesophageal reflux up into the proximal esophagus, but does not comment on any element of gastroparesis.  I believe this may have been the rationale for starting her on an antispasmodic agent, but she says that it made no difference at all being on that medication.  Although this problem is not in my area of expertise, I offered to try her on metoclopramide since this is a common treatment for gastric dysmotility.  However I do not know enough about esophageal dysmotility syndromes to make any suggestions on how this would be better evaluated and treated.  I will try reaching out to our gastroenterologist and see whether not he has any suggestions.    3.  She had an upper endoscopy that shows some superficial ulcerations of her stomach in the pre-pyloric area.  These are most often associated with hyper acid states for which medical management with proton pump inhibitors would be indicated.  Within the neuroendocrine field we try to shy away from these if at " all possible since the side effect is stimulation of the neuroendocrine system and the development of neuroendocrine tumors within the upper digestive tract.  However there is no link between proton pump inhibitor use and pancreatic neuroendocrine tumors.  If her symptoms improve a metoclopramide and hyperacidity can be treated with an H2 blocker like Pepcid AC, this would be preferable.  4.  She has not had neuroendocrine bloodwork.  I will order some baseline blood work to get a sense of her neuroendocrine physiology and baseline tumor markers.    Neuroendocrine blood work now and in 1 year  Reglan in the short-term  Suggest following up with GI about abnormal upper GI with small-bowel follow-through result    Kimberly Castaneda MD, MPH, FACS  Professor of Surgery, [facility]  Neuroendocrine Surgery, Hepatic/Pancreatic & General Surgical Oncology  [address]  [place]  [address]  ph. [phone]; [phone]  fax. [phone]

## 2020-06-02 NOTE — ANESTHESIA PROCEDURE NOTES
Intubation  Performed by: Kelly Tellez CRNA  Authorized by: Alexandra Travis MD     Intubation:     Induction:  Intravenous    Intubated:  Postinduction    Mask Ventilation:  Easy mask    Attempts:  1    Attempted By:  CRNA    Method of Intubation:  Video laryngoscopy    Blade:  Arambula 3    Laryngeal View Grade: Grade I - full view of chords      Difficult Airway Encountered?: No      Complications:  None    Airway Device:  Oral endotracheal tube    Airway Device Size:  7.0    Style/Cuff Inflation:  Cuffed    Tube secured:  22    Secured at:  The lips    Placement Verified By:  Capnometry    Complicating Factors:  None    Findings Post-Intubation:  BS equal bilateral

## 2020-06-03 ENCOUNTER — OFFICE VISIT (OUTPATIENT)
Dept: NEUROLOGY | Facility: HOSPITAL | Age: 65
End: 2020-06-03
Attending: SURGERY
Payer: MEDICARE

## 2020-06-03 DIAGNOSIS — Z85.07 PERSONAL HISTORY OF PANCREATIC CANCER: ICD-10-CM

## 2020-06-03 DIAGNOSIS — R14.2 FUNCTIONAL BELCHING DISORDER: ICD-10-CM

## 2020-06-03 DIAGNOSIS — E11.9 TYPE 2 DIABETES MELLITUS WITHOUT COMPLICATION, WITHOUT LONG-TERM CURRENT USE OF INSULIN: ICD-10-CM

## 2020-06-03 DIAGNOSIS — C25.1 MALIGNANT NEOPLASM OF BODY OF PANCREAS: Primary | ICD-10-CM

## 2020-06-03 DIAGNOSIS — I10 ESSENTIAL HYPERTENSION: ICD-10-CM

## 2020-06-03 RX ORDER — METOCLOPRAMIDE 10 MG/1
10 TABLET ORAL 4 TIMES DAILY
Qty: 60 TABLET | Refills: 2 | Status: SHIPPED | OUTPATIENT
Start: 2020-06-03 | End: 2020-09-25 | Stop reason: SDUPTHER

## 2020-06-03 NOTE — ANESTHESIA POSTPROCEDURE EVALUATION
Anesthesia Post Evaluation    Patient: Aracelis Martinez    Procedure(s) Performed: Procedure(s) (LRB):  EGD (ESOPHAGOGASTRODUODENOSCOPY) (N/A)    Final Anesthesia Type: general    Patient location during evaluation: PACU  Patient participation: Yes- Able to Participate  Level of consciousness: awake and alert  Post-procedure vital signs: reviewed and stable  Pain management: adequate  Airway patency: patent    PONV status at discharge: No PONV  Anesthetic complications: no      Cardiovascular status: blood pressure returned to baseline  Respiratory status: unassisted  Hydration status: euvolemic  Follow-up not needed.          Vitals Value Taken Time   /68 6/2/2020  4:32 PM   Temp 36.7 °C (98.1 °F) 6/2/2020  4:30 PM   Pulse 71 6/2/2020  4:45 PM   Resp 27 6/2/2020  4:44 PM   SpO2 100 % 6/2/2020  4:45 PM   Vitals shown include unvalidated device data.      No case tracking events are documented in the log.      Pain/Stefan Score: No data recorded

## 2020-06-03 NOTE — LETTER
Cristal 3, 2020      Shady Costa MD  [address]  [facility] [address]           [facility] [address] [address]  Phone: [phone]  Fax: [phone]          Patient: Aracelis Martinez   MR Number: [id number]   YOB: 1955   Date of Visit: 6/3/2020       Dear Dr. Shady Costa:    Thank you for referring Aracelis Martinez to me for evaluation. Attached you will find relevant portions of my assessment and plan of care.    If you have questions, please do not hesitate to call me. I look forward to following Aracelis Martinez along with you.    Sincerely,    Kimberly Castaneda MD    Enclosure  CC:  No Recipients    If you would like to receive this communication electronically, please contact [email or web address] or [phone] to request more information on [employer] Link access.    For providers and/or their staff who would like to refer a patient to [facility], please contact us through our one-stop-shop provider referral line, [facility], at [phone].    If you feel you have received this communication in error or would no longer like to receive these types of communications, please e-mail [email or web address]

## 2020-06-04 NOTE — PATIENT INSTRUCTIONS
Tumor markers: due now and every 12 mos.  --- June 2020 and June 2021 (mailed orders to patients home address) Get lab work drawn at least 1 month prior to appointment.    Scans: hold on imaging at this time.     Echo: hold on Echo at this time.     Consult:  Suggest following up with GI about abnormal upper GI with small-bowel follow-through result     Return Clinic Appointment: in 12 months with Dr. Castaneda - patient will call to schedule appointment    Medications: Reglan in the short-term

## 2020-06-05 ENCOUNTER — TELEPHONE (OUTPATIENT)
Dept: GASTROENTEROLOGY | Facility: CLINIC | Age: 65
End: 2020-06-05

## 2020-06-05 NOTE — TELEPHONE ENCOUNTER
"Yes, it was declined by . See her note on 5/22.    "Spoke with Dr. Costa and explained that I am not seeing consult for belching.  Per chart review patient has had a tree ordered by Dr. Adamson to rule out for rumination and belching.  If positive pt can be seen by our Gi dietitian for diaphragmatic breathing"  "

## 2020-06-05 NOTE — TELEPHONE ENCOUNTER
----- Message from Lorraine López sent at 6/5/2020  9:20 AM CDT -----  Hi- Have you received this referral already?  ----- Message -----  From: Shady Costa MD  Sent: 5/22/2020   4:12 PM CDT  To: Lorraine López    Please refer patient to see Dr. Delgado for excessive belching and possible rumination syndrome.  Patient ideally would like to be seen pretty soon she is okay with a telemedicine video visit I worker in emergently prior patient of Dr. Zapata

## 2020-06-08 ENCOUNTER — TELEPHONE (OUTPATIENT)
Dept: NEUROLOGY | Facility: HOSPITAL | Age: 65
End: 2020-06-08

## 2020-06-08 NOTE — TELEPHONE ENCOUNTER
Returned patients call. Educated patient on labs that need to be done, and contact information to the lab. Patient has no further questions at this time.

## 2020-06-08 NOTE — TELEPHONE ENCOUNTER
----- Message from Javi Gregory sent at 6/8/2020  1:40 PM CDT -----  Contact: self   Pt is requesting a call back at [phone] she has questions about the paper order that was given fo gastroenterology testing

## 2020-06-10 ENCOUNTER — HOSPITAL ENCOUNTER (OUTPATIENT)
Dept: RADIOLOGY | Facility: OTHER | Age: 65
Discharge: HOME | End: 2020-06-10
Attending: INTERNAL MEDICINE
Payer: MEDICARE

## 2020-06-10 DIAGNOSIS — Z85.07 PERSONAL HISTORY OF PANCREATIC CANCER: ICD-10-CM

## 2020-06-10 DIAGNOSIS — I10 ESSENTIAL HYPERTENSION: ICD-10-CM

## 2020-06-10 DIAGNOSIS — E11.9 TYPE 2 DIABETES MELLITUS WITHOUT COMPLICATION, WITHOUT LONG-TERM CURRENT USE OF INSULIN: ICD-10-CM

## 2020-06-10 DIAGNOSIS — R14.2 FUNCTIONAL BELCHING DISORDER: ICD-10-CM

## 2020-06-10 DIAGNOSIS — K59.09 CONSTIPATION, CHRONIC: Primary | ICD-10-CM

## 2020-06-10 LAB
FINAL PATHOLOGIC DIAGNOSIS: NORMAL
FINAL PATHOLOGIC DIAGNOSIS: NORMAL
GROSS: NORMAL
GROSS: NORMAL

## 2020-06-10 PROCEDURE — 76705 ECHO EXAM OF ABDOMEN: CPT | Mod: 26,,, | Performed by: RADIOLOGY

## 2020-06-10 PROCEDURE — 74019 RADEX ABDOMEN 2 VIEWS: CPT | Mod: TC,FY

## 2020-06-10 PROCEDURE — 71046 X-RAY EXAM CHEST 2 VIEWS: CPT | Mod: 26,,, | Performed by: RADIOLOGY

## 2020-06-10 PROCEDURE — 74019 XR ABDOMEN FLAT AND ERECT: ICD-10-PCS | Mod: 26,,, | Performed by: RADIOLOGY

## 2020-06-10 PROCEDURE — 76705 ECHO EXAM OF ABDOMEN: CPT | Mod: TC

## 2020-06-10 PROCEDURE — 71046 XR CHEST PA AND LATERAL: ICD-10-PCS | Mod: 26,,, | Performed by: RADIOLOGY

## 2020-06-10 PROCEDURE — 76705 US ABDOMEN LIMITED: ICD-10-PCS | Mod: 26,,, | Performed by: RADIOLOGY

## 2020-06-10 PROCEDURE — 74019 RADEX ABDOMEN 2 VIEWS: CPT | Mod: 26,,, | Performed by: RADIOLOGY

## 2020-06-10 PROCEDURE — 71046 X-RAY EXAM CHEST 2 VIEWS: CPT | Mod: TC,FY

## 2020-06-10 RX ORDER — POLYETHYLENE GLYCOL 3350 17 G/17G
17 POWDER, FOR SOLUTION ORAL DAILY
Qty: 510 G | Refills: 2 | Status: SHIPPED | OUTPATIENT
Start: 2020-06-10 | End: 2020-09-08

## 2020-06-11 ENCOUNTER — TELEPHONE (OUTPATIENT)
Dept: GASTROENTEROLOGY | Facility: CLINIC | Age: 65
End: 2020-06-11

## 2020-06-11 ENCOUNTER — PATIENT MESSAGE (OUTPATIENT)
Dept: GASTROENTEROLOGY | Facility: CLINIC | Age: 65
End: 2020-06-11

## 2020-06-11 NOTE — TELEPHONE ENCOUNTER
----- Message from Shady Costa MD sent at 6/10/2020  9:22 AM CDT -----  Lorraine -please tell patient that their liver imaging study shows Fatty Liver and recommend weight loss about 10% of their current body weight if they happen to be over weight (a Body Mass Index of over 26 is over weight)    Please tell patient that fatty liver in some patients can progress to liver cirrhosis, liver cancer, liver failure and death and need for liver transplant and best current treatment is gradual weight loss.    Aracelis your Ultrasound was read as follows:  EXAMINATION:  US ABDOMEN LIMITED  Impression      Surgical changes of the pancreas.    Hepatomegaly with fatty changes of the liver.    Gallbladder: No calculi, wall thickening, or pericholecystic fluid.  No sonographic Ennis's sign.    Electronically signed by: Mario Chambers MD

## 2020-06-15 ENCOUNTER — TELEPHONE (OUTPATIENT)
Dept: GASTROENTEROLOGY | Facility: CLINIC | Age: 65
End: 2020-06-15

## 2020-06-15 ENCOUNTER — NURSE TRIAGE (OUTPATIENT)
Dept: ADMINISTRATIVE | Facility: CLINIC | Age: 65
End: 2020-06-15

## 2020-06-15 NOTE — TELEPHONE ENCOUNTER
Pt contacted regarding day 13 of [facility].  Pt denies cough, fever or difficulty breathing since procedure.  Instructed pt to call back with further questions or concerns, pt verbalized understanding.      Reason for Disposition   General information question, no triage required and triager able to answer question    Additional Information   Negative: [1] Caller is not with the adult (patient) AND [2] reporting urgent symptoms   Negative: Lab result questions   Negative: Medication questions   Negative: Caller can't be reached by phone   Negative: Caller has already spoken to PCP or another triager   Negative: RN needs further essential information from caller in order to complete triage   Negative: Requesting regular office appointment   Negative: [1] Caller requesting NON-URGENT health information AND [2] PCP's office is the best resource    Protocols used: INFORMATION ONLY CALL-A-

## 2020-06-16 ENCOUNTER — NURSE TRIAGE (OUTPATIENT)
Dept: ADMINISTRATIVE | Facility: CLINIC | Age: 65
End: 2020-06-16

## 2020-06-16 NOTE — TELEPHONE ENCOUNTER
Contacted pt on behalf of Post Procedural Symptom Tracker. Pt denies any fever, cough, or difficulty breathing since procedure. Advised pt if these symptoms do arise to contact [facility] or PCP. No follow up.    Reason for Disposition   Health Information question, no triage required and triager able to answer question    Protocols used: INFORMATION ONLY CALL-A-AH

## 2020-06-23 LAB
5-HIAA, PLASMA (NEUROEND): 6 NG/ML
ALBUMIN SERPL-MCNC: 4.1 G/DL (ref 3.6–5.1)
ALBUMIN/GLOB SERPL: 1.6 (CALC) (ref 1–2.5)
ALP SERPL-CCNC: 83 U/L (ref 37–153)
ALT SERPL-CCNC: 16 U/L (ref 6–29)
AST SERPL-CCNC: 19 U/L (ref 10–35)
BILIRUB SERPL-MCNC: 0.3 MG/DL (ref 0.2–1.2)
BUN SERPL-MCNC: 23 MG/DL (ref 7–25)
BUN/CREAT SERPL: ABNORMAL (CALC) (ref 6–22)
CALCIUM SERPL-MCNC: 9.7 MG/DL (ref 8.6–10.4)
CHLORIDE SERPL-SCNC: 105 MMOL/L (ref 98–110)
CO2 SERPL-SCNC: 28 MMOL/L (ref 20–32)
CREAT SERPL-MCNC: 0.71 MG/DL (ref 0.5–0.99)
GASTRIN SERPL-MCNC: 32 PG/ML
GFRSERPLBLD MDRD-ARVRAT: 89 ML/MIN/1.73M2
GLOBULIN SER CALC-MCNC: 2.6 G/DL (CALC) (ref 1.9–3.7)
GLUCOSE SERPL-MCNC: 139 MG/DL (ref 65–99)
PANCREASTATIN: 60 PG/ML (ref 10–135)
POTASSIUM SERPL-SCNC: 4.2 MMOL/L (ref 3.5–5.3)
PROT SERPL-MCNC: 6.7 G/DL (ref 6.1–8.1)
SEROTONIN SER-MCNC: 182 NG/ML (ref 56–244)
SODIUM SERPL-SCNC: 141 MMOL/L (ref 135–146)

## 2020-07-09 ENCOUNTER — OFFICE VISIT (OUTPATIENT)
Dept: URGENT CARE | Facility: CLINIC | Age: 65
End: 2020-07-09
Payer: MEDICARE

## 2020-07-09 VITALS
TEMPERATURE: 98 F | BODY MASS INDEX: 33.82 KG/M2 | SYSTOLIC BLOOD PRESSURE: 151 MMHG | HEART RATE: 67 BPM | WEIGHT: 203 LBS | OXYGEN SATURATION: 97 % | RESPIRATION RATE: 18 BRPM | HEIGHT: 65 IN | DIASTOLIC BLOOD PRESSURE: 76 MMHG

## 2020-07-09 DIAGNOSIS — R52 TENDERNESS: ICD-10-CM

## 2020-07-09 DIAGNOSIS — R10.9 FLANK PAIN: ICD-10-CM

## 2020-07-09 DIAGNOSIS — S29.012A MUSCLE STRAIN OF RIGHT UPPER BACK, INITIAL ENCOUNTER: Primary | ICD-10-CM

## 2020-07-09 LAB
BILIRUB UR QL STRIP: NEGATIVE
GLUCOSE UR QL STRIP: NEGATIVE
KETONES UR QL STRIP: NEGATIVE
LEUKOCYTE ESTERASE UR QL STRIP: NEGATIVE
PH, POC UA: 7 (ref 5–8)
POC BLOOD, URINE: NEGATIVE
POC NITRATES, URINE: NEGATIVE
PROT UR QL STRIP: NEGATIVE
SP GR UR STRIP: 1 (ref 1–1.03)
UROBILINOGEN UR STRIP-ACNC: NORMAL (ref 0.1–1.1)

## 2020-07-09 PROCEDURE — 96372 THER/PROPH/DIAG INJ SC/IM: CPT | Mod: S$GLB,,, | Performed by: NURSE PRACTITIONER

## 2020-07-09 PROCEDURE — 71046 XR CHEST PA AND LATERAL: ICD-10-PCS | Mod: FY,S$GLB,, | Performed by: RADIOLOGY

## 2020-07-09 PROCEDURE — 99213 PR OFFICE/OUTPT VISIT, EST, LEVL III, 20-29 MIN: ICD-10-PCS | Mod: 25,S$GLB,, | Performed by: NURSE PRACTITIONER

## 2020-07-09 PROCEDURE — 96372 PR INJECTION,THERAP/PROPH/DIAG2ST, IM OR SUBCUT: ICD-10-PCS | Mod: S$GLB,,, | Performed by: NURSE PRACTITIONER

## 2020-07-09 PROCEDURE — 99213 OFFICE O/P EST LOW 20 MIN: CPT | Mod: 25,S$GLB,, | Performed by: NURSE PRACTITIONER

## 2020-07-09 PROCEDURE — 81003 URINALYSIS AUTO W/O SCOPE: CPT | Mod: QW,S$GLB,, | Performed by: NURSE PRACTITIONER

## 2020-07-09 PROCEDURE — 81003 POCT URINALYSIS, DIPSTICK, AUTOMATED, W/O SCOPE: ICD-10-PCS | Mod: QW,S$GLB,, | Performed by: NURSE PRACTITIONER

## 2020-07-09 PROCEDURE — 71046 X-RAY EXAM CHEST 2 VIEWS: CPT | Mod: FY,S$GLB,, | Performed by: RADIOLOGY

## 2020-07-09 RX ORDER — KETOROLAC TROMETHAMINE 30 MG/ML
30 INJECTION, SOLUTION INTRAMUSCULAR; INTRAVENOUS
Status: COMPLETED | OUTPATIENT
Start: 2020-07-09 | End: 2020-07-09

## 2020-07-09 RX ORDER — NAPROXEN 500 MG/1
500 TABLET ORAL 2 TIMES DAILY WITH MEALS
Qty: 30 TABLET | Refills: 0 | Status: SHIPPED | OUTPATIENT
Start: 2020-07-09 | End: 2020-10-02

## 2020-07-09 RX ADMIN — KETOROLAC TROMETHAMINE 30 MG: 30 INJECTION, SOLUTION INTRAMUSCULAR; INTRAVENOUS at 11:07

## 2020-07-09 NOTE — PROGRESS NOTES
"Subjective:       Patient ID: Aracelis Martinez is a 65 y.o. female.    Vitals:  height is 5' 5" (1.651 m) and weight is 92.1 kg (203 lb). Her temperature is 97.7 °F (36.5 °C). Her blood pressure is 151/76 (abnormal) and her pulse is 67. Her respiration is 18 and oxygen saturation is 97%.     Chief Complaint: Flank Pain    Flank Pain  This is a new problem. The current episode started in the past 7 days. The problem occurs constantly. The problem has been gradually worsening since onset. The quality of the pain is described as aching and shooting (sharp). Radiates to: abdominal  The pain is at a severity of 4/10. The pain is mild. The pain is the same all the time. The symptoms are aggravated by lying down, sitting, standing, twisting and position. Pertinent negatives include no chest pain, dysuria, fever, headaches or weakness. She has tried nothing for the symptoms. The treatment provided no relief.       Constitution: Negative for chills, fatigue and fever.   HENT: Negative for congestion and sore throat.    Neck: Negative for painful lymph nodes.   Cardiovascular: Negative for chest pain and leg swelling.   Eyes: Negative for double vision and blurred vision.   Respiratory: Negative for cough and shortness of breath.    Gastrointestinal: Negative for nausea, vomiting and diarrhea.   Genitourinary: Positive for flank pain. Negative for dysuria, frequency, urgency and history of kidney stones.   Musculoskeletal: Negative for joint pain, joint swelling, muscle cramps and muscle ache.   Skin: Negative for color change, pale, rash and bruising.   Allergic/Immunologic: Negative for seasonal allergies.   Neurological: Negative for dizziness, history of vertigo, light-headedness, passing out and headaches.   Hematologic/Lymphatic: Negative for swollen lymph nodes.   Psychiatric/Behavioral: Negative for nervous/anxious, sleep disturbance and depression. The patient is not nervous/anxious.        Objective:      Physical " Exam   Constitutional: She is oriented to person, place, and time. She appears well-developed. She is cooperative. No distress.   HENT:   Head: Normocephalic and atraumatic.   Nose: Nose normal.   Mouth/Throat: Oropharynx is clear and moist and mucous membranes are normal.   Eyes: Conjunctivae and lids are normal.   Neck: Trachea normal, normal range of motion, full passive range of motion without pain and phonation normal. Neck supple.   Cardiovascular: Normal rate, regular rhythm, normal heart sounds and normal pulses.   Pulmonary/Chest: Effort normal and breath sounds normal.   Abdominal: Soft. Normal appearance and bowel sounds are normal. She exhibits no abdominal bruit, no pulsatile midline mass and no mass.   Musculoskeletal:         General: No deformity.      Right shoulder: She exhibits no tenderness.        Arms:    Neurological: She is alert and oriented to person, place, and time. She has normal strength and normal reflexes. No sensory deficit.   Skin: Skin is warm, dry, intact and not diaphoretic.   Psychiatric: Her speech is normal and behavior is normal. Judgment and thought content normal.   Nursing note and vitals reviewed.        Results for orders placed or performed in visit on 07/09/20   POCT Urinalysis, Dipstick, Automated, W/O Scope   Result Value Ref Range    POC Blood, Urine Negative Negative    POC Bilirubin, Urine Negative Negative    POC Urobilinogen, Urine norm 0.1 - 1.1    POC Ketones, Urine Negative Negative    POC Protein, Urine Negative Negative    POC Nitrates, Urine Negative Negative    POC Glucose, Urine Negative Negative    pH, UA 7.0 5 - 8    POC Specific Gravity, Urine 1.005 1.003 - 1.029    POC Leukocytes, Urine Negative Negative   Xr Chest Pa And Lateral    Result Date: 7/9/2020  EXAMINATION: XR CHEST PA AND LATERAL CLINICAL HISTORY: Pain, unspecified TECHNIQUE: PA and lateral views of the chest were performed. COMPARISON: 06/10/2020 FINDINGS: Heart size is normal lungs are  clear and the bones showed DJD.     No acute process seen. Electronically signed by resident: Javon Mcdermott Date:    07/09/2020 Time:    10:58 Electronically signed by: Breezy Estrada MD Date:    07/09/2020 Time:    11:03      Assessment:       1. Muscle strain of right upper back, initial encounter    2. Flank pain    3. Tenderness        Plan:         Muscle strain of right upper back, initial encounter  -     ketorolac injection 30 mg  -     naproxen (NAPROSYN) 500 MG tablet; Take 1 tablet (500 mg total) by mouth 2 (two) times daily with meals.  Dispense: 30 tablet; Refill: 0    Flank pain  -     POCT Urinalysis, Dipstick, Automated, W/O Scope  -     Cancel: XR ABDOMEN FLAT AND ERECT; Future; Expected date: 07/09/2020    Tenderness  -     XR CHEST PA AND LATERAL; Future; Expected date: 07/09/2020         Patient Instructions   Orthopedic Follow up Discharge Instructions    If your condition worsens or fails to improve we recommend that you receive another evaluation at the ER immediately or contact your PCP to discuss your concerns or return here. You must understand that you've received an urgent care treatment only and that you may be released before all your medical problems are known or treated. You the patient will arrange for follouw care as instructed.   If you were prescribed a narcotic or muscle relaxant do not drive or operate heavy machinery while taking these medications   You were given Toradol 30mg IM injection on today.  DO NOT START TAKING YOUR NAPROSYN until tomorrow, 7/10/2020  Since you were given a prescription NSAID here do not also take any over the counter NSAID like ibuprofen, aleve, advil, motrin etc   RICE which means rest, ice compression and elevation are helpful.   If you have Low Back Pain and develop bowel or bladder symptoms or increase pain going down your legs go to the ER immediately.   If you were given a splint wear it at all times.   If you were given crutches use them as  we instructed. Do not rest your armpits on the foam pad or you risk compressing the nerves and the vessels there   Follow up with the orthopedist in 1 week if you continue with pain.   Sometimes it can take up to 1 week for stress fractures to show up on an X-ray, and may need reimaging or a CT or MRI of the affected area.

## 2020-07-09 NOTE — PATIENT INSTRUCTIONS
Orthopedic Follow up Discharge Instructions    If your condition worsens or fails to improve we recommend that you receive another evaluation at the ER immediately or contact your PCP to discuss your concerns or return here. You must understand that you've received an urgent care treatment only and that you may be released before all your medical problems are known or treated. You the patient will arrange for follouwp care as instructed.   If you were prescribed a narcotic or muscle relaxant do not drive or operate heavy machinery while taking these medications   You were given Toradol 30mg IM injection on today.  DO NOT START TAKING YOUR NAPROSYN until tomorrow, 7/10/2020  Since you were given a prescription NSAID here do not also take any over the counter NSAID like ibuprofen, aleve, advil, motrin etc   RICE which means rest, ice compression and elevation are helpful.   If you have Low Back Pain and develop bowel or bladder symptoms or increase pain going down your legs go to the ER immediately.   If you were given a splint wear it at all times.   If you were given crutches use them as we instructed. Do not rest your armpits on the foam pad or you risk compressing the nerves and the vessels there   Follow up with the orthopedist in 1 week if you continue with pain.   Sometimes it can take up to 1 week for stress fractures to show up on an X-ray, and may need reimaging or a CT or MRI of the affected area.

## 2020-07-17 DIAGNOSIS — Z71.89 COMPLEX CARE COORDINATION: ICD-10-CM

## 2020-08-18 ENCOUNTER — PATIENT MESSAGE (OUTPATIENT)
Dept: INTERNAL MEDICINE | Facility: CLINIC | Age: 65
End: 2020-08-18

## 2020-08-18 RX ORDER — TEMAZEPAM 15 MG/1
15 CAPSULE ORAL NIGHTLY PRN
Qty: 30 CAPSULE | Refills: 2 | Status: SHIPPED | OUTPATIENT
Start: 2020-08-18 | End: 2020-09-17

## 2020-08-18 NOTE — TELEPHONE ENCOUNTER
3 refills given at lat visit in may.  She hasn' t made her 3 mos appt yet.  I will remind her to schedule.    2 refills ok to get her to next appt?    Thanks angel

## 2020-09-25 ENCOUNTER — OFFICE VISIT (OUTPATIENT)
Dept: INTERNAL MEDICINE | Facility: CLINIC | Age: 65
End: 2020-09-25
Payer: MEDICARE

## 2020-09-25 VITALS
BODY MASS INDEX: 34.05 KG/M2 | WEIGHT: 204.38 LBS | HEART RATE: 58 BPM | TEMPERATURE: 98 F | SYSTOLIC BLOOD PRESSURE: 100 MMHG | RESPIRATION RATE: 16 BRPM | OXYGEN SATURATION: 98 % | HEIGHT: 65 IN | DIASTOLIC BLOOD PRESSURE: 60 MMHG

## 2020-09-25 DIAGNOSIS — D3A.8 NEUROENDOCRINE TUMOR OF PANCREAS: ICD-10-CM

## 2020-09-25 DIAGNOSIS — I10 ESSENTIAL HYPERTENSION: ICD-10-CM

## 2020-09-25 DIAGNOSIS — E78.5 HYPERLIPIDEMIA, UNSPECIFIED HYPERLIPIDEMIA TYPE: ICD-10-CM

## 2020-09-25 DIAGNOSIS — Z01.419 WELL WOMAN EXAM: ICD-10-CM

## 2020-09-25 DIAGNOSIS — E11.9 TYPE 2 DIABETES MELLITUS WITHOUT COMPLICATION, WITHOUT LONG-TERM CURRENT USE OF INSULIN: Primary | ICD-10-CM

## 2020-09-25 DIAGNOSIS — G47.9 SLEEP DISTURBANCE: ICD-10-CM

## 2020-09-25 DIAGNOSIS — R14.2 FUNCTIONAL BELCHING DISORDER: ICD-10-CM

## 2020-09-25 PROCEDURE — 99999 PR PBB SHADOW E&M-EST. PATIENT-LVL V: CPT | Mod: PBBFAC,,, | Performed by: INTERNAL MEDICINE

## 2020-09-25 PROCEDURE — 99214 OFFICE O/P EST MOD 30 MIN: CPT | Mod: S$PBB,,, | Performed by: INTERNAL MEDICINE

## 2020-09-25 PROCEDURE — 99214 PR OFFICE/OUTPT VISIT, EST, LEVL IV, 30-39 MIN: ICD-10-PCS | Mod: S$PBB,,, | Performed by: INTERNAL MEDICINE

## 2020-09-25 PROCEDURE — 99999 PR PBB SHADOW E&M-EST. PATIENT-LVL V: ICD-10-PCS | Mod: PBBFAC,,, | Performed by: INTERNAL MEDICINE

## 2020-09-25 PROCEDURE — 99215 OFFICE O/P EST HI 40 MIN: CPT | Mod: PBBFAC,PO | Performed by: INTERNAL MEDICINE

## 2020-09-25 RX ORDER — ONDANSETRON 4 MG/1
TABLET, ORALLY DISINTEGRATING ORAL
COMMUNITY
End: 2020-10-02

## 2020-09-25 RX ORDER — DOXEPIN HYDROCHLORIDE 100 MG/1
CAPSULE ORAL
COMMUNITY
End: 2020-10-02

## 2020-09-25 RX ORDER — HYDROXYZINE PAMOATE 25 MG/1
CAPSULE ORAL
COMMUNITY
End: 2020-10-26

## 2020-09-25 RX ORDER — TEMAZEPAM 15 MG/1
CAPSULE ORAL
COMMUNITY
End: 2020-09-25 | Stop reason: SDUPTHER

## 2020-09-25 RX ORDER — OFLOXACIN 3 MG/ML
SOLUTION/ DROPS OPHTHALMIC
COMMUNITY
End: 2020-10-02

## 2020-09-25 RX ORDER — LEVOTHYROXINE SODIUM 50 UG/1
TABLET ORAL
COMMUNITY
End: 2022-07-08 | Stop reason: SDUPTHER

## 2020-09-25 RX ORDER — PREDNISOLONE ACETATE 10 MG/ML
SUSPENSION/ DROPS OPHTHALMIC
COMMUNITY
End: 2020-10-02

## 2020-09-25 RX ORDER — TRAMADOL HYDROCHLORIDE 50 MG/1
TABLET ORAL
COMMUNITY
End: 2021-03-25

## 2020-09-25 RX ORDER — KETOROLAC TROMETHAMINE 5 MG/ML
SOLUTION OPHTHALMIC
COMMUNITY
End: 2020-10-02

## 2020-09-25 RX ORDER — AMLODIPINE BESYLATE 10 MG/1
TABLET ORAL
COMMUNITY
End: 2021-02-11 | Stop reason: SDUPTHER

## 2020-09-25 RX ORDER — TEMAZEPAM 15 MG/1
CAPSULE ORAL
Qty: 30 CAPSULE | Refills: 3 | Status: SHIPPED | OUTPATIENT
Start: 2020-09-25 | End: 2021-03-04 | Stop reason: SDUPTHER

## 2020-09-25 RX ORDER — METFORMIN HYDROCHLORIDE 500 MG/1
TABLET ORAL
COMMUNITY
End: 2021-09-14

## 2020-09-25 RX ORDER — PANTOPRAZOLE SODIUM 40 MG/1
TABLET, DELAYED RELEASE ORAL
COMMUNITY
End: 2021-08-27 | Stop reason: SDUPTHER

## 2020-09-25 RX ORDER — METOCLOPRAMIDE 10 MG/1
10 TABLET ORAL
Qty: 90 TABLET | Refills: 5 | Status: SHIPPED | OUTPATIENT
Start: 2020-09-25 | End: 2021-04-12

## 2020-09-25 NOTE — PROGRESS NOTES
Subjective:       Patient ID: Aracelis Martinez is a 65 y.o. female.    Chief Complaint: Follow-up, Back Pain, and Flank Pain    HPI   The patient reports that excessive belching has resolved since her last office visit.  Her EGD was negative.  Right gland was prescribed by her neuroendocrine specialist.  The patient is requesting consultation with the Surgical Oncology consultant Dr. Castaneda for follow-up since her partial pancreatectomy for treatment of a well-differentiated neuroendocrine tumor.    Blood sugar levels have ranged from 128-134.  No hypoglycemia has been noted.  Appetite has remained stable.  She has not been exercising regularly.    She does complain chronic back pain.    Review of Systems   Constitutional: Negative for activity change, appetite change and unexpected weight change.   Eyes: Negative for visual disturbance.   Respiratory: Negative for shortness of breath.    Cardiovascular: Negative for chest pain, palpitations and leg swelling.   Gastrointestinal: Negative for abdominal pain, blood in stool and diarrhea.   Genitourinary: Negative for dysuria, frequency, hematuria and urgency.   Musculoskeletal: Positive for back pain.   Neurological: Negative for weakness, numbness and headaches.   Psychiatric/Behavioral: Negative for sleep disturbance.         Objective:      Physical Exam  Vitals signs and nursing note reviewed.   Constitutional:       General: She is not in acute distress.     Appearance: She is well-developed.   HENT:      Head: Normocephalic and atraumatic.   Eyes:      General: No scleral icterus.     Conjunctiva/sclera: Conjunctivae normal.      Pupils: Pupils are equal, round, and reactive to light.   Neck:      Musculoskeletal: Normal range of motion and neck supple.      Thyroid: No thyromegaly.      Vascular: No JVD.   Cardiovascular:      Rate and Rhythm: Normal rate and regular rhythm.      Heart sounds: Normal heart sounds. No murmur. No friction rub. No gallop.     Pulmonary:      Effort: Pulmonary effort is normal. No respiratory distress.      Breath sounds: Normal breath sounds. No wheezing or rales.   Abdominal:      General: Bowel sounds are normal.      Palpations: Abdomen is soft. There is no mass.      Tenderness: There is no abdominal tenderness.   Musculoskeletal: Normal range of motion.         General: No tenderness.   Lymphadenopathy:      Cervical: No cervical adenopathy.   Skin:     General: Skin is warm and dry.      Findings: No rash.      Comments: No foot lesions are present.   Neurological:      Mental Status: She is alert and oriented to person, place, and time.      Cranial Nerves: No cranial nerve deficit.      Comments: Sensory exam is intact in both feet on monofilament and vibration testing.   Psychiatric:         Behavior: Behavior normal.         Assessment:       1. Type 2 diabetes mellitus without complication, without long-term current use of insulin    2. Essential hypertension    3. Hyperlipidemia, unspecified hyperlipidemia type    4. Sleep disturbance        Plan:     Aracelis was seen today for follow-up.  The patient will be referred to surgical oncology for follow-up as requested.  The patient was advised to see her gynecologist for routine well-woman checkup.  Current medications will be continued.  Fasting blood tests will be obtained next week.    Diagnoses and all orders for this visit:    Type 2 diabetes mellitus without complication, without long-term current use of insulin  -     Hemoglobin A1C; Future  -     Comprehensive metabolic panel; Future  -     Lipid Panel; Future  -     CBC auto differential; Future    Essential hypertension  -     Hemoglobin A1C; Future  -     Comprehensive metabolic panel; Future  -     Lipid Panel; Future  -     CBC auto differential; Future    Hyperlipidemia, unspecified hyperlipidemia type    Sleep disturbance    Functional belching disorder    Neuroendocrine tumor of pancreas  -     Ambulatory  referral/consult to Surgical Oncology; Future    Well woman exam  -     Ambulatory referral/consult to Obstetrics / Gynecology; Future    Other orders  -     metoclopramide HCl (REGLAN) 10 MG tablet; Take 1 tablet (10 mg total) by mouth 3 (three) times daily before meals.  -     temazepam (RESTORIL) 15 mg Cap; temazepam 15 mg capsule

## 2020-09-28 ENCOUNTER — LAB VISIT (OUTPATIENT)
Dept: LAB | Facility: HOSPITAL | Age: 65
End: 2020-09-28
Attending: INTERNAL MEDICINE
Payer: MEDICARE

## 2020-09-28 DIAGNOSIS — E11.9 TYPE 2 DIABETES MELLITUS WITHOUT COMPLICATION, WITHOUT LONG-TERM CURRENT USE OF INSULIN: ICD-10-CM

## 2020-09-28 DIAGNOSIS — I10 ESSENTIAL HYPERTENSION: ICD-10-CM

## 2020-09-28 LAB
ALBUMIN SERPL BCP-MCNC: 3.8 G/DL (ref 3.5–5.2)
ALP SERPL-CCNC: 74 U/L (ref 55–135)
ALT SERPL W/O P-5'-P-CCNC: 10 U/L (ref 10–44)
ANION GAP SERPL CALC-SCNC: 11 MMOL/L (ref 8–16)
AST SERPL-CCNC: 11 U/L (ref 10–40)
BASOPHILS # BLD AUTO: 0.03 K/UL (ref 0–0.2)
BASOPHILS NFR BLD: 0.4 % (ref 0–1.9)
BILIRUB SERPL-MCNC: 0.5 MG/DL (ref 0.1–1)
BUN SERPL-MCNC: 13 MG/DL (ref 8–23)
CALCIUM SERPL-MCNC: 9.4 MG/DL (ref 8.7–10.5)
CHLORIDE SERPL-SCNC: 103 MMOL/L (ref 95–110)
CHOLEST SERPL-MCNC: 183 MG/DL (ref 120–199)
CHOLEST/HDLC SERPL: 3.7 {RATIO} (ref 2–5)
CO2 SERPL-SCNC: 26 MMOL/L (ref 23–29)
CREAT SERPL-MCNC: 0.8 MG/DL (ref 0.5–1.4)
DIFFERENTIAL METHOD: ABNORMAL
EOSINOPHIL # BLD AUTO: 0.2 K/UL (ref 0–0.5)
EOSINOPHIL NFR BLD: 2.9 % (ref 0–8)
ERYTHROCYTE [DISTWIDTH] IN BLOOD BY AUTOMATED COUNT: 14.9 % (ref 11.5–14.5)
EST. GFR  (AFRICAN AMERICAN): >60 ML/MIN/1.73 M^2
EST. GFR  (NON AFRICAN AMERICAN): >60 ML/MIN/1.73 M^2
ESTIMATED AVG GLUCOSE: 137 MG/DL (ref 68–131)
GLUCOSE SERPL-MCNC: 139 MG/DL (ref 70–110)
HBA1C MFR BLD HPLC: 6.4 % (ref 4–5.6)
HCT VFR BLD AUTO: 42.8 % (ref 37–48.5)
HDLC SERPL-MCNC: 49 MG/DL (ref 40–75)
HDLC SERPL: 26.8 % (ref 20–50)
HGB BLD-MCNC: 13 G/DL (ref 12–16)
IMM GRANULOCYTES # BLD AUTO: 0.01 K/UL (ref 0–0.04)
IMM GRANULOCYTES NFR BLD AUTO: 0.1 % (ref 0–0.5)
LDLC SERPL CALC-MCNC: 114.8 MG/DL (ref 63–159)
LYMPHOCYTES # BLD AUTO: 4.2 K/UL (ref 1–4.8)
LYMPHOCYTES NFR BLD: 60.1 % (ref 18–48)
MCH RBC QN AUTO: 26.7 PG (ref 27–31)
MCHC RBC AUTO-ENTMCNC: 30.4 G/DL (ref 32–36)
MCV RBC AUTO: 88 FL (ref 82–98)
MONOCYTES # BLD AUTO: 0.4 K/UL (ref 0.3–1)
MONOCYTES NFR BLD: 6.4 % (ref 4–15)
NEUTROPHILS # BLD AUTO: 2.1 K/UL (ref 1.8–7.7)
NEUTROPHILS NFR BLD: 30.1 % (ref 38–73)
NONHDLC SERPL-MCNC: 134 MG/DL
NRBC BLD-RTO: 0 /100 WBC
PLATELET # BLD AUTO: 424 K/UL (ref 150–350)
PMV BLD AUTO: 9.9 FL (ref 9.2–12.9)
POTASSIUM SERPL-SCNC: 3.6 MMOL/L (ref 3.5–5.1)
PROT SERPL-MCNC: 7 G/DL (ref 6–8.4)
RBC # BLD AUTO: 4.86 M/UL (ref 4–5.4)
SODIUM SERPL-SCNC: 140 MMOL/L (ref 136–145)
TRIGL SERPL-MCNC: 96 MG/DL (ref 30–150)
WBC # BLD AUTO: 6.91 K/UL (ref 3.9–12.7)

## 2020-09-28 PROCEDURE — 80053 COMPREHEN METABOLIC PANEL: CPT

## 2020-09-28 PROCEDURE — 36415 COLL VENOUS BLD VENIPUNCTURE: CPT | Mod: PO

## 2020-09-28 PROCEDURE — 85025 COMPLETE CBC W/AUTO DIFF WBC: CPT

## 2020-09-28 PROCEDURE — 80061 LIPID PANEL: CPT

## 2020-09-28 PROCEDURE — 83036 HEMOGLOBIN GLYCOSYLATED A1C: CPT

## 2020-09-29 ENCOUNTER — TELEPHONE (OUTPATIENT)
Dept: HEMATOLOGY/ONCOLOGY | Facility: CLINIC | Age: 65
End: 2020-09-29

## 2020-09-29 ENCOUNTER — TELEPHONE (OUTPATIENT)
Dept: INTERNAL MEDICINE | Facility: CLINIC | Age: 65
End: 2020-09-29

## 2020-09-29 NOTE — TELEPHONE ENCOUNTER
----- Message from Lexis Hoyt sent at 9/29/2020  2:26 PM CDT -----  Contact: [facility]   Pharmacy is calling to clarify an RX.  RX name:  temazepam (RESTORIL) 15 mg Cap  What do they need to clarify:  frequency  Comments:

## 2020-09-29 NOTE — TELEPHONE ENCOUNTER
temazepam (RESTORIL) 15 mg Cap 30 capsule 3 9/25/2020  No   Sig: temazepam 15 mg capsule   Sent to pharmacy as: temazepam (RESTORIL) 15 mg Cap   Class: Normal   Order: [id number]   Date/Time Signed: 9/25/2020 16:13       E-Prescribing Status: Receipt confirmed by pharmacy (9/25/2020  4:13 PM CDT     Left msg directions are 1 po qhs prn sleep.  30 x 3 refills.  Gave miles/ phone number and my name.

## 2020-10-01 ENCOUNTER — PATIENT OUTREACH (OUTPATIENT)
Dept: ADMINISTRATIVE | Facility: OTHER | Age: 65
End: 2020-10-01

## 2020-10-02 ENCOUNTER — OFFICE VISIT (OUTPATIENT)
Dept: OBSTETRICS AND GYNECOLOGY | Facility: CLINIC | Age: 65
End: 2020-10-02
Attending: OBSTETRICS & GYNECOLOGY
Payer: MEDICARE

## 2020-10-02 VITALS
HEIGHT: 65 IN | BODY MASS INDEX: 34.89 KG/M2 | WEIGHT: 209.44 LBS | SYSTOLIC BLOOD PRESSURE: 124 MMHG | DIASTOLIC BLOOD PRESSURE: 78 MMHG

## 2020-10-02 DIAGNOSIS — Z90.710 S/P HYSTERECTOMY: ICD-10-CM

## 2020-10-02 DIAGNOSIS — Z01.419 WELL WOMAN EXAM: Primary | ICD-10-CM

## 2020-10-02 DIAGNOSIS — Z12.31 ENCOUNTER FOR SCREENING MAMMOGRAM FOR MALIGNANT NEOPLASM OF BREAST: ICD-10-CM

## 2020-10-02 DIAGNOSIS — Z12.39 ENCOUNTER FOR SCREENING FOR MALIGNANT NEOPLASM OF BREAST, UNSPECIFIED SCREENING MODALITY: ICD-10-CM

## 2020-10-02 PROCEDURE — G0101 CA SCREEN;PELVIC/BREAST EXAM: HCPCS | Mod: S$PBB,,, | Performed by: OBSTETRICS & GYNECOLOGY

## 2020-10-02 PROCEDURE — 99999 PR PBB SHADOW E&M-EST. PATIENT-LVL IV: CPT | Mod: PBBFAC,,, | Performed by: OBSTETRICS & GYNECOLOGY

## 2020-10-02 PROCEDURE — 99999 PR PBB SHADOW E&M-EST. PATIENT-LVL IV: ICD-10-PCS | Mod: PBBFAC,,, | Performed by: OBSTETRICS & GYNECOLOGY

## 2020-10-02 PROCEDURE — G0101 PR CA SCREEN;PELVIC/BREAST EXAM: ICD-10-PCS | Mod: S$PBB,,, | Performed by: OBSTETRICS & GYNECOLOGY

## 2020-10-02 PROCEDURE — 99214 OFFICE O/P EST MOD 30 MIN: CPT | Mod: PBBFAC,PN | Performed by: OBSTETRICS & GYNECOLOGY

## 2020-10-02 NOTE — LETTER
October 2, 2020      Alen Damico MD  [address]  [place] [address]           [place] [facility]  [address]  [place] [address]  Phone: [phone]  Fax: [phone]          Patient: Aracelis Martinez   MR Number: [id number]   YOB: 1955   Date of Visit: 10/2/2020       Dear Dr. Alen Damico:    Thank you for referring Aracelis Martinez to me for evaluation. Attached you will find relevant portions of my assessment and plan of care.    If you have questions, please do not hesitate to call me. I look forward to following Aracelis Martinez along with you.    Sincerely,    Shae Hernandes MD    Enclosure  CC:  No Recipients    If you would like to receive this communication electronically, please contact [email or web address] or [phone] to request more information on [employer] access.    For providers and/or their staff who would like to refer a patient to [facility], please contact us through our one-stop-shop provider referral line, [facility], at [phone].    If you feel you have received this communication in error or would no longer like to receive these types of communications, please e-mail [email or web address]

## 2020-10-02 NOTE — PROGRESS NOTES
CC: Well woman exam    Aracelis Martinez is a 65 y.o. female  presents for a well woman exam.  No issues, problems, or complaints.      Past Medical History:   Diagnosis Date    Arthritis     Chronic back pain     Diabetes mellitus 2014    Diverticulosis     Hypertension     Neuroendocrine tumor of pancreas 2015    Pancreatic cancer 2015    Renal cyst     left    Thyroid disease      Past Surgical History:   Procedure Laterality Date     SECTION      COLONOSCOPY N/A 3/13/2019    Procedure: COLONOSCOPY;  Surgeon: Cem Lamar MD;  Location: [facility] (4TH FLR);  Service: Endoscopy;  Laterality: N/A;  previous order cx    ESOPHAGOGASTRODUODENOSCOPY N/A 2019    Procedure: ESOPHAGOGASTRODUODENOSCOPY (EGD);  Surgeon: Jonathan Oneill MD;  Location: [facility] (4TH FLR);  Service: Endoscopy;  Laterality: N/A;    ESOPHAGOGASTRODUODENOSCOPY N/A 2020    Procedure: EGD (ESOPHAGOGASTRODUODENOSCOPY);  Surgeon: Shady Costa MD;  Location: [facility] (2ND FLR);  Service: Endoscopy;  Laterality: N/A;  Please schedule patient as soon as possible in the 2nd floor history of a Whipple surgery for neuroendocrine pancreatic tumor many years ago with now constant belching and regurgitation.  May need airway protection.  Rule out celiac sprue rule out lact    EXCISION OF GANGLION CYST OF HAND Right 10/22/2018    Procedure: EXCISION, GANGLION CYST, HAND- RIGHT, LONG AND INDEX FINGER;  Surgeon: Sowmya Schmidt MD;  Location: [facility];  Service: Orthopedics;  Laterality: Right;  Stretcher; Supine; Hand Pan 1 & [place] 2; Dr. Loja's Rasp    HYSTERECTOMY      [facility]    KNEE ARTHROSCOPY  2014    LATS/left    OOPHORECTOMY      PANCREAS SURGERY      MD [facility]itchie    TONSILLECTOMY       Family History   Problem Relation Age of Onset    Hypertension Mother     Diabetes Mother     Heart disease Mother     Stroke Mother     Hypertension Sister     Stroke Sister     Breast cancer Sister  "    Hypertension Brother     Colon cancer Neg Hx     Ovarian cancer Neg Hx      Social History     Tobacco Use    Smoking status: Former Smoker     Packs/day: 1.50     Years: 10.00     Pack years: 15.00     Types: Cigarettes     Quit date: 1982     Years since quittin.7    Smokeless tobacco: Never Used   Substance Use Topics    Alcohol use: Yes     Frequency: Monthly or less     Drinks per session: 1 or 2     Binge frequency: Never     Comment: occasional use    Drug use: No     OB History        2    Para   2    Term   0            AB        Living   2       SAB        TAB        Ectopic        Multiple        Live Births   2                 /78   Ht 5' 5" (1.651 m)   Wt 95 kg (209 lb 7 oz)   LMP  (LMP Unknown)   BMI 34.85 kg/m²     ROS:  GENERAL: Denies weight gain or weight loss. Feeling well overall.   SKIN: Denies rash or lesions.   HEAD: Denies head injury or headache.   NODES: Denies enlarged lymph nodes.   CHEST: Denies chest pain or shortness of breath.   CARDIOVASCULAR: Denies palpitations or left sided chest pain.   ABDOMEN: No abdominal pain, constipation, diarrhea, nausea, vomiting or rectal bleeding.   URINARY: No frequency, dysuria, hematuria, or burning on urination.  REPRODUCTIVE: See HPI.   BREASTS: The patient performs breast self-examination and denies pain, lumps, or nipple discharge.   HEMATOLOGIC: No easy bruisability or excessive bleeding.   MUSCULOSKELETAL: Denies joint pain or swelling.   NEUROLOGIC: Denies syncope or weakness.   PSYCHIATRIC: Denies depression, anxiety or mood swings.    PE:   APPEARANCE: Well nourished, well developed, in no acute distress.  AFFECT: WNL, alert and oriented x 3.  SKIN: No acne or hirsutism.  NECK: Neck symmetric without masses or thyromegaly.  NODES: No inguinal, cervical, axillary or femoral lymph node enlargement.  CHEST: Good respiratory effort.   ABDOMEN: Soft. No tenderness or masses. No hepatosplenomegaly. No " hernias.  BREASTS: Symmetrical, no skin changes or visible lesions. No palpable masses, nipple discharge bilaterally.  PELVIC: Normal external female genitalia without lesions. Normal hair distribution. Adequate perineal body, normal urethral meatus. Vagina atrophic without lesions or discharge. No significant cystocele or rectocele. Bimanual exam shows uterus and cervix to be surgically absent. Adnexa without masses or tenderness.  RECTAL: Rectovaginal exam confirms above with normal sphincter tone, no masses.  EXTREMITIES: No edema.      ICD-10-CM ICD-9-CM    1. Well woman exam  Z01.419 V72.31 Ambulatory referral/consult to Obstetrics / Gynecology   2. S/P hysterectomy  Z90.710 V88.01    3. Encounter for screening for malignant neoplasm of breast, unspecified screening modality  Z12.39 V76.10 Mammo Digital Screening Bilat w/ Berry   4. Encounter for screening mammogram for malignant neoplasm of breast   Z12.31 V76.12 Mammo Digital Screening Bilat w/ Berry           Patient was counseled today on A.C.S. Pap guidelines and recommendations for yearly pelvic exams, mammograms and monthly self breast exams; to see her PCP for other health maintenance.     Follow up in about 1 year (around 10/2/2021).

## 2020-10-05 ENCOUNTER — HOSPITAL ENCOUNTER (OUTPATIENT)
Dept: RADIOLOGY | Facility: HOSPITAL | Age: 65
Discharge: HOME | End: 2020-10-05
Attending: OBSTETRICS & GYNECOLOGY
Payer: MEDICARE

## 2020-10-05 ENCOUNTER — PATIENT MESSAGE (OUTPATIENT)
Dept: ADMINISTRATIVE | Facility: HOSPITAL | Age: 65
End: 2020-10-05

## 2020-10-05 DIAGNOSIS — Z12.31 ENCOUNTER FOR SCREENING MAMMOGRAM FOR MALIGNANT NEOPLASM OF BREAST: ICD-10-CM

## 2020-10-05 DIAGNOSIS — Z12.39 ENCOUNTER FOR SCREENING FOR MALIGNANT NEOPLASM OF BREAST, UNSPECIFIED SCREENING MODALITY: ICD-10-CM

## 2020-10-05 PROCEDURE — 77063 BREAST TOMOSYNTHESIS BI: CPT | Mod: 26,,, | Performed by: RADIOLOGY

## 2020-10-05 PROCEDURE — 77067 SCR MAMMO BI INCL CAD: CPT | Mod: 26,,, | Performed by: RADIOLOGY

## 2020-10-05 PROCEDURE — 77063 MAMMO DIGITAL SCREENING BILAT WITH TOMO: ICD-10-PCS | Mod: 26,,, | Performed by: RADIOLOGY

## 2020-10-05 PROCEDURE — 77067 MAMMO DIGITAL SCREENING BILAT WITH TOMO: ICD-10-PCS | Mod: 26,,, | Performed by: RADIOLOGY

## 2020-10-05 PROCEDURE — 77067 SCR MAMMO BI INCL CAD: CPT | Mod: TC,PO

## 2020-10-06 ENCOUNTER — TELEPHONE (OUTPATIENT)
Dept: RADIOLOGY | Facility: HOSPITAL | Age: 65
End: 2020-10-06

## 2020-10-06 NOTE — TELEPHONE ENCOUNTER
Spoke with patient and explained mammogram findings.Patient expressed understanding of results. Patient scheduled abnormal mammogram follow up appointment at [facility] on 10/16/2020.

## 2020-10-27 ENCOUNTER — HOSPITAL ENCOUNTER (OUTPATIENT)
Dept: RADIOLOGY | Facility: HOSPITAL | Age: 65
Discharge: HOME | End: 2020-10-27
Attending: OBSTETRICS & GYNECOLOGY
Payer: MEDICARE

## 2020-10-27 DIAGNOSIS — R92.8 ABNORMAL MAMMOGRAM: ICD-10-CM

## 2020-10-27 PROCEDURE — 76642 ULTRASOUND BREAST LIMITED: CPT | Mod: TC,RT

## 2020-10-27 PROCEDURE — 76642 US BREAST RIGHT LIMITED: ICD-10-PCS | Mod: 26,RT,, | Performed by: RADIOLOGY

## 2020-10-27 PROCEDURE — 77061 BREAST TOMOSYNTHESIS UNI: CPT | Mod: 26,RT,, | Performed by: RADIOLOGY

## 2020-10-27 PROCEDURE — 77065 MAMMO DIGITAL DIAGNOSTIC RIGHT WITH TOMO: ICD-10-PCS | Mod: 26,RT,, | Performed by: RADIOLOGY

## 2020-10-27 PROCEDURE — 77065 DX MAMMO INCL CAD UNI: CPT | Mod: 26,RT,, | Performed by: RADIOLOGY

## 2020-10-27 PROCEDURE — 77065 DX MAMMO INCL CAD UNI: CPT | Mod: TC,RT

## 2020-10-27 PROCEDURE — 77061 MAMMO DIGITAL DIAGNOSTIC RIGHT WITH TOMO: ICD-10-PCS | Mod: 26,RT,, | Performed by: RADIOLOGY

## 2020-10-27 PROCEDURE — 76642 ULTRASOUND BREAST LIMITED: CPT | Mod: 26,RT,, | Performed by: RADIOLOGY

## 2020-10-29 ENCOUNTER — PATIENT MESSAGE (OUTPATIENT)
Dept: INTERNAL MEDICINE | Facility: CLINIC | Age: 65
End: 2020-10-29

## 2020-11-05 RX ORDER — GABAPENTIN 100 MG/1
100 CAPSULE ORAL 3 TIMES DAILY
Qty: 90 CAPSULE | Refills: 5 | Status: SHIPPED | OUTPATIENT
Start: 2020-11-05 | End: 2020-11-18 | Stop reason: SINTOL

## 2020-11-05 NOTE — TELEPHONE ENCOUNTER
Patient reports intolerance of Cymbalta.  She was experiencing swelling of the hands and episodes of diarrhea.  She has been off of Cymbalta for several months now due to the side effects.  Recently she has experienced an acute flare-up of generalized body pain.  There is no joint swelling.  No gastrointestinal symptoms are noted at this time.  She cannot point to any precipitating factors producing an exacerbation of her condition.  Her diet, activity level, and stress level have remained fairly stable.    A trial of gabapentin 100 mg 3 times daily will be ordered.  The patient was advised to start slow using 100 mg at bedtime for the 1st 3 days and then increasing the dose to 3 times daily.  Dose adjustments can be made depending on the patient's clinical response.  The patient will keep me informed of any problems with side effects or lack of efficacy.

## 2020-11-15 ENCOUNTER — PATIENT MESSAGE (OUTPATIENT)
Dept: INTERNAL MEDICINE | Facility: CLINIC | Age: 65
End: 2020-11-15

## 2020-11-15 DIAGNOSIS — M79.7 FIBROMYALGIA: Primary | ICD-10-CM

## 2020-11-16 NOTE — TELEPHONE ENCOUNTER
"email said  " Dr. Damico,  I started taking the gabapentin at night only. First day felt fuzzy when I woke up. 2nd am not well started getting very nausea and weak but still took it the 3rd night and I've had to stop. Please advise "    Please advise.  Thanks angel    "

## 2020-11-18 RX ORDER — PREGABALIN 25 MG/1
25 CAPSULE ORAL 2 TIMES DAILY
Qty: 60 CAPSULE | Refills: 6 | Status: SHIPPED | OUTPATIENT
Start: 2020-11-18 | End: 2021-06-22 | Stop reason: SDUPTHER

## 2020-11-18 NOTE — TELEPHONE ENCOUNTER
Gabapentin will be discontinued.  A trial of Lyrica twice a day is recommended to treat the fibromyalgia pain symptoms.

## 2021-01-04 ENCOUNTER — PATIENT MESSAGE (OUTPATIENT)
Dept: ADMINISTRATIVE | Facility: HOSPITAL | Age: 66
End: 2021-01-04

## 2021-01-15 ENCOUNTER — PATIENT MESSAGE (OUTPATIENT)
Dept: INTERNAL MEDICINE | Facility: CLINIC | Age: 66
End: 2021-01-15

## 2021-02-10 ENCOUNTER — TELEPHONE (OUTPATIENT)
Dept: INTERNAL MEDICINE | Facility: CLINIC | Age: 66
End: 2021-02-10

## 2021-02-11 RX ORDER — AMLODIPINE BESYLATE 10 MG/1
TABLET ORAL
Qty: 30 TABLET | Refills: 5 | Status: SHIPPED | OUTPATIENT
Start: 2021-02-11 | End: 2021-10-25 | Stop reason: SDUPTHER

## 2021-02-18 ENCOUNTER — PATIENT MESSAGE (OUTPATIENT)
Dept: INTERNAL MEDICINE | Facility: CLINIC | Age: 66
End: 2021-02-18

## 2021-02-18 DIAGNOSIS — E11.9 TYPE 2 DIABETES MELLITUS WITHOUT COMPLICATION, WITHOUT LONG-TERM CURRENT USE OF INSULIN: Primary | ICD-10-CM

## 2021-02-18 DIAGNOSIS — I10 ESSENTIAL HYPERTENSION: ICD-10-CM

## 2021-02-18 DIAGNOSIS — E78.5 HYPERLIPIDEMIA, UNSPECIFIED HYPERLIPIDEMIA TYPE: ICD-10-CM

## 2021-03-04 RX ORDER — TEMAZEPAM 15 MG/1
CAPSULE ORAL
Qty: 30 CAPSULE | Refills: 3 | Status: SHIPPED | OUTPATIENT
Start: 2021-03-04 | End: 2021-03-25

## 2021-03-18 ENCOUNTER — LAB VISIT (OUTPATIENT)
Dept: LAB | Facility: HOSPITAL | Age: 66
End: 2021-03-18
Attending: INTERNAL MEDICINE
Payer: MEDICARE

## 2021-03-18 DIAGNOSIS — I10 ESSENTIAL HYPERTENSION: ICD-10-CM

## 2021-03-18 DIAGNOSIS — E78.5 HYPERLIPIDEMIA, UNSPECIFIED HYPERLIPIDEMIA TYPE: ICD-10-CM

## 2021-03-18 DIAGNOSIS — E11.9 TYPE 2 DIABETES MELLITUS WITHOUT COMPLICATION, WITHOUT LONG-TERM CURRENT USE OF INSULIN: ICD-10-CM

## 2021-03-18 LAB
ALBUMIN SERPL BCP-MCNC: 3.9 G/DL (ref 3.5–5.2)
ALP SERPL-CCNC: 82 U/L (ref 55–135)
ALT SERPL W/O P-5'-P-CCNC: 12 U/L (ref 10–44)
ANION GAP SERPL CALC-SCNC: 10 MMOL/L (ref 8–16)
AST SERPL-CCNC: 14 U/L (ref 10–40)
BASOPHILS # BLD AUTO: 0.06 K/UL (ref 0–0.2)
BASOPHILS NFR BLD: 0.7 % (ref 0–1.9)
BILIRUB SERPL-MCNC: 1 MG/DL (ref 0.1–1)
BUN SERPL-MCNC: 14 MG/DL (ref 8–23)
CALCIUM SERPL-MCNC: 9.3 MG/DL (ref 8.7–10.5)
CHLORIDE SERPL-SCNC: 103 MMOL/L (ref 95–110)
CHOLEST SERPL-MCNC: 200 MG/DL (ref 120–199)
CHOLEST/HDLC SERPL: 4 {RATIO} (ref 2–5)
CO2 SERPL-SCNC: 28 MMOL/L (ref 23–29)
CREAT SERPL-MCNC: 0.8 MG/DL (ref 0.5–1.4)
DIFFERENTIAL METHOD: ABNORMAL
EOSINOPHIL # BLD AUTO: 0.2 K/UL (ref 0–0.5)
EOSINOPHIL NFR BLD: 2.9 % (ref 0–8)
ERYTHROCYTE [DISTWIDTH] IN BLOOD BY AUTOMATED COUNT: 14.4 % (ref 11.5–14.5)
EST. GFR  (AFRICAN AMERICAN): >60 ML/MIN/1.73 M^2
EST. GFR  (NON AFRICAN AMERICAN): >60 ML/MIN/1.73 M^2
ESTIMATED AVG GLUCOSE: 143 MG/DL (ref 68–131)
GLUCOSE SERPL-MCNC: 143 MG/DL (ref 70–110)
HBA1C MFR BLD: 6.6 % (ref 4–5.6)
HCT VFR BLD AUTO: 46 % (ref 37–48.5)
HDLC SERPL-MCNC: 50 MG/DL (ref 40–75)
HDLC SERPL: 25 % (ref 20–50)
HGB BLD-MCNC: 14.8 G/DL (ref 12–16)
IMM GRANULOCYTES # BLD AUTO: 0.01 K/UL (ref 0–0.04)
IMM GRANULOCYTES NFR BLD AUTO: 0.1 % (ref 0–0.5)
LDLC SERPL CALC-MCNC: 131.6 MG/DL (ref 63–159)
LYMPHOCYTES # BLD AUTO: 5.2 K/UL (ref 1–4.8)
LYMPHOCYTES NFR BLD: 63.6 % (ref 18–48)
MCH RBC QN AUTO: 27.5 PG (ref 27–31)
MCHC RBC AUTO-ENTMCNC: 32.2 G/DL (ref 32–36)
MCV RBC AUTO: 85 FL (ref 82–98)
MONOCYTES # BLD AUTO: 0.7 K/UL (ref 0.3–1)
MONOCYTES NFR BLD: 7.9 % (ref 4–15)
NEUTROPHILS # BLD AUTO: 2 K/UL (ref 1.8–7.7)
NEUTROPHILS NFR BLD: 24.8 % (ref 38–73)
NONHDLC SERPL-MCNC: 150 MG/DL
NRBC BLD-RTO: 0 /100 WBC
PLATELET # BLD AUTO: 476 K/UL (ref 150–350)
PMV BLD AUTO: 9.8 FL (ref 9.2–12.9)
POTASSIUM SERPL-SCNC: 3.4 MMOL/L (ref 3.5–5.1)
PROT SERPL-MCNC: 7.4 G/DL (ref 6–8.4)
RBC # BLD AUTO: 5.39 M/UL (ref 4–5.4)
SODIUM SERPL-SCNC: 141 MMOL/L (ref 136–145)
TRIGL SERPL-MCNC: 92 MG/DL (ref 30–150)
WBC # BLD AUTO: 8.18 K/UL (ref 3.9–12.7)

## 2021-03-18 PROCEDURE — 85025 COMPLETE CBC W/AUTO DIFF WBC: CPT | Performed by: INTERNAL MEDICINE

## 2021-03-18 PROCEDURE — 80061 LIPID PANEL: CPT | Performed by: INTERNAL MEDICINE

## 2021-03-18 PROCEDURE — 80053 COMPREHEN METABOLIC PANEL: CPT | Performed by: INTERNAL MEDICINE

## 2021-03-18 PROCEDURE — 36415 COLL VENOUS BLD VENIPUNCTURE: CPT | Mod: PO | Performed by: INTERNAL MEDICINE

## 2021-03-18 PROCEDURE — 83036 HEMOGLOBIN GLYCOSYLATED A1C: CPT | Performed by: INTERNAL MEDICINE

## 2021-03-25 ENCOUNTER — OFFICE VISIT (OUTPATIENT)
Dept: INTERNAL MEDICINE | Facility: CLINIC | Age: 66
End: 2021-03-25
Payer: MEDICARE

## 2021-03-25 VITALS
TEMPERATURE: 97 F | WEIGHT: 208.31 LBS | OXYGEN SATURATION: 97 % | HEART RATE: 71 BPM | BODY MASS INDEX: 34.71 KG/M2 | SYSTOLIC BLOOD PRESSURE: 119 MMHG | HEIGHT: 65 IN | DIASTOLIC BLOOD PRESSURE: 67 MMHG

## 2021-03-25 DIAGNOSIS — C25.1 MALIGNANT NEOPLASM OF BODY OF PANCREAS: ICD-10-CM

## 2021-03-25 DIAGNOSIS — E78.5 HYPERLIPIDEMIA, UNSPECIFIED HYPERLIPIDEMIA TYPE: ICD-10-CM

## 2021-03-25 DIAGNOSIS — E11.9 TYPE 2 DIABETES MELLITUS WITHOUT COMPLICATION, WITHOUT LONG-TERM CURRENT USE OF INSULIN: Primary | ICD-10-CM

## 2021-03-25 DIAGNOSIS — Z78.0 POSTMENOPAUSAL: ICD-10-CM

## 2021-03-25 DIAGNOSIS — M79.7 FIBROMYALGIA: ICD-10-CM

## 2021-03-25 DIAGNOSIS — I10 ESSENTIAL HYPERTENSION: ICD-10-CM

## 2021-03-25 PROCEDURE — 99999 PR PBB SHADOW E&M-EST. PATIENT-LVL IV: CPT | Mod: PBBFAC,,, | Performed by: INTERNAL MEDICINE

## 2021-03-25 PROCEDURE — 99999 PR PBB SHADOW E&M-EST. PATIENT-LVL IV: ICD-10-PCS | Mod: PBBFAC,,, | Performed by: INTERNAL MEDICINE

## 2021-03-25 PROCEDURE — G0009 ADMIN PNEUMOCOCCAL VACCINE: HCPCS | Mod: PBBFAC,PO

## 2021-03-25 PROCEDURE — 90670 PCV13 VACCINE IM: CPT | Mod: PBBFAC,PO

## 2021-03-25 PROCEDURE — 99214 PR OFFICE/OUTPT VISIT, EST, LEVL IV, 30-39 MIN: ICD-10-PCS | Mod: S$PBB,,, | Performed by: INTERNAL MEDICINE

## 2021-03-25 PROCEDURE — 99214 OFFICE O/P EST MOD 30 MIN: CPT | Mod: S$PBB,,, | Performed by: INTERNAL MEDICINE

## 2021-03-25 PROCEDURE — 99214 OFFICE O/P EST MOD 30 MIN: CPT | Mod: PBBFAC,PO | Performed by: INTERNAL MEDICINE

## 2021-03-25 RX ORDER — ZOSTER VACCINE RECOMBINANT, ADJUVANTED 50 MCG/0.5
0.5 KIT INTRAMUSCULAR ONCE
Qty: 1 EACH | Refills: 1 | Status: SHIPPED | OUTPATIENT
Start: 2021-03-25 | End: 2021-03-25

## 2021-03-25 RX ORDER — CLOSTRIDIUM TETANI TOXOID ANTIGEN (FORMALDEHYDE INACTIVATED), CORYNEBACTERIUM DIPHTHERIAE TOXOID ANTIGEN (FORMALDEHYDE INACTIVATED), BORDETELLA PERTUSSIS TOXOID ANTIGEN (GLUTARALDEHYDE INACTIVATED), BORDETELLA PERTUSSIS FILAMENTOUS HEMAGGLUTININ ANTIGEN (FORMALDEHYDE INACTIVATED), BORDETELLA PERTUSSIS PERTACTIN ANTIGEN, AND BORDETELLA PERTUSSIS FIMBRIAE 2/3 ANTIGEN 5; 2; 2.5; 5; 3; 5 [LF]/.5ML; [LF]/.5ML; UG/.5ML; UG/.5ML; UG/.5ML; UG/.5ML
0.5 INJECTION, SUSPENSION INTRAMUSCULAR ONCE
Qty: 0.5 ML | Refills: 0 | Status: SHIPPED | OUTPATIENT
Start: 2021-03-25 | End: 2021-03-25

## 2021-03-25 RX ORDER — LOSARTAN POTASSIUM 50 MG/1
50 TABLET ORAL DAILY
Qty: 90 TABLET | Refills: 3 | Status: SHIPPED | OUTPATIENT
Start: 2021-03-25 | End: 2022-02-09

## 2021-03-25 RX ORDER — TEMAZEPAM 30 MG/1
30 CAPSULE ORAL NIGHTLY PRN
Qty: 30 CAPSULE | Refills: 3 | Status: SHIPPED | OUTPATIENT
Start: 2021-03-25 | End: 2021-03-25

## 2021-03-25 RX ORDER — TRAMADOL HYDROCHLORIDE 50 MG/1
50 TABLET ORAL EVERY 8 HOURS PRN
Qty: 90 TABLET | Refills: 1 | Status: SHIPPED | OUTPATIENT
Start: 2021-03-25 | End: 2021-04-29 | Stop reason: SDUPTHER

## 2021-03-27 RX ORDER — TEMAZEPAM 30 MG/1
30 CAPSULE ORAL NIGHTLY PRN
Qty: 30 CAPSULE | Refills: 3 | Status: SHIPPED | OUTPATIENT
Start: 2021-03-27 | End: 2021-08-12 | Stop reason: SDUPTHER

## 2021-03-29 ENCOUNTER — HOSPITAL ENCOUNTER (OUTPATIENT)
Dept: RADIOLOGY | Facility: OTHER | Age: 66
Discharge: HOME | End: 2021-03-29
Attending: INTERNAL MEDICINE
Payer: MEDICARE

## 2021-03-29 ENCOUNTER — PATIENT MESSAGE (OUTPATIENT)
Dept: INTERNAL MEDICINE | Facility: CLINIC | Age: 66
End: 2021-03-29

## 2021-03-29 DIAGNOSIS — Z78.0 POSTMENOPAUSAL: ICD-10-CM

## 2021-03-29 PROCEDURE — 77080 DXA BONE DENSITY AXIAL: CPT | Mod: 26,,, | Performed by: RADIOLOGY

## 2021-03-29 PROCEDURE — 77080 DXA BONE DENSITY AXIAL: CPT | Mod: TC

## 2021-03-29 PROCEDURE — 77080 DEXA BONE DENSITY SPINE HIP: ICD-10-PCS | Mod: 26,,, | Performed by: RADIOLOGY

## 2021-03-31 ENCOUNTER — EXTERNAL CHRONIC CARE MANAGEMENT (OUTPATIENT)
Dept: PRIMARY CARE CLINIC | Facility: CLINIC | Age: 66
End: 2021-03-31
Payer: MEDICARE

## 2021-03-31 PROCEDURE — 99490 PR CHRONIC CARE MGMT, 1ST 20 MIN: ICD-10-PCS | Mod: S$PBB,,, | Performed by: INTERNAL MEDICINE

## 2021-03-31 PROCEDURE — 99490 CHRNC CARE MGMT STAFF 1ST 20: CPT | Mod: PBBFAC,PO | Performed by: INTERNAL MEDICINE

## 2021-03-31 PROCEDURE — 99490 CHRNC CARE MGMT STAFF 1ST 20: CPT | Mod: S$PBB,,, | Performed by: INTERNAL MEDICINE

## 2021-04-28 ENCOUNTER — PATIENT MESSAGE (OUTPATIENT)
Dept: INTERNAL MEDICINE | Facility: CLINIC | Age: 66
End: 2021-04-28

## 2021-04-29 ENCOUNTER — OFFICE VISIT (OUTPATIENT)
Dept: INTERNAL MEDICINE | Facility: CLINIC | Age: 66
End: 2021-04-29
Payer: MEDICARE

## 2021-04-29 VITALS
OXYGEN SATURATION: 98 % | RESPIRATION RATE: 18 BRPM | WEIGHT: 212.94 LBS | TEMPERATURE: 97 F | HEIGHT: 65 IN | DIASTOLIC BLOOD PRESSURE: 88 MMHG | SYSTOLIC BLOOD PRESSURE: 140 MMHG | BODY MASS INDEX: 35.48 KG/M2 | HEART RATE: 62 BPM

## 2021-04-29 DIAGNOSIS — M54.31 SCIATICA OF RIGHT SIDE: ICD-10-CM

## 2021-04-29 DIAGNOSIS — S33.5XXA SPRAIN OF LOW BACK, INITIAL ENCOUNTER: Primary | ICD-10-CM

## 2021-04-29 PROCEDURE — 99214 OFFICE O/P EST MOD 30 MIN: CPT | Mod: S$PBB,,, | Performed by: INTERNAL MEDICINE

## 2021-04-29 PROCEDURE — 99999 PR PBB SHADOW E&M-EST. PATIENT-LVL IV: ICD-10-PCS | Mod: PBBFAC,,, | Performed by: INTERNAL MEDICINE

## 2021-04-29 PROCEDURE — 99214 PR OFFICE/OUTPT VISIT, EST, LEVL IV, 30-39 MIN: ICD-10-PCS | Mod: S$PBB,,, | Performed by: INTERNAL MEDICINE

## 2021-04-29 PROCEDURE — 99214 OFFICE O/P EST MOD 30 MIN: CPT | Mod: PBBFAC,PO,25 | Performed by: INTERNAL MEDICINE

## 2021-04-29 PROCEDURE — 99999 PR PBB SHADOW E&M-EST. PATIENT-LVL IV: CPT | Mod: PBBFAC,,, | Performed by: INTERNAL MEDICINE

## 2021-04-29 PROCEDURE — 96372 THER/PROPH/DIAG INJ SC/IM: CPT | Mod: PBBFAC,PO

## 2021-04-29 RX ORDER — TRAMADOL HYDROCHLORIDE 50 MG/1
50 TABLET ORAL EVERY 8 HOURS PRN
Qty: 28 TABLET | Refills: 1 | Status: SHIPPED | OUTPATIENT
Start: 2021-04-29 | End: 2021-06-02 | Stop reason: SDUPTHER

## 2021-04-29 RX ORDER — TRIAMCINOLONE ACETONIDE 40 MG/ML
40 INJECTION, SUSPENSION INTRA-ARTICULAR; INTRAMUSCULAR
Status: COMPLETED | OUTPATIENT
Start: 2021-04-29 | End: 2021-04-29

## 2021-04-29 RX ORDER — TIZANIDINE 4 MG/1
4 TABLET ORAL EVERY 8 HOURS PRN
Qty: 60 TABLET | Refills: 0 | Status: SHIPPED | OUTPATIENT
Start: 2021-04-29 | End: 2021-05-09

## 2021-04-29 RX ADMIN — TRIAMCINOLONE ACETONIDE 40 MG: 40 INJECTION, SUSPENSION INTRA-ARTICULAR; INTRAMUSCULAR at 02:04

## 2021-04-30 ENCOUNTER — EXTERNAL CHRONIC CARE MANAGEMENT (OUTPATIENT)
Dept: PRIMARY CARE CLINIC | Facility: CLINIC | Age: 66
End: 2021-04-30
Payer: MEDICARE

## 2021-04-30 PROCEDURE — 99490 CHRNC CARE MGMT STAFF 1ST 20: CPT | Mod: PBBFAC,PO | Performed by: INTERNAL MEDICINE

## 2021-04-30 PROCEDURE — 99490 CHRNC CARE MGMT STAFF 1ST 20: CPT | Mod: S$PBB,,, | Performed by: INTERNAL MEDICINE

## 2021-04-30 PROCEDURE — 99490 PR CHRONIC CARE MGMT, 1ST 20 MIN: ICD-10-PCS | Mod: S$PBB,,, | Performed by: INTERNAL MEDICINE

## 2021-05-07 ENCOUNTER — PATIENT MESSAGE (OUTPATIENT)
Dept: INTERNAL MEDICINE | Facility: CLINIC | Age: 66
End: 2021-05-07

## 2021-05-07 DIAGNOSIS — M54.41 ACUTE RIGHT-SIDED LOW BACK PAIN WITH RIGHT-SIDED SCIATICA: Primary | ICD-10-CM

## 2021-05-13 ENCOUNTER — TELEPHONE (OUTPATIENT)
Dept: INTERNAL MEDICINE | Facility: CLINIC | Age: 66
End: 2021-05-13

## 2021-05-13 ENCOUNTER — PATIENT MESSAGE (OUTPATIENT)
Dept: INTERNAL MEDICINE | Facility: CLINIC | Age: 66
End: 2021-05-13

## 2021-05-14 ENCOUNTER — OFFICE VISIT (OUTPATIENT)
Dept: PAIN MEDICINE | Facility: CLINIC | Age: 66
End: 2021-05-14
Payer: MEDICARE

## 2021-05-14 ENCOUNTER — PATIENT MESSAGE (OUTPATIENT)
Dept: INTERNAL MEDICINE | Facility: CLINIC | Age: 66
End: 2021-05-14

## 2021-05-14 ENCOUNTER — HOSPITAL ENCOUNTER (OUTPATIENT)
Dept: RADIOLOGY | Facility: HOSPITAL | Age: 66
Discharge: HOME | End: 2021-05-14
Attending: NURSE PRACTITIONER
Payer: MEDICARE

## 2021-05-14 VITALS
SYSTOLIC BLOOD PRESSURE: 160 MMHG | WEIGHT: 212.94 LBS | BODY MASS INDEX: 35.44 KG/M2 | HEART RATE: 60 BPM | DIASTOLIC BLOOD PRESSURE: 87 MMHG

## 2021-05-14 DIAGNOSIS — M46.1 SACROILIITIS: ICD-10-CM

## 2021-05-14 DIAGNOSIS — M54.9 DORSALGIA, UNSPECIFIED: ICD-10-CM

## 2021-05-14 DIAGNOSIS — M54.16 LUMBAR RADICULOPATHY: Primary | ICD-10-CM

## 2021-05-14 DIAGNOSIS — M54.41 ACUTE RIGHT-SIDED LOW BACK PAIN WITH RIGHT-SIDED SCIATICA: ICD-10-CM

## 2021-05-14 PROCEDURE — 99214 PR OFFICE/OUTPT VISIT, EST, LEVL IV, 30-39 MIN: ICD-10-PCS | Mod: S$PBB,,, | Performed by: NURSE PRACTITIONER

## 2021-05-14 PROCEDURE — 72220 X-RAY EXAM SACRUM TAILBONE: CPT | Mod: 26,,, | Performed by: RADIOLOGY

## 2021-05-14 PROCEDURE — 99999 PR PBB SHADOW E&M-EST. PATIENT-LVL IV: CPT | Mod: PBBFAC,,, | Performed by: NURSE PRACTITIONER

## 2021-05-14 PROCEDURE — 99214 OFFICE O/P EST MOD 30 MIN: CPT | Mod: PBBFAC,25,PO | Performed by: NURSE PRACTITIONER

## 2021-05-14 PROCEDURE — 72110 X-RAY EXAM L-2 SPINE 4/>VWS: CPT | Mod: TC,FY

## 2021-05-14 PROCEDURE — 99214 OFFICE O/P EST MOD 30 MIN: CPT | Mod: S$PBB,,, | Performed by: NURSE PRACTITIONER

## 2021-05-14 PROCEDURE — 72110 XR LUMBAR SPINE COMPLETE 5 VIEW: ICD-10-PCS | Mod: 26,,, | Performed by: RADIOLOGY

## 2021-05-14 PROCEDURE — 72220 XR SACRUM AND COCCYX: ICD-10-PCS | Mod: 26,,, | Performed by: RADIOLOGY

## 2021-05-14 PROCEDURE — 72110 X-RAY EXAM L-2 SPINE 4/>VWS: CPT | Mod: 26,,, | Performed by: RADIOLOGY

## 2021-05-14 PROCEDURE — 99999 PR PBB SHADOW E&M-EST. PATIENT-LVL IV: ICD-10-PCS | Mod: PBBFAC,,, | Performed by: NURSE PRACTITIONER

## 2021-05-14 PROCEDURE — 72220 X-RAY EXAM SACRUM TAILBONE: CPT | Mod: TC,FY

## 2021-05-18 ENCOUNTER — PES CALL (OUTPATIENT)
Dept: ADMINISTRATIVE | Facility: CLINIC | Age: 66
End: 2021-05-18

## 2021-05-25 ENCOUNTER — HOSPITAL ENCOUNTER (OUTPATIENT)
Dept: RADIOLOGY | Facility: HOSPITAL | Age: 66
Discharge: HOME | End: 2021-05-25
Attending: NURSE PRACTITIONER
Payer: MEDICARE

## 2021-05-25 DIAGNOSIS — M54.16 LUMBAR RADICULOPATHY: ICD-10-CM

## 2021-05-25 DIAGNOSIS — M54.9 DORSALGIA, UNSPECIFIED: ICD-10-CM

## 2021-05-25 PROCEDURE — 72148 MRI LUMBAR SPINE WITHOUT CONTRAST: ICD-10-PCS | Mod: 26,,, | Performed by: RADIOLOGY

## 2021-05-25 PROCEDURE — 72148 MRI LUMBAR SPINE W/O DYE: CPT | Mod: 26,,, | Performed by: RADIOLOGY

## 2021-05-25 PROCEDURE — 72148 MRI LUMBAR SPINE W/O DYE: CPT | Mod: TC

## 2021-05-26 ENCOUNTER — PATIENT MESSAGE (OUTPATIENT)
Dept: PAIN MEDICINE | Facility: CLINIC | Age: 66
End: 2021-05-26

## 2021-05-26 ENCOUNTER — PATIENT MESSAGE (OUTPATIENT)
Dept: ADMINISTRATIVE | Facility: HOSPITAL | Age: 66
End: 2021-05-26

## 2021-05-26 ENCOUNTER — TELEPHONE (OUTPATIENT)
Dept: PAIN MEDICINE | Facility: CLINIC | Age: 66
End: 2021-05-26

## 2021-05-27 ENCOUNTER — PATIENT OUTREACH (OUTPATIENT)
Dept: ADMINISTRATIVE | Facility: HOSPITAL | Age: 66
End: 2021-05-27

## 2021-05-31 ENCOUNTER — EXTERNAL CHRONIC CARE MANAGEMENT (OUTPATIENT)
Dept: PRIMARY CARE CLINIC | Facility: CLINIC | Age: 66
End: 2021-05-31
Payer: MEDICARE

## 2021-05-31 PROCEDURE — 99490 CHRNC CARE MGMT STAFF 1ST 20: CPT | Mod: S$PBB,,, | Performed by: INTERNAL MEDICINE

## 2021-05-31 PROCEDURE — 99490 PR CHRONIC CARE MGMT, 1ST 20 MIN: ICD-10-PCS | Mod: S$PBB,,, | Performed by: INTERNAL MEDICINE

## 2021-05-31 PROCEDURE — 99490 CHRNC CARE MGMT STAFF 1ST 20: CPT | Mod: PBBFAC,PO | Performed by: INTERNAL MEDICINE

## 2021-06-01 ENCOUNTER — PATIENT MESSAGE (OUTPATIENT)
Dept: PAIN MEDICINE | Facility: CLINIC | Age: 66
End: 2021-06-01

## 2021-06-02 ENCOUNTER — PATIENT MESSAGE (OUTPATIENT)
Dept: INTERNAL MEDICINE | Facility: CLINIC | Age: 66
End: 2021-06-02

## 2021-06-02 DIAGNOSIS — S33.5XXA SPRAIN OF LOW BACK, INITIAL ENCOUNTER: ICD-10-CM

## 2021-06-02 DIAGNOSIS — M54.31 SCIATICA OF RIGHT SIDE: ICD-10-CM

## 2021-06-03 RX ORDER — TRAMADOL HYDROCHLORIDE 50 MG/1
50 TABLET ORAL EVERY 8 HOURS PRN
Qty: 28 TABLET | Refills: 1 | Status: SHIPPED | OUTPATIENT
Start: 2021-06-03 | End: 2021-10-19 | Stop reason: SDUPTHER

## 2021-06-07 ENCOUNTER — PATIENT MESSAGE (OUTPATIENT)
Dept: PAIN MEDICINE | Facility: CLINIC | Age: 66
End: 2021-06-07

## 2021-06-08 ENCOUNTER — TELEPHONE (OUTPATIENT)
Dept: PAIN MEDICINE | Facility: CLINIC | Age: 66
End: 2021-06-08

## 2021-06-08 DIAGNOSIS — M54.16 LUMBAR RADICULITIS: Primary | ICD-10-CM

## 2021-06-14 ENCOUNTER — TELEPHONE (OUTPATIENT)
Dept: PAIN MEDICINE | Facility: CLINIC | Age: 66
End: 2021-06-14

## 2021-06-15 ENCOUNTER — HOSPITAL ENCOUNTER (OUTPATIENT)
Facility: HOSPITAL | Age: 66
Discharge: HOME | End: 2021-06-15
Attending: PAIN MEDICINE | Admitting: PAIN MEDICINE
Payer: MEDICARE

## 2021-06-15 VITALS
HEIGHT: 65 IN | TEMPERATURE: 97 F | RESPIRATION RATE: 15 BRPM | WEIGHT: 208 LBS | OXYGEN SATURATION: 98 % | BODY MASS INDEX: 34.66 KG/M2 | DIASTOLIC BLOOD PRESSURE: 75 MMHG | HEART RATE: 66 BPM | SYSTOLIC BLOOD PRESSURE: 149 MMHG

## 2021-06-15 DIAGNOSIS — G89.29 CHRONIC PAIN: ICD-10-CM

## 2021-06-15 DIAGNOSIS — M54.16 LUMBAR RADICULOPATHY: Primary | ICD-10-CM

## 2021-06-15 LAB — POCT GLUCOSE: 114 MG/DL (ref 70–110)

## 2021-06-15 PROCEDURE — 25000003 PHARM REV CODE 250: Performed by: PAIN MEDICINE

## 2021-06-15 PROCEDURE — 62323 NJX INTERLAMINAR LMBR/SAC: CPT | Performed by: PAIN MEDICINE

## 2021-06-15 PROCEDURE — 63600175 PHARM REV CODE 636 W HCPCS: Performed by: PAIN MEDICINE

## 2021-06-15 PROCEDURE — 25500020 PHARM REV CODE 255: Performed by: PAIN MEDICINE

## 2021-06-15 PROCEDURE — 62323 NJX INTERLAMINAR LMBR/SAC: CPT | Mod: ,,, | Performed by: PAIN MEDICINE

## 2021-06-15 PROCEDURE — 62323 PR INJ LUMBAR/SACRAL, W/IMAGING GUIDANCE: ICD-10-PCS | Mod: ,,, | Performed by: PAIN MEDICINE

## 2021-06-15 RX ORDER — BUPIVACAINE HYDROCHLORIDE 2.5 MG/ML
INJECTION, SOLUTION EPIDURAL; INFILTRATION; INTRACAUDAL
Status: DISCONTINUED | OUTPATIENT
Start: 2021-06-15 | End: 2021-06-15 | Stop reason: HOSPADM

## 2021-06-15 RX ORDER — METHYLPREDNISOLONE ACETATE 40 MG/ML
INJECTION, SUSPENSION INTRA-ARTICULAR; INTRALESIONAL; INTRAMUSCULAR; SOFT TISSUE
Status: DISCONTINUED | OUTPATIENT
Start: 2021-06-15 | End: 2021-06-15 | Stop reason: HOSPADM

## 2021-06-15 RX ORDER — LIDOCAINE HYDROCHLORIDE 10 MG/ML
INJECTION INFILTRATION; PERINEURAL
Status: DISCONTINUED | OUTPATIENT
Start: 2021-06-15 | End: 2021-06-15 | Stop reason: HOSPADM

## 2021-06-15 RX ORDER — ALPRAZOLAM 0.5 MG/1
0.5 TABLET, ORALLY DISINTEGRATING ORAL ONCE AS NEEDED
Status: COMPLETED | OUTPATIENT
Start: 2021-06-15 | End: 2021-06-15

## 2021-06-15 RX ADMIN — ALPRAZOLAM 0.5 MG: 0.5 TABLET, ORALLY DISINTEGRATING ORAL at 01:06

## 2021-06-22 ENCOUNTER — OFFICE VISIT (OUTPATIENT)
Dept: INTERNAL MEDICINE | Facility: CLINIC | Age: 66
End: 2021-06-22
Payer: MEDICARE

## 2021-06-22 VITALS
HEIGHT: 65 IN | HEART RATE: 66 BPM | DIASTOLIC BLOOD PRESSURE: 62 MMHG | BODY MASS INDEX: 36.65 KG/M2 | WEIGHT: 220 LBS | RESPIRATION RATE: 16 BRPM | OXYGEN SATURATION: 96 % | TEMPERATURE: 98 F | SYSTOLIC BLOOD PRESSURE: 116 MMHG

## 2021-06-22 DIAGNOSIS — I10 ESSENTIAL HYPERTENSION: ICD-10-CM

## 2021-06-22 DIAGNOSIS — E78.5 HYPERLIPIDEMIA, UNSPECIFIED HYPERLIPIDEMIA TYPE: ICD-10-CM

## 2021-06-22 DIAGNOSIS — G47.9 SLEEP DISTURBANCE: ICD-10-CM

## 2021-06-22 DIAGNOSIS — M79.7 FIBROMYALGIA: ICD-10-CM

## 2021-06-22 DIAGNOSIS — K22.70 BARRETT'S ESOPHAGUS WITHOUT DYSPLASIA: ICD-10-CM

## 2021-06-22 DIAGNOSIS — E11.9 TYPE 2 DIABETES MELLITUS WITHOUT COMPLICATION, WITHOUT LONG-TERM CURRENT USE OF INSULIN: Primary | ICD-10-CM

## 2021-06-22 DIAGNOSIS — D3A.8 NEUROENDOCRINE TUMOR OF PANCREAS: ICD-10-CM

## 2021-06-22 PROCEDURE — 99214 PR OFFICE/OUTPT VISIT, EST, LEVL IV, 30-39 MIN: ICD-10-PCS | Mod: S$PBB,,, | Performed by: INTERNAL MEDICINE

## 2021-06-22 PROCEDURE — 99999 PR PBB SHADOW E&M-EST. PATIENT-LVL V: CPT | Mod: PBBFAC,,, | Performed by: INTERNAL MEDICINE

## 2021-06-22 PROCEDURE — 99214 OFFICE O/P EST MOD 30 MIN: CPT | Mod: S$PBB,,, | Performed by: INTERNAL MEDICINE

## 2021-06-22 PROCEDURE — 99215 OFFICE O/P EST HI 40 MIN: CPT | Mod: PBBFAC,PO | Performed by: INTERNAL MEDICINE

## 2021-06-22 PROCEDURE — 99999 PR PBB SHADOW E&M-EST. PATIENT-LVL V: ICD-10-PCS | Mod: PBBFAC,,, | Performed by: INTERNAL MEDICINE

## 2021-06-22 RX ORDER — PREGABALIN 25 MG/1
25 CAPSULE ORAL 2 TIMES DAILY
Qty: 60 CAPSULE | Refills: 6 | Status: SHIPPED | OUTPATIENT
Start: 2021-06-22 | End: 2021-10-19 | Stop reason: SDUPTHER

## 2021-06-29 ENCOUNTER — LAB VISIT (OUTPATIENT)
Dept: LAB | Facility: HOSPITAL | Age: 66
End: 2021-06-29
Attending: INTERNAL MEDICINE
Payer: MEDICARE

## 2021-06-29 DIAGNOSIS — I10 ESSENTIAL HYPERTENSION: ICD-10-CM

## 2021-06-29 DIAGNOSIS — M79.7 FIBROMYALGIA: ICD-10-CM

## 2021-06-29 DIAGNOSIS — E78.5 HYPERLIPIDEMIA, UNSPECIFIED HYPERLIPIDEMIA TYPE: ICD-10-CM

## 2021-06-29 DIAGNOSIS — E11.9 TYPE 2 DIABETES MELLITUS WITHOUT COMPLICATION, WITHOUT LONG-TERM CURRENT USE OF INSULIN: ICD-10-CM

## 2021-06-29 LAB
ALBUMIN SERPL BCP-MCNC: 3.9 G/DL (ref 3.5–5.2)
ALP SERPL-CCNC: 79 U/L (ref 55–135)
ALT SERPL W/O P-5'-P-CCNC: 12 U/L (ref 10–44)
ANION GAP SERPL CALC-SCNC: 11 MMOL/L (ref 8–16)
AST SERPL-CCNC: 12 U/L (ref 10–40)
BILIRUB SERPL-MCNC: 1.2 MG/DL (ref 0.1–1)
BUN SERPL-MCNC: 13 MG/DL (ref 8–23)
CALCIUM SERPL-MCNC: 9.9 MG/DL (ref 8.7–10.5)
CHLORIDE SERPL-SCNC: 104 MMOL/L (ref 95–110)
CHOLEST SERPL-MCNC: 194 MG/DL (ref 120–199)
CHOLEST/HDLC SERPL: 2.7 {RATIO} (ref 2–5)
CO2 SERPL-SCNC: 27 MMOL/L (ref 23–29)
CREAT SERPL-MCNC: 0.9 MG/DL (ref 0.5–1.4)
EST. GFR  (AFRICAN AMERICAN): >60 ML/MIN/1.73 M^2
EST. GFR  (NON AFRICAN AMERICAN): >60 ML/MIN/1.73 M^2
ESTIMATED AVG GLUCOSE: 154 MG/DL (ref 68–131)
GLUCOSE SERPL-MCNC: 100 MG/DL (ref 70–110)
HBA1C MFR BLD: 7 % (ref 4–5.6)
HDLC SERPL-MCNC: 71 MG/DL (ref 40–75)
HDLC SERPL: 36.6 % (ref 20–50)
LDLC SERPL CALC-MCNC: 111 MG/DL (ref 63–159)
NONHDLC SERPL-MCNC: 123 MG/DL
POTASSIUM SERPL-SCNC: 3.9 MMOL/L (ref 3.5–5.1)
PROT SERPL-MCNC: 7.3 G/DL (ref 6–8.4)
SODIUM SERPL-SCNC: 142 MMOL/L (ref 136–145)
TRIGL SERPL-MCNC: 60 MG/DL (ref 30–150)

## 2021-06-29 PROCEDURE — 80053 COMPREHEN METABOLIC PANEL: CPT | Performed by: INTERNAL MEDICINE

## 2021-06-29 PROCEDURE — 80061 LIPID PANEL: CPT | Performed by: INTERNAL MEDICINE

## 2021-06-29 PROCEDURE — 83036 HEMOGLOBIN GLYCOSYLATED A1C: CPT | Performed by: INTERNAL MEDICINE

## 2021-06-29 PROCEDURE — 36415 COLL VENOUS BLD VENIPUNCTURE: CPT | Mod: PO | Performed by: INTERNAL MEDICINE

## 2021-06-30 ENCOUNTER — PATIENT MESSAGE (OUTPATIENT)
Dept: PAIN MEDICINE | Facility: CLINIC | Age: 66
End: 2021-06-30

## 2021-06-30 ENCOUNTER — EXTERNAL CHRONIC CARE MANAGEMENT (OUTPATIENT)
Dept: PRIMARY CARE CLINIC | Facility: CLINIC | Age: 66
End: 2021-06-30
Payer: MEDICARE

## 2021-06-30 PROCEDURE — 99490 CHRNC CARE MGMT STAFF 1ST 20: CPT | Mod: S$PBB,,, | Performed by: INTERNAL MEDICINE

## 2021-06-30 PROCEDURE — 99490 CHRNC CARE MGMT STAFF 1ST 20: CPT | Mod: PBBFAC,PO | Performed by: INTERNAL MEDICINE

## 2021-06-30 PROCEDURE — 99490 PR CHRONIC CARE MGMT, 1ST 20 MIN: ICD-10-PCS | Mod: S$PBB,,, | Performed by: INTERNAL MEDICINE

## 2021-07-01 ENCOUNTER — OFFICE VISIT (OUTPATIENT)
Dept: HOME HEALTH SERVICES | Facility: CLINIC | Age: 66
End: 2021-07-01
Payer: MEDICARE

## 2021-07-01 VITALS
WEIGHT: 215 LBS | HEIGHT: 65 IN | TEMPERATURE: 98 F | SYSTOLIC BLOOD PRESSURE: 149 MMHG | HEART RATE: 64 BPM | DIASTOLIC BLOOD PRESSURE: 76 MMHG | BODY MASS INDEX: 35.82 KG/M2

## 2021-07-01 DIAGNOSIS — E11.9 TYPE 2 DIABETES MELLITUS WITHOUT COMPLICATION, WITHOUT LONG-TERM CURRENT USE OF INSULIN: Chronic | ICD-10-CM

## 2021-07-01 DIAGNOSIS — M46.1 SACROILIITIS: ICD-10-CM

## 2021-07-01 DIAGNOSIS — D75.839 THROMBOCYTOSIS: ICD-10-CM

## 2021-07-01 DIAGNOSIS — M47.26 OSTEOARTHRITIS OF SPINE WITH RADICULOPATHY, LUMBAR REGION: ICD-10-CM

## 2021-07-01 DIAGNOSIS — M79.7 FIBROMYALGIA: ICD-10-CM

## 2021-07-01 DIAGNOSIS — Z00.00 ENCOUNTER FOR PREVENTIVE HEALTH EXAMINATION: Primary | ICD-10-CM

## 2021-07-01 DIAGNOSIS — Z85.07 HISTORY OF PANCREATIC CANCER: ICD-10-CM

## 2021-07-01 DIAGNOSIS — E78.5 HYPERLIPIDEMIA, UNSPECIFIED HYPERLIPIDEMIA TYPE: Chronic | ICD-10-CM

## 2021-07-01 DIAGNOSIS — G89.29 OTHER CHRONIC PAIN: ICD-10-CM

## 2021-07-01 DIAGNOSIS — I10 ESSENTIAL HYPERTENSION: Chronic | ICD-10-CM

## 2021-07-01 PROCEDURE — G0439 PR MEDICARE ANNUAL WELLNESS SUBSEQUENT VISIT: ICD-10-PCS | Mod: S$GLB,,, | Performed by: NURSE PRACTITIONER

## 2021-07-01 PROCEDURE — G0439 PPPS, SUBSEQ VISIT: HCPCS | Mod: S$GLB,,, | Performed by: NURSE PRACTITIONER

## 2021-07-02 PROBLEM — Z85.07 HISTORY OF PANCREATIC CANCER: Status: ACTIVE | Noted: 2021-07-02

## 2021-07-02 PROBLEM — D75.839 THROMBOCYTOSIS: Status: ACTIVE | Noted: 2021-07-02

## 2021-07-02 PROBLEM — C25.1 MALIGNANT NEOPLASM OF BODY OF PANCREAS: Status: RESOLVED | Noted: 2021-03-25 | Resolved: 2021-07-02

## 2021-07-02 PROBLEM — M46.1 SACROILIITIS: Status: ACTIVE | Noted: 2021-07-02

## 2021-07-06 ENCOUNTER — PATIENT OUTREACH (OUTPATIENT)
Dept: ADMINISTRATIVE | Facility: OTHER | Age: 66
End: 2021-07-06

## 2021-07-07 ENCOUNTER — PATIENT MESSAGE (OUTPATIENT)
Dept: PAIN MEDICINE | Facility: CLINIC | Age: 66
End: 2021-07-07

## 2021-07-07 ENCOUNTER — OFFICE VISIT (OUTPATIENT)
Dept: PAIN MEDICINE | Facility: CLINIC | Age: 66
End: 2021-07-07
Payer: MEDICARE

## 2021-07-07 VITALS
HEART RATE: 66 BPM | WEIGHT: 214.94 LBS | DIASTOLIC BLOOD PRESSURE: 77 MMHG | SYSTOLIC BLOOD PRESSURE: 121 MMHG | BODY MASS INDEX: 35.77 KG/M2

## 2021-07-07 DIAGNOSIS — M54.9 DORSALGIA, UNSPECIFIED: ICD-10-CM

## 2021-07-07 DIAGNOSIS — M54.16 LUMBAR RADICULITIS: ICD-10-CM

## 2021-07-07 DIAGNOSIS — M46.1 SACROILIITIS: ICD-10-CM

## 2021-07-07 DIAGNOSIS — G89.4 CHRONIC PAIN SYNDROME: ICD-10-CM

## 2021-07-07 DIAGNOSIS — M54.16 LUMBAR RADICULOPATHY: Primary | ICD-10-CM

## 2021-07-07 PROCEDURE — 99999 PR PBB SHADOW E&M-EST. PATIENT-LVL III: ICD-10-PCS | Mod: PBBFAC,,, | Performed by: NURSE PRACTITIONER

## 2021-07-07 PROCEDURE — 99213 OFFICE O/P EST LOW 20 MIN: CPT | Mod: PBBFAC,PO | Performed by: NURSE PRACTITIONER

## 2021-07-07 PROCEDURE — 99214 PR OFFICE/OUTPT VISIT, EST, LEVL IV, 30-39 MIN: ICD-10-PCS | Mod: S$PBB,,, | Performed by: NURSE PRACTITIONER

## 2021-07-07 PROCEDURE — 99999 PR PBB SHADOW E&M-EST. PATIENT-LVL III: CPT | Mod: PBBFAC,,, | Performed by: NURSE PRACTITIONER

## 2021-07-07 PROCEDURE — 99214 OFFICE O/P EST MOD 30 MIN: CPT | Mod: S$PBB,,, | Performed by: NURSE PRACTITIONER

## 2021-07-16 ENCOUNTER — OFFICE VISIT (OUTPATIENT)
Dept: OPTOMETRY | Facility: CLINIC | Age: 66
End: 2021-07-16
Payer: MEDICARE

## 2021-07-16 DIAGNOSIS — E11.9 TYPE 2 DIABETES MELLITUS WITHOUT RETINOPATHY: Primary | ICD-10-CM

## 2021-07-16 DIAGNOSIS — H52.4 PRESBYOPIA: ICD-10-CM

## 2021-07-16 DIAGNOSIS — Z13.5 GLAUCOMA SCREENING: ICD-10-CM

## 2021-07-16 PROCEDURE — 92015 DETERMINE REFRACTIVE STATE: CPT | Mod: ,,, | Performed by: OPTOMETRIST

## 2021-07-16 PROCEDURE — 99999 PR PBB SHADOW E&M-EST. PATIENT-LVL III: ICD-10-PCS | Mod: PBBFAC,,, | Performed by: OPTOMETRIST

## 2021-07-16 PROCEDURE — 92015 PR REFRACTION: ICD-10-PCS | Mod: ,,, | Performed by: OPTOMETRIST

## 2021-07-16 PROCEDURE — 99213 OFFICE O/P EST LOW 20 MIN: CPT | Mod: PBBFAC,PO | Performed by: OPTOMETRIST

## 2021-07-16 PROCEDURE — 99999 PR PBB SHADOW E&M-EST. PATIENT-LVL III: CPT | Mod: PBBFAC,,, | Performed by: OPTOMETRIST

## 2021-07-16 PROCEDURE — 92004 PR EYE EXAM, NEW PATIENT,COMPREHESV: ICD-10-PCS | Mod: S$PBB,,, | Performed by: OPTOMETRIST

## 2021-07-16 PROCEDURE — 92004 COMPRE OPH EXAM NEW PT 1/>: CPT | Mod: S$PBB,,, | Performed by: OPTOMETRIST

## 2021-07-22 ENCOUNTER — PATIENT MESSAGE (OUTPATIENT)
Dept: PAIN MEDICINE | Facility: CLINIC | Age: 66
End: 2021-07-22

## 2021-07-22 ENCOUNTER — TELEPHONE (OUTPATIENT)
Dept: OPTOMETRY | Facility: CLINIC | Age: 66
End: 2021-07-22

## 2021-07-26 ENCOUNTER — TELEPHONE (OUTPATIENT)
Dept: PAIN MEDICINE | Facility: CLINIC | Age: 66
End: 2021-07-26

## 2021-07-26 ENCOUNTER — TELEPHONE (OUTPATIENT)
Dept: OPTOMETRY | Facility: CLINIC | Age: 66
End: 2021-07-26

## 2021-07-26 DIAGNOSIS — M54.16 RADICULOPATHY, LUMBAR REGION: Primary | ICD-10-CM

## 2021-07-31 ENCOUNTER — EXTERNAL CHRONIC CARE MANAGEMENT (OUTPATIENT)
Dept: PRIMARY CARE CLINIC | Facility: CLINIC | Age: 66
End: 2021-07-31
Payer: MEDICARE

## 2021-07-31 PROCEDURE — 99490 CHRNC CARE MGMT STAFF 1ST 20: CPT | Mod: S$PBB,,, | Performed by: INTERNAL MEDICINE

## 2021-07-31 PROCEDURE — 99490 CHRNC CARE MGMT STAFF 1ST 20: CPT | Mod: PBBFAC,PO | Performed by: INTERNAL MEDICINE

## 2021-07-31 PROCEDURE — 99490 PR CHRONIC CARE MGMT, 1ST 20 MIN: ICD-10-PCS | Mod: S$PBB,,, | Performed by: INTERNAL MEDICINE

## 2021-08-09 ENCOUNTER — OFFICE VISIT (OUTPATIENT)
Dept: URGENT CARE | Facility: CLINIC | Age: 66
End: 2021-08-09
Payer: MEDICARE

## 2021-08-09 VITALS
SYSTOLIC BLOOD PRESSURE: 155 MMHG | HEIGHT: 66 IN | WEIGHT: 214 LBS | DIASTOLIC BLOOD PRESSURE: 77 MMHG | HEART RATE: 78 BPM | TEMPERATURE: 99 F | BODY MASS INDEX: 34.39 KG/M2 | OXYGEN SATURATION: 96 %

## 2021-08-09 DIAGNOSIS — R05.9 COUGH: ICD-10-CM

## 2021-08-09 DIAGNOSIS — J06.9 VIRAL URI WITH COUGH: Primary | ICD-10-CM

## 2021-08-09 DIAGNOSIS — R09.81 NASAL CONGESTION: ICD-10-CM

## 2021-08-09 LAB
CTP QC/QA: YES
SARS-COV-2 RDRP RESP QL NAA+PROBE: NEGATIVE

## 2021-08-09 PROCEDURE — 99214 OFFICE O/P EST MOD 30 MIN: CPT | Mod: S$GLB,CS,, | Performed by: NURSE PRACTITIONER

## 2021-08-09 PROCEDURE — U0002: ICD-10-PCS | Mod: QW,CR,S$GLB, | Performed by: NURSE PRACTITIONER

## 2021-08-09 PROCEDURE — 99214 PR OFFICE/OUTPT VISIT, EST, LEVL IV, 30-39 MIN: ICD-10-PCS | Mod: S$GLB,CS,, | Performed by: NURSE PRACTITIONER

## 2021-08-09 PROCEDURE — U0002 COVID-19 LAB TEST NON-CDC: HCPCS | Mod: QW,CR,S$GLB, | Performed by: NURSE PRACTITIONER

## 2021-08-09 RX ORDER — AZELASTINE 1 MG/ML
1 SPRAY, METERED NASAL 2 TIMES DAILY
Qty: 30 ML | Refills: 0 | Status: SHIPPED | OUTPATIENT
Start: 2021-08-09 | End: 2021-09-23

## 2021-08-09 RX ORDER — PROMETHAZINE HYDROCHLORIDE AND DEXTROMETHORPHAN HYDROBROMIDE 6.25; 15 MG/5ML; MG/5ML
5 SYRUP ORAL
Qty: 118 ML | Refills: 0 | Status: SHIPPED | OUTPATIENT
Start: 2021-08-09 | End: 2022-02-15

## 2021-08-16 ENCOUNTER — TELEPHONE (OUTPATIENT)
Dept: PAIN MEDICINE | Facility: CLINIC | Age: 66
End: 2021-08-16

## 2021-08-17 ENCOUNTER — HOSPITAL ENCOUNTER (OUTPATIENT)
Facility: HOSPITAL | Age: 66
Discharge: HOME | End: 2021-08-17
Attending: PAIN MEDICINE | Admitting: PAIN MEDICINE
Payer: MEDICARE

## 2021-08-17 VITALS
HEIGHT: 66 IN | DIASTOLIC BLOOD PRESSURE: 61 MMHG | WEIGHT: 214 LBS | OXYGEN SATURATION: 96 % | RESPIRATION RATE: 16 BRPM | BODY MASS INDEX: 34.39 KG/M2 | SYSTOLIC BLOOD PRESSURE: 128 MMHG | HEART RATE: 51 BPM | TEMPERATURE: 98 F

## 2021-08-17 DIAGNOSIS — G89.29 CHRONIC PAIN: ICD-10-CM

## 2021-08-17 DIAGNOSIS — M54.16 LUMBAR RADICULOPATHY: Primary | ICD-10-CM

## 2021-08-17 LAB — POCT GLUCOSE: 134 MG/DL (ref 70–110)

## 2021-08-17 PROCEDURE — 25500020 PHARM REV CODE 255: Performed by: PAIN MEDICINE

## 2021-08-17 PROCEDURE — 64483 NJX AA&/STRD TFRM EPI L/S 1: CPT | Mod: RT,,, | Performed by: PAIN MEDICINE

## 2021-08-17 PROCEDURE — 63600175 PHARM REV CODE 636 W HCPCS: Performed by: PAIN MEDICINE

## 2021-08-17 PROCEDURE — 64483 PR EPIDURAL INJ, ANES/STEROID, TRANSFORAMINAL, LUMB/SACR, SNGL LEVL: ICD-10-PCS | Mod: RT,,, | Performed by: PAIN MEDICINE

## 2021-08-17 PROCEDURE — 25000003 PHARM REV CODE 250: Performed by: PAIN MEDICINE

## 2021-08-17 PROCEDURE — 99152 MOD SED SAME PHYS/QHP 5/>YRS: CPT | Performed by: PAIN MEDICINE

## 2021-08-17 PROCEDURE — 64483 NJX AA&/STRD TFRM EPI L/S 1: CPT | Performed by: PAIN MEDICINE

## 2021-08-17 RX ORDER — MIDAZOLAM HYDROCHLORIDE 1 MG/ML
INJECTION INTRAMUSCULAR; INTRAVENOUS
Status: DISCONTINUED | OUTPATIENT
Start: 2021-08-17 | End: 2021-08-17 | Stop reason: HOSPADM

## 2021-08-17 RX ORDER — BUPIVACAINE HYDROCHLORIDE 2.5 MG/ML
INJECTION, SOLUTION EPIDURAL; INFILTRATION; INTRACAUDAL
Status: DISCONTINUED | OUTPATIENT
Start: 2021-08-17 | End: 2021-08-17 | Stop reason: HOSPADM

## 2021-08-17 RX ORDER — SODIUM CHLORIDE 9 MG/ML
INJECTION, SOLUTION INTRAVENOUS CONTINUOUS
Status: DISCONTINUED | OUTPATIENT
Start: 2021-08-17 | End: 2021-08-17 | Stop reason: HOSPADM

## 2021-08-17 RX ORDER — DEXAMETHASONE SODIUM PHOSPHATE 10 MG/ML
INJECTION INTRAMUSCULAR; INTRAVENOUS
Status: DISCONTINUED | OUTPATIENT
Start: 2021-08-17 | End: 2021-08-17 | Stop reason: HOSPADM

## 2021-08-17 RX ORDER — FENTANYL CITRATE 50 UG/ML
INJECTION, SOLUTION INTRAMUSCULAR; INTRAVENOUS
Status: DISCONTINUED | OUTPATIENT
Start: 2021-08-17 | End: 2021-08-17 | Stop reason: HOSPADM

## 2021-08-17 RX ORDER — LIDOCAINE HYDROCHLORIDE 10 MG/ML
INJECTION INFILTRATION; PERINEURAL
Status: DISCONTINUED | OUTPATIENT
Start: 2021-08-17 | End: 2021-08-17 | Stop reason: HOSPADM

## 2021-08-17 RX ORDER — LIDOCAINE HYDROCHLORIDE 10 MG/ML
1 INJECTION, SOLUTION EPIDURAL; INFILTRATION; INTRACAUDAL; PERINEURAL ONCE
Status: DISCONTINUED | OUTPATIENT
Start: 2021-08-17 | End: 2021-08-17 | Stop reason: HOSPADM

## 2021-08-23 ENCOUNTER — TELEPHONE (OUTPATIENT)
Dept: PAIN MEDICINE | Facility: CLINIC | Age: 66
End: 2021-08-23

## 2021-08-24 ENCOUNTER — HOSPITAL ENCOUNTER (OUTPATIENT)
Facility: HOSPITAL | Age: 66
Discharge: HOME | End: 2021-08-24
Attending: PAIN MEDICINE | Admitting: PAIN MEDICINE
Payer: MEDICARE

## 2021-08-24 VITALS
HEIGHT: 65 IN | OXYGEN SATURATION: 98 % | SYSTOLIC BLOOD PRESSURE: 142 MMHG | WEIGHT: 214 LBS | RESPIRATION RATE: 16 BRPM | DIASTOLIC BLOOD PRESSURE: 71 MMHG | BODY MASS INDEX: 35.65 KG/M2 | TEMPERATURE: 98 F | HEART RATE: 62 BPM

## 2021-08-24 DIAGNOSIS — G89.29 CHRONIC PAIN: ICD-10-CM

## 2021-08-24 DIAGNOSIS — M54.16 LUMBAR RADICULOPATHY: Primary | ICD-10-CM

## 2021-08-24 LAB — POCT GLUCOSE: 156 MG/DL (ref 70–110)

## 2021-08-24 PROCEDURE — 64483 NJX AA&/STRD TFRM EPI L/S 1: CPT | Mod: RT,,, | Performed by: PAIN MEDICINE

## 2021-08-24 PROCEDURE — 99152 MOD SED SAME PHYS/QHP 5/>YRS: CPT | Performed by: PAIN MEDICINE

## 2021-08-24 PROCEDURE — 63600175 PHARM REV CODE 636 W HCPCS: Performed by: PAIN MEDICINE

## 2021-08-24 PROCEDURE — 25000003 PHARM REV CODE 250: Performed by: PAIN MEDICINE

## 2021-08-24 PROCEDURE — 64483 PR EPIDURAL INJ, ANES/STEROID, TRANSFORAMINAL, LUMB/SACR, SNGL LEVL: ICD-10-PCS | Mod: RT,,, | Performed by: PAIN MEDICINE

## 2021-08-24 PROCEDURE — 25500020 PHARM REV CODE 255: Performed by: PAIN MEDICINE

## 2021-08-24 PROCEDURE — 64483 NJX AA&/STRD TFRM EPI L/S 1: CPT | Performed by: PAIN MEDICINE

## 2021-08-24 RX ORDER — DEXAMETHASONE SODIUM PHOSPHATE 10 MG/ML
INJECTION INTRAMUSCULAR; INTRAVENOUS
Status: DISCONTINUED | OUTPATIENT
Start: 2021-08-24 | End: 2021-08-24 | Stop reason: HOSPADM

## 2021-08-24 RX ORDER — SODIUM CHLORIDE 9 MG/ML
INJECTION, SOLUTION INTRAVENOUS CONTINUOUS
Status: DISCONTINUED | OUTPATIENT
Start: 2021-08-24 | End: 2021-08-24 | Stop reason: HOSPADM

## 2021-08-24 RX ORDER — MIDAZOLAM HYDROCHLORIDE 1 MG/ML
INJECTION, SOLUTION INTRAMUSCULAR; INTRAVENOUS
Status: DISCONTINUED | OUTPATIENT
Start: 2021-08-24 | End: 2021-08-24 | Stop reason: HOSPADM

## 2021-08-24 RX ORDER — LIDOCAINE HYDROCHLORIDE 10 MG/ML
1 INJECTION, SOLUTION EPIDURAL; INFILTRATION; INTRACAUDAL; PERINEURAL ONCE
Status: DISCONTINUED | OUTPATIENT
Start: 2021-08-24 | End: 2021-08-24 | Stop reason: HOSPADM

## 2021-08-24 RX ORDER — LIDOCAINE HYDROCHLORIDE 10 MG/ML
INJECTION INFILTRATION; PERINEURAL
Status: DISCONTINUED | OUTPATIENT
Start: 2021-08-24 | End: 2021-08-24 | Stop reason: HOSPADM

## 2021-08-24 RX ORDER — FENTANYL CITRATE 50 UG/ML
INJECTION, SOLUTION INTRAMUSCULAR; INTRAVENOUS
Status: DISCONTINUED | OUTPATIENT
Start: 2021-08-24 | End: 2021-08-24 | Stop reason: HOSPADM

## 2021-08-24 RX ORDER — INDOMETHACIN 25 MG/1
CAPSULE ORAL
Status: DISCONTINUED | OUTPATIENT
Start: 2021-08-24 | End: 2021-08-24 | Stop reason: HOSPADM

## 2021-08-24 RX ORDER — BUPIVACAINE HYDROCHLORIDE 2.5 MG/ML
INJECTION, SOLUTION EPIDURAL; INFILTRATION; INTRACAUDAL
Status: DISCONTINUED | OUTPATIENT
Start: 2021-08-24 | End: 2021-08-24 | Stop reason: HOSPADM

## 2021-08-26 ENCOUNTER — PATIENT MESSAGE (OUTPATIENT)
Dept: INTERNAL MEDICINE | Facility: CLINIC | Age: 66
End: 2021-08-26

## 2021-08-27 RX ORDER — PANTOPRAZOLE SODIUM 40 MG/1
TABLET, DELAYED RELEASE ORAL
Qty: 90 TABLET | Refills: 3 | Status: SHIPPED | OUTPATIENT
Start: 2021-08-27 | End: 2022-08-06

## 2021-08-31 ENCOUNTER — EXTERNAL CHRONIC CARE MANAGEMENT (OUTPATIENT)
Dept: PRIMARY CARE CLINIC | Facility: CLINIC | Age: 66
End: 2021-08-31
Payer: MEDICARE

## 2021-08-31 PROCEDURE — 99490 PR CHRONIC CARE MGMT, 1ST 20 MIN: ICD-10-PCS | Mod: S$PBB,,, | Performed by: INTERNAL MEDICINE

## 2021-08-31 PROCEDURE — 99490 CHRNC CARE MGMT STAFF 1ST 20: CPT | Mod: S$PBB,,, | Performed by: INTERNAL MEDICINE

## 2021-08-31 PROCEDURE — 99490 CHRNC CARE MGMT STAFF 1ST 20: CPT | Mod: PBBFAC,PO | Performed by: INTERNAL MEDICINE

## 2021-09-08 ENCOUNTER — PATIENT MESSAGE (OUTPATIENT)
Dept: INTERNAL MEDICINE | Facility: CLINIC | Age: 66
End: 2021-09-08

## 2021-09-08 RX ORDER — TEMAZEPAM 30 MG/1
30 CAPSULE ORAL NIGHTLY PRN
Qty: 30 CAPSULE | Refills: 2 | Status: SHIPPED | OUTPATIENT
Start: 2021-09-08 | End: 2021-10-19 | Stop reason: SDUPTHER

## 2021-09-17 ENCOUNTER — PATIENT OUTREACH (OUTPATIENT)
Dept: ADMINISTRATIVE | Facility: OTHER | Age: 66
End: 2021-09-17

## 2021-09-21 ENCOUNTER — OFFICE VISIT (OUTPATIENT)
Dept: PAIN MEDICINE | Facility: CLINIC | Age: 66
End: 2021-09-21
Payer: MEDICARE

## 2021-09-21 ENCOUNTER — PATIENT MESSAGE (OUTPATIENT)
Dept: SPORTS MEDICINE | Facility: CLINIC | Age: 66
End: 2021-09-21

## 2021-09-21 ENCOUNTER — TELEPHONE (OUTPATIENT)
Dept: PAIN MEDICINE | Facility: CLINIC | Age: 66
End: 2021-09-21

## 2021-09-21 VITALS
BODY MASS INDEX: 35.62 KG/M2 | HEART RATE: 68 BPM | SYSTOLIC BLOOD PRESSURE: 129 MMHG | WEIGHT: 214.06 LBS | DIASTOLIC BLOOD PRESSURE: 72 MMHG

## 2021-09-21 DIAGNOSIS — M46.1 SACROILIITIS: ICD-10-CM

## 2021-09-21 DIAGNOSIS — M54.16 LUMBAR RADICULOPATHY: ICD-10-CM

## 2021-09-21 DIAGNOSIS — M51.36 DDD (DEGENERATIVE DISC DISEASE), LUMBAR: ICD-10-CM

## 2021-09-21 DIAGNOSIS — M47.816 LUMBAR SPONDYLOSIS: Primary | ICD-10-CM

## 2021-09-21 DIAGNOSIS — G89.4 CHRONIC PAIN SYNDROME: ICD-10-CM

## 2021-09-21 DIAGNOSIS — M47.819 ARTHROPATHY OF FACET JOINTS AT MULTIPLE LEVELS: ICD-10-CM

## 2021-09-21 DIAGNOSIS — M47.819 SPONDYLOSIS WITHOUT MYELOPATHY: ICD-10-CM

## 2021-09-21 PROCEDURE — 99999 PR PBB SHADOW E&M-EST. PATIENT-LVL III: ICD-10-PCS | Mod: PBBFAC,,, | Performed by: NURSE PRACTITIONER

## 2021-09-21 PROCEDURE — 99214 OFFICE O/P EST MOD 30 MIN: CPT | Mod: S$PBB,,, | Performed by: NURSE PRACTITIONER

## 2021-09-21 PROCEDURE — 99214 PR OFFICE/OUTPT VISIT, EST, LEVL IV, 30-39 MIN: ICD-10-PCS | Mod: S$PBB,,, | Performed by: NURSE PRACTITIONER

## 2021-09-21 PROCEDURE — 99999 PR PBB SHADOW E&M-EST. PATIENT-LVL III: CPT | Mod: PBBFAC,,, | Performed by: NURSE PRACTITIONER

## 2021-09-21 PROCEDURE — 99213 OFFICE O/P EST LOW 20 MIN: CPT | Mod: PBBFAC,PO | Performed by: NURSE PRACTITIONER

## 2021-09-22 ENCOUNTER — TELEPHONE (OUTPATIENT)
Dept: SPORTS MEDICINE | Facility: CLINIC | Age: 66
End: 2021-09-22

## 2021-09-22 DIAGNOSIS — M17.0 PRIMARY OSTEOARTHRITIS OF KNEES, BILATERAL: Primary | ICD-10-CM

## 2021-09-23 ENCOUNTER — HOSPITAL ENCOUNTER (OUTPATIENT)
Dept: RADIOLOGY | Facility: HOSPITAL | Age: 66
Discharge: HOME | End: 2021-09-23
Attending: NEUROMUSCULOSKELETAL MEDICINE & OMM
Payer: MEDICARE

## 2021-09-23 ENCOUNTER — OFFICE VISIT (OUTPATIENT)
Dept: SPORTS MEDICINE | Facility: CLINIC | Age: 66
End: 2021-09-23
Payer: MEDICARE

## 2021-09-23 VITALS
HEIGHT: 65 IN | WEIGHT: 214 LBS | SYSTOLIC BLOOD PRESSURE: 118 MMHG | DIASTOLIC BLOOD PRESSURE: 80 MMHG | BODY MASS INDEX: 35.65 KG/M2

## 2021-09-23 DIAGNOSIS — M25.462 EFFUSION OF LEFT KNEE: ICD-10-CM

## 2021-09-23 DIAGNOSIS — M25.562 CHRONIC PAIN OF BOTH KNEES: ICD-10-CM

## 2021-09-23 DIAGNOSIS — M25.561 CHRONIC PAIN OF BOTH KNEES: ICD-10-CM

## 2021-09-23 DIAGNOSIS — M17.0 PRIMARY OSTEOARTHRITIS OF KNEES, BILATERAL: ICD-10-CM

## 2021-09-23 DIAGNOSIS — G89.29 CHRONIC PAIN OF BOTH KNEES: ICD-10-CM

## 2021-09-23 DIAGNOSIS — M17.0 BILATERAL PRIMARY OSTEOARTHRITIS OF KNEE: Primary | ICD-10-CM

## 2021-09-23 PROCEDURE — 73564 XR KNEE ORTHO BILAT WITH FLEXION: ICD-10-PCS | Mod: 26,50,, | Performed by: RADIOLOGY

## 2021-09-23 PROCEDURE — 99999 PR PBB SHADOW E&M-EST. PATIENT-LVL II: ICD-10-PCS | Mod: PBBFAC,,, | Performed by: NEUROMUSCULOSKELETAL MEDICINE & OMM

## 2021-09-23 PROCEDURE — 99212 OFFICE O/P EST SF 10 MIN: CPT | Mod: PBBFAC,PN | Performed by: NEUROMUSCULOSKELETAL MEDICINE & OMM

## 2021-09-23 PROCEDURE — 73564 X-RAY EXAM KNEE 4 OR MORE: CPT | Mod: TC,50,PO

## 2021-09-23 PROCEDURE — 73564 X-RAY EXAM KNEE 4 OR MORE: CPT | Mod: 26,50,, | Performed by: RADIOLOGY

## 2021-09-23 PROCEDURE — 99214 PR OFFICE/OUTPT VISIT, EST, LEVL IV, 30-39 MIN: ICD-10-PCS | Mod: S$PBB,,, | Performed by: NEUROMUSCULOSKELETAL MEDICINE & OMM

## 2021-09-23 PROCEDURE — 99214 OFFICE O/P EST MOD 30 MIN: CPT | Mod: S$PBB,,, | Performed by: NEUROMUSCULOSKELETAL MEDICINE & OMM

## 2021-09-23 PROCEDURE — 99999 PR PBB SHADOW E&M-EST. PATIENT-LVL II: CPT | Mod: PBBFAC,,, | Performed by: NEUROMUSCULOSKELETAL MEDICINE & OMM

## 2021-09-24 DIAGNOSIS — M17.12 PRIMARY OSTEOARTHRITIS OF LEFT KNEE: Primary | ICD-10-CM

## 2021-09-27 ENCOUNTER — TELEPHONE (OUTPATIENT)
Dept: PAIN MEDICINE | Facility: CLINIC | Age: 66
End: 2021-09-27

## 2021-09-28 ENCOUNTER — HOSPITAL ENCOUNTER (OUTPATIENT)
Facility: HOSPITAL | Age: 66
Discharge: HOME | End: 2021-09-28
Attending: PAIN MEDICINE | Admitting: PAIN MEDICINE
Payer: MEDICARE

## 2021-09-28 VITALS
HEART RATE: 65 BPM | RESPIRATION RATE: 16 BRPM | HEIGHT: 66 IN | TEMPERATURE: 98 F | DIASTOLIC BLOOD PRESSURE: 76 MMHG | BODY MASS INDEX: 34.07 KG/M2 | WEIGHT: 212 LBS | OXYGEN SATURATION: 100 % | SYSTOLIC BLOOD PRESSURE: 141 MMHG

## 2021-09-28 DIAGNOSIS — M47.816 LUMBAR SPONDYLOSIS: Primary | ICD-10-CM

## 2021-09-28 DIAGNOSIS — G89.29 CHRONIC PAIN: ICD-10-CM

## 2021-09-28 PROCEDURE — 25500020 PHARM REV CODE 255: Performed by: PAIN MEDICINE

## 2021-09-28 PROCEDURE — 64493 PR INJ DX/THER AGNT PARAVERT FACET JOINT,IMG GUIDE,LUMBAR/SAC,1ST LVL: ICD-10-PCS | Mod: 50,,, | Performed by: PAIN MEDICINE

## 2021-09-28 PROCEDURE — 64494 INJ PARAVERT F JNT L/S 2 LEV: CPT | Mod: 50 | Performed by: PAIN MEDICINE

## 2021-09-28 PROCEDURE — 64493 INJ PARAVERT F JNT L/S 1 LEV: CPT | Mod: 50,,, | Performed by: PAIN MEDICINE

## 2021-09-28 PROCEDURE — 64494 PR INJ DX/THER AGNT PARAVERT FACET JOINT,IMG GUIDE,LUMBAR/SAC, 2ND LEVEL: ICD-10-PCS | Mod: 50,,, | Performed by: PAIN MEDICINE

## 2021-09-28 PROCEDURE — 64493 INJ PARAVERT F JNT L/S 1 LEV: CPT | Mod: 50 | Performed by: PAIN MEDICINE

## 2021-09-28 PROCEDURE — 64494 INJ PARAVERT F JNT L/S 2 LEV: CPT | Mod: 50,,, | Performed by: PAIN MEDICINE

## 2021-09-28 PROCEDURE — 25000003 PHARM REV CODE 250: Performed by: PAIN MEDICINE

## 2021-09-28 RX ORDER — LIDOCAINE HYDROCHLORIDE 10 MG/ML
INJECTION INFILTRATION; PERINEURAL
Status: DISCONTINUED | OUTPATIENT
Start: 2021-09-28 | End: 2021-09-28 | Stop reason: HOSPADM

## 2021-09-28 RX ORDER — LIDOCAINE HYDROCHLORIDE 20 MG/ML
INJECTION, SOLUTION EPIDURAL; INFILTRATION; INTRACAUDAL; PERINEURAL
Status: DISCONTINUED | OUTPATIENT
Start: 2021-09-28 | End: 2021-09-28 | Stop reason: HOSPADM

## 2021-09-28 RX ORDER — SODIUM BICARBONATE 1 MEQ/ML
SYRINGE (ML) INTRAVENOUS
Status: DISCONTINUED | OUTPATIENT
Start: 2021-09-28 | End: 2021-09-28 | Stop reason: HOSPADM

## 2021-09-29 ENCOUNTER — TELEPHONE (OUTPATIENT)
Dept: PAIN MEDICINE | Facility: CLINIC | Age: 66
End: 2021-09-29

## 2021-09-29 ENCOUNTER — PATIENT MESSAGE (OUTPATIENT)
Dept: PAIN MEDICINE | Facility: CLINIC | Age: 66
End: 2021-09-29

## 2021-09-30 ENCOUNTER — EXTERNAL CHRONIC CARE MANAGEMENT (OUTPATIENT)
Dept: PRIMARY CARE CLINIC | Facility: CLINIC | Age: 66
End: 2021-09-30
Payer: MEDICARE

## 2021-09-30 PROCEDURE — 99490 CHRNC CARE MGMT STAFF 1ST 20: CPT | Mod: PBBFAC,PO | Performed by: INTERNAL MEDICINE

## 2021-09-30 PROCEDURE — 99490 CHRNC CARE MGMT STAFF 1ST 20: CPT | Mod: S$PBB,,, | Performed by: INTERNAL MEDICINE

## 2021-09-30 PROCEDURE — 99490 PR CHRONIC CARE MGMT, 1ST 20 MIN: ICD-10-PCS | Mod: S$PBB,,, | Performed by: INTERNAL MEDICINE

## 2021-10-04 ENCOUNTER — TELEPHONE (OUTPATIENT)
Dept: PAIN MEDICINE | Facility: CLINIC | Age: 66
End: 2021-10-04

## 2021-10-04 DIAGNOSIS — M54.16 LUMBAR RADICULOPATHY: Primary | ICD-10-CM

## 2021-10-07 ENCOUNTER — OFFICE VISIT (OUTPATIENT)
Dept: SPORTS MEDICINE | Facility: CLINIC | Age: 66
End: 2021-10-07
Payer: MEDICARE

## 2021-10-07 VITALS
SYSTOLIC BLOOD PRESSURE: 124 MMHG | BODY MASS INDEX: 34.07 KG/M2 | WEIGHT: 212 LBS | DIASTOLIC BLOOD PRESSURE: 82 MMHG | HEIGHT: 66 IN

## 2021-10-07 DIAGNOSIS — M17.0 BILATERAL PRIMARY OSTEOARTHRITIS OF KNEE: Primary | ICD-10-CM

## 2021-10-07 PROCEDURE — 99499 NO LOS: ICD-10-PCS | Mod: S$PBB,,, | Performed by: NEUROMUSCULOSKELETAL MEDICINE & OMM

## 2021-10-07 PROCEDURE — 99213 OFFICE O/P EST LOW 20 MIN: CPT | Mod: PBBFAC,PO,25 | Performed by: NEUROMUSCULOSKELETAL MEDICINE & OMM

## 2021-10-07 PROCEDURE — 99499 UNLISTED E&M SERVICE: CPT | Mod: S$PBB,,, | Performed by: NEUROMUSCULOSKELETAL MEDICINE & OMM

## 2021-10-07 PROCEDURE — 99999 PR PBB SHADOW E&M-EST. PATIENT-LVL III: ICD-10-PCS | Mod: PBBFAC,,, | Performed by: NEUROMUSCULOSKELETAL MEDICINE & OMM

## 2021-10-07 PROCEDURE — 99999 PR PBB SHADOW E&M-EST. PATIENT-LVL III: CPT | Mod: PBBFAC,,, | Performed by: NEUROMUSCULOSKELETAL MEDICINE & OMM

## 2021-10-07 PROCEDURE — 20611 DRAIN/INJ JOINT/BURSA W/US: CPT | Mod: 50,S$PBB,, | Performed by: NEUROMUSCULOSKELETAL MEDICINE & OMM

## 2021-10-07 PROCEDURE — 20611 PR DRAIN/ASP/INJECT MAJOR JOINT/BURSA W/US GUIDANCE: ICD-10-PCS | Mod: 50,S$PBB,, | Performed by: NEUROMUSCULOSKELETAL MEDICINE & OMM

## 2021-10-07 PROCEDURE — 20611 DRAIN/INJ JOINT/BURSA W/US: CPT | Mod: 50,PBBFAC,PO | Performed by: NEUROMUSCULOSKELETAL MEDICINE & OMM

## 2021-10-07 RX ADMIN — Medication 20 MG: at 04:10

## 2021-10-11 ENCOUNTER — TELEPHONE (OUTPATIENT)
Dept: PAIN MEDICINE | Facility: CLINIC | Age: 66
End: 2021-10-11

## 2021-10-12 ENCOUNTER — HOSPITAL ENCOUNTER (OUTPATIENT)
Facility: HOSPITAL | Age: 66
Discharge: HOME | End: 2021-10-12
Attending: PAIN MEDICINE | Admitting: PAIN MEDICINE
Payer: MEDICARE

## 2021-10-12 VITALS
TEMPERATURE: 98 F | OXYGEN SATURATION: 97 % | HEIGHT: 66 IN | BODY MASS INDEX: 34.07 KG/M2 | WEIGHT: 212 LBS | DIASTOLIC BLOOD PRESSURE: 59 MMHG | RESPIRATION RATE: 16 BRPM | HEART RATE: 73 BPM | SYSTOLIC BLOOD PRESSURE: 131 MMHG

## 2021-10-12 DIAGNOSIS — M47.819 SPONDYLOSIS WITHOUT MYELOPATHY: ICD-10-CM

## 2021-10-12 DIAGNOSIS — M54.16 LUMBAR RADICULOPATHY: Primary | ICD-10-CM

## 2021-10-12 DIAGNOSIS — M47.816 OSTEOARTHRITIS OF LUMBAR SPINE, UNSPECIFIED SPINAL OSTEOARTHRITIS COMPLICATION STATUS: ICD-10-CM

## 2021-10-12 DIAGNOSIS — M51.36 DDD (DEGENERATIVE DISC DISEASE), LUMBAR: ICD-10-CM

## 2021-10-12 DIAGNOSIS — G89.29 CHRONIC PAIN: ICD-10-CM

## 2021-10-12 LAB — POCT GLUCOSE: 143 MG/DL (ref 70–110)

## 2021-10-12 PROCEDURE — 64493 PR INJ DX/THER AGNT PARAVERT FACET JOINT,IMG GUIDE,LUMBAR/SAC,1ST LVL: ICD-10-PCS | Mod: 50,,, | Performed by: PAIN MEDICINE

## 2021-10-12 PROCEDURE — 25000003 PHARM REV CODE 250: Performed by: PAIN MEDICINE

## 2021-10-12 PROCEDURE — 25500020 PHARM REV CODE 255: Performed by: PAIN MEDICINE

## 2021-10-12 PROCEDURE — 64493 INJ PARAVERT F JNT L/S 1 LEV: CPT | Mod: 50 | Performed by: PAIN MEDICINE

## 2021-10-12 PROCEDURE — 64493 INJ PARAVERT F JNT L/S 1 LEV: CPT | Mod: 50,,, | Performed by: PAIN MEDICINE

## 2021-10-12 PROCEDURE — 64494 PR INJ DX/THER AGNT PARAVERT FACET JOINT,IMG GUIDE,LUMBAR/SAC, 2ND LEVEL: ICD-10-PCS | Mod: 50,,, | Performed by: PAIN MEDICINE

## 2021-10-12 PROCEDURE — 64494 INJ PARAVERT F JNT L/S 2 LEV: CPT | Mod: 50 | Performed by: PAIN MEDICINE

## 2021-10-12 PROCEDURE — 64494 INJ PARAVERT F JNT L/S 2 LEV: CPT | Mod: 50,,, | Performed by: PAIN MEDICINE

## 2021-10-12 RX ORDER — LIDOCAINE HYDROCHLORIDE 20 MG/ML
INJECTION, SOLUTION EPIDURAL; INFILTRATION; INTRACAUDAL; PERINEURAL
Status: DISCONTINUED | OUTPATIENT
Start: 2021-10-12 | End: 2021-10-12 | Stop reason: HOSPADM

## 2021-10-12 RX ORDER — SODIUM BICARBONATE 1 MEQ/ML
SYRINGE (ML) INTRAVENOUS
Status: DISCONTINUED | OUTPATIENT
Start: 2021-10-12 | End: 2021-10-12 | Stop reason: HOSPADM

## 2021-10-12 RX ORDER — LIDOCAINE HYDROCHLORIDE 10 MG/ML
INJECTION INFILTRATION; PERINEURAL
Status: DISCONTINUED | OUTPATIENT
Start: 2021-10-12 | End: 2021-10-12 | Stop reason: HOSPADM

## 2021-10-13 ENCOUNTER — PATIENT MESSAGE (OUTPATIENT)
Dept: PAIN MEDICINE | Facility: CLINIC | Age: 66
End: 2021-10-13

## 2021-10-14 ENCOUNTER — OFFICE VISIT (OUTPATIENT)
Dept: SPORTS MEDICINE | Facility: CLINIC | Age: 66
End: 2021-10-14
Payer: MEDICARE

## 2021-10-14 VITALS
BODY MASS INDEX: 34.07 KG/M2 | WEIGHT: 212 LBS | HEIGHT: 66 IN | DIASTOLIC BLOOD PRESSURE: 80 MMHG | SYSTOLIC BLOOD PRESSURE: 150 MMHG

## 2021-10-14 DIAGNOSIS — M17.0 BILATERAL PRIMARY OSTEOARTHRITIS OF KNEE: Primary | ICD-10-CM

## 2021-10-14 PROCEDURE — 99499 NO LOS: ICD-10-PCS | Mod: S$PBB,,, | Performed by: NEUROMUSCULOSKELETAL MEDICINE & OMM

## 2021-10-14 PROCEDURE — 99213 OFFICE O/P EST LOW 20 MIN: CPT | Mod: PBBFAC,PO | Performed by: NEUROMUSCULOSKELETAL MEDICINE & OMM

## 2021-10-14 PROCEDURE — 99499 UNLISTED E&M SERVICE: CPT | Mod: S$PBB,,, | Performed by: NEUROMUSCULOSKELETAL MEDICINE & OMM

## 2021-10-14 PROCEDURE — 20611 PR DRAIN/ASP/INJECT MAJOR JOINT/BURSA W/US GUIDANCE: ICD-10-PCS | Mod: 50,S$PBB,, | Performed by: NEUROMUSCULOSKELETAL MEDICINE & OMM

## 2021-10-14 PROCEDURE — 99999 PR PBB SHADOW E&M-EST. PATIENT-LVL III: ICD-10-PCS | Mod: PBBFAC,,, | Performed by: NEUROMUSCULOSKELETAL MEDICINE & OMM

## 2021-10-14 PROCEDURE — 20611 DRAIN/INJ JOINT/BURSA W/US: CPT | Mod: 50,S$PBB,, | Performed by: NEUROMUSCULOSKELETAL MEDICINE & OMM

## 2021-10-14 PROCEDURE — 20611 DRAIN/INJ JOINT/BURSA W/US: CPT | Mod: 50,PBBFAC,PO | Performed by: NEUROMUSCULOSKELETAL MEDICINE & OMM

## 2021-10-14 PROCEDURE — 99999 PR PBB SHADOW E&M-EST. PATIENT-LVL III: CPT | Mod: PBBFAC,,, | Performed by: NEUROMUSCULOSKELETAL MEDICINE & OMM

## 2021-10-14 RX ADMIN — Medication 20 MG: at 10:10

## 2021-10-18 ENCOUNTER — PATIENT MESSAGE (OUTPATIENT)
Dept: PAIN MEDICINE | Facility: CLINIC | Age: 66
End: 2021-10-18
Payer: MEDICARE

## 2021-10-18 ENCOUNTER — TELEPHONE (OUTPATIENT)
Dept: PAIN MEDICINE | Facility: CLINIC | Age: 66
End: 2021-10-18

## 2021-10-18 DIAGNOSIS — M47.816 LUMBAR SPONDYLOSIS: Primary | ICD-10-CM

## 2021-10-19 ENCOUNTER — OFFICE VISIT (OUTPATIENT)
Dept: INTERNAL MEDICINE | Facility: CLINIC | Age: 66
End: 2021-10-19
Payer: MEDICARE

## 2021-10-19 ENCOUNTER — LAB VISIT (OUTPATIENT)
Dept: LAB | Facility: HOSPITAL | Age: 66
End: 2021-10-19
Attending: INTERNAL MEDICINE
Payer: MEDICARE

## 2021-10-19 VITALS
WEIGHT: 218.25 LBS | BODY MASS INDEX: 35.08 KG/M2 | TEMPERATURE: 98 F | DIASTOLIC BLOOD PRESSURE: 60 MMHG | SYSTOLIC BLOOD PRESSURE: 138 MMHG | HEART RATE: 64 BPM | HEIGHT: 66 IN | RESPIRATION RATE: 16 BRPM

## 2021-10-19 DIAGNOSIS — I10 ESSENTIAL HYPERTENSION: ICD-10-CM

## 2021-10-19 DIAGNOSIS — E78.5 HYPERLIPIDEMIA, UNSPECIFIED HYPERLIPIDEMIA TYPE: ICD-10-CM

## 2021-10-19 DIAGNOSIS — E11.9 TYPE 2 DIABETES MELLITUS WITHOUT COMPLICATION, WITHOUT LONG-TERM CURRENT USE OF INSULIN: ICD-10-CM

## 2021-10-19 DIAGNOSIS — E11.9 TYPE 2 DIABETES MELLITUS WITHOUT COMPLICATION, WITHOUT LONG-TERM CURRENT USE OF INSULIN: Primary | ICD-10-CM

## 2021-10-19 DIAGNOSIS — M79.7 FIBROMYALGIA: ICD-10-CM

## 2021-10-19 DIAGNOSIS — D3A.8 NEUROENDOCRINE TUMOR OF PANCREAS: ICD-10-CM

## 2021-10-19 DIAGNOSIS — M47.26 OSTEOARTHRITIS OF SPINE WITH RADICULOPATHY, LUMBAR REGION: ICD-10-CM

## 2021-10-19 DIAGNOSIS — S33.5XXA SPRAIN OF LOW BACK, INITIAL ENCOUNTER: ICD-10-CM

## 2021-10-19 DIAGNOSIS — M19.90 ARTHRITIS: ICD-10-CM

## 2021-10-19 LAB
ALBUMIN SERPL BCP-MCNC: 4.1 G/DL (ref 3.5–5.2)
ALP SERPL-CCNC: 71 U/L (ref 55–135)
ALT SERPL W/O P-5'-P-CCNC: 14 U/L (ref 10–44)
ANION GAP SERPL CALC-SCNC: 15 MMOL/L (ref 8–16)
AST SERPL-CCNC: 15 U/L (ref 10–40)
BASOPHILS # BLD AUTO: 0.04 K/UL (ref 0–0.2)
BASOPHILS NFR BLD: 0.6 % (ref 0–1.9)
BILIRUB SERPL-MCNC: 0.7 MG/DL (ref 0.1–1)
BUN SERPL-MCNC: 19 MG/DL (ref 8–23)
CALCIUM SERPL-MCNC: 10.2 MG/DL (ref 8.7–10.5)
CHLORIDE SERPL-SCNC: 104 MMOL/L (ref 95–110)
CHOLEST SERPL-MCNC: 197 MG/DL (ref 120–199)
CHOLEST/HDLC SERPL: 3.5 {RATIO} (ref 2–5)
CO2 SERPL-SCNC: 22 MMOL/L (ref 23–29)
CREAT SERPL-MCNC: 0.8 MG/DL (ref 0.5–1.4)
DIFFERENTIAL METHOD: ABNORMAL
EOSINOPHIL # BLD AUTO: 0.2 K/UL (ref 0–0.5)
EOSINOPHIL NFR BLD: 2.4 % (ref 0–8)
ERYTHROCYTE [DISTWIDTH] IN BLOOD BY AUTOMATED COUNT: 14.7 % (ref 11.5–14.5)
EST. GFR  (AFRICAN AMERICAN): >60 ML/MIN/1.73 M^2
EST. GFR  (NON AFRICAN AMERICAN): >60 ML/MIN/1.73 M^2
ESTIMATED AVG GLUCOSE: 154 MG/DL (ref 68–131)
GLUCOSE SERPL-MCNC: 125 MG/DL (ref 70–110)
HBA1C MFR BLD: 7 % (ref 4–5.6)
HCT VFR BLD AUTO: 41.8 % (ref 37–48.5)
HDLC SERPL-MCNC: 57 MG/DL (ref 40–75)
HDLC SERPL: 28.9 % (ref 20–50)
HGB BLD-MCNC: 13.2 G/DL (ref 12–16)
IMM GRANULOCYTES # BLD AUTO: 0.03 K/UL (ref 0–0.04)
IMM GRANULOCYTES NFR BLD AUTO: 0.5 % (ref 0–0.5)
LDLC SERPL CALC-MCNC: 123.8 MG/DL (ref 63–159)
LYMPHOCYTES # BLD AUTO: 3.3 K/UL (ref 1–4.8)
LYMPHOCYTES NFR BLD: 51.3 % (ref 18–48)
MCH RBC QN AUTO: 25.9 PG (ref 27–31)
MCHC RBC AUTO-ENTMCNC: 31.6 G/DL (ref 32–36)
MCV RBC AUTO: 82 FL (ref 82–98)
MONOCYTES # BLD AUTO: 0.5 K/UL (ref 0.3–1)
MONOCYTES NFR BLD: 8 % (ref 4–15)
NEUTROPHILS # BLD AUTO: 2.4 K/UL (ref 1.8–7.7)
NEUTROPHILS NFR BLD: 37.2 % (ref 38–73)
NONHDLC SERPL-MCNC: 140 MG/DL
NRBC BLD-RTO: 0 /100 WBC
PLATELET # BLD AUTO: 443 K/UL (ref 150–450)
PMV BLD AUTO: 9.7 FL (ref 9.2–12.9)
POTASSIUM SERPL-SCNC: 3.8 MMOL/L (ref 3.5–5.1)
PROT SERPL-MCNC: 7.4 G/DL (ref 6–8.4)
RBC # BLD AUTO: 5.1 M/UL (ref 4–5.4)
SODIUM SERPL-SCNC: 141 MMOL/L (ref 136–145)
TRIGL SERPL-MCNC: 81 MG/DL (ref 30–150)
WBC # BLD AUTO: 6.37 K/UL (ref 3.9–12.7)

## 2021-10-19 PROCEDURE — 99215 OFFICE O/P EST HI 40 MIN: CPT | Mod: PBBFAC,PO | Performed by: INTERNAL MEDICINE

## 2021-10-19 PROCEDURE — 83036 HEMOGLOBIN GLYCOSYLATED A1C: CPT | Performed by: INTERNAL MEDICINE

## 2021-10-19 PROCEDURE — 80061 LIPID PANEL: CPT | Performed by: INTERNAL MEDICINE

## 2021-10-19 PROCEDURE — 85025 COMPLETE CBC W/AUTO DIFF WBC: CPT | Performed by: INTERNAL MEDICINE

## 2021-10-19 PROCEDURE — 99999 PR PBB SHADOW E&M-EST. PATIENT-LVL V: ICD-10-PCS | Mod: PBBFAC,,, | Performed by: INTERNAL MEDICINE

## 2021-10-19 PROCEDURE — 90694 VACC AIIV4 NO PRSRV 0.5ML IM: CPT | Mod: PBBFAC,PO

## 2021-10-19 PROCEDURE — 36415 COLL VENOUS BLD VENIPUNCTURE: CPT | Mod: PO | Performed by: INTERNAL MEDICINE

## 2021-10-19 PROCEDURE — 99999 PR PBB SHADOW E&M-EST. PATIENT-LVL V: CPT | Mod: PBBFAC,,, | Performed by: INTERNAL MEDICINE

## 2021-10-19 PROCEDURE — G0008 ADMIN INFLUENZA VIRUS VAC: HCPCS | Mod: PBBFAC,PO

## 2021-10-19 PROCEDURE — 99214 OFFICE O/P EST MOD 30 MIN: CPT | Mod: S$PBB,,, | Performed by: INTERNAL MEDICINE

## 2021-10-19 PROCEDURE — 80053 COMPREHEN METABOLIC PANEL: CPT | Performed by: INTERNAL MEDICINE

## 2021-10-19 PROCEDURE — 99214 PR OFFICE/OUTPT VISIT, EST, LEVL IV, 30-39 MIN: ICD-10-PCS | Mod: S$PBB,,, | Performed by: INTERNAL MEDICINE

## 2021-10-19 RX ORDER — TEMAZEPAM 30 MG/1
30 CAPSULE ORAL NIGHTLY PRN
Qty: 30 CAPSULE | Refills: 2 | Status: SHIPPED | OUTPATIENT
Start: 2021-10-19 | End: 2022-01-19 | Stop reason: SDUPTHER

## 2021-10-19 RX ORDER — TRAMADOL HYDROCHLORIDE 50 MG/1
50 TABLET ORAL EVERY 8 HOURS PRN
Qty: 90 TABLET | Refills: 1 | Status: SHIPPED | OUTPATIENT
Start: 2021-10-19 | End: 2022-11-15

## 2021-10-19 RX ORDER — PREGABALIN 25 MG/1
25 CAPSULE ORAL 2 TIMES DAILY
Qty: 60 CAPSULE | Refills: 6 | Status: SHIPPED | OUTPATIENT
Start: 2021-10-19 | End: 2021-10-26

## 2021-10-21 ENCOUNTER — TELEPHONE (OUTPATIENT)
Dept: SPORTS MEDICINE | Facility: CLINIC | Age: 66
End: 2021-10-21

## 2021-10-21 ENCOUNTER — OFFICE VISIT (OUTPATIENT)
Dept: SPORTS MEDICINE | Facility: CLINIC | Age: 66
End: 2021-10-21
Payer: MEDICARE

## 2021-10-21 VITALS
HEIGHT: 66 IN | SYSTOLIC BLOOD PRESSURE: 142 MMHG | DIASTOLIC BLOOD PRESSURE: 76 MMHG | BODY MASS INDEX: 35.08 KG/M2 | WEIGHT: 218.25 LBS | HEART RATE: 76 BPM

## 2021-10-21 DIAGNOSIS — M17.0 BILATERAL PRIMARY OSTEOARTHRITIS OF KNEE: Primary | ICD-10-CM

## 2021-10-21 PROCEDURE — 99999 PR PBB SHADOW E&M-EST. PATIENT-LVL IV: CPT | Mod: PBBFAC,,, | Performed by: NEUROMUSCULOSKELETAL MEDICINE & OMM

## 2021-10-21 PROCEDURE — 20611 PR DRAIN/ASP/INJECT MAJOR JOINT/BURSA W/US GUIDANCE: ICD-10-PCS | Mod: 50,S$PBB,, | Performed by: NEUROMUSCULOSKELETAL MEDICINE & OMM

## 2021-10-21 PROCEDURE — 20611 DRAIN/INJ JOINT/BURSA W/US: CPT | Mod: 50,S$PBB,, | Performed by: NEUROMUSCULOSKELETAL MEDICINE & OMM

## 2021-10-21 PROCEDURE — 20611 DRAIN/INJ JOINT/BURSA W/US: CPT | Mod: 50,PBBFAC,PO | Performed by: NEUROMUSCULOSKELETAL MEDICINE & OMM

## 2021-10-21 PROCEDURE — 99214 OFFICE O/P EST MOD 30 MIN: CPT | Mod: PBBFAC,PO,25 | Performed by: NEUROMUSCULOSKELETAL MEDICINE & OMM

## 2021-10-21 PROCEDURE — 99999 PR PBB SHADOW E&M-EST. PATIENT-LVL IV: ICD-10-PCS | Mod: PBBFAC,,, | Performed by: NEUROMUSCULOSKELETAL MEDICINE & OMM

## 2021-10-21 PROCEDURE — 99499 NO LOS: ICD-10-PCS | Mod: S$PBB,,, | Performed by: NEUROMUSCULOSKELETAL MEDICINE & OMM

## 2021-10-21 PROCEDURE — 99499 UNLISTED E&M SERVICE: CPT | Mod: S$PBB,,, | Performed by: NEUROMUSCULOSKELETAL MEDICINE & OMM

## 2021-10-21 RX ADMIN — Medication 20 MG: at 11:10

## 2021-10-25 ENCOUNTER — PATIENT MESSAGE (OUTPATIENT)
Dept: PAIN MEDICINE | Facility: CLINIC | Age: 66
End: 2021-10-25
Payer: MEDICARE

## 2021-10-25 ENCOUNTER — TELEPHONE (OUTPATIENT)
Dept: PAIN MEDICINE | Facility: CLINIC | Age: 66
End: 2021-10-25
Payer: MEDICARE

## 2021-10-25 ENCOUNTER — PATIENT MESSAGE (OUTPATIENT)
Dept: INTERNAL MEDICINE | Facility: CLINIC | Age: 66
End: 2021-10-25
Payer: MEDICARE

## 2021-10-25 RX ORDER — AMLODIPINE BESYLATE 10 MG/1
TABLET ORAL
Qty: 30 TABLET | Refills: 10 | Status: SHIPPED | OUTPATIENT
Start: 2021-10-25 | End: 2022-07-21

## 2021-10-26 ENCOUNTER — TELEPHONE (OUTPATIENT)
Dept: PAIN MEDICINE | Facility: CLINIC | Age: 66
End: 2021-10-26
Payer: MEDICARE

## 2021-10-26 RX ORDER — PREGABALIN 75 MG/1
75 CAPSULE ORAL 2 TIMES DAILY
Qty: 60 CAPSULE | Refills: 0 | Status: SHIPPED | OUTPATIENT
Start: 2021-10-26 | End: 2022-02-15

## 2021-10-31 ENCOUNTER — EXTERNAL CHRONIC CARE MANAGEMENT (OUTPATIENT)
Dept: PRIMARY CARE CLINIC | Facility: CLINIC | Age: 66
End: 2021-10-31
Payer: MEDICARE

## 2021-10-31 PROCEDURE — 99490 CHRNC CARE MGMT STAFF 1ST 20: CPT | Mod: PBBFAC,PO | Performed by: INTERNAL MEDICINE

## 2021-10-31 PROCEDURE — 99490 CHRNC CARE MGMT STAFF 1ST 20: CPT | Mod: S$PBB,,, | Performed by: INTERNAL MEDICINE

## 2021-10-31 PROCEDURE — 99490 PR CHRONIC CARE MGMT, 1ST 20 MIN: ICD-10-PCS | Mod: S$PBB,,, | Performed by: INTERNAL MEDICINE

## 2021-11-08 ENCOUNTER — TELEPHONE (OUTPATIENT)
Dept: PAIN MEDICINE | Facility: CLINIC | Age: 66
End: 2021-11-08
Payer: MEDICARE

## 2021-11-09 ENCOUNTER — HOSPITAL ENCOUNTER (OUTPATIENT)
Facility: HOSPITAL | Age: 66
Discharge: HOME | End: 2021-11-09
Attending: PAIN MEDICINE | Admitting: PAIN MEDICINE
Payer: MEDICARE

## 2021-11-09 VITALS
WEIGHT: 212 LBS | OXYGEN SATURATION: 98 % | DIASTOLIC BLOOD PRESSURE: 88 MMHG | RESPIRATION RATE: 16 BRPM | SYSTOLIC BLOOD PRESSURE: 183 MMHG | HEART RATE: 67 BPM | HEIGHT: 65 IN | TEMPERATURE: 98 F | BODY MASS INDEX: 35.32 KG/M2

## 2021-11-09 DIAGNOSIS — M54.16 LUMBAR RADICULOPATHY: Primary | ICD-10-CM

## 2021-11-09 DIAGNOSIS — G89.29 CHRONIC PAIN: ICD-10-CM

## 2021-11-09 LAB — POCT GLUCOSE: 135 MG/DL (ref 70–110)

## 2021-11-09 PROCEDURE — 64636 DESTROY L/S FACET JNT ADDL: CPT | Performed by: PAIN MEDICINE

## 2021-11-09 PROCEDURE — 63600175 PHARM REV CODE 636 W HCPCS: Performed by: PAIN MEDICINE

## 2021-11-09 PROCEDURE — 64635 DESTROY LUMB/SAC FACET JNT: CPT | Mod: RT,,, | Performed by: PAIN MEDICINE

## 2021-11-09 PROCEDURE — 64635 DESTROY LUMB/SAC FACET JNT: CPT | Performed by: PAIN MEDICINE

## 2021-11-09 PROCEDURE — 25000003 PHARM REV CODE 250: Performed by: PAIN MEDICINE

## 2021-11-09 PROCEDURE — 64636 DESTROY L/S FACET JNT ADDL: CPT | Mod: RT,,, | Performed by: PAIN MEDICINE

## 2021-11-09 PROCEDURE — 64635 PR DESTROY LUMB/SAC FACET JNT: ICD-10-PCS | Mod: RT,,, | Performed by: PAIN MEDICINE

## 2021-11-09 PROCEDURE — 64636 PR DESTROY L/S FACET JNT ADDL: ICD-10-PCS | Mod: RT,,, | Performed by: PAIN MEDICINE

## 2021-11-09 RX ORDER — BUPIVACAINE HYDROCHLORIDE 2.5 MG/ML
INJECTION, SOLUTION EPIDURAL; INFILTRATION; INTRACAUDAL
Status: DISCONTINUED | OUTPATIENT
Start: 2021-11-09 | End: 2021-11-09 | Stop reason: HOSPADM

## 2021-11-09 RX ORDER — LIDOCAINE HYDROCHLORIDE 10 MG/ML
INJECTION INFILTRATION; PERINEURAL
Status: DISCONTINUED | OUTPATIENT
Start: 2021-11-09 | End: 2021-11-09 | Stop reason: HOSPADM

## 2021-11-09 RX ORDER — LIDOCAINE HYDROCHLORIDE 20 MG/ML
INJECTION, SOLUTION EPIDURAL; INFILTRATION; INTRACAUDAL; PERINEURAL
Status: DISCONTINUED | OUTPATIENT
Start: 2021-11-09 | End: 2021-11-09 | Stop reason: HOSPADM

## 2021-11-09 RX ORDER — INDOMETHACIN 25 MG/1
CAPSULE ORAL
Status: DISCONTINUED | OUTPATIENT
Start: 2021-11-09 | End: 2021-11-09 | Stop reason: HOSPADM

## 2021-11-09 RX ORDER — METHYLPREDNISOLONE ACETATE 40 MG/ML
INJECTION, SUSPENSION INTRA-ARTICULAR; INTRALESIONAL; INTRAMUSCULAR; SOFT TISSUE
Status: DISCONTINUED | OUTPATIENT
Start: 2021-11-09 | End: 2021-11-09 | Stop reason: HOSPADM

## 2021-11-14 ENCOUNTER — PATIENT MESSAGE (OUTPATIENT)
Dept: INTERNAL MEDICINE | Facility: CLINIC | Age: 66
End: 2021-11-14
Payer: MEDICARE

## 2021-11-15 ENCOUNTER — PATIENT MESSAGE (OUTPATIENT)
Dept: PAIN MEDICINE | Facility: CLINIC | Age: 66
End: 2021-11-15
Payer: MEDICARE

## 2021-11-16 ENCOUNTER — PATIENT MESSAGE (OUTPATIENT)
Dept: INTERNAL MEDICINE | Facility: CLINIC | Age: 66
End: 2021-11-16
Payer: MEDICARE

## 2021-11-16 RX ORDER — GLIPIZIDE 5 MG/1
5 TABLET, FILM COATED, EXTENDED RELEASE ORAL
Qty: 30 TABLET | Refills: 6 | Status: SHIPPED | OUTPATIENT
Start: 2021-11-16 | End: 2022-06-13

## 2021-11-30 ENCOUNTER — EXTERNAL CHRONIC CARE MANAGEMENT (OUTPATIENT)
Dept: PRIMARY CARE CLINIC | Facility: CLINIC | Age: 66
End: 2021-11-30
Payer: MEDICARE

## 2021-11-30 PROCEDURE — 99490 CHRNC CARE MGMT STAFF 1ST 20: CPT | Mod: PBBFAC,PO | Performed by: INTERNAL MEDICINE

## 2021-11-30 PROCEDURE — 99490 CHRNC CARE MGMT STAFF 1ST 20: CPT | Mod: S$PBB,,, | Performed by: INTERNAL MEDICINE

## 2021-11-30 PROCEDURE — 99490 PR CHRONIC CARE MGMT, 1ST 20 MIN: ICD-10-PCS | Mod: S$PBB,,, | Performed by: INTERNAL MEDICINE

## 2021-12-03 ENCOUNTER — PATIENT MESSAGE (OUTPATIENT)
Dept: PAIN MEDICINE | Facility: CLINIC | Age: 66
End: 2021-12-03
Payer: MEDICARE

## 2021-12-06 ENCOUNTER — PATIENT MESSAGE (OUTPATIENT)
Dept: PAIN MEDICINE | Facility: CLINIC | Age: 66
End: 2021-12-06

## 2021-12-06 ENCOUNTER — TELEPHONE (OUTPATIENT)
Dept: PAIN MEDICINE | Facility: CLINIC | Age: 66
End: 2021-12-06

## 2021-12-06 ENCOUNTER — OFFICE VISIT (OUTPATIENT)
Dept: PAIN MEDICINE | Facility: CLINIC | Age: 66
End: 2021-12-06
Payer: MEDICARE

## 2021-12-06 ENCOUNTER — PATIENT OUTREACH (OUTPATIENT)
Dept: ADMINISTRATIVE | Facility: OTHER | Age: 66
End: 2021-12-06
Payer: MEDICARE

## 2021-12-06 DIAGNOSIS — M54.16 LUMBAR RADICULOPATHY: Primary | ICD-10-CM

## 2021-12-06 DIAGNOSIS — M51.36 DDD (DEGENERATIVE DISC DISEASE), LUMBAR: ICD-10-CM

## 2021-12-06 PROCEDURE — 99214 OFFICE O/P EST MOD 30 MIN: CPT | Mod: 95,,, | Performed by: PAIN MEDICINE

## 2021-12-06 PROCEDURE — 99214 PR OFFICE/OUTPT VISIT, EST, LEVL IV, 30-39 MIN: ICD-10-PCS | Mod: 95,,, | Performed by: PAIN MEDICINE

## 2021-12-10 RX ORDER — METOCLOPRAMIDE 10 MG/1
TABLET ORAL
Qty: 90 TABLET | Refills: 5 | Status: SHIPPED | OUTPATIENT
Start: 2021-12-10 | End: 2022-06-08

## 2021-12-15 ENCOUNTER — TELEPHONE (OUTPATIENT)
Dept: PAIN MEDICINE | Facility: CLINIC | Age: 66
End: 2021-12-15
Payer: MEDICARE

## 2021-12-16 ENCOUNTER — HOSPITAL ENCOUNTER (OUTPATIENT)
Facility: HOSPITAL | Age: 66
Discharge: HOME | End: 2021-12-16
Attending: PAIN MEDICINE | Admitting: PAIN MEDICINE
Payer: MEDICARE

## 2021-12-16 VITALS
OXYGEN SATURATION: 99 % | RESPIRATION RATE: 16 BRPM | TEMPERATURE: 97 F | DIASTOLIC BLOOD PRESSURE: 70 MMHG | SYSTOLIC BLOOD PRESSURE: 150 MMHG | HEART RATE: 66 BPM

## 2021-12-16 DIAGNOSIS — M47.26 OSTEOARTHRITIS OF SPINE WITH RADICULOPATHY, LUMBAR REGION: Primary | ICD-10-CM

## 2021-12-16 DIAGNOSIS — G89.29 CHRONIC PAIN: ICD-10-CM

## 2021-12-16 PROCEDURE — 25000003 PHARM REV CODE 250: Performed by: PAIN MEDICINE

## 2021-12-16 PROCEDURE — 99152 MOD SED SAME PHYS/QHP 5/>YRS: CPT | Performed by: PAIN MEDICINE

## 2021-12-16 PROCEDURE — 64636 PR DESTROY L/S FACET JNT ADDL: ICD-10-PCS | Mod: LT,,, | Performed by: PAIN MEDICINE

## 2021-12-16 PROCEDURE — 64635 DESTROY LUMB/SAC FACET JNT: CPT | Mod: LT,,, | Performed by: PAIN MEDICINE

## 2021-12-16 PROCEDURE — 63600175 PHARM REV CODE 636 W HCPCS: Performed by: PAIN MEDICINE

## 2021-12-16 PROCEDURE — 64635 PR DESTROY LUMB/SAC FACET JNT: ICD-10-PCS | Mod: LT,,, | Performed by: PAIN MEDICINE

## 2021-12-16 PROCEDURE — 64636 DESTROY L/S FACET JNT ADDL: CPT | Performed by: PAIN MEDICINE

## 2021-12-16 PROCEDURE — 64635 DESTROY LUMB/SAC FACET JNT: CPT | Performed by: PAIN MEDICINE

## 2021-12-16 PROCEDURE — 64636 DESTROY L/S FACET JNT ADDL: CPT | Mod: LT,,, | Performed by: PAIN MEDICINE

## 2021-12-16 RX ORDER — FENTANYL CITRATE 50 UG/ML
INJECTION, SOLUTION INTRAMUSCULAR; INTRAVENOUS
Status: DISCONTINUED | OUTPATIENT
Start: 2021-12-16 | End: 2021-12-16 | Stop reason: HOSPADM

## 2021-12-16 RX ORDER — LIDOCAINE HYDROCHLORIDE 10 MG/ML
1 INJECTION, SOLUTION EPIDURAL; INFILTRATION; INTRACAUDAL; PERINEURAL ONCE
Status: DISCONTINUED | OUTPATIENT
Start: 2021-12-16 | End: 2021-12-16 | Stop reason: HOSPADM

## 2021-12-16 RX ORDER — MIDAZOLAM HYDROCHLORIDE 1 MG/ML
INJECTION INTRAMUSCULAR; INTRAVENOUS
Status: DISCONTINUED | OUTPATIENT
Start: 2021-12-16 | End: 2021-12-16 | Stop reason: HOSPADM

## 2021-12-16 RX ORDER — SODIUM CHLORIDE 9 MG/ML
INJECTION, SOLUTION INTRAVENOUS CONTINUOUS
Status: DISCONTINUED | OUTPATIENT
Start: 2021-12-16 | End: 2021-12-16 | Stop reason: HOSPADM

## 2021-12-16 RX ORDER — SODIUM BICARBONATE 1 MEQ/ML
SYRINGE (ML) INTRAVENOUS
Status: DISCONTINUED | OUTPATIENT
Start: 2021-12-16 | End: 2021-12-16 | Stop reason: HOSPADM

## 2021-12-16 RX ORDER — METHYLPREDNISOLONE ACETATE 40 MG/ML
INJECTION, SUSPENSION INTRA-ARTICULAR; INTRALESIONAL; INTRAMUSCULAR; SOFT TISSUE
Status: DISCONTINUED | OUTPATIENT
Start: 2021-12-16 | End: 2021-12-16 | Stop reason: HOSPADM

## 2021-12-16 RX ORDER — BUPIVACAINE HYDROCHLORIDE 2.5 MG/ML
INJECTION, SOLUTION EPIDURAL; INFILTRATION; INTRACAUDAL
Status: DISCONTINUED | OUTPATIENT
Start: 2021-12-16 | End: 2021-12-16 | Stop reason: HOSPADM

## 2021-12-16 RX ORDER — LIDOCAINE HYDROCHLORIDE 10 MG/ML
INJECTION INFILTRATION; PERINEURAL
Status: DISCONTINUED | OUTPATIENT
Start: 2021-12-16 | End: 2021-12-16 | Stop reason: HOSPADM

## 2021-12-16 RX ADMIN — SODIUM CHLORIDE: 0.9 INJECTION, SOLUTION INTRAVENOUS at 01:12

## 2021-12-17 LAB — POCT GLUCOSE: 104 MG/DL (ref 70–110)

## 2021-12-21 ENCOUNTER — TELEPHONE (OUTPATIENT)
Dept: NEUROSURGERY | Facility: CLINIC | Age: 66
End: 2021-12-21
Payer: MEDICARE

## 2021-12-21 ENCOUNTER — PATIENT MESSAGE (OUTPATIENT)
Dept: INTERNAL MEDICINE | Facility: CLINIC | Age: 66
End: 2021-12-21
Payer: MEDICARE

## 2021-12-30 ENCOUNTER — PATIENT MESSAGE (OUTPATIENT)
Dept: SPORTS MEDICINE | Facility: CLINIC | Age: 66
End: 2021-12-30
Payer: MEDICARE

## 2021-12-31 ENCOUNTER — EXTERNAL CHRONIC CARE MANAGEMENT (OUTPATIENT)
Dept: PRIMARY CARE CLINIC | Facility: CLINIC | Age: 66
End: 2021-12-31
Payer: MEDICARE

## 2021-12-31 PROCEDURE — 99490 CHRNC CARE MGMT STAFF 1ST 20: CPT | Mod: S$PBB,,, | Performed by: INTERNAL MEDICINE

## 2021-12-31 PROCEDURE — 99490 PR CHRONIC CARE MGMT, 1ST 20 MIN: ICD-10-PCS | Mod: S$PBB,,, | Performed by: INTERNAL MEDICINE

## 2021-12-31 PROCEDURE — 99490 CHRNC CARE MGMT STAFF 1ST 20: CPT | Mod: PBBFAC,PO | Performed by: INTERNAL MEDICINE

## 2022-01-05 ENCOUNTER — TELEPHONE (OUTPATIENT)
Dept: ADMINISTRATIVE | Facility: OTHER | Age: 67
End: 2022-01-05
Payer: MEDICARE

## 2022-01-10 ENCOUNTER — PATIENT MESSAGE (OUTPATIENT)
Dept: INTERNAL MEDICINE | Facility: CLINIC | Age: 67
End: 2022-01-10
Payer: MEDICARE

## 2022-01-10 ENCOUNTER — PATIENT MESSAGE (OUTPATIENT)
Dept: ADMINISTRATIVE | Facility: OTHER | Age: 67
End: 2022-01-10
Payer: MEDICARE

## 2022-01-10 ENCOUNTER — PATIENT OUTREACH (OUTPATIENT)
Dept: ADMINISTRATIVE | Facility: OTHER | Age: 67
End: 2022-01-10
Payer: MEDICARE

## 2022-01-10 DIAGNOSIS — Z12.31 ENCOUNTER FOR SCREENING MAMMOGRAM FOR MALIGNANT NEOPLASM OF BREAST: Primary | ICD-10-CM

## 2022-01-10 NOTE — PROGRESS NOTES
Health Maintenance Due   Topic Date Due    TETANUS VACCINE  Never done    Pneumococcal Vaccines (Age 65+) (2 of 4 - PPSV23) 05/20/2021    Mammogram  10/27/2021    Colorectal Cancer Screening  03/13/2022     Updates were requested from care everywhere.  Chart was reviewed for overdue Proactive [facility] Encounters (LALITHA) topics (CRS, Breast Cancer Screening, Eye exam)  Health Maintenance has been updated.  LINKS immunization registry triggered.  Immunizations were reconciled.  Order placed for mammogram and tasked to pt.

## 2022-01-11 ENCOUNTER — OFFICE VISIT (OUTPATIENT)
Dept: NEUROSURGERY | Facility: CLINIC | Age: 67
End: 2022-01-11
Payer: MEDICARE

## 2022-01-11 VITALS
DIASTOLIC BLOOD PRESSURE: 83 MMHG | WEIGHT: 224.19 LBS | HEART RATE: 65 BPM | BODY MASS INDEX: 37.35 KG/M2 | SYSTOLIC BLOOD PRESSURE: 155 MMHG | HEIGHT: 65 IN

## 2022-01-11 DIAGNOSIS — M51.36 DDD (DEGENERATIVE DISC DISEASE), LUMBAR: ICD-10-CM

## 2022-01-11 DIAGNOSIS — M54.16 LUMBAR RADICULOPATHY: ICD-10-CM

## 2022-01-11 PROCEDURE — 99999 PR PBB SHADOW E&M-EST. PATIENT-LVL IV: CPT | Mod: PBBFAC,,, | Performed by: NEUROLOGICAL SURGERY

## 2022-01-11 PROCEDURE — 99214 OFFICE O/P EST MOD 30 MIN: CPT | Mod: PBBFAC,PN | Performed by: NEUROLOGICAL SURGERY

## 2022-01-11 PROCEDURE — 99204 OFFICE O/P NEW MOD 45 MIN: CPT | Mod: S$PBB,,, | Performed by: NEUROLOGICAL SURGERY

## 2022-01-11 PROCEDURE — 99204 PR OFFICE/OUTPT VISIT, NEW, LEVL IV, 45-59 MIN: ICD-10-PCS | Mod: S$PBB,,, | Performed by: NEUROLOGICAL SURGERY

## 2022-01-11 PROCEDURE — 99999 PR PBB SHADOW E&M-EST. PATIENT-LVL IV: ICD-10-PCS | Mod: PBBFAC,,, | Performed by: NEUROLOGICAL SURGERY

## 2022-01-11 NOTE — PROGRESS NOTES
NEUROSURGICAL OUTPATIENT CONSULTATION NOTE    DATE OF SERVICE:  01/11/2022    ATTENDING PHYSICIAN:  Esteban Monaco MD    CONSULT REQUESTED BY:  Saranya Cornelius Jr.     REASON FOR CONSULT:  Lumbar radiculopathy    HISTORY OF PRESENT ILLNESS:  This is a very pleasant 66 y.o. female who presents with right leg pain, present constantly and radiating to the foot.  She has tried INOCENCIA with some benefit in the past.  She has some associated LBP as well.  She denies weakness.  Occasional numbness.  No RLE symptoms.  No B'/B symptoms    PAST MEDICAL HISTORY:  Active Ambulatory Problems     Diagnosis Date Noted    Essential hypertension     Arthritis     Chronic back pain     Renal cyst     Diverticulosis     DJD (degenerative joint disease) of knee 01/07/2014    Fibromyalgia 01/12/2014    Knee pain 04/16/2014    Status post arthroscopy of left knee 4/16/2014 04/22/2014    Hyperlipidemia 06/29/2014    Special screening for malignant neoplasms, colon 07/28/2014    Type 2 diabetes mellitus 10/24/2014    Abdominal pain 04/20/2015    Osteoarthritis of spine with radiculopathy, lumbar region 04/14/2016    Finger pain, right 10/22/2018    Bone spur of finger IP joint 10/22/2018    Effusion of right hand 11/09/2018    Finger stiffness, right 11/09/2018    Range of motion deficit 11/09/2018    Stiffness of right hand, not elsewhere classified 02/26/2019    Colon cancer screening 03/13/2019    Nausea 11/19/2019    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/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    Chronic pain 06/15/2021    Thrombocytosis 07/02/2021    History of pancreatic cancer 07/02/2021    Sacroiliitis 07/02/2021     Resolved Ambulatory Problems     Diagnosis Date Noted    DJD (degenerative joint disease) of lumbar spine 01/07/2014    Lesion of pancreas 05/21/2015    Pancreatic neoplasm 06/04/2015    Spondylosis of lumbar region without myelopathy or radiculopathy 04/05/2016    Pain 04/25/2016    Neuroendocrine tumor of pancreas  2016    Malignant neoplasm of body of pancreas 2021     Past Medical History:   Diagnosis Date    Diabetes mellitus 2014    Hypertension     Pancreatic cancer     Thyroid disease        PAST SURGICAL HISTORY:  Past Surgical History:   Procedure Laterality Date     SECTION      COLONOSCOPY N/A 3/13/2019    Procedure: COLONOSCOPY;  Surgeon: Cem Lamar MD;  Location: [facility] (4TH FLR);  Service: Endoscopy;  Laterality: N/A;  previous order cx    EPIDURAL STEROID INJECTION INTO LUMBAR SPINE N/A 6/15/2021    Procedure: Injection-steroid-epidural-lumbar-L5-S1;  Surgeon: Saranya Cornelius Jr., MD;  Location: [facility] PAIN MGT;  Service: Pain Management;  Laterality: N/A;    ESOPHAGOGASTRODUODENOSCOPY N/A 2019    Procedure: ESOPHAGOGASTRODUODENOSCOPY (EGD);  Surgeon: Jonathan Oneill MD;  Location: [facility] (4TH FLR);  Service: Endoscopy;  Laterality: N/A;    ESOPHAGOGASTRODUODENOSCOPY N/A 2020    Procedure: EGD (ESOPHAGOGASTRODUODENOSCOPY);  Surgeon: Shady Costa MD;  Location: [facility] (2ND FLR);  Service: Endoscopy;  Laterality: N/A;  Please schedule patient as soon as possible in the 2nd floor history of a Whipple surgery for neuroendocrine pancreatic tumor many years ago with now constant belching and regurgitation.  May need airway protection.  Rule out celiac sprue rule out lact    EXCISION OF GANGLION CYST OF HAND Right 10/22/2018    Procedure: EXCISION, GANGLION CYST, HAND- RIGHT, LONG AND INDEX FINGER;  Surgeon: Sowmya Schmidt MD;  Location: [facility];  Service: Orthopedics;  Laterality: Right;  Stretcher; Supine; Hand Pan 1 & [place] 2; Dr. Loja's Rasp    HYSTERECTOMY  2015    ISMAEL    INJECTION OF ANESTHETIC AGENT AROUND MEDIAL BRANCH NERVES INNERVATING LUMBAR FACET JOINT Bilateral 2021    Procedure: Block-nerve-medial branch-lumbar-bilateral L4-5 and L5-S1;  Surgeon: Saranya Cornelius Jr., MD;  Location: [facility];  Service: Pain Management;   Laterality: Bilateral;    INJECTION OF ANESTHETIC AGENT AROUND MEDIAL BRANCH NERVES INNERVATING LUMBAR FACET JOINT Bilateral 10/12/2021    Procedure: Bilateral Lumbar Medial Branch Block L4-5 L5-S1- (No Sedation);  Surgeon: Saranya Cornelius Jr., MD;  Location: [facility];  Service: Pain Management;  Laterality: Bilateral;    KNEE ARTHROSCOPY  2014    LATS/left    OOPHORECTOMY      PANCREAS SURGERY      MD Ritchie    RADIOFREQUENCY THERMOCOAGULATION Bilateral 2021    Procedure: Radiofrequency Themocoagulation of medial branches: L4-5 and L5-S1;  Surgeon: Saranya Cornelius Jr., MD;  Location: [facility];  Service: Pain Management;  Laterality: Bilateral;  Patient is diabetic.     RADIOFREQUENCY THERMOCOAGULATION Left 2021    Procedure: RADIOFREQUENCY THERMAL COAGULATION LEFT L4-5, L5-S1 (IV Sedation);  Surgeon: Saranya Cornelius Jr., MD;  Location: [facility];  Service: Pain Management;  Laterality: Left;  diabetic    TONSILLECTOMY      TRANSFORAMINAL EPIDURAL INJECTION OF STEROID Right 2021    Procedure: Injection,steroid,epidural,transforaminal approach; Levels: L5-S1;  Surgeon: Saranya Cornelius Jr., MD;  Location: [facility];  Service: Pain Management;  Laterality: Right;  No pacemaker. Patient is diabetic.    TRANSFORAMINAL EPIDURAL INJECTION OF STEROID Right 2021    Procedure: Injection,steroid,epidural,transforaminal approach; Levels: L5-S1;  Surgeon: Saranya Cornelius Jr., MD;  Location: [facility];  Service: Pain Management;  Laterality: Right;  No pacemaker. Patient is diabetic.        SOCIAL HISTORY:   Social History     Socioeconomic History    Marital status:    Tobacco Use    Smoking status: Current Some Day Smoker     Packs/day: 0.25     Years: 10.00     Pack years: 2.50     Types: Cigarettes     Last attempt to quit: 1982     Years since quittin.0    Smokeless tobacco: Never Used    Tobacco comment: reports smokes 1 cigarettes  a day   Substance and Sexual Activity    Alcohol use: Yes     Comment: occasional use    Drug use: No    Sexual activity: Yes     Partners: Male     Birth control/protection: None     Social Determinants of Health     Financial Resource Strain: Low Risk     Difficulty of Paying Living Expenses: Not hard at all   Food Insecurity: No Food Insecurity    Worried About Running Out of Food in the Last Year: Never true    Ran Out of Food in the Last Year: Never true   Transportation Needs: No Transportation Needs    Lack of Transportation (Medical): No    Lack of Transportation (Non-Medical): No   Physical Activity: Inactive    Days of Exercise per Week: 0 days    Minutes of Exercise per Session: 0 min   Stress: Stress Concern Present    Feeling of Stress : Rather much   Social Connections: Socially Integrated    Frequency of Communication with Friends and Family: More than three times a week    Frequency of Social Gatherings with Friends and Family: Patient refused    Attends Mosque Services: More than 4 times per year    Active Member of Clubs or Organizations: Yes    Attends Club or Organization Meetings: More than 4 times per year    Marital Status:    Housing Stability: Low Risk     Unable to Pay for Housing in the Last Year: No    Number of Places Lived in the Last Year: 1    Unstable Housing in the Last Year: No       FAMILY HISTORY:  Family History   Problem Relation Age of Onset    Hypertension Mother     Diabetes Mother     Heart disease Mother     Stroke Mother     Hypertension Sister     Stroke Sister     Breast cancer Sister     Hypertension Brother     Colon cancer Neg Hx     Ovarian cancer Neg Hx        CURRENTS MEDICATIONS:  Current Outpatient Medications on File Prior to Visit   Medication Sig Dispense Refill    amLODIPine (NORVASC) 10 MG tablet 1 tablet by mouth every day. 30 tablet 10    blood-glucose meter kit To check BG 1 time daily, to use with insurance  preferred meter 1 each 0    empagliflozin (JARDIANCE) 25 mg tablet Take 1 tablet (25 mg total) by mouth once daily. For diabetes control. 30 tablet 6    glipiZIDE (GLUCOTROL) 5 MG TR24 Take 1 tablet (5 mg total) by mouth daily with breakfast. For diabetes control. 30 tablet 6    hydrOXYzine pamoate (VISTARIL) 25 MG Cap TAKE 1 CAPSULE BY MOUTH TWICE DAILY AS NEEDED FOR ANXIETY 60 capsule 3    lancets Misc To check BG 1 time daily, to use with insurance preferred meter 100 each 3    levothyroxine (SYNTHROID) 50 MCG tablet levothyroxine 50 mcg tablet      losartan (COZAAR) 50 MG tablet Take 1 tablet (50 mg total) by mouth once daily. 90 tablet 3    meloxicam (MOBIC) 15 MG tablet TAKE 1 TABLET BY MOUTH EVERY DAY FOR ANKLE PAIN AND SWELLING 60 tablet 3    metFORMIN (GLUCOPHAGE) 500 MG tablet TAKE 1 TABLET BY MOUTH EVERY MORNING, AND 2 TABLETS EVERY EVENING FOR DIABETES 270 tablet 3    metoclopramide HCl (REGLAN) 10 MG tablet TAKE 1 TABLET(10 MG) BY MOUTH THREE TIMES DAILY BEFORE MEALS 90 tablet 5    ONETOUCH VERIO TEST STRIPS Strp USE TO CHECK BLOOD GLUCOSE ONCE DAILY AS DIRECTED 100 strip 3    pantoprazole (PROTONIX) 40 MG tablet One tablet daily 90 tablet 3    pregabalin (LYRICA) 75 MG capsule Take 1 capsule (75 mg total) by mouth 2 (two) times daily. 60 capsule 0    promethazine-dextromethorphan (PROMETHAZINE-DM) 6.25-15 mg/5 mL Syrp Take 5 mLs by mouth every 4 to 6 hours as needed (cough). 5 mL every 4 to 6 hours; maximum: 30 mL in 24 hours 118 mL 0    temazepam (RESTORIL) 30 mg capsule Take 1 capsule (30 mg total) by mouth nightly as needed for Insomnia (insomnia). 30 capsule 2    traMADoL (ULTRAM) 50 mg tablet Take 1 tablet (50 mg total) by mouth every 8 (eight) hours as needed for Pain. 90 tablet 1     No current facility-administered medications on file prior to visit.       ALLERGIES:  Review of patient's allergies indicates:   Allergen Reactions    Erythromycin base        REVIEW OF  SYSTEMS:  ROS - per HPI    OBJECTIVE:    PHYSICAL EXAMINATION:   Vitals:    01/11/22 1056   BP: (!) 155/83   Pulse: 65         Neurologic Exam     Mental Status   Attention: normal.   Level of consciousness: alert    Motor Exam   Muscle bulk: normal  Overall muscle tone: normal    Strength   Strength 5/5 throughout.     Sensory Exam   Sensory deficit distribution on right: L5    Gait, Coordination, and Reflexes     Gait  Gait: normal    Reflexes   Right patellar: 2+  Left patellar: 2+  Right achilles: 2+  Left achilles: 2+  Right plantar: normal  Left plantar: normal  Right ankle clonus: absent  Left ankle clonus: absent        DIAGNOSTIC DATA:  I personally interpreted the following imaging:     Lumbar MRI suggests no central stenosis, moderate bilateral L5/S1 foraminal stenosis    ASSESMENT:  This is a 66 y.o. female with     Problem List Items Addressed This Visit    None     Visit Diagnoses     Lumbar radiculopathy        DDD (degenerative disc disease), lumbar              PLAN:  We reviewed images and discussed surgical options.     I explained she is a good candidate for medial foraminotomy based on the MRI.  We discussed that she might require complete facetectomy and fusion in the future.  She would like to avoid surgery if possible.  We will move forward with PT/HEP, f/u 6 weeks.        Esteban Monaco MD, FAANS    Disclaimer: This note was partly generated using dictation software which may occasionally result in transcription errors.

## 2022-01-18 ENCOUNTER — CLINICAL SUPPORT (OUTPATIENT)
Dept: REHABILITATION | Facility: HOSPITAL | Age: 67
End: 2022-01-18
Attending: NEUROLOGICAL SURGERY
Payer: MEDICARE

## 2022-01-18 DIAGNOSIS — M54.50 LUMBAR PAIN: ICD-10-CM

## 2022-01-18 DIAGNOSIS — M54.16 LUMBAR RADICULOPATHY: ICD-10-CM

## 2022-01-18 DIAGNOSIS — M53.86 DECREASED ROM OF LUMBAR SPINE: ICD-10-CM

## 2022-01-18 PROCEDURE — 97162 PT EVAL MOD COMPLEX 30 MIN: CPT | Mod: PO

## 2022-01-18 PROCEDURE — 97110 THERAPEUTIC EXERCISES: CPT | Mod: PO

## 2022-01-18 NOTE — PROGRESS NOTES
[facility]  Physical Therapy Initial Evaluation - Lumbar    Name: Aracelis Matrinez  Clinic Number: [id number]    Therapy Diagnosis:   Encounter Diagnoses   Name Primary?    Lumbar radiculopathy     Decreased ROM of lumbar spine     Lumbar pain      Physician: Esteban Monaco MD    Physician Orders: PT Eval and Treat   Medical Diagnosis from Referral: M54.16 (ICD-10-CM) - Lumbar radiculopathy  Evaluation Date: 1/18/2022  Plan of Care Expiration: 3/15/22 or 16th Visit  Authorization Period Expiration: 12/31/22  Visit # / Visits authorized: 1/ 1  FOTO: Issued Visit # NP          Please see Plan of Care notation section to view Aracelis Blancharder Initial Evaluation.       Aayush Nava, PT,DPT

## 2022-01-19 DIAGNOSIS — E11.9 TYPE 2 DIABETES MELLITUS WITHOUT COMPLICATION, WITHOUT LONG-TERM CURRENT USE OF INSULIN: Primary | ICD-10-CM

## 2022-01-20 ENCOUNTER — PATIENT MESSAGE (OUTPATIENT)
Dept: INTERNAL MEDICINE | Facility: CLINIC | Age: 67
End: 2022-01-20
Payer: MEDICARE

## 2022-01-20 RX ORDER — TEMAZEPAM 30 MG/1
30 CAPSULE ORAL NIGHTLY PRN
Qty: 30 CAPSULE | Refills: 2 | Status: SHIPPED | OUTPATIENT
Start: 2022-01-20 | End: 2022-04-21 | Stop reason: SDUPTHER

## 2022-01-20 NOTE — TELEPHONE ENCOUNTER
Care Due:                  Date            Visit Type   Department     Provider  --------------------------------------------------------------------------------                                             [facility] INTERNAL  Last Visit: 10-      [facility] SAMANTHA Damico                                           [facility] INTER[facility]  Next Visit: 02-      [facility]                                                            Last  Test          Frequency    Reason                     Performed    Due Date  --------------------------------------------------------------------------------    HBA1C.......  6 months...  empagliflozin, glipiZIDE,   10-   04-                             metFORMIN................    Powered by [employer]. Reference number: [id number].   1/19/2022 8:06:30 PM CST

## 2022-01-20 NOTE — TELEPHONE ENCOUNTER
No new care gaps identified.  Powered by [employer]. Reference number: [id number].   1/19/2022 8:07:24 PM CST

## 2022-01-20 NOTE — TELEPHONE ENCOUNTER
No new care gaps identified.  Powered by [employer]. Reference number: [id number].   1/19/2022 8:08:16 PM CST

## 2022-01-21 PROBLEM — M53.86 DECREASED ROM OF LUMBAR SPINE: Status: ACTIVE | Noted: 2022-01-21

## 2022-01-21 PROBLEM — M54.50 LUMBAR PAIN: Status: ACTIVE | Noted: 2022-01-21

## 2022-01-21 NOTE — PLAN OF CARE
[facility]  Physical Therapy Initial Evaluation - Lumbar    Name: Aracelis Martinez  Clinic Number: [id number]    Therapy Diagnosis:   Encounter Diagnoses   Name Primary?    Lumbar radiculopathy     Decreased ROM of lumbar spine     Lumbar pain      Physician: Esteban Monaco MD    Physician Orders: PT Eval and Treat   Medical Diagnosis from Referral: M54.16 (ICD-10-CM) - Lumbar radiculopathy  Evaluation Date: 2022  Plan of Care Expiration: 3/15/22 or 16th Visit  Authorization Period Expiration: 22  Visit # / Visits authorized:   FOTO: Issued Visit # NP    Time In: 10:05  Time Out: 10:45  Total Billable Time: 40 minutes    Precautions: Standard and Fall    Subjective     History of current condition - Aracelis is a 66 y.o. year old female who presents to the [facility] PT clinic  with complaint of lumbar pain that began in October with reports of burning and shoot pain down the right lower extremity . Patient states that she continues to experience throbbing and shooting pain of the right lower extremity. Pt reports prior to shooting pain experienced pain that awakened her in the middle of the night. Pt report onset of the symptoms occurred  4  months prior to evaluation. Precipitating event:Insidious Onset . Current symptoms include: lumbar pain. Aggravating factors: bending, standing, walking.  Patients presently rates pain 1/10 on pain scale.    Mechanism of Injury: insidious onset of pain   Next MD Visit: TBD      Medical History:   Past Medical History:   Diagnosis Date    Arthritis     Chronic back pain     Diabetes mellitus 2014    Diverticulosis     Hypertension     Neuroendocrine tumor of pancreas 2015    Pancreatic cancer 2015    Renal cyst     left    Thyroid disease        Surgical History:   Aracelis Martinez  has a past surgical history that includes Tonsillectomy; Knee arthroscopy (2014);  section; Excision of ganglion cyst of hand (Right,  "10/22/2018); Pancreas surgery (2015); Colonoscopy (N/A, 3/13/2019); Esophagogastroduodenoscopy (N/A, 11/19/2019); Esophagogastroduodenoscopy (N/A, 6/2/2020); Hysterectomy (2015); Oophorectomy; Epidural steroid injection into lumbar spine (N/A, 6/15/2021); Transforaminal epidural injection of steroid (Right, 8/17/2021); Transforaminal epidural injection of steroid (Right, 8/24/2021); Injection of anesthetic agent around medial branch nerves innervating lumbar facet joint (Bilateral, 9/28/2021); Injection of anesthetic agent around medial branch nerves innervating lumbar facet joint (Bilateral, 10/12/2021); Radiofrequency thermocoagulation (Bilateral, 11/9/2021); and Radiofrequency thermocoagulation (Left, 12/16/2021).    Medications:   Aracelis has a current medication list which includes the following prescription(s): amlodipine, blood-glucose meter, empagliflozin, glipizide, hydroxyzine pamoate, lancets, levothyroxine, losartan, meloxicam, metformin, metoclopramide hcl, onetouch verio test strips, pantoprazole, pregabalin, promethazine-dextromethorphan, temazepam, and tramadol.    Allergies:   Review of patient's allergies indicates:   Allergen Reactions    Erythromycin base         Imaging:MRI scan   Per radiology report "Degenerative changes of the lumbar spine mostly involving the lower lumbar facets.  Mild L5-S1 foraminal encroachment bilaterally."    Prior Therapy: No prior therapy received for current condition   Social History: Aracelis lives in a single story home with 0 steps to enter and  lives with their spouse  Occupation: Retired  Assistive Devices Owned: none   Hobbies/Exercise: Walking, crafting and baking  Hand Dominance: right  Prior Level of Function: Independent  Current Level of Function: Modified Independent secondary to lumbar pain     Pain:  Current 1/10, worst 8/10 (starts with sleeping (2y/o mattress) increases pain), best 0/10 ( sleeping reduces pain)  Location: right lumbar   Sleeping position - " right side sleeper (any movement produces pain with sleep)   Description: Throbbing and Shooting (Mid glut to gastrocnemius on right lower extremity)   Aggravating Factors: Standing, Laying and Walking  Easing Factors: rest    Pts goals: performed duties without pain     Objective     Repeated flexion/extension test - Flexing  And extension increases pain none - reduces pain   Posture: Fair  Palpation: mild generalized muscle tenderness  Sensation: intact to light touch  Pelvic Observation: L/R Up-slip ; L/R anterior or posterior Rotation     Range of Motion/Strength:   Lumbar   Pain/Dysfunction with Movement   AROM     Flexion (80)  90°     Extension (25)  5°      Right side bend (35)  40°  Increase pain    Left side bend (35)  50°     Right  Rotation (45)  30°     Left Rotation (45)  30°           LLE 5/5 4+/5 4/5 4-/5 3+/5 3/5 3-/5 2+/5 2/5 2-/5 1/5 0 NT   Hip Flexion  x                   Hip Extension  x                   Hip Abduction  x                   Hip Adduction  x                   Hip Internal Rotation  x                   Hip External Rotation  x                   Knee Flexion  x                   Knee Extension  x                   Ankle Dorsiflexion  x                   Ankle Plantarflexion  x                     RLE 5/5 4+/5 4/5 4-/5 3+/5 3/5 3-/5 2+/5 2/5 2-/5 1/5 0 NT   Hip Flexion     x                Hip Extension    x                 Hip Abduction    x                 Hip Adduction   x                  Hip Internal Rotation   x                  Hip External Rotation   x                  Knee Flexion  x                   Knee Extension  x                   Ankle Dorsiflexion  x                   Ankle Plantarflexion  x                     Lumbar 5/5 4+/5 4/5 4-/5 3+/5 3/5 3-/5 2+/5 2/5 2-/5 1/5 0 NT   Lumbar Flexion    x                 Lumbar Extension     x                Lumbar Rotation Right     x                 Lumbar Rotation Left     x                 Lumbar Sidebending Right     x      "            Lumbar Sidebending Left    x                         Lumbar Segment Joint Mobility Comments   L1/L2 2+    L2/L3 2+    L3/L4 2+    L4/L5 2+    L5/S1 2+        Joint Mobility       Special Tests Left Right Comments   SLR negative positive  pain   Piriformis  negative positive  pain    Flexibility       Hamstrings  -20 degrees  -30 degrees       Gait Analysis   Aracelis Martinez ambulated 120 feet with none device with independence assistance. Aracelis Martinez displays decreased hip flexion gait deviation during 1 gait cycle.      Other:   Sit to Stand - 6 Reps. Completed in 30 sec.          CMS Impairment/Limitation/Restriction for FOTO Lumbar Survey    Therapist reviewed FOTO scores for Arcaelis Martinez on 1/18/2022.   FOTO documents entered into EP[employer] - see Media section.    Limitation Score: See scanned media   Category: Mobility         TREATMENT   Treatment Time In: 10:35  Treatment Time Out: 10:45  Total Treatment time separate from Evaluation: 10 minutes    Aracelis received therapeutic exercises to develop strength, ROM and flexibility for 15 minutes including:  Warm-up      Supine    GSS - 3X30"  HSS - 3X30"  SKC - 30 reps right lower extremity   LE  [place] stretch - 2' with and 2' without strap  SLR - 30 reps   Piriformis stretch - 3X30"   Supine hip Abduction with black TB - 30 reps     Seated        Standing      Home Exercises and Patient Education Provided    Education provided:   Patient was provided educational information regarding: role of Physical Therapy, short and long term goals, patient/therapist expectations, scheduling, and attendance policy.    Written Home Exercises Provided: yes  Exercises were reviewed and Aracelis was able to demonstrate them prior to the end of the session.  Aracelis demonstrated fair  understanding of the education provided.     See EMR under: Patient Instructions for exercises provided 1/18/2022.    Assessment     Aracelis is a 66 y.o. female referred to outpatient Physical " Therapy with a medical diagnosis of Lumbar radiculopathy. Pt presents with displays of weakness, impaired endurance, impaired functional mobility, gait instability, impaired balance, decreased lower extremity function, pain, decreased ROM and impaired muscle length. Patient currently presents to therapy with reports of lumbar pain experienced with functional activity with a recent increase in pain. Patient displays limited hip range of motion and strength with objective testing. Patient displayed limitation described above with functional testing. Patient also reports paraspinal pain with movement performed. Patient will benefit from skilled therapy to address current deficits.     Pt prognosis is Fair.   Pt will benefit from skilled outpatient Physical Therapy to address the deficits stated above and in the chart below, provide pt/family education, and to maximize pt's level of independence.     Plan of care discussed with patient: Yes  Pt's spiritual, cultural and educational needs considered and patient is agreeable to the plan of care and goals as stated below:     Anticipated Barriers for therapy: None Identified during initial evaluation     Medical Necessity is demonstrated by the following  History  Co-morbidities and personal factors that may impact the plan of care Co-morbidities:     Past Medical History:   Diagnosis Date    Arthritis     Chronic back pain     Diabetes mellitus 07/2014    Diverticulosis     Hypertension     Neuroendocrine tumor of pancreas 2015    Pancreatic cancer 2015    Renal cyst     left    Thyroid disease        Personal Factors:   no deficits     moderate   Examination  Body Structures and Functions, activity limitations and participation restrictions that may impact the plan of care Body Regions:   back  lower extremities    Body Systems:    ROM  strength  balance    Participation Restrictions:   none    Activity limitations:   Learning and applying knowledge  no  deficits    General Tasks and Commands  no deficits    Communication  no deficits    Mobility  lifting and carrying objects    Self care  no deficits    Domestic Life  doing house work (cleaning house, washing dishes, laundry)    Interactions/Relationships  no deficits    Life Areas  no deficits    Community and Social Life  no deficits         moderate   Clinical Presentation stable and uncomplicated moderate   Decision Making/ Complexity Score: moderate     Goals:  Short Term Goals: 4 weeks      Goal   Status     1. Pt. to report decreased lumbar pain </ =  6/10 at worst to increase tolerance for upright unsupported sitting posture.    2. Pt. to demonstrate proper posture requiring minimum verbal cues from PT for improved posture positioning     3. Increase L1 - L3 joint mobility to 3/6 to promote greater ease with squat to stand transitioning.    4. Increase lumbar extension AROM ? 10 degrees to promote greater ease with self-care.    5. Increase AROM lumbar rotation bilateral  ? 35 degrees to promote greater ease with driving.    6. Pt to be independent with HEP to improve ROM and independence with ADL's        Long Term Goals: 8 weeks    Goal   Status     1. Pt. to report decreased lumbar pain </ =  4/10 at worst to increase tolerance for upright unsupported sitting posture.    2. Pt. to demonstrate proper posture requiring minimum verbal cues from PT for improved posture positioning     3. Increase L3 - L5 joint mobility to 3/6 to promote greater ease with squat to stand transitioning.    4. Increase lumbar extension AROM ? 15 degrees to promote greater ease with self-care.    5. Increase AROM lumbar rotation bilateral  ? 40 degrees to promote greater ease with driving.    6. Pt. to demonstrate increased MMT for rectus abdominus  ?  4/5 to improve supine to sitting transfer.    7. Pt. to demonstrate increased MMT for Lumbar extensor paraspinals t ?  4/5 to improve tolerance for prolong standing and ambulation      8. Pt. to demonstrate increased MMT for Lumbar/thoracic rotators   ? 4/5 to improve tolerance for ADL and work activities.     9. Pt. to demonstrate increased MMT for Lumbar/thoracic side bending   ? 4/5 to improve household cleaning.     10. Pt to be independent with HEP to improve ROM and independence with ADL's        Plan   Plan of care Certification: 1/18/2022 to  3/18/2022.    Outpatient Physical Therapy 2 times weekly for 8 weeks to include the following interventions: Gait Training, Manual Therapy, Neuromuscular Re-ed, Patient Education, Therapeutic Activities and Therapeutic Exercise.     Aayush Nava, PT,DPT

## 2022-01-24 ENCOUNTER — OFFICE VISIT (OUTPATIENT)
Dept: PAIN MEDICINE | Facility: CLINIC | Age: 67
End: 2022-01-24
Payer: MEDICARE

## 2022-01-24 VITALS
BODY MASS INDEX: 37.31 KG/M2 | HEART RATE: 86 BPM | SYSTOLIC BLOOD PRESSURE: 121 MMHG | DIASTOLIC BLOOD PRESSURE: 71 MMHG | WEIGHT: 224.19 LBS

## 2022-01-24 DIAGNOSIS — M47.26 OSTEOARTHRITIS OF SPINE WITH RADICULOPATHY, LUMBAR REGION: ICD-10-CM

## 2022-01-24 DIAGNOSIS — G89.4 CHRONIC PAIN SYNDROME: ICD-10-CM

## 2022-01-24 DIAGNOSIS — M51.36 DDD (DEGENERATIVE DISC DISEASE), LUMBAR: ICD-10-CM

## 2022-01-24 DIAGNOSIS — M47.816 LUMBAR SPONDYLOSIS: ICD-10-CM

## 2022-01-24 DIAGNOSIS — M54.16 LUMBAR RADICULOPATHY: Primary | ICD-10-CM

## 2022-01-24 DIAGNOSIS — M47.819 ARTHROPATHY OF FACET JOINTS AT MULTIPLE LEVELS: ICD-10-CM

## 2022-01-24 DIAGNOSIS — M47.819 SPONDYLOSIS WITHOUT MYELOPATHY: ICD-10-CM

## 2022-01-24 PROCEDURE — 99999 PR PBB SHADOW E&M-EST. PATIENT-LVL III: CPT | Mod: PBBFAC,,, | Performed by: NURSE PRACTITIONER

## 2022-01-24 PROCEDURE — 99213 OFFICE O/P EST LOW 20 MIN: CPT | Mod: PBBFAC,PO | Performed by: NURSE PRACTITIONER

## 2022-01-24 PROCEDURE — 99213 PR OFFICE/OUTPT VISIT, EST, LEVL III, 20-29 MIN: ICD-10-PCS | Mod: S$PBB,,, | Performed by: NURSE PRACTITIONER

## 2022-01-24 PROCEDURE — 99999 PR PBB SHADOW E&M-EST. PATIENT-LVL III: ICD-10-PCS | Mod: PBBFAC,,, | Performed by: NURSE PRACTITIONER

## 2022-01-24 PROCEDURE — 99213 OFFICE O/P EST LOW 20 MIN: CPT | Mod: S$PBB,,, | Performed by: NURSE PRACTITIONER

## 2022-01-24 NOTE — PROGRESS NOTES
" [facility] Established Patient Evaluation    Chief Complaint  Chief Complaint   Patient presents with    Low-back Pain    Leg Pain     Right posterior calf    Male I    Last 3 PDI Scores 1/24/2022 7/7/2021 5/14/2021   Pain Disability Index (PDI) 40 44 46       Interval Updates; 1/24/2022 -  Juan returns to clinic s/p Right then  Left L4-5 and L5-S1 Lumbar Medial Branch Radiofrequency Ablation on 12/16/21 with 75% relief.  She reports a pain intensity 4/10 today with a weekly range of 4-5/10.  Mrs. Martinez reported pain with her right sided Lumbar RFA due to not having IV sedation. I discussed that I would speak with Dr. Cornelius. She reports overall doing "ok" she has begun PT and is getting daily exercise by walking at least 1 mile per day. She endorses meeting with nsgy/Dr. Monaco and that she will not consider any surgery at this point.     12/6/21 - Patient is reporting bothersome LBP in the right buttock tghat radiates down the leg to the calf.  Worse when rolling over in bed or walking.  Sitting will help a bit, but prolonged sitting will aggravate the pain further.  Patient points to LL back on camera as the origin.    9/21/2021 Juan returns to clinic s/p TFESI on 8/17 an 8/24 with 95% relief.  She reports a pain intensity 2/10 today. She is reporting significant improvement of her rihgt leg pain following the her most recently injection, however she is now reporting continued low back pain that presents in a bandlike distribution. Pain is described as aching and stiff, pain is worse with extension and somewhat relived with flexion. She denies any profound weakness, denies any b/b dysfunction denies any  Saddle and esthesia.     7/7/21 - Ms. Martinez returns to clinic for follow up visit reporting improved pain in the low back and right leg at 50% following a  Lumbar INOCENCIA @ L5-S1  on 6/15/21.  Pain intensity is currently 4/10.    Although she received 50% relief she is still complaining of " pulling pain in the low back with radiating pain into the right leg in the L5-S1 dermatome distribution, her pain is not stable at this point continues to bother her when performing ADLs, sleeping and simple task around her home.  Patient I will also review lumbar MRI in detail as she mentioned to me that Dr. Cornelius stated there were couple spots on her lumbar MRI.    Aracelis Martinez presents today complaining of right sided low back and right side sciatica pain that started 3 weeks ago.  Her pain right-sided low back that radiates into the posterior right leg into her right calf.  She denies any inciting incident or recent trauma pain is constant.  Pain is described as pulling and shooting, she states that her pain is aggravated with repositioning while sleeping, walking, and standing.  She states that she soaks in Epson salt and is currently utilizing tramadol, he also received a steroid injection from her PCP few weeks ago but nothing has truly mitigate her pain.  She denies any symptoms in her left leg, her worst pain is 9/10.  She was previously seen at the [facility] Dr. Norman  approximately 2 years ago she reports to me that this was the earliest appointment available which is why she came to [place].      Background from Chart Review  Previously seen in 2018 at the [facility] by Dr. Norman for right finger pain    Treatment History  PT/OT: OT for sprain of her finger  HEP: walking   TENS:  Injections:              -12/16/2021  Left L4-5 and L5-S1 Lumbar Medial Branch Radiofrequency Ablation              - 11/09/2021 Right L4-5 and L5-S1 Lumbar Medial Branch Radiofrequency Ablation   - 8/24/2021Lumbar Transforaminal Epidural Steroid Injection, Right L5-S1   - 8/17/2021Lumbar Transforaminal Epidural Steroid Injection, Right L5-S1   - 6/15/2021  Lumbar Epidural Steroid Injection at L5-S1 50% relief  Surgery: none  Medications:   - NSAIDS:   - MSK Relaxants:   -  TCAs:   - SNRIs:   - Topicals:   - Opioids:   - Anticonvulsants:     Current Pain Medications  1. Tramadol 50 mg p.r.n. per her PCP  2. Lyrica 25 mg b.i.d.  3. Mobic 50 mg for ankle pain and swelling p.r.n.    Full Medication List    Current Outpatient Medications:     amLODIPine (NORVASC) 10 MG tablet, 1 tablet by mouth every day., Disp: 30 tablet, Rfl: 10    empagliflozin (JARDIANCE) 25 mg tablet, Take 1 tablet (25 mg total) by mouth once daily. For diabetes control., Disp: 30 tablet, Rfl: 6    glipiZIDE (GLUCOTROL) 5 MG TR24, Take 1 tablet (5 mg total) by mouth daily with breakfast. For diabetes control., Disp: 30 tablet, Rfl: 6    hydrOXYzine pamoate (VISTARIL) 25 MG Cap, TAKE 1 CAPSULE BY MOUTH TWICE DAILY AS NEEDED FOR ANXIETY, Disp: 60 capsule, Rfl: 3    lancets Misc, To check BG 1 time daily, to use with insurance preferred meter, Disp: 100 each, Rfl: 3    levothyroxine (SYNTHROID) 50 MCG tablet, levothyroxine 50 mcg tablet, Disp: , Rfl:     losartan (COZAAR) 50 MG tablet, Take 1 tablet (50 mg total) by mouth once daily., Disp: 90 tablet, Rfl: 3    meloxicam (MOBIC) 15 MG tablet, TAKE 1 TABLET BY MOUTH EVERY DAY FOR ANKLE PAIN AND SWELLING, Disp: 60 tablet, Rfl: 3    metFORMIN (GLUCOPHAGE) 500 MG tablet, TAKE 1 TABLET BY MOUTH EVERY MORNING, AND 2 TABLETS EVERY EVENING FOR DIABETES, Disp: 270 tablet, Rfl: 3    metoclopramide HCl (REGLAN) 10 MG tablet, TAKE 1 TABLET(10 MG) BY MOUTH THREE TIMES DAILY BEFORE MEALS, Disp: 90 tablet, Rfl: 5    ONETOUCH VERIO TEST STRIPS Strp, USE TO CHECK BLOOD GLUCOSE ONCE DAILY AS DIRECTED, Disp: 100 strip, Rfl: 3    pantoprazole (PROTONIX) 40 MG tablet, One tablet daily, Disp: 90 tablet, Rfl: 3    promethazine-dextromethorphan (PROMETHAZINE-DM) 6.25-15 mg/5 mL Syrp, Take 5 mLs by mouth every 4 to 6 hours as needed (cough). 5 mL every 4 to 6 hours; maximum: 30 mL in 24 hours, Disp: 118 mL, Rfl: 0    temazepam (RESTORIL) 30 mg capsule, Take 1 capsule (30 mg total)  by mouth nightly as needed for Insomnia (insomnia)., Disp: 30 capsule, Rfl: 2    traMADoL (ULTRAM) 50 mg tablet, Take 1 tablet (50 mg total) by mouth every 8 (eight) hours as needed for Pain., Disp: 90 tablet, Rfl: 1    blood-glucose meter kit, To check BG 1 time daily, to use with insurance preferred meter, Disp: 1 each, Rfl: 0    pregabalin (LYRICA) 75 MG capsule, Take 1 capsule (75 mg total) by mouth 2 (two) times daily., Disp: 60 capsule, Rfl: 0     Review of Systems  Review of Systems   Constitutional: Negative.  Negative for chills and fever.   HENT: Negative.  Negative for nosebleeds.    Eyes: Negative.  Negative for blurred vision.   Respiratory: Negative.    Cardiovascular: Negative.  Negative for chest pain and palpitations.   Gastrointestinal: Negative.  Negative for heartburn and vomiting.   Genitourinary: Negative.  Negative for hematuria.   Musculoskeletal: Positive for back pain and joint pain. Negative for myalgias.   Skin: Negative.  Negative for rash.   Neurological: Negative.  Negative for seizures and loss of consciousness.   Endo/Heme/Allergies: Negative.  Does not bruise/bleed easily.   Psychiatric/Behavioral: Negative for hallucinations. The patient has insomnia.        Medical History  Past Medical History:   Diagnosis Date    Arthritis     Chronic back pain     Diabetes mellitus 2014    Diverticulosis     Hypertension     Neuroendocrine tumor of pancreas     Pancreatic cancer 2015    Renal cyst     left    Thyroid disease         Surgical History  Past Surgical History:   Procedure Laterality Date     SECTION      COLONOSCOPY N/A 3/13/2019    Procedure: COLONOSCOPY;  Surgeon: Cem Lamar MD;  Location: [address];  Service: Endoscopy;  Laterality: N/A;  previous order cx    EPIDURAL STEROID INJECTION INTO LUMBAR SPINE N/A 6/15/2021    Procedure: Injection-steroid-epidural-lumbar-L5-S1;  Surgeon: Saranya Cornelius Jr., MD;  [facility];   Service: Pain Management;  Laterality: N/A;    ESOPHAGOGASTRODUODENOSCOPY N/A 11/19/2019    Procedure: ESOPHAGOGASTRODUODENOSCOPY (EGD);  Surgeon: Jonathan Oneill MD;  Location: [facility] (4TH FLR);  Service: Endoscopy;  Laterality: N/A;    ESOPHAGOGASTRODUODENOSCOPY N/A 6/2/2020    Procedure: EGD (ESOPHAGOGASTRODUODENOSCOPY);  Surgeon: Shady Costa MD;  Location: [facility] (2ND FLR);  Service: Endoscopy;  Laterality: N/A;  Please schedule patient as soon as possible in the 2nd floor history of a Whipple surgery for neuroendocrine pancreatic tumor many years ago with now constant belching and regurgitation.  May need airway protection.  Rule out celiac sprue rule out lact    EXCISION OF GANGLION CYST OF HAND Right 10/22/2018    Procedure: EXCISION, GANGLION CYST, HAND- RIGHT, LONG AND INDEX FINGER;  Surgeon: Sowmya Schmidt MD;  Location: [facility];  Service: Orthopedics;  Laterality: Right;  Stretcher; Supine; Hand Pan 1 & [place] 2; Dr. Ljoa's Rasp    HYSTERECTOMY  2015    ISMAEL    INJECTION OF ANESTHETIC AGENT AROUND MEDIAL BRANCH NERVES INNERVATING LUMBAR FACET JOINT Bilateral 9/28/2021    Procedure: Block-nerve-medial branch-lumbar-bilateral L4-5 and L5-S1;  Surgeon: Saranya Cornelius Jr., MD;  Location: [facility];  Service: Pain Management;  Laterality: Bilateral;    INJECTION OF ANESTHETIC AGENT AROUND MEDIAL BRANCH NERVES INNERVATING LUMBAR FACET JOINT Bilateral 10/12/2021    Procedure: Bilateral Lumbar Medial Branch Block L4-5 L5-S1- (No Sedation);  Surgeon: Saranya Cornelius Jr., MD;  Location: [facility];  Service: Pain Management;  Laterality: Bilateral;    KNEE ARTHROSCOPY  4/18/2014    LATS/left    OOPHORECTOMY      PANCREAS SURGERY  2015    MD Ritchie    RADIOFREQUENCY THERMOCOAGULATION Bilateral 11/9/2021    Procedure: Radiofrequency Themocoagulation of medial branches: L4-5 and L5-S1;  Surgeon: Saranya Cornelius Jr., MD;  Location: [facility];  Service: Pain Management;   Laterality: Bilateral;  Patient is diabetic.     RADIOFREQUENCY THERMOCOAGULATION Left 12/16/2021    Procedure: RADIOFREQUENCY THERMAL COAGULATION LEFT L4-5, L5-S1 (IV Sedation);  Surgeon: Saranya Cornelius Jr., MD;  Location: [facility];  Service: Pain Management;  Laterality: Left;  diabetic    TONSILLECTOMY      TRANSFORAMINAL EPIDURAL INJECTION OF STEROID Right 8/17/2021    Procedure: Injection,steroid,epidural,transforaminal approach; Levels: L5-S1;  Surgeon: Saranya Cornelius Jr., MD;  Location: [facility];  Service: Pain Management;  Laterality: Right;  No pacemaker. Patient is diabetic.    TRANSFORAMINAL EPIDURAL INJECTION OF STEROID Right 8/24/2021    Procedure: Injection,steroid,epidural,transforaminal approach; Levels: L5-S1;  Surgeon: Saranya Cornelius Jr., MD;  Location: [facility];  Service: Pain Management;  Laterality: Right;  No pacemaker. Patient is diabetic.         Physical Exam  Vitals:    01/24/22 1017   BP: 121/71   Pulse: 86   Weight: 101.7 kg (224 lb 3.3 oz)   PainSc:   4          General Musculoskeletal Exam   Gait: antalgic     Back (L-Spine & T-Spine) / Neck (C-Spine) Exam     Tenderness Posterior midline palpation reveals tenderness of the Upper L-Spine, Lower L-Spine and Sacrum. Right paramedian tenderness of the Lower L-Spine and Sacrum. Left paramedian tenderness of the Lower L-Spine.     Back (L-Spine & T-Spine) Range of Motion   Extension: abnormal   Flexion: abnormal   Lateral bend right: abnormal   Lateral bend left: normal   Rotation right: abnormal   Rotation left: normal     Spinal Sensation   Right Side Sensation  L-Spine Level: dysesthesia    Comments:  + Patricks right side only  TTP PSIS   + straight leg raise right only      Muscle Strength   Right Lower Extremity   Hip Abduction: 4/5   Quadriceps:  4/5   Anterior tibial:  4/5   Gastrocsoleus:  4/5   Left Lower Extremity   Hip Abduction: 5/5   Quadriceps:  5/5   Anterior tibial:  5/5   Gastrocsoleus:  5/5        Imaging  20Comparison: none.     Findings:     Lumbar spine alignment is within normal limits.  The vertebral body heights are well maintained, with no fracture.  There is no marrow signal abnormality suspicious for infiltrative process.  The conus is normal in appearance and terminates at the L1-L2 level.  The adjacent soft tissue structures show no significant abnormalities.     L1-L2: Broad-based disc bulge and mild bilateral facet arthropathy contributes to effacement of the ventral thecal sac without significant spinal canal or neural foraminal narrowing.     L2-L3: Broad-based disc bulge and bilateral facet neuropathy without significant spinal canal or neural foraminal narrowing.     L3-L4: Bilateral facet arthropathy and broad-based disc bulge with mild effacement of the ventral thecal sac. There is no significant neural foraminal narrowing.     L4-L5: Severe bilateral facet arthropathy and broad-based disc bulge producing mild spinal canal stenosis as well as moderate left and mild right neural foraminal narrowing.     L5-S1: Bilateral facet arthropathy and broad-based disc bulge, asymmetric to the left, contributes to moderate left and mild right neural foraminal stenosis. There is no significant spinal canal narrowing.  IMPRESSION:         Lumbar spondylosis, contributing to mild spinal canal stensois at L4-5, and mild-moderate neural foraminal stenosis at L4-5 and L5-S1, as above.     Prominent facet arthropathy at L4-L5, which likely contributes to back pain.17 MRI      Labs:  BMP  Lab Results   Component Value Date     10/19/2021    K 3.8 10/19/2021     10/19/2021    CO2 22 (L) 10/19/2021    BUN 19 10/19/2021    CREATININE 0.8 10/19/2021    CALCIUM 10.2 10/19/2021    ANIONGAP 15 10/19/2021    ESTGFRAFRICA >60.0 10/19/2021    EGFRNONAA >60.0 10/19/2021     Lab Results   Component Value Date    ALT 14 10/19/2021    AST 15 10/19/2021    ALKPHOS 71 10/19/2021    BILITOT 0.7 10/19/2021        Assessment:  Problem List Items Addressed This Visit        Neuro    Osteoarthritis of spine with radiculopathy, lumbar region    Chronic pain      Other Visit Diagnoses     Lumbar radiculopathy    -  Primary    Lumbar spondylosis        DDD (degenerative disc disease), lumbar        Spondylosis without myelopathy        Arthropathy of facet joints at multiple levels              5/14/2021 - Aracelis Martinez is a 66 y.o. female with Aracelis Martinez is a 66 y.o. female who  has a past medical history of Arthritis, Chronic back pain, Diabetes mellitus (07/2014), Diverticulosis, Hypertension, Neuroendocrine tumor of pancreas (2015), Pancreatic cancer (2015), Renal cyst, and Thyroid disease.  By history and examination this patient has chronic low back pain with radiculopathy.  The underlying cause cause is facet arthritis, degenerative disc disease, foraminal stenosis, muscle dysfunction and sacroiliitis.  Pathology is confirmed by imaging.  We discussed the underlying diagnoses and multiple treatment options including non-opioid medications, injections, physical therapy, home exercise, activity modification / rest and weight loss.  The risks and benefits of each treatment option were discussed and all questions were answered.      07/07/[id number]-bzsw-old female with past medical history of arthritis, chronic low back pain, diabetes, diverticulitis, hypertension, neuroendocrine tumor of the pancreas (2015), pancreatic cancer (20), renal cyst and thyroid disease.  She is status post a lumbar INOCENCIA targeting L5-S1 to help with chronic low back and right leg pain likely related to lumbar radiculopathy.  Underlying cause of her symptoms is facet arthritis, degenerative disc disease, foraminal stenosis, sacroiliitis and muscle dysfunction.  Patient discussed recent lumbar MRI there were to spotted images located sagittal view 1 image at L2 and the other at L5 ages 7 of 13 discussed with patient I will follow-up with the  radiologist Dr. Saint Germain to discuss further to rule out anything concerning.  Within follow-up with patient with a phone call, patient agreed understood.  Also discussed with patient that considering she received 50% relief from her lumbar INOCENCIA targeting L5-S1 that we may need to repeat this injection attempt a different angle for the procedure or repeat the same procedure in effort to provide more relief patient also verbalized understanding and agreed.    9/21/2021- Aracelis Martinez is a 66 y.o. female who  has a past medical history of Arthritis, Chronic back pain, Diabetes mellitus (07/2014), Diverticulosis, Hypertension, Neuroendocrine tumor of pancreas (2015), Pancreatic cancer (2015), Renal cyst, and Thyroid disease.  By history and examination this patient has chronic low back pain without radiculopathy.  The underlying cause cause is facet arthritis, degenerative disc disease, muscles strain, deconditioning and sacroiliitis.  Pathology is confirmed by imaging.  We discussed the underlying diagnoses and multiple treatment options including non-opioid medications, injections, physical therapy, home exercise, activity modification / rest and weight loss.  The risks and benefits of each treatment option were discussed and all questions were answered.     12/6/21 - this is a 66-year-old female with chronic low back and right lower extremity pain.  She is presenting today via telemedicine encounter from [facility] describing chronic left low back pain that is nonradiating along with increasingly severe right buttock pain that radiates down the posterior aspect of the leg to the calf.  This is consistent with an MRI of her lumbar spine from May 2021 which shows advanced degenerative disc disease and facet hypertrophy combining to create severe foraminal stenosis at right L5-S1.  Patient is fail to experience sustained relief from 3 epidural steroid injections that have been provided over the past 6  months.  She is reporting good relief in the axial back from the RFA completed at right L4-5 and right L5-S1.  I recommend moving forward with the left-sided RFA, as planned, but I do not believe additional epidural steroid injections would be of significant benefit at this time.  I will refer her to Neurosurgery for an evaluation and consideration of surgical interventions.  Should additional epidural steroid injections be needed as part of the diagnostic workup process for neuro surgery, I would be happy to perform either an interlaminar epidural steroid injection or transforaminal epidural steroid injection at L5-S1.    01/24/20225085-72-cevc-old female that presented today following a left then right lumbar RFA targeting L4-5 and L5-S1 she is reporting overall 75% relief,she continues to is endorse pain in the right leg.  She did meet Neurosurgery and it discussed that she would be  a good candidate for medial foraminotomy based on the MRI. They also  discussed that she might require complete facetectomy and fusion in the future.  At this point she refuses any surgery would like to continue with physical therapy.    Plan & Recommendations  Procedures:     - none at this time    - Consider a Right SI joint  Injection as she exhibited right SI joint pain upon exam.   - Consider intracept procedure- Schmorl nodes with adjacent degenerative MODIC endplate changes   - Consider SCS therapy if no relief from above procedures.   Medications: No medications changes recommended at this time.  Imaging:  Reviewed in detail today, message sent to Dr. Marrero/radiologist to confirm specific images located on sagittal T1 image  PT/OT:  Continue PT   HEP: I encouraged the patient to maintain a home exercise regimen that includes daily, moderate cardiovascular exercise lasting at least 30 minutes.  This may include yoga, devan chi, walking, swimming, aqua aerobics, or other exercises that maintain a heart rate of 50-70% of the  calculated maximum heart rate.  I also encouraged light, daily stretching focused on the target area. Patient reports walking at least 1 mile daily.     Follow Up:  3 months or sooner if needed.     ALISON HoganC  Interventional Pain Management        Disclaimer: This note was partly generated using dictation software which may occasionally result in transcription errors.

## 2022-01-25 ENCOUNTER — PATIENT MESSAGE (OUTPATIENT)
Dept: ADMINISTRATIVE | Facility: HOSPITAL | Age: 67
End: 2022-01-25
Payer: MEDICARE

## 2022-01-26 ENCOUNTER — CLINICAL SUPPORT (OUTPATIENT)
Dept: REHABILITATION | Facility: HOSPITAL | Age: 67
End: 2022-01-26
Payer: MEDICARE

## 2022-01-26 DIAGNOSIS — M54.50 LUMBAR PAIN: ICD-10-CM

## 2022-01-26 DIAGNOSIS — M53.86 DECREASED ROM OF LUMBAR SPINE: ICD-10-CM

## 2022-01-26 PROCEDURE — 97140 MANUAL THERAPY 1/> REGIONS: CPT | Mod: PO

## 2022-01-26 PROCEDURE — 97110 THERAPEUTIC EXERCISES: CPT | Mod: PO

## 2022-01-26 NOTE — PROGRESS NOTES
Physical Therapy Daily Treatment Note     Name: Aracelis Eisenberg [facility] Number: [id number]    Therapy Diagnosis:   Encounter Diagnoses   Name Primary?    Decreased ROM of lumbar spine     Lumbar pain      Physician: Esteban Monaco MD    Visit Date: 1/26/2022  Physician Orders: Evaluate and treat  Medical Diagnosis: Lumbar radiculopathy [M54.16]    Evaluation Date: 1/18/2022  Authorization Period Expiration: 1/24/2022 - 12/1/2022  Plan of Care Certification Period: 3/18/2022  Visit #/Visits authorized: 1/ 20     Time In: 1:30 PM   Time Out:2:15 pm  Total Billable Time: 45 minutes    Precautions: Standard    Subjective     Pt reports: that the right lower back is hurting 5/10 NPRS upon movement.  She was compliant with home exercise program.  Response to previous treatment: The patient felt loosen up a bit in the left lower back.   Functional change: The patient was able to get in and out of the car with ease.     Pain: 5/10  Location: right back  and buttocks      Objective     Aracelis received therapeutic exercises to develop strength, endurance, ROM, flexibility, posture and core stabilization for 30 minutes including:  Hamstring and Piriformis stretching 30 seconds hold x 5  POSTERIOR PELVIC TILT 10 seconds hold x 10   SLR 10 x 3   LTR and DOUBLE KNEE TO CHEST 10 x 3       Aracelis received the following manual therapy techniques: Joint mobilizations were applied to the: lumbar for 15 minutes, including:  Unilateral and central posterior to anterior mobilization grade 1,2,3 and 4 oscillation.     Home Exercises Provided and Patient Education Provided     Education provided:   - The patient was instructed to perform stretching of both lower extremities every day.     Written Home Exercises Provided: yes.  Exercises were reviewed and Aracelis was able to demonstrate them prior to the end of the session.  Aracelis demonstrated good  understanding of the education provided.     See EMR under Patient Instructions for exercises  provided prior visit.    Assessment     The patient exhibited difficulty performing DOUBLE KNEE TO CHEST but felt better with theraball. The patient also had manual therapy in the lower back and felt that the pain has reduced to 1/10 NPRS but needs more PT sessions in order to reduce the risk of Cauda Equina Syndrome.   Aracelis Is progressing well towards her goals.   Pt prognosis is Good.     Pt will continue to benefit from skilled outpatient physical therapy to address the deficits listed in the problem list box on initial evaluation, provide pt/family education and to maximize pt's level of independence in the home and community environment.     Pt's spiritual, cultural and educational needs considered and pt agreeable to plan of care and goals.    Anticipated barriers to physical therapy: None       Goals:  Short Term Goals: 4 weeks        Goal   Status     1. Pt. to report decreased lumbar pain </ =  6/10 at worst to increase tolerance for upright unsupported sitting posture.     2. Pt. to demonstrate proper posture requiring minimum verbal cues from PT for improved posture positioning      3. Increase L1 - L3 joint mobility to 3/6 to promote greater ease with squat to stand transitioning.     4. Increase lumbar extension AROM ? 10 degrees to promote greater ease with self-care.     5. Increase AROM lumbar rotation bilateral  ? 35 degrees to promote greater ease with driving.     6. Pt to be independent with HEP to improve ROM and independence with ADL's           Long Term Goals: 8 weeks     Goal   Status     1. Pt. to report decreased lumbar pain </ =  4/10 at worst to increase tolerance for upright unsupported sitting posture.     2. Pt. to demonstrate proper posture requiring minimum verbal cues from PT for improved posture positioning      3. Increase L3 - L5 joint mobility to 3/6 to promote greater ease with squat to stand transitioning.     4. Increase lumbar extension AROM ? 15 degrees to promote greater  ease with self-care.     5. Increase AROM lumbar rotation bilateral  ? 40 degrees to promote greater ease with driving.     6. Pt. to demonstrate increased MMT for rectus abdominus  ?  4/5 to improve supine to sitting transfer.     7. Pt. to demonstrate increased MMT for Lumbar extensor paraspinals t ?  4/5 to improve tolerance for prolong standing and ambulation      8. Pt. to demonstrate increased MMT for Lumbar/thoracic rotators   ? 4/5 to improve tolerance for ADL and work activities.      9. Pt. to demonstrate increased MMT for Lumbar/thoracic side bending   ? 4/5 to improve household cleaning.      10. Pt to be independent with HEP to improve ROM and independence with ADL's           Plan   Plan of care Certification: 1/18/2022 to  3/18/2022.     Outpatient Physical Therapy to include the following interventions: Gait Training, Manual Therapy, Neuromuscular Re-ed, Patient Education, Therapeutic Activities and Therapeutic Exercise.      Lord Stanford Padilla, PT , DPT, MTC

## 2022-01-29 NOTE — TELEPHONE ENCOUNTER
No new care gaps identified.  Powered by [employer]. Reference number: [id number].   1/28/2022 9:33:43 PM CST

## 2022-02-01 ENCOUNTER — PATIENT MESSAGE (OUTPATIENT)
Dept: SPORTS MEDICINE | Facility: CLINIC | Age: 67
End: 2022-02-01
Payer: MEDICARE

## 2022-02-01 ENCOUNTER — HOSPITAL ENCOUNTER (OUTPATIENT)
Dept: RADIOLOGY | Facility: HOSPITAL | Age: 67
Discharge: HOME | End: 2022-02-01
Attending: INTERNAL MEDICINE
Payer: MEDICARE

## 2022-02-01 DIAGNOSIS — Z12.31 ENCOUNTER FOR SCREENING MAMMOGRAM FOR MALIGNANT NEOPLASM OF BREAST: ICD-10-CM

## 2022-02-01 PROCEDURE — 77063 BREAST TOMOSYNTHESIS BI: CPT | Mod: 26,,, | Performed by: RADIOLOGY

## 2022-02-01 PROCEDURE — 77063 BREAST TOMOSYNTHESIS BI: CPT | Mod: TC,PO

## 2022-02-01 PROCEDURE — 77067 MAMMO DIGITAL SCREENING BILAT WITH TOMO: ICD-10-PCS | Mod: 26,,, | Performed by: RADIOLOGY

## 2022-02-01 PROCEDURE — 77067 SCR MAMMO BI INCL CAD: CPT | Mod: 26,,, | Performed by: RADIOLOGY

## 2022-02-01 PROCEDURE — 77063 MAMMO DIGITAL SCREENING BILAT WITH TOMO: ICD-10-PCS | Mod: 26,,, | Performed by: RADIOLOGY

## 2022-02-01 PROCEDURE — 77067 SCR MAMMO BI INCL CAD: CPT | Mod: TC,PO

## 2022-02-02 ENCOUNTER — CLINICAL SUPPORT (OUTPATIENT)
Dept: REHABILITATION | Facility: HOSPITAL | Age: 67
End: 2022-02-02
Payer: MEDICARE

## 2022-02-02 DIAGNOSIS — M53.86 DECREASED ROM OF LUMBAR SPINE: ICD-10-CM

## 2022-02-02 DIAGNOSIS — M54.50 LUMBAR PAIN: ICD-10-CM

## 2022-02-02 PROCEDURE — 97110 THERAPEUTIC EXERCISES: CPT | Mod: PO

## 2022-02-02 PROCEDURE — 97140 MANUAL THERAPY 1/> REGIONS: CPT | Mod: PO

## 2022-02-02 NOTE — PROGRESS NOTES
Physical Therapy Daily Treatment Note     Name: Aracelis Eisenberg [facility] Number: [id number]    Therapy Diagnosis:   Encounter Diagnoses   Name Primary?    Decreased ROM of lumbar spine     Lumbar pain      Physician: Esteban Monaco MD    Visit Date: 2/2/2022  Physician Orders: Evaluate and treat  Medical Diagnosis: Lumbar radiculopathy [M54.16]    Evaluation Date: 1/18/2022  Authorization Period Expiration: 1/24/2022 - 12/1/2022  Plan of Care Certification Period: 3/18/2022  Visit #/Visits authorized: 3/20     Time In: 11:30 PM   Time Out:12:15 pm  Total Billable Time: 45 minutes    Precautions: Standard    Subjective     Pt reports: that the right lower back was hurting 5/10 NPRS upon movement especially when standing for an hour.  She was compliant with home exercise program.  Response to previous treatment: The patient felt loosen up a bit in the left lower back.   Functional change: The patient was able to get in and out of the car with ease.     Pain: 5/10  Location: right back and buttocks      Objective     Aracelis received therapeutic exercises to develop strength, endurance, ROM, flexibility, posture and core stabilization for 30 minutes including:  Hamstring and Piriformis stretching 30 seconds hold x 5  POSTERIOR PELVIC TILT 10 seconds hold x 10   SLR 10 x 3   LTR and DOUBLE KNEE TO CHEST 10 x 3   Bilateral leg lifts with knee bent 10 x 3   Bilateral leg lifts with knee extended 5 x 3       Aracelis received the following manual therapy techniques: Joint mobilizations were applied to the: lumbar for 15 minutes, including:  Unilateral and central posterior to anterior mobilization grade 1,2,3 and 4 oscillation.     Home Exercises Provided and Patient Education Provided     Education provided:   - The patient was instructed to perform stretching of both lower extremities every day.     Written Home Exercises Provided: yes.  Exercises were reviewed and Aracelis was able to demonstrate them prior to the end of the  session.  Aracelis demonstrated good  understanding of the education provided.     See EMR under Patient Instructions for exercises provided prior visit.    Assessment     The patient had manual therapy in the lower back and felt that the pain has reduced to 0/10 NPRS and felt loosen up a bit. The patient has still tightness of the right Hamstrings but did not exhibit any difficulty getting in and out of the bed. The patient need more session of therapy in order to reduce the risk of Cauda Equina.     Aracelis Is progressing well towards her goals.     Pt prognosis is Good.     Pt will continue to benefit from skilled outpatient physical therapy to address the deficits listed in the problem list box on initial evaluation, provide pt/family education and to maximize pt's level of independence in the home and community environment.     Pt's spiritual, cultural and educational needs considered and pt agreeable to plan of care and goals.    Anticipated barriers to physical therapy: None       Goals:  Short Term Goals: 4 weeks        Goal   Status     1. Pt. to report decreased lumbar pain </ =  6/10 at worst to increase tolerance for upright unsupported sitting posture.     2. Pt. to demonstrate proper posture requiring minimum verbal cues from PT for improved posture positioning      3. Increase L1 - L3 joint mobility to 3/6 to promote greater ease with squat to stand transitioning.     4. Increase lumbar extension AROM ? 10 degrees to promote greater ease with self-care.     5. Increase AROM lumbar rotation bilateral  ? 35 degrees to promote greater ease with driving.     6. Pt to be independent with HEP to improve ROM and independence with ADL's           Long Term Goals: 8 weeks     Goal   Status     1. Pt. to report decreased lumbar pain </ =  4/10 at worst to increase tolerance for upright unsupported sitting posture.     2. Pt. to demonstrate proper posture requiring minimum verbal cues from PT for improved posture  positioning      3. Increase L3 - L5 joint mobility to 3/6 to promote greater ease with squat to stand transitioning.     4. Increase lumbar extension AROM ? 15 degrees to promote greater ease with self-care.     5. Increase AROM lumbar rotation bilateral  ? 40 degrees to promote greater ease with driving.     6. Pt. to demonstrate increased MMT for rectus abdominus  ?  4/5 to improve supine to sitting transfer.     7. Pt. to demonstrate increased MMT for Lumbar extensor paraspinals t ?  4/5 to improve tolerance for prolong standing and ambulation      8. Pt. to demonstrate increased MMT for Lumbar/thoracic rotators   ? 4/5 to improve tolerance for ADL and work activities.      9. Pt. to demonstrate increased MMT for Lumbar/thoracic side bending   ? 4/5 to improve household cleaning.      10. Pt to be independent with HEP to improve ROM and independence with ADL's           Plan   Plan of care Certification: 1/18/2022 to  3/18/2022.     Outpatient Physical Therapy to include the following interventions: Gait Training, Manual Therapy, Neuromuscular Re-ed, Patient Education, Therapeutic Activities and Therapeutic Exercise.      Lord Stanford Padilla, PT , DPT, MTC

## 2022-02-04 ENCOUNTER — CLINICAL SUPPORT (OUTPATIENT)
Dept: REHABILITATION | Facility: HOSPITAL | Age: 67
End: 2022-02-04
Payer: MEDICARE

## 2022-02-04 DIAGNOSIS — M54.50 LUMBAR PAIN: ICD-10-CM

## 2022-02-04 DIAGNOSIS — M53.86 DECREASED ROM OF LUMBAR SPINE: ICD-10-CM

## 2022-02-04 PROCEDURE — 97110 THERAPEUTIC EXERCISES: CPT | Mod: PO

## 2022-02-04 PROCEDURE — 97140 MANUAL THERAPY 1/> REGIONS: CPT | Mod: PO

## 2022-02-04 NOTE — PROGRESS NOTES
Physical Therapy Daily Treatment Note     Name: Aracelis Eisenberg [facility] Number: [id number]    Therapy Diagnosis:   Encounter Diagnoses   Name Primary?    Decreased ROM of lumbar spine     Lumbar pain      Physician: Esteban Monaco MD    Visit Date: 2/4/2022  Physician Orders: Evaluate and treat  Medical Diagnosis: Lumbar radiculopathy [M54.16]    Evaluation Date: 1/18/2022  Authorization Period Expiration: 1/24/2022 - 12/1/2022  Plan of Care Certification Period: 3/18/2022  Visit #/Visits authorized: 4/20     Time In: 7:45 PM   Time Out: 8:30 PM  Total Billable Time: 45 minutes    Precautions: Standard    Subjective     Pt reports: that the right lower back was hurting 3/10 NPRS upon movement especially when standing for an hour. The patient also stated that the left lower back is more limited when turning.     She was compliant with home exercise program.    Response to previous treatment: The patient felt loosen up a bit in the left lower back.     Functional change: The patient was able to get in and out of the car with ease.     Pain: 4/10    Location: right back and buttocks      Objective     Aracelis received therapeutic exercises to develop strength, endurance, ROM, flexibility, posture and core stabilization for 30 minutes including:  Hamstring, QL and Piriformis stretching 30 seconds hold x 5  POSTERIOR PELVIC TILT 10 seconds hold x 10   SLR with ankle dorsiflexion 10 x 3   LTR and DOUBLE KNEE TO CHEST  10 x 3   Bilateral leg lifts with knee bent 10 x 3   Bilateral leg lifts with knee extended 5 x 6      Aracelis received the following manual therapy techniques: Joint mobilizations were applied to the: lumbar for 15 minutes, including:  Unilateral and central posterior to anterior mobilization grade 1,2,3 and 4 oscillation.     Home Exercises Provided and Patient Education Provided     Education provided:   - The patient was instructed to perform stretching of both lower extremities every day.     Written  Home Exercises Provided: yes.  Exercises were reviewed and Aracelis was able to demonstrate them prior to the end of the session.  Aracelis demonstrated good  understanding of the education provided.     See EMR under Patient Instructions for exercises provided prior visit.    Assessment     The patient had manual therapy in the lower back and felt that the pain has reduced to 0/10 NPRS and felt loosen up a bit but still have some tightness of the right Hamstrings and left lumbar region. The  patient needs more session of therapy in order to reduce the risk of Cauda Equina Syndrome.     Aracelis Is progressing well towards her goals.     Pt prognosis is Good.     Pt will continue to benefit from skilled outpatient physical therapy to address the deficits listed in the problem list box on initial evaluation, provide pt/family education and to maximize pt's level of independence in the home and community environment.     Pt's spiritual, cultural and educational needs considered and pt agreeable to plan of care and goals.    Anticipated barriers to physical therapy: None       Goals:  Short Term Goals: 4 weeks        Goal   Status     1. Pt. to report decreased lumbar pain </ =  6/10 at worst to increase tolerance for upright unsupported sitting posture.     2. Pt. to demonstrate proper posture requiring minimum verbal cues from PT for improved posture positioning      3. Increase L1 - L3 joint mobility to 3/6 to promote greater ease with squat to stand transitioning.     4. Increase lumbar extension AROM ? 10 degrees to promote greater ease with self-care.     5. Increase AROM lumbar rotation bilateral  ? 35 degrees to promote greater ease with driving.     6. Pt to be independent with HEP to improve ROM and independence with ADL's           Long Term Goals: 8 weeks     Goal   Status     1. Pt. to report decreased lumbar pain </ =  4/10 at worst to increase tolerance for upright unsupported sitting posture.     2. Pt. to demonstrate  proper posture requiring minimum verbal cues from PT for improved posture positioning      3. Increase L3 - L5 joint mobility to 3/6 to promote greater ease with squat to stand transitioning.     4. Increase lumbar extension AROM ? 15 degrees to promote greater ease with self-care.     5. Increase AROM lumbar rotation bilateral  ? 40 degrees to promote greater ease with driving.     6. Pt. to demonstrate increased MMT for rectus abdominus  ?  4/5 to improve supine to sitting transfer.     7. Pt. to demonstrate increased MMT for Lumbar extensor paraspinals t ?  4/5 to improve tolerance for prolong standing and ambulation      8. Pt. to demonstrate increased MMT for Lumbar/thoracic rotators   ? 4/5 to improve tolerance for ADL and work activities.      9. Pt. to demonstrate increased MMT for Lumbar/thoracic side bending   ? 4/5 to improve household cleaning.      10. Pt to be independent with HEP to improve ROM and independence with ADL's           Plan   Plan of care Certification: 1/18/2022 to  3/18/2022.     Outpatient Physical Therapy to include the following interventions: Gait Training, Manual Therapy, Neuromuscular Re-ed, Patient Education, Therapeutic Activities and Therapeutic Exercise.      Lord Stanford Padilla, PT , DPT, MTC

## 2022-02-07 RX ORDER — LANCETS
EACH MISCELLANEOUS
Qty: 100 EACH | Refills: 3 | Status: SHIPPED | OUTPATIENT
Start: 2022-02-07 | End: 2022-04-19 | Stop reason: SDUPTHER

## 2022-02-07 NOTE — TELEPHONE ENCOUNTER
Provider Staff:     Action is required for this patient.   Please see care gap opportunities below in Care Due Message.     Thanks!  [facility]     Appointments      Date Provider   Last Visit   10/19/2021 Alen Damico MD   Next Visit   1/19/2022 Alen Damico MD     Note composed:5:55 AM 02/07/2022

## 2022-02-07 NOTE — TELEPHONE ENCOUNTER
Refill Authorization Note   Aracelis Martinez  is requesting a refill authorization.  Brief Assessment and Rationale for Refill:  Approve    -Medication-Related Problems Identified: Requires labs  Medication Therapy Plan:       Medication Reconciliation Completed: No   Comments:   --->Care Gap information included below if applicable.   Orders Placed This Encounter    blood sugar diagnostic (ONETOUCH VERIO TEST STRIPS) Strp      Requested Prescriptions   Signed Prescriptions Disp Refills    blood sugar diagnostic (ONETOUCH VERIO TEST STRIPS) Strp 100 strip 3     Sig: Use once daily to check blood sugar       Endocrinology: Diabetes - Supplies Passed - 2/7/2022  5:53 AM        Passed - Patient is at least 18 years old        Passed - Valid encounter within last 15 months     Recent Visits  Date Type Provider Dept   10/19/21 Office Visit Alen Damico MD [facility]   06/22/21 Office Visit Alen Damico MD [facility]   03/25/21 Office Visit Alen Damico MD [facility]   09/25/20 Office Visit Alen Damico MD [facility]   05/11/20 Office Visit Alen Damico MD [facility]   Showing recent visits within past 720 days and meeting all other requirements  Future Appointments  No visits were found meeting these conditions.  Showing future appointments within next 150 days and meeting all other requirements      Future Appointments              In 2 days Jordyn Duque, [facility], [facility]    In 4 days Aayush Nava, PT [place] [facility], [facility]    In 1 week Alen Damico MD [facility], [place]    In 1 week [facility] [facility] - [facility], [facility]    In 1 week Lord Stanford Padilla, PT [place] [facility], [facility]    In 1 week Aayush Nava, PT [place] [facility], [facility]    In 2 weeks MD Suzi [facility] -  Neurosurgery, [facility]    In 2 weeks Bren Deng, DO [place] [facility] - [facility] Medicine, [place]    In 2 weeks Lord Stanford Padilla, PT [place] [facility], [facility]    In 2 weeks Aayush Nava, PT [place] [facility], [facility]    In 3 weeks [facility]    In 3 weeks Lord Stanford Padilla, PT [place] [facility], [facility]                Passed - HBA1C within 1080 days     Lab Results   Component Value Date    HGBA1C 7.0 (H) 10/19/2021    HGBA1C 7.0 (H) 06/29/2021    HGBA1C 6.6 (H) 03/18/2021                  Appointments  past 12m or future 3m with PCP    Date Provider   Last Visit   10/19/2021 Alen Damico MD   Next Visit   1/19/2022 Alen Damico MD   ED visits in past 90 days: 0     Note composed:5:55 AM 02/07/2022

## 2022-02-07 NOTE — TELEPHONE ENCOUNTER
Refill Authorization Note   Aracelis Martinez  is requesting a refill authorization.  Brief Assessment and Rationale for Refill:  Approve     Medication Therapy Plan:       Medication Reconciliation Completed: No   Comments:   --->Care Gap information included below if applicable.   Orders Placed This Encounter    lancets Misc      Requested Prescriptions   Signed Prescriptions Disp Refills    lancets Misc 100 each 3     Sig: To check BG 1 time daily, to use with insurance preferred meter       Endocrinology: Diabetes - Supplies Passed - 1/20/2022  1:14 PM        Passed - Patient is at least 18 years old        Passed - Valid encounter within last 15 months     Recent Visits  Date Type Provider Dept   10/19/21 Office Visit Alen Damico MD [facility] Internal Medicine   06/22/21 Office Visit Alen Damico MD [facility]   03/25/21 Office Visit Alen Damico MD [facility]   09/25/20 Office Visit Alen Damico MD [facility]   05/11/20 Office Visit Alen Damico MD [facility]   Showing recent visits within past 720 days and meeting all other requirements  Future Appointments  No visits were found meeting these conditions.  Showing future appointments within next 150 days and meeting all other requirements      Future Appointments              In 2 days Jordny Duque, PTA [place] [facility], [facility]    In 4 days Aayush Nava, PT [place] [facility], [facility]    In 1 week Alen Damico MD [facility], [place]    In 1 week [facility] - [facility], [facility]    In 1 week Lord Stanford Padilla, PT [place] [facility], [facility]    In 1 week Aayush Nava, PT [place] [facility], [facility]    In 2 weeks MD [facility] - Neurosurgery, [facility]    In 2 weeks Bren Deng,  [facility] Sports Medicine, [place]    In 2  weeks Lord Stanford Padilla, PT [place] [facility], [facility]    In 2 weeks Aayush Nava, PT [place] [facility], [facility]    In 3 weeks Lord Stanford Padilla, PT [place] [facility], [facility]    In 3 weeks Lord Stanford Padilla, PT [place] [facility], [facility]                Passed - HBA1C within 1080 days     Lab Results   Component Value Date    HGBA1C 7.0 (H) 10/19/2021    HGBA1C 7.0 (H) 06/29/2021    HGBA1C 6.6 (H) 03/18/2021                  Appointments  past 12m or future 3m with PCP    Date Provider   Last Visit   10/19/2021 Alen Damico MD   Next Visit   1/19/2022 Alen Damico MD   ED visits in past 90 days: 0     Note composed:5:53 AM 02/07/2022

## 2022-02-09 ENCOUNTER — CLINICAL SUPPORT (OUTPATIENT)
Dept: REHABILITATION | Facility: HOSPITAL | Age: 67
End: 2022-02-09
Payer: MEDICARE

## 2022-02-09 DIAGNOSIS — M53.86 DECREASED ROM OF LUMBAR SPINE: ICD-10-CM

## 2022-02-09 DIAGNOSIS — M54.50 LUMBAR PAIN: ICD-10-CM

## 2022-02-09 PROCEDURE — 97110 THERAPEUTIC EXERCISES: CPT | Mod: PO,CQ

## 2022-02-09 PROCEDURE — 97140 MANUAL THERAPY 1/> REGIONS: CPT | Mod: PO,CQ

## 2022-02-09 RX ORDER — LOSARTAN POTASSIUM 50 MG/1
TABLET ORAL
Qty: 90 TABLET | Refills: 3 | Status: SHIPPED | OUTPATIENT
Start: 2022-02-09 | End: 2022-12-27 | Stop reason: SDUPTHER

## 2022-02-09 NOTE — PROGRESS NOTES
Physical Therapy Daily Treatment Note     Name: Aracelis Eisenberg [facility] Number: [id number]    Therapy Diagnosis:   Encounter Diagnoses   Name Primary?    Decreased ROM of lumbar spine     Lumbar pain      Physician: Esteban Monaco MD    Visit Date: 2/9/2022  Physician Orders: Evaluate and treat  Medical Diagnosis: Lumbar radiculopathy [M54.16]    Evaluation Date: 1/18/2022  Authorization Period Expiration: 1/24/2022 - 12/1/2022  Plan of Care Certification Period: 3/18/2022  Visit #/Visits authorized: 4/20   PTA Visit #: 1/6    Time In: 11:40 PM   Time Out: 12:15 PM  Total Billable Time: 35 minutes    Precautions: Standard    Subjective     Pt reports: a little pain/discomfort but nothing to complain about.      She was compliant with home exercise program.    Response to previous treatment: sore but good    Functional change: Pain in the lower right back when I turn in my sleep is not as bad as it was before    Pain: 2/10    Location: right back and buttocks      Objective     Aracelis received therapeutic exercises to develop strength, endurance, ROM, flexibility, posture and core stabilization for 20 minutes including:  Hamstring, QL and Piriformis stretching 30 seconds hold x 5 (each stretch done 3 times. QL not performed)  POSTERIOR PELVIC TILT 10 seconds hold x 10   SLR with ankle dorsiflexion 10 x 3   LTR and DOUBLE KNEE TO CHEST  10 x 3   Bilateral leg lifts with knee bent 10 x 3   Bilateral leg lifts with knee extended 5 x 6      Aracelis received the following manual therapy techniques: Joint mobilizations were applied to the: lumbar for 10 minutes, including:  Unilateral and central posterior to anterior mobilization grade 1,2,3 and 4 oscillation.   Theragun 1900 to R lower back    Home Exercises Provided and Patient Education Provided     Education provided:   - The patient was instructed to perform stretching of both lower extremities every day.     Written Home Exercises Provided: yes.  Exercises were  reviewed and Aracelis was able to demonstrate them prior to the end of the session.  Aracelis demonstrated good  understanding of the education provided.     See EMR under Patient Instructions for exercises provided prior visit.    Assessment     Pt tolerated therapy well. Verbal cues given to improve muscular control throughout Straight Leg Raise. Pt challenged with maintaining full knee extension during eccentric phase of exercise. Tactile and verbal cues given to improve technique for posterior pelvic tilt, pt demonstrated understanding. STM to lower back with theragun used to decrease pain and tightness in lower R paraspinals. Pt stated she felt better, upon leaving, than she did before therapy. Aracelis will continue to benefit from skilled therapy.     Aracelis Is progressing well towards her goals.     Pt prognosis is Good.     Pt will continue to benefit from skilled outpatient physical therapy to address the deficits listed in the problem list box on initial evaluation, provide pt/family education and to maximize pt's level of independence in the home and community environment.     Pt's spiritual, cultural and educational needs considered and pt agreeable to plan of care and goals.    Anticipated barriers to physical therapy: None       Goals:  Short Term Goals: 4 weeks        Goal   Status     1. Pt. to report decreased lumbar pain </ =  6/10 at worst to increase tolerance for upright unsupported sitting posture. Met 2/9/2022    2. Pt. to demonstrate proper posture requiring minimum verbal cues from PT for improved posture positioning  Progressing 2/9/2022      3. Increase L1 - L3 joint mobility to 3/6 to promote greater ease with squat to stand transitioning. Progressing 2/9/2022      4. Increase lumbar extension AROM ? 10 degrees to promote greater ease with self-care. Progressing 2/9/2022      5. Increase AROM lumbar rotation bilateral  ? 35 degrees to promote greater ease with driving. Progressing 2/9/2022      6. Pt to  be independent with HEP to improve ROM and independence with ADL's Progressing 2/9/2022            Long Term Goals: 8 weeks     Goal   Status     1. Pt. to report decreased lumbar pain </ =  4/10 at worst to increase tolerance for upright unsupported sitting posture.     2. Pt. to demonstrate proper posture requiring minimum verbal cues from PT for improved posture positioning      3. Increase L3 - L5 joint mobility to 3/6 to promote greater ease with squat to stand transitioning.     4. Increase lumbar extension AROM ? 15 degrees to promote greater ease with self-care.     5. Increase AROM lumbar rotation bilateral  ? 40 degrees to promote greater ease with driving.     6. Pt. to demonstrate increased MMT for rectus abdominus  ?  4/5 to improve supine to sitting transfer.     7. Pt. to demonstrate increased MMT for Lumbar extensor paraspinals t ?  4/5 to improve tolerance for prolong standing and ambulation      8. Pt. to demonstrate increased MMT for Lumbar/thoracic rotators   ? 4/5 to improve tolerance for ADL and work activities.      9. Pt. to demonstrate increased MMT for Lumbar/thoracic side bending   ? 4/5 to improve household cleaning.      10. Pt to be independent with HEP to improve ROM and independence with ADL's           Plan   Plan of care Certification: 1/18/2022 to  3/18/2022.     Continue POC as outlined in eval as: Outpatient Physical Therapy to include the following interventions: Gait Training, Manual Therapy, Neuromuscular Re-ed, Patient Education, Therapeutic Activities and Therapeutic Exercise.      Jordyn Duque PTA ,

## 2022-02-10 RX ORDER — HYDROXYZINE PAMOATE 25 MG/1
CAPSULE ORAL
Qty: 60 CAPSULE | Refills: 3 | Status: SHIPPED | OUTPATIENT
Start: 2022-02-10 | End: 2022-07-20

## 2022-02-15 ENCOUNTER — LAB VISIT (OUTPATIENT)
Dept: LAB | Facility: HOSPITAL | Age: 67
End: 2022-02-15
Attending: INTERNAL MEDICINE
Payer: MEDICARE

## 2022-02-15 ENCOUNTER — OFFICE VISIT (OUTPATIENT)
Dept: INTERNAL MEDICINE | Facility: CLINIC | Age: 67
End: 2022-02-15
Payer: MEDICARE

## 2022-02-15 VITALS
BODY MASS INDEX: 36.55 KG/M2 | RESPIRATION RATE: 20 BRPM | HEART RATE: 70 BPM | HEIGHT: 65 IN | OXYGEN SATURATION: 99 % | DIASTOLIC BLOOD PRESSURE: 64 MMHG | SYSTOLIC BLOOD PRESSURE: 114 MMHG | TEMPERATURE: 98 F | WEIGHT: 219.38 LBS

## 2022-02-15 DIAGNOSIS — R10.10 PAIN OF UPPER ABDOMEN: ICD-10-CM

## 2022-02-15 DIAGNOSIS — M54.31 SCIATICA OF RIGHT SIDE: ICD-10-CM

## 2022-02-15 DIAGNOSIS — E11.9 TYPE 2 DIABETES MELLITUS WITHOUT COMPLICATION, WITHOUT LONG-TERM CURRENT USE OF INSULIN: ICD-10-CM

## 2022-02-15 DIAGNOSIS — R11.2 NON-INTRACTABLE VOMITING WITH NAUSEA, UNSPECIFIED VOMITING TYPE: Primary | ICD-10-CM

## 2022-02-15 DIAGNOSIS — I10 ESSENTIAL HYPERTENSION: ICD-10-CM

## 2022-02-15 DIAGNOSIS — R11.2 NON-INTRACTABLE VOMITING WITH NAUSEA, UNSPECIFIED VOMITING TYPE: ICD-10-CM

## 2022-02-15 DIAGNOSIS — M54.41 CHRONIC RIGHT-SIDED LOW BACK PAIN WITH RIGHT-SIDED SCIATICA: ICD-10-CM

## 2022-02-15 DIAGNOSIS — G89.29 CHRONIC RIGHT-SIDED LOW BACK PAIN WITH RIGHT-SIDED SCIATICA: ICD-10-CM

## 2022-02-15 LAB
ALBUMIN SERPL BCP-MCNC: 4.1 G/DL (ref 3.5–5.2)
ALP SERPL-CCNC: 72 U/L (ref 55–135)
ALT SERPL W/O P-5'-P-CCNC: 15 U/L (ref 10–44)
ANION GAP SERPL CALC-SCNC: 13 MMOL/L (ref 8–16)
AST SERPL-CCNC: 13 U/L (ref 10–40)
BASOPHILS # BLD AUTO: 0.04 K/UL (ref 0–0.2)
BASOPHILS NFR BLD: 0.6 % (ref 0–1.9)
BILIRUB SERPL-MCNC: 0.7 MG/DL (ref 0.1–1)
BUN SERPL-MCNC: 25 MG/DL (ref 8–23)
CALCIUM SERPL-MCNC: 10.3 MG/DL (ref 8.7–10.5)
CHLORIDE SERPL-SCNC: 102 MMOL/L (ref 95–110)
CO2 SERPL-SCNC: 27 MMOL/L (ref 23–29)
CREAT SERPL-MCNC: 1 MG/DL (ref 0.5–1.4)
DIFFERENTIAL METHOD: ABNORMAL
EOSINOPHIL # BLD AUTO: 0.2 K/UL (ref 0–0.5)
EOSINOPHIL NFR BLD: 2.7 % (ref 0–8)
ERYTHROCYTE [DISTWIDTH] IN BLOOD BY AUTOMATED COUNT: 15.7 % (ref 11.5–14.5)
EST. GFR  (AFRICAN AMERICAN): >60 ML/MIN/1.73 M^2
EST. GFR  (NON AFRICAN AMERICAN): 58.4 ML/MIN/1.73 M^2
ESTIMATED AVG GLUCOSE: 134 MG/DL (ref 68–131)
GLUCOSE SERPL-MCNC: 95 MG/DL (ref 70–110)
HBA1C MFR BLD: 6.3 % (ref 4–5.6)
HCT VFR BLD AUTO: 45 % (ref 37–48.5)
HGB BLD-MCNC: 13.9 G/DL (ref 12–16)
IMM GRANULOCYTES # BLD AUTO: 0.02 K/UL (ref 0–0.04)
IMM GRANULOCYTES NFR BLD AUTO: 0.3 % (ref 0–0.5)
LIPASE SERPL-CCNC: 12 U/L (ref 4–60)
LYMPHOCYTES # BLD AUTO: 3.7 K/UL (ref 1–4.8)
LYMPHOCYTES NFR BLD: 52.2 % (ref 18–48)
MCH RBC QN AUTO: 26.3 PG (ref 27–31)
MCHC RBC AUTO-ENTMCNC: 30.9 G/DL (ref 32–36)
MCV RBC AUTO: 85 FL (ref 82–98)
MONOCYTES # BLD AUTO: 0.6 K/UL (ref 0.3–1)
MONOCYTES NFR BLD: 7.8 % (ref 4–15)
NEUTROPHILS # BLD AUTO: 2.6 K/UL (ref 1.8–7.7)
NEUTROPHILS NFR BLD: 36.4 % (ref 38–73)
NRBC BLD-RTO: 0 /100 WBC
PLATELET # BLD AUTO: 442 K/UL (ref 150–450)
PMV BLD AUTO: 9.8 FL (ref 9.2–12.9)
POTASSIUM SERPL-SCNC: 3.7 MMOL/L (ref 3.5–5.1)
PROT SERPL-MCNC: 7.7 G/DL (ref 6–8.4)
RBC # BLD AUTO: 5.28 M/UL (ref 4–5.4)
SODIUM SERPL-SCNC: 142 MMOL/L (ref 136–145)
WBC # BLD AUTO: 7.16 K/UL (ref 3.9–12.7)

## 2022-02-15 PROCEDURE — 36415 COLL VENOUS BLD VENIPUNCTURE: CPT | Mod: PO | Performed by: INTERNAL MEDICINE

## 2022-02-15 PROCEDURE — 83690 ASSAY OF LIPASE: CPT | Performed by: INTERNAL MEDICINE

## 2022-02-15 PROCEDURE — 99214 PR OFFICE/OUTPT VISIT, EST, LEVL IV, 30-39 MIN: ICD-10-PCS | Mod: S$PBB,,, | Performed by: INTERNAL MEDICINE

## 2022-02-15 PROCEDURE — 99999 PR PBB SHADOW E&M-EST. PATIENT-LVL V: CPT | Mod: PBBFAC,,, | Performed by: INTERNAL MEDICINE

## 2022-02-15 PROCEDURE — 83036 HEMOGLOBIN GLYCOSYLATED A1C: CPT | Performed by: INTERNAL MEDICINE

## 2022-02-15 PROCEDURE — 99215 OFFICE O/P EST HI 40 MIN: CPT | Mod: PBBFAC,PO | Performed by: INTERNAL MEDICINE

## 2022-02-15 PROCEDURE — 99214 OFFICE O/P EST MOD 30 MIN: CPT | Mod: S$PBB,,, | Performed by: INTERNAL MEDICINE

## 2022-02-15 PROCEDURE — 80053 COMPREHEN METABOLIC PANEL: CPT | Performed by: INTERNAL MEDICINE

## 2022-02-15 PROCEDURE — 85025 COMPLETE CBC W/AUTO DIFF WBC: CPT | Performed by: INTERNAL MEDICINE

## 2022-02-15 PROCEDURE — 99999 PR PBB SHADOW E&M-EST. PATIENT-LVL V: ICD-10-PCS | Mod: PBBFAC,,, | Performed by: INTERNAL MEDICINE

## 2022-02-15 RX ORDER — ONDANSETRON 4 MG/1
4 TABLET, ORALLY DISINTEGRATING ORAL EVERY 6 HOURS PRN
Qty: 30 TABLET | Refills: 2 | Status: SHIPPED | OUTPATIENT
Start: 2022-02-15 | End: 2022-07-07

## 2022-02-15 NOTE — PATIENT INSTRUCTIONS
An egd (upper endoscopy) has been ordered for you to schedule.    Call the dept to schedule the outpatient procedure at .

## 2022-02-15 NOTE — PROGRESS NOTES
Subjective:       Patient ID: Aracelis Martinez is a 66 y.o. female.    Chief Complaint: Follow-up    HPI   The patient presents for follow-up of medical conditions.  Her main complaint today however is abdominal pain, nausea, and vomiting.  Symptoms have been present for the past month.  Vomiting episodes may occur without warning.  She is better able to tolerate soft foods such as oatmeal and soup.  She has become intolerant of fresh vegetables.  She states that her abdomen feels sore.  Stools are light brown in color and soft to slimy in character.  She states her urine is dark.  She denies having any chills or fever.  She is averaging 3 bowel movements per day.  No melena or gross rectal bleeding is noted.    Patient has chronic lower back pain.  She has symptoms of right-sided sciatica so CAD with the pain.  She has been undergoing physical therapy which appears to be helping her back symptoms.  There is no bowel or bladder sphincter dysfunction.    Fasting blood sugar levels have ranged from  over the past week.  Jardiance was discontinued due to recurrent urinary tract infections and yeast infections.  Glipizide was ordered as a substitute.  Glipizide was ordered in November 2021. The patient is tolerating her blood pressure medication well without side effects.  She does not monitor blood pressures regularly.      Review of Systems   Constitutional: Positive for appetite change. Negative for chills, fever and unexpected weight change.   HENT: Negative for trouble swallowing.    Respiratory: Negative for cough, shortness of breath and wheezing.    Cardiovascular: Negative for chest pain, palpitations and leg swelling.   Gastrointestinal: Positive for abdominal pain, nausea and vomiting. Negative for abdominal distention, blood in stool and fecal incontinence.   Genitourinary: Negative for bladder incontinence and dysuria.   Musculoskeletal: Positive for back pain and leg pain.   Neurological: Negative  for dizziness and headaches.   Psychiatric/Behavioral: Positive for sleep disturbance.            Physical Exam  Vitals and nursing note reviewed.   Constitutional:       General: She is not in acute distress.     Appearance: She is well-developed and well-nourished.   HENT:      Head: Normocephalic and atraumatic.   Eyes:      General: No scleral icterus.     Extraocular Movements: EOM normal.      Conjunctiva/sclera: Conjunctivae normal.      Pupils: Pupils are equal, round, and reactive to light.   Neck:      Thyroid: No thyromegaly.      Vascular: No JVD.   Cardiovascular:      Rate and Rhythm: Normal rate and regular rhythm.      Pulses: Intact distal pulses.      Heart sounds: Normal heart sounds. No murmur heard.  No friction rub. No gallop.    Pulmonary:      Effort: Pulmonary effort is normal. No respiratory distress.      Breath sounds: Normal breath sounds. No wheezing or rales.   Abdominal:      General: Bowel sounds are normal. There is no distension.      Palpations: Abdomen is soft. There is no mass.      Tenderness: There is abdominal tenderness. There is no right CVA tenderness, left CVA tenderness, guarding or rebound.   Musculoskeletal:         General: No tenderness or edema. Normal range of motion.      Cervical back: Normal range of motion and neck supple.   Lymphadenopathy:      Cervical: No cervical adenopathy.   Skin:     General: Skin is warm and dry.      Coloration: Skin is not jaundiced.      Findings: No rash.      Comments: No foot lesions are present.   Neurological:      General: No focal deficit present.      Mental Status: She is alert and oriented to person, place, and time.      Cranial Nerves: No cranial nerve deficit.      Comments: Sensory exam is intact in both feet on monofilament  testing.   Psychiatric:         Mood and Affect: Mood and affect and mood normal.         Behavior: Behavior normal.         Thought Content: Thought content normal.         Protective Sensation  (w/ 10 gram monofilament):  Right: Intact  Left: Intact    Visual Inspection:  Bilateral bunion deformities are present with lateral deviation of the great toes.  Slight overriding of the 1st and 2nd toes bilaterally.    Pedal Pulses:   Right: Present  Left: Present    Posterior tibialis:   Right:Present  Left: Present         Admission on 12/16/2021, Discharged on 12/16/2021   Component Date Value Ref Range Status    POCT Glucose 12/16/2021 104  70 - 110 mg/dL Final       Assessment & Plan:      Aracelis was seen today for follow-up.  An abdominal ultrasound and EGD will be obtained.  GI consultation will also be obtained regarding the patient's recurrent nausea and vomiting.  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    Fasting blood tests will be obtained today.  Anti nausea medication will be renewed.    Diagnoses and all orders for this visit:    Non-intractable vomiting with nausea, unspecified vomiting type  -     Case Request Endoscopy: ESOPHAGOGASTRODUODENOSCOPY (EGD)  -     Ambulatory referral/consult to Gastroenterology; Future  -     US Abdomen Complete; Future  -     ondansetron (ZOFRAN-ODT) 4 MG TbDL; Take 1 tablet (4 mg total) by mouth every 6 (six) hours as needed (nausea).  -     Comprehensive Metabolic Panel; Future  -     CBC Auto Differential; Future  -     Urinalysis; Future  -     Hemoglobin A1C; Future  -     Urine culture; Future  -     Lipase; Future    Essential hypertension  -     Comprehensive Metabolic Panel; Future  -     CBC Auto Differential; Future  -     Urinalysis; Future  -     Hemoglobin A1C; Future  -     Urine culture; Future  -     Lipase; Future    Type 2 diabetes mellitus without complication, without long-term current use of insulin  -     blood sugar diagnostic (ONETOUCH VERIO TEST STRIPS) Strp; Use once daily to check blood sugar  -     Comprehensive Metabolic Panel; Future  -     CBC Auto Differential; Future  -     Urinalysis; Future  -      Hemoglobin A1C; Future  -     Urine culture; Future  -     Lipase; Future    Sciatica of right side  -     Comprehensive Metabolic Panel; Future  -     CBC Auto Differential; Future  -     Urinalysis; Future  -     Hemoglobin A1C; Future  -     Urine culture; Future  -     Lipase; Future    Chronic right-sided low back pain with right-sided sciatica  -     Comprehensive Metabolic Panel; Future  -     CBC Auto Differential; Future  -     Urinalysis; Future  -     Hemoglobin A1C; Future  -     Urine culture; Future  -     Lipase; Future    Pain of upper abdomen  -     Case Request Endoscopy: ESOPHAGOGASTRODUODENOSCOPY (EGD)  -     Ambulatory referral/consult to Gastroenterology; Future  -     US Abdomen Complete; Future  -     Comprehensive Metabolic Panel; Future  -     CBC Auto Differential; Future  -     Urinalysis; Future  -     Hemoglobin A1C; Future  -     Urine culture; Future  -     Lipase; Future         Follow up in about 6 weeks (around 3/29/2022).     Alen Damico MD

## 2022-02-16 ENCOUNTER — CLINICAL SUPPORT (OUTPATIENT)
Dept: REHABILITATION | Facility: HOSPITAL | Age: 67
End: 2022-02-16
Payer: MEDICARE

## 2022-02-16 DIAGNOSIS — M53.86 DECREASED ROM OF LUMBAR SPINE: ICD-10-CM

## 2022-02-16 DIAGNOSIS — M54.50 LUMBAR PAIN: ICD-10-CM

## 2022-02-16 PROCEDURE — 97110 THERAPEUTIC EXERCISES: CPT | Mod: PO

## 2022-02-16 NOTE — PROGRESS NOTES
Physical Therapy Daily Treatment Note     Name: Aracelis Eisenberg [facility] Number: [id number]    Therapy Diagnosis:   Encounter Diagnoses   Name Primary?    Decreased ROM of lumbar spine     Lumbar pain      Physician: Esteban Monaco MD    Visit Date: 2/16/2022  Physician Orders: Evaluate and treat  Medical Diagnosis: Lumbar radiculopathy [M54.16]    Evaluation Date: 1/18/2022  Authorization Period Expiration: 1/24/2022 - 12/1/2022  Plan of Care Certification Period: 3/18/2022  Visit #/Visits authorized: 6/20   PTA Visit #: 1/6    Time In: 11:45 PM   Time Out: 12:15 PM  Total Billable Time: 45 minutes    Precautions: Standard    Subjective     Pt reports: that she does not have any pain as of the moment but felt some soreness in the abdominal region.     She was compliant with home exercise program.    Response to previous treatment: sore but good    Functional change: The patient was able to turn from supine to side lying position with lesser pain.     Pain: 2/10    Location: right back and buttocks      Objective     Aracelis received therapeutic exercises to develop strength, endurance, ROM, flexibility, posture and core stabilization for 45 minutes including:  Hamstring, QL and Piriformis stretching 30 seconds hold x 5   POSTERIOR PELVIC TILT 10 seconds hold x 10   SLR with ankle dorsiflexion 10 x 3   LTR and DOUBLE KNEE TO CHEST  10 x 3   Bilateral leg lifts with knee bent 10 x 3   Bilateral leg lifts with knee extended 5 x 6      Aracelis received the following manual therapy techniques: Joint mobilizations were applied to the: lumbar for 00 minutes, including:  Unilateral and central posterior to anterior mobilization grade 1,2,3 and 4 oscillation.   Theragun 1900 to R lower back    Home Exercises Provided and Patient Education Provided     Education provided:   - The patient was instructed to perform stretching of both lower extremities every day.     Written Home Exercises Provided: yes.  Exercises were reviewed  and Aracelis was able to demonstrate them prior to the end of the session.  Aracelis demonstrated good  understanding of the education provided.     See EMR under Patient Instructions for exercises provided prior visit.    Assessment     Aracelis Ngo tolerated therapy well with slowness of movement.The patient was hesitated to perform more exercises due to acid reflux last week. The patient was in supine position with head elevated when she did the bed exercises. No pain was noted after the treatment and no stomachache during the entire session. Aracelis ngo stated she felt better and will continue to benefit from skilled therapy.     Aracelis Is progressing well towards her goals.     Pt prognosis is Good.     Pt will continue to benefit from skilled outpatient physical therapy to address the deficits listed in the problem list box on initial evaluation, provide pt/family education and to maximize pt's level of independence in the home and community environment.     Pt's spiritual, cultural and educational needs considered and pt agreeable to plan of care and goals.    Anticipated barriers to physical therapy: None       Goals:  Short Term Goals: 4 weeks        Goal   Status     1. Pt. to report decreased lumbar pain </ =  6/10 at worst to increase tolerance for upright unsupported sitting posture. Met 2/9/2022    2. Pt. to demonstrate proper posture requiring minimum verbal cues from PT for improved posture positioning  Progressing 2/16/2022      3. Increase L1 - L3 joint mobility to 3/6 to promote greater ease with squat to stand transitioning. Progressing 2/16/2022      4. Increase lumbar extension AROM ? 10 degrees to promote greater ease with self-care. Progressing 2/16/2022      5. Increase AROM lumbar rotation bilateral  ? 35 degrees to promote greater ease with driving. Progressing 2/16/2022      6. Pt to be independent with HEP to improve ROM and independence with ADL's Progressing 2/16/2022            Long Term Goals: 8 weeks      Goal   Status     1. Pt. to report decreased lumbar pain </ =  4/10 at worst to increase tolerance for upright unsupported sitting posture.     2. Pt. to demonstrate proper posture requiring minimum verbal cues from PT for improved posture positioning      3. Increase L3 - L5 joint mobility to 3/6 to promote greater ease with squat to stand transitioning.     4. Increase lumbar extension AROM ? 15 degrees to promote greater ease with self-care.     5. Increase AROM lumbar rotation bilateral  ? 40 degrees to promote greater ease with driving.     6. Pt. to demonstrate increased MMT for rectus abdominus  ?  4/5 to improve supine to sitting transfer.     7. Pt. to demonstrate increased MMT for Lumbar extensor paraspinals t ?  4/5 to improve tolerance for prolong standing and ambulation      8. Pt. to demonstrate increased MMT for Lumbar/thoracic rotators   ? 4/5 to improve tolerance for ADL and work activities.      9. Pt. to demonstrate increased MMT for Lumbar/thoracic side bending   ? 4/5 to improve household cleaning.      10. Pt to be independent with HEP to improve ROM and independence with ADL's           Plan   Plan of care Certification: 1/18/2022 to  3/18/2022.     Continue POC as outlined in eval as: Outpatient Physical Therapy to include the following interventions: Gait Training, Manual Therapy, Neuromuscular Re-ed, Patient Education, Therapeutic Activities and Therapeutic Exercise.      Lord Stanford Padilla, PT , DPT, MTC

## 2022-02-18 ENCOUNTER — CLINICAL SUPPORT (OUTPATIENT)
Dept: REHABILITATION | Facility: HOSPITAL | Age: 67
End: 2022-02-18
Payer: MEDICARE

## 2022-02-18 ENCOUNTER — PATIENT MESSAGE (OUTPATIENT)
Dept: NEUROSURGERY | Facility: CLINIC | Age: 67
End: 2022-02-18
Payer: MEDICARE

## 2022-02-18 DIAGNOSIS — M53.86 DECREASED ROM OF LUMBAR SPINE: ICD-10-CM

## 2022-02-18 DIAGNOSIS — M54.50 LUMBAR PAIN: ICD-10-CM

## 2022-02-18 PROCEDURE — 97110 THERAPEUTIC EXERCISES: CPT | Mod: PO

## 2022-02-18 NOTE — PROGRESS NOTES
"  Physical Therapy Daily Treatment Note     Name: Aracelis Eisenberg [facility] Number: [id number]    Therapy Diagnosis:   Encounter Diagnoses   Name Primary?    Decreased ROM of lumbar spine     Lumbar pain      Physician: Esteban Monaco MD    Visit Date: 2/18/2022  Physician Orders: Evaluate and treat  Medical Diagnosis: Lumbar radiculopathy [M54.16]    Evaluation Date: 1/18/2022  Authorization Period Expiration: 1/24/2022 - 12/1/2022  Plan of Care Certification Period: 3/18/2022  Visit #/Visits authorized: 6/20   PTA Visit #: 0/6    Time In: 09:15 AM   Time Out: 10:00 AM  Total Billable Time: 45 minutes    Precautions: Standard    Subjective     Pt reports: pain continues down the right lower extremity  However it has decreased     She was compliant with home exercise program.    Response to previous treatment: sore but good    Functional change: The patient was able to turn from supine to side lying position with lesser pain. (continued)    Pain: 2/10    Location: right back and buttocks      Objective     Aracelis received therapeutic exercises to develop strength, endurance, ROM, flexibility, posture and core stabilization for 45 minutes including:  Hamstring, QL and Piriformis stretching 30 seconds hold x 5   POSTERIOR PELVIC TILT 10 seconds hold x 10   SLR with ankle dorsiflexion 10 x 3   LTR and DOUBLE KNEE TO CHEST  10 x 3   Bilateral leg lifts with knee bent 10 x 3   Bilateral leg lifts with knee extended 5 x 6  GASTROC STRETCH standing L3 - 3X30"  Long sitting hamsting stretch - 3X30"  Sciatic nerve stretch - 30 repititions  With towel  [place] stretch - 3X30 with and without strap  Hip abduction with BLUE THERABAND  - 30 repititions      Aracelis received the following manual therapy techniques: Joint mobilizations were applied to the: lumbar for 05 minutes, including:  Unilateral and central posterior to anterior mobilization grade 1,2,3 and 4 oscillation. Not Performed    Theragun 1900 to R lower back Not Performed "    Right hip distraction with posterior hip mobilization - done     Home Exercises Provided and Patient Education Provided     Education provided:   - The patient was instructed to perform stretching of both lower extremities every day.     Written Home Exercises Provided: yes.  Exercises were reviewed and Aracelis was able to demonstrate them prior to the end of the session.  Aracelis demonstrated good  understanding of the education provided.     See EMR under Patient Instructions for exercises provided prior visit.    Assessment     Patient currently presents to therapy with reports of lumbar pain and radiation down the right lower extremity. Patient was able to tolerate an increase in therapeutic exercise in comparison to last treatment session in supine secondary to acid reflux. Pt displays weakness of the piriformis with pain and decreased tolerance for movement secondary to pain. Patient will continue to benefit from skilled therapy to address current deficits.     Patient currently presents to therapy with reports     Aracelis Is progressing well towards her goals.     Pt prognosis is Good.     Pt will continue to benefit from skilled outpatient physical therapy to address the deficits listed in the problem list box on initial evaluation, provide pt/family education and to maximize pt's level of independence in the home and community environment.     Pt's spiritual, cultural and educational needs considered and pt agreeable to plan of care and goals.    Anticipated barriers to physical therapy: None       Goals:  Short Term Goals: 4 weeks        Goal   Status     1. Pt. to report decreased lumbar pain </ =  6/10 at worst to increase tolerance for upright unsupported sitting posture. Met 2/9/2022    2. Pt. to demonstrate proper posture requiring minimum verbal cues from PT for improved posture positioning  Progressing 2/18/2022      3. Increase L1 - L3 joint mobility to 3/6 to promote greater ease with squat to stand  transitioning. Progressing 2/18/2022      4. Increase lumbar extension AROM ? 10 degrees to promote greater ease with self-care. Progressing 2/18/2022      5. Increase AROM lumbar rotation bilateral  ? 35 degrees to promote greater ease with driving. Progressing 2/18/2022      6. Pt to be independent with HEP to improve ROM and independence with ADL's Progressing 2/18/2022            Long Term Goals: 8 weeks     Goal   Status     1. Pt. to report decreased lumbar pain </ =  4/10 at worst to increase tolerance for upright unsupported sitting posture.     2. Pt. to demonstrate proper posture requiring minimum verbal cues from PT for improved posture positioning      3. Increase L3 - L5 joint mobility to 3/6 to promote greater ease with squat to stand transitioning.     4. Increase lumbar extension AROM ? 15 degrees to promote greater ease with self-care.     5. Increase AROM lumbar rotation bilateral  ? 40 degrees to promote greater ease with driving.     6. Pt. to demonstrate increased MMT for rectus abdominus  ?  4/5 to improve supine to sitting transfer.     7. Pt. to demonstrate increased MMT for Lumbar extensor paraspinals t ?  4/5 to improve tolerance for prolong standing and ambulation      8. Pt. to demonstrate increased MMT for Lumbar/thoracic rotators   ? 4/5 to improve tolerance for ADL and work activities.      9. Pt. to demonstrate increased MMT for Lumbar/thoracic side bending   ? 4/5 to improve household cleaning.      10. Pt to be independent with HEP to improve ROM and independence with ADL's           Plan   Plan of care Certification: 1/18/2022 to  3/18/2022.     Continue POC as outlined in eval as: Outpatient Physical Therapy to include the following interventions: Gait Training, Manual Therapy, Neuromuscular Re-ed, Patient Education, Therapeutic Activities and Therapeutic Exercise.      Aayush Nava, PT , DPT, MTC

## 2022-02-21 ENCOUNTER — PATIENT MESSAGE (OUTPATIENT)
Dept: CARDIOLOGY | Facility: CLINIC | Age: 67
End: 2022-02-21
Payer: MEDICARE

## 2022-02-22 NOTE — PROGRESS NOTES
"Subjective:     Aracelis Martinez    Chief Complaint   Patient presents with    Follow-up       HPI    Aracelsi is a 66 y.o. female coming in today for bilateral knee pain. She completed the Euflexxa series in her B knees 10/21/21. Pt reports her knees are "a little bit wobbly" but her pain is improved. Notes swelling in her knees that is persistent. Feels 75% improved since the injections. She is walking 1 mile per day and stationary bike 4 times per day for 10 minutes at a time. The pain is better with rest and worse with activity, climbing stairs. Pt. describes the pain as a 0/10 achy pain that does not radiate. There has not been any new a fall/injury/ or traumas since last visit. She is taking meloxicam once per day. Recently received medial  for left knee and feels this helps- wears it when she's out and about.  Pt. denies any new musculoskeletal complaints at this time.     Office note from 2018 reviewed    Joint instability? Yes, left   Mechanical locking/clicking? Yes, bilateral   Affecting ADL's? Yes  Affecting sleep? Yes     Procedures reviewed:   2018 B knee CSI- <8weeks relief  18- Euflexxa L knee  10/21/21- Euflexxa B knee- 75% improvemend    Review of Systems   Constitutional: Negative for chills and fever.   Musculoskeletal: Positive for joint pain. Negative for back pain, falls, myalgias and neck pain.   Neurological: Negative for dizziness, tingling, focal weakness, weakness and headaches.       PAST MEDICAL HISTORY:   Past Medical History:   Diagnosis Date    Arthritis     Chronic back pain     Diabetes mellitus 2014    Diverticulosis     Hypertension     Neuroendocrine tumor of pancreas 2015    Pancreatic cancer 2015    Renal cyst     left    Thyroid disease      PAST SURGICAL HISTORY:   Past Surgical History:   Procedure Laterality Date     SECTION      COLONOSCOPY N/A 3/13/2019    Procedure: COLONOSCOPY;  Surgeon: Cem Lamar MD;  Location: [facility] (4TH " FLR);  Service: Endoscopy;  Laterality: N/A;  previous order cx    EPIDURAL STEROID INJECTION INTO LUMBAR SPINE N/A 6/15/2021    Procedure: Injection-steroid-epidural-lumbar-L5-S1;  Surgeon: Saranya Cornelius Jr., MD;  Location: [facility] PAIN MGT;  Service: Pain Management;  Laterality: N/A;    ESOPHAGOGASTRODUODENOSCOPY N/A 11/19/2019    Procedure: ESOPHAGOGASTRODUODENOSCOPY (EGD);  Surgeon: Jonathan Oneill MD;  Location: [facility] (4TH FLR);  Service: Endoscopy;  Laterality: N/A;    ESOPHAGOGASTRODUODENOSCOPY N/A 6/2/2020    Procedure: EGD (ESOPHAGOGASTRODUODENOSCOPY);  Surgeon: Shady Costa MD;  Location: [facility] (2ND FLR);  Service: Endoscopy;  Laterality: N/A;  Please schedule patient as soon as possible in the 2nd floor history of a Whipple surgery for neuroendocrine pancreatic tumor many years ago with now constant belching and regurgitation.  May need airway protection.  Rule out celiac sprue rule out lact    EXCISION OF GANGLION CYST OF HAND Right 10/22/2018    Procedure: EXCISION, GANGLION CYST, HAND- RIGHT, LONG AND INDEX FINGER;  Surgeon: Sowmya Schmidt MD;  Location: [facility];  Service: Orthopedics;  Laterality: Right;  Stretcher; Supine; Hand Pan 1 & [place] 2; Dr. Loja's Rasp    HYSTERECTOMY  2015    ISMAEL    INJECTION OF ANESTHETIC AGENT AROUND MEDIAL BRANCH NERVES INNERVATING LUMBAR FACET JOINT Bilateral 9/28/2021    Procedure: Block-nerve-medial branch-lumbar-bilateral L4-5 and L5-S1;  Surgeon: Saranya Cornelius Jr., MD;  Location: [facility] PAIN MGT;  Service: Pain Management;  Laterality: Bilateral;    INJECTION OF ANESTHETIC AGENT AROUND MEDIAL BRANCH NERVES INNERVATING LUMBAR FACET JOINT Bilateral 10/12/2021    Procedure: Bilateral Lumbar Medial Branch Block L4-5 L5-S1- (No Sedation);  Surgeon: Saranya Cornelius Jr., MD;  Location: [facility] PAIN MGT;  Service: Pain Management;  Laterality: Bilateral;    KNEE ARTHROSCOPY  4/18/2014    LATS/left    OOPHORECTOMY      PANCREAS SURGERY  2015     MD Ritchie    RADIOFREQUENCY THERMOCOAGULATION Bilateral 11/9/2021    Procedure: Radiofrequency Themocoagulation of medial branches: L4-5 and L5-S1;  Surgeon: Saranya Cornelius Jr., MD;  Location: [facility] PAIN MGT;  Service: Pain Management;  Laterality: Bilateral;  Patient is diabetic.     RADIOFREQUENCY THERMOCOAGULATION Left 12/16/2021    Procedure: RADIOFREQUENCY THERMAL COAGULATION LEFT L4-5, L5-S1 (IV Sedation);  Surgeon: Saranya Cornelius Jr., MD;  Location: [facility] PAIN MGT;  Service: Pain Management;  Laterality: Left;  diabetic    TONSILLECTOMY      TRANSFORAMINAL EPIDURAL INJECTION OF STEROID Right 8/17/2021    Procedure: Injection,steroid,epidural,transforaminal approach; Levels: L5-S1;  Surgeon: Saranya Cornelius Jr., MD;  Location: [facility] PAIN MGT;  Service: Pain Management;  Laterality: Right;  No pacemaker. Patient is diabetic.    TRANSFORAMINAL EPIDURAL INJECTION OF STEROID Right 8/24/2021    Procedure: Injection,steroid,epidural,transforaminal approach; Levels: L5-S1;  Surgeon: Saranya Cornelius Jr., MD;  Location: [facility] PAIN MGT;  Service: Pain Management;  Laterality: Right;  No pacemaker. Patient is diabetic.        MEDICATIONS:   Current Outpatient Medications:     amLODIPine (NORVASC) 10 MG tablet, 1 tablet by mouth every day., Disp: 30 tablet, Rfl: 10    blood sugar diagnostic (ONETOUCH VERIO TEST STRIPS) Strp, Use once daily to check blood sugar, Disp: 100 strip, Rfl: 3    blood-glucose meter kit, To check BG 1 time daily, to use with insurance preferred meter, Disp: 1 each, Rfl: 0    glipiZIDE (GLUCOTROL) 5 MG TR24, Take 1 tablet (5 mg total) by mouth daily with breakfast. For diabetes control., Disp: 30 tablet, Rfl: 6    hydrOXYzine pamoate (VISTARIL) 25 MG Cap, TAKE 1 CAPSULE BY MOUTH TWICE DAILY AS NEEDED FOR ANXIETY, Disp: 60 capsule, Rfl: 3    lancets Misc, To check BG 1 time daily, to use with insurance preferred meter, Disp: 100 each, Rfl: 3    levothyroxine (SYNTHROID) 50 MCG  "tablet, levothyroxine 50 mcg tablet, Disp: , Rfl:     losartan (COZAAR) 50 MG tablet, TAKE 1 TABLET(50 MG) BY MOUTH EVERY DAY, Disp: 90 tablet, Rfl: 3    meloxicam (MOBIC) 15 MG tablet, TAKE 1 TABLET BY MOUTH EVERY DAY FOR ANKLE PAIN AND SWELLING, Disp: 60 tablet, Rfl: 3    metFORMIN (GLUCOPHAGE) 500 MG tablet, TAKE 1 TABLET BY MOUTH EVERY MORNING, AND 2 TABLETS EVERY EVENING FOR DIABETES, Disp: 270 tablet, Rfl: 3    metoclopramide HCl (REGLAN) 10 MG tablet, TAKE 1 TABLET(10 MG) BY MOUTH THREE TIMES DAILY BEFORE MEALS, Disp: 90 tablet, Rfl: 5    ondansetron (ZOFRAN-ODT) 4 MG TbDL, Take 1 tablet (4 mg total) by mouth every 6 (six) hours as needed (nausea)., Disp: 30 tablet, Rfl: 2    pantoprazole (PROTONIX) 40 MG tablet, One tablet daily, Disp: 90 tablet, Rfl: 3    temazepam (RESTORIL) 30 mg capsule, Take 1 capsule (30 mg total) by mouth nightly as needed for Insomnia (insomnia)., Disp: 30 capsule, Rfl: 2    traMADoL (ULTRAM) 50 mg tablet, Take 1 tablet (50 mg total) by mouth every 8 (eight) hours as needed for Pain., Disp: 90 tablet, Rfl: 1  ALLERGIES:   Review of patient's allergies indicates:   Allergen Reactions    Erythromycin base      Objective:     VITAL SIGNS: BP (!) 140/76   Ht 5' 5" (1.651 m)   Wt 99.3 kg (219 lb)   LMP  (LMP Unknown)   BMI 36.44 kg/m²    General    Vitals reviewed.  Constitutional: She is oriented to person, place, and time. She appears well-developed and well-nourished.   Neurological: She is alert and oriented to person, place, and time.   Psychiatric: She has a normal mood and affect. Her behavior is normal.             MUSCULOSKELETAL EXAM    BILATERAL KNEE EXAMINATION     Observation:  No edema, erythema, effusion or ecchymosis noted.  No muscle atrophy of the thighs and calves noted.  No obvious bony deformities noted.   No Genu valgus/varum noted.  No recurvatum noted.    No tibial internal/external torsion.    Posture:  Posterior pelvis tilt with loss of lumbar " lordosis  Gait: Left antalgic    Tenderness:  Patella - none    Lateral joint line - + mild tenderness bilaterally  Quad tendon - none   Medial joint line - + mild tenderness bilaterally  Patellar tendon - none   Medial plica - none  Tibial tubercle - none   Lateral plica - none  Pes anserine - none   MCL prox - none  Distal ITB - none   MCL distal - none  MFC - none    LCL prox - none  LFC - none    LCL distal - none  Tibia - none    Fibula - none    No obvious bursae, plicae, popliteal cysts, or tendon derangement palpated.          ROM (* = with pain):   Active extension to 0° on left without hyperextension, lag, crepitus, or patellar J sign.   Active extension to 0° on right without hyperextension, lag, crepitus, or patellar J sign.  Active flexion to 135° on left and 135° on right    Strength(* = with pain):  Knee Flexion - 5/5 on left and 5/5 on right  Knee Extension - 5/5 on left and 5/5 on right  Hip Flexion - 5/5 on left and 5/5 on right  Hip Extension - 5/5 on left and 5/5 on right  Ankle dorsiflexion - 5/5 on left and 5/5 on right  Ankle Plantarflexion - 5/5 on left and 5/5 on right    Patellofemoral Exam:   Patellar ballottement - negative  Bulge sign - mildly positive on left  Patellar grind - negative    No patellar laxity with medial and lateral translation   No apprehension with medial and lateral patellar translation.     Meniscus Testing:     No pain with terminal extension and flexion at joint lines.  Olgas test - negative   Thesaly test - negative  Bounce home test - negative    Ligament Testing:  Lachman's test - negative  No laxity with anterior drawer.  No laxity with posterior drawer.    No posterior sag sign.   No laxity with varus testing at 0 and 30 degrees.  No laxity with valgus testing at 0 and 30 degrees.    IT band testing:  Leonards test - negative   Noble Compression test - negative    Neurovascular Examination:   Sensation intact to light touch in the obturator,  lateral/intermediate/medial/posterior femoral cutaneous, saphenous, and common peroneal nerves bilaterally.  Motor Function:    Fully intact motor function at hip, knee, foot and ankle.  Negative supine straight leg raise bilaterally.    Pulses intact at the DP and PT arteries bilaterally.    Capillary refill intact <2 seconds in all toes bilaterally.    Assessment:      Encounter Diagnoses   Name Primary?    Bilateral primary osteoarthritis of knee Yes    Chronic pain of both knees           Plan:   1. Chronic bilateral  knee pain secondary to DJD changes as noted on x-ray with significant relief from previous bilateral Euflexxa injection series completed on 10/21/21.  - Discussed conservative therapy of OA with  brace wear, HEP, injection therapy (repeat viscosupplementation on or after 4/22/22), Ice up to 20 minutes at a time, and Mobic 15 mg po qday for breakthrough pain vs. Referral to orthopedic surgery for discussion on TKA. Pt. Would like to continue with conservative treatment at this time with repeat bilateral Euflexxa injection series.   - Continue bilateral seated quadriceps strengthening exercise: Straight leg raises with hip neural, hip externally rotated, and then hip internally rotated. 10-15 reps in each plane, twice daily.  - Continue left  Medial  knee brace wear.   -  X-ray images of bilateral knee taken 9/23/21 (AP bilateral standing, PA bilateral standing in flexion, bilateral merchants, and  bilateral lateral views) showed Kellgren-Bryn grade 3 OA changes bilaterally, slightly progressed from previous imaging on 10/18/2018.    2.  Follow-up in 2 months for repeat bilateral US guided Euflexxa injection series    3. Patient agreeable to today's plan and all questions were answered    This note is dictated using the M*Modal Fluency Direct word recognition program. There are word recognition mistakes that are occasionally missed on review.

## 2022-02-23 ENCOUNTER — OFFICE VISIT (OUTPATIENT)
Dept: SPORTS MEDICINE | Facility: CLINIC | Age: 67
End: 2022-02-23
Payer: MEDICARE

## 2022-02-23 VITALS
WEIGHT: 219 LBS | SYSTOLIC BLOOD PRESSURE: 140 MMHG | HEIGHT: 65 IN | DIASTOLIC BLOOD PRESSURE: 76 MMHG | BODY MASS INDEX: 36.49 KG/M2

## 2022-02-23 DIAGNOSIS — M25.562 CHRONIC PAIN OF BOTH KNEES: ICD-10-CM

## 2022-02-23 DIAGNOSIS — M25.561 CHRONIC PAIN OF BOTH KNEES: ICD-10-CM

## 2022-02-23 DIAGNOSIS — G89.29 CHRONIC PAIN OF BOTH KNEES: ICD-10-CM

## 2022-02-23 DIAGNOSIS — M17.0 BILATERAL PRIMARY OSTEOARTHRITIS OF KNEE: Primary | ICD-10-CM

## 2022-02-23 PROCEDURE — 99214 OFFICE O/P EST MOD 30 MIN: CPT | Mod: S$PBB,,, | Performed by: NEUROMUSCULOSKELETAL MEDICINE & OMM

## 2022-02-23 PROCEDURE — 99214 PR OFFICE/OUTPT VISIT, EST, LEVL IV, 30-39 MIN: ICD-10-PCS | Mod: S$PBB,,, | Performed by: NEUROMUSCULOSKELETAL MEDICINE & OMM

## 2022-02-23 PROCEDURE — 99213 OFFICE O/P EST LOW 20 MIN: CPT | Mod: PBBFAC,PO | Performed by: NEUROMUSCULOSKELETAL MEDICINE & OMM

## 2022-02-23 PROCEDURE — 99999 PR PBB SHADOW E&M-EST. PATIENT-LVL III: ICD-10-PCS | Mod: PBBFAC,,, | Performed by: NEUROMUSCULOSKELETAL MEDICINE & OMM

## 2022-02-23 PROCEDURE — 99999 PR PBB SHADOW E&M-EST. PATIENT-LVL III: CPT | Mod: PBBFAC,,, | Performed by: NEUROMUSCULOSKELETAL MEDICINE & OMM

## 2022-02-23 RX ORDER — MELOXICAM 15 MG/1
TABLET ORAL
Qty: 60 TABLET | Refills: 3 | Status: SHIPPED | OUTPATIENT
Start: 2022-02-23 | End: 2022-10-17

## 2022-02-24 ENCOUNTER — HOSPITAL ENCOUNTER (OUTPATIENT)
Dept: RADIOLOGY | Facility: HOSPITAL | Age: 67
Discharge: HOME | End: 2022-02-24
Attending: INTERNAL MEDICINE
Payer: MEDICARE

## 2022-02-24 DIAGNOSIS — R10.10 PAIN OF UPPER ABDOMEN: ICD-10-CM

## 2022-02-24 DIAGNOSIS — R11.2 NON-INTRACTABLE VOMITING WITH NAUSEA, UNSPECIFIED VOMITING TYPE: ICD-10-CM

## 2022-02-24 PROCEDURE — 76700 US EXAM ABDOM COMPLETE: CPT | Mod: TC

## 2022-02-24 PROCEDURE — 76700 US EXAM ABDOM COMPLETE: CPT | Mod: 26,,, | Performed by: STUDENT IN AN ORGANIZED HEALTH CARE EDUCATION/TRAINING PROGRAM

## 2022-02-24 PROCEDURE — 76700 US ABDOMEN COMPLETE: ICD-10-PCS | Mod: 26,,, | Performed by: STUDENT IN AN ORGANIZED HEALTH CARE EDUCATION/TRAINING PROGRAM

## 2022-02-28 ENCOUNTER — PATIENT MESSAGE (OUTPATIENT)
Dept: INTERNAL MEDICINE | Facility: CLINIC | Age: 67
End: 2022-02-28
Payer: MEDICARE

## 2022-02-28 DIAGNOSIS — T23.039A BURN OF FINGERS: Primary | ICD-10-CM

## 2022-02-28 NOTE — TELEPHONE ENCOUNTER
Pascual.  See telephone call msg under  ursula.  Need referral in that encounter.  I will cancel this referral

## 2022-03-02 ENCOUNTER — CLINICAL SUPPORT (OUTPATIENT)
Dept: REHABILITATION | Facility: HOSPITAL | Age: 67
End: 2022-03-02
Payer: MEDICARE

## 2022-03-02 DIAGNOSIS — M53.86 DECREASED ROM OF LUMBAR SPINE: Primary | ICD-10-CM

## 2022-03-02 DIAGNOSIS — M54.50 LUMBAR PAIN: ICD-10-CM

## 2022-03-02 PROCEDURE — 97110 THERAPEUTIC EXERCISES: CPT | Mod: PO,CQ

## 2022-03-02 NOTE — PROGRESS NOTES
"  Physical Therapy Daily Treatment Note     [id number]    Therapy Diagnosis:   Encounter Diagnoses   Name Primary?    Decreased ROM of lumbar spine Yes    Lumbar pain      Physician: Esteban Monaco MD    Visit Date: 3/2/2022  Physician Orders: Evaluate and treat  Medical Diagnosis: Lumbar radiculopathy [M54.16]    Evaluation Date: 1/18/2022  Authorization Period Expiration: 1/24/2022 - 12/1/2022  Plan of Care Certification Period: 3/18/2022  Visit #/Visits authorized: 7/20   PTA Visit #: 1/6    Time In: 12:45 PM   Time Out: 1:30 PM  Total Billable Time: 45 minutes    Precautions: Standard    Subjective     Pt reports: Back is feeling better, she bought a bike and exercising at home has been helping a lot    She was compliant with home exercise program.    Response to previous treatment: "felt good"    Functional change: Pt is riding her bike at home    Pain: 2/10    Location: right back and buttocks      Objective     Aracelis received therapeutic exercises to develop strength, endurance, ROM, flexibility, posture and core stabilization for 45 minutes including:  Hamstring, QL and Piriformis stretching 30 seconds hold x 5   POSTERIOR PELVIC TILT 10 seconds hold x 10   SLR with ankle dorsiflexion 10 x 3   LTR and DOUBLE KNEE TO CHEST  10 x 3   Bilateral leg lifts with knee bent 10 x 3   Bilateral leg lifts with knee extended 5 x 6  GASTROC STRETCH standing L3 - 3X30"  Long sitting hamsting stretch - 3X30"  Sciatic nerve stretch - 30 repititions  With towel  Torrey stretch - 4X30 with and without strap  Hip abduction with BLUE THERABAND  - 30 repititions    Bridges 10 x 3  Bike lvl 10'    Aracelis received the following manual therapy techniques: Joint mobilizations were applied to the: lumbar for 00 minutes, including:  Unilateral and central posterior to anterior mobilization grade 1,2,3 and 4 oscillation. Not Performed    Theragun 1900 to R lower back Not Performed    Right hip " distraction with posterior hip mobilization - done     Home Exercises Provided and Patient Education Provided     Education provided:   - The patient was instructed to perform stretching of both lower extremities every day.     Written Home Exercises Provided: yes.  Exercises were reviewed and Aracelis was able to demonstrate them prior to the end of the session.  Aracelis demonstrated good  understanding of the education provided.     See EMR under Patient Instructions for exercises provided prior visit.    Assessment     Pt tolerated therapy well. Bridges added to strengthen muscles in lower back and glute max. Pt tolerated these well with no adverse effects. R quad was very tight, number of glenys test stretches increased to 4. Bike added to strengthen lower extremities, pt had no adverse effects. Aracelis will continue to benefit form skilled therapy.     Patient currently presents to therapy with reports     Aracelis Is progressing well towards her goals.     Pt prognosis is Good.     Pt will continue to benefit from skilled outpatient physical therapy to address the deficits listed in the problem list box on initial evaluation, provide pt/family education and to maximize pt's level of independence in the home and community environment.     Pt's spiritual, cultural and educational needs considered and pt agreeable to plan of care and goals.    Anticipated barriers to physical therapy: None       Goals:  Short Term Goals: 4 weeks        Goal   Status     1. Pt. to report decreased lumbar pain </ =  6/10 at worst to increase tolerance for upright unsupported sitting posture. Met 2/9/2022    2. Pt. to demonstrate proper posture requiring minimum verbal cues from PT for improved posture positioning  Progressing 3/2/2022      3. Increase L1 - L3 joint mobility to 3/6 to promote greater ease with squat to stand transitioning. Progressing 3/2/2022      4. Increase lumbar extension AROM ? 10 degrees to promote greater ease with self-care.  Progressing 3/2/2022      5. Increase AROM lumbar rotation bilateral  ? 35 degrees to promote greater ease with driving. Progressing 3/2/2022      6. Pt to be independent with HEP to improve ROM and independence with ADL's Progressing 3/2/2022            Long Term Goals: 8 weeks     Goal   Status     1. Pt. to report decreased lumbar pain </ =  4/10 at worst to increase tolerance for upright unsupported sitting posture.  Met 3/2/22   2. Pt. to demonstrate proper posture requiring minimum verbal cues from PT for improved posture positioning      3. Increase L3 - L5 joint mobility to 3/6 to promote greater ease with squat to stand transitioning.     4. Increase lumbar extension AROM ? 15 degrees to promote greater ease with self-care.     5. Increase AROM lumbar rotation bilateral  ? 40 degrees to promote greater ease with driving.     6. Pt. to demonstrate increased MMT for rectus abdominus  ?  4/5 to improve supine to sitting transfer.     7. Pt. to demonstrate increased MMT for Lumbar extensor paraspinals t ?  4/5 to improve tolerance for prolong standing and ambulation      8. Pt. to demonstrate increased MMT for Lumbar/thoracic rotators   ? 4/5 to improve tolerance for ADL and work activities.      9. Pt. to demonstrate increased MMT for Lumbar/thoracic side bending   ? 4/5 to improve household cleaning.      10. Pt to be independent with HEP to improve ROM and independence with ADL's           Plan   Plan of care Certification: 1/18/2022 to  3/18/2022.     Continue POC as outlined in eval as: Outpatient Physical Therapy to include the following interventions: Gait Training, Manual Therapy, Neuromuscular Re-ed, Patient Education, Therapeutic Activities and Therapeutic Exercise.      Jordyn Duque PTA ,

## 2022-03-03 ENCOUNTER — DOCUMENTATION ONLY (OUTPATIENT)
Dept: REHABILITATION | Facility: HOSPITAL | Age: 67
End: 2022-03-03
Payer: MEDICARE

## 2022-03-03 ENCOUNTER — PATIENT MESSAGE (OUTPATIENT)
Dept: INTERNAL MEDICINE | Facility: CLINIC | Age: 67
End: 2022-03-03
Payer: MEDICARE

## 2022-03-03 NOTE — PROGRESS NOTES
30 day PT-PTA face-face discussion with Lord Stanford PETERST, re: Name: Aracelis Eisenberg [facility] Number: [id number] patient status, POC, and plan for progression done.

## 2022-03-04 ENCOUNTER — CLINICAL SUPPORT (OUTPATIENT)
Dept: REHABILITATION | Facility: HOSPITAL | Age: 67
End: 2022-03-04
Payer: MEDICARE

## 2022-03-04 ENCOUNTER — TELEPHONE (OUTPATIENT)
Dept: INTERNAL MEDICINE | Facility: CLINIC | Age: 67
End: 2022-03-04
Payer: MEDICARE

## 2022-03-04 DIAGNOSIS — M54.50 LUMBAR PAIN: ICD-10-CM

## 2022-03-04 DIAGNOSIS — M53.86 DECREASED ROM OF LUMBAR SPINE: Primary | ICD-10-CM

## 2022-03-04 PROCEDURE — 97110 THERAPEUTIC EXERCISES: CPT | Mod: PO

## 2022-03-04 NOTE — PROGRESS NOTES
"  Physical Therapy Progress Note     Name: Aracelis Martinez  [facility] Number: [id number]    Therapy Diagnosis:   Encounter Diagnoses   Name Primary?    Decreased ROM of lumbar spine Yes    Lumbar pain      Physician: Esteban Monaco MD    Visit Date: 3/4/2022  Physician Orders: Evaluate and treat  Medical Diagnosis: Lumbar radiculopathy [M54.16]    Evaluation Date: 1/18/2022  Authorization Period Expiration: 1/24/2022 - 12/1/2022  Plan of Care Certification Period: 3/18/2022  Visit #/Visits authorized: 9/20   PTA Visit #: 1/6    Time In: 7:45 AM   Time Out: 8:30 AM  Total Billable Time: 45 minutes    Precautions: Standard    Subjective     Pt reports: that her back is getting better and no pain as of the moment.     She was compliant with home exercise program.    Response to previous treatment: "felt good"    Functional change: The patient has no difficulty getting in and out of the car.    Pain: 0/10    Location: right back and buttocks      Objective     Lumbar Range of Motion:    Active  Passive   Rotation to the left  0-50   0-50        Rotation to the right  0-50   0-50        Left Side Bending 0-30 0-30        Right Side Bending 0-30 0-30            Lower Extremity Strength  Right LE  Left LE    Quadriceps: 4+/5 Quadriceps: 4+/5   Hamstrings: 4+/5 Hamstrings: 4+/5   Iliospoas: 4+/5 Iliospoas: 4+/5   Hip extension:  4+/5 Hip extension: 4+/5   PGM: 4+/5 PGM: 4+/5   Hip ER:  4+/5 Hip ER: 4+/5   Hip IR: 4+/5 Hip IR: 4+/5   Ankle dorsiflexion: 4+/5 Ankle dorsiflexion: 4+/5   Ankle plantarflexion: 4+/5 Ankle plantarflexion: 4+/5         Aracelis received therapeutic exercises to develop strength, endurance, ROM, flexibility, posture and core stabilization for 45 minutes including:  Hamstring, QL and Piriformis stretching 30 seconds hold x 5   POSTERIOR PELVIC TILT 10 seconds hold x 10   SLR with ankle dorsiflexion 10 x 3   LTR and DOUBLE KNEE TO CHEST  10 x 3   Bilateral leg lifts with knee bent 10 x 3   Bilateral leg " "lifts with knee extended 5 x 6  GASTROC STRETCH standing L3 - 3X30"  Long sitting hamsting stretch - 3X30"  Sciatic nerve stretch - 30 repititions  With towel  Torrey stretch - 4X30 with and without strap  Hip abduction with BLUE THERABAND  - 30 repititions    Bridges 10 x 3  Bike lvl 10'    Aracelis received the following manual therapy techniques: Joint mobilizations were applied to the: lumbar for 00 minutes, including:  Unilateral and central posterior to anterior mobilization grade 1,2,3 and 4 oscillation. Not Performed      Home Exercises Provided and Patient Education Provided     Education provided:   - The patient was instructed to perform stretching of both lower extremities every day.     Written Home Exercises Provided: yes.  Exercises were reviewed and Aracelis was able to demonstrate them prior to the end of the session.  Aracelis demonstrated good  understanding of the education provided.     See EMR under Patient Instructions for exercises provided prior visit.    Assessment     The patient was able to achieve all of the short term goals given to her. The patient has normal lumbar ROM and 4+/5 muscle strength of the lower extremity. The patient has been exercising regularly but needs more PT sessions in order to achieve prior level of functions ansd reduce the risk of cauda Equina Syndrome.    Aracelis Is progressing well towards her goals.     Pt prognosis is Good.     Pt will continue to benefit from skilled outpatient physical therapy to address the deficits listed in the problem list box on initial evaluation, provide pt/family education and to maximize pt's level of independence in the home and community environment.     Pt's spiritual, cultural and educational needs considered and pt agreeable to plan of care and goals.    Anticipated barriers to physical therapy: None       Goals:  Short Term Goals: 4 weeks        Goal   Status     1. Pt. to report decreased lumbar pain </ =  6/10 at worst to increase tolerance " for upright unsupported sitting posture. Met 2/9/2022    2. Pt. to demonstrate proper posture requiring minimum verbal cues from PT for improved posture positioning  Met 3/4/2022    3. Increase L1 - L3 joint mobility to 3/6 to promote greater ease with squat to stand transitioning. Met 3/4/2022    4. Increase lumbar extension AROM ? 10 degrees to promote greater ease with self-care. Met 3/4/2022    5. Increase AROM lumbar rotation bilateral  ? 35 degrees to promote greater ease with driving. Met 3/4/2022    6. Pt to be independent with HEP to improve ROM and independence with ADL's Met 3/4/2022          Long Term Goals: 8 weeks     Goal   Status     1. Pt. to report decreased lumbar pain </ =  4/10 at worst to increase tolerance for upright unsupported sitting posture.  Met 3/2/22   2. Pt. to demonstrate proper posture requiring minimum verbal cues from PT for improved posture positioning      3. Increase L3 - L5 joint mobility to 3/6 to promote greater ease with squat to stand transitioning.     4. Increase lumbar extension AROM ? 15 degrees to promote greater ease with self-care.     5. Increase AROM lumbar rotation bilateral  ? 40 degrees to promote greater ease with driving.     6. Pt. to demonstrate increased MMT for rectus abdominus  ?  5/5 to improve supine to sitting transfer.     7. Pt. to demonstrate increased MMT for Lumbar extensor paraspinals t ?  5/5 to improve tolerance for prolong standing and ambulation      8. Pt. to demonstrate increased MMT for Lumbar/thoracic rotators   ? 5/5 to improve tolerance for ADL and work activities.      9. Pt. to demonstrate increased MMT for Lumbar/thoracic side bending   ? 5/5 to improve household cleaning.      10. Pt to be independent with HEP to improve ROM and independence with ADL's           Plan   Plan of care Certification: 1/18/2022 to  3/18/2022.     Continue POC as outlined in eval as: Outpatient Physical Therapy to include the following interventions:  Gait Training, Manual Therapy, Neuromuscular Re-ed, Patient Education, Therapeutic Activities and Therapeutic Exercise.      Lord Stanford Padilla, PT , DPT, MTC

## 2022-03-04 NOTE — TELEPHONE ENCOUNTER
Fax from [facility] [phone] states patient has been experiencing nausea and upset stomach on metformin .    I called her to inquire.   Looks like she has been on metformin since 2014.    She  Says she started  With problems about 2 weeks ago after had some chicken and baked potato.  Her stools are always  light brown, not firm.  Mainly loose.  Vomited a few times.  Nausea off and on, not every day.  Seems to be improving, uses zofran and helps     Started probiotics for 2 weeks now.        Usually drinks a lot of water all day.   Usually likes salads but can't tolerate them lately with nausea.    What do you recommend we try?  Please advise.  Thanks angel

## 2022-03-05 NOTE — TELEPHONE ENCOUNTER
We can stop the metformin and monitor symptoms for now.  GI consultation can be requested also if nausea persists.

## 2022-03-07 ENCOUNTER — TELEPHONE (OUTPATIENT)
Dept: NEUROSURGERY | Facility: CLINIC | Age: 67
End: 2022-03-07
Payer: MEDICARE

## 2022-03-07 ENCOUNTER — PATIENT OUTREACH (OUTPATIENT)
Dept: ADMINISTRATIVE | Facility: OTHER | Age: 67
End: 2022-03-07
Payer: MEDICARE

## 2022-03-07 ENCOUNTER — PATIENT MESSAGE (OUTPATIENT)
Dept: INTERNAL MEDICINE | Facility: CLINIC | Age: 67
End: 2022-03-07
Payer: MEDICARE

## 2022-03-07 ENCOUNTER — PATIENT MESSAGE (OUTPATIENT)
Dept: NEUROSURGERY | Facility: CLINIC | Age: 67
End: 2022-03-07
Payer: MEDICARE

## 2022-03-07 NOTE — TELEPHONE ENCOUNTER
Called pt; asked if she still wanted to keep her appt tomorrow with us as she had messaged me earlier and stated she is not interested in Sx as PT is working.    Pt wanted to Cx appt; appt was Cx'd

## 2022-03-10 ENCOUNTER — PATIENT MESSAGE (OUTPATIENT)
Dept: GASTROENTEROLOGY | Facility: CLINIC | Age: 67
End: 2022-03-10
Payer: MEDICARE

## 2022-03-10 ENCOUNTER — OFFICE VISIT (OUTPATIENT)
Dept: GASTROENTEROLOGY | Facility: CLINIC | Age: 67
End: 2022-03-10
Payer: MEDICARE

## 2022-03-10 ENCOUNTER — PATIENT MESSAGE (OUTPATIENT)
Dept: GASTROENTEROLOGY | Facility: CLINIC | Age: 67
End: 2022-03-10

## 2022-03-10 VITALS — WEIGHT: 213.63 LBS | BODY MASS INDEX: 35.59 KG/M2 | HEIGHT: 65 IN

## 2022-03-10 DIAGNOSIS — R11.2 NON-INTRACTABLE VOMITING WITH NAUSEA, UNSPECIFIED VOMITING TYPE: ICD-10-CM

## 2022-03-10 DIAGNOSIS — R14.2 BELCHING: Primary | ICD-10-CM

## 2022-03-10 DIAGNOSIS — Z12.11 SCREENING FOR COLON CANCER: ICD-10-CM

## 2022-03-10 DIAGNOSIS — R10.10 PAIN OF UPPER ABDOMEN: ICD-10-CM

## 2022-03-10 PROCEDURE — 99215 OFFICE O/P EST HI 40 MIN: CPT | Mod: S$PBB,,, | Performed by: NURSE PRACTITIONER

## 2022-03-10 PROCEDURE — 99999 PR PBB SHADOW E&M-EST. PATIENT-LVL IV: CPT | Mod: PBBFAC,,, | Performed by: NURSE PRACTITIONER

## 2022-03-10 PROCEDURE — 99214 OFFICE O/P EST MOD 30 MIN: CPT | Mod: PBBFAC,PO | Performed by: NURSE PRACTITIONER

## 2022-03-10 PROCEDURE — 99215 PR OFFICE/OUTPT VISIT, EST, LEVL V, 40-54 MIN: ICD-10-PCS | Mod: S$PBB,,, | Performed by: NURSE PRACTITIONER

## 2022-03-10 PROCEDURE — 99999 PR PBB SHADOW E&M-EST. PATIENT-LVL IV: ICD-10-PCS | Mod: PBBFAC,,, | Performed by: NURSE PRACTITIONER

## 2022-03-10 RX ORDER — FAMOTIDINE 40 MG/1
40 TABLET, FILM COATED ORAL DAILY
Qty: 30 TABLET | Refills: 2 | Status: SHIPPED | OUTPATIENT
Start: 2022-03-10 | End: 2022-06-15

## 2022-03-10 RX ORDER — SODIUM, POTASSIUM,MAG SULFATES 17.5-3.13G
1 SOLUTION, RECONSTITUTED, ORAL ORAL DAILY
Qty: 1 KIT | Refills: 0 | Status: SHIPPED | OUTPATIENT
Start: 2022-03-10 | End: 2022-07-07

## 2022-03-10 NOTE — PATIENT INSTRUCTIONS
SUPREP Instructions    [facility] [address]  Clinic Office [phone]  Endoscopy Lab [phone]    You are scheduled for a Colonoscopy with Dr. Newberry On  04/19/2022 at [facility] in [place].    Check in at the Hospital -1st floor, Information desk.   Call [phone] to reschedule.    An adult friend/family member must come with you to drive you home.  You cannot drive, take a taxi, Uber/Lyft or bus to leave the Endoscopy Center alone.  If you do not have someone to drive you home, your test will be cancelled.     Please follow the directions of your doctor if you take any pills that thin your blood. If you take these meds: Aggrenox, Brilinta, Effient, Eliquis, Lovenox, Plavix, Pletal, Pradaxa, Ticilid, Xarelto or Coumadin, let the doctor's office know.    DON'T: On the morning of the test do not take insulin or pills for diabetes.     DO: On the morning of the test, do take any pills for blood pressure, heart, anti-rejection and or seizures with a small sip of water. Bring any inhalers with you.    To have a good prep, you must follow these instructions - please do not use the directions from the pharmacy.    The doctor will send a prescription for the SUPREP.      The Day Before the test:    You can only drink CLEAR LIQUIDS the whole day before your test.  You can't eat any food for the whole day.    You CAN have:  Water, Coffee or decaf coffee (no milk or cream)  Tea  Soft drinks - regular and sugar free  Jell-O (green or yellow)  Apple Juice, grape juice, white cranberry juice  Gatorade, Power Aid, Crystal Light, Willie Aid  Lemonade and Limeade  Bouillon, clear soup  Snowball, popsicles  YOU CAN'T DRINK ANYTHING RED  YOU CAN'T DRINK ALCOHOL  ONLY DRINK WHAT IS ON THE LIST      At 5 pm the night before your test:    Pour the 1st bottle of SUPREP into the cup provided in the box. Add water to the line on the cup and mix well.  Drink the whole cup and then drink 2 more  full cups of water over 1 hour.  This can be easier to drink if it is cold. You can mix it 20 minutes ahead of time and place in the refrigerator before you drink it.  You must drink it within 30-45 minutes of mixing it.  Do NOT pour the drink over ice.  You can drink it with a straw.    The Day of the test - We will call you 2 days before your test to tell you what time to get there.    5 hours before you come to the hospital (this may be in the middle of the night)  Pour the 2nd bottle of SUPREP into the cup provided in the box. Add water to the line on the cup and mix well.  Drink the whole cup and then drink 2 more full cups of water over 1 hour.  It might be easier to drink if it is cold. You can mix it 20 minutes ahead of time and place in the refrigerator before you drink it.  You must drink it within 30-45 minutes of mixing it.  Do NOT pour the drink over ice.  You can drink it with a straw.    YOU CAN'T EAT OR DRINK ANYTHING ELSE ONCE YOU FINISH THE PREP    Leave all valuables and jewelry at home. You will be at the hospital for 2-4 hours.    Call the Endoscopy department at [phone] with any questions about your procedure.

## 2022-03-10 NOTE — PROGRESS NOTES
GASTROENTEROLOGY CLINIC NOTE    Chief Complaint: Diagnoses of Non-intractable vomiting with nausea, unspecified vomiting type and Pain of upper abdomen were pertinent to this visit.  Referring provider/PCP: Alen Damico MD    HPI:  Aracelis Martinez is a 66 y.o. female who is a new patient to me with a PMH that is significant for DM , Pancreatic Cancer (2015) (Stage 1 Neuroendocrine Tumor), Nausea, Vomiting, and Belching Disorder  She has been seen by two other physicians for similar symptoms and is here today to re-establish care for nausea, vomiting, and abdominal pain.  This is not a new problem as it has been ongoing over the last two years. She originally had temporary improvement with TID Baclofen but symptoms returned.  It was recommended she have Manometry for Rumination, undergo EGD, and referral was also placed for patient to see Dr. Delgado.  Dr. Delgado declined referral. EGD 6/2020 revealed two non-bleeding prepyloric ulcers (See Report Below). Manometry was not completed. Additionally, small bowel follow through was performed to exclude obstruction since symptoms began after Whipple procedure for pancreatic cancer.  This was negative for obstruction.    She reports symptoms occur 2-3 weeks per month where she experiences nausea, vomiting, and uncontrollable belching.  She has not identified any known triggers to symptoms.  Vomiting occurs after meals and is accompanied by early satiety.  Vomitus consists of old food from previous meals.  Denies rumination, regurgitation, pyrosis, reflux, or water brash.  No abdominal pain, significant NSAID use, ETOH use, or changes in bowel habits.  She is currently taking Zofran for nausea and reports it is helping.  PCP stopped metformin about one week ago to see if this could be contributing to symptoms. Celiac labs were also obtained which was negative.     Prior Upper Endoscopy: 6/2020  Impression:   - Normal examined duodenum. Biopsied.                          - Non-bleeding gastric ulcers with a clean ulcer base (Blair Class III). Biopsied.                         - Normal esophagus.                         - Z-line regular, 39 cm from the incisors.     Recommendation:       - Discharge patient to home.                         - Await pathology results.                         - Telephone endoscopist for pathology results in 2 weeks.                         - Consider avoiding all non-steroidal anti-inflammatory drugs (aspirin, ibuprofen, naproxen, etc.), unless needed for cardiovascular protection. Recommend you discuss with your prescribing doctor (of your aspirin) to see if cardiovascular benefits of your aspirin outweigh the risks of GI bleeding.                         - Use Protonix (pantoprazole) 40 mg by mouth once daily (or any other full strength proton pump inhibitor) - best taken 45 minutes before your first protein containing meal.                         - Repeat upper endoscopy in 12 weeks to check healing.                         - The findings and recommendations were discussed with the patient.                         - Return to primary care physician.   Pathology:  1. Duodenum (biopsy):  - Benign duodenal mucosa, no histologic abnormality  - No villous blunting or intraepithelial lymphocytosis    2. Stomach, pre-pyloric ulcer (biopsy):  - Benign antral-type mucosa with reactive epithelium  - No intestinal metaplasia, dysplasia or malignancy; multiple levels examined  - H. pylori immunostain is negative; H. pylori stain with appropriate controls  - CD56 and synaptophysin immunostains do not show significant neuroendocrine hyperplasia  - CD56 and synaptophysin stains with appropriate controls    3. Stomach (biopsy):  - Benign oxyntic and antral-type mucosa, no histologic abnormality  - No H.pylori organism identified H&E stained sections  - No intestinal metaplasia, dysplasia or malignancy    Prior Colonoscopy: 3/2019  Impression:  - One 2 mm polyp  at the splenic flexure, removed with a jumbo cold forceps. Resected and retrieved.                         - The examination was otherwise normal on direct and retroflexion views.     Recommendation: - Discharge patient to home.                         - Await pathology results.                         - Repeat colonoscopy in 3 years for surveillance.                         - The findings and recommendations were discussed with the designated responsible adult.     Pathology:  COLON, SPLENIC FLEXURE, POLYPECTOMY:  Tubular adenoma    Family h/o Colon Cancer: No  Family h/o Crohn's Disease or Ulcerative Colitis: No  Abdominal Surgeries: Partial Pancreatectomy     GI ROS:  Reflux: No  Dysphagia: No   Constipation: No  Diarrhea: No  Rectal bleeding/Melena/hematemesis: No  NSAIDs: No  Anticoagulation or Antiplatelet: No      Review of Systems   Constitutional: Negative for weight loss.   HENT: Negative for sore throat.    Eyes: Negative for blurred vision.   Respiratory: Negative for cough.    Cardiovascular: Negative for chest pain.   Gastrointestinal: Positive for nausea and vomiting. Negative for abdominal pain, blood in stool, constipation, diarrhea and melena.        Belching   Genitourinary: Negative for dysuria.   Musculoskeletal: Negative for myalgias.   Skin: Negative for rash.   Neurological: Negative for headaches.   Endo/Heme/Allergies: Negative for environmental allergies.   Psychiatric/Behavioral: Negative for suicidal ideas. The patient is not nervous/anxious.        Past Medical History: has a past medical history of Arthritis, Chronic back pain, Diabetes mellitus, Diverticulosis, Hypertension, Neuroendocrine tumor of pancreas, Pancreatic cancer, Renal cyst, and Thyroid disease.    Past Surgical History: has a past surgical history that includes Tonsillectomy; Knee arthroscopy (2014);  section; Excision of ganglion cyst of hand (Right, 10/22/2018); Pancreas surgery (); Colonoscopy (N/A,  3/13/2019); Esophagogastroduodenoscopy (N/A, 11/19/2019); Esophagogastroduodenoscopy (N/A, 6/2/2020); Hysterectomy (2015); Oophorectomy; Epidural steroid injection into lumbar spine (N/A, 6/15/2021); Transforaminal epidural injection of steroid (Right, 8/17/2021); Transforaminal epidural injection of steroid (Right, 8/24/2021); Injection of anesthetic agent around medial branch nerves innervating lumbar facet joint (Bilateral, 9/28/2021); Injection of anesthetic agent around medial branch nerves innervating lumbar facet joint (Bilateral, 10/12/2021); Radiofrequency thermocoagulation (Bilateral, 11/9/2021); and Radiofrequency thermocoagulation (Left, 12/16/2021).    Family History:family history includes Breast cancer in her sister; Diabetes in her mother; Heart disease in her mother; Hypertension in her brother, mother, and sister; Stroke in her mother and sister.    Allergies:   Review of patient's allergies indicates:   Allergen Reactions    Erythromycin base        Social History: reports that she has been smoking cigarettes. She has a 2.50 pack-year smoking history. She has never used smokeless tobacco. She reports current alcohol use. She reports that she does not use drugs.    Home medications:   Current Outpatient Medications on File Prior to Visit   Medication Sig Dispense Refill    amLODIPine (NORVASC) 10 MG tablet 1 tablet by mouth every day. 30 tablet 10    blood sugar diagnostic (ONETOUCH VERIO TEST STRIPS) Strp Use once daily to check blood sugar 100 strip 3    glipiZIDE (GLUCOTROL) 5 MG TR24 Take 1 tablet (5 mg total) by mouth daily with breakfast. For diabetes control. 30 tablet 6    hydrOXYzine pamoate (VISTARIL) 25 MG Cap TAKE 1 CAPSULE BY MOUTH TWICE DAILY AS NEEDED FOR ANXIETY 60 capsule 3    lancets Misc To check BG 1 time daily, to use with insurance preferred meter 100 each 3    levothyroxine (SYNTHROID) 50 MCG tablet levothyroxine 50 mcg tablet      losartan (COZAAR) 50 MG tablet TAKE  "1 TABLET(50 MG) BY MOUTH EVERY DAY 90 tablet 3    meloxicam (MOBIC) 15 MG tablet TAKE 1 TABLET BY MOUTH EVERY DAY FOR ANKLE PAIN OR SWELLING 60 tablet 3    metFORMIN (GLUCOPHAGE) 500 MG tablet TAKE 1 TABLET BY MOUTH EVERY MORNING, AND 2 TABLETS EVERY EVENING FOR DIABETES 270 tablet 3    metoclopramide HCl (REGLAN) 10 MG tablet TAKE 1 TABLET(10 MG) BY MOUTH THREE TIMES DAILY BEFORE MEALS 90 tablet 5    ondansetron (ZOFRAN-ODT) 4 MG TbDL Take 1 tablet (4 mg total) by mouth every 6 (six) hours as needed (nausea). 30 tablet 2    pantoprazole (PROTONIX) 40 MG tablet One tablet daily 90 tablet 3    temazepam (RESTORIL) 30 mg capsule Take 1 capsule (30 mg total) by mouth nightly as needed for Insomnia (insomnia). 30 capsule 2    traMADoL (ULTRAM) 50 mg tablet Take 1 tablet (50 mg total) by mouth every 8 (eight) hours as needed for Pain. 90 tablet 1    blood-glucose meter kit To check BG 1 time daily, to use with insurance preferred meter 1 each 0     No current facility-administered medications on file prior to visit.       Vital signs:  Ht 5' 5" (1.651 m)   Wt 96.9 kg (213 lb 10 oz)   LMP  (LMP Unknown)   BMI 35.55 kg/m²     Physical Exam  Vitals reviewed.   Constitutional:       General: She is not in acute distress.     Appearance: Normal appearance. She is not ill-appearing.   Cardiovascular:      Rate and Rhythm: Normal rate and regular rhythm.      Heart sounds: Normal heart sounds. No murmur heard.  Pulmonary:      Effort: Pulmonary effort is normal. No respiratory distress.      Breath sounds: Normal breath sounds.   Chest:      Chest wall: No tenderness.   Abdominal:      General: Bowel sounds are normal. There is no distension.      Palpations: Abdomen is soft.      Tenderness: There is no abdominal tenderness. Negative signs include Ennis's sign.      Hernia: No hernia is present.      Comments: Frequent belching throughout visit.   Skin:     General: Skin is warm.   Neurological:      Mental Status: " She is alert and oriented to person, place, and time.   Psychiatric:         Mood and Affect: Mood normal. Affect is tearful.         Behavior: Behavior normal.         Routine labs:  Lab Results   Component Value Date    WBC 7.16 02/15/2022    HGB 13.9 02/15/2022    HCT 45.0 02/15/2022    MCV 85 02/15/2022     02/15/2022     No results found for: INR  No results found for: IRON, FERRITIN, TIBC, FESATURATED  Lab Results   Component Value Date     02/15/2022    K 3.7 02/15/2022     02/15/2022    CO2 27 02/15/2022    BUN 25 (H) 02/15/2022    CREATININE 1.0 02/15/2022     Lab Results   Component Value Date    ALBUMIN 4.1 02/15/2022    ALT 15 02/15/2022    AST 13 02/15/2022    ALKPHOS 72 02/15/2022    BILITOT 0.7 02/15/2022     No results found for: GLUCOSE  Lab Results   Component Value Date    TSH 2.510 05/13/2020     Lab Results   Component Value Date    CALCIUM 10.3 02/15/2022       Imaging:  US Abdomen Complete  Narrative: EXAMINATION:  US ABDOMEN COMPLETE    CLINICAL HISTORY:  Nausea with vomiting, unspecified    TECHNIQUE:  Complete abdominal ultrasound (including pancreas, aorta, liver, gallbladder, common bile duct, IVC, kidneys, and spleen) was performed.    COMPARISON:  Abdominal ultrasound 06/10/2020.  CT abdomen 03/06/2019, 04/16/2015.  MRI 04/30/2019.    FINDINGS:  Pancreas: History of prior partial pancreatectomy.  No significant abnormality within the visualized remaining pancreas.    Aorta: No aneurysm.    Liver: 19.0 cm, enlarged.  Diffusely increased parenchymal echogenicity consistent with steatosis. No focal lesions.    Gallbladder: No calculi, wall thickening, or pericholecystic fluid.  Negative sonographic Ennis's sign.    Biliary system: 5 mm common bile duct.  No intrahepatic ductal dilatation.    Inferior vena cava: Normal in appearance.    Right kidney: 12.2 cm. No hydronephrosis.  Focal echogenic wedge shaped cortical defect, possibly cortical scarring.    Left kidney:  12.3 cm. No hydronephrosis.  2.4 x 2.1 x 2.6 cm hyperechoic left renal lesion.  This lesion is present since 04/16/2015 CT abdomen and demonstrates signal dropout on MRI abdomen dated 04/30/2019 consistent with intralesional fat.    Spleen: 8.8 x 3 cm.  Normal in size with homogeneous echotexture.    Miscellaneous: No ascites.  Impression: Hepatic steatosis and hepatomegaly.    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    History of prior partial pancreatectomy for neuroendocrine tumor.    Electronically signed by resident: Vijaya Delacruz MD  Date:    02/24/2022  Time:    11:51    Electronically signed by: Stanford Conteh  Date:    02/24/2022  Time:    12:30      I have reviewed prior labs, imaging, and notes.      Assessment:  1. Belching    2. Non-intractable vomiting with nausea, unspecified vomiting type    3. Pain of upper abdomen    4. Screening for colon cancer        Plan:  Orders Placed This Encounter    COVID-19 Routine Screening    sodium,potassium,mag sulfates (SUPREP BOWEL PREP KIT) 17.5-3.13-1.6 gram SolR    famotidine (PEPCID) 40 MG tablet    Case Request Endoscopy: COLONOSCOPY, EGD (ESOPHAGOGASTRODUODENOSCOPY)     EGD to further evaluate belching, nausea, and vomiting. Consider biopsy for H.pylori.  Continue Protonix 40mg daily as previously prescribed.   Continue Zofran as previously prescribed.   Colonoscopy for colon cancer screening. Suprep.    Consider gastric emptying study in future.     Plan of care discussed with patient who is in agreement and verbalized understanding.     I have explained the planned procedures to the patient.The risks, benefits and alternatives of the procedure were also explained in detail. Patient verbalized understanding, all questions were answered. The patient agrees to proceed as planned    Follow Up: As Needed Pending Above Workup    Due to complexity of patient's case and being previously established  with two other physicians, recommend following up with one of our physicians if warranted.     More than 45 minutes was spent reviewing and summarizing patient's medical records including reviewing tests, scans, and labs, performing exam, counseling and educating the patient, documenting information in the electronic health record, interpreting results, coordinating care, ordering procedures, and communicating with other providers.         LAURI Castellon,FNP-BC [facility]

## 2022-03-21 ENCOUNTER — CLINICAL SUPPORT (OUTPATIENT)
Dept: REHABILITATION | Facility: HOSPITAL | Age: 67
End: 2022-03-21
Payer: MEDICARE

## 2022-03-21 DIAGNOSIS — M53.86 DECREASED ROM OF LUMBAR SPINE: Primary | ICD-10-CM

## 2022-03-21 DIAGNOSIS — M54.50 LUMBAR PAIN: ICD-10-CM

## 2022-03-21 PROCEDURE — 97110 THERAPEUTIC EXERCISES: CPT | Mod: PO

## 2022-03-21 NOTE — PROGRESS NOTES
"  Physical Therapy Daily Note     [id number]    Therapy Diagnosis:   Encounter Diagnoses   Name Primary?    Decreased ROM of lumbar spine Yes    Lumbar pain      Physician: Esteban Monaco MD    Visit Date: 3/21/2022  Physician Orders: Evaluate and treat  Medical Diagnosis: Lumbar radiculopathy [M54.16]    Evaluation Date: 1/18/2022  Authorization Period Expiration: 1/24/2022 - 12/1/2022  Plan of Care Certification Period: 3/4/2022- 6/4/2022  Visit #/Visits authorized: 10/20   PTA Visit #: 1/6    Time In: 7:45 AM   Time Out: 8:30 AM  Total Billable Time: 45 minutes    Precautions: Standard    Subjective     Pt reports: that the lower back was hurting after cooking and seeping the floor during the weekend 2/10 NPRS.     She was compliant with home exercise program.    Response to previous treatment: "felt good"    Functional change: The patient has no difficulty getting in and out of the car.    Pain: 2/10     Location: right back and buttocks      Objective       Aracelis received therapeutic exercises to develop strength, endurance, ROM, flexibility, posture and core stabilization for 45 minutes including:  Hamstring, QL and Piriformis stretching 30 seconds hold x 5   POSTERIOR PELVIC TILT 10 seconds hold x 10   SLR with ankle dorsiflexion 10 x 3   LTR and DOUBLE KNEE TO CHEST  10 x 3   Bilateral leg lifts with knee bent 10 x 3   Bilateral leg lifts with knee extended 5 x 6  GASTROC STRETCH standing L3 - 3X30"  Long sitting hamsting stretch - 3X30"  Sciatic nerve stretch - 30 repititions  With towel  Torrey stretch - 4X30 with and without strap  Hip abduction with BLUE THERABAND  - 30 repititions    Bridges 10 x 3  Bike lvl 10'    Aracelis received the following manual therapy techniques: Joint mobilizations were applied to the: lumbar for 00 minutes, including:  Unilateral and central posterior to anterior mobilization grade 1,2,3 and 4 oscillation. Not Performed      Home Exercises " Provided and Patient Education Provided     Education provided:   - The patient was instructed to perform stretching of both lower extremities every day.     Written Home Exercises Provided: yes.  Exercises were reviewed and Aracelis was able to demonstrate them prior to the end of the session.  Aracelis demonstrated good  understanding of the education provided.     See EMR under Patient Instructions for exercises provided prior visit.    Assessment     The patient was able to accomplish the tasks with mild difficulty and has trouble getting up from bed. The patient was having difficulty turning to the right side and getting up. The patient needs more core exercises in order to achieve prior level of functions and reduce the risk of cauda Equina Syndrome.    Aracelis Is progressing well towards her goals.     Pt prognosis is Good.     Pt will continue to benefit from skilled outpatient physical therapy to address the deficits listed in the problem list box on initial evaluation, provide pt/family education and to maximize pt's level of independence in the home and community environment.     Pt's spiritual, cultural and educational needs considered and pt agreeable to plan of care and goals.    Anticipated barriers to physical therapy: None       Goals:  Short Term Goals: 4 weeks        Goal   Status     1. Pt. to report decreased lumbar pain </ =  6/10 at worst to increase tolerance for upright unsupported sitting posture. Met 2/9/2022    2. Pt. to demonstrate proper posture requiring minimum verbal cues from PT for improved posture positioning  Met 3/4/2022    3. Increase L1 - L3 joint mobility to 3/6 to promote greater ease with squat to stand transitioning. Met 3/4/2022    4. Increase lumbar extension AROM ? 10 degrees to promote greater ease with self-care. Met 3/4/2022    5. Increase AROM lumbar rotation bilateral  ? 35 degrees to promote greater ease with driving. Met 3/4/2022    6. Pt to be independent with HEP to improve  ROM and independence with ADL's Met 3/4/2022          Long Term Goals: 8 weeks     Goal   Status     1. Pt. to report decreased lumbar pain </ =  4/10 at worst to increase tolerance for upright unsupported sitting posture.  Met 3/2/22   2. Pt. to demonstrate proper posture requiring minimum verbal cues from PT for improved posture positioning      3. Increase L3 - L5 joint mobility to 3/6 to promote greater ease with squat to stand transitioning.     4. Increase lumbar extension AROM ? 15 degrees to promote greater ease with self-care.     5. Increase AROM lumbar rotation bilateral  ? 40 degrees to promote greater ease with driving.     6. Pt. to demonstrate increased MMT for rectus abdominus  ?  5/5 to improve supine to sitting transfer.     7. Pt. to demonstrate increased MMT for Lumbar extensor paraspinals t ?  5/5 to improve tolerance for prolong standing and ambulation      8. Pt. to demonstrate increased MMT for Lumbar/thoracic rotators   ? 5/5 to improve tolerance for ADL and work activities.      9. Pt. to demonstrate increased MMT for Lumbar/thoracic side bending   ? 5/5 to improve household cleaning.      10. Pt to be independent with HEP to improve ROM and independence with ADL's           Plan   Plan of care Certification: 3/4/2022 to  6/4/2022.     Continue POC as outlined in eval as: Outpatient Physical Therapy to include the following interventions: Gait Training, Manual Therapy, Neuromuscular Re-ed, Patient Education, Therapeutic Activities and Therapeutic Exercise.      Lord Stanford Padilla, PT , DPT, MTC

## 2022-03-22 ENCOUNTER — PATIENT MESSAGE (OUTPATIENT)
Dept: INTERNAL MEDICINE | Facility: CLINIC | Age: 67
End: 2022-03-22
Payer: MEDICARE

## 2022-03-24 ENCOUNTER — CLINICAL SUPPORT (OUTPATIENT)
Dept: REHABILITATION | Facility: HOSPITAL | Age: 67
End: 2022-03-24
Payer: MEDICARE

## 2022-03-24 DIAGNOSIS — M53.86 DECREASED ROM OF LUMBAR SPINE: Primary | ICD-10-CM

## 2022-03-24 DIAGNOSIS — M54.50 LUMBAR PAIN: ICD-10-CM

## 2022-03-24 PROCEDURE — 97110 THERAPEUTIC EXERCISES: CPT | Mod: PO,CQ

## 2022-03-24 NOTE — PROGRESS NOTES
"  Physical Therapy Daily Note     [id number]    Therapy Diagnosis:   Encounter Diagnoses   Name Primary?    Decreased ROM of lumbar spine Yes    Lumbar pain      Physician: Esteban Monaco MD    Visit Date: 3/24/2022  Physician Orders: Evaluate and treat  Medical Diagnosis: Lumbar radiculopathy [M54.16]    Evaluation Date: 1/18/2022  Authorization Period Expiration: 1/24/2022 - 12/1/2022  Plan of Care Certification Period: 3/4/2022- 6/4/2022  Visit #/Visits authorized: 10/20   PTA Visit #: 1/6    Time In: 8:30 AM   Time Out: 9:15 AM  Total Billable Time: 45 minutes    Precautions: Standard    Subjective     Pt reports: "No pain today, I'm felling pretty good today"    She was compliant with home exercise program.    Response to previous treatment: "felt great. I went home, rode my bike for another 10 minutes and did 2 laps around the block"    Functional change: "riding bike around the block"  Pain: 0/10     Location: right back and buttocks      Objective       Aracelis received therapeutic exercises to develop strength, endurance, ROM, flexibility, posture and core stabilization for 45 minutes including:  Hamstring, QL and Piriformis stretching 30 seconds hold x 5   POSTERIOR PELVIC TILT 10 seconds hold x 10   SLR with ankle dorsiflexion 10 x 3   LTR and DOUBLE KNEE TO CHEST  10 x 3   Bilateral leg lifts with knee bent 10 x 3   Bilateral leg lifts with knee extended 5 x 6  GASTROC STRETCH standing L3 - 3X30"  Long sitting hamsting stretch - 3X30"  Sciatic nerve stretch - 30 repititions  With towel  Torrey stretch - 4X30 with and without strap  Hip abduction with BLUE THERABAND  - 30 repititions    Bridges 10 x 3  Bike lvl 1 - 10'    Aracelis received the following manual therapy techniques: Joint mobilizations were applied to the: lumbar for 00 minutes, including:  Unilateral and central posterior to anterior mobilization grade 1,2,3 and 4 oscillation. Not Performed      Home " Exercises Provided and Patient Education Provided     Education provided:   - The patient was instructed to perform stretching of both lower extremities every day.     Written Home Exercises Provided: yes.  Exercises were reviewed and Aracelis was able to demonstrate them prior to the end of the session.  Aracelis demonstrated good  understanding of the education provided.     See EMR under Patient Instructions for exercises provided prior visit.    Assessment     Patient tolerated therapy well. All exercises were completed without adverse effects. She displayed some fatigue with double leg lifts, knees extended, due to abdominal weakness. Aracelis will continue to benefit from skilled therapy.     Aracelis Is progressing well towards her goals.     Pt prognosis is Good.     Pt will continue to benefit from skilled outpatient physical therapy to address the deficits listed in the problem list box on initial evaluation, provide pt/family education and to maximize pt's level of independence in the home and community environment.     Pt's spiritual, cultural and educational needs considered and pt agreeable to plan of care and goals.    Anticipated barriers to physical therapy: None       Goals:  Short Term Goals: 4 weeks        Goal   Status     1. Pt. to report decreased lumbar pain </ =  6/10 at worst to increase tolerance for upright unsupported sitting posture. Met 2/9/2022    2. Pt. to demonstrate proper posture requiring minimum verbal cues from PT for improved posture positioning  Met 3/4/2022    3. Increase L1 - L3 joint mobility to 3/6 to promote greater ease with squat to stand transitioning. Met 3/4/2022    4. Increase lumbar extension AROM ? 10 degrees to promote greater ease with self-care. Met 3/4/2022    5. Increase AROM lumbar rotation bilateral  ? 35 degrees to promote greater ease with driving. Met 3/4/2022    6. Pt to be independent with HEP to improve ROM and independence with ADL's Met 3/4/2022          Long Term  Goals: 8 weeks     Goal   Status     1. Pt. to report decreased lumbar pain </ =  4/10 at worst to increase tolerance for upright unsupported sitting posture.  Met 3/2/22   2. Pt. to demonstrate proper posture requiring minimum verbal cues from PT for improved posture positioning      3. Increase L3 - L5 joint mobility to 3/6 to promote greater ease with squat to stand transitioning.     4. Increase lumbar extension AROM ? 15 degrees to promote greater ease with self-care.     5. Increase AROM lumbar rotation bilateral  ? 40 degrees to promote greater ease with driving.     6. Pt. to demonstrate increased MMT for rectus abdominus  ?  5/5 to improve supine to sitting transfer.     7. Pt. to demonstrate increased MMT for Lumbar extensor paraspinals t ?  5/5 to improve tolerance for prolong standing and ambulation      8. Pt. to demonstrate increased MMT for Lumbar/thoracic rotators   ? 5/5 to improve tolerance for ADL and work activities.      9. Pt. to demonstrate increased MMT for Lumbar/thoracic side bending   ? 5/5 to improve household cleaning.      10. Pt to be independent with HEP to improve ROM and independence with ADL's           Plan   Plan of care Certification: 3/4/2022 to  6/4/2022.     Continue POC as outlined in eval as: Outpatient Physical Therapy to include the following interventions: Gait Training, Manual Therapy, Neuromuscular Re-ed, Patient Education, Therapeutic Activities and Therapeutic Exercise.      Jordyn Duque PTA ,

## 2022-03-25 ENCOUNTER — PATIENT MESSAGE (OUTPATIENT)
Dept: GASTROENTEROLOGY | Facility: CLINIC | Age: 67
End: 2022-03-25
Payer: MEDICARE

## 2022-03-28 ENCOUNTER — CLINICAL SUPPORT (OUTPATIENT)
Dept: REHABILITATION | Facility: HOSPITAL | Age: 67
End: 2022-03-28
Payer: MEDICARE

## 2022-03-28 DIAGNOSIS — M53.86 DECREASED ROM OF LUMBAR SPINE: Primary | ICD-10-CM

## 2022-03-28 DIAGNOSIS — M54.50 LUMBAR PAIN: ICD-10-CM

## 2022-03-28 PROCEDURE — 97110 THERAPEUTIC EXERCISES: CPT | Mod: PO,CQ

## 2022-03-28 NOTE — PROGRESS NOTES
"  Physical Therapy Daily Note     [id number]    Therapy Diagnosis:   Encounter Diagnoses   Name Primary?    Decreased ROM of lumbar spine Yes    Lumbar pain      Physician: Esteban Monaco MD    Visit Date: 3/28/2022  Physician Orders: Evaluate and treat  Medical Diagnosis: Lumbar radiculopathy [M54.16]    Evaluation Date: 1/18/2022  Authorization Period Expiration: 1/24/2022 - 12/1/2022  Plan of Care Certification Period: 3/4/2022- 6/4/2022  Visit #/Visits authorized: 11/20   PTA Visit #: 2/6    Time In: 12:45 PM   Time Out: 1:30 PM  Total Billable Time: 45 minutes    Precautions: Standard    Subjective     Pt reports: She's felling okay, pain/discomfort is minimal    She was compliant with home exercise program.    Response to previous treatment: Patient was nauseous    Functional change: "none stated"  Pain: 2/10     Location: right back and buttocks      Objective       Aracelis received therapeutic exercises to develop strength, endurance, ROM, flexibility, posture and core stabilization for 45 minutes including:  Hamstring, QL and Piriformis stretching 30 seconds hold x 5 (only hamstring and piriformis performed)  POSTERIOR PELVIC TILT 10 seconds hold x 10   SLR with ankle dorsiflexion 10 x 3   LTR and DOUBLE KNEE TO CHEST  10 x 3   Bilateral leg lifts with knee bent 10 x 3   Bilateral leg lifts with knee extended 5 x 6  GASTROC STRETCH standing L3 - 3X30"  Long sitting hamsting stretch - 3X30"  Sciatic nerve stretch - 30 repititions  With towel  Torrey stretch - 4X30 with and without strap  Hip abduction with BLUE THERABAND  - 30 repititions    Bridges 10 x 3  Crunches on swiss ball 10 x 3  Swiss ball roll outs 10 x 10 s  Bike lvl 2 - 6'    Aracelis received the following manual therapy techniques: Joint mobilizations were applied to the: lumbar for 00 minutes, including:  Unilateral and central posterior to anterior mobilization grade 1,2,3 and 4 oscillation. Not Performed  "     Home Exercises Provided and Patient Education Provided     Education provided:   - The patient was instructed to perform stretching of both lower extremities every day.     Written Home Exercises Provided: yes.  Exercises were reviewed and Aracelis was able to demonstrate them prior to the end of the session.  Aracelis demonstrated good  understanding of the education provided.     See EMR under Patient Instructions for exercises provided prior visit.    Assessment     Patient tolerated therapy well. She continues to display abdominal weakness during double leg lifts. Swiss ball roll outs added to stretch out lower lumbar region. Patient had no adverse effects. Aracelis will continue to benefit from skilled therapy.    Aracelis Is progressing well towards her goals.     Pt prognosis is Good.     Pt will continue to benefit from skilled outpatient physical therapy to address the deficits listed in the problem list box on initial evaluation, provide pt/family education and to maximize pt's level of independence in the home and community environment.     Pt's spiritual, cultural and educational needs considered and pt agreeable to plan of care and goals.    Anticipated barriers to physical therapy: None       Goals:  Short Term Goals: 4 weeks        Goal   Status     1. Pt. to report decreased lumbar pain </ =  6/10 at worst to increase tolerance for upright unsupported sitting posture. Met 2/9/2022    2. Pt. to demonstrate proper posture requiring minimum verbal cues from PT for improved posture positioning  Met 3/4/2022    3. Increase L1 - L3 joint mobility to 3/6 to promote greater ease with squat to stand transitioning. Met 3/4/2022    4. Increase lumbar extension AROM ? 10 degrees to promote greater ease with self-care. Met 3/4/2022    5. Increase AROM lumbar rotation bilateral  ? 35 degrees to promote greater ease with driving. Met 3/4/2022    6. Pt to be independent with HEP to improve ROM and independence with ADL's Met  3/4/2022          Long Term Goals: 8 weeks     Goal   Status     1. Pt. to report decreased lumbar pain </ =  4/10 at worst to increase tolerance for upright unsupported sitting posture.  Met 3/2/22   2. Pt. to demonstrate proper posture requiring minimum verbal cues from PT for improved posture positioning      3. Increase L3 - L5 joint mobility to 3/6 to promote greater ease with squat to stand transitioning.     4. Increase lumbar extension AROM ? 15 degrees to promote greater ease with self-care.     5. Increase AROM lumbar rotation bilateral  ? 40 degrees to promote greater ease with driving.     6. Pt. to demonstrate increased MMT for rectus abdominus  ?  5/5 to improve supine to sitting transfer.     7. Pt. to demonstrate increased MMT for Lumbar extensor paraspinals t ?  5/5 to improve tolerance for prolong standing and ambulation      8. Pt. to demonstrate increased MMT for Lumbar/thoracic rotators   ? 5/5 to improve tolerance for ADL and work activities.      9. Pt. to demonstrate increased MMT for Lumbar/thoracic side bending   ? 5/5 to improve household cleaning.      10. Pt to be independent with HEP to improve ROM and independence with ADL's           Plan   Plan of care Certification: 3/4/2022 to  6/4/2022.     Continue POC as outlined in eval as: Outpatient Physical Therapy to include the following interventions: Gait Training, Manual Therapy, Neuromuscular Re-ed, Patient Education, Therapeutic Activities and Therapeutic Exercise.      Jordyn Duque PTA ,

## 2022-03-31 ENCOUNTER — CLINICAL SUPPORT (OUTPATIENT)
Dept: REHABILITATION | Facility: HOSPITAL | Age: 67
End: 2022-03-31
Payer: MEDICARE

## 2022-03-31 ENCOUNTER — DOCUMENTATION ONLY (OUTPATIENT)
Dept: REHABILITATION | Facility: HOSPITAL | Age: 67
End: 2022-03-31
Payer: MEDICARE

## 2022-03-31 DIAGNOSIS — M53.86 DECREASED ROM OF LUMBAR SPINE: Primary | ICD-10-CM

## 2022-03-31 DIAGNOSIS — M54.50 LUMBAR PAIN: ICD-10-CM

## 2022-03-31 PROCEDURE — 97110 THERAPEUTIC EXERCISES: CPT | Mod: PO

## 2022-03-31 NOTE — PROGRESS NOTES
Physical Therapy Daily Note     Name: Aracelis Martinez  [facility] Number: [id number]    Therapy Diagnosis:   Encounter Diagnoses   Name Primary?    Decreased ROM of lumbar spine Yes    Lumbar pain      Physician: Esteban Monaco MD    Visit Date: 3/31/2022  Physician Orders: Evaluate and treat  Medical Diagnosis: Lumbar radiculopathy [M54.16]    Evaluation Date: 1/18/2022  Authorization Period Expiration: 1/24/2022 - 12/1/2022  Plan of Care Certification Period: 3/4/2022- 6/4/2022  Visit #/Visits authorized: 13/20   PTA Visit #: 2/6    Time In: 9:15 AM   Time Out: 10:00 AM  Total Billable Time: 45 minutes    Precautions: Standard    Subjective     Pt reports: that the right lower back was hurting the other day 1/10 NPRS after household chores.     She was compliant with home exercise program.    Response to previous treatment: Patient was nauseous    Functional change: The patient was able to cook for more than an hour.     Pain: 1/10     Location: right back and buttocks      Objective       Aracelis received therapeutic exercises to develop strength, endurance, ROM, flexibility, posture and core stabilization for 45 minutes including:    Hamstring, QL and Piriformis stretching 30 seconds hold x 3  POSTERIOR PELVIC TILT 10 seconds hold x 10   SLR with ankle dorsiflexion 10 x 3   LTR and DOUBLE KNEE TO CHEST  10 x 3   Bilateral leg lifts with knee bent 10 x 3   Bilateral leg lifts with knee extended 5 x 6  Sciatic nerve stretch - 30 repititions  With towel  Marching in place with emphasis on hip and knee flexion with ankle dorsi flexion 10 x 3  Torrey stretch - 4X30 with and without strap  Hip abduction with BLUE THERABAND  - 30 repititions    Bridges 10 x 3  Crunches on swiss ball 10 x 3  Bike level 3 - 10 minutes    Aracelis received the following manual therapy techniques: Joint mobilizations were applied to the: lumbar for 00 minutes, including:  Unilateral and central posterior to anterior mobilization grade 1,2,3 and 4  oscillation. Not Performed      Home Exercises Provided and Patient Education Provided     Education provided:   - The patient was instructed to perform stretching of both lower extremities every day.     Written Home Exercises Provided: yes.  Exercises were reviewed and Aracelis was able to demonstrate them prior to the end of the session.  Aracelis demonstrated good  understanding of the education provided.     See EMR under Patient Instructions for exercises provided prior visit.    Assessment     Patient tolerated therapy well and exhibited motivation. The patient had a faulty posture during the marching activity and needs verba cues in order to properly perform it. The patient Patient had no adverse effects after the session and will continue to benefit from skilled therapy.    Aracelis Is progressing well towards her goals.     Pt prognosis is Good.     Pt will continue to benefit from skilled outpatient physical therapy to address the deficits listed in the problem list box on initial evaluation, provide pt/family education and to maximize pt's level of independence in the home and community environment.     Pt's spiritual, cultural and educational needs considered and pt agreeable to plan of care and goals.    Anticipated barriers to physical therapy: None       Goals:  Short Term Goals: 4 weeks        Goal   Status     1. Pt. to report decreased lumbar pain </ =  6/10 at worst to increase tolerance for upright unsupported sitting posture. Met 2/9/2022    2. Pt. to demonstrate proper posture requiring minimum verbal cues from PT for improved posture positioning  Met 3/4/2022    3. Increase L1 - L3 joint mobility to 3/6 to promote greater ease with squat to stand transitioning. Met 3/4/2022    4. Increase lumbar extension AROM ? 10 degrees to promote greater ease with self-care. Met 3/4/2022    5. Increase AROM lumbar rotation bilateral  ? 35 degrees to promote greater ease with driving. Met 3/4/2022    6. Pt to be  independent with HEP to improve ROM and independence with ADL's Met 3/4/2022          Long Term Goals: 8 weeks     Goal   Status     1. Pt. to report decreased lumbar pain </ =  4/10 at worst to increase tolerance for upright unsupported sitting posture.  Met 3/2/22   2. Pt. to demonstrate proper posture requiring minimum verbal cues from PT for improved posture positioning      3. Increase L3 - L5 joint mobility to 3/6 to promote greater ease with squat to stand transitioning.     4. Increase lumbar extension AROM ? 15 degrees to promote greater ease with self-care.     5. Increase AROM lumbar rotation bilateral  ? 40 degrees to promote greater ease with driving.     6. Pt. to demonstrate increased MMT for rectus abdominus  ?  5/5 to improve supine to sitting transfer.     7. Pt. to demonstrate increased MMT for Lumbar extensor paraspinals t ?  5/5 to improve tolerance for prolong standing and ambulation      8. Pt. to demonstrate increased MMT for Lumbar/thoracic rotators   ? 5/5 to improve tolerance for ADL and work activities.      9. Pt. to demonstrate increased MMT for Lumbar/thoracic side bending   ? 5/5 to improve household cleaning.      10. Pt to be independent with HEP to improve ROM and independence with ADL's           Plan   Plan of care Certification: 3/4/2022 to  6/4/2022.     Continue POC as outlined in eval as: Outpatient Physical Therapy to include the following interventions: Gait Training, Manual Therapy, Neuromuscular Re-ed, Patient Education, Therapeutic Activities and Therapeutic Exercise.      Lord Stanford Padilla, PT , DPT, MTC

## 2022-03-31 NOTE — PROGRESS NOTES
30 day PT-PTA face-face discussion with Aayush Nava DPT re: Name: Aracelis [facility] Number: [id number] patient status, POC, and plan for progression done .

## 2022-04-04 ENCOUNTER — CLINICAL SUPPORT (OUTPATIENT)
Dept: REHABILITATION | Facility: HOSPITAL | Age: 67
End: 2022-04-04
Payer: MEDICARE

## 2022-04-04 DIAGNOSIS — M54.50 LUMBAR PAIN: ICD-10-CM

## 2022-04-04 DIAGNOSIS — M53.86 DECREASED ROM OF LUMBAR SPINE: Primary | ICD-10-CM

## 2022-04-04 PROCEDURE — 97110 THERAPEUTIC EXERCISES: CPT | Mod: PO

## 2022-04-04 NOTE — PROGRESS NOTES
Physical Therapy Progress Note     Name: Aracelis Martinez  [facility] Number: [id number]    Therapy Diagnosis:   Encounter Diagnoses   Name Primary?    Decreased ROM of lumbar spine Yes    Lumbar pain      Physician: Esteban Monaco MD    Visit Date: 4/4/2022  Physician Orders: Evaluate and treat  Medical Diagnosis: Lumbar radiculopathy [M54.16]    Evaluation Date: 1/18/2022  Authorization Period Expiration: 1/24/2022 - 12/1/2022  Plan of Care Certification Period: 3/4/2022- 6/4/2022  Visit #/Visits authorized: 14/20   PTA Visit #: 2/6    Time In: 9:15 AM   Time Out: 10:00 AM  Total Billable Time: 45 minutes    Precautions: Standard    Subjective     Pt reports: that the right lower back was hurting the other day 1/10 NPRS after doing doing some household chores.     She was compliant with home exercise program.    Response to previous treatment: Patient was nauseous    Functional change: The patient was able to cook for more than an hour.     Pain: 1/10     Location: right back and buttocks      Objective     Lumbar Range of Motion:    Active  Passive    Rotation to the left  0-60   0-60        Rotation to the right  0-60   0-60        Left Side Bending 0-30 0-30        Right Side Bending 0-30 0-30            Lower Extremity Strength  Right LE  Left LE    Right Internal Oblique 4/5 Left Internal Oblique 4/5   Right External Oblique 4/5 Left Internal Oblique 4/5    external oblique, internal oblique, and transverse abdominis, Abdominal flexors  4/5 Iliospoas: 4/5       Aracelis received therapeutic exercises to develop strength, endurance, ROM, flexibility, posture and core stabilization for 45 minutes including:    Hamstring, QL and Piriformis stretching 30 seconds hold x 3  POSTERIOR PELVIC TILT 10 seconds hold x 10   SLR with ankle dorsiflexion 10 x 3   LTR and DOUBLE KNEE TO CHEST  10 x 3   Bilateral leg lifts with knee bent 10 x 3   Bilateral leg lifts with knee extended 5 x 6  Sciatic nerve stretch - 30 repititions   With towel  Marching in place with emphasis on hip and knee flexion with ankle dorsi flexion 10 x 3  Torrey stretch - 4X30 with and without strap  Hip abduction with BLUE THERABAND  - 30 repititions    Bridges 10 x 3  Crunches on swiss ball 10 x 3  Bike level 3 - 10 minutes    Aracelis received the following manual therapy techniques: Joint mobilizations were applied to the: lumbar for 00 minutes, including:  Unilateral and central posterior to anterior mobilization grade 1,2,3 and 4 oscillation. Not Performed      Home Exercises Provided and Patient Education Provided     Education provided:   - The patient was instructed to perform stretching of both lower extremities every day.     Written Home Exercises Provided: yes.  Exercises were reviewed and Aracelis was able to demonstrate them prior to the end of the session.  Aracelis demonstrated good  understanding of the education provided.     See EMR under Patient Instructions for exercises provided prior visit.    Assessment     Patient tolerated therapy well and achieved 4/11 long term goals given to her. The patient has still weakness of the abdominal muscles and needs more session in order to achieve prior level of functions.      Aracelis Is progressing well towards her goals.     Pt prognosis is Good.     Pt will continue to benefit from skilled outpatient physical therapy to address the deficits listed in the problem list box on initial evaluation, provide pt/family education and to maximize pt's level of independence in the home and community environment.     Pt's spiritual, cultural and educational needs considered and pt agreeable to plan of care and goals.    Anticipated barriers to physical therapy: None       Goals:  Short Term Goals: 4 weeks        Goal   Status     1. Pt. to report decreased lumbar pain </ =  6/10 at worst to increase tolerance for upright unsupported sitting posture. Met 2/9/2022    2. Pt. to demonstrate proper posture requiring minimum verbal cues from  PT for improved posture positioning  Met 3/4/2022    3. Increase L1 - L3 joint mobility to 3/6 to promote greater ease with squat to stand transitioning. Met 3/4/2022    4. Increase lumbar extension AROM ? 10 degrees to promote greater ease with self-care. Met 3/4/2022    5. Increase AROM lumbar rotation bilateral  ? 35 degrees to promote greater ease with driving. Met 3/4/2022    6. Pt to be independent with HEP to improve ROM and independence with ADL's Met 3/4/2022          Long Term Goals: 8 weeks     Goal   Status     1. Pt. to report decreased lumbar pain </ =  4/10 at worst to increase tolerance for upright unsupported sitting posture.  Met 3/2/22   2. Pt. to demonstrate proper posture requiring minimum verbal cues from PT for improved posture positioning  Goal achieved as of 4/4/2022    3. Increase L3 - L5 joint mobility to 3/6 to promote greater ease with squat to stand transitioning.     4. Increase lumbar extension AROM ? 15 degrees to promote greater ease with self-care.     5. Increase AROM lumbar rotation bilateral  ? 40 degrees to promote greater ease with driving.     6. Pt. to demonstrate increased MMT for rectus abdominus   5/5 to improve supine to sitting transfer. Goal achieved as of 4/4/2022    7. Pt. to demonstrate increased MMT for Lumbar extensor paraspinals t  5/5 to improve tolerance for prolong standing and ambulation      8. Pt. to demonstrate increased MMT for Lumbar/thoracic rotators   5/5 to improve tolerance for ADL and work activities.      9. Pt. to demonstrate increased MMT for Lumbar/thoracic side bending   5/5 to improve household cleaning.      10. Pt to be independent with HEP to improve ROM and independence with ADL's  Goal achieved as of 4/4/2022         Plan   Plan of care Certification: 4/4/2022 to  7/4/2022.     Continue POC as outlined in eval as: Outpatient Physical Therapy to include the following interventions: Gait Training, Manual Therapy, Neuromuscular Re-ed,  Patient Education, Therapeutic Activities and Therapeutic Exercise.      Lord Stanford Padilla, PT , DPT, MTC

## 2022-04-13 ENCOUNTER — TELEPHONE (OUTPATIENT)
Dept: ENDOSCOPY | Facility: HOSPITAL | Age: 67
End: 2022-04-13
Payer: MEDICARE

## 2022-04-13 NOTE — TELEPHONE ENCOUNTER
Spoke with patient about arrival time @ 1130.   EGD/Colon  Covid test = vacc    Prep instructions reviewed: the day before the procedure, follow a clear liquid diet all day, then start the first 1/2 of prep at 5pm and take 2nd 1/2 of prep @ 0630.  Pt must be completely NPO when prep completed @ 0830.              Medications: Do not take Insulin or oral diabetic medications the day of the procedure.  Take as prescribed: heart, seizure and blood pressure medication in the morning with a sip of water (less than an ounce).  Take any breathing medications and bring inhalers to hospital with you Leave all valuables and jewelry at home.     Wear comfortable clothes to procedure to change into hospital gown You cannot drive for 24 hours after your procedure because you will receive sedation for your procedure to make you comfortable.  A ride must be provided at discharge.

## 2022-04-14 ENCOUNTER — CLINICAL SUPPORT (OUTPATIENT)
Dept: REHABILITATION | Facility: HOSPITAL | Age: 67
End: 2022-04-14
Payer: MEDICARE

## 2022-04-14 DIAGNOSIS — M54.50 LUMBAR PAIN: ICD-10-CM

## 2022-04-14 DIAGNOSIS — M53.86 DECREASED ROM OF LUMBAR SPINE: Primary | ICD-10-CM

## 2022-04-14 PROCEDURE — 97110 THERAPEUTIC EXERCISES: CPT | Mod: PO,CQ

## 2022-04-14 NOTE — PROGRESS NOTES
"  Physical Therapy Daily Note     Name: Aracelis Martinez  Clinic Number: [id number]    Therapy Diagnosis:   Encounter Diagnoses   Name Primary?    Decreased ROM of lumbar spine Yes    Lumbar pain      Physician: Esteban Monaco MD    Visit Date: 4/14/2022  Physician Orders: Evaluate and treat  Medical Diagnosis: Lumbar radiculopathy [M54.16]    Evaluation Date: 1/18/2022  Authorization Period Expiration: 1/24/2022 - 12/1/2022  Plan of Care Certification Period: 3/4/2022- 6/4/2022  Visit #/Visits authorized: 14/20   PTA Visit #: 1/6    Time In: 10:05 AM   Time Out: 10:50 AM  Total Billable Time: 45 minutes    Precautions: Standard    Subjective     Pt reports:back is feeling better.     She was compliant with home exercise program.    Response to previous treatment: "Pretty good"    Functional change: None stated    Pain: 2/10     Location: right back and buttocks      Objective       Aracelis received therapeutic exercises to develop strength, endurance, ROM, flexibility, posture and core stabilization for 45 minutes including:    Hamstring, QL and Piriformis stretching 30 seconds hold x 3  POSTERIOR PELVIC TILT 10 seconds hold x 10   SLR with ankle dorsiflexion 10 x 3   LTR and DOUBLE KNEE TO CHEST  10 x 3   Bilateral leg lifts with knee bent 10 x 3   Bilateral leg lifts with knee extended 5 x 6  Sciatic nerve stretch - 30 repititions  With towel  Marching in place with emphasis on hip and knee flexion with ankle dorsi flexion 10 x 3  Torrey stretch - 4X30 with and without strap  Hip abduction with BLUE THERABAND  - 30 repititions    Bridges 10 x 3  Crunches on swiss ball 10 x 3  Bike level 3 - 10 minutes    Aracelis received the following manual therapy techniques: Joint mobilizations were applied to the: lumbar for 00 minutes, including:  Unilateral and central posterior to anterior mobilization grade 1,2,3 and 4 oscillation. Not Performed      Home Exercises Provided and Patient Education Provided     Education provided: "   - The patient was instructed to perform stretching of both lower extremities every day.     Written Home Exercises Provided: yes.  Exercises were reviewed and Aracelis was able to demonstrate them prior to the end of the session.  Aracelis demonstrated good  understanding of the education provided.     See EMR under Patient Instructions for exercises provided prior visit.    Assessment     Patient tolerated therapy well. She was able to complete all Ther ex without adverse effects. Verbal cues used to improve ther ex technique for posterior pelvic tilts. Patient will continue to benefit from skilled therapy.      Aracelis Is progressing well towards her goals.     Pt prognosis is Good.     Pt will continue to benefit from skilled outpatient physical therapy to address the deficits listed in the problem list box on initial evaluation, provide pt/family education and to maximize pt's level of independence in the home and community environment.     Pt's spiritual, cultural and educational needs considered and pt agreeable to plan of care and goals.    Anticipated barriers to physical therapy: None       Goals:  Short Term Goals: 4 weeks        Goal   Status     1. Pt. to report decreased lumbar pain </ =  6/10 at worst to increase tolerance for upright unsupported sitting posture. Met 2/9/2022    2. Pt. to demonstrate proper posture requiring minimum verbal cues from PT for improved posture positioning  Met 3/4/2022    3. Increase L1 - L3 joint mobility to 3/6 to promote greater ease with squat to stand transitioning. Met 3/4/2022    4. Increase lumbar extension AROM ? 10 degrees to promote greater ease with self-care. Met 3/4/2022    5. Increase AROM lumbar rotation bilateral  ? 35 degrees to promote greater ease with driving. Met 3/4/2022    6. Pt to be independent with HEP to improve ROM and independence with ADL's Met 3/4/2022          Long Term Goals: 8 weeks     Goal   Status     1. Pt. to report decreased lumbar pain </ =   4/10 at worst to increase tolerance for upright unsupported sitting posture.  Met 3/2/22   2. Pt. to demonstrate proper posture requiring minimum verbal cues from PT for improved posture positioning  Goal achieved as of 4/4/2022    3. Increase L3 - L5 joint mobility to 3/6 to promote greater ease with squat to stand transitioning.     4. Increase lumbar extension AROM ? 15 degrees to promote greater ease with self-care.     5. Increase AROM lumbar rotation bilateral  ? 40 degrees to promote greater ease with driving.     6. Pt. to demonstrate increased MMT for rectus abdominus   5/5 to improve supine to sitting transfer. Goal achieved as of 4/4/2022    7. Pt. to demonstrate increased MMT for Lumbar extensor paraspinals t  5/5 to improve tolerance for prolong standing and ambulation      8. Pt. to demonstrate increased MMT for Lumbar/thoracic rotators   5/5 to improve tolerance for ADL and work activities.      9. Pt. to demonstrate increased MMT for Lumbar/thoracic side bending   5/5 to improve household cleaning.      10. Pt to be independent with HEP to improve ROM and independence with ADL's  Goal achieved as of 4/4/2022         Plan   Plan of care Certification: 4/4/2022 to  7/4/2022.     Continue POC as outlined in eval as: Outpatient Physical Therapy to include the following interventions: Gait Training, Manual Therapy, Neuromuscular Re-ed, Patient Education, Therapeutic Activities and Therapeutic Exercise.      Jordyn Duque PTA ,

## 2022-04-18 ENCOUNTER — PATIENT MESSAGE (OUTPATIENT)
Dept: INTERNAL MEDICINE | Facility: CLINIC | Age: 67
End: 2022-04-18
Payer: MEDICARE

## 2022-04-18 ENCOUNTER — CLINICAL SUPPORT (OUTPATIENT)
Dept: REHABILITATION | Facility: HOSPITAL | Age: 67
End: 2022-04-18
Payer: MEDICARE

## 2022-04-18 DIAGNOSIS — M53.86 DECREASED ROM OF LUMBAR SPINE: Primary | ICD-10-CM

## 2022-04-18 DIAGNOSIS — E11.9 TYPE 2 DIABETES MELLITUS WITHOUT COMPLICATION, WITHOUT LONG-TERM CURRENT USE OF INSULIN: ICD-10-CM

## 2022-04-18 DIAGNOSIS — M54.50 LUMBAR PAIN: ICD-10-CM

## 2022-04-18 PROCEDURE — 97110 THERAPEUTIC EXERCISES: CPT | Mod: PO

## 2022-04-18 NOTE — PROGRESS NOTES
"  Physical Therapy Daily Note     [id number]    Therapy Diagnosis:   Encounter Diagnoses   Name Primary?    Decreased ROM of lumbar spine Yes    Lumbar pain      Physician: Esteban Monaco MD    Visit Date: 4/18/2022  Physician Orders: Evaluate and treat  Medical Diagnosis: Lumbar radiculopathy [M54.16]    Evaluation Date: 1/18/2022  Authorization Period Expiration: 1/24/2022 - 12/1/2022  Plan of Care Certification Period: 3/4/2022- 6/4/2022  Visit #/Visits authorized: 16/20   PTA Visit #: 1/6    Time In: 10:00 AM   Time Out: 10:45 AM  Total Billable Time: 45 minutes    Precautions: Standard    Subjective     Pt reports: that the back is not bothering her lately and she is preparing for her procedure ( endoscopy and colonoscopy) tomorrow.     She was compliant with home exercise program.    Response to previous treatment: "Pretty good"    Functional change: None stated    Pain: 0/10     Location: right back and buttocks      Objective       Aracelis received therapeutic exercises to develop strength, endurance, ROM, flexibility, posture and core stabilization for 45 minutes including:    Hamstring, QL and Piriformis stretching 30 seconds hold x 3  POSTERIOR PELVIC TILT 10 seconds hold x 10   SLR with ankle dorsiflexion 10 x 3   LTR and DOUBLE KNEE TO CHEST  10 x 3   Bilateral leg lifts with knee bent 10 x 3   Bilateral leg lifts with knee extended 5 x 6  Sciatic nerve stretch - 30 repititions  With towel  Marching in place with emphasis on hip and knee flexion with ankle dorsi flexion 10 x 3  Torrey stretch - 4X30 with and without strap  Hip abduction with BLUE THERABAND  - 30 repititions    Bridges 10 x 3  Crunches on swiss ball 10 x 3  Bike level 3 x  10 minutes    Aracelis received the following manual therapy techniques: Joint mobilizations were applied to the: lumbar for 00 minutes, including:  Unilateral and central posterior to anterior mobilization grade 1,2,3 and 4 oscillation. " Not Performed      Home Exercises Provided and Patient Education Provided     Education provided:   - The patient was instructed to perform stretching of both lower extremities every day.     Written Home Exercises Provided: yes.  Exercises were reviewed and Aracelis was able to demonstrate them prior to the end of the session.  Aracelis demonstrated good  understanding of the education provided.     See EMR under Patient Instructions for exercises provided prior visit.    Assessment     Patient tolerated therapy well but exhibited dizziness and shortness of breath during the session. The patient rested and performed PURSED LIP BREATHING then the dyspnea subsided. No dizziness after the session but needs more sessions of Physical Therapy to improve the endurance and reduce the risk of falls.       Aracelis Is progressing well towards her goals.     Pt prognosis is Good.     Pt will continue to benefit from skilled outpatient physical therapy to address the deficits listed in the problem list box on initial evaluation, provide pt/family education and to maximize pt's level of independence in the home and community environment.     Pt's spiritual, cultural and educational needs considered and pt agreeable to plan of care and goals.    Anticipated barriers to physical therapy: None       Goals:  Short Term Goals: 4 weeks        Goal   Status     1. Pt. to report decreased lumbar pain </ =  6/10 at worst to increase tolerance for upright unsupported sitting posture. Met 2/9/2022    2. Pt. to demonstrate proper posture requiring minimum verbal cues from PT for improved posture positioning  Met 3/4/2022    3. Increase L1 - L3 joint mobility to 3/6 to promote greater ease with squat to stand transitioning. Met 3/4/2022    4. Increase lumbar extension AROM ? 10 degrees to promote greater ease with self-care. Met 3/4/2022    5. Increase AROM lumbar rotation bilateral  ? 35 degrees to promote greater ease with driving. Met 3/4/2022    6. Pt  to be independent with HEP to improve ROM and independence with ADL's Met 3/4/2022          Long Term Goals: 8 weeks     Goal   Status     1. Pt. to report decreased lumbar pain </ =  4/10 at worst to increase tolerance for upright unsupported sitting posture.  Met 3/2/22   2. Pt. to demonstrate proper posture requiring minimum verbal cues from PT for improved posture positioning  Goal achieved as of 4/4/2022    3. Increase L3 - L5 joint mobility to 3/6 to promote greater ease with squat to stand transitioning.     4. Increase lumbar extension AROM ? 15 degrees to promote greater ease with self-care.     5. Increase AROM lumbar rotation bilateral  ? 40 degrees to promote greater ease with driving.     6. Pt. to demonstrate increased MMT for rectus abdominus   5/5 to improve supine to sitting transfer. Goal achieved as of 4/4/2022    7. Pt. to demonstrate increased MMT for Lumbar extensor paraspinals t  5/5 to improve tolerance for prolong standing and ambulation      8. Pt. to demonstrate increased MMT for Lumbar/thoracic rotators   5/5 to improve tolerance for ADL and work activities.      9. Pt. to demonstrate increased MMT for Lumbar/thoracic side bending   5/5 to improve household cleaning.      10. Pt to be independent with HEP to improve ROM and independence with ADL's  Goal achieved as of 4/4/2022         Plan   Plan of care Certification: 4/4/2022 to  7/4/2022.     Continue POC as outlined in eval as: Outpatient Physical Therapy to include the following interventions: Gait Training, Manual Therapy, Neuromuscular Re-ed, Patient Education, Therapeutic Activities and Therapeutic Exercise.      Lord Stanford Padilla, PT ,DPT, MTC

## 2022-04-19 ENCOUNTER — ANESTHESIA EVENT (OUTPATIENT)
Dept: ENDOSCOPY | Facility: HOSPITAL | Age: 67
End: 2022-04-19
Payer: MEDICARE

## 2022-04-19 ENCOUNTER — HOSPITAL ENCOUNTER (OUTPATIENT)
Facility: HOSPITAL | Age: 67
Discharge: HOME | End: 2022-04-19
Attending: INTERNAL MEDICINE | Admitting: INTERNAL MEDICINE
Payer: MEDICARE

## 2022-04-19 ENCOUNTER — ANESTHESIA (OUTPATIENT)
Dept: ENDOSCOPY | Facility: HOSPITAL | Age: 67
End: 2022-04-19
Payer: MEDICARE

## 2022-04-19 VITALS
DIASTOLIC BLOOD PRESSURE: 73 MMHG | TEMPERATURE: 98 F | HEART RATE: 60 BPM | OXYGEN SATURATION: 100 % | HEIGHT: 65 IN | RESPIRATION RATE: 20 BRPM | WEIGHT: 212 LBS | BODY MASS INDEX: 35.32 KG/M2 | SYSTOLIC BLOOD PRESSURE: 126 MMHG

## 2022-04-19 DIAGNOSIS — Z12.11 SCREEN FOR COLON CANCER: ICD-10-CM

## 2022-04-19 LAB — POCT GLUCOSE: 124 MG/DL (ref 70–110)

## 2022-04-19 PROCEDURE — 88305 TISSUE EXAM BY PATHOLOGIST: CPT | Mod: 26,,, | Performed by: PATHOLOGY

## 2022-04-19 PROCEDURE — 25000003 PHARM REV CODE 250: Performed by: NURSE ANESTHETIST, CERTIFIED REGISTERED

## 2022-04-19 PROCEDURE — 88305 TISSUE EXAM BY PATHOLOGIST: ICD-10-PCS | Mod: 26,,, | Performed by: PATHOLOGY

## 2022-04-19 PROCEDURE — 43235 EGD DIAGNOSTIC BRUSH WASH: CPT | Performed by: INTERNAL MEDICINE

## 2022-04-19 PROCEDURE — 45385 COLONOSCOPY W/LESION REMOVAL: CPT | Mod: PT,,, | Performed by: INTERNAL MEDICINE

## 2022-04-19 PROCEDURE — 43235 EGD DIAGNOSTIC BRUSH WASH: CPT | Mod: 51,,, | Performed by: INTERNAL MEDICINE

## 2022-04-19 PROCEDURE — 43235 PR EGD, FLEX, DIAGNOSTIC: ICD-10-PCS | Mod: 51,,, | Performed by: INTERNAL MEDICINE

## 2022-04-19 PROCEDURE — 63600175 PHARM REV CODE 636 W HCPCS: Performed by: NURSE ANESTHETIST, CERTIFIED REGISTERED

## 2022-04-19 PROCEDURE — 45385 PR COLONOSCOPY,REMV LESN,SNARE: ICD-10-PCS | Mod: PT,,, | Performed by: INTERNAL MEDICINE

## 2022-04-19 PROCEDURE — 45385 COLONOSCOPY W/LESION REMOVAL: CPT | Performed by: INTERNAL MEDICINE

## 2022-04-19 PROCEDURE — 25000003 PHARM REV CODE 250: Performed by: INTERNAL MEDICINE

## 2022-04-19 PROCEDURE — 88305 TISSUE EXAM BY PATHOLOGIST: CPT | Performed by: PATHOLOGY

## 2022-04-19 PROCEDURE — 37000008 HC ANESTHESIA 1ST 15 MINUTES: Performed by: INTERNAL MEDICINE

## 2022-04-19 PROCEDURE — 37000009 HC ANESTHESIA EA ADD 15 MINS: Performed by: INTERNAL MEDICINE

## 2022-04-19 PROCEDURE — 82962 GLUCOSE BLOOD TEST: CPT | Performed by: INTERNAL MEDICINE

## 2022-04-19 PROCEDURE — 27201089 HC SNARE, DISP (ANY): Performed by: INTERNAL MEDICINE

## 2022-04-19 RX ORDER — INSULIN PUMP SYRINGE, 3 ML
EACH MISCELLANEOUS
Qty: 1 EACH | Refills: 0 | Status: SHIPPED | OUTPATIENT
Start: 2022-04-19 | End: 2023-01-03

## 2022-04-19 RX ORDER — PROPOFOL 10 MG/ML
VIAL (ML) INTRAVENOUS
Status: DISCONTINUED | OUTPATIENT
Start: 2022-04-19 | End: 2022-04-19

## 2022-04-19 RX ORDER — LIDOCAINE HYDROCHLORIDE 20 MG/ML
INJECTION INTRAVENOUS
Status: DISCONTINUED | OUTPATIENT
Start: 2022-04-19 | End: 2022-04-19

## 2022-04-19 RX ORDER — SODIUM CHLORIDE 9 MG/ML
INJECTION, SOLUTION INTRAVENOUS CONTINUOUS
Status: DISCONTINUED | OUTPATIENT
Start: 2022-04-19 | End: 2022-04-19 | Stop reason: HOSPADM

## 2022-04-19 RX ORDER — LANCETS
EACH MISCELLANEOUS
Qty: 100 EACH | Refills: 3 | Status: SHIPPED | OUTPATIENT
Start: 2022-04-19 | End: 2022-12-29 | Stop reason: SDUPTHER

## 2022-04-19 RX ORDER — SODIUM CHLORIDE 0.9 % (FLUSH) 0.9 %
10 SYRINGE (ML) INJECTION
Status: DISCONTINUED | OUTPATIENT
Start: 2022-04-19 | End: 2022-04-19 | Stop reason: HOSPADM

## 2022-04-19 RX ORDER — PROPOFOL 10 MG/ML
VIAL (ML) INTRAVENOUS CONTINUOUS PRN
Status: DISCONTINUED | OUTPATIENT
Start: 2022-04-19 | End: 2022-04-19

## 2022-04-19 RX ADMIN — PROPOFOL 75 MG: 10 INJECTION, EMULSION INTRAVENOUS at 11:04

## 2022-04-19 RX ADMIN — LIDOCAINE HYDROCHLORIDE 100 MG: 20 INJECTION, SOLUTION INTRAVENOUS at 11:04

## 2022-04-19 RX ADMIN — GLYCOPYRROLATE 0.1 MG: 0.2 INJECTION, SOLUTION INTRAMUSCULAR; INTRAVITREAL at 11:04

## 2022-04-19 RX ADMIN — PROPOFOL 150 MCG/KG/MIN: 10 INJECTION, EMULSION INTRAVENOUS at 11:04

## 2022-04-19 RX ADMIN — SODIUM CHLORIDE: 0.9 INJECTION, SOLUTION INTRAVENOUS at 11:04

## 2022-04-19 NOTE — PROVATION PATIENT INSTRUCTIONS
Discharge Summary/Instructions after an Endoscopic Procedure  Patient Name: Aracelis Martinez  Patient MRN: [id number]  Patient YOB: 1955 Tuesday, April 19, 2022  Hiren Newberry MD  Dear patient,  As a result of recent federal legislation (The Federal Cures Act), you may   receive lab or pathology results from your procedure in your [facility]   account before your physician is able to contact you. Your physician or   their representative will relay the results to you with their   recommendations at their soonest availability.  Thank you,  Your health is very important to us during the Covid Crisis. Following your   procedure today, you will receive a daily text for 2 weeks asking about   signs or symptoms of Covid 19.  Please respond to this text when you   receive it so we can follow up and keep you as safe as possible.   RESTRICTIONS:  During your procedure today, you received medications for sedation.  These   medications may affect your judgment, balance and coordination.  Therefore,   for 24 hours, you have the following restrictions:   - DO NOT drive a car, operate machinery, make legal/financial decisions,   sign important papers or drink alcohol.    ACTIVITY:  Today: no heavy lifting, straining or running due to procedural   sedation/anesthesia.  The following day: return to full activity including work.  DIET:  Eat and drink normally unless instructed otherwise.     TREATMENT FOR COMMON SIDE EFFECTS:  - Mild abdominal pain, nausea, belching, bloating or excessive gas:  rest,   eat lightly and use a heating pad.  - Sore Throat: treat with throat lozenges and/or gargle with warm salt   water.  - Because air was used during the procedure, expelling large amounts of air   from your rectum or belching is normal.  - If a bowel prep was taken, you may not have a bowel movement for 1-3 days.    This is normal.  SYMPTOMS TO WATCH FOR AND REPORT TO YOUR PHYSICIAN:  1. Abdominal pain or bloating, other than gas  cramps.  2. Chest pain.  3. Back pain.  4. Signs of infection such as: chills or fever occurring within 24 hours   after the procedure.  5. Rectal bleeding, which would show as bright red, maroon, or black stools.   (A tablespoon of blood from the rectum is not serious, especially if   hemorrhoids are present.)  6. Vomiting.  7. Weakness or dizziness.  GO DIRECTLY TO THE NEAREST EMERGENCY ROOM IF YOU HAVE ANY OF THE FOLLOWING:      Difficulty breathing              Chills and/or fever over 101 F   Persistent vomiting and/or vomiting blood   Severe abdominal pain   Severe chest pain   Black, tarry stools   Bleeding- more than one tablespoon   Any other symptom or condition that you feel may need urgent attention  Your doctor recommends these additional instructions:  If any biopsies were taken, your doctors clinic will contact you in 1 to 2   weeks with any results.  - Discharge patient to home.   - Patient has a contact number available for emergencies.  The signs and   symptoms of potential delayed complications were discussed with the   patient.  Return to normal activities tomorrow.  Written discharge   instructions were provided to the patient.   - Resume previous diet.   - Continue present medications.   - Would consider upper GI series  +Esophagram, to further evaluate the   gastric anatomy; Also would consider NM emtpying study  For questions, problems or results please call your physician - Hiren Newberry MD.  EMERGENCY PHONE NUMBER: [phone],  LAB RESULTS: [phone]  IF A COMPLICATION OR EMERGENCY SITUATION ARISES AND YOU ARE UNABLE TO REACH   YOUR PHYSICIAN - GO DIRECTLY TO THE EMERGENCY ROOM.  Hiren Newberry MD  4/19/2022 12:19:44 PM  This report has been verified and signed electronically.  Dear patient,  As a result of recent federal legislation (The Federal Cures Act), you may   receive lab or pathology results from your procedure in your [facility]   account before your physician is  able to contact you. Your physician or   their representative will relay the results to you with their   recommendations at their soonest availability.  Thank you,  PROVATION

## 2022-04-19 NOTE — TELEPHONE ENCOUNTER
I called and spoke to patient.  We did 3 cmn forms last month and I sent to scan on her chart last week.    Medicare won't cover her verio test strips.  Pharmacist told her to have us send new order for new meter for insurance preferred meter  And they hope they can process like that.    I pended new orders for [facility].    Thanks angel

## 2022-04-19 NOTE — TELEPHONE ENCOUNTER
No new care gaps identified.  Powered by [employer]. Reference number: [id number].   4/19/2022 1:28:16 PM CDT

## 2022-04-19 NOTE — PROVATION PATIENT INSTRUCTIONS
Discharge Summary/Instructions after an Endoscopic Procedure  Patient Name: Aracelis Martinez  Patient MRN: [id number]  Patient YOB: 1955 Tuesday, April 19, 2022  Hiren Newberry MD  Dear patient,  As a result of recent federal legislation (The Federal Cures Act), you may   receive lab or pathology results from your procedure in your [facility]   account before your physician is able to contact you. Your physician or   their representative will relay the results to you with their   recommendations at their soonest availability.  Thank you,  Your health is very important to us during the Covid Crisis. Following your   procedure today, you will receive a daily text for 2 weeks asking about   signs or symptoms of Covid 19.  Please respond to this text when you   receive it so we can follow up and keep you as safe as possible.   RESTRICTIONS:  During your procedure today, you received medications for sedation.  These   medications may affect your judgment, balance and coordination.  Therefore,   for 24 hours, you have the following restrictions:   - DO NOT drive a car, operate machinery, make legal/financial decisions,   sign important papers or drink alcohol.    ACTIVITY:  Today: no heavy lifting, straining or running due to procedural   sedation/anesthesia.  The following day: return to full activity including work.  DIET:  Eat and drink normally unless instructed otherwise.     TREATMENT FOR COMMON SIDE EFFECTS:  - Mild abdominal pain, nausea, belching, bloating or excessive gas:  rest,   eat lightly and use a heating pad.  - Sore Throat: treat with throat lozenges and/or gargle with warm salt   water.  - Because air was used during the procedure, expelling large amounts of air   from your rectum or belching is normal.  - If a bowel prep was taken, you may not have a bowel movement for 1-3 days.    This is normal.  SYMPTOMS TO WATCH FOR AND REPORT TO YOUR PHYSICIAN:  1. Abdominal pain or bloating, other than gas  cramps.  2. Chest pain.  3. Back pain.  4. Signs of infection such as: chills or fever occurring within 24 hours   after the procedure.  5. Rectal bleeding, which would show as bright red, maroon, or black stools.   (A tablespoon of blood from the rectum is not serious, especially if   hemorrhoids are present.)  6. Vomiting.  7. Weakness or dizziness.  GO DIRECTLY TO THE NEAREST EMERGENCY ROOM IF YOU HAVE ANY OF THE FOLLOWING:      Difficulty breathing              Chills and/or fever over 101 F   Persistent vomiting and/or vomiting blood   Severe abdominal pain   Severe chest pain   Black, tarry stools   Bleeding- more than one tablespoon   Any other symptom or condition that you feel may need urgent attention  Your doctor recommends these additional instructions:  If any biopsies were taken, your doctors clinic will contact you in 1 to 2   weeks with any results.  - Discharge patient to home.   - Patient has a contact number available for emergencies.  The signs and   symptoms of potential delayed complications were discussed with the   patient.  Return to normal activities tomorrow.  Written discharge   instructions were provided to the patient.   - Resume previous diet.   - Continue present medications.   - Await pathology results.   - Repeat colonoscopy in 7 years for surveillance.  For questions, problems or results please call your physician - Hiren Newberry MD.  EMERGENCY PHONE NUMBER: [phone],  LAB RESULTS: [phone]  IF A COMPLICATION OR EMERGENCY SITUATION ARISES AND YOU ARE UNABLE TO REACH   YOUR PHYSICIAN - GO DIRECTLY TO THE EMERGENCY ROOM.  Hiren Newberry MD  4/19/2022 12:14:53 PM  This report has been verified and signed electronically.  Dear patient,  As a result of recent federal legislation (The Federal Cures Act), you may   receive lab or pathology results from your procedure in your [facility]   account before your physician is able to contact you. Your physician or   their  representative will relay the results to you with their   recommendations at their soonest availability.  Thank you,  PROVATION

## 2022-04-19 NOTE — TRANSFER OF CARE
"Anesthesia Transfer of Care Note    Patient: Aracelis Martinez    Procedure(s) Performed: Procedure(s) (LRB):  COLONOSCOPY Suprep (N/A)  EGD (ESOPHAGOGASTRODUODENOSCOPY) (N/A)    Patient location: GI    Anesthesia Type: MAC    Transport from OR: Transported from OR on room air with adequate spontaneous ventilation    Post pain: adequate analgesia    Post assessment: no apparent anesthetic complications and tolerated procedure well    Post vital signs: stable    Level of consciousness: awake    Nausea/Vomiting: no nausea/vomiting    Complications: none    Transfer of care protocol was followed      Last vitals:   Visit Vitals  BP (!) 141/75 (BP Location: Left arm, Patient Position: Lying)   Pulse 67   Temp 36.4 °C (97.5 °F) (Temporal)   Resp 18   Ht 5' 5" (1.651 m)   Wt 96.2 kg (212 lb)   LMP  (LMP Unknown)   SpO2 98%   Breastfeeding No   BMI 35.28 kg/m²     "

## 2022-04-19 NOTE — ANESTHESIA POSTPROCEDURE EVALUATION
Anesthesia Post Evaluation    Patient: Aracelis Martinez    Procedure(s) Performed: Procedure(s) (LRB):  COLONOSCOPY Suprep (N/A)  EGD (ESOPHAGOGASTRODUODENOSCOPY) (N/A)    Final Anesthesia Type: MAC      Patient location during evaluation: GI PACU  Patient participation: Yes- Able to Participate  Level of consciousness: awake and alert and awake  Post-procedure vital signs: reviewed and stable  Pain management: adequate  Airway patency: patent    PONV status at discharge: No PONV  Anesthetic complications: no      Cardiovascular status: blood pressure returned to baseline and hemodynamically stable  Respiratory status: unassisted, spontaneous ventilation and room air  Hydration status: euvolemic  Follow-up not needed.          Vitals Value Taken Time   BP  04/19/22 1218   Temp  04/19/22 1218   Pulse  04/19/22 1218   Resp  04/19/22 1218   SpO2  04/19/22 1218       See nursing notes for vitals   No case tracking events are documented in the log.      Pain/Stefan Score: No data recorded

## 2022-04-19 NOTE — ANESTHESIA PREPROCEDURE EVALUATION
2022  Aracelis Martinez is a 67 y.o., female for EGD and colonoscopy under MAC    Past Medical History:   Diagnosis Date    Arthritis     Chronic back pain     Diabetes mellitus 2014    Diverticulosis     Hypertension     Neuroendocrine tumor of pancreas 2015    Pancreatic cancer 2015    Renal cyst     left    Thyroid disease      Past Surgical History:   Procedure Laterality Date     SECTION      COLONOSCOPY N/A 3/13/2019    Procedure: COLONOSCOPY;  Surgeon: Cem Lamar MD;  Location: [facility] (4TH FLR);  Service: Endoscopy;  Laterality: N/A;  previous order cx    EPIDURAL STEROID INJECTION INTO LUMBAR SPINE N/A 6/15/2021    Procedure: Injection-steroid-epidural-lumbar-L5-S1;  Surgeon: Saranya Cornelius Jr., MD;  Location: [facility] PAIN MGT;  Service: Pain Management;  Laterality: N/A;    ESOPHAGOGASTRODUODENOSCOPY N/A 2019    Procedure: ESOPHAGOGASTRODUODENOSCOPY (EGD);  Surgeon: Jonathan Oneill MD;  Location: [facility] (4TH FLR);  Service: Endoscopy;  Laterality: N/A;    ESOPHAGOGASTRODUODENOSCOPY N/A 2020    Procedure: EGD (ESOPHAGOGASTRODUODENOSCOPY);  Surgeon: Shady Costa MD;  Location: [facility] (2ND FLR);  Service: Endoscopy;  Laterality: N/A;  Please schedule patient as soon as possible in the 2nd floor history of a Whipple surgery for neuroendocrine pancreatic tumor many years ago with now constant belching and regurgitation.  May need airway protection.  Rule out celiac sprue rule out lact    EXCISION OF GANGLION CYST OF HAND Right 10/22/2018    Procedure: EXCISION, GANGLION CYST, HAND- RIGHT, LONG AND INDEX FINGER;  Surgeon: Sowmya Schmidt MD;  Location: [facility] OR;  Service: Orthopedics;  Laterality: Right;  Stretcher; Supine; Hand Pan 1 & [place] 2; Dr. Loja's Rasp    HYSTERECTOMY  2015    ISMAEL    INJECTION OF ANESTHETIC AGENT AROUND MEDIAL BRANCH  NERVES INNERVATING LUMBAR FACET JOINT Bilateral 9/28/2021    Procedure: Block-nerve-medial branch-lumbar-bilateral L4-5 and L5-S1;  Surgeon: Saranya Cornelius Jr., MD;  Location: [facility] PAIN MGT;  Service: Pain Management;  Laterality: Bilateral;    INJECTION OF ANESTHETIC AGENT AROUND MEDIAL BRANCH NERVES INNERVATING LUMBAR FACET JOINT Bilateral 10/12/2021    Procedure: Bilateral Lumbar Medial Branch Block L4-5 L5-S1- (No Sedation);  Surgeon: Saranya Cornelius Jr., MD;  Location: [facility] PAIN MGT;  Service: Pain Management;  Laterality: Bilateral;    KNEE ARTHROSCOPY  4/18/2014    LATS/left    OOPHORECTOMY      PANCREAS SURGERY  2015    MD Ritchie    RADIOFREQUENCY THERMOCOAGULATION Bilateral 11/9/2021    Procedure: Radiofrequency Themocoagulation of medial branches: L4-5 and L5-S1;  Surgeon: Saranya Cornelius Jr., MD;  Location: [facility] PAIN MGT;  Service: Pain Management;  Laterality: Bilateral;  Patient is diabetic.     RADIOFREQUENCY THERMOCOAGULATION Left 12/16/2021    Procedure: RADIOFREQUENCY THERMAL COAGULATION LEFT L4-5, L5-S1 (IV Sedation);  Surgeon: Saranya Cornelius Jr., MD;  Location: [facility] PAIN MGT;  Service: Pain Management;  Laterality: Left;  diabetic    TONSILLECTOMY      TRANSFORAMINAL EPIDURAL INJECTION OF STEROID Right 8/17/2021    Procedure: Injection,steroid,epidural,transforaminal approach; Levels: L5-S1;  Surgeon: Saranya Cornelius Jr., MD;  Location: [facility] PAIN MGT;  Service: Pain Management;  Laterality: Right;  No pacemaker. Patient is diabetic.    TRANSFORAMINAL EPIDURAL INJECTION OF STEROID Right 8/24/2021    Procedure: Injection,steroid,epidural,transforaminal approach; Levels: L5-S1;  Surgeon: Saranya Cornelius Jr., MD;  Location: [facility] PAIN MGT;  Service: Pain Management;  Laterality: Right;  No pacemaker. Patient is diabetic.            Pre-op Assessment    I have reviewed the Patient Summary Reports.    I have reviewed the NPO Status.   I have reviewed the Medications.     Review  of Systems  Social:  Smoker    Endocrine:  Obesity / BMI > 30      Physical Exam  General: Well nourished    Airway:  Mallampati: II           Anesthesia Plan  Type of Anesthesia, risks & benefits discussed:    Anesthesia Type: MAC  Intra-op Monitoring Plan: Standard ASA Monitors  Informed Consent: Informed consent signed with the Patient and all parties understand the risks and agree with anesthesia plan.  All questions answered.   ASA Score: 3    Ready For Surgery From Anesthesia Perspective.     .

## 2022-04-19 NOTE — H&P
Short Stay Endoscopy History and Physical    PCP - Alen Damico MD    Procedure - Colonoscopy  + EGD  ASA - per anesthesia  Mallampati - per anesthesia  History of Anesthesia problems - no  Family history Anesthesia problems - no   Plan of anesthesia - General    HPI:  This is a 67 y.o. female here for evaluation of :   egd for dyspepsia, belch, n/v   Colon for hx of colon polyps    ROS:  Constitutional: No fevers, chills, No weight loss  CV: No chest pain  Pulm: No cough, No shortness of breath  GI: see HPI  Derm: No rash    Medical History:  has a past medical history of Arthritis, Chronic back pain, Diabetes mellitus (2014), Diverticulosis, Hypertension, Neuroendocrine tumor of pancreas (), Pancreatic cancer (), Renal cyst, and Thyroid disease.    Surgical History:  has a past surgical history that includes Tonsillectomy; Knee arthroscopy (2014);  section; Excision of ganglion cyst of hand (Right, 10/22/2018); Pancreas surgery (); Colonoscopy (N/A, 3/13/2019); Esophagogastroduodenoscopy (N/A, 2019); Esophagogastroduodenoscopy (N/A, 2020); Hysterectomy (); Oophorectomy; Epidural steroid injection into lumbar spine (N/A, 6/15/2021); Transforaminal epidural injection of steroid (Right, 2021); Transforaminal epidural injection of steroid (Right, 2021); Injection of anesthetic agent around medial branch nerves innervating lumbar facet joint (Bilateral, 2021); Injection of anesthetic agent around medial branch nerves innervating lumbar facet joint (Bilateral, 10/12/2021); Radiofrequency thermocoagulation (Bilateral, 2021); and Radiofrequency thermocoagulation (Left, 2021).    Family History: family history includes Breast cancer in her sister; Diabetes in her mother; Heart disease in her mother; Hypertension in her brother, mother, and sister; Stroke in her mother and sister.. Otherwise no colon cancer, inflammatory bowel disease, or GI  malignancies.    Social History:  reports that she has been smoking cigarettes. She has a 2.50 pack-year smoking history. She has never used smokeless tobacco. She reports current alcohol use. She reports that she does not use drugs.    Review of patient's allergies indicates:   Allergen Reactions    Erythromycin base        Medications:   Medications Prior to Admission   Medication Sig Dispense Refill Last Dose    amLODIPine (NORVASC) 10 MG tablet 1 tablet by mouth every day. 30 tablet 10 4/18/2022 at Unknown time    blood sugar diagnostic (ONETOUCH VERIO TEST STRIPS) Strp Use once daily to check blood sugar 100 strip 3 4/19/2022 at Unknown time    blood-glucose meter kit To check BG 1 time daily, to use with insurance preferred meter 1 each 0 4/19/2022 at Unknown time    famotidine (PEPCID) 40 MG tablet Take 1 tablet (40 mg total) by mouth once daily. 30 tablet 2 4/18/2022 at Unknown time    glipiZIDE (GLUCOTROL) 5 MG TR24 Take 1 tablet (5 mg total) by mouth daily with breakfast. For diabetes control. 30 tablet 6 4/19/2022 at Unknown time    hydrOXYzine pamoate (VISTARIL) 25 MG Cap TAKE 1 CAPSULE BY MOUTH TWICE DAILY AS NEEDED FOR ANXIETY 60 capsule 3 4/18/2022 at Unknown time    lancets Misc To check BG 1 time daily, to use with insurance preferred meter 100 each 3 4/19/2022 at Unknown time    levothyroxine (SYNTHROID) 50 MCG tablet levothyroxine 50 mcg tablet   4/19/2022 at Unknown time    losartan (COZAAR) 50 MG tablet TAKE 1 TABLET(50 MG) BY MOUTH EVERY DAY 90 tablet 3 4/18/2022 at Unknown time    meloxicam (MOBIC) 15 MG tablet TAKE 1 TABLET BY MOUTH EVERY DAY FOR ANKLE PAIN OR SWELLING 60 tablet 3 4/18/2022 at Unknown time    metoclopramide HCl (REGLAN) 10 MG tablet TAKE 1 TABLET(10 MG) BY MOUTH THREE TIMES DAILY BEFORE MEALS 90 tablet 5 4/18/2022 at Unknown time    ondansetron (ZOFRAN-ODT) 4 MG TbDL Take 1 tablet (4 mg total) by mouth every 6 (six) hours as needed (nausea). 30 tablet 2 Past Week  at Unknown time    pantoprazole (PROTONIX) 40 MG tablet One tablet daily 90 tablet 3 4/18/2022 at Unknown time    sodium,potassium,mag sulfates (SUPREP BOWEL PREP KIT) 17.5-3.13-1.6 gram SolR Take 177 mLs by mouth once daily. 1 kit 0 4/19/2022 at Unknown time    temazepam (RESTORIL) 30 mg capsule Take 1 capsule (30 mg total) by mouth nightly as needed for Insomnia (insomnia). 30 capsule 2 4/18/2022 at Unknown time    traMADoL (ULTRAM) 50 mg tablet Take 1 tablet (50 mg total) by mouth every 8 (eight) hours as needed for Pain. 90 tablet 1 4/18/2022 at Unknown time    metFORMIN (GLUCOPHAGE) 500 MG tablet TAKE 1 TABLET BY MOUTH EVERY MORNING, AND 2 TABLETS EVERY EVENING FOR DIABETES 270 tablet 3          Physical Exam:    Vital Signs:   Vitals:    04/19/22 1120   BP: (!) 141/75   Pulse: 67   Resp: 18   Temp: 97.5 °F (36.4 °C)       General Appearance: Well appearing in no acute distress  Eyes:    No scleral icterus  ENT: Neck supple, Lips, mucosa, and tongue normal; teeth and gums normal  Abdomen: Soft, non tender, non distended with positive bowel sounds. No hepatosplenomegaly, ascites, or mass.  Extremities: 2+ pulses, no clubbing, cyanosis or edema  Skin: No rash      Labs:  Lab Results   Component Value Date    WBC 7.16 02/15/2022    HGB 13.9 02/15/2022    HCT 45.0 02/15/2022     02/15/2022    CHOL 197 10/19/2021    TRIG 81 10/19/2021    HDL 57 10/19/2021    ALT 15 02/15/2022    AST 13 02/15/2022     02/15/2022    K 3.7 02/15/2022     02/15/2022    CREATININE 1.0 02/15/2022    BUN 25 (H) 02/15/2022    CO2 27 02/15/2022    TSH 2.510 05/13/2020    HGBA1C 6.3 (H) 02/15/2022       I have explained the risks and benefits of endoscopy procedures to the patient including but not limited to bleeding, perforation, infection, and death.  The patient was asked if they understand and allowed to ask any further questions to their satisfaction.    Hiren Newberry MD

## 2022-04-20 ENCOUNTER — TELEPHONE (OUTPATIENT)
Dept: ENDOSCOPY | Facility: HOSPITAL | Age: 67
End: 2022-04-20
Payer: MEDICARE

## 2022-04-20 ENCOUNTER — PATIENT MESSAGE (OUTPATIENT)
Dept: SPORTS MEDICINE | Facility: CLINIC | Age: 67
End: 2022-04-20
Payer: MEDICARE

## 2022-04-21 ENCOUNTER — OFFICE VISIT (OUTPATIENT)
Dept: INTERNAL MEDICINE | Facility: CLINIC | Age: 67
End: 2022-04-21
Payer: MEDICARE

## 2022-04-21 ENCOUNTER — CLINICAL SUPPORT (OUTPATIENT)
Dept: REHABILITATION | Facility: HOSPITAL | Age: 67
End: 2022-04-21
Payer: MEDICARE

## 2022-04-21 ENCOUNTER — PATIENT MESSAGE (OUTPATIENT)
Dept: INTERNAL MEDICINE | Facility: CLINIC | Age: 67
End: 2022-04-21

## 2022-04-21 VITALS
SYSTOLIC BLOOD PRESSURE: 120 MMHG | TEMPERATURE: 97 F | HEART RATE: 87 BPM | WEIGHT: 211.19 LBS | OXYGEN SATURATION: 98 % | BODY MASS INDEX: 35.18 KG/M2 | HEIGHT: 65 IN | DIASTOLIC BLOOD PRESSURE: 70 MMHG

## 2022-04-21 DIAGNOSIS — M54.50 LUMBAR PAIN: ICD-10-CM

## 2022-04-21 DIAGNOSIS — I10 ESSENTIAL HYPERTENSION: ICD-10-CM

## 2022-04-21 DIAGNOSIS — E11.9 TYPE 2 DIABETES MELLITUS WITHOUT COMPLICATION, WITHOUT LONG-TERM CURRENT USE OF INSULIN: Primary | ICD-10-CM

## 2022-04-21 DIAGNOSIS — M53.86 DECREASED ROM OF LUMBAR SPINE: Primary | ICD-10-CM

## 2022-04-21 DIAGNOSIS — R11.0 NAUSEA: ICD-10-CM

## 2022-04-21 PROCEDURE — 99214 OFFICE O/P EST MOD 30 MIN: CPT | Mod: S$PBB,,, | Performed by: INTERNAL MEDICINE

## 2022-04-21 PROCEDURE — 97140 MANUAL THERAPY 1/> REGIONS: CPT | Mod: PO,CQ

## 2022-04-21 PROCEDURE — 99214 PR OFFICE/OUTPT VISIT, EST, LEVL IV, 30-39 MIN: ICD-10-PCS | Mod: S$PBB,,, | Performed by: INTERNAL MEDICINE

## 2022-04-21 PROCEDURE — 99999 PR PBB SHADOW E&M-EST. PATIENT-LVL IV: CPT | Mod: PBBFAC,,, | Performed by: INTERNAL MEDICINE

## 2022-04-21 PROCEDURE — 99214 OFFICE O/P EST MOD 30 MIN: CPT | Mod: PBBFAC,PO | Performed by: INTERNAL MEDICINE

## 2022-04-21 PROCEDURE — 99999 PR PBB SHADOW E&M-EST. PATIENT-LVL IV: ICD-10-PCS | Mod: PBBFAC,,, | Performed by: INTERNAL MEDICINE

## 2022-04-21 PROCEDURE — 97110 THERAPEUTIC EXERCISES: CPT | Mod: PO,CQ

## 2022-04-21 RX ORDER — TEMAZEPAM 30 MG/1
30 CAPSULE ORAL NIGHTLY PRN
Qty: 30 CAPSULE | Refills: 2 | Status: SHIPPED | OUTPATIENT
Start: 2022-04-21 | End: 2022-07-20

## 2022-04-21 RX ORDER — PREGABALIN 25 MG/1
CAPSULE ORAL
COMMUNITY
Start: 2022-04-13 | End: 2022-06-01

## 2022-04-21 RX ORDER — LANCETS 33 GAUGE
EACH MISCELLANEOUS DAILY
COMMUNITY
Start: 2022-02-07 | End: 2022-09-27

## 2022-04-21 NOTE — PROGRESS NOTES
"  Physical Therapy Daily Note     Name: Aracelis Martinez  [facility] Number: [id number]    Therapy Diagnosis:   Encounter Diagnoses   Name Primary?    Decreased ROM of lumbar spine Yes    Lumbar pain      Physician: Esteban Monaco MD    Visit Date: 4/21/2022  Physician Orders: Evaluate and treat  Medical Diagnosis: Lumbar radiculopathy [M54.16]    Evaluation Date: 1/18/2022  Authorization Period Expiration: 1/24/2022 - 12/1/2022  Plan of Care Certification Period: 3/4/2022- 6/4/2022  Visit #/Visits authorized: 16/20   PTA Visit #: 1/6    Time In: 9:18 AM   Time Out: 10:06 AM  Total Billable Time: 48 minutes    Precautions: Standard    Subjective     Pt reports: Some pain today but nothing more than usual     She was compliant with home exercise program.    Response to previous treatment: "Pretty good" (con)    Functional change: None stated    Pain: 3/10     Location: right back and buttocks      Objective       Aracelis received therapeutic exercises to develop strength, endurance, ROM, flexibility, posture and core stabilization for 38 minutes including:    Hamstring, QL and Piriformis stretching 30 seconds hold x 3  POSTERIOR PELVIC TILT 10 seconds hold x 10   SLR with ankle dorsiflexion 10 x 3   LTR and DOUBLE KNEE TO CHEST  10 x 3   Bilateral leg lifts with knee bent 10 x 3   Bilateral leg lifts with knee extended 5 x 6  Sciatic nerve stretch - 30 repititions  With towel  Marching in place with emphasis on hip and knee flexion with ankle dorsi flexion 10 x 3  Torrey stretch - 4X30 with and without strap  Hip abduction with BLUE THERABAND  - 30 repititions    Bridges 10 x 3  Crunches on swiss ball 10 x 3  Bike level 3 x  10 minutes    Aracelis received the following manual therapy techniques: Joint mobilizations were applied to the: lumbar for 10 minutes, including:  Unilateral and central posterior to anterior mobilization grade 1,2,3 and 4 oscillation. Not Performed    STM to lower back and Right  glute    Home Exercises " Provided and Patient Education Provided     Education provided:   - The patient was instructed to perform stretching of both lower extremities every day.     Written Home Exercises Provided: yes.  Exercises were reviewed and Aracelis was able to demonstrate them prior to the end of the session.  Aracelis demonstrated good  understanding of the education provided.     See EMR under Patient Instructions for exercises provided prior visit.    Assessment     Patient tolerated therapy well. Minimal verbal cues needed to improve exercise technique. She shows improved core strength with bilateral leg lifts. No dizziness was experienced in today's session. STM to Right lower back and glute used to reduce pain and tension in the muscle. Patient stated exercise and manual therapy reduced pain. Aracelis will continue to benefit from skilled therapy.      Aarcelis Is progressing well towards her goals.     Pt prognosis is Good.     Pt will continue to benefit from skilled outpatient physical therapy to address the deficits listed in the problem list box on initial evaluation, provide pt/family education and to maximize pt's level of independence in the home and community environment.     Pt's spiritual, cultural and educational needs considered and pt agreeable to plan of care and goals.    Anticipated barriers to physical therapy: None       Goals:  Short Term Goals: 4 weeks        Goal   Status     1. Pt. to report decreased lumbar pain </ =  6/10 at worst to increase tolerance for upright unsupported sitting posture. Met 2/9/2022    2. Pt. to demonstrate proper posture requiring minimum verbal cues from PT for improved posture positioning  Met 3/4/2022    3. Increase L1 - L3 joint mobility to 3/6 to promote greater ease with squat to stand transitioning. Met 3/4/2022    4. Increase lumbar extension AROM ? 10 degrees to promote greater ease with self-care. Met 3/4/2022    5. Increase AROM lumbar rotation bilateral  ? 35 degrees to promote  greater ease with driving. Met 3/4/2022    6. Pt to be independent with HEP to improve ROM and independence with ADL's Met 3/4/2022          Long Term Goals: 8 weeks     Goal   Status     1. Pt. to report decreased lumbar pain </ =  4/10 at worst to increase tolerance for upright unsupported sitting posture.  Met 3/2/22   2. Pt. to demonstrate proper posture requiring minimum verbal cues from PT for improved posture positioning  Goal achieved as of 4/4/2022    3. Increase L3 - L5 joint mobility to 3/6 to promote greater ease with squat to stand transitioning.     4. Increase lumbar extension AROM ? 15 degrees to promote greater ease with self-care.     5. Increase AROM lumbar rotation bilateral  ? 40 degrees to promote greater ease with driving.     6. Pt. to demonstrate increased MMT for rectus abdominus   5/5 to improve supine to sitting transfer. Goal achieved as of 4/4/2022    7. Pt. to demonstrate increased MMT for Lumbar extensor paraspinals t  5/5 to improve tolerance for prolong standing and ambulation      8. Pt. to demonstrate increased MMT for Lumbar/thoracic rotators   5/5 to improve tolerance for ADL and work activities.      9. Pt. to demonstrate increased MMT for Lumbar/thoracic side bending   5/5 to improve household cleaning.      10. Pt to be independent with HEP to improve ROM and independence with ADL's  Goal achieved as of 4/4/2022         Plan   Plan of care Certification: 4/4/2022 to  7/4/2022.     Continue POC as outlined in eval as: Outpatient Physical Therapy to include the following interventions: Gait Training, Manual Therapy, Neuromuscular Re-ed, Patient Education, Therapeutic Activities and Therapeutic Exercise.      Jordyn Duque, PTA

## 2022-04-22 LAB
FINAL PATHOLOGIC DIAGNOSIS: NORMAL
GROSS: NORMAL
Lab: NORMAL

## 2022-04-25 ENCOUNTER — PATIENT MESSAGE (OUTPATIENT)
Dept: GASTROENTEROLOGY | Facility: CLINIC | Age: 67
End: 2022-04-25
Payer: MEDICARE

## 2022-04-25 ENCOUNTER — CLINICAL SUPPORT (OUTPATIENT)
Dept: REHABILITATION | Facility: HOSPITAL | Age: 67
End: 2022-04-25
Payer: MEDICARE

## 2022-04-25 DIAGNOSIS — M53.86 DECREASED ROM OF LUMBAR SPINE: Primary | ICD-10-CM

## 2022-04-25 DIAGNOSIS — R10.10 PAIN OF UPPER ABDOMEN: Primary | ICD-10-CM

## 2022-04-25 DIAGNOSIS — M54.50 LUMBAR PAIN: ICD-10-CM

## 2022-04-25 PROCEDURE — 97110 THERAPEUTIC EXERCISES: CPT | Mod: PO,CQ

## 2022-04-25 NOTE — PROGRESS NOTES
"  Physical Therapy Daily Note     Name: Aracelis Martinez  Clinic Number: [id number]    Therapy Diagnosis:   Encounter Diagnoses   Name Primary?    Decreased ROM of lumbar spine Yes    Lumbar pain      Physician: Esteban Monaco MD    Visit Date: 4/25/2022  Physician Orders: Evaluate and treat  Medical Diagnosis: Lumbar radiculopathy [M54.16]    Evaluation Date: 1/18/2022  Authorization Period Expiration: 1/24/2022 - 12/1/2022  Plan of Care Certification Period: 3/4/2022- 6/4/2022  Visit #/Visits authorized: 17/20   PTA Visit #: 2/6    Time In: 8:00 AM   Time Out: 9:15 AM  Total Billable Time: 45 minutes    Precautions: Standard    Subjective     Pt reports: Nothing significant today, no pain    She was compliant with home exercise program.    Response to previous treatment: "Pretty good" (con)    Functional change: No radiating pain in her back while sleeping. Patient feels like therapy has helped her get to where she wants to be.     Pain: 0/10     Location: right back and buttocks      Objective       Aracelis received therapeutic exercises to develop strength, endurance, ROM, flexibility, posture and core stabilization for 45 minutes including:    Hamstring, QL and Piriformis stretching 30 seconds hold x 3  POSTERIOR PELVIC TILT 10 seconds hold x 10   SLR with ankle dorsiflexion #2 10 x 3   LTR and DOUBLE KNEE TO CHEST  10 x 3   Bilateral leg lifts with knee bent 10 x 3   Bilateral leg lifts with knee extended 5 x 6  Sciatic nerve stretch - 30 repititions  With towel  Marching in place with emphasis on hip and knee flexion with ankle dorsi flexion 10 x 3  Torrey stretch - 4X30 with and without strap  Hip abduction with BLUE THERABAND  - 30 repititions    Bridges with Right theraband 10 x 3  Crunches on swiss ball 10 x 3  Bike level 3 x  10 minutes    Aracelis received the following manual therapy techniques: Joint mobilizations were applied to the: lumbar for 00 minutes, including:  Unilateral and central posterior to " anterior mobilization grade 1,2,3 and 4 oscillation. Not Performed    STM to lower back and Right  glute    Home Exercises Provided and Patient Education Provided     Education provided:   - The patient was instructed to perform stretching of both lower extremities every day.     Written Home Exercises Provided: yes.  Exercises were reviewed and Aracelis was able to demonstrate them prior to the end of the session.  Aracelis demonstrated good  understanding of the education provided.     See EMR under Patient Instructions for exercises provided prior visit.    Assessment     Patient tolerated therapy well. Weight added to Straight Leg Raise to strengthen lower extremity. Red theraband added to bridges to strengthen hip abductors. Patient had no adverse effects with progressed exercises. PTA discussed pt's goals with her, pt feels as though therapy has helped her get to where she wanted to be. PTA discussed possibility of discharge in the future. Aracelis will continue to benefit from skilled therapy.     Aracelis Is progressing well towards her goals.     Pt prognosis is Good.     Pt will continue to benefit from skilled outpatient physical therapy to address the deficits listed in the problem list box on initial evaluation, provide pt/family education and to maximize pt's level of independence in the home and community environment.     Pt's spiritual, cultural and educational needs considered and pt agreeable to plan of care and goals.    Anticipated barriers to physical therapy: None       Goals:  Short Term Goals: 4 weeks        Goal   Status     1. Pt. to report decreased lumbar pain </ =  6/10 at worst to increase tolerance for upright unsupported sitting posture. Met 2/9/2022    2. Pt. to demonstrate proper posture requiring minimum verbal cues from PT for improved posture positioning  Met 3/4/2022    3. Increase L1 - L3 joint mobility to 3/6 to promote greater ease with squat to stand transitioning. Met 3/4/2022    4. Increase  lumbar extension AROM ? 10 degrees to promote greater ease with self-care. Met 3/4/2022    5. Increase AROM lumbar rotation bilateral  ? 35 degrees to promote greater ease with driving. Met 3/4/2022    6. Pt to be independent with HEP to improve ROM and independence with ADL's Met 3/4/2022          Long Term Goals: 8 weeks     Goal   Status     1. Pt. to report decreased lumbar pain </ =  4/10 at worst to increase tolerance for upright unsupported sitting posture.  Met 3/2/22   2. Pt. to demonstrate proper posture requiring minimum verbal cues from PT for improved posture positioning  Goal achieved as of 4/4/2022    3. Increase L3 - L5 joint mobility to 3/6 to promote greater ease with squat to stand transitioning.     4. Increase lumbar extension AROM ? 15 degrees to promote greater ease with self-care.     5. Increase AROM lumbar rotation bilateral  ? 40 degrees to promote greater ease with driving.     6. Pt. to demonstrate increased MMT for rectus abdominus   5/5 to improve supine to sitting transfer. Goal achieved as of 4/4/2022    7. Pt. to demonstrate increased MMT for Lumbar extensor paraspinals t  5/5 to improve tolerance for prolong standing and ambulation      8. Pt. to demonstrate increased MMT for Lumbar/thoracic rotators   5/5 to improve tolerance for ADL and work activities.      9. Pt. to demonstrate increased MMT for Lumbar/thoracic side bending   5/5 to improve household cleaning.      10. Pt to be independent with HEP to improve ROM and independence with ADL's  Goal achieved as of 4/4/2022         Plan   Plan of care Certification: 4/4/2022 to  7/4/2022.     Continue POC as outlined in eval as: Outpatient Physical Therapy to include the following interventions: Gait Training, Manual Therapy, Neuromuscular Re-ed, Patient Education, Therapeutic Activities and Therapeutic Exercise.      Jordyn Duque, PTA

## 2022-04-28 ENCOUNTER — CLINICAL SUPPORT (OUTPATIENT)
Dept: REHABILITATION | Facility: HOSPITAL | Age: 67
End: 2022-04-28
Payer: MEDICARE

## 2022-04-28 DIAGNOSIS — M53.86 DECREASED ROM OF LUMBAR SPINE: Primary | ICD-10-CM

## 2022-04-28 DIAGNOSIS — M54.50 LUMBAR PAIN: ICD-10-CM

## 2022-04-28 PROCEDURE — 97110 THERAPEUTIC EXERCISES: CPT | Mod: PO

## 2022-04-28 NOTE — PROGRESS NOTES
"  Physical Therapy Daily Note     Name: Aracelis Martinez  [facility] Number: [id number]    Therapy Diagnosis:   Encounter Diagnoses   Name Primary?    Decreased ROM of lumbar spine Yes    Lumbar pain      Physician: Esteban Monaco MD    Visit Date: 4/28/2022  Physician Orders: Evaluate and treat  Medical Diagnosis: Lumbar radiculopathy [M54.16]    Evaluation Date: 1/18/2022  Authorization Period Expiration: 1/24/2022 - 12/1/2022  Plan of Care Certification Period: 3/4/2022- 6/4/2022  Visit #/Visits authorized: 18/20   PTA Visit #: 0/6    Time In: 9:20 AM   Time Out: 10:00 AM  Total Billable Time: 40 minutes    Precautions: Standard    Subjective     Patient: continues to do well without any adverse effects. Patient mentions she is ready to be discharged from physical therapy.    She was compliant with home exercise program.    Response to previous treatment: "None stated"   Functional change: No radiating pain in her back while sleeping. Patient feels like therapy has helped her get to where she wants to be.   Pain: 2/10   Location: right back and buttocks      Objective     Lumbar Range of Motion:     Active    Rotation to the left  0-60      Rotation to the right  0-60      Left Side Bending 0-30   Right Side Bending 0-30         Lower Extremity Strength  Right LE   Left LE     Right Internal Oblique 4/5 Left Internal Oblique 4/5   Right External Oblique 4/5 Left Internal Oblique 4/5    external oblique, internal oblique, and transverse abdominis, Abdominal flexors  4/5 Iliospoas: 4/5      Right LE   Left LE     Quadriceps: 4+/5 Quadriceps: 4+/5   Hamstrings: 4+/5 Hamstrings: 4+/5   Iliospoas: 4+/5 Iliospoas: 4+/5   Hip extension:  4+/5 Hip extension: 4+/5   PGM: 4+/5 PGM: 4+/5   Hip ER:  4+/5 Hip ER: 4+/5   Hip IR: 4+/5 Hip IR: 4+/5   Ankle dorsiflexion: 4+/5 Ankle dorsiflexion: 4+/5   Ankle plantarflexion: 4+/5 Ankle plantarflexion: 4+/5      Treatment     Aracelis received therapeutic exercises to develop strength, " endurance, ROM, flexibility, posture and core stabilization for 40 minutes including:    Hamstring, QL and Piriformis stretching 30 seconds hold x 3  POSTERIOR PELVIC TILT 10 seconds hold x 10   SLR with ankle dorsiflexion #1 10 x 3   LTR and DOUBLE KNEE TO CHEST  10 x 3    Bilateral leg lifts with knee bent 10 x 3   Bilateral leg lifts with knee extended 5 x 6  Sciatic nerve stretch - 30 repititions  With towel  Marching in place with emphasis on hip and knee flexion with ankle dorsi flexion 10 x 3  Torrey stretch - 4X30 with and without strap  Hip abduction with BLUE THERABAND  - 30 repititions    Bridges with Right theraband 10 x 3  Crunches on swiss ball 10 x 3  Bike level 3 x  10 minutes  Sidelying straight leg hip abduction, 3x10 - bilateral     Aracelis received the following manual therapy techniques: Joint mobilizations were applied to the: lumbar for 00 minutes, including:    Unilateral and central posterior to anterior mobilization grade 1,2,3 and 4 oscillation.   STM to lower back and Right  glute    Home Exercises Provided and Patient Education Provided     Education provided:   - home exercise program updated on 4/28/2022    Written Home Exercises Provided: yes.  Exercises were reviewed and Aracelis was able to demonstrate them prior to the end of the session.  Aracelis demonstrated good  understanding of the education provided.     See EMR under Patient Instructions for exercises provided prior visit.    Assessment     Patient started physical therapy due to lower back pain with radiating symptoms down the lower extremity.  Pt required an increase in verbal and tactile cueing during today's treatment session for proper form and pacing of exercises.  Pt tolerated treatment session good  without any adverse events.  Pt educated to continue HEP to improve progression. Patient has achieve the majority of her physical therapy goals. Patient has shown improvements in lumbar spine and lower extremity functional strength  and mobility. Patient is discharged from skilled physical therapy.    Pt's spiritual, cultural and educational needs considered and pt agreeable to plan of care and goals.    Anticipated barriers to physical therapy: None       Goals:  Short Term Goals: 4 weeks        Goal   Status     1. Pt. to report decreased lumbar pain </ =  6/10 at worst to increase tolerance for upright unsupported sitting posture. Met 2/9/2022    2. Pt. to demonstrate proper posture requiring minimum verbal cues from PT for improved posture positioning  Met 3/4/2022    3. Increase L1 - L3 joint mobility to 3/6 to promote greater ease with squat to stand transitioning. Met 3/4/2022    4. Increase lumbar extension AROM ? 10 degrees to promote greater ease with self-care. Met 3/4/2022    5. Increase AROM lumbar rotation bilateral  ? 35 degrees to promote greater ease with driving. Met 3/4/2022    6. Pt to be independent with HEP to improve ROM and independence with ADL's Met 3/4/2022          Long Term Goals: 8 weeks     Goal   Status     1. Pt. to report decreased lumbar pain </ =  4/10 at worst to increase tolerance for upright unsupported sitting posture.  Met 3/2/22   2. Pt. to demonstrate proper posture requiring minimum verbal cues from PT for improved posture positioning  Goal achieved as of 4/4/2022    3. Increase L3 - L5 joint mobility to 3/6 to promote greater ease with squat to stand transitioning. Goal achieved as of 4/28/2022     4. Increase lumbar extension AROM ? 15 degrees to promote greater ease with self-care. Goal discharged as of 4/28/2022     5. Increase AROM lumbar rotation bilateral  ? 40 degrees to promote greater ease with driving. Goal achieved as of 4/28/2022     6. Pt. to demonstrate increased MMT for rectus abdominus   5/5 to improve supine to sitting transfer. Goal achieved as of 4/4/2022    7. Pt. to demonstrate increased MMT for Lumbar extensor paraspinals t  5/5 to improve tolerance for prolong standing and  ambulation  Goal achieved as of 4/28/2022    8. Pt. to demonstrate increased MMT for Lumbar/thoracic rotators   5/5 to improve tolerance for ADL and work activities.  Goal achieved as of 4/28/2022    9. Pt. to demonstrate increased MMT for Lumbar/thoracic side bending   5/5 to improve household cleaning.  Goal achieved as of 4/28/2022    10. Pt to be independent with HEP to improve ROM and independence with ADL's  Goal achieved as of 4/4/2022         Plan   Plan of care Certification: 4/4/2022 to  7/4/2022.     Continue POC as outlined in eval as: Outpatient Physical Therapy to include the following interventions: Gait Training, Manual Therapy, Neuromuscular Re-ed, Patient Education, Therapeutic Activities and Therapeutic Exercise.      Bry Arciniega, PT

## 2022-04-30 ENCOUNTER — EXTERNAL CHRONIC CARE MANAGEMENT (OUTPATIENT)
Dept: PRIMARY CARE CLINIC | Facility: CLINIC | Age: 67
End: 2022-04-30
Payer: MEDICARE

## 2022-04-30 PROCEDURE — 99439 PR CHRONIC CARE MGMT, EA ADDTL 20 MIN: ICD-10-PCS | Mod: S$PBB,,, | Performed by: INTERNAL MEDICINE

## 2022-04-30 PROCEDURE — 99490 CHRNC CARE MGMT STAFF 1ST 20: CPT | Mod: PBBFAC,PO,25 | Performed by: INTERNAL MEDICINE

## 2022-04-30 PROCEDURE — 99490 PR CHRONIC CARE MGMT, 1ST 20 MIN: ICD-10-PCS | Mod: S$PBB,,, | Performed by: INTERNAL MEDICINE

## 2022-04-30 PROCEDURE — 99439 CHRNC CARE MGMT STAF EA ADDL: CPT | Mod: S$PBB,,, | Performed by: INTERNAL MEDICINE

## 2022-04-30 PROCEDURE — 99439 CHRNC CARE MGMT STAF EA ADDL: CPT | Mod: PBBFAC,PO | Performed by: INTERNAL MEDICINE

## 2022-04-30 PROCEDURE — 99490 CHRNC CARE MGMT STAFF 1ST 20: CPT | Mod: S$PBB,,, | Performed by: INTERNAL MEDICINE

## 2022-05-23 ENCOUNTER — TELEPHONE (OUTPATIENT)
Dept: INTERNAL MEDICINE | Facility: CLINIC | Age: 67
End: 2022-05-23
Payer: MEDICARE

## 2022-05-23 NOTE — TELEPHONE ENCOUNTER
Mendel SINGH Staff  Phone Number: [phone]     MRN: [id number]     Patient: Aracelis Martinez     Phone: [phone]     Pharmacy: [facility] on [address]     Good Morning;     This pt see's Dr. Damico as her PCP. I spoke with pt this morning she is requesting a refill of her Lyrica 25mg to [facility] on [address]. Please advise     If needed, you may reach me via [employer] or at the number listed below.     Thank you     Mendel Solares LPN   Care Coordinator   [facility]   [phone] ext [phone]

## 2022-05-23 NOTE — PROGRESS NOTES
Subjective:       Patient ID: Aracelis Martinez is a 67 y.o. female.    Chief Complaint: Follow-up and Medication Refill    HPI   The patient presents for follow-up of medical conditions which include type 2 diabetes mellitus, hypertension.  The patient also has periodic nausea, vomiting, and abdominal bloating.  Symptoms are alleviated with use of Reglan and Zofran.  The patient is followed by Gastroenterology.     The patient had an EGD and colonoscopy yesterday.  A follow-up colonoscopy in 7 years is recommended.  A benign polyp was removed.  The patient has not been monitoring blood sugars recently.  The EGD showed extrinsic compression of the body of the stomach.    The patient is attending physical therapy twice a week to treat chronic back pain symptoms.    Review of Systems   Constitutional: Negative for activity change, appetite change and unexpected weight change.   Eyes: Negative for visual disturbance.   Respiratory: Negative for shortness of breath.    Cardiovascular: Negative for chest pain, palpitations and leg swelling.   Gastrointestinal: Positive for abdominal distention, nausea and vomiting. Negative for abdominal pain, blood in stool and diarrhea.   Genitourinary: Negative for dysuria, frequency, hematuria and urgency.   Musculoskeletal: Positive for back pain.   Neurological: Negative for weakness, numbness and headaches.   Psychiatric/Behavioral: Negative for sleep disturbance.            Physical Exam  Vitals and nursing note reviewed.   Constitutional:       General: She is not in acute distress.     Appearance: She is well-developed.   HENT:      Head: Normocephalic and atraumatic.   Eyes:      General: No scleral icterus.     Conjunctiva/sclera: Conjunctivae normal.      Pupils: Pupils are equal, round, and reactive to light.   Neck:      Thyroid: No thyromegaly.      Vascular: No JVD.   Cardiovascular:      Rate and Rhythm: Normal rate and regular rhythm.      Heart sounds: Normal heart  sounds. No murmur heard.    No friction rub. No gallop.   Pulmonary:      Effort: Pulmonary effort is normal. No respiratory distress.      Breath sounds: Normal breath sounds. No wheezing or rales.   Abdominal:      General: Bowel sounds are normal.      Palpations: Abdomen is soft. There is no mass.      Tenderness: There is no abdominal tenderness.   Musculoskeletal:         General: No tenderness. Normal range of motion.      Cervical back: Normal range of motion and neck supple.   Lymphadenopathy:      Cervical: No cervical adenopathy.   Skin:     General: Skin is warm and dry.      Findings: No rash.      Comments: No foot lesions are present.   Neurological:      Mental Status: She is alert and oriented to person, place, and time.      Cranial Nerves: No cranial nerve deficit.      Comments: Sensory exam is intact in both feet on monofilament  testing.   Psychiatric:         Behavior: Behavior normal.         Protective Sensation (w/ 10 gram monofilament):  Right: Intact  Left: Intact    Visual Inspection:  Normal -  Bilateral    Pedal Pulses:   Right: Present  Left: Present    Posterior tibialis:   Right:Present  Left: Present       EGD and colonoscopy reports of 04/19/2022 were reviewed with the patient.    Assessment & Plan:      Aracelis was seen today for follow-up and medication refill.  Current therapy will be continued.  The patient will follow-up with gastroenterology as scheduled.    Laboratory studies will be repeated next month.    Diagnoses and all orders for this visit:    Type 2 diabetes mellitus without complication, without long-term current use of insulin  -     Comprehensive Metabolic Panel; Future  -     Lipid Panel; Future  -     Hemoglobin A1C; Standing  -     Comprehensive Metabolic Panel; Future  -     TSH; Future  -     Microalbumin/Creatinine Ratio, Urine; Future    Essential hypertension  -     Comprehensive Metabolic Panel; Future  -     Lipid Panel; Future  -     Hemoglobin A1C;  Standing  -     Comprehensive Metabolic Panel; Future  -     TSH; Future  -     Microalbumin/Creatinine Ratio, Urine; Future    Nausea    Other orders  -     temazepam (RESTORIL) 30 mg capsule; Take 1 capsule (30 mg total) by mouth nightly as needed for Insomnia (insomnia).         Follow up in about 4 months (around 8/21/2022).     Alen Damico MD

## 2022-05-24 ENCOUNTER — PATIENT MESSAGE (OUTPATIENT)
Dept: INTERNAL MEDICINE | Facility: CLINIC | Age: 67
End: 2022-05-24
Payer: MEDICARE

## 2022-05-26 ENCOUNTER — LAB VISIT (OUTPATIENT)
Dept: LAB | Facility: HOSPITAL | Age: 67
End: 2022-05-26
Payer: MEDICARE

## 2022-05-26 DIAGNOSIS — E11.9 TYPE 2 DIABETES MELLITUS WITHOUT COMPLICATION, WITHOUT LONG-TERM CURRENT USE OF INSULIN: ICD-10-CM

## 2022-05-26 DIAGNOSIS — I10 ESSENTIAL HYPERTENSION: ICD-10-CM

## 2022-05-26 LAB
ALBUMIN SERPL BCP-MCNC: 4.1 G/DL (ref 3.5–5.2)
ALP SERPL-CCNC: 91 U/L (ref 55–135)
ALT SERPL W/O P-5'-P-CCNC: 11 U/L (ref 10–44)
ANION GAP SERPL CALC-SCNC: 8 MMOL/L (ref 8–16)
AST SERPL-CCNC: 11 U/L (ref 10–40)
BILIRUB SERPL-MCNC: 0.5 MG/DL (ref 0.1–1)
BUN SERPL-MCNC: 16 MG/DL (ref 8–23)
CALCIUM SERPL-MCNC: 10 MG/DL (ref 8.7–10.5)
CHLORIDE SERPL-SCNC: 106 MMOL/L (ref 95–110)
CHOLEST SERPL-MCNC: 196 MG/DL (ref 120–199)
CHOLEST/HDLC SERPL: 4 {RATIO} (ref 2–5)
CO2 SERPL-SCNC: 27 MMOL/L (ref 23–29)
CREAT SERPL-MCNC: 0.8 MG/DL (ref 0.5–1.4)
EST. GFR  (AFRICAN AMERICAN): >60 ML/MIN/1.73 M^2
EST. GFR  (NON AFRICAN AMERICAN): >60 ML/MIN/1.73 M^2
ESTIMATED AVG GLUCOSE: 128 MG/DL (ref 68–131)
GLUCOSE SERPL-MCNC: 116 MG/DL (ref 70–110)
HBA1C MFR BLD: 6.1 % (ref 4–5.6)
HDLC SERPL-MCNC: 49 MG/DL (ref 40–75)
HDLC SERPL: 25 % (ref 20–50)
LDLC SERPL CALC-MCNC: 131.8 MG/DL (ref 63–159)
NONHDLC SERPL-MCNC: 147 MG/DL
POTASSIUM SERPL-SCNC: 4.2 MMOL/L (ref 3.5–5.1)
PROT SERPL-MCNC: 6.9 G/DL (ref 6–8.4)
SODIUM SERPL-SCNC: 141 MMOL/L (ref 136–145)
TRIGL SERPL-MCNC: 76 MG/DL (ref 30–150)

## 2022-05-26 PROCEDURE — 80053 COMPREHEN METABOLIC PANEL: CPT | Performed by: INTERNAL MEDICINE

## 2022-05-26 PROCEDURE — 80061 LIPID PANEL: CPT | Performed by: INTERNAL MEDICINE

## 2022-05-26 PROCEDURE — 36415 COLL VENOUS BLD VENIPUNCTURE: CPT | Mod: PO | Performed by: INTERNAL MEDICINE

## 2022-05-26 PROCEDURE — 83036 HEMOGLOBIN GLYCOSYLATED A1C: CPT | Performed by: INTERNAL MEDICINE

## 2022-05-31 ENCOUNTER — EXTERNAL CHRONIC CARE MANAGEMENT (OUTPATIENT)
Dept: PRIMARY CARE CLINIC | Facility: CLINIC | Age: 67
End: 2022-05-31
Payer: MEDICARE

## 2022-05-31 PROCEDURE — 99490 CHRNC CARE MGMT STAFF 1ST 20: CPT | Mod: S$PBB,,, | Performed by: INTERNAL MEDICINE

## 2022-05-31 PROCEDURE — 99490 CHRNC CARE MGMT STAFF 1ST 20: CPT | Mod: PBBFAC,PO | Performed by: INTERNAL MEDICINE

## 2022-05-31 PROCEDURE — 99490 PR CHRONIC CARE MGMT, 1ST 20 MIN: ICD-10-PCS | Mod: S$PBB,,, | Performed by: INTERNAL MEDICINE

## 2022-06-08 RX ORDER — METOCLOPRAMIDE 10 MG/1
TABLET ORAL
Qty: 90 TABLET | Refills: 5 | Status: SHIPPED | OUTPATIENT
Start: 2022-06-08 | End: 2022-09-20

## 2022-06-08 NOTE — TELEPHONE ENCOUNTER
Refill Routing Note   Medication(s) are not appropriate for processing by [facility] for the following reason(s):      - Outside of protocol    [facility] action(s):  Route          Medication reconciliation completed: No     Appointments  past 12m or future 3m with PCP    Date Provider   Last Visit   4/21/2022 Alen Damico MD   Next Visit   9/20/2022 Alen Damico MD   ED visits in past 90 days: 0        Note composed:10:25 AM 06/08/2022

## 2022-06-08 NOTE — TELEPHONE ENCOUNTER
No new care gaps identified.  [facility] Embedded Care Gaps. Reference number: [id number]. 6/08/2022   5:35:11 AM CDT

## 2022-06-13 RX ORDER — GLIPIZIDE 5 MG/1
TABLET, FILM COATED, EXTENDED RELEASE ORAL
Qty: 90 TABLET | Refills: 1 | Status: SHIPPED | OUTPATIENT
Start: 2022-06-13 | End: 2022-12-16

## 2022-06-13 NOTE — TELEPHONE ENCOUNTER
Refill Authorization Note   Aracelis Martinez  is requesting a refill authorization.  Brief Assessment and Rationale for Refill:  Approve     Medication Therapy Plan:       Medication Reconciliation Completed: No   Comments:     No Care Gaps recommended.     Note composed:5:52 PM 06/13/2022

## 2022-06-13 NOTE — TELEPHONE ENCOUNTER
No new care gaps identified.  [facility] Embedded Care Gaps. Reference number: [id number]. 6/13/2022   3:37:59 AM MOHITT

## 2022-06-30 ENCOUNTER — EXTERNAL CHRONIC CARE MANAGEMENT (OUTPATIENT)
Dept: PRIMARY CARE CLINIC | Facility: CLINIC | Age: 67
End: 2022-06-30
Payer: MEDICARE

## 2022-06-30 ENCOUNTER — PES CALL (OUTPATIENT)
Dept: ADMINISTRATIVE | Facility: CLINIC | Age: 67
End: 2022-06-30
Payer: MEDICARE

## 2022-06-30 PROCEDURE — 99490 CHRNC CARE MGMT STAFF 1ST 20: CPT | Mod: PBBFAC,PO | Performed by: INTERNAL MEDICINE

## 2022-06-30 PROCEDURE — 99490 CHRNC CARE MGMT STAFF 1ST 20: CPT | Mod: S$PBB,,, | Performed by: INTERNAL MEDICINE

## 2022-06-30 PROCEDURE — 99490 PR CHRONIC CARE MGMT, 1ST 20 MIN: ICD-10-PCS | Mod: S$PBB,,, | Performed by: INTERNAL MEDICINE

## 2022-07-05 ENCOUNTER — TELEPHONE (OUTPATIENT)
Dept: ADMINISTRATIVE | Facility: CLINIC | Age: 67
End: 2022-07-05
Payer: MEDICARE

## 2022-07-07 ENCOUNTER — PATIENT MESSAGE (OUTPATIENT)
Dept: INTERNAL MEDICINE | Facility: CLINIC | Age: 67
End: 2022-07-07
Payer: MEDICARE

## 2022-07-07 ENCOUNTER — TELEPHONE (OUTPATIENT)
Dept: ADMINISTRATIVE | Facility: CLINIC | Age: 67
End: 2022-07-07
Payer: MEDICARE

## 2022-07-07 ENCOUNTER — OFFICE VISIT (OUTPATIENT)
Dept: HOME HEALTH SERVICES | Facility: CLINIC | Age: 67
End: 2022-07-07
Payer: MEDICARE

## 2022-07-07 VITALS — WEIGHT: 205 LBS | BODY MASS INDEX: 34.11 KG/M2

## 2022-07-07 DIAGNOSIS — Z12.11 COLON CANCER SCREENING: ICD-10-CM

## 2022-07-07 DIAGNOSIS — M25.641 STIFFNESS OF RIGHT HAND, NOT ELSEWHERE CLASSIFIED: ICD-10-CM

## 2022-07-07 DIAGNOSIS — M46.1 SACROILIITIS: ICD-10-CM

## 2022-07-07 DIAGNOSIS — Z00.00 ENCOUNTER FOR PREVENTIVE HEALTH EXAMINATION: ICD-10-CM

## 2022-07-07 DIAGNOSIS — M17.9 OSTEOARTHRITIS OF KNEE, UNSPECIFIED LATERALITY, UNSPECIFIED OSTEOARTHRITIS TYPE: ICD-10-CM

## 2022-07-07 DIAGNOSIS — E11.9 TYPE 2 DIABETES MELLITUS WITHOUT COMPLICATION, WITHOUT LONG-TERM CURRENT USE OF INSULIN: Chronic | ICD-10-CM

## 2022-07-07 DIAGNOSIS — M25.441 EFFUSION OF RIGHT HAND: ICD-10-CM

## 2022-07-07 DIAGNOSIS — M47.26 OSTEOARTHRITIS OF SPINE WITH RADICULOPATHY, LUMBAR REGION: ICD-10-CM

## 2022-07-07 DIAGNOSIS — M25.641 FINGER STIFFNESS, RIGHT: ICD-10-CM

## 2022-07-07 DIAGNOSIS — I10 ESSENTIAL HYPERTENSION: Chronic | ICD-10-CM

## 2022-07-07 DIAGNOSIS — M79.644 FINGER PAIN, RIGHT: ICD-10-CM

## 2022-07-07 DIAGNOSIS — D47.3 ESSENTIAL (HEMORRHAGIC) THROMBOCYTHEMIA: ICD-10-CM

## 2022-07-07 DIAGNOSIS — M79.7 FIBROMYALGIA: ICD-10-CM

## 2022-07-07 DIAGNOSIS — Z12.11 SPECIAL SCREENING FOR MALIGNANT NEOPLASMS, COLON: ICD-10-CM

## 2022-07-07 DIAGNOSIS — M54.50 LUMBAR PAIN: ICD-10-CM

## 2022-07-07 DIAGNOSIS — M54.41 CHRONIC RIGHT-SIDED LOW BACK PAIN WITH RIGHT-SIDED SCIATICA: ICD-10-CM

## 2022-07-07 DIAGNOSIS — M53.86 DECREASED ROM OF LUMBAR SPINE: ICD-10-CM

## 2022-07-07 DIAGNOSIS — M77.8 BONE SPUR OF FINGER IP JOINT: ICD-10-CM

## 2022-07-07 DIAGNOSIS — M25.60 RANGE OF MOTION DEFICIT: ICD-10-CM

## 2022-07-07 DIAGNOSIS — N28.1 RENAL CYST: ICD-10-CM

## 2022-07-07 DIAGNOSIS — Z98.890 STATUS POST ARTHROSCOPY OF LEFT KNEE: ICD-10-CM

## 2022-07-07 DIAGNOSIS — E78.5 HYPERLIPIDEMIA, UNSPECIFIED HYPERLIPIDEMIA TYPE: Chronic | ICD-10-CM

## 2022-07-07 DIAGNOSIS — K57.90 DIVERTICULOSIS: ICD-10-CM

## 2022-07-07 DIAGNOSIS — E66.01 SEVERE OBESITY (BMI 35.0-39.9) WITH COMORBIDITY: Primary | ICD-10-CM

## 2022-07-07 DIAGNOSIS — Z85.07 HISTORY OF PANCREATIC CANCER: ICD-10-CM

## 2022-07-07 DIAGNOSIS — G89.29 CHRONIC RIGHT-SIDED LOW BACK PAIN WITH RIGHT-SIDED SCIATICA: ICD-10-CM

## 2022-07-07 DIAGNOSIS — M19.90 ARTHRITIS: ICD-10-CM

## 2022-07-07 PROBLEM — R14.2 FUNCTIONAL BELCHING DISORDER: Status: RESOLVED | Noted: 2020-06-02 | Resolved: 2022-07-07

## 2022-07-07 PROBLEM — R11.0 NAUSEA: Status: RESOLVED | Noted: 2019-11-19 | Resolved: 2022-07-07

## 2022-07-07 PROCEDURE — 99499 RISK ADDL DX/OHS AUDIT: ICD-10-PCS | Mod: 95,,, | Performed by: NURSE PRACTITIONER

## 2022-07-07 PROCEDURE — G0439 PR MEDICARE ANNUAL WELLNESS SUBSEQUENT VISIT: ICD-10-PCS | Mod: 95,,, | Performed by: NURSE PRACTITIONER

## 2022-07-07 PROCEDURE — 99499 UNLISTED E&M SERVICE: CPT | Mod: 95,,, | Performed by: NURSE PRACTITIONER

## 2022-07-07 PROCEDURE — G0439 PPPS, SUBSEQ VISIT: HCPCS | Mod: 95,,, | Performed by: NURSE PRACTITIONER

## 2022-07-07 NOTE — TELEPHONE ENCOUNTER
Called pt; informed pt I was just making a reminder call for her virtual visit today at 11:00am and to see if she needed any help; pt stated she didn't need any help; pt informed to login 15 minutes prior to appt time

## 2022-07-07 NOTE — PROGRESS NOTES
The patient location is: [place]  The chief complaint leading to consultation is: awv    Visit type: audiovisual    Face to Face time with patient: 60  60 minutes of total time spent on the encounter, which includes face to face time and non-face to face time preparing to see the patient (eg, review of tests), Obtaining and/or reviewing separately obtained history, Documenting clinical information in the electronic or other health record, Independently interpreting results (not separately reported) and communicating results to the patient/family/caregiver, or Care coordination (not separately reported).         Each patient to whom he or she provides medical services by telemedicine is:  (1) informed of the relationship between the physician and patient and the respective role of any other health care provider with respect to management of the patient; and (2) notified that he or she may decline to receive medical services by telemedicine and may withdraw from such care at any time.    Notes:       Aarcelis Martinez presented for a  Medicare AWV and comprehensive Health Risk Assessment today. The following components were reviewed and updated:    · Medical history  · Family History  · Social history  · Allergies and Current Medications  · Health Risk Assessment  · Health Maintenance  · Care Team         ** See Completed Assessments for Annual Wellness Visit within the encounter summary.**         The following assessments were completed:  · Living Situation  · CAGE  · Depression Screening  · Fall Risk Assessment (MACH 10)  · Hearing Assessment(HHI)  · Cognitive Function Screening  · Nutrition Screening  · ADL Screening  · PAQ Screening      Vitals:    07/07/22 1113   Weight: 93 kg (205 lb)     Body mass index is 34.11 kg/m².  Physical Exam  Constitutional:       Appearance: Normal appearance.   Neurological:      Mental Status: She is alert.   Psychiatric:         Attention and Perception: Attention and perception  normal.         Mood and Affect: Mood and affect normal.         Speech: Speech normal.         Behavior: Behavior normal. Behavior is cooperative.         Thought Content: Thought content normal.         Cognition and Memory: Cognition and memory normal.         Judgment: Judgment normal.               Diagnoses and health risks identified today and associated recommendations/orders:    1. Severe obesity (BMI 35.0-39.9) with comorbidity  Stable, followed by provider.     2. Sacroiliitis  Stable, followed by provider.     3. Essential (hemorrhagic) thrombocythemia  Stable, followed by provider.     4. Encounter for preventive health examination  Stable, followed by provider.     5. Osteoarthritis of spine with radiculopathy, lumbar region  Stable, followed by provider.     6. Decreased ROM of lumbar spine  Stable, followed by provider.     7. Essential hypertension  Stable, followed by provider.     8. Hyperlipidemia, unspecified hyperlipidemia type  Stable, followed by provider.     9. Renal cyst  Stable, followed by provider.     10. History of pancreatic cancer  Stable, followed by provider.     11. Type 2 diabetes mellitus without complication, without long-term current use of insulin  Stable, followed by provider.     12. Diverticulosis  Stable, followed by provider.     13. Special screening for malignant neoplasms, colon  Stable, followed by provider.     14. Colon cancer screening  Stable, followed by provider.     15. Arthritis  Stable, followed by provider.     16. Chronic right-sided low back pain with right-sided sciatica  Stable, followed by provider.     17. Osteoarthritis of knee, unspecified laterality, unspecified osteoarthritis type  Stable, followed by provider.     18. Fibromyalgia  Stable, followed by provider.     19. Status post arthroscopy of left knee 4/16/2014  Stable, followed by provider.     20. Finger pain, right  Stable, followed by provider.     21. Bone spur of finger IP  joint  Stable, followed by provider.     22. Effusion of right hand  Stable, followed by provider.     23. Finger stiffness, right  Stable, followed by provider.     24. Range of motion deficit  Stable, followed by provider.     25. Stiffness of right hand, not elsewhere classified  Stable, followed by provider.     26. Lumbar pain  Stable, followed by provider.       Provided Aracelis with a 5-10 year written screening schedule and personal prevention plan. Recommendations were developed using the USPSTF age appropriate recommendations. Education, counseling, and referrals were provided as needed. After Visit Summary printed and given to patient which includes a list of additional screenings\tests needed.    Follow up in about 1 year (around 7/7/2023) for awv.    Eleuterio Barrientos, DINH  I offered to discuss advanced care planning, including how to pick a person who would make decisions for you if you were unable to make them for yourself, called a health care power of , and what kind of decisions you might make such as use of life sustaining treatments such as ventilators and tube feeding when faced with a life limiting illness recorded on a living will that they will need to know. (How you want to be cared for as you near the end of your natural life)     X Patient is interested in learning more about how to make advanced directives.  I provided them paperwork and offered to discuss this with them.

## 2022-07-07 NOTE — PATIENT INSTRUCTIONS
Counseling and Referral of Other Preventative  (Italic type indicates deductible and co-insurance are waived)    Patient Name: Aracelis Martinez  Today's Date: 7/7/2022    Health Maintenance       Date Due Completion Date    TETANUS VACCINE Never done ---    Shingles Vaccine (1 of 2) Never done ---    Pneumococcal Vaccines (Age 65+) (2 - PPSV23 or PCV20) 03/25/2022 3/25/2021    Influenza Vaccine (1) 09/01/2022 10/19/2021    Eye Exam 09/27/2022 9/27/2021    Hemoglobin A1c 11/26/2022 5/26/2022    Mammogram 02/01/2023 2/1/2022    Foot Exam 04/21/2023 4/21/2022    Override on 3/25/2021: Done    Override on 11/12/2019: Done    Diabetes Urine Screening 05/26/2023 5/26/2022    Lipid Panel 05/26/2023 5/26/2022    DEXA Scan 03/29/2024 3/29/2021    Colorectal Cancer Screening 04/19/2029 4/19/2022        No orders of the defined types were placed in this encounter.    The following information is provided to all patients.  This information is to help you find resources for any of the problems found today that may be affecting your health:                Living healthy guide: www.[facility].[place].gov      Understanding Diabetes: www.diabetes.org      Eating healthy: www.cdc.gov/healthyweight      CDC home safety checklist: www.cdc.gov/steadi/patient.html      Agency on Aging: www.goea.[place].gov      Alcoholics anonymous (AA): www.aa.org      Physical Activity: www.gely.nih.gov/gr6senc      Tobacco use: www.quitwithusla.org

## 2022-07-08 RX ORDER — LEVOTHYROXINE SODIUM 50 UG/1
TABLET ORAL
Qty: 90 TABLET | Refills: 3 | Status: SHIPPED | OUTPATIENT
Start: 2022-07-08 | End: 2022-09-20

## 2022-07-08 NOTE — TELEPHONE ENCOUNTER
No new care gaps identified.  [facility] Embedded Care Gaps. Reference number: [id number]. 7/08/2022   10:39:27 AM MOHITT

## 2022-07-12 ENCOUNTER — PATIENT MESSAGE (OUTPATIENT)
Dept: INTERNAL MEDICINE | Facility: CLINIC | Age: 67
End: 2022-07-12
Payer: MEDICARE

## 2022-07-21 RX ORDER — AMLODIPINE BESYLATE 10 MG/1
10 TABLET ORAL DAILY
Qty: 90 TABLET | Refills: 3 | Status: SHIPPED | OUTPATIENT
Start: 2022-07-21 | End: 2022-12-27 | Stop reason: SDUPTHER

## 2022-07-21 NOTE — TELEPHONE ENCOUNTER
Refill Decision Note   Aracelis Martinez  is requesting a refill authorization.  Brief Assessment and Rationale for Refill:  Approve     Medication Therapy Plan:       Medication Reconciliation Completed: No   Comments:     No Care Gaps recommended.     Note composed:12:22 PM 07/21/2022

## 2022-07-21 NOTE — TELEPHONE ENCOUNTER
No new care gaps identified.  [facility] Embedded Care Gaps. Reference number: [id number]. 7/21/2022   7:50:17 AM CDT

## 2022-07-31 ENCOUNTER — EXTERNAL CHRONIC CARE MANAGEMENT (OUTPATIENT)
Dept: PRIMARY CARE CLINIC | Facility: CLINIC | Age: 67
End: 2022-07-31
Payer: MEDICARE

## 2022-07-31 PROCEDURE — 99490 PR CHRONIC CARE MGMT, 1ST 20 MIN: ICD-10-PCS | Mod: S$PBB,,, | Performed by: INTERNAL MEDICINE

## 2022-07-31 PROCEDURE — 99439 CHRNC CARE MGMT STAF EA ADDL: CPT | Mod: S$PBB,,, | Performed by: INTERNAL MEDICINE

## 2022-07-31 PROCEDURE — 99439 PR CHRONIC CARE MGMT, EA ADDTL 20 MIN: ICD-10-PCS | Mod: S$PBB,,, | Performed by: INTERNAL MEDICINE

## 2022-07-31 PROCEDURE — 99490 CHRNC CARE MGMT STAFF 1ST 20: CPT | Mod: PBBFAC,PO | Performed by: INTERNAL MEDICINE

## 2022-07-31 PROCEDURE — 99439 CHRNC CARE MGMT STAF EA ADDL: CPT | Mod: PBBFAC,PO | Performed by: INTERNAL MEDICINE

## 2022-07-31 PROCEDURE — 99490 CHRNC CARE MGMT STAFF 1ST 20: CPT | Mod: S$PBB,,, | Performed by: INTERNAL MEDICINE

## 2022-08-06 RX ORDER — PANTOPRAZOLE SODIUM 40 MG/1
TABLET, DELAYED RELEASE ORAL
Qty: 90 TABLET | Refills: 2 | Status: SHIPPED | OUTPATIENT
Start: 2022-08-06 | End: 2022-12-27 | Stop reason: SDUPTHER

## 2022-08-06 NOTE — TELEPHONE ENCOUNTER
No new care gaps identified.  [facility] Embedded Care Gaps. Reference number: [id number]. 8/06/2022   5:30:53 AM CDT

## 2022-08-06 NOTE — TELEPHONE ENCOUNTER
Refill Decision Note   Aracelis Martinez  is requesting a refill authorization.  Brief Assessment and Rationale for Refill:  Approve     Medication Therapy Plan:       Medication Reconciliation Completed: No   Comments:     No Care Gaps recommended.     Note composed:4:44 PM 08/06/2022

## 2022-08-08 ENCOUNTER — TELEPHONE (OUTPATIENT)
Dept: INTERNAL MEDICINE | Facility: CLINIC | Age: 67
End: 2022-08-08
Payer: MEDICARE

## 2022-08-08 ENCOUNTER — OFFICE VISIT (OUTPATIENT)
Dept: OPTOMETRY | Facility: CLINIC | Age: 67
End: 2022-08-08
Payer: MEDICARE

## 2022-08-08 DIAGNOSIS — H52.4 PRESBYOPIA: ICD-10-CM

## 2022-08-08 DIAGNOSIS — E11.9 TYPE 2 DIABETES MELLITUS WITHOUT RETINOPATHY: Primary | ICD-10-CM

## 2022-08-08 DIAGNOSIS — Z13.5 GLAUCOMA SCREENING: ICD-10-CM

## 2022-08-08 PROCEDURE — 92014 PR EYE EXAM, EST PATIENT,COMPREHESV: ICD-10-PCS | Mod: S$PBB,,, | Performed by: OPTOMETRIST

## 2022-08-08 PROCEDURE — 92014 COMPRE OPH EXAM EST PT 1/>: CPT | Mod: S$PBB,,, | Performed by: OPTOMETRIST

## 2022-08-08 PROCEDURE — 92015 DETERMINE REFRACTIVE STATE: CPT | Mod: ,,, | Performed by: OPTOMETRIST

## 2022-08-08 PROCEDURE — 99999 PR PBB SHADOW E&M-EST. PATIENT-LVL III: ICD-10-PCS | Mod: PBBFAC,,, | Performed by: OPTOMETRIST

## 2022-08-08 PROCEDURE — 99213 OFFICE O/P EST LOW 20 MIN: CPT | Mod: PBBFAC,PO | Performed by: OPTOMETRIST

## 2022-08-08 PROCEDURE — 92015 PR REFRACTION: ICD-10-PCS | Mod: ,,, | Performed by: OPTOMETRIST

## 2022-08-08 PROCEDURE — 99999 PR PBB SHADOW E&M-EST. PATIENT-LVL III: CPT | Mod: PBBFAC,,, | Performed by: OPTOMETRIST

## 2022-08-08 NOTE — TELEPHONE ENCOUNTER
"Dr Mookie DICKEY  Next appt to see you is 9/20/22  See msg received below:      Mendel Damico Alen SINGH Staff  Phone Number: [phone]     MRN:  [id number]     Patient: [id number]     Phone: [phone]     Good Afternoon;     This pt see's Dr. Damico as her PCP. I spoke with patient this afternoon and she stated she had a syncopal episode last week. Patient described to nurse she was sitting on her back porch,   she got up to walk outside and fainted, when she regained consciousness her  was standing over her. Patient stated she was not "out" long and did not suffer any injuries, just soreness.   She did not take her blood sugar or BP at the time the incident occurred.   This nurse has encouraged patient to continue blood sugar checks as well as blood pressure checks and seek medical treatment if acute changes.     If needed, you may reach me via [employer] or at the number listed below.     Thank you     Mendel Solares LPN   Care Coordinator   [facility]   [phone] ext [phone]         "

## 2022-08-08 NOTE — PROGRESS NOTES
HPI     FERNANDO: 7/16/2021    Presents today for Diabetic Eye Exam.  Pt reports mild blurry vision and dry eyes.  No f/f  No gtts  S/P PCIOL OU    LBS: 110  Hemoglobin A1C       Date                     Value               Ref Range             Status                05/26/2022               6.1 (H)             4.0 - 5.6 %           Final                   02/15/2022               6.3 (H)             4.0 - 5.6 %           Final                   10/19/2021               7.0 (H)             4.0 - 5.6 %           Final                  Last edited by Shira Feliciano MA on 8/8/2022  8:10 AM. (History)        ROS     Positive for: Endocrine (DM), Eyes (cat surgery)    Negative for: Constitutional, Gastrointestinal, Neurological, Skin,   Genitourinary, Musculoskeletal, HENT, Cardiovascular, Respiratory,   Psychiatric, Allergic/Imm, Heme/Lymph    Last edited by Ashwin Landrum, LEO on 8/8/2022  8:14 AM. (History)        Assessment /Plan     For exam results, see Encounter Report.    Type 2 diabetes mellitus without retinopathy    Glaucoma screening    Presbyopia      1. Sp pciol OU--pt wishes new Rx  2. DM- WITHOUT RETINOPATHY.  Advised yearly DFE  3. Pt has pigmented area of optic disc OS. stable    PLAN:    rtc 1 yr

## 2022-08-29 ENCOUNTER — PATIENT OUTREACH (OUTPATIENT)
Dept: ADMINISTRATIVE | Facility: HOSPITAL | Age: 67
End: 2022-08-29
Payer: MEDICARE

## 2022-08-31 ENCOUNTER — EXTERNAL CHRONIC CARE MANAGEMENT (OUTPATIENT)
Dept: PRIMARY CARE CLINIC | Facility: CLINIC | Age: 67
End: 2022-08-31
Payer: MEDICARE

## 2022-08-31 PROCEDURE — 99439 CHRNC CARE MGMT STAF EA ADDL: CPT | Mod: PBBFAC,PO | Performed by: INTERNAL MEDICINE

## 2022-08-31 PROCEDURE — 99490 CHRNC CARE MGMT STAFF 1ST 20: CPT | Mod: PBBFAC,PO | Performed by: INTERNAL MEDICINE

## 2022-08-31 PROCEDURE — 99439 CHRNC CARE MGMT STAF EA ADDL: CPT | Mod: S$PBB,,, | Performed by: INTERNAL MEDICINE

## 2022-08-31 PROCEDURE — 99490 PR CHRONIC CARE MGMT, 1ST 20 MIN: ICD-10-PCS | Mod: S$PBB,,, | Performed by: INTERNAL MEDICINE

## 2022-08-31 PROCEDURE — 99439 PR CHRONIC CARE MGMT, EA ADDTL 20 MIN: ICD-10-PCS | Mod: S$PBB,,, | Performed by: INTERNAL MEDICINE

## 2022-08-31 PROCEDURE — 99490 CHRNC CARE MGMT STAFF 1ST 20: CPT | Mod: S$PBB,,, | Performed by: INTERNAL MEDICINE

## 2022-09-05 RX ORDER — METFORMIN HYDROCHLORIDE 500 MG/1
TABLET ORAL
Qty: 270 TABLET | Refills: 0 | Status: SHIPPED | OUTPATIENT
Start: 2022-09-05 | End: 2022-09-20

## 2022-09-05 NOTE — TELEPHONE ENCOUNTER
Care Due:                  Date            Visit Type   Department     Provider  --------------------------------------------------------------------------------                                EP -                              PRIMARY      [facility] INTERNAL  Last Visit: 04-      CARE ([facility])   MEDICINE       Alenjosias Damico                              EP -                              PRIMARY      [facility] INTERNAL  Next Visit: 09-      [facility] ([facility])   MEDICINE       Alenmirna Damico                                                            Last  Test          Frequency    Reason                     Performed    Due Date  --------------------------------------------------------------------------------    HBA1C.......  6 months...  glipiZIDE, metFORMIN.....  05- 11-    [facility] Embedded Care Gaps. Reference number: [id number]. 9/05/2022   5:32:09 AM CDT

## 2022-09-05 NOTE — TELEPHONE ENCOUNTER
Refill Decision Note   Aracelis Martinez  is requesting a refill authorization.  Brief Assessment and Rationale for Refill:  Approve     Medication Therapy Plan:       Medication Reconciliation Completed: No   Comments:     No Care Gaps recommended.     Note composed:12:30 PM 09/05/2022

## 2022-09-13 ENCOUNTER — LAB VISIT (OUTPATIENT)
Dept: LAB | Facility: HOSPITAL | Age: 67
End: 2022-09-13
Attending: INTERNAL MEDICINE
Payer: MEDICARE

## 2022-09-13 DIAGNOSIS — I10 ESSENTIAL HYPERTENSION: ICD-10-CM

## 2022-09-13 DIAGNOSIS — E11.9 TYPE 2 DIABETES MELLITUS WITHOUT COMPLICATION, WITHOUT LONG-TERM CURRENT USE OF INSULIN: ICD-10-CM

## 2022-09-13 LAB
ALBUMIN SERPL BCP-MCNC: 4 G/DL (ref 3.5–5.2)
ALP SERPL-CCNC: 91 U/L (ref 55–135)
ALT SERPL W/O P-5'-P-CCNC: 11 U/L (ref 10–44)
ANION GAP SERPL CALC-SCNC: 10 MMOL/L (ref 8–16)
AST SERPL-CCNC: 12 U/L (ref 10–40)
BILIRUB SERPL-MCNC: 0.4 MG/DL (ref 0.1–1)
BUN SERPL-MCNC: 15 MG/DL (ref 8–23)
CALCIUM SERPL-MCNC: 9.8 MG/DL (ref 8.7–10.5)
CHLORIDE SERPL-SCNC: 105 MMOL/L (ref 95–110)
CO2 SERPL-SCNC: 26 MMOL/L (ref 23–29)
CREAT SERPL-MCNC: 0.8 MG/DL (ref 0.5–1.4)
EST. GFR  (NO RACE VARIABLE): >60 ML/MIN/1.73 M^2
ESTIMATED AVG GLUCOSE: 126 MG/DL (ref 68–131)
GLUCOSE SERPL-MCNC: 112 MG/DL (ref 70–110)
HBA1C MFR BLD: 6 % (ref 4–5.6)
POTASSIUM SERPL-SCNC: 4 MMOL/L (ref 3.5–5.1)
PROT SERPL-MCNC: 7.2 G/DL (ref 6–8.4)
SODIUM SERPL-SCNC: 141 MMOL/L (ref 136–145)
T4 FREE SERPL-MCNC: 1.03 NG/DL (ref 0.71–1.51)
TSH SERPL DL<=0.005 MIU/L-ACNC: 6.53 UIU/ML (ref 0.4–4)

## 2022-09-13 PROCEDURE — 84439 ASSAY OF FREE THYROXINE: CPT | Performed by: INTERNAL MEDICINE

## 2022-09-13 PROCEDURE — 80053 COMPREHEN METABOLIC PANEL: CPT | Performed by: INTERNAL MEDICINE

## 2022-09-13 PROCEDURE — 84443 ASSAY THYROID STIM HORMONE: CPT | Performed by: INTERNAL MEDICINE

## 2022-09-13 PROCEDURE — 83036 HEMOGLOBIN GLYCOSYLATED A1C: CPT | Performed by: INTERNAL MEDICINE

## 2022-09-13 PROCEDURE — 36415 COLL VENOUS BLD VENIPUNCTURE: CPT | Mod: PO | Performed by: INTERNAL MEDICINE

## 2022-09-20 ENCOUNTER — HOSPITAL ENCOUNTER (OUTPATIENT)
Dept: RADIOLOGY | Facility: HOSPITAL | Age: 67
Discharge: HOME | End: 2022-09-20
Attending: INTERNAL MEDICINE
Payer: MEDICARE

## 2022-09-20 ENCOUNTER — PATIENT MESSAGE (OUTPATIENT)
Dept: OBSTETRICS AND GYNECOLOGY | Facility: CLINIC | Age: 67
End: 2022-09-20
Payer: MEDICARE

## 2022-09-20 ENCOUNTER — OFFICE VISIT (OUTPATIENT)
Dept: INTERNAL MEDICINE | Facility: CLINIC | Age: 67
End: 2022-09-20
Payer: MEDICARE

## 2022-09-20 VITALS
HEIGHT: 60 IN | HEART RATE: 64 BPM | RESPIRATION RATE: 16 BRPM | BODY MASS INDEX: 39.82 KG/M2 | DIASTOLIC BLOOD PRESSURE: 60 MMHG | TEMPERATURE: 97 F | WEIGHT: 202.81 LBS | SYSTOLIC BLOOD PRESSURE: 132 MMHG

## 2022-09-20 DIAGNOSIS — M25.512 LEFT SHOULDER PAIN, UNSPECIFIED CHRONICITY: ICD-10-CM

## 2022-09-20 DIAGNOSIS — D47.3 ESSENTIAL (HEMORRHAGIC) THROMBOCYTHEMIA: ICD-10-CM

## 2022-09-20 DIAGNOSIS — R55 SYNCOPE, UNSPECIFIED SYNCOPE TYPE: ICD-10-CM

## 2022-09-20 DIAGNOSIS — E78.49 OTHER HYPERLIPIDEMIA: Chronic | ICD-10-CM

## 2022-09-20 DIAGNOSIS — E11.9 TYPE 2 DIABETES MELLITUS WITHOUT COMPLICATION, WITHOUT LONG-TERM CURRENT USE OF INSULIN: Primary | Chronic | ICD-10-CM

## 2022-09-20 PROCEDURE — 99215 OFFICE O/P EST HI 40 MIN: CPT | Mod: PBBFAC,PO | Performed by: INTERNAL MEDICINE

## 2022-09-20 PROCEDURE — 73030 XR SHOULDER TRAUMA 3 VIEW LEFT: ICD-10-PCS | Mod: 26,LT,, | Performed by: RADIOLOGY

## 2022-09-20 PROCEDURE — 99999 PR PBB SHADOW E&M-EST. PATIENT-LVL V: ICD-10-PCS | Mod: PBBFAC,,, | Performed by: INTERNAL MEDICINE

## 2022-09-20 PROCEDURE — 99214 PR OFFICE/OUTPT VISIT, EST, LEVL IV, 30-39 MIN: ICD-10-PCS | Mod: S$PBB,,, | Performed by: INTERNAL MEDICINE

## 2022-09-20 PROCEDURE — 73030 X-RAY EXAM OF SHOULDER: CPT | Mod: TC,PO,LT

## 2022-09-20 PROCEDURE — 99999 PR PBB SHADOW E&M-EST. PATIENT-LVL V: CPT | Mod: PBBFAC,,, | Performed by: INTERNAL MEDICINE

## 2022-09-20 PROCEDURE — 99214 OFFICE O/P EST MOD 30 MIN: CPT | Mod: S$PBB,,, | Performed by: INTERNAL MEDICINE

## 2022-09-20 PROCEDURE — 73030 X-RAY EXAM OF SHOULDER: CPT | Mod: 26,LT,, | Performed by: RADIOLOGY

## 2022-09-20 RX ORDER — LEVOTHYROXINE SODIUM 75 UG/1
75 TABLET ORAL
Qty: 90 TABLET | Refills: 3 | Status: SHIPPED | OUTPATIENT
Start: 2022-09-20 | End: 2022-12-27 | Stop reason: SDUPTHER

## 2022-09-20 NOTE — PROGRESS NOTES
Subjective:       Patient ID: Aracelis Martinez is a 67 y.o. female.    Chief Complaint: Diabetes (4 mos w/labs), blacked out (About 2 mos ago. Never got ck'd.  Was hot and  had not eaten.   Since then notices pain shoulder /neck at 4 and both arms weak.  Left more than right. ), and Hot Flashes (Seems to be worse.  Ck'd glucose this am during one and 108. )    HPI  The patient presents for medical conditions which include type 2 diabetes mellitus, hypertension, left shoulder pain and recent syncopal episode.    The patient experienced an episode of syncope which occurred without warning 1 month ago.  There was no incontinence associated with it.  No tonic-clonic activity was witnessed.  She does note postural lightheadedness at times.  This usually occurs after prolonged sitting.  Denies having any palpitations or chest pain.    No hypoglycemic episodes have been noted.  Her blood sugar was 108 this morning.  She has reduced her intake of carbohydrates.  These included sodas and sweet tea she has been riding a stationary bike for exercise.  No exertional chest pain is noted.    The patient states he has been using OTC probiotics.  This has reduced any belching or gastrointestinal upset.    The patient does not monitor blood pressures regularly.    Review of Systems   Constitutional:  Negative for activity change, appetite change and unexpected weight change.   Eyes:  Negative for visual disturbance.   Respiratory:  Negative for shortness of breath.    Cardiovascular:  Negative for chest pain, palpitations and leg swelling.   Gastrointestinal:  Negative for abdominal pain, blood in stool and diarrhea.   Genitourinary:  Negative for dysuria, frequency, hematuria and urgency.   Musculoskeletal:  Positive for arthralgias.   Neurological:  Positive for syncope. Negative for seizures, weakness, numbness and headaches.   Psychiatric/Behavioral:  Negative for sleep disturbance.           Physical Exam  Vitals and nursing  note reviewed.   Constitutional:       General: She is not in acute distress.     Appearance: Normal appearance. She is well-developed.   HENT:      Head: Normocephalic and atraumatic.   Eyes:      General: No scleral icterus.     Extraocular Movements: Extraocular movements intact.      Conjunctiva/sclera: Conjunctivae normal.   Neck:      Thyroid: No thyromegaly.      Vascular: No JVD.   Cardiovascular:      Rate and Rhythm: Normal rate and regular rhythm.      Heart sounds: Normal heart sounds. No murmur heard.    No friction rub. No gallop.   Pulmonary:      Effort: Pulmonary effort is normal. No respiratory distress.      Breath sounds: Normal breath sounds. No wheezing or rales.   Abdominal:      General: Bowel sounds are normal.      Palpations: Abdomen is soft. There is no mass.      Tenderness: There is no abdominal tenderness.   Musculoskeletal:         General: Tenderness present. Normal range of motion.      Cervical back: Normal range of motion and neck supple.      Comments: Left shoulder:  tender on posterior rotation and abduction.   Lymphadenopathy:      Cervical: No cervical adenopathy.   Skin:     General: Skin is warm and dry.      Findings: No rash.      Comments: No foot lesions are present.   Neurological:      Mental Status: She is alert and oriented to person, place, and time.      Cranial Nerves: No cranial nerve deficit.      Comments: Sensory exam is intact in both feet on monofilament testing.   Psychiatric:         Mood and Affect: Mood normal.         Behavior: Behavior normal.       Protective Sensation (w/ 10 gram monofilament):  Right: Intact  Left: Intact    Visual Inspection:  Bilateral bunions present with lateral deviation of the toes.    Pedal Pulses:   Right: Present  Left: Present    Posterior tibialis:   Right:Present  Left: Present    Lab Visit on 09/13/2022   Component Date Value Ref Range Status    Hemoglobin A1C 09/13/2022 6.0 (H)  4.0 - 5.6 % Final    Comment: ADA  Screening Guidelines:  5.7-6.4%  Consistent with prediabetes  >or=6.5%  Consistent with diabetes    High levels of fetal hemoglobin interfere with the HbA1C  assay. Heterozygous hemoglobin variants (HbS, HgC, etc)do  not significantly interfere with this assay.   However, presence of multiple variants may affect accuracy.      Estimated Avg Glucose 09/13/2022 126  68 - 131 mg/dL Final    Sodium 09/13/2022 141  136 - 145 mmol/L Final    Potassium 09/13/2022 4.0  3.5 - 5.1 mmol/L Final    Chloride 09/13/2022 105  95 - 110 mmol/L Final    CO2 09/13/2022 26  23 - 29 mmol/L Final    Glucose 09/13/2022 112 (H)  70 - 110 mg/dL Final    BUN 09/13/2022 15  8 - 23 mg/dL Final    Creatinine 09/13/2022 0.8  0.5 - 1.4 mg/dL Final    Calcium 09/13/2022 9.8  8.7 - 10.5 mg/dL Final    Total Protein 09/13/2022 7.2  6.0 - 8.4 g/dL Final    Albumin 09/13/2022 4.0  3.5 - 5.2 g/dL Final    Total Bilirubin 09/13/2022 0.4  0.1 - 1.0 mg/dL Final    Comment: For infants and newborns, interpretation of results should be based  on gestational age, weight and in agreement with clinical  observations.    Premature Infant recommended reference ranges:  Up to 24 hours.............<8.0 mg/dL  Up to 48 hours............<12.0 mg/dL  3-5 days..................<15.0 mg/dL  6-29 days.................<15.0 mg/dL      Alkaline Phosphatase 09/13/2022 91  55 - 135 U/L Final    AST 09/13/2022 12  10 - 40 U/L Final    ALT 09/13/2022 11  10 - 44 U/L Final    Anion Gap 09/13/2022 10  8 - 16 mmol/L Final    eGFR 09/13/2022 >60.0  >60 mL/min/1.73 m^2 Final    TSH 09/13/2022 6.531 (H)  0.400 - 4.000 uIU/mL Final    Free T4 09/13/2022 1.03  0.71 - 1.51 ng/dL Final       Assessment & Plan:      Aracelis was seen today for diabetes, blacked out and hot flashes.  Metformin will be discontinued since the patient is no longer using it.  Reglan will be discontinued.    X-ray of the left shoulder will be obtained.  Orthopedic consultation will be obtained regarding left  shoulder pain.    The dose of levothyroxine will be increased to 75 mcg every morning.  Blood tests will be repeated in 2 months.    Cardiology consultation will be obtained regarding syncopal episode.    Diagnoses and all orders for this visit:    Type 2 diabetes mellitus without complication, without long-term current use of insulin  -     TSH; Future  -     T4, Free; Future  -     Comprehensive Metabolic Panel; Future  -     Lipid Panel; Future  -     CBC Auto Differential; Future  -     TSH; Future  -     Hemoglobin A1C; Future  -     Urinalysis; Future  -     Microalbumin/Creatinine Ratio, Urine; Future    Essential (hemorrhagic) thrombocythemia  -     TSH; Future  -     T4, Free; Future  -     Comprehensive Metabolic Panel; Future  -     Lipid Panel; Future  -     CBC Auto Differential; Future  -     TSH; Future  -     Hemoglobin A1C; Future  -     Urinalysis; Future  -     Microalbumin/Creatinine Ratio, Urine; Future    Other hyperlipidemia  -     Comprehensive Metabolic Panel; Future  -     Lipid Panel; Future  -     CBC Auto Differential; Future  -     TSH; Future  -     Hemoglobin A1C; Future  -     Urinalysis; Future  -     Microalbumin/Creatinine Ratio, Urine; Future    Left shoulder pain, unspecified chronicity  -     Ambulatory referral/consult to Orthopedics; Future  -     X-Ray Shoulder Trauma 3 view Left; Future    Syncope, unspecified syncope type  -     Ambulatory referral/consult to Cardiology; Future  -     Comprehensive Metabolic Panel; Future  -     Lipid Panel; Future  -     CBC Auto Differential; Future  -     TSH; Future  -     Hemoglobin A1C; Future  -     Urinalysis; Future  -     Microalbumin/Creatinine Ratio, Urine; Future    Other orders  -     levothyroxine (SYNTHROID) 75 MCG tablet; Take 1 tablet (75 mcg total) by mouth before breakfast.       Follow up in about 4 months (around 1/20/2023).     Alen Damico MD

## 2022-09-21 ENCOUNTER — OFFICE VISIT (OUTPATIENT)
Dept: CARDIOLOGY | Facility: CLINIC | Age: 67
End: 2022-09-21
Payer: MEDICARE

## 2022-09-21 VITALS
SYSTOLIC BLOOD PRESSURE: 126 MMHG | OXYGEN SATURATION: 99 % | HEIGHT: 60 IN | WEIGHT: 204.06 LBS | BODY MASS INDEX: 40.06 KG/M2 | DIASTOLIC BLOOD PRESSURE: 60 MMHG | HEART RATE: 57 BPM

## 2022-09-21 DIAGNOSIS — I10 ESSENTIAL HYPERTENSION: Chronic | ICD-10-CM

## 2022-09-21 DIAGNOSIS — R55 SYNCOPE, UNSPECIFIED SYNCOPE TYPE: ICD-10-CM

## 2022-09-21 DIAGNOSIS — R94.31 NONSPECIFIC ABNORMAL ELECTROCARDIOGRAM (ECG) (EKG): Primary | ICD-10-CM

## 2022-09-21 DIAGNOSIS — E78.5 HYPERLIPIDEMIA, UNSPECIFIED HYPERLIPIDEMIA TYPE: Chronic | ICD-10-CM

## 2022-09-21 DIAGNOSIS — R55 SYNCOPE AND COLLAPSE: ICD-10-CM

## 2022-09-21 PROCEDURE — 99999 PR PBB SHADOW E&M-EST. PATIENT-LVL V: CPT | Mod: PBBFAC,,, | Performed by: NURSE PRACTITIONER

## 2022-09-21 PROCEDURE — 93010 EKG 12-LEAD: ICD-10-PCS | Mod: S$PBB,59,, | Performed by: INTERNAL MEDICINE

## 2022-09-21 PROCEDURE — 93010 ELECTROCARDIOGRAM REPORT: CPT | Mod: S$PBB,59,, | Performed by: INTERNAL MEDICINE

## 2022-09-21 PROCEDURE — 99214 OFFICE O/P EST MOD 30 MIN: CPT | Mod: S$PBB,,, | Performed by: NURSE PRACTITIONER

## 2022-09-21 PROCEDURE — 99215 OFFICE O/P EST HI 40 MIN: CPT | Mod: PBBFAC,PN | Performed by: NURSE PRACTITIONER

## 2022-09-21 PROCEDURE — 99214 PR OFFICE/OUTPT VISIT, EST, LEVL IV, 30-39 MIN: ICD-10-PCS | Mod: S$PBB,,, | Performed by: NURSE PRACTITIONER

## 2022-09-21 PROCEDURE — 93005 ELECTROCARDIOGRAM TRACING: CPT | Mod: PBBFAC,PN | Performed by: INTERNAL MEDICINE

## 2022-09-21 PROCEDURE — 99999 PR PBB SHADOW E&M-EST. PATIENT-LVL V: ICD-10-PCS | Mod: PBBFAC,,, | Performed by: NURSE PRACTITIONER

## 2022-09-21 NOTE — PROGRESS NOTES
"        Cardiology    9/21/2022  11:11 AM    Problem list  Patient Active Problem List   Diagnosis    Essential hypertension    Arthritis    Chronic back pain    Renal cyst    Diverticulosis    DJD (degenerative joint disease) of knee    Fibromyalgia    Status post arthroscopy of left knee 4/16/2014    Hyperlipidemia    Special screening for malignant neoplasms, colon    Type 2 diabetes mellitus    Osteoarthritis of spine with radiculopathy, lumbar region    Finger pain, right    Bone spur of finger IP joint    Effusion of right hand    Finger stiffness, right    Range of motion deficit    Stiffness of right hand, not elsewhere classified    Colon cancer screening    Essential (hemorrhagic) thrombocythemia    History of pancreatic cancer    Sacroiliitis    Decreased ROM of lumbar spine    Lumbar pain    Severe obesity (BMI 35.0-39.9) with comorbidity       CC:  Syncope    HPI:  August 1st had a syncopal episode in her yard- she was going to walk to the yard and decided to turn to go into the house and woke up with her  standing over her.  This happened at midday and they had been sitting out for a little while.  Had been sitting for a while and also it was very hot that day.  It does not happen all the time but she does notice getting out of her car she has to stand still because she is off balance.  She feels like she is having an out of body experience.  This has happened in the past, it has been a couple of years.  No alleviating factors beside "standing still". Worsened when going from sitting to standing. Checks BP at home 128/80, 131/76, can tell when it is high.  CBGs have been stable.  Lowest /72, 160/90. Quit smoking in January 2022. Occasional etoh. Used to drink coffee in AM, started to cause GI distress and she switched to hot tea. Was on metformin and glipizide but CBGs have improved and she is not only on glipizide.  Has been told that she snores sometimes.  Does have heart racing. No " sleep study in the past.  Has had vertigo in the past, has vomited because of this.     Medications  Current Outpatient Medications   Medication Sig Dispense Refill    amLODIPine (NORVASC) 10 MG tablet Take 1 tablet (10 mg total) by mouth once daily. 90 tablet 3    blood sugar diagnostic Strp 1 strip by Misc.(Non-Drug; Combo Route) route once daily. For insurance preferred meter 100 strip 3    blood-glucose meter kit To check BG 1 time daily, to use with insurance preferred meter 1 each 0    glipiZIDE (GLUCOTROL) 5 MG TR24 TAKE 1 TABLET(5 MG) BY MOUTH DAILY WITH BREAKFAST FOR DIABETES 90 tablet 1    hydrOXYzine pamoate (VISTARIL) 25 MG Cap TAKE 1 CAPSULE BY MOUTH TWICE DAILY AS NEEDED FOR ANXIETY 60 capsule 3    lancets Misc To check BG 1 time daily, to use with insurance preferred meter 100 each 3    levothyroxine (SYNTHROID) 75 MCG tablet Take 1 tablet (75 mcg total) by mouth before breakfast. 90 tablet 3    losartan (COZAAR) 50 MG tablet TAKE 1 TABLET(50 MG) BY MOUTH EVERY DAY 90 tablet 3    meloxicam (MOBIC) 15 MG tablet TAKE 1 TABLET BY MOUTH EVERY DAY FOR ANKLE PAIN OR SWELLING 60 tablet 3    pantoprazole (PROTONIX) 40 MG tablet TAKE 1 TABLET BY MOUTH DAILY 90 tablet 2    pregabalin (LYRICA) 25 MG capsule TAKE 1 CAPSULE(25 MG) BY MOUTH TWICE DAILY 60 capsule 5    temazepam (RESTORIL) 30 mg capsule TAKE 1 CAPSULE(30 MG) BY MOUTH EVERY NIGHT AS NEEDED FOR INSOMNIA 30 capsule 3    traMADoL (ULTRAM) 50 mg tablet Take 1 tablet (50 mg total) by mouth every 8 (eight) hours as needed for Pain. 90 tablet 1    ONETOUCH DELICA PLUS LANCET 33 gauge Misc Apply topically once daily.       No current facility-administered medications for this visit.      Prior to Admission medications    Medication Sig Start Date End Date Taking? Authorizing Provider   amLODIPine (NORVASC) 10 MG tablet Take 1 tablet (10 mg total) by mouth once daily. 7/21/22  Yes Alen Damico MD   blood sugar diagnostic Strp 1 strip by Misc.(Non-Drug;  Combo Route) route once daily. For insurance preferred meter 4/19/22  Yes Alen Damico MD   blood-glucose meter kit To check BG 1 time daily, to use with insurance preferred meter 4/19/22 9/24/24 Yes Alen Damcio MD   glipiZIDE (GLUCOTROL) 5 MG TR24 TAKE 1 TABLET(5 MG) BY MOUTH DAILY WITH BREAKFAST FOR DIABETES 6/13/22  Yes Alen Damico MD   hydrOXYzine pamoate (VISTARIL) 25 MG Cap TAKE 1 CAPSULE BY MOUTH TWICE DAILY AS NEEDED FOR ANXIETY 7/20/22  Yes Alen Damico MD   lancets Misc To check BG 1 time daily, to use with insurance preferred meter 4/19/22  Yes Alen Damico MD   levothyroxine (SYNTHROID) 75 MCG tablet Take 1 tablet (75 mcg total) by mouth before breakfast. 9/20/22  Yes Alen Damico MD   losartan (COZAAR) 50 MG tablet TAKE 1 TABLET(50 MG) BY MOUTH EVERY DAY 2/9/22  Yes Alen Damico MD   meloxicam (MOBIC) 15 MG tablet TAKE 1 TABLET BY MOUTH EVERY DAY FOR ANKLE PAIN OR SWELLING 2/23/22  Yes Alen Damico MD   pantoprazole (PROTONIX) 40 MG tablet TAKE 1 TABLET BY MOUTH DAILY 8/6/22  Yes Alen Damico MD   pregabalin (LYRICA) 25 MG capsule TAKE 1 CAPSULE(25 MG) BY MOUTH TWICE DAILY 9/9/22  Yes Alen Damico MD   temazepam (RESTORIL) 30 mg capsule TAKE 1 CAPSULE(30 MG) BY MOUTH EVERY NIGHT AS NEEDED FOR INSOMNIA 7/20/22  Yes Alen Damico MD   traMADoL (ULTRAM) 50 mg tablet Take 1 tablet (50 mg total) by mouth every 8 (eight) hours as needed for Pain. 10/19/21  Yes MD SOREN Ring PLUS LANCET 33 gauge Misc Apply topically once daily. 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    Hypertension     Knee pain 4/16/2014    Nausea 11/19/2019    Neuroendocrine tumor of pancreas 2015    Pancreatic cancer 2015    Renal cyst     left     Thyroid disease      Past Surgical History:   Procedure Laterality Date     SECTION      COLONOSCOPY N/A 3/13/2019    Procedure: COLONOSCOPY;  Surgeon: Cem Lamar MD;  Location: [facility] (4TH FLR);  Service: Endoscopy;  Laterality: N/A;  previous order cx    COLONOSCOPY N/A 2022    Procedure: COLONOSCOPY Suprep;  Surgeon: Hiren Newberry MD;  Location: [facility] ENDO;  Service: Endoscopy;  Laterality: N/A;    EPIDURAL STEROID INJECTION INTO LUMBAR SPINE N/A 6/15/2021    Procedure: Injection-steroid-epidural-lumbar-L5-S1;  Surgeon: Saranya Cornelius Jr., MD;  Location: [facility] PAIN MGT;  Service: Pain Management;  Laterality: N/A;    ESOPHAGOGASTRODUODENOSCOPY N/A 2019    Procedure: ESOPHAGOGASTRODUODENOSCOPY (EGD);  Surgeon: Jonathan Oneill MD;  Location: [facility] (4TH FLR);  Service: Endoscopy;  Laterality: N/A;    ESOPHAGOGASTRODUODENOSCOPY N/A 2020    Procedure: EGD (ESOPHAGOGASTRODUODENOSCOPY);  Surgeon: Shady Costa MD;  Location: [facility] (2ND FLR);  Service: Endoscopy;  Laterality: N/A;  Please schedule patient as soon as possible in the 2nd floor history of a Whipple surgery for neuroendocrine pancreatic tumor many years ago with now constant belching and regurgitation.  May need airway protection.  Rule out celiac sprue rule out lact    ESOPHAGOGASTRODUODENOSCOPY N/A 2022    Procedure: EGD (ESOPHAGOGASTRODUODENOSCOPY);  Surgeon: Hiren Newberry MD;  Location: [facility];  Service: Endoscopy;  Laterality: N/A;    EXCISION OF GANGLION CYST OF HAND Right 10/22/2018    Procedure: EXCISION, GANGLION CYST, HAND- RIGHT, LONG AND INDEX FINGER;  Surgeon: Sowmya Schmidt MD;  Location: [facility] OR;  Service: Orthopedics;  Laterality: Right;  Stretcher; Supine; Hand Pan 1 & [place] 2; Dr. Loja's Rasp    HYSTERECTOMY      ISMAEL    INJECTION OF ANESTHETIC AGENT AROUND MEDIAL BRANCH NERVES INNERVATING LUMBAR FACET JOINT Bilateral 2021    Procedure: Block-nerve-medial  branch-lumbar-bilateral L4-5 and L5-S1;  Surgeon: Saranya Cornelius Jr., MD;  Location: [facility] PAIN MGT;  Service: Pain Management;  Laterality: Bilateral;    INJECTION OF ANESTHETIC AGENT AROUND MEDIAL BRANCH NERVES INNERVATING LUMBAR FACET JOINT Bilateral 10/12/2021    Procedure: Bilateral Lumbar Medial Branch Block L4-5 L5-S1- (No Sedation);  Surgeon: Saranya Cornelius Jr., MD;  Location: [facility] PAIN MGT;  Service: Pain Management;  Laterality: Bilateral;    KNEE ARTHROSCOPY  2014    LATS/left    OOPHORECTOMY      PANCREAS SURGERY      MD Ritchie    RADIOFREQUENCY THERMOCOAGULATION Bilateral 2021    Procedure: Radiofrequency Themocoagulation of medial branches: L4-5 and L5-S1;  Surgeon: Saranya Cornelius Jr., MD;  Location: [facility] PAIN MGT;  Service: Pain Management;  Laterality: Bilateral;  Patient is diabetic.     RADIOFREQUENCY THERMOCOAGULATION Left 2021    Procedure: RADIOFREQUENCY THERMAL COAGULATION LEFT L4-5, L5-S1 (IV Sedation);  Surgeon: Saranya Cornelius Jr., MD;  Location: [facility] PAIN MGT;  Service: Pain Management;  Laterality: Left;  diabetic    TONSILLECTOMY      TRANSFORAMINAL EPIDURAL INJECTION OF STEROID Right 2021    Procedure: Injection,steroid,epidural,transforaminal approach; Levels: L5-S1;  Surgeon: Saranya Cornelius Jr., MD;  Location: [facility] PAIN MGT;  Service: Pain Management;  Laterality: Right;  No pacemaker. Patient is diabetic.    TRANSFORAMINAL EPIDURAL INJECTION OF STEROID Right 2021    Procedure: Injection,steroid,epidural,transforaminal approach; Levels: L5-S1;  Surgeon: Saranya Cornelius Jr., MD;  Location: [facility] PAIN MGT;  Service: Pain Management;  Laterality: Right;  No pacemaker. Patient is diabetic.      Social History     Socioeconomic History    Marital status:    Tobacco Use    Smoking status: Former     Packs/day: 0.25     Years: 10.00     Pack years: 2.50     Types: Cigarettes     Quit date: 1982     Years since quittin.7     Smokeless tobacco: Never    Tobacco comments:     Totally quit per 9/22 visit.   Substance and Sexual Activity    Alcohol use: Yes     Comment: occasional use    Drug use: Never    Sexual activity: Yes     Partners: Male     Birth control/protection: None     Social Determinants of Health     Financial Resource Strain: Low Risk     Difficulty of Paying Living Expenses: Not very hard   Food Insecurity: No Food Insecurity    Worried About Running Out of Food in the Last Year: Never true    Ran Out of Food in the Last Year: Never true   Transportation Needs: No Transportation Needs    Lack of Transportation (Medical): No    Lack of Transportation (Non-Medical): No   Physical Activity: Sufficiently Active    Days of Exercise per Week: 5 days    Minutes of Exercise per Session: 30 min   Stress: Stress Concern Present    Feeling of Stress : Very much   Social Connections: Unknown    Frequency of Communication with Friends and Family: More than three times a week    Frequency of Social Gatherings with Friends and Family: Patient refused    Active Member of Clubs or Organizations: Yes    Attends Club or Organization Meetings: More than 4 times per year    Marital Status:    Housing Stability: Low Risk     Unable to Pay for Housing in the Last Year: No    Number of Places Lived in the Last Year: 1    Unstable Housing in the Last Year: No         Allergies  Review of patient's allergies indicates:   Allergen Reactions    Erythromycin base          Review of Systems   Review of Systems   Constitutional: Negative for diaphoresis and malaise/fatigue.   HENT: Negative.     Cardiovascular:  Positive for syncope. Negative for chest pain, claudication, dyspnea on exertion, irregular heartbeat, leg swelling, near-syncope, orthopnea, palpitations and paroxysmal nocturnal dyspnea.   Respiratory:  Negative for shortness of breath.    Endocrine: Negative for polydipsia, polyphagia and polyuria.   Hematologic/Lymphatic: Does not  bruise/bleed easily.   Gastrointestinal:  Negative for bloating, nausea and vomiting.   Genitourinary: Negative.    Neurological:  Positive for dizziness, light-headedness and loss of balance. Negative for excessive daytime sleepiness and weakness.   Psychiatric/Behavioral:  The patient is not nervous/anxious.    Allergic/Immunologic: Negative.        Physical Exam  Wt Readings from Last 1 Encounters:   09/21/22 92.5 kg (204 lb 0.6 oz)     BP Readings from Last 3 Encounters:   09/21/22 126/60   09/20/22 132/60   04/21/22 120/70     Pulse Readings from Last 1 Encounters:   09/21/22 (!) 57     Body mass index is 39.85 kg/m².    Physical Exam  Vitals and nursing note reviewed.   Constitutional:       Appearance: Normal appearance.      Comments: BMI 39   HENT:      Head: Normocephalic and atraumatic.      Mouth/Throat:      Mouth: Mucous membranes are moist.   Eyes:      Pupils: Pupils are equal, round, and reactive to light.   Cardiovascular:      Rate and Rhythm: Normal rate and regular rhythm.      Pulses:           Radial pulses are 2+ on the right side and 2+ on the left side.        Dorsalis pedis pulses are 2+ on the right side and 2+ on the left side.        Posterior tibial pulses are 2+ on the right side and 2+ on the left side.      Heart sounds: No murmur heard.  Pulmonary:      Effort: Pulmonary effort is normal. No respiratory distress.      Breath sounds: Normal breath sounds.   Abdominal:      General: There is no distension.      Tenderness: There is no abdominal tenderness.   Musculoskeletal:      Cervical back: Normal range of motion.      Right lower leg: No edema.      Left lower leg: No edema.   Skin:     General: Skin is warm and dry.      Findings: No erythema.   Neurological:      General: No focal deficit present.      Mental Status: She is alert.   Psychiatric:         Mood and Affect: Mood normal.         Behavior: Behavior normal.       Problem List Items Addressed This Visit           Cardiac/Vascular    Essential hypertension (Chronic)    Overview     Well controlled on Losartan 50 mg  Continue as now         Current Assessment & Plan     As above         Hyperlipidemia (Chronic)    Overview     LDL in 130s  With DM would prefer LDL of 70 or below  Likely would benefit from statin         Current Assessment & Plan     As above            Other    Syncope and collapse    Overview     Recommend echo and Holter  Could be related to heat exposure.  This has happened in past without much investigation into etiology  Await testing         Current Assessment & Plan     As above          Other Visit Diagnoses       Nonspecific abnormal electrocardiogram (ECG) (EKG)    -  Primary    Relevant Orders    Echo    Syncope, unspecified syncope type        Relevant Orders    IN OFFICE EKG 12-LEAD (to Muse) (Completed)    Holter monitor - 48 hour                      Follow Up  4 weeks      @Honey Everett DNP

## 2022-09-22 ENCOUNTER — PATIENT MESSAGE (OUTPATIENT)
Dept: CARDIOLOGY | Facility: CLINIC | Age: 67
End: 2022-09-22
Payer: MEDICARE

## 2022-09-27 PROBLEM — R55 SYNCOPE AND COLLAPSE: Status: ACTIVE | Noted: 2022-09-27

## 2022-09-30 ENCOUNTER — EXTERNAL CHRONIC CARE MANAGEMENT (OUTPATIENT)
Dept: PRIMARY CARE CLINIC | Facility: CLINIC | Age: 67
End: 2022-09-30
Payer: MEDICARE

## 2022-09-30 PROCEDURE — 99490 CHRNC CARE MGMT STAFF 1ST 20: CPT | Mod: S$PBB,,, | Performed by: INTERNAL MEDICINE

## 2022-09-30 PROCEDURE — 99490 CHRNC CARE MGMT STAFF 1ST 20: CPT | Mod: PBBFAC,PO | Performed by: INTERNAL MEDICINE

## 2022-09-30 PROCEDURE — 99439 CHRNC CARE MGMT STAF EA ADDL: CPT | Mod: PBBFAC,PO | Performed by: INTERNAL MEDICINE

## 2022-09-30 PROCEDURE — 99439 PR CHRONIC CARE MGMT, EA ADDTL 20 MIN: ICD-10-PCS | Mod: S$PBB,,, | Performed by: INTERNAL MEDICINE

## 2022-09-30 PROCEDURE — 99490 PR CHRONIC CARE MGMT, 1ST 20 MIN: ICD-10-PCS | Mod: S$PBB,,, | Performed by: INTERNAL MEDICINE

## 2022-09-30 PROCEDURE — 99439 CHRNC CARE MGMT STAF EA ADDL: CPT | Mod: S$PBB,,, | Performed by: INTERNAL MEDICINE

## 2022-10-03 ENCOUNTER — HOSPITAL ENCOUNTER (OUTPATIENT)
Dept: CARDIOLOGY | Facility: HOSPITAL | Age: 67
Discharge: HOME | End: 2022-10-03
Attending: NURSE PRACTITIONER
Payer: MEDICARE

## 2022-10-03 VITALS
SYSTOLIC BLOOD PRESSURE: 144 MMHG | WEIGHT: 204 LBS | DIASTOLIC BLOOD PRESSURE: 80 MMHG | BODY MASS INDEX: 40.05 KG/M2 | HEART RATE: 50 BPM | HEIGHT: 60 IN

## 2022-10-03 DIAGNOSIS — R94.31 NONSPECIFIC ABNORMAL ELECTROCARDIOGRAM (ECG) (EKG): ICD-10-CM

## 2022-10-03 LAB
ASCENDING AORTA: 3.13 CM
AV INDEX (PROSTH): 0.97
AV MEAN GRADIENT: 5 MMHG
AV PEAK GRADIENT: 8 MMHG
AV VALVE AREA: 3.29 CM2
AV VELOCITY RATIO: 1.06
BSA FOR ECHO PROCEDURE: 1.98 M2
CV ECHO LV RWT: 0.27 CM
DOP CALC AO PEAK VEL: 1.38 M/S
DOP CALC AO VTI: 39.2 CM
DOP CALC LVOT AREA: 3.4 CM2
DOP CALC LVOT DIAMETER: 2.08 CM
DOP CALC LVOT PEAK VEL: 1.46 M/S
DOP CALC LVOT STROKE VOLUME: 128.82 CM3
DOP CALCLVOT PEAK VEL VTI: 37.93 CM
E WAVE DECELERATION TIME: 207.12 MSEC
E/A RATIO: 1.32
E/E' RATIO: 14.46 M/S
ECHO LV POSTERIOR WALL: 0.69 CM (ref 0.6–1.1)
EJECTION FRACTION: 65 %
FRACTIONAL SHORTENING: 48 % (ref 28–44)
INTERVENTRICULAR SEPTUM: 0.75 CM (ref 0.6–1.1)
IVRT: 105.61 MSEC
LA MAJOR: 4.79 CM
LA MINOR: 4.85 CM
LA WIDTH: 3.51 CM
LEFT ATRIUM SIZE: 3.44 CM
LEFT ATRIUM VOLUME INDEX MOD: 22 ML/M2
LEFT ATRIUM VOLUME INDEX: 26.3 ML/M2
LEFT ATRIUM VOLUME MOD: 41.36 CM3
LEFT ATRIUM VOLUME: 49.47 CM3
LEFT INTERNAL DIMENSION IN SYSTOLE: 2.63 CM (ref 2.1–4)
LEFT VENTRICLE DIASTOLIC VOLUME INDEX: 63.72 ML/M2
LEFT VENTRICLE DIASTOLIC VOLUME: 119.79 ML
LEFT VENTRICLE MASS INDEX: 64 G/M2
LEFT VENTRICLE SYSTOLIC VOLUME INDEX: 13.4 ML/M2
LEFT VENTRICLE SYSTOLIC VOLUME: 25.22 ML
LEFT VENTRICULAR INTERNAL DIMENSION IN DIASTOLE: 5.03 CM (ref 3.5–6)
LEFT VENTRICULAR MASS: 120.06 G
LV LATERAL E/E' RATIO: 13.43 M/S
LV SEPTAL E/E' RATIO: 15.67 M/S
MV PEAK A VEL: 0.71 M/S
MV PEAK E VEL: 0.94 M/S
MV STENOSIS PRESSURE HALF TIME: 60.06 MS
MV VALVE AREA P 1/2 METHOD: 3.66 CM2
PISA TR MAX VEL: 2.44 M/S
PULM VEIN S/D RATIO: 0.87
PV PEAK D VEL: 0.61 M/S
PV PEAK S VEL: 0.53 M/S
RA MAJOR: 4.19 CM
RA PRESSURE: 3 MMHG
RA WIDTH: 3.03 CM
RIGHT VENTRICULAR END-DIASTOLIC DIMENSION: 2.77 CM
SINUS: 3.17 CM
STJ: 2.68 CM
TDI LATERAL: 0.07 M/S
TDI SEPTAL: 0.06 M/S
TDI: 0.07 M/S
TR MAX PG: 24 MMHG
TRICUSPID ANNULAR PLANE SYSTOLIC EXCURSION: 2.39 CM
TV REST PULMONARY ARTERY PRESSURE: 27 MMHG

## 2022-10-03 PROCEDURE — 93306 ECHO (CUPID ONLY): ICD-10-PCS | Mod: 26,,, | Performed by: INTERNAL MEDICINE

## 2022-10-03 PROCEDURE — 93306 TTE W/DOPPLER COMPLETE: CPT

## 2022-10-03 PROCEDURE — 93306 TTE W/DOPPLER COMPLETE: CPT | Mod: 26,,, | Performed by: INTERNAL MEDICINE

## 2022-10-05 ENCOUNTER — OFFICE VISIT (OUTPATIENT)
Dept: CARDIOLOGY | Facility: CLINIC | Age: 67
End: 2022-10-05
Payer: MEDICARE

## 2022-10-05 VITALS
BODY MASS INDEX: 39.69 KG/M2 | SYSTOLIC BLOOD PRESSURE: 132 MMHG | DIASTOLIC BLOOD PRESSURE: 63 MMHG | WEIGHT: 202.19 LBS | OXYGEN SATURATION: 98 % | HEART RATE: 70 BPM | HEIGHT: 60 IN

## 2022-10-05 DIAGNOSIS — R55 SYNCOPE AND COLLAPSE: ICD-10-CM

## 2022-10-05 DIAGNOSIS — I10 ESSENTIAL HYPERTENSION: Chronic | ICD-10-CM

## 2022-10-05 PROCEDURE — 99999 PR PBB SHADOW E&M-EST. PATIENT-LVL IV: ICD-10-PCS | Mod: PBBFAC,,, | Performed by: NURSE PRACTITIONER

## 2022-10-05 PROCEDURE — 99213 PR OFFICE/OUTPT VISIT, EST, LEVL III, 20-29 MIN: ICD-10-PCS | Mod: S$PBB,,, | Performed by: NURSE PRACTITIONER

## 2022-10-05 PROCEDURE — 99999 PR PBB SHADOW E&M-EST. PATIENT-LVL IV: CPT | Mod: PBBFAC,,, | Performed by: NURSE PRACTITIONER

## 2022-10-05 PROCEDURE — 99213 OFFICE O/P EST LOW 20 MIN: CPT | Mod: S$PBB,,, | Performed by: NURSE PRACTITIONER

## 2022-10-05 PROCEDURE — 99214 OFFICE O/P EST MOD 30 MIN: CPT | Mod: PBBFAC,PN | Performed by: NURSE PRACTITIONER

## 2022-10-05 RX ORDER — COVID-19 ANTIGEN TEST
KIT MISCELLANEOUS
COMMUNITY
Start: 2022-09-20 | End: 2023-03-27

## 2022-10-05 NOTE — PROGRESS NOTES
Cardiology    10/5/2022  9:27 AM    Problem list  Patient Active Problem List   Diagnosis    Essential hypertension    Arthritis    Chronic back pain    Renal cyst    Diverticulosis    DJD (degenerative joint disease) of knee    Fibromyalgia    Status post arthroscopy of left knee 4/16/2014    Hyperlipidemia    Special screening for malignant neoplasms, colon    Type 2 diabetes mellitus    Osteoarthritis of spine with radiculopathy, lumbar region    Finger pain, right    Bone spur of finger IP joint    Effusion of right hand    Finger stiffness, right    Range of motion deficit    Stiffness of right hand, not elsewhere classified    Colon cancer screening    Essential (hemorrhagic) thrombocythemia    History of pancreatic cancer    Sacroiliitis    Decreased ROM of lumbar spine    Lumbar pain    Severe obesity (BMI 35.0-39.9) with comorbidity    Syncope and collapse       CC:  Results review    HPI:  Feeling well overall.  No further episodes of syncope.     Medications  Current Outpatient Medications   Medication Sig Dispense Refill    amLODIPine (NORVASC) 10 MG tablet Take 1 tablet (10 mg total) by mouth once daily. 90 tablet 3    azelastine (ASTELIN) 137 mcg (0.1 %) nasal spray USE 1 SPRAY NASALLY TWICE DAILY FOR 7 DAYS 30 mL 0    blood sugar diagnostic Strp 1 strip by Misc.(Non-Drug; Combo Route) route once daily. For insurance preferred meter 100 strip 3    blood-glucose meter kit To check BG 1 time daily, to use with insurance preferred meter 1 each 0    glipiZIDE (GLUCOTROL) 5 MG TR24 TAKE 1 TABLET(5 MG) BY MOUTH DAILY WITH BREAKFAST FOR DIABETES 90 tablet 1    hydrOXYzine pamoate (VISTARIL) 25 MG Cap TAKE 1 CAPSULE BY MOUTH TWICE DAILY AS NEEDED FOR ANXIETY 60 capsule 3    lancets Misc To check BG 1 time daily, to use with insurance preferred meter 100 each 3    levothyroxine (SYNTHROID) 75 MCG tablet Take 1 tablet (75 mcg total) by mouth before breakfast. 90 tablet 3    losartan (COZAAR) 50 MG tablet  TAKE 1 TABLET(50 MG) BY MOUTH EVERY DAY 90 tablet 3    meloxicam (MOBIC) 15 MG tablet TAKE 1 TABLET BY MOUTH EVERY DAY FOR ANKLE PAIN OR SWELLING 60 tablet 3    pantoprazole (PROTONIX) 40 MG tablet TAKE 1 TABLET BY MOUTH DAILY 90 tablet 2    pregabalin (LYRICA) 25 MG capsule TAKE 1 CAPSULE(25 MG) BY MOUTH TWICE DAILY 60 capsule 5    temazepam (RESTORIL) 30 mg capsule TAKE 1 CAPSULE(30 MG) BY MOUTH EVERY NIGHT AS NEEDED FOR INSOMNIA 30 capsule 3    traMADoL (ULTRAM) 50 mg tablet Take 1 tablet (50 mg total) by mouth every 8 (eight) hours as needed for Pain. 90 tablet 1     No current facility-administered medications for this visit.      Prior to Admission medications    Medication Sig Start Date End Date Taking? Authorizing Provider   amLODIPine (NORVASC) 10 MG tablet Take 1 tablet (10 mg total) by mouth once daily. 7/21/22   Alen Damico MD   azelastine (ASTELIN) 137 mcg (0.1 %) nasal spray USE 1 SPRAY NASALLY TWICE DAILY FOR 7 DAYS 9/21/22   Alen Damico MD   blood sugar diagnostic Strp 1 strip by Misc.(Non-Drug; Combo Route) route once daily. For insurance preferred meter 4/19/22   Alen Damico MD   blood-glucose meter kit To check BG 1 time daily, to use with insurance preferred meter 4/19/22 9/24/24  Alen Damico MD   glipiZIDE (GLUCOTROL) 5 MG TR24 TAKE 1 TABLET(5 MG) BY MOUTH DAILY WITH BREAKFAST FOR DIABETES 6/13/22   Alen Damico MD   hydrOXYzine pamoate (VISTARIL) 25 MG Cap TAKE 1 CAPSULE BY MOUTH TWICE DAILY AS NEEDED FOR ANXIETY 7/20/22   Alen Damico MD   lancets [facility] To check BG 1 time daily, to use with insurance preferred meter 4/19/22   Alen Damico MD   levothyroxine (SYNTHROID) 75 MCG tablet Take 1 tablet (75 mcg total) by mouth before breakfast. 9/20/22   Alen Damico MD   losartan (COZAAR) 50 MG tablet TAKE 1 TABLET(50 MG) BY MOUTH EVERY DAY 2/9/22   Alen Damico MD   meloxicam (MOBIC) 15 MG tablet TAKE 1 TABLET BY MOUTH EVERY DAY FOR ANKLE PAIN OR SWELLING  22   Alen Damico MD   pantoprazole (PROTONIX) 40 MG tablet TAKE 1 TABLET BY MOUTH DAILY 22   Alen Damico MD   pregabalin (LYRICA) 25 MG capsule TAKE 1 CAPSULE(25 MG) BY MOUTH TWICE DAILY 22   Alen Damico MD   temazepam (RESTORIL) 30 mg capsule TAKE 1 CAPSULE(30 MG) BY MOUTH EVERY NIGHT AS NEEDED FOR INSOMNIA 22   Alen Damico MD   traMADoL (ULTRAM) 50 mg tablet Take 1 tablet (50 mg total) by mouth every 8 (eight) hours as needed for Pain. 10/19/21   Alen Damico MD         History  Past Medical History:   Diagnosis Date    Abdominal pain 2015    Arthritis     Chronic back pain     Chronic pain 6/15/2021    Diabetes mellitus 2014    Diverticulosis     Functional belching disorder 2020    Hypertension     Knee pain 2014    Nausea 2019    Neuroendocrine tumor of pancreas 2015    Pancreatic cancer 2015    Renal cyst     left    Thyroid disease      Past Surgical History:   Procedure Laterality Date     SECTION      COLONOSCOPY N/A 3/13/2019    Procedure: COLONOSCOPY;  Surgeon: Cem Lamar MD;  Location: [facility] (4TH FLR);  Service: Endoscopy;  Laterality: N/A;  previous order cx    COLONOSCOPY N/A 2022    Procedure: COLONOSCOPY Suprep;  Surgeon: Hiren Newberry MD;  Location: [facility];  Service: Endoscopy;  Laterality: N/A;    EPIDURAL STEROID INJECTION INTO LUMBAR SPINE N/A 6/15/2021    Procedure: Injection-steroid-epidural-lumbar-L5-S1;  Surgeon: Saranya Cornelius Jr., MD;  Location: [facility] PAIN MGT;  Service: Pain Management;  Laterality: N/A;    ESOPHAGOGASTRODUODENOSCOPY N/A 2019    Procedure: ESOPHAGOGASTRODUODENOSCOPY (EGD);  Surgeon: Jonathan Oneill MD;  Location: [facility] (4TH FLR);  Service: Endoscopy;  Laterality: N/A;    ESOPHAGOGASTRODUODENOSCOPY N/A 2020    Procedure: EGD (ESOPHAGOGASTRODUODENOSCOPY);  Surgeon: Shady Costa MD;  Location: [facility] ENDO (2ND FLR);  Service: Endoscopy;  Laterality: N/A;  Please schedule  patient as soon as possible in the 2nd floor history of a Whipple surgery for neuroendocrine pancreatic tumor many years ago with now constant belching and regurgitation.  May need airway protection.  Rule out celiac sprue rule out lact    ESOPHAGOGASTRODUODENOSCOPY N/A 4/19/2022    Procedure: EGD (ESOPHAGOGASTRODUODENOSCOPY);  Surgeon: Hiren Newberry MD;  Location: [facility] ENDO;  Service: Endoscopy;  Laterality: N/A;    EXCISION OF GANGLION CYST OF HAND Right 10/22/2018    Procedure: EXCISION, GANGLION CYST, HAND- RIGHT, LONG AND INDEX FINGER;  Surgeon: Sowmya Schmidt MD;  Location: [facility] OR;  Service: Orthopedics;  Laterality: Right;  Stretcher; Supine; Hand Pan 1 & [place] 2; Dr. Loja's Rasp    HYSTERECTOMY  2015    [facility]    INJECTION OF ANESTHETIC AGENT AROUND MEDIAL BRANCH NERVES INNERVATING LUMBAR FACET JOINT Bilateral 9/28/2021    Procedure: Block-nerve-medial branch-lumbar-bilateral L4-5 and L5-S1;  Surgeon: Saranya Cornelius Jr., MD;  [place] [facility] [place] [facility]  Service: Pain Management;  Laterality: Bilateral;    INJECTION OF ANESTHETIC AGENT AROUND MEDIAL BRANCH NERVES INNERVATING LUMBAR FACET JOINT Bilateral 10/12/2021    Procedure: Bilateral Lumbar Medial Branch Block L4-5 L5-S1- (No Sedation);  Surgeon: Saranya Cornelius Jr., MD;  [place] [facility] [place] [facility]  Service: Pain Management;  Laterality: Bilateral;    KNEE ARTHROSCOPY  4/18/2014    LATS/left    OOPHORECTOMY      PANCREAS SURGERY  2015    [facility]    RADIOFREQUENCY THERMOCOAGULATION Bilateral 11/9/2021    Procedure: Radiofrequency Themocoagulation of medial branches: L4-5 and L5-S1;  Surgeon: Saranya Cornelius Jr., MD;  Location: [facility] PAIN MGT;  Service: Pain Management;  Laterality: Bilateral;  Patient is diabetic.     RADIOFREQUENCY THERMOCOAGULATION Left 12/16/2021    Procedure: RADIOFREQUENCY THERMAL COAGULATION LEFT L4-5, L5-S1 (IV Sedation);  Surgeon: Saranya Cornelius Jr., MD;  Location: [facility] PAIN MGT;  Service: Pain Management;   Laterality: Left;  diabetic    TONSILLECTOMY      TRANSFORAMINAL EPIDURAL INJECTION OF STEROID Right 2021    Procedure: Injection,steroid,epidural,transforaminal approach; Levels: L5-S1;  Surgeon: Saranya Cornelius Jr., MD;  Location: [facility];  Service: Pain Management;  Laterality: Right;  No pacemaker. Patient is diabetic.    TRANSFORAMINAL EPIDURAL INJECTION OF STEROID Right 2021    Procedure: Injection,steroid,epidural,transforaminal approach; Levels: L5-S1;  Surgeon: Saranya Cornelius Jr., MD;  Location: [facility];  Service: Pain Management;  Laterality: Right;  No pacemaker. Patient is diabetic.      Social History     Socioeconomic History    Marital status:    Tobacco Use    Smoking status: Former     Packs/day: 0.25     Years: 10.00     Pack years: 2.50     Types: Cigarettes     Quit date: 1982     Years since quittin.7    Smokeless tobacco: Never    Tobacco comments:     Totally quit per  visit.   Substance and Sexual Activity    Alcohol use: Yes     Comment: occasional use    Drug use: Never    Sexual activity: Yes     Partners: Male     Birth control/protection: None     Social Determinants of Health     Financial Resource Strain: Low Risk     Difficulty of Paying Living Expenses: Not hard at all   Food Insecurity: No Food Insecurity    Worried About Running Out of Food in the Last Year: Never true    Ran Out of Food in the Last Year: Never true   Transportation Needs: No Transportation Needs    Lack of Transportation (Medical): No    Lack of Transportation (Non-Medical): No   Physical Activity: Sufficiently Active    Days of Exercise per Week: 5 days    Minutes of Exercise per Session: 30 min   Stress: Stress Concern Present    Feeling of Stress : Rather much   Social Connections: Unknown    Frequency of Communication with Friends and Family: More than three times a week    Frequency of Social Gatherings with Friends and Family: Patient refused    Active Member of  Clubs or Organizations: Yes    Attends Club or Organization Meetings: More than 4 times per year    Marital Status:    Housing Stability: Low Risk     Unable to Pay for Housing in the Last Year: No    Number of Places Lived in the Last Year: 1    Unstable Housing in the Last Year: No         Allergies  Review of patient's allergies indicates:   Allergen Reactions    Erythromycin base          Review of Systems   Review of Systems   Constitutional: Negative for diaphoresis and malaise/fatigue.   HENT: Negative.     Cardiovascular:  Negative for chest pain, claudication, dyspnea on exertion, irregular heartbeat, leg swelling, near-syncope, orthopnea, palpitations, paroxysmal nocturnal dyspnea and syncope.   Respiratory:  Negative for shortness of breath.    Endocrine: Negative for polydipsia, polyphagia and polyuria.   Hematologic/Lymphatic: Does not bruise/bleed easily.   Gastrointestinal:  Negative for bloating, nausea and vomiting.   Genitourinary: Negative.    Neurological:  Negative for excessive daytime sleepiness, dizziness, light-headedness, loss of balance and weakness.   Psychiatric/Behavioral:  The patient is not nervous/anxious.    Allergic/Immunologic: Negative.        Physical Exam  Wt Readings from Last 1 Encounters:   10/03/22 92.5 kg (204 lb)     BP Readings from Last 3 Encounters:   10/03/22 (!) 144/80   09/21/22 126/60   09/20/22 132/60     Pulse Readings from Last 1 Encounters:   10/03/22 (!) 50     There is no height or weight on file to calculate BMI.    Physical Exam  Vitals and nursing note reviewed.   Constitutional:       Appearance: Normal appearance.   HENT:      Head: Normocephalic and atraumatic.      Mouth/Throat:      Mouth: Mucous membranes are moist.   Eyes:      Pupils: Pupils are equal, round, and reactive to light.   Cardiovascular:      Rate and Rhythm: Normal rate and regular rhythm.      Pulses:           Radial pulses are 2+ on the right side and 2+ on the left side.         Dorsalis pedis pulses are 2+ on the right side and 2+ on the left side.        Posterior tibial pulses are 2+ on the right side and 2+ on the left side.      Heart sounds: No murmur heard.  Pulmonary:      Effort: Pulmonary effort is normal. No respiratory distress.      Breath sounds: Normal breath sounds.   Abdominal:      General: There is no distension.      Tenderness: There is no abdominal tenderness.   Musculoskeletal:      Cervical back: Normal range of motion.      Right lower leg: No edema.      Left lower leg: No edema.   Skin:     General: Skin is warm and dry.      Findings: No erythema.   Neurological:      General: No focal deficit present.      Mental Status: She is alert.   Psychiatric:         Mood and Affect: Mood normal.         Behavior: Behavior normal.     Problem List Items Addressed This Visit          Cardiac/Vascular    Essential hypertension (Chronic)    Overview     Well controlled on Losartan 50 mg  Continue as now         Current Assessment & Plan     As above            Other    Syncope and collapse    Overview     Echo and Holter normal  Suspect vasovagal episode                    RTC PRN      @Honey Everett DNP

## 2022-10-17 ENCOUNTER — PATIENT MESSAGE (OUTPATIENT)
Dept: INTERNAL MEDICINE | Facility: CLINIC | Age: 67
End: 2022-10-17
Payer: MEDICARE

## 2022-10-17 DIAGNOSIS — M19.90 ARTHRITIS: ICD-10-CM

## 2022-10-17 DIAGNOSIS — M79.7 FIBROMYALGIA: Primary | ICD-10-CM

## 2022-10-18 NOTE — TELEPHONE ENCOUNTER
Pt is having some concerns regarding her Fibromyalgia, says she has been trying to bare the pain but it's getting worse.

## 2022-10-23 ENCOUNTER — PATIENT MESSAGE (OUTPATIENT)
Dept: INTERNAL MEDICINE | Facility: CLINIC | Age: 67
End: 2022-10-23
Payer: MEDICARE

## 2022-10-23 NOTE — TELEPHONE ENCOUNTER
Message sent to referral coordinator to schedule  Pain clinic    Pt aware she will be called to schedule this apt

## 2022-10-31 ENCOUNTER — EXTERNAL CHRONIC CARE MANAGEMENT (OUTPATIENT)
Dept: PRIMARY CARE CLINIC | Facility: CLINIC | Age: 67
End: 2022-10-31
Payer: MEDICARE

## 2022-10-31 PROCEDURE — 99490 PR CHRONIC CARE MGMT, 1ST 20 MIN: ICD-10-PCS | Mod: S$PBB,,, | Performed by: INTERNAL MEDICINE

## 2022-10-31 PROCEDURE — 99490 CHRNC CARE MGMT STAFF 1ST 20: CPT | Mod: S$PBB,,, | Performed by: INTERNAL MEDICINE

## 2022-10-31 PROCEDURE — 99490 CHRNC CARE MGMT STAFF 1ST 20: CPT | Mod: PBBFAC,PO | Performed by: INTERNAL MEDICINE

## 2022-11-02 ENCOUNTER — TELEPHONE (OUTPATIENT)
Dept: PAIN MEDICINE | Facility: CLINIC | Age: 67
End: 2022-11-02
Payer: MEDICARE

## 2022-11-02 ENCOUNTER — TELEPHONE (OUTPATIENT)
Dept: SPORTS MEDICINE | Facility: CLINIC | Age: 67
End: 2022-11-02
Payer: MEDICARE

## 2022-11-02 NOTE — TELEPHONE ENCOUNTER
----- Message from Araseli Jacob sent at 11/2/2022  3:02 PM CDT -----  Regarding: Patient call back  Who called:DES JULES [[id number]]    What is the request in detail: Patient is requesting a call back. Pt would like to change pain providers to Dr. Delcid. She would like to further discuss.  Please advise.    Can the clinic reply by [facility]? No    Best call back number: [phone]    Additional Information: N/A

## 2022-11-02 NOTE — TELEPHONE ENCOUNTER
----- Message from Janes Randall sent at 11/2/2022 11:03 AM CDT -----  Sports Referral for shoulder    See if she would like to see Dr. Ortega. Has seen Dr. eDng for knees in past.     Janes

## 2022-11-11 NOTE — TELEPHONE ENCOUNTER
Spoke with patient. Discussed scheduling an appointment at this location with Dr Delcid. An appointment has been established for the patient at time/date selected by her. No further issues discussed.

## 2022-11-15 ENCOUNTER — OFFICE VISIT (OUTPATIENT)
Dept: PAIN MEDICINE | Facility: CLINIC | Age: 67
End: 2022-11-15
Payer: MEDICARE

## 2022-11-15 VITALS
DIASTOLIC BLOOD PRESSURE: 70 MMHG | BODY MASS INDEX: 40.19 KG/M2 | WEIGHT: 204.69 LBS | SYSTOLIC BLOOD PRESSURE: 124 MMHG | HEIGHT: 60 IN | RESPIRATION RATE: 16 BRPM | HEART RATE: 64 BPM | TEMPERATURE: 98 F

## 2022-11-15 DIAGNOSIS — M79.7 FIBROMYALGIA: Primary | ICD-10-CM

## 2022-11-15 PROCEDURE — 99214 OFFICE O/P EST MOD 30 MIN: CPT | Mod: S$PBB,,, | Performed by: PAIN MEDICINE

## 2022-11-15 PROCEDURE — 99999 PR PBB SHADOW E&M-EST. PATIENT-LVL IV: CPT | Mod: PBBFAC,,, | Performed by: PAIN MEDICINE

## 2022-11-15 PROCEDURE — 99999 PR PBB SHADOW E&M-EST. PATIENT-LVL IV: ICD-10-PCS | Mod: PBBFAC,,, | Performed by: PAIN MEDICINE

## 2022-11-15 PROCEDURE — 99214 OFFICE O/P EST MOD 30 MIN: CPT | Mod: PBBFAC,PN | Performed by: PAIN MEDICINE

## 2022-11-15 PROCEDURE — 99214 PR OFFICE/OUTPT VISIT, EST, LEVL IV, 30-39 MIN: ICD-10-PCS | Mod: S$PBB,,, | Performed by: PAIN MEDICINE

## 2022-11-15 RX ORDER — PREGABALIN 75 MG/1
CAPSULE ORAL
Qty: 187 CAPSULE | Refills: 0 | Status: SHIPPED | OUTPATIENT
Start: 2022-11-15 | End: 2023-02-20

## 2022-11-15 NOTE — PROGRESS NOTES
Subjective:     Patient ID: Aracelis Martinez is a 67 y.o. female    Chief Complaint: Fibromyalgia      Referred by: No ref. provider found      HPI:    Initial Encounter (11/15/22):  Aracelis Martinez is a 67 y.o. female who presents today with fairly widespread pain likely related to fibromyalgia.  Has previously been treated by my colleague at [facility].  He is leaving his practice, and patient is now seeking to transition her care to my clinic.  She has undergone multiple interventional procedures for her low back and lower extremity pain.  States these have helped somewhat but she is not very interested in additional interventional procedures at this time.  Currently the majority of her pain is located across the upper back and into the upper extremities.  She denies any associated numbness, tingling, weakness, bowel bladder dysfunction.  She is unable to say if this pain is related to fibromyalgia or some other source of pain.  She is currently taking Lyrica 25 mg b.i.d. provided by her primary care physician.  She does not feel this medication is providing as much relief as it was in the past.  She denies any adverse effects of this medication.   This pain is described in detail below.    Physical Therapy:  No.    Non-pharmacologic Treatment:  Rest helps         TENS?  No    Pain Medications:         Currently taking:  Lyrica 25 mg b.i.d., meloxicam    Has tried in the past:  Tramadol (upset stomach).  NSAIDs, Tylenol    Has not tried:   Muscle relaxants, TCAs, SNRIs, topical creams    Blood thinners:  None    Interventional Therapies:                -12/16/2021  Left L4-5 and L5-S1 Lumbar Medial Branch Radiofrequency Ablation              - 11/09/2021 Right L4-5 and L5-S1 Lumbar Medial Branch Radiofrequency Ablation              - 8/24/2021Lumbar Transforaminal Epidural Steroid Injection, Right L5-S1              - 8/17/2021Lumbar Transforaminal Epidural Steroid Injection, Right L5-S1              -  6/15/2021  Lumbar Epidural Steroid Injection at L5-S1 50% relief    Relevant Surgeries:  None    Affecting sleep?  Yes    Affecting daily activities? yes    Depressive symptoms? no          SI/HI? No    Work status: Retired    Pain Scores:    Best:       2/10  Worst:     10/10  Usually:   5/10  Today:    4/10         Review of Systems   Constitutional:  Negative for activity change, appetite change, chills, fatigue, fever and unexpected weight change.   HENT:  Negative for hearing loss.    Eyes:  Negative for visual disturbance.   Respiratory:  Negative for chest tightness and shortness of breath.    Cardiovascular:  Negative for chest pain.   Gastrointestinal:  Negative for abdominal pain, constipation, diarrhea, nausea and vomiting.   Genitourinary:  Negative for difficulty urinating.   Musculoskeletal:  Positive for arthralgias, back pain, gait problem, myalgias, neck pain and neck stiffness.   Skin:  Negative for rash.   Neurological:  Negative for dizziness, weakness, light-headedness, numbness and headaches.   Psychiatric/Behavioral:  Positive for sleep disturbance. Negative for hallucinations and suicidal ideas. The patient is not nervous/anxious.      Past Medical History:   Diagnosis Date    Abdominal pain 2015    Arthritis     Chronic back pain     Chronic pain 6/15/2021    Diabetes mellitus 2014    Diverticulosis     Functional belching disorder 2020    Hypertension     Knee pain 2014    Nausea 2019    Neuroendocrine tumor of pancreas 2015    Pancreatic cancer 2015    Renal cyst     left    Thyroid disease        Past Surgical History:   Procedure Laterality Date     SECTION      COLONOSCOPY N/A 3/13/2019    Procedure: COLONOSCOPY;  Surgeon: Cem Lamar MD;  Location: [address]);  Service: Endoscopy;  Laterality: N/A;  previous order cx    COLONOSCOPY N/A 2022    Procedure: COLONOSCOPY Suprep;  Surgeon: Hiren Newberry MD;  Location: [facility];  Service:  Endoscopy;  Laterality: N/A;    EPIDURAL STEROID INJECTION INTO LUMBAR SPINE N/A 6/15/2021    Procedure: Injection-steroid-epidural-lumbar-L5-S1;  Surgeon: Saranya Cornelius Jr., MD;  Location[place] [facility];  Service: Pain Management;  Laterality: N/A;    ESOPHAGOGASTRODUODENOSCOPY N/A 11/19/2019    Procedure: ESOPHAGOGASTRODUODENOSCOPY (EGD);  Surgeon: Jonathan Oneill MD;  Location: [facility] (4TH FLR);  Service: Endoscopy;  Laterality: N/A;    ESOPHAGOGASTRODUODENOSCOPY N/A 6/2/2020    Procedure: EGD (ESOPHAGOGASTRODUODENOSCOPY);  Surgeon: Shady Costa MD;  Location: [facility] (2ND FLR);  Service: Endoscopy;  Laterality: N/A;  Please schedule patient as soon as possible in the 2nd floor history of a Whipple surgery for neuroendocrine pancreatic tumor many years ago with now constant belching and regurgitation.  May need airway protection.  Rule out celiac sprue rule out lact    ESOPHAGOGASTRODUODENOSCOPY N/A 4/19/2022    Procedure: EGD (ESOPHAGOGASTRODUODENOSCOPY);  Surgeon: Hiren Newberry MD;  Location: [facility];  Service: Endoscopy;  Laterality: N/A;    EXCISION OF GANGLION CYST OF HAND Right 10/22/2018    Procedure: EXCISION, GANGLION CYST, HAND- RIGHT, LONG AND INDEX FINGER;  Surgeon: Sowmya Schmidt MD;  Location: [facility];  Service: Orthopedics;  Laterality: Right;  Stretcher; Supine; Hand Pan 1 & [place] 2; Dr. Loja's Rasp    HYSTERECTOMY  2015    ISMAEL    INJECTION OF ANESTHETIC AGENT AROUND MEDIAL BRANCH NERVES INNERVATING LUMBAR FACET JOINT Bilateral 9/28/2021    Procedure: Block-nerve-medial branch-lumbar-bilateral L4-5 and L5-S1;  Surgeon: Saranya Cornelius Jr., MD;  Location: [facility] PAIN [facility];  Service: Pain Management;  Laterality: Bilateral;    INJECTION OF ANESTHETIC AGENT AROUND MEDIAL BRANCH NERVES INNERVATING LUMBAR FACET JOINT Bilateral 10/12/2021    Procedure: Bilateral Lumbar Medial Branch Block L4-5 L5-S1- (No Sedation);  Surgeon: Saranya Cornelius Jr., MD;  Location: [facility];   Service: Pain Management;  Laterality: Bilateral;    KNEE ARTHROSCOPY  2014    LATS/left    OOPHORECTOMY      PANCREAS SURGERY  2015    MD Ritchie    RADIOFREQUENCY THERMOCOAGULATION Bilateral 2021    Procedure: Radiofrequency Themocoagulation of medial branches: L4-5 and L5-S1;  Surgeon: Saranya Cornelius Jr., MD;  Location: [facility] PAIN [facility];  Service: Pain Management;  Laterality: Bilateral;  Patient is diabetic.     RADIOFREQUENCY THERMOCOAGULATION Left 2021    Procedure: RADIOFREQUENCY THERMAL COAGULATION LEFT L4-5, L5-S1 (IV Sedation);  Surgeon: Saranya Cornelius Jr., MD;  Location: [facility] PAIN [facility];  Service: Pain Management;  Laterality: Left;  diabetic    TONSILLECTOMY      TRANSFORAMINAL EPIDURAL INJECTION OF STEROID Right 2021    Procedure: Injection,steroid,epidural,transforaminal approach; Levels: L5-S1;  Surgeon: Saranya Cornelius Jr., MD;  Location: [facility] PAIN [facility];  Service: Pain Management;  Laterality: Right;  No pacemaker. Patient is diabetic.    TRANSFORAMINAL EPIDURAL INJECTION OF STEROID Right 2021    Procedure: Injection,steroid,epidural,transforaminal approach; Levels: L5-S1;  Surgeon: Saranya Cornelius Jr., MD;  Location: [facility] PAIN [facility];  Service: Pain Management;  Laterality: Right;  No pacemaker. Patient is diabetic.        Social History     Socioeconomic History    Marital status:    Tobacco Use    Smoking status: Former     Packs/day: 0.25     Years: 10.00     Pack years: 2.50     Types: Cigarettes     Quit date: 1982     Years since quittin.8    Smokeless tobacco: Never    Tobacco comments:     Totally quit per  visit.   Substance and Sexual Activity    Alcohol use: Yes     Comment: occasional use    Drug use: Never    Sexual activity: Yes     Partners: Male     Birth control/protection: None     Social Determinants of Health     Financial Resource Strain: Low Risk     Difficulty of Paying Living Expenses: Not hard at all   Food Insecurity: No Food  Insecurity    Worried About Running Out of Food in the Last Year: Never true    Ran Out of Food in the Last Year: Never true   Transportation Needs: No Transportation Needs    Lack of Transportation (Medical): No    Lack of Transportation (Non-Medical): No   Physical Activity: Sufficiently Active    Days of Exercise per Week: 5 days    Minutes of Exercise per Session: 30 min   Stress: Stress Concern Present    Feeling of Stress : Rather much   Social Connections: Unknown    Frequency of Communication with Friends and Family: More than three times a week    Frequency of Social Gatherings with Friends and Family: Patient refused    Active Member of Clubs or Organizations: Yes    Attends Club or Organization Meetings: More than 4 times per year    Marital Status:    Housing Stability: Low Risk     Unable to Pay for Housing in the Last Year: No    Number of Places Lived in the Last Year: 1    Unstable Housing in the Last Year: No       Review of patient's allergies indicates:   Allergen Reactions    Erythromycin base        Current Outpatient Medications on File Prior to Visit   Medication Sig Dispense Refill    amLODIPine (NORVASC) 10 MG tablet Take 1 tablet (10 mg total) by mouth once daily. 90 tablet 3    blood sugar diagnostic Strp 1 strip by Misc.(Non-Drug; Combo Route) route once daily. For insurance preferred meter 100 strip 3    blood-glucose meter kit To check BG 1 time daily, to use with insurance preferred meter 1 each 0    glipiZIDE (GLUCOTROL) 5 MG TR24 TAKE 1 TABLET(5 MG) BY MOUTH DAILY WITH BREAKFAST FOR DIABETES 90 tablet 1    hydrOXYzine pamoate (VISTARIL) 25 MG Cap TAKE 1 CAPSULE BY MOUTH TWICE DAILY AS NEEDED FOR ANXIETY 60 capsule 3    lancets Misc To check BG 1 time daily, to use with insurance preferred meter 100 each 3    levothyroxine (SYNTHROID) 75 MCG tablet Take 1 tablet (75 mcg total) by mouth before breakfast. 90 tablet 3    losartan (COZAAR) 50 MG tablet TAKE 1 TABLET(50 MG) BY MOUTH  EVERY DAY 90 tablet 3    meloxicam (MOBIC) 15 MG tablet TAKE 1 TABLET BY MOUTH EVERY DAY FOR ANKLE PAIN OR SWELLING 90 tablet 1    pantoprazole (PROTONIX) 40 MG tablet TAKE 1 TABLET BY MOUTH DAILY 90 tablet 2    temazepam (RESTORIL) 30 mg capsule TAKE 1 CAPSULE(30 MG) BY MOUTH EVERY NIGHT AS NEEDED FOR INSOMNIA 30 capsule 3    [DISCONTINUED] pregabalin (LYRICA) 25 MG capsule TAKE 1 CAPSULE(25 MG) BY MOUTH TWICE DAILY 60 capsule 5    QUICKVUE AT-HOME COVID-19 TEST Kit TEST AS DIRECTED TODAY      [DISCONTINUED] azelastine (ASTELIN) 137 mcg (0.1 %) nasal spray USE 1 SPRAY NASALLY TWICE DAILY FOR 7 DAYS (Patient not taking: Reported on 10/5/2022) 30 mL 0    [DISCONTINUED] traMADoL (ULTRAM) 50 mg tablet Take 1 tablet (50 mg total) by mouth every 8 (eight) hours as needed for Pain. (Patient not taking: Reported on 11/15/2022) 90 tablet 1     No current facility-administered medications on file prior to visit.       Objective:      /70 (BP Location: Left arm, Patient Position: Sitting, BP Method: Medium (Automatic))   Pulse 64   Temp 98.4 °F (36.9 °C)   Resp 16   Ht 5' (1.524 m)   Wt 92.8 kg (204 lb 11.2 oz)   LMP  (LMP Unknown)   BMI 39.98 kg/m²     Exam:  GEN:  Well developed, well nourished.  No acute distress.   HEENT:  No trauma.  Mucous membranes moist.  Nares patent bilaterally.  PSYCH: Normal affect. Thought content appropriate.  CHEST:  Breathing symmetric.  No audible wheezing.  ABD: Soft, non-distended.  SKIN:  Warm, pink, dry.  No rash on exposed areas.    EXT:  No cyanosis, clubbing, or edema.  No color change or changes in nail or hair growth.  NEURO/MUSCULOSKELETAL:  Fully alert, oriented, and appropriate. Speech normal amarilis. No cranial nerve deficits.   Gait:  Normal.  No focal motor deficits.       Imaging:      Narrative & Impression    EXAMINATION:  MRI LUMBAR SPINE WITHOUT CONTRAST     CLINICAL HISTORY:  Dorsalgia, unspecifiedBack pain or radiculopathy, > 6 wks;     TECHNIQUE:  Sagittal  T1, sagittal T2, sagittal STIR, axial T1 and axial T2 weighted images of the lumbar spine obtained without contrast.     COMPARISON:  04/12/2016     FINDINGS:  Lumbar spine alignment is within normal limits. The vertebral body heights are well maintained, with no fracture.  Degenerative fatty metaplasia endplate changes at L5-S1 and L1-2.  Otherwise, bone marrow signal is normal.     The conus is normal in appearance.  The adjacent soft tissue structures show no significant abnormalities.     There are multiple broad-based spur and disc complex is without spinal stenosis at T11-12 and T12-L1.     L1-L2: No focal disc herniation.  Broad-based disc osteophyte complex without central canal or foraminal stenosis.No significant central canal or neural foraminal narrowing.     L2-L3: There is no focal disc herniation. No significant central canal or neural foraminal narrowing.     L3-L4: There is no focal disc herniation. No significant central canal or neural foraminal narrowing.     L4-L5: Broad-based disc bulging.  Severe facet arthritis.  No central canal stenosis.  No foraminal stenosis.     L5-S1: Broad-based disc osteophyte complex and severe facet arthritis produce mild medial foraminal narrowing bilaterally.  No central canal stenosis.     Impression:     Degenerative changes of the lumbar spine mostly involving the lower lumbar facets.  Mild L5-S1 foraminal encroachment bilaterally.        Electronically signed by: Chaz Beth Jr  Date:                                            05/25/2021  Time:                                           16:14       Assessment:       Encounter Diagnosis   Name Primary?    Fibromyalgia Yes         Plan:       Aracelis was seen today for fibromyalgia.    Diagnoses and all orders for this visit:    Fibromyalgia  -     pregabalin (LYRICA) 75 MG capsule; Take 1 capsule (75 mg total) by mouth every evening for 7 days, THEN 1 capsule (75 mg total) 2 (two) times daily.      Aracelis Eisenberg  Juan is a 67 y.o. female with chronic neck and low back pain.  Also with fibromyalgia.  Unclear at this time how much of her pain is related to fibromyalgia or more focal spinal conditions..    Prescription monitoring report reviewed today.  No inconsistencies noted.  Increase Lyrica to 75 mg q.h.s..  After 1 week can increase to 75 mg b.i.d..    Return to clinic in 6-8 weeks to review efficacy of increased dosage of Lyrica.  May consider titrating further if appropriate.  Otherwise may consider further workup and treatment for focal spinal sources of pain.          This note was created by combination of typed  and M-Modal dictation. Transcription and phonetic errors may be present.  If there are any questions, please contact me.

## 2022-11-20 NOTE — TELEPHONE ENCOUNTER
No new care gaps identified.  [facility] Embedded Care Gaps. Reference number: [id number]. 11/20/2022   2:06:54 PM CST

## 2022-11-21 RX ORDER — TEMAZEPAM 30 MG/1
30 CAPSULE ORAL NIGHTLY PRN
Qty: 30 CAPSULE | Refills: 3 | Status: SHIPPED | OUTPATIENT
Start: 2022-11-21 | End: 2023-03-20

## 2022-11-28 ENCOUNTER — PATIENT MESSAGE (OUTPATIENT)
Dept: INTERNAL MEDICINE | Facility: CLINIC | Age: 67
End: 2022-11-28
Payer: MEDICARE

## 2022-11-28 ENCOUNTER — LAB VISIT (OUTPATIENT)
Dept: LAB | Facility: HOSPITAL | Age: 67
End: 2022-11-28
Attending: INTERNAL MEDICINE
Payer: MEDICARE

## 2022-11-28 DIAGNOSIS — E11.9 TYPE 2 DIABETES MELLITUS WITHOUT COMPLICATION, WITHOUT LONG-TERM CURRENT USE OF INSULIN: Chronic | ICD-10-CM

## 2022-11-28 DIAGNOSIS — D47.3 ESSENTIAL (HEMORRHAGIC) THROMBOCYTHEMIA: ICD-10-CM

## 2022-11-28 LAB
T4 FREE SERPL-MCNC: 1.17 NG/DL (ref 0.71–1.51)
TSH SERPL DL<=0.005 MIU/L-ACNC: 1.45 UIU/ML (ref 0.4–4)

## 2022-11-28 PROCEDURE — 84443 ASSAY THYROID STIM HORMONE: CPT | Performed by: INTERNAL MEDICINE

## 2022-11-28 PROCEDURE — 36415 COLL VENOUS BLD VENIPUNCTURE: CPT | Mod: PO | Performed by: INTERNAL MEDICINE

## 2022-11-28 PROCEDURE — 84439 ASSAY OF FREE THYROXINE: CPT | Performed by: INTERNAL MEDICINE

## 2022-11-30 ENCOUNTER — EXTERNAL CHRONIC CARE MANAGEMENT (OUTPATIENT)
Dept: PRIMARY CARE CLINIC | Facility: CLINIC | Age: 67
End: 2022-11-30
Payer: MEDICARE

## 2022-11-30 PROCEDURE — 99490 CHRNC CARE MGMT STAFF 1ST 20: CPT | Mod: PBBFAC,PO | Performed by: INTERNAL MEDICINE

## 2022-11-30 PROCEDURE — 99490 PR CHRONIC CARE MGMT, 1ST 20 MIN: ICD-10-PCS | Mod: S$PBB,,, | Performed by: INTERNAL MEDICINE

## 2022-11-30 PROCEDURE — 99490 CHRNC CARE MGMT STAFF 1ST 20: CPT | Mod: S$PBB,,, | Performed by: INTERNAL MEDICINE

## 2022-12-05 ENCOUNTER — PATIENT MESSAGE (OUTPATIENT)
Dept: INTERNAL MEDICINE | Facility: CLINIC | Age: 67
End: 2022-12-05
Payer: MEDICARE

## 2022-12-05 DIAGNOSIS — M21.612 BILATERAL BUNIONS: Primary | ICD-10-CM

## 2022-12-05 DIAGNOSIS — M21.611 BILATERAL BUNIONS: Primary | ICD-10-CM

## 2022-12-06 ENCOUNTER — PATIENT MESSAGE (OUTPATIENT)
Dept: INTERNAL MEDICINE | Facility: CLINIC | Age: 67
End: 2022-12-06
Payer: MEDICARE

## 2022-12-16 RX ORDER — GLIPIZIDE 5 MG/1
TABLET, FILM COATED, EXTENDED RELEASE ORAL
Qty: 90 TABLET | Refills: 0 | Status: SHIPPED | OUTPATIENT
Start: 2022-12-16 | End: 2022-12-27 | Stop reason: SDUPTHER

## 2022-12-16 NOTE — TELEPHONE ENCOUNTER
Care Due:                  Date            Visit Type   Department     Provider  --------------------------------------------------------------------------------                                EP -                              PRIMARY      [facility] INTERNAL  Last Visit: 09-      CARE ([facility])   MEDICINE       Alenjosias Damico                              EP -                              PRIMARY      [facility] INTERNAL  Next Visit: 03-      [facility] ([facility])   MEDICINE       Alen SAMANTHA Damico                                                            Last  Test          Frequency    Reason                     Performed    Due Date  --------------------------------------------------------------------------------    HBA1C.......  6 months...  glipiZIDE................  09- 03-    [facility] Embedded Care Gaps. Reference number: [id number]. 12/16/2022   3:40:29 AM CST

## 2022-12-16 NOTE — TELEPHONE ENCOUNTER
Refill Decision Note   Aracelis Martinez  is requesting a refill authorization.  Brief Assessment and Rationale for Refill:  Approve     Medication Therapy Plan:       Medication Reconciliation Completed: No   Comments:     No Care Gaps recommended.     Note composed:2:06 PM 12/16/2022

## 2022-12-19 ENCOUNTER — PATIENT MESSAGE (OUTPATIENT)
Dept: INTERNAL MEDICINE | Facility: CLINIC | Age: 67
End: 2022-12-19
Payer: MEDICARE

## 2022-12-27 ENCOUNTER — PATIENT MESSAGE (OUTPATIENT)
Dept: INTERNAL MEDICINE | Facility: CLINIC | Age: 67
End: 2022-12-27
Payer: MEDICARE

## 2022-12-27 RX ORDER — LEVOTHYROXINE SODIUM 75 UG/1
75 TABLET ORAL
Qty: 90 TABLET | Refills: 3 | Status: SHIPPED | OUTPATIENT
Start: 2022-12-27 | End: 2023-12-15

## 2022-12-27 RX ORDER — MELOXICAM 15 MG/1
TABLET ORAL
Qty: 90 TABLET | Refills: 1 | Status: SHIPPED | OUTPATIENT
Start: 2022-12-27 | End: 2022-12-28

## 2022-12-27 RX ORDER — PANTOPRAZOLE SODIUM 40 MG/1
TABLET, DELAYED RELEASE ORAL
Qty: 90 TABLET | Refills: 3 | Status: SHIPPED | OUTPATIENT
Start: 2022-12-27 | End: 2024-02-01

## 2022-12-27 RX ORDER — GLIPIZIDE 5 MG/1
TABLET, FILM COATED, EXTENDED RELEASE ORAL
Qty: 90 TABLET | Refills: 3 | Status: SHIPPED | OUTPATIENT
Start: 2022-12-27 | End: 2023-12-15

## 2022-12-27 RX ORDER — AMLODIPINE BESYLATE 10 MG/1
10 TABLET ORAL DAILY
Qty: 90 TABLET | Refills: 3 | Status: SHIPPED | OUTPATIENT
Start: 2022-12-27 | End: 2023-10-01

## 2022-12-27 RX ORDER — LOSARTAN POTASSIUM 50 MG/1
50 TABLET ORAL DAILY
Qty: 90 TABLET | Refills: 3 | Status: SHIPPED | OUTPATIENT
Start: 2022-12-27 | End: 2023-10-01

## 2022-12-27 RX ORDER — HYDROXYZINE PAMOATE 25 MG/1
CAPSULE ORAL
Qty: 180 CAPSULE | Refills: 1 | Status: SHIPPED | OUTPATIENT
Start: 2022-12-27 | End: 2023-05-24

## 2022-12-27 NOTE — TELEPHONE ENCOUNTER
Using [facility] for refills now.  Refills pended for them.    Lov 9/20/22.  Nov 3/2023

## 2022-12-27 NOTE — TELEPHONE ENCOUNTER
No new care gaps identified.  [facility] Embedded Care Gaps. Reference number: [id number]. 12/27/2022   1:15:04 PM CST

## 2022-12-28 DIAGNOSIS — E11.9 TYPE 2 DIABETES MELLITUS WITHOUT COMPLICATION, WITHOUT LONG-TERM CURRENT USE OF INSULIN: ICD-10-CM

## 2022-12-28 NOTE — TELEPHONE ENCOUNTER
----- Message from Dominique Luis sent at 12/28/2022  4:22 PM CST -----  Contact: Lena/ Romana [phone]  Requesting an RX refill or new RX.  Is this a refill or new RX: Refill  RX name and strength temazepam (RESTORIL) 30 mg capsule, pregabalin (LYRICA) 75 MG capsule, and blood sugar diagnostic Strp, lancets Misc, and alcohol swabs  Is this a 30 day or 90 day RX:   Pharmacy name and phone # [facility] - [place], [place] - [address] Phone:  [phone] Fax:  [phone]

## 2022-12-29 ENCOUNTER — PATIENT MESSAGE (OUTPATIENT)
Dept: INTERNAL MEDICINE | Facility: CLINIC | Age: 67
End: 2022-12-29
Payer: MEDICARE

## 2022-12-29 ENCOUNTER — TELEPHONE (OUTPATIENT)
Dept: INTERNAL MEDICINE | Facility: CLINIC | Age: 67
End: 2022-12-29
Payer: MEDICARE

## 2022-12-29 ENCOUNTER — TELEPHONE (OUTPATIENT)
Dept: PAIN MEDICINE | Facility: CLINIC | Age: 67
End: 2022-12-29
Payer: MEDICARE

## 2022-12-29 RX ORDER — ISOPROPYL ALCOHOL 70 ML/100ML
SWAB TOPICAL
Qty: 100 EACH | Refills: 3 | Status: SHIPPED | OUTPATIENT
Start: 2022-12-29 | End: 2023-10-01

## 2022-12-29 RX ORDER — LANCETS
EACH MISCELLANEOUS
Qty: 100 EACH | Refills: 3 | Status: SHIPPED | OUTPATIENT
Start: 2022-12-29

## 2022-12-29 RX ORDER — TEMAZEPAM 30 MG/1
30 CAPSULE ORAL NIGHTLY PRN
Qty: 90 CAPSULE | Refills: 0 | Status: CANCELLED | OUTPATIENT
Start: 2022-12-29

## 2022-12-29 NOTE — TELEPHONE ENCOUNTER
Returned patient's call and changed her appointment to a virtual visit. Answered all patient's questions and nothing further discussed.

## 2022-12-29 NOTE — TELEPHONE ENCOUNTER
----- Message from Concepcion Sharif sent at 12/29/2022 11:00 AM CST -----  Contact: pt @ [phone]  Aracelis Martinez calling regarding Patient Advice (message) for #pt is calling to see if she can do a virtual visit instead on 1/3, asking for call back

## 2022-12-29 NOTE — TELEPHONE ENCOUNTER
Tony Is new for her.  We did some of her other meds on 12/27.  She also wants these sent.    I am sending msg for lyrica to dr street to handle.    Temazepam removed at patient request.  She prefers to continue getting locally.

## 2022-12-29 NOTE — TELEPHONE ENCOUNTER
----- Message from Kelly Mills MA sent at 12/29/2022 11:04 AM CST -----  Regarding: RE: need lyrica refills to [facility]  Alex Collazo,  Patient received as 90 day supply on 11/15/22 which will carry her through her follow up with Dr Delcid in January. He will refill, adjust or change her prescription at the f/u visit and send to the new pharmacy.  Thank you,  Ld  ----- Message -----  From: Vale Farr MA  Sent: 12/29/2022  10:28 AM CST  To: Farhana Alejandro Staff  Subject: need lyrica refills to [facility]                Patient is new to [facility] for prescriptions.  She is requesting lyrica 90 day rx be sent to them.    Dr delcid usually takes care of that medication..  Please handle for patient.  We are doing her primary care meds.    Thanks vale muller/ dr cazares.

## 2022-12-29 NOTE — TELEPHONE ENCOUNTER
No new care gaps identified.  [facility] Embedded Care Gaps. Reference number: [id number]. 12/29/2022   10:31:19 AM CST

## 2022-12-31 ENCOUNTER — EXTERNAL CHRONIC CARE MANAGEMENT (OUTPATIENT)
Dept: PRIMARY CARE CLINIC | Facility: CLINIC | Age: 67
End: 2022-12-31
Payer: MEDICARE

## 2022-12-31 PROCEDURE — 99490 CHRNC CARE MGMT STAFF 1ST 20: CPT | Mod: PBBFAC,PO | Performed by: INTERNAL MEDICINE

## 2022-12-31 PROCEDURE — 99490 CHRNC CARE MGMT STAFF 1ST 20: CPT | Mod: S$PBB,,, | Performed by: INTERNAL MEDICINE

## 2022-12-31 PROCEDURE — 99490 PR CHRONIC CARE MGMT, 1ST 20 MIN: ICD-10-PCS | Mod: S$PBB,,, | Performed by: INTERNAL MEDICINE

## 2023-01-03 ENCOUNTER — OFFICE VISIT (OUTPATIENT)
Dept: PAIN MEDICINE | Facility: CLINIC | Age: 68
End: 2023-01-03
Payer: MEDICARE

## 2023-01-03 DIAGNOSIS — M79.7 FIBROMYALGIA: ICD-10-CM

## 2023-01-03 PROCEDURE — 99214 PR OFFICE/OUTPT VISIT, EST, LEVL IV, 30-39 MIN: ICD-10-PCS | Mod: HCNC,95,, | Performed by: PAIN MEDICINE

## 2023-01-03 PROCEDURE — 99214 OFFICE O/P EST MOD 30 MIN: CPT | Mod: HCNC,95,, | Performed by: PAIN MEDICINE

## 2023-01-03 RX ORDER — PREGABALIN 75 MG/1
75 CAPSULE ORAL 2 TIMES DAILY
Qty: 60 CAPSULE | Refills: 4 | Status: SHIPPED | OUTPATIENT
Start: 2023-02-20 | End: 2023-07-18 | Stop reason: SDUPTHER

## 2023-01-03 NOTE — PROGRESS NOTES
Subjective:     Patient ID: Aracelis Martinez is a 67 y.o. female    Chief Complaint: Follow-up        Referred by: No ref. provider found      HPI:    Interval History (1/3/23):    The patient location is:  Home  The chief complaint leading to consultation is:  Follow-up  Visit type: Virtual visit with synchronous audio and video  Total time spent with patient:  8 minutes  Each patient to whom he or she provides medical services by telemedicine is:  (1) informed of the relationship between the physician and patient and the respective role of any other health care provider with respect to management of the patient; and (2) notified that he or she may decline to receive medical services by telemedicine and may withdraw from such care at any time.      She returns today for follow up.  She reports that Lyrica 75 mg b.i.d. has been helpful for the fibromyalgia pain.  She reports a very significant improvement in her symptoms and states that her pain is very well controlled.  She denies any adverse effects.  She does request that additional prescriptions be sent to mail order pharmacy.      Initial Encounter (11/15/22):  Aracelis Martinez is a 67 y.o. female who presents today with fairly widespread pain likely related to fibromyalgia.  Has previously been treated by my colleague at [facility].  He is leaving his practice, and patient is now seeking to transition her care to my clinic.  She has undergone multiple interventional procedures for her low back and lower extremity pain.  States these have helped somewhat but she is not very interested in additional interventional procedures at this time.  Currently the majority of her pain is located across the upper back and into the upper extremities.  She denies any associated numbness, tingling, weakness, bowel bladder dysfunction.  She is unable to say if this pain is related to fibromyalgia or some other source of pain.  She is currently taking Lyrica 25 mg b.i.d.  provided by her primary care physician.  She does not feel this medication is providing as much relief as it was in the past.  She denies any adverse effects of this medication.   This pain is described in detail below.    Physical Therapy:  No.    Non-pharmacologic Treatment:  Rest helps         TENS?  No    Pain Medications:         Currently taking:  Lyrica 75 mg b.i.d., meloxicam    Has tried in the past:  Tramadol (upset stomach).  NSAIDs, Tylenol    Has not tried:   Muscle relaxants, TCAs, SNRIs, topical creams    Blood thinners:  None    Interventional Therapies:                -12/16/2021  Left L4-5 and L5-S1 Lumbar Medial Branch Radiofrequency Ablation              - 11/09/2021 Right L4-5 and L5-S1 Lumbar Medial Branch Radiofrequency Ablation              - 8/24/2021Lumbar Transforaminal Epidural Steroid Injection, Right L5-S1              - 8/17/2021Lumbar Transforaminal Epidural Steroid Injection, Right L5-S1              - 6/15/2021  Lumbar Epidural Steroid Injection at L5-S1 50% relief    Relevant Surgeries:  None    Affecting sleep?  Yes    Affecting daily activities? yes    Depressive symptoms? no          SI/HI? No    Work status: Retired    Pain Scores:    Best:       2/10  Worst:     10/10  Usually:   5/10  Today:    4/10         Review of Systems   Constitutional:  Negative for activity change, appetite change, chills, fatigue, fever and unexpected weight change.   HENT:  Negative for hearing loss.    Eyes:  Negative for visual disturbance.   Respiratory:  Negative for chest tightness and shortness of breath.    Cardiovascular:  Negative for chest pain.   Gastrointestinal:  Negative for abdominal pain, constipation, diarrhea, nausea and vomiting.   Genitourinary:  Negative for difficulty urinating.   Musculoskeletal:  Positive for arthralgias, back pain, gait problem, myalgias, neck pain and neck stiffness.   Skin:  Negative for rash.   Neurological:  Negative for dizziness, weakness,  light-headedness, numbness and headaches.   Psychiatric/Behavioral:  Positive for sleep disturbance. Negative for hallucinations and suicidal ideas. The patient is not nervous/anxious.      Past Medical History:   Diagnosis Date    Abdominal pain 2015    Arthritis     Chronic back pain     Chronic pain 6/15/2021    Diabetes mellitus 2014    Diverticulosis     Functional belching disorder 2020    Hypertension     Knee pain 2014    Nausea 2019    Neuroendocrine tumor of pancreas     Pancreatic cancer     Renal cyst     left    Thyroid disease        Past Surgical History:   Procedure Laterality Date     SECTION      COLONOSCOPY N/A 3/13/2019    Procedure: COLONOSCOPY;  Surgeon: Cem Lamar MD;  Location: [facility] (4TH FLR);  Service: Endoscopy;  Laterality: N/A;  previous order cx    COLONOSCOPY N/A 2022    Procedure: COLONOSCOPY Suprep;  Surgeon: Hiren Newberry MD;  Location: [facility];  Service: Endoscopy;  Laterality: N/A;    EPIDURAL STEROID INJECTION INTO LUMBAR SPINE N/A 6/15/2021    Procedure: Injection-steroid-epidural-lumbar-L5-S1;  Surgeon: Saranya Cornelius Jr., MD;  Location: [facility] PAIN MGT;  Service: Pain Management;  Laterality: N/A;    ESOPHAGOGASTRODUODENOSCOPY N/A 2019    Procedure: ESOPHAGOGASTRODUODENOSCOPY (EGD);  Surgeon: Jonathan Oneill MD;  Location: [facility] (4TH FLR);  Service: Endoscopy;  Laterality: N/A;    ESOPHAGOGASTRODUODENOSCOPY N/A 2020    Procedure: EGD (ESOPHAGOGASTRODUODENOSCOPY);  Surgeon: Shady Costa MD;  Location: [facility] (2ND FLR);  Service: Endoscopy;  Laterality: N/A;  Please schedule patient as soon as possible in the 2nd floor history of a Whipple surgery for neuroendocrine pancreatic tumor many years ago with now constant belching and regurgitation.  May need airway protection.  Rule out celiac sprue rule out lact    ESOPHAGOGASTRODUODENOSCOPY N/A 2022    Procedure: EGD (ESOPHAGOGASTRODUODENOSCOPY);   Surgeon: Hiren Newberry MD;  Location: [facility] ENDO;  Service: Endoscopy;  Laterality: N/A;    EXCISION OF GANGLION CYST OF HAND Right 10/22/2018    Procedure: EXCISION, GANGLION CYST, HAND- RIGHT, LONG AND INDEX FINGER;  Surgeon: Sowmya Schmidt MD;  Location: [facility] [place], operated by [facility] OR;  Service: Orthopedics;  Laterality: Right;  Stretcher; Supine; Hand Pan 1 & [place] 2; Dr. Loja's Rasp    HYSTERECTOMY  2015    [facility]    INJECTION OF ANESTHETIC AGENT AROUND MEDIAL BRANCH NERVES INNERVATING LUMBAR FACET JOINT Bilateral 9/28/2021    Procedure: Block-nerve-medial branch-lumbar-bilateral L4-5 and L5-S1;  Surgeon: Saranya Cornelius Jr., MD;  Location: [facility] PAIN MGT;  Service: Pain Management;  Laterality: Bilateral;    INJECTION OF ANESTHETIC AGENT AROUND MEDIAL BRANCH NERVES INNERVATING LUMBAR FACET JOINT Bilateral 10/12/2021    Procedure: Bilateral Lumbar Medial Branch Block L4-5 L5-S1- (No Sedation);  Surgeon: Saranya Cornelius Jr., MD;  Location: [facility] PAIN MGT;  Service: Pain Management;  Laterality: Bilateral;    KNEE ARTHROSCOPY  4/18/2014    LATS/left    OOPHORECTOMY      PANCREAS SURGERY  2015    [facility]    RADIOFREQUENCY THERMOCOAGULATION Bilateral 11/9/2021    Procedure: Radiofrequency Themocoagulation of medial branches: L4-5 and L5-S1;  Surgeon: Saranya Cornelius Jr., MD;  Location: [facility] PAIN MGT;  Service: Pain Management;  Laterality: Bilateral;  Patient is diabetic.     RADIOFREQUENCY THERMOCOAGULATION Left 12/16/2021    Procedure: RADIOFREQUENCY THERMAL COAGULATION LEFT L4-5, L5-S1 (IV Sedation);  Surgeon: Saranya Cornelius Jr., MD;  Location: [facility] PAIN MGT;  Service: Pain Management;  Laterality: Left;  diabetic    TONSILLECTOMY      TRANSFORAMINAL EPIDURAL INJECTION OF STEROID Right 8/17/2021    Procedure: Injection,steroid,epidural,transforaminal approach; Levels: L5-S1;  Surgeon: Saranya Cornelius Jr., MD;  Location: [facility] PAIN MGT;  Service: Pain Management;  Laterality: Right;  No pacemaker. Patient is  diabetic.    TRANSFORAMINAL EPIDURAL INJECTION OF STEROID Right 2021    Procedure: Injection,steroid,epidural,transforaminal approach; Levels: L5-S1;  Surgeon: Saranya Cornelius Jr., MD;  Location: [facility];  Service: Pain Management;  Laterality: Right;  No pacemaker. Patient is diabetic.        Social History     Socioeconomic History    Marital status:    Tobacco Use    Smoking status: Former     Packs/day: 0.25     Years: 10.00     Pack years: 2.50     Types: Cigarettes     Quit date: 1982     Years since quittin.0    Smokeless tobacco: Never    Tobacco comments:     Totally quit per  visit.   Substance and Sexual Activity    Alcohol use: Yes     Comment: occasional use    Drug use: Never    Sexual activity: Yes     Partners: Male     Birth control/protection: None     Social Determinants of Health     Financial Resource Strain: Low Risk     Difficulty of Paying Living Expenses: Not hard at all   Food Insecurity: No Food Insecurity    Worried About Running Out of Food in the Last Year: Never true    Ran Out of Food in the Last Year: Never true   Transportation Needs: No Transportation Needs    Lack of Transportation (Medical): No    Lack of Transportation (Non-Medical): No   Physical Activity: Sufficiently Active    Days of Exercise per Week: 5 days    Minutes of Exercise per Session: 30 min   Stress: Stress Concern Present    Feeling of Stress : Rather much   Social Connections: Unknown    Frequency of Communication with Friends and Family: More than three times a week    Frequency of Social Gatherings with Friends and Family: Patient refused    Active Member of Clubs or Organizations: Yes    Attends Club or Organization Meetings: More than 4 times per year    Marital Status:    Housing Stability: Low Risk     Unable to Pay for Housing in the Last Year: No    Number of Places Lived in the Last Year: 1    Unstable Housing in the Last Year: No       Review of patient's  allergies indicates:   Allergen Reactions    Erythromycin base        Current Outpatient Medications on File Prior to Visit   Medication Sig Dispense Refill    alcohol swabs PadM Use once daily to clean finger prior to glucose check for diabetes 100 each 3    amLODIPine (NORVASC) 10 MG tablet Take 1 tablet (10 mg total) by mouth once daily. 90 tablet 3    blood sugar diagnostic Strp 1 strip by Misc.(Non-Drug; Combo Route) route once daily. For insurance preferred meter 100 strip 3    blood-glucose meter kit To check BG 1 time daily, to use with insurance preferred meter 1 each 0    glipiZIDE 5 MG TR24 TAKE 1 TABLET(5 MG) BY MOUTH DAILY WITH BREAKFAST FOR DIABETES 90 tablet 3    hydrOXYzine pamoate (VISTARIL) 25 MG Cap 1 capsule by mouth twice daily as need for anxiety. 180 capsule 1    lancets Misc To check BG 1 time daily, to use with insurance preferred meter 100 each 3    levothyroxine (SYNTHROID) 75 MCG tablet Take 1 tablet (75 mcg total) by mouth before breakfast. 90 tablet 3    losartan (COZAAR) 50 MG tablet Take 1 tablet (50 mg total) by mouth once daily. 90 tablet 3    meloxicam (MOBIC) 15 MG tablet TAKE 1 TABLET BY MOUTH EVERY DAY FOR ANKLE PAIN OR SWELLING 14 tablet 0    pantoprazole (PROTONIX) 40 MG tablet TAKE 1 TABLET BY MOUTH DAILY 90 tablet 3    pregabalin (LYRICA) 75 MG capsule Take 1 capsule (75 mg total) by mouth every evening for 7 days, THEN 1 capsule (75 mg total) 2 (two) times daily. 187 capsule 0    QUICKVUE AT-HOME COVID-19 TEST Kit TEST AS DIRECTED TODAY      temazepam (RESTORIL) 30 mg capsule Take 1 capsule (30 mg total) by mouth nightly as needed for Insomnia. 30 capsule 3     No current facility-administered medications on file prior to visit.       Objective:      LMP  (LMP Unknown)     Exam:  GEN:  Well developed, well nourished.  No acute distress.   HEENT:  No trauma.  Mucous membranes moist.  Nares patent bilaterally.  PSYCH: Normal affect. Thought content appropriate.  CHEST:   Breathing symmetric.  No audible wheezing.  SKIN:  Warm, pink, dry.  No rash on exposed areas.    EXT:  No cyanosis, clubbing, or edema.  No color change or changes in nail or hair growth.  NEURO/MUSCULOSKELETAL:  Fully alert, oriented, and appropriate. Speech normal amarilis. No cranial nerve deficits.       Imaging:      Narrative & Impression    EXAMINATION:  MRI LUMBAR SPINE WITHOUT CONTRAST     CLINICAL HISTORY:  Dorsalgia, unspecifiedBack pain or radiculopathy, > 6 wks;     TECHNIQUE:  Sagittal T1, sagittal T2, sagittal STIR, axial T1 and axial T2 weighted images of the lumbar spine obtained without contrast.     COMPARISON:  04/12/2016     FINDINGS:  Lumbar spine alignment is within normal limits. The vertebral body heights are well maintained, with no fracture.  Degenerative fatty metaplasia endplate changes at L5-S1 and L1-2.  Otherwise, bone marrow signal is normal.     The conus is normal in appearance.  The adjacent soft tissue structures show no significant abnormalities.     There are multiple broad-based spur and disc complex is without spinal stenosis at T11-12 and T12-L1.     L1-L2: No focal disc herniation.  Broad-based disc osteophyte complex without central canal or foraminal stenosis.No significant central canal or neural foraminal narrowing.     L2-L3: There is no focal disc herniation. No significant central canal or neural foraminal narrowing.     L3-L4: There is no focal disc herniation. No significant central canal or neural foraminal narrowing.     L4-L5: Broad-based disc bulging.  Severe facet arthritis.  No central canal stenosis.  No foraminal stenosis.     L5-S1: Broad-based disc osteophyte complex and severe facet arthritis produce mild medial foraminal narrowing bilaterally.  No central canal stenosis.     Impression:     Degenerative changes of the lumbar spine mostly involving the lower lumbar facets.  Mild L5-S1 foraminal encroachment bilaterally.        Electronically signed by:  Chaz Beth   Date:                                            05/25/2021  Time:                                           16:14       Assessment:       Encounter Diagnosis   Name Primary?    Fibromyalgia          Plan:       Aracelis was seen today for follow-up.    Diagnoses and all orders for this visit:    Fibromyalgia  -     pregabalin (LYRICA) 75 MG capsule; Take 1 capsule (75 mg total) by mouth 2 (two) times daily.      Aracelis Martinez is a 67 y.o. female with chronic neck and low back pain.  Also with fibromyalgia.  Unclear at this time how much of her pain is related to fibromyalgia or more focal spinal conditions..    Prescription monitoring report reviewed today.  No inconsistencies noted.  Continue Lyrica 75 mg b.i.d.    Return to clinic in 6 months to review efficacy of Lyrica.  May consider titrating further if appropriate.  Otherwise may consider further workup and treatment for focal spinal sources of pain.          This note was created by combination of typed  and M-Modal dictation. Transcription and phonetic errors may be present.  If there are any questions, please contact me.

## 2023-01-04 RX ORDER — INSULIN PUMP SYRINGE, 3 ML
EACH MISCELLANEOUS
Qty: 1 EACH | Refills: 0 | Status: SHIPPED | OUTPATIENT
Start: 2023-01-04 | End: 2024-02-12

## 2023-01-09 ENCOUNTER — OFFICE VISIT (OUTPATIENT)
Dept: PODIATRY | Facility: CLINIC | Age: 68
End: 2023-01-09
Payer: MEDICARE

## 2023-01-09 ENCOUNTER — HOSPITAL ENCOUNTER (OUTPATIENT)
Dept: RADIOLOGY | Facility: HOSPITAL | Age: 68
Discharge: HOME | End: 2023-01-09
Attending: PODIATRIST
Payer: MEDICARE

## 2023-01-09 VITALS
SYSTOLIC BLOOD PRESSURE: 140 MMHG | DIASTOLIC BLOOD PRESSURE: 73 MMHG | BODY MASS INDEX: 35.71 KG/M2 | WEIGHT: 214.31 LBS | HEIGHT: 65 IN | HEART RATE: 62 BPM

## 2023-01-09 DIAGNOSIS — M20.11 HALLUX ABDUCTO VALGUS, RIGHT: ICD-10-CM

## 2023-01-09 DIAGNOSIS — M79.671 FOOT PAIN, BILATERAL: ICD-10-CM

## 2023-01-09 DIAGNOSIS — M21.6X1 ACQUIRED EQUINUS DEFORMITY OF BOTH FEET: ICD-10-CM

## 2023-01-09 DIAGNOSIS — M20.42 HAMMER TOES OF BOTH FEET: ICD-10-CM

## 2023-01-09 DIAGNOSIS — M21.42 PES PLANUS OF BOTH FEET: ICD-10-CM

## 2023-01-09 DIAGNOSIS — M20.41 HAMMER TOES OF BOTH FEET: ICD-10-CM

## 2023-01-09 DIAGNOSIS — M21.41 PES PLANUS OF BOTH FEET: ICD-10-CM

## 2023-01-09 DIAGNOSIS — M20.5X2 HALLUX LIMITUS, ACQUIRED, LEFT: ICD-10-CM

## 2023-01-09 DIAGNOSIS — M20.5X1 HALLUX LIMITUS, ACQUIRED, RIGHT: ICD-10-CM

## 2023-01-09 DIAGNOSIS — M21.6X2 ACQUIRED EQUINUS DEFORMITY OF BOTH FEET: ICD-10-CM

## 2023-01-09 DIAGNOSIS — M79.672 FOOT PAIN, BILATERAL: ICD-10-CM

## 2023-01-09 DIAGNOSIS — M20.12 HALLUX ABDUCTO VALGUS, LEFT: ICD-10-CM

## 2023-01-09 DIAGNOSIS — E11.9 COMPREHENSIVE DIABETIC FOOT EXAMINATION, TYPE 2 DM, ENCOUNTER FOR: Primary | ICD-10-CM

## 2023-01-09 PROCEDURE — 1160F PR REVIEW ALL MEDS BY PRESCRIBER/CLIN PHARMACIST DOCUMENTED: ICD-10-PCS | Mod: HCNC,CPTII,S$GLB, | Performed by: PODIATRIST

## 2023-01-09 PROCEDURE — 1159F PR MEDICATION LIST DOCUMENTED IN MEDICAL RECORD: ICD-10-PCS | Mod: HCNC,CPTII,S$GLB, | Performed by: PODIATRIST

## 2023-01-09 PROCEDURE — 3008F BODY MASS INDEX DOCD: CPT | Mod: HCNC,CPTII,S$GLB, | Performed by: PODIATRIST

## 2023-01-09 PROCEDURE — 1125F AMNT PAIN NOTED PAIN PRSNT: CPT | Mod: HCNC,CPTII,S$GLB, | Performed by: PODIATRIST

## 2023-01-09 PROCEDURE — 73630 X-RAY EXAM OF FOOT: CPT | Mod: TC,50,HCNC

## 2023-01-09 PROCEDURE — 73630 XR FOOT COMPLETE 3 VIEW BILATERAL: ICD-10-PCS | Mod: 26,50,HCNC, | Performed by: RADIOLOGY

## 2023-01-09 PROCEDURE — 3072F PR LOW RISK FOR RETINOPATHY: ICD-10-PCS | Mod: HCNC,CPTII,S$GLB, | Performed by: PODIATRIST

## 2023-01-09 PROCEDURE — 3078F DIAST BP <80 MM HG: CPT | Mod: HCNC,CPTII,S$GLB, | Performed by: PODIATRIST

## 2023-01-09 PROCEDURE — 73630 X-RAY EXAM OF FOOT: CPT | Mod: 26,50,HCNC, | Performed by: RADIOLOGY

## 2023-01-09 PROCEDURE — 1160F RVW MEDS BY RX/DR IN RCRD: CPT | Mod: HCNC,CPTII,S$GLB, | Performed by: PODIATRIST

## 2023-01-09 PROCEDURE — 1159F MED LIST DOCD IN RCRD: CPT | Mod: HCNC,CPTII,S$GLB, | Performed by: PODIATRIST

## 2023-01-09 PROCEDURE — 99204 PR OFFICE/OUTPT VISIT, NEW, LEVL IV, 45-59 MIN: ICD-10-PCS | Mod: HCNC,S$GLB,, | Performed by: PODIATRIST

## 2023-01-09 PROCEDURE — 99999 PR PBB SHADOW E&M-EST. PATIENT-LVL V: CPT | Mod: PBBFAC,HCNC,, | Performed by: PODIATRIST

## 2023-01-09 PROCEDURE — 3077F SYST BP >= 140 MM HG: CPT | Mod: HCNC,CPTII,S$GLB, | Performed by: PODIATRIST

## 2023-01-09 PROCEDURE — 3008F PR BODY MASS INDEX (BMI) DOCUMENTED: ICD-10-PCS | Mod: HCNC,CPTII,S$GLB, | Performed by: PODIATRIST

## 2023-01-09 PROCEDURE — 1125F PR PAIN SEVERITY QUANTIFIED, PAIN PRESENT: ICD-10-PCS | Mod: HCNC,CPTII,S$GLB, | Performed by: PODIATRIST

## 2023-01-09 PROCEDURE — 3072F LOW RISK FOR RETINOPATHY: CPT | Mod: HCNC,CPTII,S$GLB, | Performed by: PODIATRIST

## 2023-01-09 PROCEDURE — 3077F PR MOST RECENT SYSTOLIC BLOOD PRESSURE >= 140 MM HG: ICD-10-PCS | Mod: HCNC,CPTII,S$GLB, | Performed by: PODIATRIST

## 2023-01-09 PROCEDURE — 99999 PR PBB SHADOW E&M-EST. PATIENT-LVL V: ICD-10-PCS | Mod: PBBFAC,HCNC,, | Performed by: PODIATRIST

## 2023-01-09 PROCEDURE — 99204 OFFICE O/P NEW MOD 45 MIN: CPT | Mod: HCNC,S$GLB,, | Performed by: PODIATRIST

## 2023-01-09 PROCEDURE — 3078F PR MOST RECENT DIASTOLIC BLOOD PRESSURE < 80 MM HG: ICD-10-PCS | Mod: HCNC,CPTII,S$GLB, | Performed by: PODIATRIST

## 2023-01-09 NOTE — PATIENT INSTRUCTIONS
Supplements for inflammation: Arnica Tabs, bromelain with tumeric, alpha lipoic acid, garlic     Over the counter pain creams: Voltaren Gel, Biofreeze, Bengay, tiger balm, two old goat, lidocaine gel,  Absorbine Veterinary Liniment Gel Topical Analgesic Sore Muscle and Joint Pain Relief    Recommend lotions: eucerin, eucerin for diabetics, aquaphor, A&D ointment, gold bond for diabetics, sween, [place]'s Bees all purpose baby ointment,  urea 40 with aloe (found on amazon.com)    Shoe recommendations: (try 6pm.com, zappos.Boke , nordstromrack.Boke, or shoes.Boke for discounted prices) you can visit [employer] in [place]  or [employer] [facility] in the [facility] (there are also several shoe brand outlets in the [facility])    Asics (GT 2000 or gel foundations), new balance stability type shoes (such as the 940 series), tammie (stabil c3),  Manzo (GTS or Beast or transcend), propet, Paul (tennis shoe)    Sofft Brand, baretraps (women) Lr&Ennis (men), clarks, crocs, aerosoles, naturalizers, SAS, ecco, born, enrico benton, rockports (dress shoes)    Vionic, burkenstocks, fitflops, propet, baretraps (sandals)    HokaOne sandals, rubber birkenstock sandals crocs, propet (house shoes)    Nail Home remedy:  Vicks Vapor rub or Emuaid to nails for easier manageability    Occasional soaks for 15-20 mins in luke warm water with 1/2 cup of listerine and 1 cup of apple cider vinegar are ok You may add several drops of oil of oregano or tea tree oil as well      What Is Arthritis in the Foot?  Degenerative arthritis is a condition that slowly wears away joints, the area where bones meet and move. In the beginning, you may notice that the affected joint seems stiff. It may even ache. As the joint lining (cartilage) breaks down, the bones rub against each other, causing pain and swelling. Over time, small pieces of rough or splintered bone (bone spurs) develop, and the joints range of motion becomes limited. But movement doesnt have to  cause pain. The effects of arthritis can be reduced.    The big-toe joint  When arthritis affects your big toe, your foot hurts when it pushes off the ground. Arthritis often appears in the big-toe joint along with a bunion (a bony bump at the side of the joint) or a bone spur on top of the joint.    Other joints  When arthritis affects the rear or midfoot joints, you feel pain when you put weight on your foot. Arthritis may affect the joint where the ankle and foot meet. It may also affect other joints nearby.  Date Last Reviewed: 7/1/2016  © 2458-6671 [employer]. [address] [place] [address], [place] [address]. All rights reserved. This information is not intended as a substitute for professional medical care. Always follow your healthcare professional's instructions.        Treating Arthritis in the Foot  If your symptoms are mild, medications may be enough to reduce pain and swelling. For more severe arthritis, surgery may be needed to improve the condition of the joint.    Medicine  Your doctor may prescribe medicine--pills or injections--to limit pain and swelling. Ice, aspirin, acetaminophen, or ibuprofen may help relieve mild symptoms that occur after activity.  Surgery and bone trimming  To ease movement and reduce pain, your doctor may trim damaged bone. If arthritis is severe, the joint may be fused or removed. If the bone is not damaged too badly, your doctor may simply shave away bone spurs. Any excess bone growth related to a bunion may also be trimmed.  Fusing joints  If damage is more severe, your doctor may fuse the joint to prevent the bones from rubbing. Afterward, staples, plates, or screws may hold the bones in place so they heal properly. In some cases, the joint may be removed and replaced with an implant.  After surgery  During the early stages of recovery, your foot is likely to be bandaged and immobilized for a while. For best results, follow up with your doctor as scheduled. These  visits help ensure that your foot heals properly.  As you heal  After surgery, youll be told how to care for your incision and how soon to begin walking on the foot. Until the foot can bear weight, you may need to walk with crutches or a cane.  For surgery on the big toe, your foot may be splinted to limit movement for several weeks. Despite this, you should be able to walk soon after surgery.  For surgery on rear or midfoot joints, you may need to wear a cast or surgical shoe. These joints are fairly large, so full recovery may take a few months. Once the bone has healed, any staples, plates, or screws may be removed.  Date Last Reviewed: 7/1/2016 © 2000-2017 [employer]. [address] [place] [address], [place] [address]. All rights reserved. This information is not intended as a substitute for professional medical care. Always follow your healthcare professional's instructions.        Foot Surgery: Degenerative Joint Disease    Degenerative joint disease (arthritis) often happens in the joint of a big toe. This bone growth may cause pain and stiffness in the joint. Left untreated, arthritis can break down the cartilage and destroy the joint. Your treatment choices depend on how damaged your joint is. There are many nonsurgical treatments, but if these are not helpful, surgery may be considered.    Cheilectomy  This is done when the arthritic joint and cartilage can be saved. A bone spur caused by arthritis may be symptomatic on the top of the big toe joint. The procedure involves removing this bone spur, usually with a small part of the top of the joint itself.  You will need to wear a surgical shoe for several weeks. Once the foot heals, joint movement is restored.    Fusion  In fusion, the cartilage and some bone on both sides of the joint are removed. Then, the big toe and metatarsal bones are held together with staples, screws, or a plate and screws. Your foot may be placed in a cast. While you heal,  you will be asked not to bear weight on this foot. You may also need crutches for several weeks. Because the joint has been removed, your toe will be less flexible.    Arthroplasty  During surgery, bone growth caused by the arthritis is trimmed, and part of the joint is removed. A pin can be used to align the bones and to keep them from touching. The pin is removed after several weeks. In some cases, the entire joint may be replaced with an implant. You may have to wear a splint or a surgical shoe for several weeks. When healed, the bones become connected with scar tissue.  Date Last Reviewed: 10/15/2015  © 0561-5994 [employer] [address] [place] [address], [place] [address]. All rights reserved. This information is not intended as a substitute for professional medical care. Always follow your healthcare professional's instructions.      Diabetes: Inspecting Your Feet  Diabetes increases your chances of developing foot problems. So inspect your feet every day. This helps you find small skin irritations before they become serious infections. If you have trouble seeing the bottoms of your feet, use a mirror or ask a family member or friend to help.     Pressure spots on the bottom of the foot are common areas where problems develop.   How to check your feet  Below are tips to help you look for foot problems. Try to check your feet at the same time each day, such as when you get out of bed in the morning:  Check the top of each foot. The tops of toes, back of the heel, and outer edge of the foot can get a lot of rubbing from poor-fitting shoes.  Check the bottom of each foot. Daily wear and tear often leads to problems at pressure spots.  Check the toes and nails. Fungal infections often occur between toes. Toenail problems can also be a sign of fungal infections or lead to breaks in the skin.  Check your shoes, too. Loose objects inside a shoe can injure the foot. Use your hand to feel inside your shoes for  things like katia, loose stitching, or rough areas that could irritate your skin.  Warning signs  Look for any color changes in the foot. Redness with streaks can signal a severe infection, which needs immediate medical attention. Tell your doctor right away if you have any of these problems:  Swelling, sometimes with color changes, may be a sign of poor blood flow or infection. Symptoms include tenderness and an increase in the size of your foot.  Warm or hot areas on your feet may be signs of infection. A foot that is cold may not be getting enough blood.  Sensations such as burning, tingling, or pins and needles can be signs of a problem. Also check for areas that may be numb.  Hot spots are caused by friction or pressure. Look for hot spots in areas that get a lot of rubbing. Hot spots can turn into blisters, calluses, or sores.  Cracks and sores are caused by dry or irritated skin. They are a sign that the skin is breaking down, which can lead to infection.  Toenail problems to watch for include nails growing into the skin (ingrown toenail) and causing redness or pain. Thick, yellow, or discolored nails can signal a fungal infection.  Drainage and odor can develop from untreated sores and ulcers. Call your doctor right away if you notice white or yellow drainage, bleeding, or unpleasant odor.   © 1693-2897 [employer]. [address] [place] [address]. All rights reserved. This information is not intended as a substitute for professional medical care. Always follow your healthcare professional's instructions.        Step-by-Step:  Inspecting Your Feet (Diabetes)    Date Last Reviewed: 10/1/2016  © 7041-9715 [employer]. [address] [place] [address]. All rights reserved. This information is not intended as a substitute for professional medical care. Always follow your healthcare professional's instructions.

## 2023-01-09 NOTE — PROGRESS NOTES
Subjective:      Patient ID: Aracelis Martinez is a 67 y.o. female.    Chief Complaint: Bunions (CLARK 2/10), Diabetes Mellitus, and Foot Problem (Feet starting to turn )    Aracelis is a 67 y.o. female who presents to the clinic upon referral from Dr. Damico  for evaluation and treatment of diabetic feet. 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 Presents to the podiatry clinic  with several pedal concerns including structural concerns of a bunion and flat feet and concerns about abnormal sensations she experiences to the feet that are self limiting and occur primarily at rest.  She endorses LBP and is currently on lyrica.     Shoe gear: Bobs    PCP: Alen Damico MD    Date Last Seen by PCP:   Chief Complaint   Patient presents with    Bunions     CLARK 2/10    Diabetes Mellitus    Foot Problem     Feet starting to turn        Hemoglobin A1C   Date Value Ref Range Status   09/13/2022 6.0 (H) 4.0 - 5.6 % Final     Comment:     ADA Screening Guidelines:  5.7-6.4%  Consistent with prediabetes  >or=6.5%  Consistent with diabetes    High levels of fetal hemoglobin interfere with the HbA1C  assay. Heterozygous hemoglobin variants (HbS, HgC, etc)do  not significantly interfere with this assay.   However, presence of multiple variants may affect accuracy.     05/26/2022 6.1 (H) 4.0 - 5.6 % Final     Comment:     ADA Screening Guidelines:  5.7-6.4%  Consistent with prediabetes  >or=6.5%  Consistent with diabetes    High levels of fetal hemoglobin interfere with the HbA1C  assay. Heterozygous hemoglobin variants (HbS, HgC, etc)do  not significantly interfere with this assay.   However, presence of multiple variants may  affect accuracy.     02/15/2022 6.3 (H) 4.0 - 5.6 % Final     Comment:     ADA Screening Guidelines:  5.7-6.4%  Consistent with prediabetes  >or=6.5%  Consistent with diabetes    High levels of fetal hemoglobin interfere with the HbA1C  assay. Heterozygous hemoglobin variants (HbS, HgC, etc)do  not significantly interfere with this assay.   However, presence of multiple variants may affect accuracy.               Patient Active Problem List   Diagnosis    Essential hypertension    Arthritis    Chronic back pain    Renal cyst    Diverticulosis    DJD (degenerative joint disease) of knee    Fibromyalgia    Status post arthroscopy of left knee 4/16/2014    Hyperlipidemia    Special screening for malignant neoplasms, colon    Type 2 diabetes mellitus    Osteoarthritis of spine with radiculopathy, lumbar region    Finger pain, right    Bone spur of finger IP joint    Effusion of right hand    Finger stiffness, right    Range of motion deficit    Stiffness of right hand, not elsewhere classified    Colon cancer screening    Essential (hemorrhagic) thrombocythemia    History of pancreatic cancer    Sacroiliitis    Decreased ROM of lumbar spine    Lumbar pain    Severe obesity (BMI 35.0-39.9) with comorbidity    Syncope and collapse       Current Outpatient Medications on File Prior to Visit   Medication Sig Dispense Refill    alcohol swabs PadM Use once daily to clean finger prior to glucose check for diabetes 100 each 3    amLODIPine (NORVASC) 10 MG tablet Take 1 tablet (10 mg total) by mouth once daily. 90 tablet 3    blood sugar diagnostic Strp 1 strip by Misc.(Non-Drug; Combo Route) route once daily. For insurance preferred meter 100 strip 3    blood-glucose meter kit Use as instructed 1 each 0    glipiZIDE 5 MG TR24 TAKE 1 TABLET(5 MG) BY MOUTH DAILY WITH BREAKFAST FOR DIABETES 90 tablet 3    hydrOXYzine pamoate (VISTARIL) 25 MG Cap 1 capsule by mouth twice daily as need for anxiety. 180 capsule 1    lancets Misc To  check BG 1 time daily, to use with insurance preferred meter 100 each 3    levothyroxine (SYNTHROID) 75 MCG tablet Take 1 tablet (75 mcg total) by mouth before breakfast. 90 tablet 3    losartan (COZAAR) 50 MG tablet Take 1 tablet (50 mg total) by mouth once daily. 90 tablet 3    meloxicam (MOBIC) 15 MG tablet TAKE 1 TABLET BY MOUTH EVERY DAY FOR ANKLE PAIN OR SWELLING 14 tablet 0    pantoprazole (PROTONIX) 40 MG tablet TAKE 1 TABLET BY MOUTH DAILY 90 tablet 3    pregabalin (LYRICA) 75 MG capsule Take 1 capsule (75 mg total) by mouth every evening for 7 days, THEN 1 capsule (75 mg total) 2 (two) times daily. 187 capsule 0    [START ON 2023] pregabalin (LYRICA) 75 MG capsule Take 1 capsule (75 mg total) by mouth 2 (two) times daily. 60 capsule 4    QUICKVUE AT-HOME COVID-19 TEST Kit TEST AS DIRECTED TODAY      temazepam (RESTORIL) 30 mg capsule Take 1 capsule (30 mg total) by mouth nightly as needed for Insomnia. 30 capsule 3     No current facility-administered medications on file prior to visit.       Review of patient's allergies indicates:   Allergen Reactions    Erythromycin base        Past Surgical History:   Procedure Laterality Date     SECTION      COLONOSCOPY N/A 3/13/2019    Procedure: COLONOSCOPY;  Surgeon: Cem Lamar MD;  Location: [address]);  Service: Endoscopy;  Laterality: N/A;  previous order cx    COLONOSCOPY N/A 2022    Procedure: COLONOSCOPY Suprep;  Surgeon: Hiren Newberry MD;  Location: [facility] ENDO;  Service: Endoscopy;  Laterality: N/A;    EPIDURAL STEROID INJECTION INTO LUMBAR SPINE N/A 6/15/2021    Procedure: Injection-steroid-epidural-lumbar-L5-S1;  Surgeon: Saranya Cornelius Jr., MD;  Location: [facility] PAIN MGT;  Service: Pain Management;  Laterality: N/A;    ESOPHAGOGASTRODUODENOSCOPY N/A 2019    Procedure: ESOPHAGOGASTRODUODENOSCOPY (EGD);  Surgeon: Jonathan Oneill MD;  [facility]  Service: Endoscopy;  Laterality: N/A;     ESOPHAGOGASTRODUODENOSCOPY N/A 6/2/2020    Procedure: EGD (ESOPHAGOGASTRODUODENOSCOPY);  Surgeon: Shady Costa MD;  Location: [facility] ([address] [place] [address]);  Service: Endoscopy;  Laterality: N/A;  Please schedule patient as soon as possible in the 2nd floor history of a Whipple surgery for neuroendocrine pancreatic tumor many years ago with now constant belching and regurgitation.  May need airway protection.  Rule out celiac sprue rule out lact    ESOPHAGOGASTRODUODENOSCOPY N/A 4/19/2022    Procedure: EGD (ESOPHAGOGASTRODUODENOSCOPY);  Surgeon: Hiren Newberry MD;  Location: [facility] ENDO;  Service: Endoscopy;  Laterality: N/A;    EXCISION OF GANGLION CYST OF HAND Right 10/22/2018    Procedure: EXCISION, GANGLION CYST, HAND- RIGHT, LONG AND INDEX FINGER;  Surgeon: Sowmya Schmidt MD;  Location: [facility];  Service: Orthopedics;  Laterality: Right;  Stretcher; Supine; Hand Pan 1 & [place] 2; Dr. Loja's Rasp    HYSTERECTOMY  2015    [facility]    INJECTION OF ANESTHETIC AGENT AROUND MEDIAL BRANCH NERVES INNERVATING LUMBAR FACET JOINT Bilateral 9/28/2021    Procedure: Block-nerve-medial branch-lumbar-bilateral L4-5 and L5-S1;  Surgeon: Saranya Cornelius Jr., MD;  [place] [facility] [place]IN [facility];  Service: Pain Management;  Laterality: Bilateral;    INJECTION OF ANESTHETIC AGENT AROUND MEDIAL BRANCH NERVES INNERVATING LUMBAR FACET JOINT Bilateral 10/12/2021    Procedure: Bilateral Lumbar Medial Branch Block L4-5 L5-S1- (No Sedation);  Surgeon: Saranya Cornelius Jr., MD;  Location: [facility] PAIN T;  Service: Pain Management;  Laterality: Bilateral;    KNEE ARTHROSCOPY  4/18/2014    LATS/left    OOPHORECTOMY      PANCREAS SURGERY  2015    [facility]    RADIOFREQUENCY THERMOCOAGULATION Bilateral 11/9/2021    Procedure: Radiofrequency Themocoagulation of medial branches: L4-5 and L5-S1;  Surgeon: Saranya Cornelius Jr., MD;  Location: [facility] PAIN MGT;  Service: Pain Management;  Laterality: Bilateral;  Patient is diabetic.     RADIOFREQUENCY  THERMOCOAGULATION Left 2021    Procedure: RADIOFREQUENCY THERMAL COAGULATION LEFT L4-5, L5-S1 (IV Sedation);  Surgeon: Saranya Cornelius Jr., MD;  Location: [facility] PAIN MGT;  Service: Pain Management;  Laterality: Left;  diabetic    TONSILLECTOMY      TRANSFORAMINAL EPIDURAL INJECTION OF STEROID Right 2021    Procedure: Injection,steroid,epidural,transforaminal approach; Levels: L5-S1;  Surgeon: Saranya Cornelius Jr., MD;  Location: [facility] PAIN MGT;  Service: Pain Management;  Laterality: Right;  No pacemaker. Patient is diabetic.    TRANSFORAMINAL EPIDURAL INJECTION OF STEROID Right 2021    Procedure: Injection,steroid,epidural,transforaminal approach; Levels: L5-S1;  Surgeon: Saranya Cornelius Jr., MD;  Location: [facility] PAIN MGT;  Service: Pain Management;  Laterality: Right;  No pacemaker. Patient is diabetic.        Family History   Problem Relation Age of Onset    Hypertension Mother     Diabetes Mother     Heart disease Mother     Stroke Mother     Hypertension Sister     Stroke Sister     Breast cancer Sister     Hypertension Brother     Colon cancer Neg Hx     Ovarian cancer Neg Hx        Social History     Socioeconomic History    Marital status:    Tobacco Use    Smoking status: Former     Packs/day: 0.25     Years: 10.00     Pack years: 2.50     Types: Cigarettes     Quit date: 1982     Years since quittin.1    Smokeless tobacco: Never    Tobacco comments:     Totally quit per  visit.   Substance and Sexual Activity    Alcohol use: Yes     Comment: occasional use    Drug use: Never    Sexual activity: Yes     Partners: Male     Birth control/protection: None     Social Determinants of Health     Financial Resource Strain: Low Risk     Difficulty of Paying Living Expenses: Not hard at all   Food Insecurity: No Food Insecurity    Worried About Running Out of Food in the Last Year: Never true    Ran Out of Food in the Last Year: Never true   Transportation Needs: No  "Transportation Needs    Lack of Transportation (Medical): No    Lack of Transportation (Non-Medical): No   Physical Activity: Sufficiently Active    Days of Exercise per Week: 5 days    Minutes of Exercise per Session: 30 min   Stress: Stress Concern Present    Feeling of Stress : Rather much   Social Connections: Unknown    Frequency of Communication with Friends and Family: More than three times a week    Frequency of Social Gatherings with Friends and Family: Patient refused    Active Member of Clubs or Organizations: Yes    Attends Club or Organization Meetings: More than 4 times per year    Marital Status:    Housing Stability: Low Risk     Unable to Pay for Housing in the Last Year: No    Number of Places Lived in the Last Year: 1    Unstable Housing in the Last Year: No       Review of Systems   Constitutional: Negative for chills and fever.   Cardiovascular:  Negative for claudication and leg swelling.   Respiratory:  Negative for cough and shortness of breath.    Skin:  Positive for dry skin. Negative for itching, nail changes and rash.   Musculoskeletal:  Positive for arthritis, back pain, joint pain and myalgias. Negative for falls, joint swelling and muscle weakness.   Gastrointestinal:  Negative for diarrhea, nausea and vomiting.   Neurological:  Positive for paresthesias. Negative for numbness, tremors and weakness.   Psychiatric/Behavioral:  Negative for altered mental status and hallucinations.          Objective:      Vitals:    01/09/23 0754   BP: (!) 140/73   Pulse: 62   Weight: 97.2 kg (214 lb 4.6 oz)   Height: 5' 5" (1.651 m)   PainSc:   2   PainLoc: Foot       Physical Exam  Vitals and nursing note reviewed.   Constitutional:       General: She is not in acute distress.     Appearance: She is well-developed. She is not toxic-appearing or diaphoretic.      Comments: alert and oriented x 3.    Cardiovascular:      Pulses:           Dorsalis pedis pulses are 2+ on the right side and 2+ on " the left side.        Posterior tibial pulses are 2+ on the right side and 2+ on the left side.      Comments:  Capillary refill time is within normal limits. Digital hair present.   Pulmonary:      Effort: No respiratory distress.   Musculoskeletal:         General: No deformity.      Right ankle: No tenderness. No lateral malleolus, medial malleolus, AITF ligament, CF ligament or posterior TF ligament tenderness.      Right Achilles Tendon: No defects. Lawson's test negative.      Left ankle: No tenderness. No lateral malleolus, medial malleolus, AITF ligament, CF ligament or posterior TF ligament tenderness.      Left Achilles Tendon: No defects. Lawson's test negative.      Right foot: No bony tenderness.      Left foot: No bony tenderness.      Comments: Muscle strength is 5/5 in all groups bilaterally.    There is equinus deformity bilateral with decreased dorsiflexion at the ankle joint bilateral. Decreased arch height noted b/l. Negative too many toes sign. Gait analysis reveals excessive pronation through midstance and propulsion with early heel off. Shoes reveals lateral heel counter wear bilateral     Decreased first MPJ range of motion both weightbearing and nonweightbearing, no crepitus observed the first MP joint, + dorsal flag sign. Moderate bunion deformity is observed .    Patient has hammertoes of digits 2-5 bilateral partially reducible            Feet:      Right foot:      Protective Sensation: 5 sites tested.  5 sites sensed.      Skin integrity: Skin integrity normal.      Left foot:      Protective Sensation: 5 sites tested.  5 sites sensed.      Skin integrity: Skin integrity normal.   Lymphadenopathy:      Comments: No lymphatic streaking     Skin:     General: Skin is warm and dry.      Coloration: Skin is not pale.      Findings: No rash.      Nails: There is no clubbing.      Comments: Skin is of normal turgor.   Normal temperature gradient.  Examination of the skin reveals no  evidence of significant rashes, open lesions, suspicious appearing nevi or other concerning lesions.    Neurological:      Sensory: No sensory deficit.      Motor: No atrophy.      Comments: Paresthesias, and hyperesthesia bilateral with no clearly identified trigger or source.    Light touch present    [place] 5.07 monofilament is intact bilateral feet. Sharp/dull sensation is also intact Bilateral feet.    proprioception intact    Vibratory intact           Psychiatric:         Attention and Perception: She is attentive.         Mood and Affect: Mood is not anxious. Affect is not inappropriate.         Speech: She is communicative. Speech is not slurred.         Behavior: Behavior is not combative.             Assessment:       Encounter Diagnoses   Name Primary?    Foot pain, bilateral     Comprehensive diabetic foot examination, type 2 DM, encounter for Yes    Hallux abducto valgus, right     Hallux abducto valgus, left     Hallux limitus, acquired, right     Hallux limitus, acquired, left     Hammer toes of both feet     Pes planus of both feet     Acquired equinus deformity of both feet          Plan:     Problem List Items Addressed This Visit    None  Visit Diagnoses       Comprehensive diabetic foot examination, type 2 DM, encounter for    -  Primary    Relevant Orders    DIABETIC SHOES FOR HOME USE    Foot pain, bilateral        Relevant Orders    DIABETIC SHOES FOR HOME USE    X-Ray Foot Complete Bilateral (Completed)    Hallux abducto valgus, right        Relevant Orders    DIABETIC SHOES FOR HOME USE    X-Ray Foot Complete Bilateral (Completed)    Hallux abducto valgus, left        Relevant Orders    DIABETIC SHOES FOR HOME USE    X-Ray Foot Complete Bilateral (Completed)    Hallux limitus, acquired, right        Relevant Orders    DIABETIC SHOES FOR HOME USE    X-Ray Foot Complete Bilateral (Completed)    Hallux limitus, acquired, left        Relevant Orders    DIABETIC SHOES FOR HOME USE     X-Ray Foot Complete Bilateral (Completed)    Hammer toes of both feet        Relevant Orders    DIABETIC SHOES FOR HOME USE    X-Ray Foot Complete Bilateral (Completed)    Pes planus of both feet        Relevant Orders    DIABETIC SHOES FOR HOME USE    X-Ray Foot Complete Bilateral (Completed)    Acquired equinus deformity of both feet               I counseled the patient on her conditions, their implications and medical management.    Orders Placed This Encounter    DIABETIC SHOES FOR HOME USE    X-Ray Foot Complete Bilateral     Discussed biomechanical deformity correction surgically vs conservative management     Explained possible causes of patients paresthesias including but not limited to systemic illness vs idiopathic vs edema vs low back pathology vs shoe gear. In her case likely related to low back pathology and being treated with lyrica. Counseled patient on conservative management of her complaint including compression stockings, inserts, extra depth and width shoe gear avoiding flats, slippers, sandals, and going barefoot.    Education about the diabetic foot, neuropathy, and prevention of limb loss.    Shoe inspection. Diabetic Foot Education. Patient reminded of the importance of good nutrition/healthy diet/weight management and blood sugar control to help prevent podiatric complications of diabetes. Patient instructed on proper foot hygeine. Wear comfortable, proper fitting shoes. Wash feet daily. Dry well. After drying, apply moisturizer to feet (no lotion to webspaces). Inspect feet daily for skin breaks, blisters, swelling, or redness. Wear cotton socks (preferably white)  Change socks every day. Do NOT walk barefoot. Do NOT use heating pads or hot water soaks. We discussed wearing proper shoe gear, daily foot inspections, never walking without protective shoe gear.     Discussed edema control and the importance of daily moisturizer to the feet such as Gold bonds diabetic foot cream    Recommend  applying vicks vaporub to thick abnormal toenails daily x 6 months to treat fungal nail infection.    rx diabetic shoes for protection and support    Based on chart review this patient does not qualify for nail care (Patients who qualify for nail care are usually as follows: diabetic with neurological manifestations, PVD, pernicious anemia, or CKD with appropriate modifiers that indicate high amputation risk).     Patient is low risk for developing lower extremity issues secondary to diabetes. I recommend continued yearly diabetic foot examinations.      Patients PCP can perform yearly foot checks . Currently  patient has no pedal manifestations of DM     Patients without pedal manifestations of DM, do not qualify for nail/callus trimming        She will continue to monitor the areas daily, inspect her feet, wear protective shoe gear when ambulatory, moisturizer to maintain skin integrity and follow in this office in approximately 12 months, sooner p.r.n. or as cosmetic nail visit (Proc B)

## 2023-01-10 ENCOUNTER — PATIENT MESSAGE (OUTPATIENT)
Dept: PODIATRY | Facility: CLINIC | Age: 68
End: 2023-01-10
Payer: MEDICARE

## 2023-01-10 NOTE — TELEPHONE ENCOUNTER
Called to discuss foot deformity and answered all questions as posed. Recommend supportive shoes and stretching for prevention of further deformity

## 2023-01-11 ENCOUNTER — PATIENT MESSAGE (OUTPATIENT)
Dept: INTERNAL MEDICINE | Facility: CLINIC | Age: 68
End: 2023-01-11
Payer: MEDICARE

## 2023-01-13 ENCOUNTER — PATIENT MESSAGE (OUTPATIENT)
Dept: INTERNAL MEDICINE | Facility: CLINIC | Age: 68
End: 2023-01-13
Payer: MEDICARE

## 2023-02-01 ENCOUNTER — PATIENT MESSAGE (OUTPATIENT)
Dept: INTERNAL MEDICINE | Facility: CLINIC | Age: 68
End: 2023-02-01
Payer: MEDICARE

## 2023-02-01 DIAGNOSIS — Z12.31 BREAST CANCER SCREENING BY MAMMOGRAM: Primary | ICD-10-CM

## 2023-02-06 ENCOUNTER — HOSPITAL ENCOUNTER (OUTPATIENT)
Dept: RADIOLOGY | Facility: HOSPITAL | Age: 68
Discharge: HOME | End: 2023-02-06
Attending: INTERNAL MEDICINE
Payer: MEDICARE

## 2023-02-06 DIAGNOSIS — Z12.31 BREAST CANCER SCREENING BY MAMMOGRAM: ICD-10-CM

## 2023-02-06 PROCEDURE — 77063 MAMMO DIGITAL SCREENING BILAT WITH TOMO: ICD-10-PCS | Mod: 26,HCNC,, | Performed by: RADIOLOGY

## 2023-02-06 PROCEDURE — 77067 SCR MAMMO BI INCL CAD: CPT | Mod: TC,HCNC,PN

## 2023-02-06 PROCEDURE — 77067 SCR MAMMO BI INCL CAD: CPT | Mod: 26,HCNC,, | Performed by: RADIOLOGY

## 2023-02-06 PROCEDURE — 77063 BREAST TOMOSYNTHESIS BI: CPT | Mod: 26,HCNC,, | Performed by: RADIOLOGY

## 2023-02-06 PROCEDURE — 77067 MAMMO DIGITAL SCREENING BILAT WITH TOMO: ICD-10-PCS | Mod: 26,HCNC,, | Performed by: RADIOLOGY

## 2023-02-07 DIAGNOSIS — Z00.00 ENCOUNTER FOR MEDICARE ANNUAL WELLNESS EXAM: ICD-10-CM

## 2023-02-09 DIAGNOSIS — Z00.00 ENCOUNTER FOR MEDICARE ANNUAL WELLNESS EXAM: ICD-10-CM

## 2023-02-09 NOTE — PROGRESS NOTES
Patient, Aracelis Martinez (MRN #[id number]), presented with a recorded BMI of 35.66 kg/m^2 and a documented comorbidity(s):  - Diabetes Mellitus Type 2  to which the severe obesity is a contributing factor. This is consistent with the definition of severe obesity (BMI 35.0-39.9) with comorbidity (ICD-10 E66.01, Z68.35). The patient's severe obesity was monitored, evaluated, addressed and/or treated. This addendum to the medical record is made on 02/08/2023.

## 2023-02-16 ENCOUNTER — PATIENT MESSAGE (OUTPATIENT)
Dept: INTERNAL MEDICINE | Facility: CLINIC | Age: 68
End: 2023-02-16
Payer: MEDICARE

## 2023-03-13 ENCOUNTER — PES CALL (OUTPATIENT)
Dept: ADMINISTRATIVE | Facility: OTHER | Age: 68
End: 2023-03-13
Payer: MEDICARE

## 2023-03-14 NOTE — PROGRESS NOTES
"Mikayla Martinez presented for a follow-up Medicare AWV and Health Risk Assessment  today. The following components were reviewed and updated:    Medical history  Family History  Social history  Allergies and Current Medications  Health Risk Assessment  Health Maintenance  Care Team    See Completed Assessments for Annual Wellness visit with in the encounter summary    The following assessments were completed:  Depression Screening  Cognitive function Screening  Timed Get Up Test  Whisper Test  Nutrition  Activities of Daily Living   PAQ      Vitals:    03/15/23 0825   BP: 121/60   BP Location: Left arm   Patient Position: Sitting   BP Method: Large (Automatic)   Pulse: 61   Temp: 97.6 °F (36.4 °C)   TempSrc: Oral   SpO2: 98%   Weight: 97.7 kg (215 lb 6.2 oz)   Height: 5' 5.5" (1.664 m)     Body mass index is 35.3 kg/m².   ]    Physical Exam  Constitutional:       Appearance: Normal appearance.   HENT:      Right Ear: External ear normal.      Left Ear: External ear normal.      Nose: Nose normal.      Mouth/Throat:      Mouth: Mucous membranes are moist.      Pharynx: Oropharynx is clear.   Eyes:      Extraocular Movements: Extraocular movements intact.   Pulmonary:      Effort: Pulmonary effort is normal. No respiratory distress.   Abdominal:      General: Bowel sounds are normal. There is no distension.      Tenderness: There is no abdominal tenderness. There is no guarding.   Musculoskeletal:      Cervical back: Normal range of motion.      Comments: Limited movements bilateral knees   Skin:     General: Skin is warm and dry.      Capillary Refill: Capillary refill takes 2 to 3 seconds.   Neurological:      Mental Status: She is alert and oriented to person, place, and time.     Pain level assessed and reviewed. Patient provided with non-opioid treatment options for relief of generalized pain secondary to fibromyalgia;  Preglabin  used only when absolutely necessary. Reviewed potential opioid use disorder risk " factors. Patient instructed to follow up with PCP and/or Pain Medicine.     Diagnoses and health risks identified today and associated recommendations/orders:  1. Encounter for preventive health examination  Screenings performed,  as noted above. Personal preventive testing needs reviewed.   - diphth,pertus,acell,,tetanus (BOOSTRIX) 2.5-8-5 Lf-mcg-Lf/0.5mL Susp; Inject 0.5 mLs into the muscle once. for 1 dose  Dispense: 0.5 mL; Refill: 0  - varicella-zoster gE-AS01B, PF, (SHINGRIX, PF,) 50 mcg/0.5 mL injection; Inject 0.5 mLs into the muscle once. for 1 dose  Dispense: 1 each; Refill: 0  - pneumoc 20-kyler conj-dip cr,PF, (PREVNAR-20, PF,) 0.5 mL Syrg injection; Inject 0.5 mLs into the muscle once. for 1 dose  Dispense: 0.5 mL; Refill: 0    2. Encounter for Medicare annual wellness exam  Screenings performed,  as noted above. Personal preventive testing needs reviewed.   - Ambulatory Referral/Consult to Enhanced Annual Wellness Visit (eAWV)    3. Essential hypertension  Stable, followed by PCP.     4. Hyperlipidemia, unspecified hyperlipidemia type  Stable, followed by PCP.     5. Type 2 diabetes mellitus without complication, without long-term current use of insulin  Stable, followed by PCP.     6. Essential (hemorrhagic) thrombocythemia  Stable, followed by PCP.     7. Fibromyalgia  Stable, followed by PCP.     8. Benign neoplasm of cranial nerves  Stable, followed by PCP.     9. Renal cyst  Stable, followed by PCP.     10. Class 1 drug-induced obesity with serious comorbidity and body mass index (BMI) of 34.0 to 34.9 in adult  Stable, followed by PCP.     11. Sacroiliitis  Stable, followed by PCP.     12. Abnormality of gait and mobility  Stable, followed by PCP.     13. Osteoarthritis of spine with radiculopathy, lumbar region  Stable, followed by PCP.     14. Arthritis  Stable, followed by PCP.     15. Chronic right-sided low back pain with right-sided sciatica  Stable, followed by PCP.     16. Osteoarthritis of  knee, unspecified laterality, unspecified osteoarthritis type  Stable, followed by PCP.     17. Syncope and collapse  Stable, followed by PCP.     I offered to discuss advanced care planning, including how to pick a person who would make decisions for you if you were unable to make them for yourself, called a health care power of , and what kind of decisions you might make such as use of life sustaining treatments such as ventilators and tube feeding when faced with a life limiting illness recorded on a living will that they will need to know. (How you want to be cared for as you near the end of your natural life)     X Patient is interested in learning more about how to make advanced directives.  I provided them paperwork and offered to discuss this with them.  Provided Aracelis with a 5-10 year written screening schedule and personal prevention plan. Recommendations were developed using the USPSTF age appropriate recommendations. Education, counseling, and referrals were provided as needed.  After Visit Summary printed and given to patient which includes a list of additional screenings\tests needed.    Follow up in about 1 year (around 3/15/2024), or Annual Wellness Examination.      Cira Oneill NP

## 2023-03-15 ENCOUNTER — OFFICE VISIT (OUTPATIENT)
Dept: PRIMARY CARE CLINIC | Facility: CLINIC | Age: 68
End: 2023-03-15
Payer: MEDICARE

## 2023-03-15 VITALS
DIASTOLIC BLOOD PRESSURE: 60 MMHG | BODY MASS INDEX: 34.61 KG/M2 | WEIGHT: 215.38 LBS | HEART RATE: 61 BPM | OXYGEN SATURATION: 98 % | HEIGHT: 66 IN | TEMPERATURE: 98 F | SYSTOLIC BLOOD PRESSURE: 121 MMHG

## 2023-03-15 DIAGNOSIS — Z00.00 ENCOUNTER FOR MEDICARE ANNUAL WELLNESS EXAM: ICD-10-CM

## 2023-03-15 DIAGNOSIS — M17.9 OSTEOARTHRITIS OF KNEE, UNSPECIFIED LATERALITY, UNSPECIFIED OSTEOARTHRITIS TYPE: ICD-10-CM

## 2023-03-15 DIAGNOSIS — E78.5 HYPERLIPIDEMIA, UNSPECIFIED HYPERLIPIDEMIA TYPE: Chronic | ICD-10-CM

## 2023-03-15 DIAGNOSIS — E11.9 TYPE 2 DIABETES MELLITUS WITHOUT COMPLICATION, WITHOUT LONG-TERM CURRENT USE OF INSULIN: Chronic | ICD-10-CM

## 2023-03-15 DIAGNOSIS — M54.41 CHRONIC RIGHT-SIDED LOW BACK PAIN WITH RIGHT-SIDED SCIATICA: ICD-10-CM

## 2023-03-15 DIAGNOSIS — R55 SYNCOPE AND COLLAPSE: ICD-10-CM

## 2023-03-15 DIAGNOSIS — G89.29 CHRONIC RIGHT-SIDED LOW BACK PAIN WITH RIGHT-SIDED SCIATICA: ICD-10-CM

## 2023-03-15 DIAGNOSIS — M47.26 OSTEOARTHRITIS OF SPINE WITH RADICULOPATHY, LUMBAR REGION: ICD-10-CM

## 2023-03-15 DIAGNOSIS — M19.90 ARTHRITIS: ICD-10-CM

## 2023-03-15 DIAGNOSIS — M46.1 SACROILIITIS: ICD-10-CM

## 2023-03-15 DIAGNOSIS — I10 ESSENTIAL HYPERTENSION: Chronic | ICD-10-CM

## 2023-03-15 DIAGNOSIS — E66.1 CLASS 1 DRUG-INDUCED OBESITY WITH SERIOUS COMORBIDITY AND BODY MASS INDEX (BMI) OF 34.0 TO 34.9 IN ADULT: ICD-10-CM

## 2023-03-15 DIAGNOSIS — Z00.00 ENCOUNTER FOR PREVENTIVE HEALTH EXAMINATION: Primary | ICD-10-CM

## 2023-03-15 DIAGNOSIS — N28.1 RENAL CYST: ICD-10-CM

## 2023-03-15 DIAGNOSIS — R26.9 ABNORMALITY OF GAIT AND MOBILITY: ICD-10-CM

## 2023-03-15 DIAGNOSIS — D47.3 ESSENTIAL (HEMORRHAGIC) THROMBOCYTHEMIA: ICD-10-CM

## 2023-03-15 DIAGNOSIS — D33.3 BENIGN NEOPLASM OF CRANIAL NERVES: ICD-10-CM

## 2023-03-15 DIAGNOSIS — M79.7 FIBROMYALGIA: ICD-10-CM

## 2023-03-15 PROBLEM — M25.641 FINGER STIFFNESS, RIGHT: Status: RESOLVED | Noted: 2018-11-09 | Resolved: 2023-03-15

## 2023-03-15 PROBLEM — M25.441 EFFUSION OF RIGHT HAND: Status: RESOLVED | Noted: 2018-11-09 | Resolved: 2023-03-15

## 2023-03-15 PROBLEM — M25.60 RANGE OF MOTION DEFICIT: Status: RESOLVED | Noted: 2018-11-09 | Resolved: 2023-03-15

## 2023-03-15 PROBLEM — M54.50 LUMBAR PAIN: Status: RESOLVED | Noted: 2022-01-21 | Resolved: 2023-03-15

## 2023-03-15 PROBLEM — M79.644 FINGER PAIN, RIGHT: Status: RESOLVED | Noted: 2018-10-22 | Resolved: 2023-03-15

## 2023-03-15 PROBLEM — Z85.07 HISTORY OF PANCREATIC CANCER: Status: RESOLVED | Noted: 2021-07-02 | Resolved: 2023-03-15

## 2023-03-15 PROBLEM — Z12.11 COLON CANCER SCREENING: Status: RESOLVED | Noted: 2019-03-13 | Resolved: 2023-03-15

## 2023-03-15 PROBLEM — M77.8: Status: RESOLVED | Noted: 2018-10-22 | Resolved: 2023-03-15

## 2023-03-15 PROBLEM — M25.641 STIFFNESS OF RIGHT HAND, NOT ELSEWHERE CLASSIFIED: Status: RESOLVED | Noted: 2019-02-26 | Resolved: 2023-03-15

## 2023-03-15 PROBLEM — M53.86 DECREASED ROM OF LUMBAR SPINE: Status: RESOLVED | Noted: 2022-01-21 | Resolved: 2023-03-15

## 2023-03-15 PROCEDURE — G0439 PR MEDICARE ANNUAL WELLNESS SUBSEQUENT VISIT: ICD-10-PCS | Mod: HCNC,S$GLB,, | Performed by: NURSE PRACTITIONER

## 2023-03-15 PROCEDURE — 3066F PR DOCUMENTATION OF TREATMENT FOR NEPHROPATHY: ICD-10-PCS | Mod: HCNC,CPTII,S$GLB, | Performed by: NURSE PRACTITIONER

## 2023-03-15 PROCEDURE — 3288F FALL RISK ASSESSMENT DOCD: CPT | Mod: HCNC,CPTII,S$GLB, | Performed by: NURSE PRACTITIONER

## 2023-03-15 PROCEDURE — G0439 PPPS, SUBSEQ VISIT: HCPCS | Mod: HCNC,S$GLB,, | Performed by: NURSE PRACTITIONER

## 2023-03-15 PROCEDURE — 3066F NEPHROPATHY DOC TX: CPT | Mod: HCNC,CPTII,S$GLB, | Performed by: NURSE PRACTITIONER

## 2023-03-15 PROCEDURE — 3008F PR BODY MASS INDEX (BMI) DOCUMENTED: ICD-10-PCS | Mod: HCNC,CPTII,S$GLB, | Performed by: NURSE PRACTITIONER

## 2023-03-15 PROCEDURE — 3288F PR FALLS RISK ASSESSMENT DOCUMENTED: ICD-10-PCS | Mod: HCNC,CPTII,S$GLB, | Performed by: NURSE PRACTITIONER

## 2023-03-15 PROCEDURE — 3060F POS MICROALBUMINURIA REV: CPT | Mod: HCNC,CPTII,S$GLB, | Performed by: NURSE PRACTITIONER

## 2023-03-15 PROCEDURE — 3044F PR MOST RECENT HEMOGLOBIN A1C LEVEL <7.0%: ICD-10-PCS | Mod: HCNC,CPTII,S$GLB, | Performed by: NURSE PRACTITIONER

## 2023-03-15 PROCEDURE — 4010F ACE/ARB THERAPY RXD/TAKEN: CPT | Mod: HCNC,CPTII,S$GLB, | Performed by: NURSE PRACTITIONER

## 2023-03-15 PROCEDURE — 1126F AMNT PAIN NOTED NONE PRSNT: CPT | Mod: HCNC,CPTII,S$GLB, | Performed by: NURSE PRACTITIONER

## 2023-03-15 PROCEDURE — 3008F BODY MASS INDEX DOCD: CPT | Mod: HCNC,CPTII,S$GLB, | Performed by: NURSE PRACTITIONER

## 2023-03-15 PROCEDURE — 3078F PR MOST RECENT DIASTOLIC BLOOD PRESSURE < 80 MM HG: ICD-10-PCS | Mod: HCNC,CPTII,S$GLB, | Performed by: NURSE PRACTITIONER

## 2023-03-15 PROCEDURE — 4010F PR ACE/ARB THEARPY RXD/TAKEN: ICD-10-PCS | Mod: HCNC,CPTII,S$GLB, | Performed by: NURSE PRACTITIONER

## 2023-03-15 PROCEDURE — 1101F PT FALLS ASSESS-DOCD LE1/YR: CPT | Mod: HCNC,CPTII,S$GLB, | Performed by: NURSE PRACTITIONER

## 2023-03-15 PROCEDURE — 3044F HG A1C LEVEL LT 7.0%: CPT | Mod: HCNC,CPTII,S$GLB, | Performed by: NURSE PRACTITIONER

## 2023-03-15 PROCEDURE — 1126F PR PAIN SEVERITY QUANTIFIED, NO PAIN PRESENT: ICD-10-PCS | Mod: HCNC,CPTII,S$GLB, | Performed by: NURSE PRACTITIONER

## 2023-03-15 PROCEDURE — 3060F PR POS MICROALBUMINURIA RESULT DOCUMENTED/REVIEW: ICD-10-PCS | Mod: HCNC,CPTII,S$GLB, | Performed by: NURSE PRACTITIONER

## 2023-03-15 PROCEDURE — 99999 PR PBB SHADOW E&M-EST. PATIENT-LVL V: CPT | Mod: PBBFAC,HCNC,, | Performed by: NURSE PRACTITIONER

## 2023-03-15 PROCEDURE — 1101F PR PT FALLS ASSESS DOC 0-1 FALLS W/OUT INJ PAST YR: ICD-10-PCS | Mod: HCNC,CPTII,S$GLB, | Performed by: NURSE PRACTITIONER

## 2023-03-15 PROCEDURE — 3078F DIAST BP <80 MM HG: CPT | Mod: HCNC,CPTII,S$GLB, | Performed by: NURSE PRACTITIONER

## 2023-03-15 PROCEDURE — 99999 PR PBB SHADOW E&M-EST. PATIENT-LVL V: ICD-10-PCS | Mod: PBBFAC,HCNC,, | Performed by: NURSE PRACTITIONER

## 2023-03-15 PROCEDURE — 3074F SYST BP LT 130 MM HG: CPT | Mod: HCNC,CPTII,S$GLB, | Performed by: NURSE PRACTITIONER

## 2023-03-15 PROCEDURE — 3072F LOW RISK FOR RETINOPATHY: CPT | Mod: HCNC,CPTII,S$GLB, | Performed by: NURSE PRACTITIONER

## 2023-03-15 PROCEDURE — 3072F PR LOW RISK FOR RETINOPATHY: ICD-10-PCS | Mod: HCNC,CPTII,S$GLB, | Performed by: NURSE PRACTITIONER

## 2023-03-15 PROCEDURE — 3074F PR MOST RECENT SYSTOLIC BLOOD PRESSURE < 130 MM HG: ICD-10-PCS | Mod: HCNC,CPTII,S$GLB, | Performed by: NURSE PRACTITIONER

## 2023-03-15 RX ORDER — HYDROCODONE BITARTRATE AND ACETAMINOPHEN 5; 325 MG/1; MG/1
1 TABLET ORAL EVERY 6 HOURS PRN
COMMUNITY
Start: 2023-01-31 | End: 2023-03-27

## 2023-03-15 RX ORDER — ZOSTER VACCINE RECOMBINANT, ADJUVANTED 50 MCG/0.5
0.5 KIT INTRAMUSCULAR ONCE
Qty: 1 EACH | Refills: 0 | Status: SHIPPED | OUTPATIENT
Start: 2023-03-15 | End: 2023-03-15

## 2023-03-15 RX ORDER — HYDROCODONE BITARTRATE AND ACETAMINOPHEN 7.5; 325 MG/1; MG/1
1 TABLET ORAL EVERY 6 HOURS PRN
COMMUNITY
Start: 2023-01-09 | End: 2023-03-27

## 2023-03-15 RX ORDER — LANCETS 33 GAUGE
EACH MISCELLANEOUS
COMMUNITY
Start: 2023-03-11 | End: 2023-10-03

## 2023-03-15 NOTE — PATIENT INSTRUCTIONS
Counseling and Referral of Other Preventative  (Italic type indicates deductible and co-insurance are waived)    Patient Name: Aracelis Martinez  Today's Date: 3/15/2023    Health Maintenance       Date Due Completion Date    TETANUS VACCINE Never done ---    Shingles Vaccine (1 of 2) Never done ---    Pneumococcal Vaccines (Age 65+) (2 - PPSV23 if available, else PCV20) 05/20/2021 3/25/2021    Hemoglobin A1c 03/13/2023 9/13/2022    Diabetes Urine Screening 05/26/2023 5/26/2022    Lipid Panel 05/26/2023 5/26/2022    Eye Exam 10/13/2023 10/13/2022    Foot Exam 01/09/2024 1/9/2023    Override on 3/25/2021: Done    Override on 11/12/2019: Done    Mammogram 02/06/2024 2/6/2023    DEXA Scan 03/29/2024 3/29/2021    Colorectal Cancer Screening 04/19/2029 4/19/2022        No orders of the defined types were placed in this encounter.    The following information is provided to all patients.  This information is to help you find resources for any of the problems found today that may be affecting your health:                Living healthy guide: www.[facility].[place].gov      Understanding Diabetes: www.diabetes.org      Eating healthy: www.cdc.gov/healthyweight      [facility] home safety checklist: www.cdc.gov/steadi/patient.html      Agency on Aging: www.goea.[place].[facility]      Alcoholics anonymous (AA): www.aa.org      Physical Activity: www.gely.nih.gov/nk1vcxr      Tobacco use: www.quitwithusla.org     
What Is The Reason For Today's Visit?: Family History of Melanoma
Family Member: Father

## 2023-03-20 ENCOUNTER — LAB VISIT (OUTPATIENT)
Dept: LAB | Facility: HOSPITAL | Age: 68
End: 2023-03-20
Payer: MEDICARE

## 2023-03-20 DIAGNOSIS — R55 SYNCOPE, UNSPECIFIED SYNCOPE TYPE: ICD-10-CM

## 2023-03-20 DIAGNOSIS — E11.9 TYPE 2 DIABETES MELLITUS WITHOUT COMPLICATION, WITHOUT LONG-TERM CURRENT USE OF INSULIN: Chronic | ICD-10-CM

## 2023-03-20 DIAGNOSIS — D47.3 ESSENTIAL (HEMORRHAGIC) THROMBOCYTHEMIA: ICD-10-CM

## 2023-03-20 DIAGNOSIS — E78.49 OTHER HYPERLIPIDEMIA: Chronic | ICD-10-CM

## 2023-03-20 LAB
ALBUMIN SERPL BCP-MCNC: 3.8 G/DL (ref 3.5–5.2)
ALBUMIN/CREAT UR: 103 UG/MG (ref 0–30)
ALP SERPL-CCNC: 114 U/L (ref 55–135)
ALT SERPL W/O P-5'-P-CCNC: 14 U/L (ref 10–44)
ANION GAP SERPL CALC-SCNC: 12 MMOL/L (ref 8–16)
AST SERPL-CCNC: 11 U/L (ref 10–40)
BASOPHILS # BLD AUTO: 0.04 K/UL (ref 0–0.2)
BASOPHILS NFR BLD: 0.6 % (ref 0–1.9)
BILIRUB SERPL-MCNC: 0.6 MG/DL (ref 0.1–1)
BILIRUB UR QL STRIP: NEGATIVE
BUN SERPL-MCNC: 18 MG/DL (ref 8–23)
CALCIUM SERPL-MCNC: 9.7 MG/DL (ref 8.7–10.5)
CHLORIDE SERPL-SCNC: 106 MMOL/L (ref 95–110)
CHOLEST SERPL-MCNC: 202 MG/DL (ref 120–199)
CHOLEST/HDLC SERPL: 3.5 {RATIO} (ref 2–5)
CLARITY UR REFRACT.AUTO: CLEAR
CO2 SERPL-SCNC: 26 MMOL/L (ref 23–29)
COLOR UR AUTO: YELLOW
CREAT SERPL-MCNC: 0.8 MG/DL (ref 0.5–1.4)
CREAT UR-MCNC: 133 MG/DL (ref 15–325)
DIFFERENTIAL METHOD: ABNORMAL
EOSINOPHIL # BLD AUTO: 0.2 K/UL (ref 0–0.5)
EOSINOPHIL NFR BLD: 3 % (ref 0–8)
ERYTHROCYTE [DISTWIDTH] IN BLOOD BY AUTOMATED COUNT: 15.9 % (ref 11.5–14.5)
EST. GFR  (NO RACE VARIABLE): >60 ML/MIN/1.73 M^2
ESTIMATED AVG GLUCOSE: 143 MG/DL (ref 68–131)
GLUCOSE SERPL-MCNC: 130 MG/DL (ref 70–110)
GLUCOSE UR QL STRIP: NEGATIVE
HBA1C MFR BLD: 6.6 % (ref 4–5.6)
HCT VFR BLD AUTO: 45.7 % (ref 37–48.5)
HDLC SERPL-MCNC: 58 MG/DL (ref 40–75)
HDLC SERPL: 28.7 % (ref 20–50)
HGB BLD-MCNC: 13.8 G/DL (ref 12–16)
HGB UR QL STRIP: NEGATIVE
IMM GRANULOCYTES # BLD AUTO: 0.01 K/UL (ref 0–0.04)
IMM GRANULOCYTES NFR BLD AUTO: 0.1 % (ref 0–0.5)
KETONES UR QL STRIP: NEGATIVE
LDLC SERPL CALC-MCNC: 127.6 MG/DL (ref 63–159)
LEUKOCYTE ESTERASE UR QL STRIP: NEGATIVE
LYMPHOCYTES # BLD AUTO: 4 K/UL (ref 1–4.8)
LYMPHOCYTES NFR BLD: 57.7 % (ref 18–48)
MCH RBC QN AUTO: 26 PG (ref 27–31)
MCHC RBC AUTO-ENTMCNC: 30.2 G/DL (ref 32–36)
MCV RBC AUTO: 86 FL (ref 82–98)
MICROALBUMIN UR DL<=1MG/L-MCNC: 137 UG/ML
MONOCYTES # BLD AUTO: 0.5 K/UL (ref 0.3–1)
MONOCYTES NFR BLD: 6.4 % (ref 4–15)
NEUTROPHILS # BLD AUTO: 2.3 K/UL (ref 1.8–7.7)
NEUTROPHILS NFR BLD: 32.2 % (ref 38–73)
NITRITE UR QL STRIP: NEGATIVE
NONHDLC SERPL-MCNC: 144 MG/DL
NRBC BLD-RTO: 0 /100 WBC
PH UR STRIP: 6 [PH] (ref 5–8)
PLATELET # BLD AUTO: 416 K/UL (ref 150–450)
PMV BLD AUTO: 10.2 FL (ref 9.2–12.9)
POTASSIUM SERPL-SCNC: 4.3 MMOL/L (ref 3.5–5.1)
PROT SERPL-MCNC: 7.1 G/DL (ref 6–8.4)
PROT UR QL STRIP: ABNORMAL
RBC # BLD AUTO: 5.3 M/UL (ref 4–5.4)
SODIUM SERPL-SCNC: 144 MMOL/L (ref 136–145)
SP GR UR STRIP: 1.02 (ref 1–1.03)
TRIGL SERPL-MCNC: 82 MG/DL (ref 30–150)
TSH SERPL DL<=0.005 MIU/L-ACNC: 3.52 UIU/ML (ref 0.4–4)
URN SPEC COLLECT METH UR: ABNORMAL
WBC # BLD AUTO: 6.99 K/UL (ref 3.9–12.7)

## 2023-03-20 PROCEDURE — 80053 COMPREHEN METABOLIC PANEL: CPT | Mod: HCNC | Performed by: INTERNAL MEDICINE

## 2023-03-20 PROCEDURE — 84443 ASSAY THYROID STIM HORMONE: CPT | Mod: HCNC | Performed by: INTERNAL MEDICINE

## 2023-03-20 PROCEDURE — 85025 COMPLETE CBC W/AUTO DIFF WBC: CPT | Mod: HCNC | Performed by: INTERNAL MEDICINE

## 2023-03-20 PROCEDURE — 81003 URINALYSIS AUTO W/O SCOPE: CPT | Mod: HCNC | Performed by: INTERNAL MEDICINE

## 2023-03-20 PROCEDURE — 80061 LIPID PANEL: CPT | Mod: HCNC | Performed by: INTERNAL MEDICINE

## 2023-03-20 PROCEDURE — 82570 ASSAY OF URINE CREATININE: CPT | Mod: HCNC | Performed by: INTERNAL MEDICINE

## 2023-03-20 PROCEDURE — 36415 COLL VENOUS BLD VENIPUNCTURE: CPT | Mod: HCNC,PO | Performed by: INTERNAL MEDICINE

## 2023-03-20 PROCEDURE — 83036 HEMOGLOBIN GLYCOSYLATED A1C: CPT | Mod: HCNC | Performed by: INTERNAL MEDICINE

## 2023-03-20 RX ORDER — TEMAZEPAM 30 MG/1
CAPSULE ORAL
Qty: 30 CAPSULE | Refills: 3 | Status: SHIPPED | OUTPATIENT
Start: 2023-03-20 | End: 2023-06-20

## 2023-03-20 NOTE — TELEPHONE ENCOUNTER
Care Due:                  Date            Visit Type   Department     Provider  --------------------------------------------------------------------------------                                EP -                              PRIMARY      [facility] INTERNAL  Last Visit: 09-      CARE ([facility])   MEDICINE       Alenjosias Damico                              EP -                              PRIMARY      [facility] INTERNAL  Next Visit: 03-      [facility] ([facility])   MEDICINE       Alen SAMANTHA Damico                                                            Last  Test          Frequency    Reason                     Performed    Due Date  --------------------------------------------------------------------------------    HBA1C.......  6 months...  glipiZIDE................  09- 03-    [facility] Embedded Care Gaps. Reference number: [id number]. 3/19/2023   8:07:56 PM CDT

## 2023-03-21 NOTE — PLAN OF CARE
Discharge instructions discussed with patient and patients family. Both verbalize understanding and have no questions at this time.      What Type Of Note Output Would You Prefer (Optional)?: Standard Output Hpi Title: Evaluation of Skin Lesions

## 2023-03-27 ENCOUNTER — OFFICE VISIT (OUTPATIENT)
Dept: INTERNAL MEDICINE | Facility: CLINIC | Age: 68
End: 2023-03-27
Payer: MEDICARE

## 2023-03-27 VITALS
TEMPERATURE: 98 F | SYSTOLIC BLOOD PRESSURE: 130 MMHG | DIASTOLIC BLOOD PRESSURE: 68 MMHG | OXYGEN SATURATION: 95 % | HEIGHT: 66 IN | BODY MASS INDEX: 34.72 KG/M2 | HEART RATE: 58 BPM | RESPIRATION RATE: 18 BRPM | WEIGHT: 216.06 LBS

## 2023-03-27 DIAGNOSIS — H93.13 TINNITUS, BILATERAL: ICD-10-CM

## 2023-03-27 DIAGNOSIS — I10 ESSENTIAL HYPERTENSION: Chronic | ICD-10-CM

## 2023-03-27 DIAGNOSIS — E11.9 TYPE 2 DIABETES MELLITUS WITHOUT COMPLICATION, WITHOUT LONG-TERM CURRENT USE OF INSULIN: Primary | Chronic | ICD-10-CM

## 2023-03-27 DIAGNOSIS — E78.49 OTHER HYPERLIPIDEMIA: Chronic | ICD-10-CM

## 2023-03-27 PROCEDURE — 1101F PT FALLS ASSESS-DOCD LE1/YR: CPT | Mod: HCNC,CPTII,S$GLB, | Performed by: INTERNAL MEDICINE

## 2023-03-27 PROCEDURE — 3060F PR POS MICROALBUMINURIA RESULT DOCUMENTED/REVIEW: ICD-10-PCS | Mod: HCNC,CPTII,S$GLB, | Performed by: INTERNAL MEDICINE

## 2023-03-27 PROCEDURE — 3072F LOW RISK FOR RETINOPATHY: CPT | Mod: HCNC,CPTII,S$GLB, | Performed by: INTERNAL MEDICINE

## 2023-03-27 PROCEDURE — 3066F PR DOCUMENTATION OF TREATMENT FOR NEPHROPATHY: ICD-10-PCS | Mod: HCNC,CPTII,S$GLB, | Performed by: INTERNAL MEDICINE

## 2023-03-27 PROCEDURE — 99999 PR PBB SHADOW E&M-EST. PATIENT-LVL V: ICD-10-PCS | Mod: PBBFAC,HCNC,, | Performed by: INTERNAL MEDICINE

## 2023-03-27 PROCEDURE — 3044F PR MOST RECENT HEMOGLOBIN A1C LEVEL <7.0%: ICD-10-PCS | Mod: HCNC,CPTII,S$GLB, | Performed by: INTERNAL MEDICINE

## 2023-03-27 PROCEDURE — 3078F PR MOST RECENT DIASTOLIC BLOOD PRESSURE < 80 MM HG: ICD-10-PCS | Mod: HCNC,CPTII,S$GLB, | Performed by: INTERNAL MEDICINE

## 2023-03-27 PROCEDURE — 3008F BODY MASS INDEX DOCD: CPT | Mod: HCNC,CPTII,S$GLB, | Performed by: INTERNAL MEDICINE

## 2023-03-27 PROCEDURE — 99214 PR OFFICE/OUTPT VISIT, EST, LEVL IV, 30-39 MIN: ICD-10-PCS | Mod: HCNC,S$GLB,, | Performed by: INTERNAL MEDICINE

## 2023-03-27 PROCEDURE — 3060F POS MICROALBUMINURIA REV: CPT | Mod: HCNC,CPTII,S$GLB, | Performed by: INTERNAL MEDICINE

## 2023-03-27 PROCEDURE — 1126F PR PAIN SEVERITY QUANTIFIED, NO PAIN PRESENT: ICD-10-PCS | Mod: HCNC,CPTII,S$GLB, | Performed by: INTERNAL MEDICINE

## 2023-03-27 PROCEDURE — 99999 PR PBB SHADOW E&M-EST. PATIENT-LVL V: CPT | Mod: PBBFAC,HCNC,, | Performed by: INTERNAL MEDICINE

## 2023-03-27 PROCEDURE — 4010F ACE/ARB THERAPY RXD/TAKEN: CPT | Mod: HCNC,CPTII,S$GLB, | Performed by: INTERNAL MEDICINE

## 2023-03-27 PROCEDURE — 99214 OFFICE O/P EST MOD 30 MIN: CPT | Mod: HCNC,S$GLB,, | Performed by: INTERNAL MEDICINE

## 2023-03-27 PROCEDURE — 3075F SYST BP GE 130 - 139MM HG: CPT | Mod: HCNC,CPTII,S$GLB, | Performed by: INTERNAL MEDICINE

## 2023-03-27 PROCEDURE — 1159F PR MEDICATION LIST DOCUMENTED IN MEDICAL RECORD: ICD-10-PCS | Mod: HCNC,CPTII,S$GLB, | Performed by: INTERNAL MEDICINE

## 2023-03-27 PROCEDURE — 3288F PR FALLS RISK ASSESSMENT DOCUMENTED: ICD-10-PCS | Mod: HCNC,CPTII,S$GLB, | Performed by: INTERNAL MEDICINE

## 2023-03-27 PROCEDURE — 4010F PR ACE/ARB THEARPY RXD/TAKEN: ICD-10-PCS | Mod: HCNC,CPTII,S$GLB, | Performed by: INTERNAL MEDICINE

## 2023-03-27 PROCEDURE — 1126F AMNT PAIN NOTED NONE PRSNT: CPT | Mod: HCNC,CPTII,S$GLB, | Performed by: INTERNAL MEDICINE

## 2023-03-27 PROCEDURE — 1160F RVW MEDS BY RX/DR IN RCRD: CPT | Mod: HCNC,CPTII,S$GLB, | Performed by: INTERNAL MEDICINE

## 2023-03-27 PROCEDURE — 3288F FALL RISK ASSESSMENT DOCD: CPT | Mod: HCNC,CPTII,S$GLB, | Performed by: INTERNAL MEDICINE

## 2023-03-27 PROCEDURE — 3075F PR MOST RECENT SYSTOLIC BLOOD PRESS GE 130-139MM HG: ICD-10-PCS | Mod: HCNC,CPTII,S$GLB, | Performed by: INTERNAL MEDICINE

## 2023-03-27 PROCEDURE — 3066F NEPHROPATHY DOC TX: CPT | Mod: HCNC,CPTII,S$GLB, | Performed by: INTERNAL MEDICINE

## 2023-03-27 PROCEDURE — 3008F PR BODY MASS INDEX (BMI) DOCUMENTED: ICD-10-PCS | Mod: HCNC,CPTII,S$GLB, | Performed by: INTERNAL MEDICINE

## 2023-03-27 PROCEDURE — 3072F PR LOW RISK FOR RETINOPATHY: ICD-10-PCS | Mod: HCNC,CPTII,S$GLB, | Performed by: INTERNAL MEDICINE

## 2023-03-27 PROCEDURE — 1160F PR REVIEW ALL MEDS BY PRESCRIBER/CLIN PHARMACIST DOCUMENTED: ICD-10-PCS | Mod: HCNC,CPTII,S$GLB, | Performed by: INTERNAL MEDICINE

## 2023-03-27 PROCEDURE — 1159F MED LIST DOCD IN RCRD: CPT | Mod: HCNC,CPTII,S$GLB, | Performed by: INTERNAL MEDICINE

## 2023-03-27 PROCEDURE — 3044F HG A1C LEVEL LT 7.0%: CPT | Mod: HCNC,CPTII,S$GLB, | Performed by: INTERNAL MEDICINE

## 2023-03-27 PROCEDURE — 1101F PR PT FALLS ASSESS DOC 0-1 FALLS W/OUT INJ PAST YR: ICD-10-PCS | Mod: HCNC,CPTII,S$GLB, | Performed by: INTERNAL MEDICINE

## 2023-03-27 PROCEDURE — 3078F DIAST BP <80 MM HG: CPT | Mod: HCNC,CPTII,S$GLB, | Performed by: INTERNAL MEDICINE

## 2023-03-27 NOTE — PROGRESS NOTES
Subjective:       Patient ID: Aracelis Martinez is a 68 y.o. female.    Chief Complaint: Annual Exam, Follow-up, Diabetes, and Hypertension    HPI  The patient presents for follow-up of medical conditions which include type 2 diabetes mellitus, hypertension, hyperlipidemia obesity and chronic tinnitus.  The patient reports has been under some emotional stress lately.  She recently lost her grandson to violence.    She has been using his stationary bike 3 days a week for 10 minutes each day.  He is not experiencing any exertional chest pain or dyspnea.    She has noted constant ringing in both ears for several months.  She does not feel she has had any loss of hearing.  She denies having any ear pain or headaches.  No loss of balance or vertigo is reported.    Blood sugar levels have ranged from 120 to 130+ fasting recently.  No hypoglycemia has been noted.  She does not monitor blood pressures but she is tolerating her medication well without side effects.    Review of Systems   Constitutional:  Positive for activity change and appetite change. Negative for unexpected weight change.   HENT:  Positive for tinnitus.    Eyes:  Negative for visual disturbance.   Respiratory:  Negative for shortness of breath.    Cardiovascular:  Negative for chest pain, palpitations and leg swelling.   Gastrointestinal:  Negative for abdominal pain, blood in stool and diarrhea.   Endocrine: Positive for cold intolerance.   Genitourinary:  Negative for dysuria, frequency, hematuria and urgency.   Neurological:  Negative for weakness, numbness and headaches.   Psychiatric/Behavioral:  Positive for dysphoric mood. Negative for sleep disturbance.           Physical Exam  Vitals and nursing note reviewed.   Constitutional:       General: She is not in acute distress.     Appearance: Normal appearance. She is well-developed.      Comments: The patient has gained 14 lb since 09/20/2022.   HENT:      Head: Normocephalic and atraumatic.   Eyes:       General: No scleral icterus.     Extraocular Movements: Extraocular movements intact.      Conjunctiva/sclera: Conjunctivae normal.   Neck:      Thyroid: No thyromegaly.      Vascular: No JVD.   Cardiovascular:      Rate and Rhythm: Normal rate and regular rhythm.      Heart sounds: Normal heart sounds. No murmur heard.    No friction rub. No gallop.   Pulmonary:      Effort: Pulmonary effort is normal. No respiratory distress.      Breath sounds: Normal breath sounds. No wheezing or rales.   Abdominal:      General: Bowel sounds are normal.      Palpations: Abdomen is soft. There is no mass.      Tenderness: There is no abdominal tenderness.   Musculoskeletal:         General: No tenderness. Normal range of motion.      Cervical back: Normal range of motion and neck supple.   Lymphadenopathy:      Cervical: No cervical adenopathy.   Skin:     General: Skin is warm and dry.      Findings: No rash.   Neurological:      Mental Status: She is alert and oriented to person, place, and time.      Cranial Nerves: No cranial nerve deficit.   Psychiatric:         Mood and Affect: Mood normal.         Behavior: Behavior normal.       Protective Sensation (w/ 10 gram monofilament):  Right: Intact  Left: Intact    Visual Inspection:  Bilateral bunion deformities present with lateral deviation of the great toes    Pedal Pulses:   Right: Present  Left: Present    Posterior Tibialis Pulses:   Right:Present  Left: Present    Lab Visit on 03/20/2023   Component Date Value Ref Range Status    Sodium 03/20/2023 144  136 - 145 mmol/L Final    Potassium 03/20/2023 4.3  3.5 - 5.1 mmol/L Final    Chloride 03/20/2023 106  95 - 110 mmol/L Final    CO2 03/20/2023 26  23 - 29 mmol/L Final    Glucose 03/20/2023 130 (H)  70 - 110 mg/dL Final    BUN 03/20/2023 18  8 - 23 mg/dL Final    Creatinine 03/20/2023 0.8  0.5 - 1.4 mg/dL Final    Calcium 03/20/2023 9.7  8.7 - 10.5 mg/dL Final    Total Protein 03/20/2023 7.1  6.0 - 8.4 g/dL Final     Albumin 03/20/2023 3.8  3.5 - 5.2 g/dL Final    Total Bilirubin 03/20/2023 0.6  0.1 - 1.0 mg/dL Final    Comment: For infants and newborns, interpretation of results should be based  on gestational age, weight and in agreement with clinical  observations.    Premature Infant recommended reference ranges:  Up to 24 hours.............<8.0 mg/dL  Up to 48 hours............<12.0 mg/dL  3-5 days..................<15.0 mg/dL  6-29 days.................<15.0 mg/dL      Alkaline Phosphatase 03/20/2023 114  55 - 135 U/L Final    AST 03/20/2023 11  10 - 40 U/L Final    ALT 03/20/2023 14  10 - 44 U/L Final    Anion Gap 03/20/2023 12  8 - 16 mmol/L Final    eGFR 03/20/2023 >60.0  >60 mL/min/1.73 m^2 Final    Cholesterol 03/20/2023 202 (H)  120 - 199 mg/dL Final    Comment: The National Cholesterol Education Program (NCEP) has set the  following guidelines (reference ranges) for Cholesterol:  Optimal.....................<200 mg/dL  Borderline High.............200-239 mg/dL  High........................> or = 240 mg/dL      Triglycerides 03/20/2023 82  30 - 150 mg/dL Final    Comment: The National Cholesterol Education Program (NCEP) has set the  following guidelines (reference values) for triglycerides:  Normal......................<150 mg/dL  Borderline High.............150-199 mg/dL  High........................200-499 mg/dL      HDL 03/20/2023 58  40 - 75 mg/dL Final    Comment: The National Cholesterol Education Program (NCEP) has set the  following guidelines (reference values) for HDL Cholesterol:  Low...............<40 mg/dL  Optimal...........>60 mg/dL      LDL Cholesterol 03/20/2023 127.6  63.0 - 159.0 mg/dL Final    Comment: The National Cholesterol Education Program (NCEP) has set the  following guidelines (reference values) for LDL Cholesterol:  Optimal.......................<130 mg/dL  Borderline High...............130-159 mg/dL  High..........................160-189 mg/dL  Very High.....................>190 mg/dL       HDL/Cholesterol Ratio 03/20/2023 28.7  20.0 - 50.0 % Final    Total Cholesterol/HDL Ratio 03/20/2023 3.5  2.0 - 5.0 Final    Non-HDL Cholesterol 03/20/2023 144  mg/dL Final    Comment: Risk category and Non-HDL cholesterol goals:  Coronary heart disease (CHD)or equivalent (10-year risk of CHD >20%):  Non-HDL cholesterol goal     <130 mg/dL  Two or more CHD risk factors and 10-year risk of CHD <= 20%:  Non-HDL cholesterol goal     <160 mg/dL  0 to 1 CHD risk factor:  Non-HDL cholesterol goal     <190 mg/dL      WBC 03/20/2023 6.99  3.90 - 12.70 K/uL Final    RBC 03/20/2023 5.30  4.00 - 5.40 M/uL Final    Hemoglobin 03/20/2023 13.8  12.0 - 16.0 g/dL Final    Hematocrit 03/20/2023 45.7  37.0 - 48.5 % Final    MCV 03/20/2023 86  82 - 98 fL Final    MCH 03/20/2023 26.0 (L)  27.0 - 31.0 pg Final    MCHC 03/20/2023 30.2 (L)  32.0 - 36.0 g/dL Final    RDW 03/20/2023 15.9 (H)  11.5 - 14.5 % Final    Platelets 03/20/2023 416  150 - 450 K/uL Final    MPV 03/20/2023 10.2  9.2 - 12.9 fL Final    Immature Granulocytes 03/20/2023 0.1  0.0 - 0.5 % Final    Gran # (ANC) 03/20/2023 2.3  1.8 - 7.7 K/uL Final    Immature Grans (Abs) 03/20/2023 0.01  0.00 - 0.04 K/uL Final    Comment: Mild elevation in immature granulocytes is non specific and   can be seen in a variety of conditions including stress response,   acute inflammation, trauma and pregnancy. Correlation with other   laboratory and clinical findings is essential.      Lymph # 03/20/2023 4.0  1.0 - 4.8 K/uL Final    Mono # 03/20/2023 0.5  0.3 - 1.0 K/uL Final    Eos # 03/20/2023 0.2  0.0 - 0.5 K/uL Final    Baso # 03/20/2023 0.04  0.00 - 0.20 K/uL Final    nRBC 03/20/2023 0  0 /100 WBC Final    Gran % 03/20/2023 32.2 (L)  38.0 - 73.0 % Final    Lymph % 03/20/2023 57.7 (H)  18.0 - 48.0 % Final    Mono % 03/20/2023 6.4  4.0 - 15.0 % Final    Eosinophil % 03/20/2023 3.0  0.0 - 8.0 % Final    Basophil % 03/20/2023 0.6  0.0 - 1.9 % Final    Differential Method 03/20/2023  Automated   Final    TSH 03/20/2023 3.523  0.400 - 4.000 uIU/mL Final    Hemoglobin A1C 03/20/2023 6.6 (H)  4.0 - 5.6 % Final    Comment: ADA Screening Guidelines:  5.7-6.4%  Consistent with prediabetes  >or=6.5%  Consistent with diabetes    High levels of fetal hemoglobin interfere with the HbA1C  assay. Heterozygous hemoglobin variants (HbS, HgC, etc)do  not significantly interfere with this assay.   However, presence of multiple variants may affect accuracy.      Estimated Avg Glucose 03/20/2023 143 (H)  68 - 131 mg/dL Final    Specimen UA 03/20/2023 Urine, Unspecified   Final    Color, UA 03/20/2023 Yellow  Yellow, Straw, [place] Final    Appearance, UA 03/20/2023 Clear  Clear Final    pH, UA 03/20/2023 6.0  5.0 - 8.0 Final    Specific Gravity, UA 03/20/2023 1.020  1.005 - 1.030 Final    Protein, UA 03/20/2023 Trace (A)  Negative Final    Comment: Recommend a 24 hour urine protein or a urine   protein/creatinine ratio if globulin induced proteinuria is  clinically suspected.      Glucose, UA 03/20/2023 Negative  Negative Final    Ketones, UA 03/20/2023 Negative  Negative Final    Bilirubin (UA) 03/20/2023 Negative  Negative Final    Occult Blood UA 03/20/2023 Negative  Negative Final    Nitrite, UA 03/20/2023 Negative  Negative Final    Leukocytes, UA 03/20/2023 Negative  Negative Final    Microalbumin, Urine 03/20/2023 137.0  ug/mL Final    Creatinine, Urine 03/20/2023 133.0  15.0 - 325.0 mg/dL Final    Microalb/Creat Ratio 03/20/2023 103.0 (H)  0.0 - 30.0 ug/mg Final       Assessment & Plan:      Aracelis was seen today for annual exam, follow-up, diabetes and hypertension.  ENT consultation will be obtained for evaluation of chronic tinnitus.    Current medications will be continued for management of diabetes and hypertension.    The patient been encouraged to increase her exercise level and to lose weight.    Fasting blood tests will be obtained in 4 months.    Diagnoses and all orders for this visit:    Type 2  diabetes mellitus without complication, without long-term current use of insulin  -     Comprehensive Metabolic Panel; Future  -     Lipid Panel; Future  -     Hemoglobin A1C; Future    Essential hypertension  -     Comprehensive Metabolic Panel; Future  -     Lipid Panel; Future  -     Hemoglobin A1C; Future    Other hyperlipidemia  -     Comprehensive Metabolic Panel; Future  -     Lipid Panel; Future  -     Hemoglobin A1C; Future    Tinnitus, bilateral  -     Ambulatory referral/consult to ENT; Future         Follow up in about 4 months (around 7/27/2023).     Alen Damico MD

## 2023-04-15 PROBLEM — E66.01 SEVERE OBESITY (BMI 35.0-39.9) WITH COMORBIDITY: Status: RESOLVED | Noted: 2022-07-07 | Resolved: 2023-04-15

## 2023-05-12 ENCOUNTER — OFFICE VISIT (OUTPATIENT)
Dept: OTOLARYNGOLOGY | Facility: CLINIC | Age: 68
End: 2023-05-12
Payer: MEDICARE

## 2023-05-12 VITALS
BODY MASS INDEX: 34.91 KG/M2 | HEART RATE: 60 BPM | DIASTOLIC BLOOD PRESSURE: 69 MMHG | HEIGHT: 66 IN | SYSTOLIC BLOOD PRESSURE: 114 MMHG | WEIGHT: 217.25 LBS

## 2023-05-12 DIAGNOSIS — H93.13 TINNITUS, BILATERAL: Primary | ICD-10-CM

## 2023-05-12 DIAGNOSIS — H91.90 HEARING LOSS, UNSPECIFIED HEARING LOSS TYPE, UNSPECIFIED LATERALITY: ICD-10-CM

## 2023-05-12 PROCEDURE — 3066F PR DOCUMENTATION OF TREATMENT FOR NEPHROPATHY: ICD-10-PCS | Mod: HCNC,CPTII,S$GLB, | Performed by: OTOLARYNGOLOGY

## 2023-05-12 PROCEDURE — 3060F PR POS MICROALBUMINURIA RESULT DOCUMENTED/REVIEW: ICD-10-PCS | Mod: HCNC,CPTII,S$GLB, | Performed by: OTOLARYNGOLOGY

## 2023-05-12 PROCEDURE — 1101F PR PT FALLS ASSESS DOC 0-1 FALLS W/OUT INJ PAST YR: ICD-10-PCS | Mod: HCNC,CPTII,S$GLB, | Performed by: OTOLARYNGOLOGY

## 2023-05-12 PROCEDURE — 3008F PR BODY MASS INDEX (BMI) DOCUMENTED: ICD-10-PCS | Mod: HCNC,CPTII,S$GLB, | Performed by: OTOLARYNGOLOGY

## 2023-05-12 PROCEDURE — 3078F DIAST BP <80 MM HG: CPT | Mod: HCNC,CPTII,S$GLB, | Performed by: OTOLARYNGOLOGY

## 2023-05-12 PROCEDURE — 1159F PR MEDICATION LIST DOCUMENTED IN MEDICAL RECORD: ICD-10-PCS | Mod: HCNC,CPTII,S$GLB, | Performed by: OTOLARYNGOLOGY

## 2023-05-12 PROCEDURE — 3072F PR LOW RISK FOR RETINOPATHY: ICD-10-PCS | Mod: HCNC,CPTII,S$GLB, | Performed by: OTOLARYNGOLOGY

## 2023-05-12 PROCEDURE — 4010F ACE/ARB THERAPY RXD/TAKEN: CPT | Mod: HCNC,CPTII,S$GLB, | Performed by: OTOLARYNGOLOGY

## 2023-05-12 PROCEDURE — 99203 PR OFFICE/OUTPT VISIT, NEW, LEVL III, 30-44 MIN: ICD-10-PCS | Mod: HCNC,S$GLB,, | Performed by: OTOLARYNGOLOGY

## 2023-05-12 PROCEDURE — 3044F PR MOST RECENT HEMOGLOBIN A1C LEVEL <7.0%: ICD-10-PCS | Mod: HCNC,CPTII,S$GLB, | Performed by: OTOLARYNGOLOGY

## 2023-05-12 PROCEDURE — 1160F RVW MEDS BY RX/DR IN RCRD: CPT | Mod: HCNC,CPTII,S$GLB, | Performed by: OTOLARYNGOLOGY

## 2023-05-12 PROCEDURE — 3078F PR MOST RECENT DIASTOLIC BLOOD PRESSURE < 80 MM HG: ICD-10-PCS | Mod: HCNC,CPTII,S$GLB, | Performed by: OTOLARYNGOLOGY

## 2023-05-12 PROCEDURE — 99999 PR PBB SHADOW E&M-EST. PATIENT-LVL IV: ICD-10-PCS | Mod: PBBFAC,HCNC,, | Performed by: OTOLARYNGOLOGY

## 2023-05-12 PROCEDURE — 1126F AMNT PAIN NOTED NONE PRSNT: CPT | Mod: HCNC,CPTII,S$GLB, | Performed by: OTOLARYNGOLOGY

## 2023-05-12 PROCEDURE — 99999 PR PBB SHADOW E&M-EST. PATIENT-LVL IV: CPT | Mod: PBBFAC,HCNC,, | Performed by: OTOLARYNGOLOGY

## 2023-05-12 PROCEDURE — 3066F NEPHROPATHY DOC TX: CPT | Mod: HCNC,CPTII,S$GLB, | Performed by: OTOLARYNGOLOGY

## 2023-05-12 PROCEDURE — 3008F BODY MASS INDEX DOCD: CPT | Mod: HCNC,CPTII,S$GLB, | Performed by: OTOLARYNGOLOGY

## 2023-05-12 PROCEDURE — 3074F SYST BP LT 130 MM HG: CPT | Mod: HCNC,CPTII,S$GLB, | Performed by: OTOLARYNGOLOGY

## 2023-05-12 PROCEDURE — 1160F PR REVIEW ALL MEDS BY PRESCRIBER/CLIN PHARMACIST DOCUMENTED: ICD-10-PCS | Mod: HCNC,CPTII,S$GLB, | Performed by: OTOLARYNGOLOGY

## 2023-05-12 PROCEDURE — 4010F PR ACE/ARB THEARPY RXD/TAKEN: ICD-10-PCS | Mod: HCNC,CPTII,S$GLB, | Performed by: OTOLARYNGOLOGY

## 2023-05-12 PROCEDURE — 3044F HG A1C LEVEL LT 7.0%: CPT | Mod: HCNC,CPTII,S$GLB, | Performed by: OTOLARYNGOLOGY

## 2023-05-12 PROCEDURE — 3060F POS MICROALBUMINURIA REV: CPT | Mod: HCNC,CPTII,S$GLB, | Performed by: OTOLARYNGOLOGY

## 2023-05-12 PROCEDURE — 1126F PR PAIN SEVERITY QUANTIFIED, NO PAIN PRESENT: ICD-10-PCS | Mod: HCNC,CPTII,S$GLB, | Performed by: OTOLARYNGOLOGY

## 2023-05-12 PROCEDURE — 99203 OFFICE O/P NEW LOW 30 MIN: CPT | Mod: HCNC,S$GLB,, | Performed by: OTOLARYNGOLOGY

## 2023-05-12 PROCEDURE — 3072F LOW RISK FOR RETINOPATHY: CPT | Mod: HCNC,CPTII,S$GLB, | Performed by: OTOLARYNGOLOGY

## 2023-05-12 PROCEDURE — 3288F PR FALLS RISK ASSESSMENT DOCUMENTED: ICD-10-PCS | Mod: HCNC,CPTII,S$GLB, | Performed by: OTOLARYNGOLOGY

## 2023-05-12 PROCEDURE — 1159F MED LIST DOCD IN RCRD: CPT | Mod: HCNC,CPTII,S$GLB, | Performed by: OTOLARYNGOLOGY

## 2023-05-12 PROCEDURE — 3074F PR MOST RECENT SYSTOLIC BLOOD PRESSURE < 130 MM HG: ICD-10-PCS | Mod: HCNC,CPTII,S$GLB, | Performed by: OTOLARYNGOLOGY

## 2023-05-12 PROCEDURE — 3288F FALL RISK ASSESSMENT DOCD: CPT | Mod: HCNC,CPTII,S$GLB, | Performed by: OTOLARYNGOLOGY

## 2023-05-12 PROCEDURE — 1101F PT FALLS ASSESS-DOCD LE1/YR: CPT | Mod: HCNC,CPTII,S$GLB, | Performed by: OTOLARYNGOLOGY

## 2023-05-12 NOTE — PROGRESS NOTES
[facility]    Subjective:      Patient: Aracelis Martinez Patient PCP: Alen Damico MD         :  1955     Sex:  female      MRN:  [id number]          Date of Visit: 2023      Chief Complaint: Tinnitus (Both ears constantly ring, sound does not pulsate. States has had for at least 10 years.  No pain in the ear. No recent audiogram)      Patient ID: Aracelis Martinez is a 68 y.o. female former smoker with a past medical history of diabetes, HTN, HLD and fibromyalgia on Lyrica for neuropathy referred to me by Dr. Alen Damico in consultation for bilateral nonpulsatile tinnitus which is symmetric and present for 10 years.  No pain or drainage.  No vertigo.  No recent audiogram.    Review of Systems     Past Medical History  She has a past medical history of Abdominal pain, Anemia, Anxiety, Arthritis, Bone spur of finger IP joint, Chronic back pain, Chronic pain, Colon cancer screening, Decreased ROM of lumbar spine, Diabetes mellitus, Diverticulosis, Diverticulosis, Effusion of right hand, Finger pain, right, Finger stiffness, right, Functional belching disorder, History of pancreatic cancer, Hypertension, Knee pain, Lumbar pain, Nausea, Neuroendocrine tumor of pancreas, Obesity, Pancreatic cancer, Range of motion deficit, Renal cyst, Special screening for malignant neoplasms, colon, Status post arthroscopy of left knee 2014, Stiffness of right hand, not elsewhere classified, Thyroid disease, Trouble in sleeping, Type 2 diabetes mellitus, and Type 2 diabetes mellitus with ophthalmic manifestations.    Family / Surgical / Social History  Her family history includes Breast cancer in her sister; Diabetes in her mother; Heart disease in her mother; Hypertension in her brother, mother, and sister; Stroke in her mother and sister.    Past Surgical History:   Procedure Laterality Date     SECTION      COLONOSCOPY N/A 3/13/2019    Procedure: COLONOSCOPY;  Surgeon: Cem Lamar MD;  Location: [facility]  ENDO (4TH FLR);  Service: Endoscopy;  Laterality: N/A;  previous order cx    COLONOSCOPY N/A 4/19/2022    Procedure: COLONOSCOPY Suprep;  Surgeon: Hiren Newberry MD;  Location: [facility];  Service: Endoscopy;  Laterality: N/A;    EPIDURAL STEROID INJECTION INTO LUMBAR SPINE N/A 6/15/2021    Procedure: Injection-steroid-epidural-lumbar-L5-S1;  Surgeon: Saranya Cornelius Jr., MD;  Location: [facility] PAIN MGT;  Service: Pain Management;  Laterality: N/A;    ESOPHAGOGASTRODUODENOSCOPY N/A 11/19/2019    Procedure: ESOPHAGOGASTRODUODENOSCOPY (EGD);  Surgeon: Jonathan Oneill MD;  Location: [facility] (4TH FLR);  Service: Endoscopy;  Laterality: N/A;    ESOPHAGOGASTRODUODENOSCOPY N/A 6/2/2020    Procedure: EGD (ESOPHAGOGASTRODUODENOSCOPY);  Surgeon: Shady Costa MD;  Location: [facility] (2ND FLR);  Service: Endoscopy;  Laterality: N/A;  Please schedule patient as soon as possible in the 2nd floor history of a Whipple surgery for neuroendocrine pancreatic tumor many years ago with now constant belching and regurgitation.  May need airway protection.  Rule out celiac sprue rule out lact    ESOPHAGOGASTRODUODENOSCOPY N/A 4/19/2022    Procedure: EGD (ESOPHAGOGASTRODUODENOSCOPY);  Surgeon: Hiren Newberry MD;  Location: [facility];  Service: Endoscopy;  Laterality: N/A;    EXCISION OF GANGLION CYST OF HAND Right 10/22/2018    Procedure: EXCISION, GANGLION CYST, HAND- RIGHT, LONG AND INDEX FINGER;  Surgeon: Sowmya Schmidt MD;  Location: [facility] OR;  Service: Orthopedics;  Laterality: Right;  Stretcher; Supine; Hand Pan 1 & [place] 2; Dr. Loja's Rasp    HYSTERECTOMY  2015    ISMAEL    INJECTION OF ANESTHETIC AGENT AROUND MEDIAL BRANCH NERVES INNERVATING LUMBAR FACET JOINT Bilateral 9/28/2021    Procedure: Block-nerve-medial branch-lumbar-bilateral L4-5 and L5-S1;  Surgeon: Saranya Cornelius Jr., MD;  Location: [facility] PAIN MGT;  Service: Pain Management;  Laterality: Bilateral;    INJECTION OF ANESTHETIC AGENT AROUND MEDIAL BRANCH  NERVES INNERVATING LUMBAR FACET JOINT Bilateral 10/12/2021    Procedure: Bilateral Lumbar Medial Branch Block L4-5 L5-S1- (No Sedation);  Surgeon: Saranya Cornelius Jr., MD;  Location: [facility] PAIN MGT;  Service: Pain Management;  Laterality: Bilateral;    KNEE ARTHROSCOPY  2014    LATS/left    OOPHORECTOMY      PANCREAS SURGERY      MD Ritchie    RADIOFREQUENCY THERMOCOAGULATION Bilateral 2021    Procedure: Radiofrequency Themocoagulation of medial branches: L4-5 and L5-S1;  Surgeon: Saranya Cornelius Jr., MD;  Location: [facility] PAIN MGT;  Service: Pain Management;  Laterality: Bilateral;  Patient is diabetic.     RADIOFREQUENCY THERMOCOAGULATION Left 2021    Procedure: RADIOFREQUENCY THERMAL COAGULATION LEFT L4-5, L5-S1 (IV Sedation);  Surgeon: Saranya Cornelius Jr., MD;  Location: [facility] PAIN MGT;  Service: Pain Management;  Laterality: Left;  diabetic    TONSILLECTOMY      TRANSFORAMINAL EPIDURAL INJECTION OF STEROID Right 2021    Procedure: Injection,steroid,epidural,transforaminal approach; Levels: L5-S1;  Surgeon: Saranya Cornelius Jr., MD;  Location: [facility] PAIN MGT;  Service: Pain Management;  Laterality: Right;  No pacemaker. Patient is diabetic.    TRANSFORAMINAL EPIDURAL INJECTION OF STEROID Right 2021    Procedure: Injection,steroid,epidural,transforaminal approach; Levels: L5-S1;  Surgeon: Saranya Cornelius Jr., MD;  Location: [facility] PAIN MGT;  Service: Pain Management;  Laterality: Right;  No pacemaker. Patient is diabetic.        Social History     Tobacco Use    Smoking status: Former     Packs/day: 0.25     Years: 10.00     Pack years: 2.50     Types: Cigarettes     Quit date: 1982     Years since quittin.3    Smokeless tobacco: Never    Tobacco comments:     Totally quit per  visit.   Substance and Sexual Activity    Alcohol use: Yes     Comment: occasional use    Drug use: Never    Sexual activity: Yes     Partners: Male     Birth control/protection: None        Medications  She has a current medication list which includes the following prescription(s): alcohol swabs, amlodipine, blood sugar diagnostic, blood-glucose meter, glipizide, hydroxyzine pamoate, lancets, levothyroxine, losartan, meloxicam, pantoprazole, pregabalin, temazepam, and trueplus lancets.      Allergies  Review of patient's allergies indicates:   Allergen Reactions    Erythromycin base        All medications, allergies, and past history have been reviewed.    Objective:      Vitals:  Vitals - 1 value per visit 3/27/2023 5/12/2023 5/12/2023   SYSTOLIC 130 - 114   DIASTOLIC 68 - 69   Pulse 58 - 60   Temp 97.9 - -   Resp 18 - -   SPO2 95 - -   Weight (lb) 216.05 - 217.26   Weight (kg) 98 - 98.55   Height 66 - 66   BMI (Calculated) 34.9 - 35.1   VISIT REPORT - - -   Pain Score  - 0 -   Some recent data might be hidden       Body surface area is 2.14 meters squared.    Physical Exam:    GENERAL  APPEARANCE -  alert, appears stated age, and cooperative  BARRIER(S) TO COMMUNICATION -  none VOICE - appropriate for age and gender    INTEGUMENTARY  no suspicious head and neck lesions    HEENT  HEAD: Normocephalic, without obvious abnormality, atraumatic  FACE: INSPECTION - Symmetric, no signs of trauma, no suspicious lesion(s)  PALPATION -  No masses SALIVARY GLANDS - non-tender with no appreciable mass  STRENGTH - facial symmetry  NECK/THYROID: normal atraumatic, no neck masses, normal thyroid, no jvd    EYES  Normal occular alignment and mobility with no visible nystagmus at rest    EARS/NOSE/MOUTH/THROAT  EARS  PINNAE AND EXTERNAL EARS - no suspicious lesion OTOSCOPIC EXAM (surgical microscopy was used for visualization/instrumentation): EAR EXAM - mild right > left negative pressure  HEARING - Mcdonald midline at 512 hz    NOSE AND SINUSES  EXTERNAL NOSE - Grossly normal for age/sex  SEPTUM - normal/no obstruction on anterior exam without decongestion TURBINATES - within normal limits MUCOSA - within normal  limits     MOUTH AND THROAT   ORAL CAVITY, LIPS, TEETH, GUMS & TONGUE - moist, no suspicious lesions  OROPHARYNX /TONSILS/PHARYNGEAL WALLS/HYPOPHARYNX - no erythema or exudates  NASOPHARYNX - limited mirror exam - unable to visualize due to anatomy/gag  LARYNX -  - limited mirror exam - unable to visualize due to anatomy/gag      CHEST AND LUNG   INSPECTION & AUSCULTATION - normal effort, no stridor    CARDIOVASCULAR  AUSCULTATION & PERIPHERAL VASCULAR - regular rate and rhythm.    NEUROLOGIC  MENTAL STATUS - alert, interactive CRANIAL NERVES - normal          Procedure(s):  None    Labs:  WBC   Date Value Ref Range Status   03/20/2023 6.99 3.90 - 12.70 K/uL Final     Hemoglobin   Date Value Ref Range Status   03/20/2023 13.8 12.0 - 16.0 g/dL Final     Platelets   Date Value Ref Range Status   03/20/2023 416 150 - 450 K/uL Final     Creatinine   Date Value Ref Range Status   03/20/2023 0.8 0.5 - 1.4 mg/dL Final     TSH   Date Value Ref Range Status   03/20/2023 3.523 0.400 - 4.000 uIU/mL Final     Glucose   Date Value Ref Range Status   03/20/2023 130 (H) 70 - 110 mg/dL Final     Hemoglobin A1C   Date Value Ref Range Status   03/20/2023 6.6 (H) 4.0 - 5.6 % Final     Comment:     ADA Screening Guidelines:  5.7-6.4%  Consistent with prediabetes  >or=6.5%  Consistent with diabetes    High levels of fetal hemoglobin interfere with the HbA1C  assay. Heterozygous hemoglobin variants (HbS, HgC, etc)do  not significantly interfere with this assay.   However, presence of multiple variants may affect accuracy.           Assessment:      Problem List Items Addressed This Visit    None  Visit Diagnoses       Tinnitus, bilateral    -  Primary    Hearing loss, unspecified hearing loss type, unspecified laterality                     Plan:      Tinnitus information provided.  Masking and other treatment options reviewed. Audiogram ordered.  Imaging only if indicated by abnormal audiologic findings or other neurologic symptoms  presently absent.

## 2023-05-19 ENCOUNTER — CLINICAL SUPPORT (OUTPATIENT)
Dept: AUDIOLOGY | Facility: CLINIC | Age: 68
End: 2023-05-19
Payer: MEDICARE

## 2023-05-19 DIAGNOSIS — H91.90 HEARING LOSS, UNSPECIFIED HEARING LOSS TYPE, UNSPECIFIED LATERALITY: ICD-10-CM

## 2023-05-19 DIAGNOSIS — H91.8X3 ASYMMETRICAL HEARING LOSS: ICD-10-CM

## 2023-05-19 DIAGNOSIS — H90.A12 CONDUCTIVE HEARING LOSS OF LEFT EAR WITH RESTRICTED HEARING OF RIGHT EAR: ICD-10-CM

## 2023-05-19 DIAGNOSIS — H90.3 SENSORINEURAL HEARING LOSS, BILATERAL: Primary | ICD-10-CM

## 2023-05-19 DIAGNOSIS — H93.13 TINNITUS, BILATERAL: ICD-10-CM

## 2023-05-19 PROCEDURE — 92567 PR TYMPA2METRY: ICD-10-PCS | Mod: HCNC,,, | Performed by: AUDIOLOGIST

## 2023-05-19 PROCEDURE — 92567 TYMPANOMETRY: CPT | Mod: HCNC,,, | Performed by: AUDIOLOGIST

## 2023-05-19 PROCEDURE — 92557 COMPREHENSIVE HEARING TEST: CPT | Mod: HCNC,,, | Performed by: AUDIOLOGIST

## 2023-05-19 PROCEDURE — 92557 PR COMPREHENSIVE HEARING TEST: ICD-10-PCS | Mod: HCNC,,, | Performed by: AUDIOLOGIST

## 2023-05-19 NOTE — PROGRESS NOTES
Aracelis Martinez was seen 05/19/2023 for an audiological evaluation. Pertinent complaints today include hearing loss. Pt denies history of loud noise exposure and denies early onset of genetic family history of hearing loss. Otoscopy revealed no cerumen in both ears. The tympanic membrane was visualized AU prior to proceeding with the hearing testing.     Results reveal a mild-to-moderate sensorineural hearing loss for the right ear and  moderate sensorineural hearing loss for the left ear.    Speech Reception Thresholds were  30 dBHL for the right ear and 30 dBHL for the left ear.    Word recognition scores were excellent for the right ear and excellent for the left ear.   Tympanograms were Type A for the right ear and Type A for the left ear.    Recommendations: 1) Follow up with ENT     2) Annual hearing evaluation      3) Hearing aid consult when ready    Audiogram results were reviewed in detail with patient and all questions were answered. Results will be reviewed by the referring provider at the completion of this note. All complaints were addressed during this visit to the patient's satisfaction.

## 2023-05-24 RX ORDER — HYDROXYZINE PAMOATE 25 MG/1
CAPSULE ORAL
Qty: 180 CAPSULE | Refills: 1 | Status: SHIPPED | OUTPATIENT
Start: 2023-05-24 | End: 2023-12-27

## 2023-05-24 RX ORDER — MELOXICAM 15 MG/1
TABLET ORAL
Qty: 90 TABLET | Refills: 3 | Status: SHIPPED | OUTPATIENT
Start: 2023-05-24

## 2023-05-24 NOTE — TELEPHONE ENCOUNTER
Hydroxyzine last filled 180 x 1  refill 12/27/22  Meloxicam last filled 14 pills 12/28/22    Lov 3/27/23

## 2023-05-29 ENCOUNTER — TELEPHONE (OUTPATIENT)
Dept: DERMATOLOGY | Facility: CLINIC | Age: 68
End: 2023-05-29
Payer: MEDICARE

## 2023-05-29 NOTE — TELEPHONE ENCOUNTER
Left VM telling pt I scheduled for 9/25 at 2:30    ----- Message from Sierra Gamble CMA sent at 5/29/2023 12:11 PM CDT -----  Regarding: Appt request  Contact: [phone]  Hi,    Pt would like to come in the same day and time as  Mr. Mike Martinez on 9/25/23 at 245pm. Please call pt to advise.    I was unable to schedule the same day and time. Pt will be new for moles appearing around neck.     Thank you

## 2023-06-06 ENCOUNTER — TELEPHONE (OUTPATIENT)
Dept: AUDIOLOGY | Facility: CLINIC | Age: 68
End: 2023-06-06
Payer: MEDICARE

## 2023-06-06 DIAGNOSIS — H90.A12 CONDUCTIVE HEARING LOSS OF LEFT EAR WITH RESTRICTED HEARING OF RIGHT EAR: Primary | ICD-10-CM

## 2023-06-06 NOTE — TELEPHONE ENCOUNTER
Called pt back. Pt states that she wanted to know if hearing aids were absolutely necessary? States that the Audiologist mentioned them to her when she had the hearing test done. Pt states that if she does not have to get them now, she would like to hold off. Advised that it is usually up to the pt if they want hearing aids or not. Pt states that she still has the ringing in her ears, it has not changed. Advised will ask Dr. Newsome to review and will call her back tomorrow with his recommendations. Thanks, Payton

## 2023-06-06 NOTE — TELEPHONE ENCOUNTER
----- Message from Carmencita Arciniega sent at 6/6/2023  2:21 PM CDT -----  Contact: self  Type: Needs Medical Advice  Who Called:  Patient    Best Call Back Number: [phone]    Additional Information: Pt states she she would like to speak with office has some questions about getting hearing aids had a appt on 5/12 was suppose to get a call back but never heard anything.Please call back

## 2023-06-07 NOTE — TELEPHONE ENCOUNTER
Audiogram shows some asymmetric hearing loss on the left side which appears to be conductive in nature.  A screening CT of the temporal bones is recommended without contrast as well as routine audiologic surveillance with an audiogram in 6-12 months.

## 2023-06-08 NOTE — TELEPHONE ENCOUNTER
Called pt back. Went over recommendations from Dr. Newsome. CT scan scheduled at [facility]. Thanks, Payton

## 2023-06-19 ENCOUNTER — PATIENT MESSAGE (OUTPATIENT)
Dept: INTERNAL MEDICINE | Facility: CLINIC | Age: 68
End: 2023-06-19
Payer: MEDICARE

## 2023-06-19 NOTE — TELEPHONE ENCOUNTER
Pt c/o still having serious issues with sleeping and staying asleep for the last couple of months. Still taking the Temapazem nightly with no affect.

## 2023-06-20 RX ORDER — ZOLPIDEM TARTRATE 5 MG/1
5 TABLET ORAL NIGHTLY PRN
Qty: 30 TABLET | Refills: 2 | Status: SHIPPED | OUTPATIENT
Start: 2023-06-20 | End: 2023-07-10

## 2023-06-30 ENCOUNTER — PATIENT MESSAGE (OUTPATIENT)
Dept: INTERNAL MEDICINE | Facility: CLINIC | Age: 68
End: 2023-06-30
Payer: MEDICARE

## 2023-07-10 ENCOUNTER — PATIENT MESSAGE (OUTPATIENT)
Dept: INTERNAL MEDICINE | Facility: CLINIC | Age: 68
End: 2023-07-10
Payer: MEDICARE

## 2023-07-10 RX ORDER — ZOLPIDEM TARTRATE 10 MG/1
10 TABLET ORAL NIGHTLY PRN
Qty: 30 TABLET | Refills: 2 | Status: SHIPPED | OUTPATIENT
Start: 2023-07-10 | End: 2023-09-27 | Stop reason: SDUPTHER

## 2023-07-10 NOTE — TELEPHONE ENCOUNTER
Temazepam didn't work.  We rx'd  zolpidem  with her hydroxzine and not helping      See other msg. I updated.

## 2023-07-10 NOTE — TELEPHONE ENCOUNTER
New ambien from 2 weeks ago not heling. See her msg.  She sent separate msg today checking on your advice.

## 2023-07-18 ENCOUNTER — OFFICE VISIT (OUTPATIENT)
Dept: PAIN MEDICINE | Facility: CLINIC | Age: 68
End: 2023-07-18
Payer: MEDICARE

## 2023-07-18 VITALS
SYSTOLIC BLOOD PRESSURE: 118 MMHG | HEART RATE: 53 BPM | HEIGHT: 66 IN | DIASTOLIC BLOOD PRESSURE: 66 MMHG | WEIGHT: 214.94 LBS | BODY MASS INDEX: 34.55 KG/M2

## 2023-07-18 DIAGNOSIS — M79.7 FIBROMYALGIA: ICD-10-CM

## 2023-07-18 DIAGNOSIS — M51.36 DDD (DEGENERATIVE DISC DISEASE), LUMBAR: Primary | ICD-10-CM

## 2023-07-18 DIAGNOSIS — G89.4 CHRONIC PAIN SYNDROME: ICD-10-CM

## 2023-07-18 DIAGNOSIS — M54.16 LUMBAR RADICULOPATHY: ICD-10-CM

## 2023-07-18 PROCEDURE — 3072F PR LOW RISK FOR RETINOPATHY: ICD-10-PCS | Mod: HCNC,CPTII,S$GLB,

## 2023-07-18 PROCEDURE — 4010F PR ACE/ARB THEARPY RXD/TAKEN: ICD-10-PCS | Mod: HCNC,CPTII,S$GLB,

## 2023-07-18 PROCEDURE — 3078F DIAST BP <80 MM HG: CPT | Mod: HCNC,CPTII,S$GLB,

## 2023-07-18 PROCEDURE — 3066F NEPHROPATHY DOC TX: CPT | Mod: HCNC,CPTII,S$GLB,

## 2023-07-18 PROCEDURE — 1159F PR MEDICATION LIST DOCUMENTED IN MEDICAL RECORD: ICD-10-PCS | Mod: HCNC,CPTII,S$GLB,

## 2023-07-18 PROCEDURE — 3078F PR MOST RECENT DIASTOLIC BLOOD PRESSURE < 80 MM HG: ICD-10-PCS | Mod: HCNC,CPTII,S$GLB,

## 2023-07-18 PROCEDURE — 3044F HG A1C LEVEL LT 7.0%: CPT | Mod: HCNC,CPTII,S$GLB,

## 2023-07-18 PROCEDURE — 1160F PR REVIEW ALL MEDS BY PRESCRIBER/CLIN PHARMACIST DOCUMENTED: ICD-10-PCS | Mod: HCNC,CPTII,S$GLB,

## 2023-07-18 PROCEDURE — 4010F ACE/ARB THERAPY RXD/TAKEN: CPT | Mod: HCNC,CPTII,S$GLB,

## 2023-07-18 PROCEDURE — 3074F PR MOST RECENT SYSTOLIC BLOOD PRESSURE < 130 MM HG: ICD-10-PCS | Mod: HCNC,CPTII,S$GLB,

## 2023-07-18 PROCEDURE — 99999 PR PBB SHADOW E&M-EST. PATIENT-LVL III: CPT | Mod: PBBFAC,HCNC,,

## 2023-07-18 PROCEDURE — 3060F POS MICROALBUMINURIA REV: CPT | Mod: HCNC,CPTII,S$GLB,

## 2023-07-18 PROCEDURE — 1160F RVW MEDS BY RX/DR IN RCRD: CPT | Mod: HCNC,CPTII,S$GLB,

## 2023-07-18 PROCEDURE — 3072F LOW RISK FOR RETINOPATHY: CPT | Mod: HCNC,CPTII,S$GLB,

## 2023-07-18 PROCEDURE — 99214 PR OFFICE/OUTPT VISIT, EST, LEVL IV, 30-39 MIN: ICD-10-PCS | Mod: HCNC,S$GLB,,

## 2023-07-18 PROCEDURE — 3008F BODY MASS INDEX DOCD: CPT | Mod: HCNC,CPTII,S$GLB,

## 2023-07-18 PROCEDURE — 3074F SYST BP LT 130 MM HG: CPT | Mod: HCNC,CPTII,S$GLB,

## 2023-07-18 PROCEDURE — 3008F PR BODY MASS INDEX (BMI) DOCUMENTED: ICD-10-PCS | Mod: HCNC,CPTII,S$GLB,

## 2023-07-18 PROCEDURE — 3044F PR MOST RECENT HEMOGLOBIN A1C LEVEL <7.0%: ICD-10-PCS | Mod: HCNC,CPTII,S$GLB,

## 2023-07-18 PROCEDURE — 99214 OFFICE O/P EST MOD 30 MIN: CPT | Mod: HCNC,S$GLB,,

## 2023-07-18 PROCEDURE — 3060F PR POS MICROALBUMINURIA RESULT DOCUMENTED/REVIEW: ICD-10-PCS | Mod: HCNC,CPTII,S$GLB,

## 2023-07-18 PROCEDURE — 3066F PR DOCUMENTATION OF TREATMENT FOR NEPHROPATHY: ICD-10-PCS | Mod: HCNC,CPTII,S$GLB,

## 2023-07-18 PROCEDURE — 1125F AMNT PAIN NOTED PAIN PRSNT: CPT | Mod: HCNC,CPTII,S$GLB,

## 2023-07-18 PROCEDURE — 1125F PR PAIN SEVERITY QUANTIFIED, PAIN PRESENT: ICD-10-PCS | Mod: HCNC,CPTII,S$GLB,

## 2023-07-18 PROCEDURE — 1159F MED LIST DOCD IN RCRD: CPT | Mod: HCNC,CPTII,S$GLB,

## 2023-07-18 PROCEDURE — 99999 PR PBB SHADOW E&M-EST. PATIENT-LVL III: ICD-10-PCS | Mod: PBBFAC,HCNC,,

## 2023-07-18 RX ORDER — PREGABALIN 75 MG/1
75 CAPSULE ORAL 2 TIMES DAILY
Qty: 180 CAPSULE | Refills: 1 | Status: SHIPPED | OUTPATIENT
Start: 2023-07-22 | End: 2023-08-29

## 2023-07-18 NOTE — PROGRESS NOTES
Subjective:     Patient ID: Aracelis Martinez is a 68 y.o. female    Chief Complaint: Follow-up (6 mo fu)        Referred by: No ref. provider found      HPI:    Interval History PA (07/18/2023):  Patient returns to clinic for follow up.  Patient denies any changes in the quality or location of her pain since previous visit.  Denies any new or worsening symptoms.  She continues to take Lyrica 75 mg b.i.d. with continued benefit.  Notes that this medication has provided significant improvement in her overall symptoms and that her pain continues to be well-controlled at this time.  Denies any adverse effects from the medication.  No other concerns at this time.    Interval History (1/3/23):    The patient location is:  Home  The chief complaint leading to consultation is:  Follow-up  Visit type: Virtual visit with synchronous audio and video  Total time spent with patient:  8 minutes  Each patient to whom he or she provides medical services by telemedicine is:  (1) informed of the relationship between the physician and patient and the respective role of any other health care provider with respect to management of the patient; and (2) notified that he or she may decline to receive medical services by telemedicine and may withdraw from such care at any time.      She returns today for follow up.  She reports that Lyrica 75 mg b.i.d. has been helpful for the fibromyalgia pain.  She reports a very significant improvement in her symptoms and states that her pain is very well controlled.  She denies any adverse effects.  She does request that additional prescriptions be sent to mail order pharmacy.      Initial Encounter (11/15/22):  Aracelis Martinez is a 68 y.o. female who presents today with fairly widespread pain likely related to fibromyalgia.  Has previously been treated by my colleague at [facility].  He is leaving his practice, and patient is now seeking to transition her care to my clinic.  She has undergone  multiple interventional procedures for her low back and lower extremity pain.  States these have helped somewhat but she is not very interested in additional interventional procedures at this time.  Currently the majority of her pain is located across the upper back and into the upper extremities.  She denies any associated numbness, tingling, weakness, bowel bladder dysfunction.  She is unable to say if this pain is related to fibromyalgia or some other source of pain.  She is currently taking Lyrica 25 mg b.i.d. provided by her primary care physician.  She does not feel this medication is providing as much relief as it was in the past.  She denies any adverse effects of this medication.   This pain is described in detail below.    Physical Therapy:  No.    Non-pharmacologic Treatment:  Rest helps         TENS?  No    Pain Medications:         Currently taking:  Lyrica 75 mg b.i.d., meloxicam    Has tried in the past:  Tramadol (upset stomach).  NSAIDs, Tylenol    Has not tried:   Muscle relaxants, TCAs, SNRIs, topical creams    Blood thinners:  None    Interventional Therapies:                -12/16/2021  Left L4-5 and L5-S1 Lumbar Medial Branch Radiofrequency Ablation              - 11/09/2021 Right L4-5 and L5-S1 Lumbar Medial Branch Radiofrequency Ablation              - 8/24/2021Lumbar Transforaminal Epidural Steroid Injection, Right L5-S1              - 8/17/2021Lumbar Transforaminal Epidural Steroid Injection, Right L5-S1              - 6/15/2021  Lumbar Epidural Steroid Injection at L5-S1 50% relief    Relevant Surgeries:  None    Affecting sleep?  Yes    Affecting daily activities? yes    Depressive symptoms? no          SI/HI? No    Work status: Retired    Pain Scores:    Best:       2/10  Worst:     10/10  Usually:   6/10  Today:    4/10    Pain Disability Index  Family/Home Responsibilities:: 5  Recreation:: 5  Social Activity:: 4  Occupation:: 4  Sexual Behavior:: 6  Self Care:: 4  Life-Support  Activities:: 4  Pain Disability Index (PDI): 32    Review of Systems   Constitutional:  Negative for activity change, appetite change, chills, fatigue, fever and unexpected weight change.   HENT:  Negative for hearing loss.    Eyes:  Negative for visual disturbance.   Respiratory:  Negative for chest tightness and shortness of breath.    Cardiovascular:  Negative for chest pain.   Gastrointestinal:  Negative for abdominal pain, constipation, diarrhea, nausea and vomiting.   Genitourinary:  Negative for difficulty urinating.   Musculoskeletal:  Positive for arthralgias, back pain, gait problem, myalgias, neck pain and neck stiffness.   Skin:  Negative for rash.   Neurological:  Negative for dizziness, weakness, light-headedness, numbness and headaches.   Psychiatric/Behavioral:  Positive for sleep disturbance. Negative for hallucinations and suicidal ideas. The patient is not nervous/anxious.      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    History of pancreatic cancer 7/2/2021    Hypertension     Knee pain 04/16/2014    Lumbar pain 1/21/2022    Nausea 11/19/2019    Neuroendocrine tumor of pancreas 2015    Obesity     Pancreatic cancer 2015    Range of motion deficit 11/9/2018    Renal cyst     left    Special screening for malignant neoplasms, colon 7/28/2014    Status post arthroscopy of left knee 4/16/2014 4/22/2014    Stiffness of right hand, not elsewhere classified 2/26/2019    Thyroid disease     Trouble in sleeping     Type 2 diabetes mellitus     Type 2 diabetes  mellitus with ophthalmic manifestations        Past Surgical History:   Procedure Laterality Date     SECTION      COLONOSCOPY N/A 3/13/2019    Procedure: COLONOSCOPY;  Surgeon: Cem Lamar MD;  Location: [facility] (4TH FLR);  Service: Endoscopy;  Laterality: N/A;  previous order cx    COLONOSCOPY N/A 2022    Procedure: COLONOSCOPY Suprep;  Surgeon: Hiren Newberry MD;  Location: [facility] ENDO;  Service: Endoscopy;  Laterality: N/A;    EPIDURAL STEROID INJECTION INTO LUMBAR SPINE N/A 6/15/2021    Procedure: Injection-steroid-epidural-lumbar-L5-S1;  Surgeon: Saranya Cornelius Jr., MD;  Location: [facility] PAIN MGT;  Service: Pain Management;  Laterality: N/A;    ESOPHAGOGASTRODUODENOSCOPY N/A 2019    Procedure: ESOPHAGOGASTRODUODENOSCOPY (EGD);  Surgeon: Jonathan Oneill MD;  Location: [facility] (4TH FLR);  Service: Endoscopy;  Laterality: N/A;    ESOPHAGOGASTRODUODENOSCOPY N/A 2020    Procedure: EGD (ESOPHAGOGASTRODUODENOSCOPY);  Surgeon: Shady Costa MD;  Location: [facility] (2ND FLR);  Service: Endoscopy;  Laterality: N/A;  Please schedule patient as soon as possible in the 2nd floor history of a Whipple surgery for neuroendocrine pancreatic tumor many years ago with now constant belching and regurgitation.  May need airway protection.  Rule out celiac sprue rule out lact    ESOPHAGOGASTRODUODENOSCOPY N/A 2022    Procedure: EGD (ESOPHAGOGASTRODUODENOSCOPY);  Surgeon: Hiren Newberry MD;  Location: [facility];  Service: Endoscopy;  Laterality: N/A;    EXCISION OF GANGLION CYST OF HAND Right 10/22/2018    Procedure: EXCISION, GANGLION CYST, HAND- RIGHT, LONG AND INDEX FINGER;  Surgeon: Sowmya Schmidt MD;  Location: [facility] OR;  Service: Orthopedics;  Laterality: Right;  Stretcher; Supine; Hand Pan 1 & [place] 2; Dr. Loja's Rasp    HYSTERECTOMY      ISMALE    INJECTION OF ANESTHETIC AGENT AROUND MEDIAL BRANCH NERVES INNERVATING LUMBAR FACET JOINT Bilateral 2021    Procedure:  Block-nerve-medial branch-lumbar-bilateral L4-5 and L5-S1;  Surgeon: Saranya Cornelius Jr., MD;  Location: [facility] PAIN MGT;  Service: Pain Management;  Laterality: Bilateral;    INJECTION OF ANESTHETIC AGENT AROUND MEDIAL BRANCH NERVES INNERVATING LUMBAR FACET JOINT Bilateral 10/12/2021    Procedure: Bilateral Lumbar Medial Branch Block L4-5 L5-S1- (No Sedation);  Surgeon: Saranya Cornelius Jr., MD;  Location: [facility] PAIN MGT;  Service: Pain Management;  Laterality: Bilateral;    KNEE ARTHROSCOPY  4/18/2014    LATS/left    OOPHORECTOMY      PANCREAS SURGERY  2015    MD Ritchie    RADIOFREQUENCY THERMOCOAGULATION Bilateral 11/9/2021    Procedure: Radiofrequency Themocoagulation of medial branches: L4-5 and L5-S1;  Surgeon: Saranya Cornelius Jr., MD;  Location: [facility] PAIN MGT;  Service: Pain Management;  Laterality: Bilateral;  Patient is diabetic.     RADIOFREQUENCY THERMOCOAGULATION Left 12/16/2021    Procedure: RADIOFREQUENCY THERMAL COAGULATION LEFT L4-5, L5-S1 (IV Sedation);  Surgeon: Saranya Cornelius Jr., MD;  Location: [facility] PAIN MGT;  Service: Pain Management;  Laterality: Left;  diabetic    TONSILLECTOMY      TRANSFORAMINAL EPIDURAL INJECTION OF STEROID Right 8/17/2021    Procedure: Injection,steroid,epidural,transforaminal approach; Levels: L5-S1;  Surgeon: Saranya Cornelius Jr., MD;  Location: [facility] PAIN MGT;  Service: Pain Management;  Laterality: Right;  No pacemaker. Patient is diabetic.    TRANSFORAMINAL EPIDURAL INJECTION OF STEROID Right 8/24/2021    Procedure: Injection,steroid,epidural,transforaminal approach; Levels: L5-S1;  Surgeon: Saranya Cornelius Jr., MD;  Location: [facility] PAIN MGT;  Service: Pain Management;  Laterality: Right;  No pacemaker. Patient is diabetic.        Social History     Socioeconomic History    Marital status:    Tobacco Use    Smoking status: Former     Packs/day: 0.25     Years: 10.00     Pack years: 2.50     Types: Cigarettes     Quit date: 1/1/1982     Years since  quittin.5    Smokeless tobacco: Never    Tobacco comments:     Totally quit per  visit.   Substance and Sexual Activity    Alcohol use: Yes     Comment: occasional use    Drug use: Never    Sexual activity: Yes     Partners: Male     Birth control/protection: None     Social Determinants of Health     Financial Resource Strain: Low Risk     Difficulty of Paying Living Expenses: Not very hard   Food Insecurity: No Food Insecurity    Worried About Running Out of Food in the Last Year: Never true    Ran Out of Food in the Last Year: Never true   Transportation Needs: No Transportation Needs    Lack of Transportation (Medical): No    Lack of Transportation (Non-Medical): No   Physical Activity: Sufficiently Active    Days of Exercise per Week: 5 days    Minutes of Exercise per Session: 30 min   Stress: Stress Concern Present    Feeling of Stress : Very much   Social Connections: Socially Integrated    Frequency of Communication with Friends and Family: More than three times a week    Frequency of Social Gatherings with Friends and Family: Once a week    Attends Anabaptism Services: More than 4 times per year    Active Member of Clubs or Organizations: Yes    Attends Club or Organization Meetings: More than 4 times per year    Marital Status:    Housing Stability: Low Risk     Unable to Pay for Housing in the Last Year: No    Number of Places Lived in the Last Year: 1    Unstable Housing in the Last Year: No       Review of patient's allergies indicates:   Allergen Reactions    Erythromycin base        Current Outpatient Medications on File Prior to Visit   Medication Sig Dispense Refill    alcohol swabs PadM Use once daily to clean finger prior to glucose check for diabetes 100 each 3    amLODIPine (NORVASC) 10 MG tablet Take 1 tablet (10 mg total) by mouth once daily. 90 tablet 3    blood sugar diagnostic Strp 1 strip by Misc.(Non-Drug; Combo Route) route once daily. For insurance preferred meter 100  "strip 3    blood-glucose meter kit Use as instructed 1 each 0    glipiZIDE 5 MG TR24 TAKE 1 TABLET(5 MG) BY MOUTH DAILY WITH BREAKFAST FOR DIABETES 90 tablet 3    hydrOXYzine pamoate (VISTARIL) 25 MG Cap TAKE 1 CAPSULE TWICE DAILY AS NEEDED FOR ANXIETY 180 capsule 1    lancets Misc To check BG 1 time daily, to use with insurance preferred meter 100 each 3    levothyroxine (SYNTHROID) 75 MCG tablet Take 1 tablet (75 mcg total) by mouth before breakfast. 90 tablet 3    losartan (COZAAR) 50 MG tablet Take 1 tablet (50 mg total) by mouth once daily. 90 tablet 3    meloxicam (MOBIC) 15 MG tablet TAKE 1 TABLET BY MOUTH EVERY DAY FOR ANKLE PAIN OR SWELLING 90 tablet 3    pantoprazole (PROTONIX) 40 MG tablet TAKE 1 TABLET BY MOUTH DAILY 90 tablet 3    pregabalin (LYRICA) 75 MG capsule Take 1 capsule (75 mg total) by mouth 2 (two) times daily. 60 capsule 4    TRUEPLUS LANCETS 33 gauge Misc       zolpidem (AMBIEN) 10 mg Tab Take 1 tablet (10 mg total) by mouth nightly as needed (Insomnia). 30 tablet 2     No current facility-administered medications on file prior to visit.       Objective:      /66   Pulse (!) 53   Ht 5' 6" (1.676 m)   Wt 97.5 kg (214 lb 15.2 oz)   LMP  (LMP Unknown)   BMI 34.69 kg/m²     Exam:  GEN:  Well developed, well nourished.  No acute distress.   HEENT:  No trauma.  Mucous membranes moist.  Nares patent bilaterally.  PSYCH: Normal affect. Thought content appropriate.  CHEST:  Breathing symmetric.  No audible wheezing.  SKIN:  Warm, pink, dry.  No rash on exposed areas.    EXT:  No cyanosis, clubbing, or edema.  No color change or changes in nail or hair growth.  NEURO/MUSCULOSKELETAL:  Fully alert, oriented, and appropriate. Speech normal amarilis. No cranial nerve deficits.       Imaging:      Narrative & Impression    EXAMINATION:  MRI LUMBAR SPINE WITHOUT CONTRAST     CLINICAL HISTORY:  Dorsalgia, unspecifiedBack pain or radiculopathy, > 6 wks;     TECHNIQUE:  Sagittal T1, sagittal T2, " sagittal STIR, axial T1 and axial T2 weighted images of the lumbar spine obtained without contrast.     COMPARISON:  04/12/2016     FINDINGS:  Lumbar spine alignment is within normal limits. The vertebral body heights are well maintained, with no fracture.  Degenerative fatty metaplasia endplate changes at L5-S1 and L1-2.  Otherwise, bone marrow signal is normal.     The conus is normal in appearance.  The adjacent soft tissue structures show no significant abnormalities.     There are multiple broad-based spur and disc complex is without spinal stenosis at T11-12 and T12-L1.     L1-L2: No focal disc herniation.  Broad-based disc osteophyte complex without central canal or foraminal stenosis.No significant central canal or neural foraminal narrowing.     L2-L3: There is no focal disc herniation. No significant central canal or neural foraminal narrowing.     L3-L4: There is no focal disc herniation. No significant central canal or neural foraminal narrowing.     L4-L5: Broad-based disc bulging.  Severe facet arthritis.  No central canal stenosis.  No foraminal stenosis.     L5-S1: Broad-based disc osteophyte complex and severe facet arthritis produce mild medial foraminal narrowing bilaterally.  No central canal stenosis.     Impression:     Degenerative changes of the lumbar spine mostly involving the lower lumbar facets.  Mild L5-S1 foraminal encroachment bilaterally.        Electronically signed by: Chaz Beth Jr  Date:                                            05/25/2021  Time:                                           16:14       Assessment:       Encounter Diagnoses   Name Primary?    Fibromyalgia     DDD (degenerative disc disease), lumbar Yes    Lumbar radiculopathy     Chronic pain syndrome            Plan:       Aracelis was seen today for follow-up.    Diagnoses and all orders for this visit:    DDD (degenerative disc disease), lumbar    Fibromyalgia    Lumbar radiculopathy    Chronic pain  syndrome          Aracelis Faina Martinez is a 68 y.o. female with chronic neck and low back pain.  Also with fibromyalgia.  Unclear at this time how much of her pain is related to fibromyalgia or more focal spinal conditions..    Prior records reviewed.    Prescription monitoring report reviewed today.  No inconsistencies noted.  Continue Lyrica 75 mg b.i.d.    Return to clinic in 6 months to review efficacy of Lyrica.  May consider titrating further if appropriate.  Otherwise may consider further workup and treatment for focal spinal sources of pain.      Ignacio Childress PA-C  [facility]  Interventional Pain Management   07/18/2023    This note was created by combination of typed  and M-Modal dictation.  Transcription and phonetic errors may be present.  If there are any questions, please contact me.

## 2023-07-25 ENCOUNTER — PATIENT OUTREACH (OUTPATIENT)
Dept: ADMINISTRATIVE | Facility: HOSPITAL | Age: 68
End: 2023-07-25
Payer: MEDICARE

## 2023-08-21 ENCOUNTER — LAB VISIT (OUTPATIENT)
Dept: LAB | Facility: HOSPITAL | Age: 68
End: 2023-08-21
Attending: INTERNAL MEDICINE
Payer: MEDICARE

## 2023-08-21 DIAGNOSIS — I10 ESSENTIAL HYPERTENSION: Chronic | ICD-10-CM

## 2023-08-21 DIAGNOSIS — E11.9 TYPE 2 DIABETES MELLITUS WITHOUT COMPLICATION, WITHOUT LONG-TERM CURRENT USE OF INSULIN: Chronic | ICD-10-CM

## 2023-08-21 DIAGNOSIS — E78.49 OTHER HYPERLIPIDEMIA: Chronic | ICD-10-CM

## 2023-08-21 LAB
ALBUMIN SERPL BCP-MCNC: 4.1 G/DL (ref 3.5–5.2)
ALP SERPL-CCNC: 106 U/L (ref 55–135)
ALT SERPL W/O P-5'-P-CCNC: 29 U/L (ref 10–44)
ANION GAP SERPL CALC-SCNC: 12 MMOL/L (ref 8–16)
AST SERPL-CCNC: 16 U/L (ref 10–40)
BILIRUB SERPL-MCNC: 0.9 MG/DL (ref 0.1–1)
BUN SERPL-MCNC: 15 MG/DL (ref 8–23)
CALCIUM SERPL-MCNC: 9.8 MG/DL (ref 8.7–10.5)
CHLORIDE SERPL-SCNC: 104 MMOL/L (ref 95–110)
CHOLEST SERPL-MCNC: 224 MG/DL (ref 120–199)
CHOLEST/HDLC SERPL: 4.3 {RATIO} (ref 2–5)
CO2 SERPL-SCNC: 24 MMOL/L (ref 23–29)
CREAT SERPL-MCNC: 0.9 MG/DL (ref 0.5–1.4)
EST. GFR  (NO RACE VARIABLE): >60 ML/MIN/1.73 M^2
ESTIMATED AVG GLUCOSE: 146 MG/DL (ref 68–131)
GLUCOSE SERPL-MCNC: 137 MG/DL (ref 70–110)
HBA1C MFR BLD: 6.7 % (ref 4–5.6)
HDLC SERPL-MCNC: 52 MG/DL (ref 40–75)
HDLC SERPL: 23.2 % (ref 20–50)
LDLC SERPL CALC-MCNC: 152.8 MG/DL (ref 63–159)
NONHDLC SERPL-MCNC: 172 MG/DL
POTASSIUM SERPL-SCNC: 3.7 MMOL/L (ref 3.5–5.1)
PROT SERPL-MCNC: 7.6 G/DL (ref 6–8.4)
SODIUM SERPL-SCNC: 140 MMOL/L (ref 136–145)
TRIGL SERPL-MCNC: 96 MG/DL (ref 30–150)

## 2023-08-21 PROCEDURE — 80053 COMPREHEN METABOLIC PANEL: CPT | Mod: HCNC | Performed by: INTERNAL MEDICINE

## 2023-08-21 PROCEDURE — 36415 COLL VENOUS BLD VENIPUNCTURE: CPT | Mod: HCNC,PO | Performed by: INTERNAL MEDICINE

## 2023-08-21 PROCEDURE — 80061 LIPID PANEL: CPT | Mod: HCNC | Performed by: INTERNAL MEDICINE

## 2023-08-21 PROCEDURE — 83036 HEMOGLOBIN GLYCOSYLATED A1C: CPT | Mod: HCNC | Performed by: INTERNAL MEDICINE

## 2023-08-29 ENCOUNTER — OFFICE VISIT (OUTPATIENT)
Dept: INTERNAL MEDICINE | Facility: CLINIC | Age: 68
End: 2023-08-29
Payer: MEDICARE

## 2023-08-29 VITALS
WEIGHT: 216 LBS | DIASTOLIC BLOOD PRESSURE: 68 MMHG | HEIGHT: 66 IN | TEMPERATURE: 98 F | SYSTOLIC BLOOD PRESSURE: 116 MMHG | BODY MASS INDEX: 34.72 KG/M2 | HEART RATE: 64 BPM | RESPIRATION RATE: 16 BRPM

## 2023-08-29 DIAGNOSIS — M79.7 FIBROMYALGIA: ICD-10-CM

## 2023-08-29 DIAGNOSIS — E78.49 OTHER HYPERLIPIDEMIA: ICD-10-CM

## 2023-08-29 DIAGNOSIS — E11.9 TYPE 2 DIABETES MELLITUS WITHOUT COMPLICATION, WITHOUT LONG-TERM CURRENT USE OF INSULIN: Primary | ICD-10-CM

## 2023-08-29 DIAGNOSIS — I10 ESSENTIAL HYPERTENSION: ICD-10-CM

## 2023-08-29 DIAGNOSIS — D3A.8 NEUROENDOCRINE TUMOR OF PANCREAS: ICD-10-CM

## 2023-08-29 PROCEDURE — 3288F FALL RISK ASSESSMENT DOCD: CPT | Mod: HCNC,CPTII,S$GLB, | Performed by: INTERNAL MEDICINE

## 2023-08-29 PROCEDURE — 3044F PR MOST RECENT HEMOGLOBIN A1C LEVEL <7.0%: ICD-10-PCS | Mod: HCNC,CPTII,S$GLB, | Performed by: INTERNAL MEDICINE

## 2023-08-29 PROCEDURE — 99999 PR PBB SHADOW E&M-EST. PATIENT-LVL V: ICD-10-PCS | Mod: PBBFAC,HCNC,, | Performed by: INTERNAL MEDICINE

## 2023-08-29 PROCEDURE — 99214 OFFICE O/P EST MOD 30 MIN: CPT | Mod: HCNC,S$GLB,, | Performed by: INTERNAL MEDICINE

## 2023-08-29 PROCEDURE — 3066F PR DOCUMENTATION OF TREATMENT FOR NEPHROPATHY: ICD-10-PCS | Mod: HCNC,CPTII,S$GLB, | Performed by: INTERNAL MEDICINE

## 2023-08-29 PROCEDURE — 3078F DIAST BP <80 MM HG: CPT | Mod: HCNC,CPTII,S$GLB, | Performed by: INTERNAL MEDICINE

## 2023-08-29 PROCEDURE — 1160F RVW MEDS BY RX/DR IN RCRD: CPT | Mod: HCNC,CPTII,S$GLB, | Performed by: INTERNAL MEDICINE

## 2023-08-29 PROCEDURE — 1160F PR REVIEW ALL MEDS BY PRESCRIBER/CLIN PHARMACIST DOCUMENTED: ICD-10-PCS | Mod: HCNC,CPTII,S$GLB, | Performed by: INTERNAL MEDICINE

## 2023-08-29 PROCEDURE — 3044F HG A1C LEVEL LT 7.0%: CPT | Mod: HCNC,CPTII,S$GLB, | Performed by: INTERNAL MEDICINE

## 2023-08-29 PROCEDURE — 3066F NEPHROPATHY DOC TX: CPT | Mod: HCNC,CPTII,S$GLB, | Performed by: INTERNAL MEDICINE

## 2023-08-29 PROCEDURE — 3060F PR POS MICROALBUMINURIA RESULT DOCUMENTED/REVIEW: ICD-10-PCS | Mod: HCNC,CPTII,S$GLB, | Performed by: INTERNAL MEDICINE

## 2023-08-29 PROCEDURE — 3060F POS MICROALBUMINURIA REV: CPT | Mod: HCNC,CPTII,S$GLB, | Performed by: INTERNAL MEDICINE

## 2023-08-29 PROCEDURE — 4010F PR ACE/ARB THEARPY RXD/TAKEN: ICD-10-PCS | Mod: HCNC,CPTII,S$GLB, | Performed by: INTERNAL MEDICINE

## 2023-08-29 PROCEDURE — 1159F MED LIST DOCD IN RCRD: CPT | Mod: HCNC,CPTII,S$GLB, | Performed by: INTERNAL MEDICINE

## 2023-08-29 PROCEDURE — 1101F PT FALLS ASSESS-DOCD LE1/YR: CPT | Mod: HCNC,CPTII,S$GLB, | Performed by: INTERNAL MEDICINE

## 2023-08-29 PROCEDURE — 3288F PR FALLS RISK ASSESSMENT DOCUMENTED: ICD-10-PCS | Mod: HCNC,CPTII,S$GLB, | Performed by: INTERNAL MEDICINE

## 2023-08-29 PROCEDURE — 1125F PR PAIN SEVERITY QUANTIFIED, PAIN PRESENT: ICD-10-PCS | Mod: HCNC,CPTII,S$GLB, | Performed by: INTERNAL MEDICINE

## 2023-08-29 PROCEDURE — 1101F PR PT FALLS ASSESS DOC 0-1 FALLS W/OUT INJ PAST YR: ICD-10-PCS | Mod: HCNC,CPTII,S$GLB, | Performed by: INTERNAL MEDICINE

## 2023-08-29 PROCEDURE — 3078F PR MOST RECENT DIASTOLIC BLOOD PRESSURE < 80 MM HG: ICD-10-PCS | Mod: HCNC,CPTII,S$GLB, | Performed by: INTERNAL MEDICINE

## 2023-08-29 PROCEDURE — 3074F PR MOST RECENT SYSTOLIC BLOOD PRESSURE < 130 MM HG: ICD-10-PCS | Mod: HCNC,CPTII,S$GLB, | Performed by: INTERNAL MEDICINE

## 2023-08-29 PROCEDURE — 4010F ACE/ARB THERAPY RXD/TAKEN: CPT | Mod: HCNC,CPTII,S$GLB, | Performed by: INTERNAL MEDICINE

## 2023-08-29 PROCEDURE — 1125F AMNT PAIN NOTED PAIN PRSNT: CPT | Mod: HCNC,CPTII,S$GLB, | Performed by: INTERNAL MEDICINE

## 2023-08-29 PROCEDURE — 3008F PR BODY MASS INDEX (BMI) DOCUMENTED: ICD-10-PCS | Mod: HCNC,CPTII,S$GLB, | Performed by: INTERNAL MEDICINE

## 2023-08-29 PROCEDURE — 3008F BODY MASS INDEX DOCD: CPT | Mod: HCNC,CPTII,S$GLB, | Performed by: INTERNAL MEDICINE

## 2023-08-29 PROCEDURE — 3074F SYST BP LT 130 MM HG: CPT | Mod: HCNC,CPTII,S$GLB, | Performed by: INTERNAL MEDICINE

## 2023-08-29 PROCEDURE — 1159F PR MEDICATION LIST DOCUMENTED IN MEDICAL RECORD: ICD-10-PCS | Mod: HCNC,CPTII,S$GLB, | Performed by: INTERNAL MEDICINE

## 2023-08-29 PROCEDURE — 99214 PR OFFICE/OUTPT VISIT, EST, LEVL IV, 30-39 MIN: ICD-10-PCS | Mod: HCNC,S$GLB,, | Performed by: INTERNAL MEDICINE

## 2023-08-29 PROCEDURE — 99999 PR PBB SHADOW E&M-EST. PATIENT-LVL V: CPT | Mod: PBBFAC,HCNC,, | Performed by: INTERNAL MEDICINE

## 2023-08-29 RX ORDER — PREGABALIN 100 MG/1
100 CAPSULE ORAL 3 TIMES DAILY
Qty: 90 CAPSULE | Refills: 2 | Status: SHIPPED | OUTPATIENT
Start: 2023-08-29 | End: 2023-09-27

## 2023-08-29 RX ORDER — VALACYCLOVIR HYDROCHLORIDE 1 G/1
1000 TABLET, FILM COATED ORAL EVERY 8 HOURS
Qty: 21 TABLET | Refills: 0 | Status: SHIPPED | OUTPATIENT
Start: 2023-08-29 | End: 2023-10-10 | Stop reason: SDUPTHER

## 2023-09-01 ENCOUNTER — PATIENT MESSAGE (OUTPATIENT)
Dept: INTERNAL MEDICINE | Facility: CLINIC | Age: 68
End: 2023-09-01
Payer: MEDICARE

## 2023-09-04 NOTE — PROGRESS NOTES
Subjective:       Patient ID: Aracelis Martinez is a 68 y.o. female.    Chief Complaint: Diabetes (5 mos fol up w/labs), Hypertension, Hyperlipidemia, and Rash (Under right breast, intregio?  Pain at 5 . 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  The patient also has been noting right breast pain along with the rash over the past week.      Patient states blood sugar levels have been fluctuating at home.  No hypoglycemia has been noted.  She feels blood pressure levels have been stable.  She is tolerating her medications well without side effects.    Review of Systems   Constitutional:  Negative for activity change, appetite change and unexpected weight change.   Eyes:  Negative for visual disturbance.   Respiratory:  Negative for shortness of breath.    Cardiovascular:  Positive for chest pain (right-sided chest wall pain is noted). Negative for palpitations and leg swelling.   Gastrointestinal:  Negative for abdominal pain, blood in stool and diarrhea.   Genitourinary:  Negative for dysuria, frequency, hematuria and urgency.   Integumentary:  Positive for rash (right-sided chest wall rash is present) and breast tenderness (right-sided breast pain is noted.).   Neurological:  Negative for weakness, numbness and headaches.   Psychiatric/Behavioral:  Negative for sleep disturbance.    Breast: Positive for tenderness (right-sided breast pain is noted.).           Physical Exam  Vitals and nursing note reviewed.   Constitutional:       General: She is not in acute distress.     Appearance: Normal appearance. She is well-developed.      Comments: The patient's weight has remained stable since 03/27/2023.   HENT:      Head: Normocephalic and atraumatic.   Eyes:      General: No scleral icterus.     Extraocular Movements: Extraocular movements  intact.      Conjunctiva/sclera: Conjunctivae normal.   Neck:      Thyroid: No thyromegaly.      Vascular: No JVD.   Cardiovascular:      Rate and Rhythm: Normal rate and regular rhythm.      Heart sounds: Normal heart sounds. No murmur heard.     No friction rub. No gallop.   Pulmonary:      Effort: Pulmonary effort is normal. No respiratory distress.      Breath sounds: Normal breath sounds. No wheezing or rales.   Abdominal:      General: Bowel sounds are normal.      Palpations: Abdomen is soft. There is no mass.      Tenderness: There is no abdominal tenderness.   Musculoskeletal:         General: No tenderness. Normal range of motion.      Cervical back: Normal range of motion and neck supple.   Lymphadenopathy:      Cervical: No cervical adenopathy.   Skin:     General: Skin is warm and dry.      Findings: Rash present.      Comments: Clusters of vesicular lesions with erythematous base are noted under the right breast and along the right posterior chest wall.  Rash is in a dermatomal distribution.   Neurological:      Mental Status: She is alert and oriented to person, place, and time.      Cranial Nerves: No cranial nerve deficit.      Comments: Sensory exam is intact in both feet on monofilament testing.   Psychiatric:         Mood and Affect: Mood normal.         Behavior: Behavior normal.         Protective Sensation (w/ 10 gram monofilament):  Right: Intact  Left: Intact    Visual Inspection:  Normal -  Bilateral    Pedal Pulses:   Right: Present  Left: Present    Posterior Tibialis Pulses:   Right:Present  Left: Present    Lab Visit on 08/21/2023   Component Date Value Ref Range Status    Sodium 08/21/2023 140  136 - 145 mmol/L Final    Potassium 08/21/2023 3.7  3.5 - 5.1 mmol/L Final    Chloride 08/21/2023 104  95 - 110 mmol/L Final    CO2 08/21/2023 24  23 - 29 mmol/L Final    Glucose 08/21/2023 137 (H)  70 - 110 mg/dL Final    BUN 08/21/2023 15  8 - 23 mg/dL Final    Creatinine 08/21/2023 0.9  0.5 -  1.4 mg/dL Final    Calcium 08/21/2023 9.8  8.7 - 10.5 mg/dL Final    Total Protein 08/21/2023 7.6  6.0 - 8.4 g/dL Final    Albumin 08/21/2023 4.1  3.5 - 5.2 g/dL Final    Total Bilirubin 08/21/2023 0.9  0.1 - 1.0 mg/dL Final    Comment: For infants and newborns, interpretation of results should be based  on gestational age, weight and in agreement with clinical  observations.    Premature Infant recommended reference ranges:  Up to 24 hours.............<8.0 mg/dL  Up to 48 hours............<12.0 mg/dL  3-5 days..................<15.0 mg/dL  6-29 days.................<15.0 mg/dL      Alkaline Phosphatase 08/21/2023 106  55 - 135 U/L Final    AST 08/21/2023 16  10 - 40 U/L Final    ALT 08/21/2023 29  10 - 44 U/L Final    eGFR 08/21/2023 >60.0  >60 mL/min/1.73 m^2 Final    Anion Gap 08/21/2023 12  8 - 16 mmol/L Final    Cholesterol 08/21/2023 224 (H)  120 - 199 mg/dL Final    Comment: The National Cholesterol Education Program (NCEP) has set the  following guidelines (reference ranges) for Cholesterol:  Optimal.....................<200 mg/dL  Borderline High.............200-239 mg/dL  High........................> or = 240 mg/dL      Triglycerides 08/21/2023 96  30 - 150 mg/dL Final    Comment: The National Cholesterol Education Program (NCEP) has set the  following guidelines (reference values) for triglycerides:  Normal......................<150 mg/dL  Borderline High.............150-199 mg/dL  High........................200-499 mg/dL      HDL 08/21/2023 52  40 - 75 mg/dL Final    Comment: The National Cholesterol Education Program (NCEP) has set the  following guidelines (reference values) for HDL Cholesterol:  Low...............<40 mg/dL  Optimal...........>60 mg/dL      LDL Cholesterol 08/21/2023 152.8  63.0 - 159.0 mg/dL Final    Comment: The National Cholesterol Education Program (NCEP) has set the  following guidelines (reference values) for LDL Cholesterol:  Optimal.......................<130  mg/dL  Borderline High...............130-159 mg/dL  High..........................160-189 mg/dL  Very High.....................>190 mg/dL      HDL/Cholesterol Ratio 08/21/2023 23.2  20.0 - 50.0 % Final    Total Cholesterol/HDL Ratio 08/21/2023 4.3  2.0 - 5.0 Final    Non-HDL Cholesterol 08/21/2023 172  mg/dL Final    Comment: Risk category and Non-HDL cholesterol goals:  Coronary heart disease (CHD)or equivalent (10-year risk of CHD >20%):  Non-HDL cholesterol goal     <130 mg/dL  Two or more CHD risk factors and 10-year risk of CHD <= 20%:  Non-HDL cholesterol goal     <160 mg/dL  0 to 1 CHD risk factor:  Non-HDL cholesterol goal     <190 mg/dL      Hemoglobin A1C 08/21/2023 6.7 (H)  4.0 - 5.6 % Final    Comment: ADA Screening Guidelines:  5.7-6.4%  Consistent with prediabetes  >or=6.5%  Consistent with diabetes    High levels of fetal hemoglobin interfere with the HbA1C  assay. Heterozygous hemoglobin variants (HbS, HgC, etc)do  not significantly interfere with this assay.   However, presence of multiple variants may affect accuracy.      Estimated Avg Glucose 08/21/2023 146 (H)  68 - 131 mg/dL Final       Assessment & Plan:      Aracelis was seen today for diabetes, hypertension, hyperlipidemia and rash.  The patient has herpes zoster involving the right-sided chest wall.  Valtrex will be ordered.  The dose of Lyrica will be increased to 100 mg 3 times a day for better coverage of nerve pain.    Current medications will be continued for treatment of hypertension and diabetes.    Abdominal CT scan with pancreas protocol will be obtained in view of the patient's personal history of neuroendocrine tumor involving the pancreas.  She has not been followed recently by neuroendocrine service.    Diagnoses and all orders for this visit:    Type 2 diabetes mellitus without complication, without long-term current use of insulin  -     Comprehensive Metabolic Panel; Future  -     Lipid Panel; Future  -     Hemoglobin A1C;  Future    Essential hypertension  -     Comprehensive Metabolic Panel; Future  -     Lipid Panel; Future  -     Hemoglobin A1C; Future    Other hyperlipidemia  -     Comprehensive Metabolic Panel; Future  -     Lipid Panel; Future  -     Hemoglobin A1C; Future    Fibromyalgia  -     pregabalin (LYRICA) 100 MG capsule; Take 1 capsule (100 mg total) by mouth 3 (three) times daily. For shingles pain.    Neuroendocrine tumor of pancreas  -     CT Abdomen With Contrast; Future    Other orders  -     valACYclovir (VALTREX) 1000 MG tablet; Take 1 tablet (1,000 mg total) by mouth every 8 (eight) hours. For shingles infection. for 7 days         Follow up in about 6 months (around 2/29/2024).     Alen Damico MD

## 2023-09-26 ENCOUNTER — PATIENT MESSAGE (OUTPATIENT)
Dept: INTERNAL MEDICINE | Facility: CLINIC | Age: 68
End: 2023-09-26
Payer: MEDICARE

## 2023-09-26 DIAGNOSIS — B02.29 POSTHERPETIC NEURALGIA: Primary | ICD-10-CM

## 2023-09-26 DIAGNOSIS — M79.7 FIBROMYALGIA: ICD-10-CM

## 2023-09-27 RX ORDER — PREGABALIN 200 MG/1
200 CAPSULE ORAL 3 TIMES DAILY
Qty: 90 CAPSULE | Refills: 2 | Status: SHIPPED | OUTPATIENT
Start: 2023-09-27 | End: 2024-03-25

## 2023-09-27 RX ORDER — ZOLPIDEM TARTRATE 10 MG/1
10 TABLET ORAL NIGHTLY PRN
Qty: 30 TABLET | Refills: 2 | Status: SHIPPED | OUTPATIENT
Start: 2023-09-27 | End: 2024-01-16

## 2023-09-27 NOTE — TELEPHONE ENCOUNTER
No care due was identified.  [facility] Embedded Care Due Messages. Reference number: [id number].   9/27/2023 2:32:01 PM CDT

## 2023-09-28 ENCOUNTER — PATIENT MESSAGE (OUTPATIENT)
Dept: PAIN MEDICINE | Facility: CLINIC | Age: 68
End: 2023-09-28
Payer: MEDICARE

## 2023-10-01 DIAGNOSIS — E11.9 TYPE 2 DIABETES MELLITUS WITHOUT COMPLICATION, WITHOUT LONG-TERM CURRENT USE OF INSULIN: Primary | Chronic | ICD-10-CM

## 2023-10-01 RX ORDER — LOSARTAN POTASSIUM 50 MG/1
50 TABLET ORAL
Qty: 90 TABLET | Refills: 3 | Status: SHIPPED | OUTPATIENT
Start: 2023-10-01

## 2023-10-01 RX ORDER — AMLODIPINE BESYLATE 10 MG/1
10 TABLET ORAL
Qty: 90 TABLET | Refills: 3 | Status: SHIPPED | OUTPATIENT
Start: 2023-10-01

## 2023-10-01 RX ORDER — CALCIUM CITRATE/VITAMIN D3 200MG-6.25
TABLET ORAL
Qty: 100 STRIP | Refills: 3 | Status: SHIPPED | OUTPATIENT
Start: 2023-10-01

## 2023-10-01 RX ORDER — ISOPROPYL ALCOHOL 70 ML/100ML
SWAB TOPICAL
Qty: 100 EACH | Refills: 3 | Status: SHIPPED | OUTPATIENT
Start: 2023-10-01

## 2023-10-01 NOTE — TELEPHONE ENCOUNTER
No care due was identified.  [facility] Embedded Care Due Messages. Reference number: [id number].   10/01/2023 4:57:25 AM CDT

## 2023-10-02 NOTE — TELEPHONE ENCOUNTER
Refill Routing Note   Medication(s) are not appropriate for processing by [facility] for the following reason(s):      No active prescription written by provider    [facility] action(s):  Defer  Approve Care Due:  None identified            Appointments  past 12m or future 3m with PCP    Date Provider   Last Visit   8/29/2023 Alen Damico MD   Next Visit   2/19/2024 Alen Damico MD   ED visits in past 90 days: 0        Note composed:9:55 PM 10/01/2023

## 2023-10-03 ENCOUNTER — PATIENT MESSAGE (OUTPATIENT)
Dept: INTERNAL MEDICINE | Facility: CLINIC | Age: 68
End: 2023-10-03
Payer: MEDICARE

## 2023-10-03 RX ORDER — LANCETS 33 GAUGE
EACH MISCELLANEOUS
Qty: 100 EACH | Refills: 3 | Status: SHIPPED | OUTPATIENT
Start: 2023-10-03

## 2023-10-04 NOTE — TELEPHONE ENCOUNTER
I would like the patient to see the Pain Management specialists for further evaluation of her intractable pain.  She should not take a shingles shot at the present time.  She can take the latest COVID-19 vaccine at any time.

## 2023-10-10 ENCOUNTER — OFFICE VISIT (OUTPATIENT)
Dept: PAIN MEDICINE | Facility: CLINIC | Age: 68
End: 2023-10-10
Payer: MEDICARE

## 2023-10-10 VITALS
BODY MASS INDEX: 34.7 KG/M2 | HEIGHT: 66 IN | SYSTOLIC BLOOD PRESSURE: 119 MMHG | HEART RATE: 61 BPM | DIASTOLIC BLOOD PRESSURE: 69 MMHG | WEIGHT: 215.94 LBS

## 2023-10-10 DIAGNOSIS — B02.29 POST HERPETIC NEURALGIA: Primary | ICD-10-CM

## 2023-10-10 PROCEDURE — 99214 PR OFFICE/OUTPT VISIT, EST, LEVL IV, 30-39 MIN: ICD-10-PCS | Mod: HCNC,S$GLB,,

## 2023-10-10 PROCEDURE — 4010F ACE/ARB THERAPY RXD/TAKEN: CPT | Mod: HCNC,CPTII,S$GLB,

## 2023-10-10 PROCEDURE — 3008F BODY MASS INDEX DOCD: CPT | Mod: HCNC,CPTII,S$GLB,

## 2023-10-10 PROCEDURE — 1160F RVW MEDS BY RX/DR IN RCRD: CPT | Mod: HCNC,CPTII,S$GLB,

## 2023-10-10 PROCEDURE — 4010F PR ACE/ARB THEARPY RXD/TAKEN: ICD-10-PCS | Mod: HCNC,CPTII,S$GLB,

## 2023-10-10 PROCEDURE — 1160F PR REVIEW ALL MEDS BY PRESCRIBER/CLIN PHARMACIST DOCUMENTED: ICD-10-PCS | Mod: HCNC,CPTII,S$GLB,

## 2023-10-10 PROCEDURE — 1159F PR MEDICATION LIST DOCUMENTED IN MEDICAL RECORD: ICD-10-PCS | Mod: HCNC,CPTII,S$GLB,

## 2023-10-10 PROCEDURE — 99999 PR PBB SHADOW E&M-EST. PATIENT-LVL IV: ICD-10-PCS | Mod: PBBFAC,HCNC,,

## 2023-10-10 PROCEDURE — 3044F HG A1C LEVEL LT 7.0%: CPT | Mod: HCNC,CPTII,S$GLB,

## 2023-10-10 PROCEDURE — 99214 OFFICE O/P EST MOD 30 MIN: CPT | Mod: HCNC,S$GLB,,

## 2023-10-10 PROCEDURE — 3074F SYST BP LT 130 MM HG: CPT | Mod: HCNC,CPTII,S$GLB,

## 2023-10-10 PROCEDURE — 99999 PR PBB SHADOW E&M-EST. PATIENT-LVL IV: CPT | Mod: PBBFAC,HCNC,,

## 2023-10-10 PROCEDURE — 1125F PR PAIN SEVERITY QUANTIFIED, PAIN PRESENT: ICD-10-PCS | Mod: HCNC,CPTII,S$GLB,

## 2023-10-10 PROCEDURE — 3078F PR MOST RECENT DIASTOLIC BLOOD PRESSURE < 80 MM HG: ICD-10-PCS | Mod: HCNC,CPTII,S$GLB,

## 2023-10-10 PROCEDURE — 3008F PR BODY MASS INDEX (BMI) DOCUMENTED: ICD-10-PCS | Mod: HCNC,CPTII,S$GLB,

## 2023-10-10 PROCEDURE — 3060F POS MICROALBUMINURIA REV: CPT | Mod: HCNC,CPTII,S$GLB,

## 2023-10-10 PROCEDURE — 3074F PR MOST RECENT SYSTOLIC BLOOD PRESSURE < 130 MM HG: ICD-10-PCS | Mod: HCNC,CPTII,S$GLB,

## 2023-10-10 PROCEDURE — 3066F PR DOCUMENTATION OF TREATMENT FOR NEPHROPATHY: ICD-10-PCS | Mod: HCNC,CPTII,S$GLB,

## 2023-10-10 PROCEDURE — 3060F PR POS MICROALBUMINURIA RESULT DOCUMENTED/REVIEW: ICD-10-PCS | Mod: HCNC,CPTII,S$GLB,

## 2023-10-10 PROCEDURE — 3044F PR MOST RECENT HEMOGLOBIN A1C LEVEL <7.0%: ICD-10-PCS | Mod: HCNC,CPTII,S$GLB,

## 2023-10-10 PROCEDURE — 3078F DIAST BP <80 MM HG: CPT | Mod: HCNC,CPTII,S$GLB,

## 2023-10-10 PROCEDURE — 3066F NEPHROPATHY DOC TX: CPT | Mod: HCNC,CPTII,S$GLB,

## 2023-10-10 PROCEDURE — 1159F MED LIST DOCD IN RCRD: CPT | Mod: HCNC,CPTII,S$GLB,

## 2023-10-10 PROCEDURE — 1125F AMNT PAIN NOTED PAIN PRSNT: CPT | Mod: HCNC,CPTII,S$GLB,

## 2023-10-10 NOTE — PROGRESS NOTES
Subjective:     Patient ID: Aracelis Martinez is a 68 y.o. female    Chief Complaint: Pain (Pt had shingles 2 months ago. Pt having pain still from shingles. )        Referred by: No ref. provider found      HPI:    Interval History PA (10/10/2023):  Patient returns to clinic for follow up.  Since previous visit patient had a shingles outbreak in August 2023.  She was treated with valacyclovir and increased dose of Lyrica.  Once the rash resolved patient reports pain continued and has been worsening.  Reports pain/burning sensation at the site of her previous rash.  Located from her right flank to beneath her right breast region with a dermatomal pattern.  Notes that her PCP increased her Lyrica to 200 mg t.i.d. which has been minimally beneficial.  Denies any adverse effects from this medication.  Reports pain continues to be severe and limiting to her daily activities.    Interval History PA (07/18/2023):  Patient returns to clinic for follow up.  Patient denies any changes in the quality or location of her pain since previous visit.  Denies any new or worsening symptoms.  She continues to take Lyrica 75 mg b.i.d. with continued benefit.  Notes that this medication has provided significant improvement in her overall symptoms and that her pain continues to be well-controlled at this time.  Denies any adverse effects from the medication.  No other concerns at this time.    Interval History (1/3/23):    The patient location is:  Home  The chief complaint leading to consultation is:  Follow-up  Visit type: Virtual visit with synchronous audio and video  Total time spent with patient:  8 minutes  Each patient to whom he or she provides medical services by telemedicine is:  (1) informed of the relationship between the physician and patient and the respective role of any other health care provider with respect to management of the patient; and (2) notified that he or she may decline to receive medical services by  telemedicine and may withdraw from such care at any time.      She returns today for follow up.  She reports that Lyrica 75 mg b.i.d. has been helpful for the fibromyalgia pain.  She reports a very significant improvement in her symptoms and states that her pain is very well controlled.  She denies any adverse effects.  She does request that additional prescriptions be sent to mail order pharmacy.      Initial Encounter (11/15/22):  Aracelis Martinez is a 68 y.o. female who presents today with fairly widespread pain likely related to fibromyalgia.  Has previously been treated by my colleague at [facility].  He is leaving his practice, and patient is now seeking to transition her care to my clinic.  She has undergone multiple interventional procedures for her low back and lower extremity pain.  States these have helped somewhat but she is not very interested in additional interventional procedures at this time.  Currently the majority of her pain is located across the upper back and into the upper extremities.  She denies any associated numbness, tingling, weakness, bowel bladder dysfunction.  She is unable to say if this pain is related to fibromyalgia or some other source of pain.  She is currently taking Lyrica 25 mg b.i.d. provided by her primary care physician.  She does not feel this medication is providing as much relief as it was in the past.  She denies any adverse effects of this medication.   This pain is described in detail below.    Physical Therapy:  No.    Non-pharmacologic Treatment:  Rest helps         TENS?  No    Pain Medications:         Currently taking:  Lyrica 200 mg t.i.d., meloxicam    Has tried in the past:  Tramadol (upset stomach).  NSAIDs, Tylenol    Has not tried:   Muscle relaxants, TCAs, SNRIs, topical creams    Blood thinners:  None    Interventional Therapies:                -12/16/2021  Left L4-5 and L5-S1 Lumbar Medial Branch Radiofrequency Ablation              - 11/09/2021  Right L4-5 and L5-S1 Lumbar Medial Branch Radiofrequency Ablation              - 8/24/2021Lumbar Transforaminal Epidural Steroid Injection, Right L5-S1              - 8/17/2021Lumbar Transforaminal Epidural Steroid Injection, Right L5-S1              - 6/15/2021  Lumbar Epidural Steroid Injection at L5-S1 50% relief    Relevant Surgeries:  None    Affecting sleep?  Yes    Affecting daily activities? yes    Depressive symptoms? no          SI/HI? No    Work status: Retired    Pain Scores:    Best:       8/10  Worst:     10/10  Usually:   10/10  Today:    10/10    Pain Disability Index  Family/Home Responsibilities:: 10  Recreation:: 10  Social Activity:: 10  Occupation:: 10  Sexual Behavior:: 10  Self Care:: 10  Life-Support Activities:: 10  Pain Disability Index (PDI): 70    Review of Systems   Constitutional:  Negative for activity change, appetite change, chills, fatigue, fever and unexpected weight change.   HENT:  Negative for hearing loss.    Eyes:  Negative for visual disturbance.   Respiratory:  Negative for chest tightness and shortness of breath.    Cardiovascular:  Negative for chest pain.   Gastrointestinal:  Negative for abdominal pain, constipation, diarrhea, nausea and vomiting.   Genitourinary:  Negative for difficulty urinating.   Musculoskeletal:  Positive for arthralgias, back pain, gait problem, myalgias, neck pain and neck stiffness.   Skin:  Negative for rash.   Neurological:  Negative for dizziness, weakness, light-headedness, numbness and headaches.   Psychiatric/Behavioral:  Positive for sleep disturbance. Negative for hallucinations and suicidal ideas. The patient is not nervous/anxious.        Past Medical History:   Diagnosis Date    Abdominal pain 04/20/2015    Anemia     Anxiety     Arthritis     Bone spur of finger IP joint 10/22/2018    Chronic back pain     Chronic pain 06/15/2021    Colon cancer screening 3/13/2019    Decreased ROM of lumbar spine 1/21/2022    Diabetes mellitus  2014    Diverticulosis     Diverticulosis     Effusion of right hand 2018    Finger pain, right 10/22/2018    Finger stiffness, right 2018    Functional belching disorder 2020    History of pancreatic cancer 2021    Hypertension     Knee pain 2014    Lumbar pain 2022    Nausea 2019    Neuroendocrine tumor of pancreas 2015    Obesity     Pancreatic cancer 2015    Range of motion deficit 2018    Renal cyst     left    Special screening for malignant neoplasms, colon 2014    Status post arthroscopy of left knee 2014    Stiffness of right hand, not elsewhere classified 2019    Thyroid disease     Trouble in sleeping     Type 2 diabetes mellitus     Type 2 diabetes mellitus with ophthalmic manifestations        Past Surgical History:   Procedure Laterality Date     SECTION      COLONOSCOPY N/A 3/13/2019    Procedure: COLONOSCOPY;  Surgeon: Cem Lamar MD;  Location: [facility] (4TH FLR);  Service: Endoscopy;  Laterality: N/A;  previous order cx    COLONOSCOPY N/A 2022    Procedure: COLONOSCOPY Suprep;  Surgeon: Hiren Newberry MD;  Location: [facility] ENDO;  Service: Endoscopy;  Laterality: N/A;    EPIDURAL STEROID INJECTION INTO LUMBAR SPINE N/A 6/15/2021    Procedure: Injection-steroid-epidural-lumbar-L5-S1;  Surgeon: Saranya Cornelius Jr., MD;  Location: [facility] PAIN MGT;  Service: Pain Management;  Laterality: N/A;    ESOPHAGOGASTRODUODENOSCOPY N/A 2019    Procedure: ESOPHAGOGASTRODUODENOSCOPY (EGD);  Surgeon: Jonathan Oneill MD;  Location: [facility] (4TH FLR);  Service: Endoscopy;  Laterality: N/A;    ESOPHAGOGASTRODUODENOSCOPY N/A 2020    Procedure: EGD (ESOPHAGOGASTRODUODENOSCOPY);  Surgeon: Shady Costa MD;  Location: [facility] (2ND FLR);  Service: Endoscopy;  Laterality: N/A;  Please schedule patient as soon as possible in the 2nd floor history of a Whipple surgery for neuroendocrine pancreatic tumor many years ago with now  constant belching and regurgitation.  May need airway protection.  Rule out celiac sprue rule out lact    ESOPHAGOGASTRODUODENOSCOPY N/A 4/19/2022    Procedure: EGD (ESOPHAGOGASTRODUODENOSCOPY);  Surgeon: Hiren Newberry MD;  Location: [facility] ENDO;  Service: Endoscopy;  Laterality: N/A;    EXCISION OF GANGLION CYST OF HAND Right 10/22/2018    Procedure: EXCISION, GANGLION CYST, HAND- RIGHT, LONG AND INDEX FINGER;  Surgeon: Sowmya Schmidt MD;  Location: [facility] OR;  Service: Orthopedics;  Laterality: Right;  Stretcher; Supine; Hand Pan 1 & [place] 2; Dr. Loja's Rasp    HYSTERECTOMY  2015    [facility]    INJECTION OF ANESTHETIC AGENT AROUND MEDIAL BRANCH NERVES INNERVATING LUMBAR FACET JOINT Bilateral 9/28/2021    Procedure: Block-nerve-medial branch-lumbar-bilateral L4-5 and L5-S1;  Surgeon: Saranya Cornelius Jr., MD;  Location: [facility] PAIN [facility];  Service: Pain Management;  Laterality: Bilateral;    INJECTION OF ANESTHETIC AGENT AROUND MEDIAL BRANCH NERVES INNERVATING LUMBAR FACET JOINT Bilateral 10/12/2021    Procedure: Bilateral Lumbar Medial Branch Block L4-5 L5-S1- (No Sedation);  Surgeon: Saranya Cornelius Jr., MD;  Location: [facility];  Service: Pain Management;  Laterality: Bilateral;    KNEE ARTHROSCOPY  4/18/2014    LATS/left    OOPHORECTOMY      PANCREAS SURGERY  2015    [facility]    RADIOFREQUENCY THERMOCOAGULATION Bilateral 11/9/2021    Procedure: Radiofrequency Themocoagulation of medial branches: L4-5 and L5-S1;  Surgeon: Saranya Cornelius Jr., MD;  Location: [facility] PAIN MGT;  Service: Pain Management;  Laterality: Bilateral;  Patient is diabetic.     RADIOFREQUENCY THERMOCOAGULATION Left 12/16/2021    Procedure: RADIOFREQUENCY THERMAL COAGULATION LEFT L4-5, L5-S1 (IV Sedation);  Surgeon: Saranya Cornelius Jr., MD;  Location: [facility] PAIN MGT;  Service: Pain Management;  Laterality: Left;  diabetic    TONSILLECTOMY      TRANSFORAMINAL EPIDURAL INJECTION OF STEROID Right 8/17/2021    Procedure:  Injection,steroid,epidural,transforaminal approach; Levels: L5-S1;  Surgeon: Saranya Cornelius Jr., MD;  Location: [facility];  Service: Pain Management;  Laterality: Right;  No pacemaker. Patient is diabetic.    TRANSFORAMINAL EPIDURAL INJECTION OF STEROID Right 2021    Procedure: Injection,steroid,epidural,transforaminal approach; Levels: L5-S1;  Surgeon: Saranya Cornelius Jr., MD;  Location: [facility] PAIN [facility];  Service: Pain Management;  Laterality: Right;  No pacemaker. Patient is diabetic.        Social History     Socioeconomic History    Marital status:    Tobacco Use    Smoking status: Former     Current packs/day: 0.00     Average packs/day: 0.3 packs/day for 10.0 years (2.5 ttl pk-yrs)     Types: Cigarettes     Start date: 1972     Quit date: 1982     Years since quittin.8    Smokeless tobacco: Never    Tobacco comments:     Totally quit per  visit.   Substance and Sexual Activity    Alcohol use: Yes     Comment: occasional use    Drug use: Never    Sexual activity: Yes     Partners: Male     Birth control/protection: None     Social Determinants of Health     Financial Resource Strain: Low Risk  (2023)    Overall Financial Resource Strain (CARDIA)     Difficulty of Paying Living Expenses: Not hard at all   Food Insecurity: No Food Insecurity (2023)    Hunger Vital Sign     Worried About Running Out of Food in the Last Year: Never true     Ran Out of Food in the Last Year: Never true   Transportation Needs: No Transportation Needs (2023)    PRAPARE - Transportation     Lack of Transportation (Medical): No     Lack of Transportation (Non-Medical): No   Physical Activity: Insufficiently Active (2023)    Exercise Vital Sign     Days of Exercise per Week: 2 days     Minutes of Exercise per Session: 10 min   Stress: Stress Concern Present (2023)    [employer] [employer] - Occupational Stress Questionnaire     Feeling of Stress : Very much    Social Connections: Socially Integrated (8/28/2023)    Social Connection and Isolation Panel [NHANES]     Frequency of Communication with Friends and Family: More than three times a week     Frequency of Social Gatherings with Friends and Family: Patient refused     Attends [employer] Services: More than 4 times per year     Active Member of Clubs or Organizations: Yes     Attends Club or Organization Meetings: More than 4 times per year     Marital Status:    Housing Stability: Low Risk  (8/28/2023)    Housing Stability Vital Sign     Unable to Pay for Housing in the Last Year: No     Number of Places Lived in the Last Year: 1     Unstable Housing in the Last Year: No       Review of patient's allergies indicates:   Allergen Reactions    Erythromycin base        Current Outpatient Medications on File Prior to Visit   Medication Sig Dispense Refill    alcohol swabs (DROPSAFE ALCOHOL PREP PADS) PadM Use once daily to clean finger prior to glucose check for diabetes 100 each 3    amLODIPine (NORVASC) 10 MG tablet TAKE 1 TABLET EVERY DAY 90 tablet 3    blood-glucose meter kit Use as instructed 1 each 0    glipiZIDE 5 MG TR24 TAKE 1 TABLET(5 MG) BY MOUTH DAILY WITH BREAKFAST FOR DIABETES 90 tablet 3    hydrOXYzine pamoate (VISTARIL) 25 MG Cap TAKE 1 CAPSULE TWICE DAILY AS NEEDED FOR ANXIETY 180 capsule 1    lancets Misc To check BG 1 time daily, to use with insurance preferred meter 100 each 3    levothyroxine (SYNTHROID) 75 MCG tablet Take 1 tablet (75 mcg total) by mouth before breakfast. 90 tablet 3    losartan (COZAAR) 50 MG tablet TAKE 1 TABLET EVERY DAY 90 tablet 3    meloxicam (MOBIC) 15 MG tablet TAKE 1 TABLET BY MOUTH EVERY DAY FOR ANKLE PAIN OR SWELLING 90 tablet 3    pantoprazole (PROTONIX) 40 MG tablet TAKE 1 TABLET BY MOUTH DAILY 90 tablet 3    pregabalin (LYRICA) 200 MG Cap Take 1 capsule (200 mg total) by mouth 3 (three) times daily. For shingles pain. 90 capsule 2    TRUE METRIX GLUCOSE TEST  "STRIP Strp TEST BLOOD SUGAR EVERY  strip 3    TRUEPLUS LANCETS 33 gauge Misc TEST BLOOD SUGAR EVERY  each 3    zolpidem (AMBIEN) 10 mg Tab Take 1 tablet (10 mg total) by mouth nightly as needed (Insomnia). 30 tablet 2    [DISCONTINUED] valACYclovir (VALTREX) 1000 MG tablet Take 1 tablet (1,000 mg total) by mouth every 8 (eight) hours. For shingles infection. for 7 days 21 tablet 0     No current facility-administered medications on file prior to visit.       Objective:      /69   Pulse 61   Ht 5' 6" (1.676 m)   Wt 98 kg (215 lb 15.1 oz)   LMP  (LMP Unknown)   BMI 34.85 kg/m²     Exam:  GEN:  Well developed, well nourished.  No acute distress.   HEENT:  No trauma.  Mucous membranes moist.  Nares patent bilaterally.  PSYCH: Normal affect. Thought content appropriate.  CHEST:  Breathing symmetric.  No audible wheezing.  SKIN:  Warm, pink, dry.  No rash on exposed areas.    EXT:  No cyanosis, clubbing, or edema.  No color change or changes in nail or hair growth.  NEURO/MUSCULOSKELETAL:  Fully alert, oriented, and appropriate. Speech normal amarilis. No cranial nerve deficits.       Imaging:      Narrative & Impression    EXAMINATION:  MRI LUMBAR SPINE WITHOUT CONTRAST     CLINICAL HISTORY:  Dorsalgia, unspecifiedBack pain or radiculopathy, > 6 wks;     TECHNIQUE:  Sagittal T1, sagittal T2, sagittal STIR, axial T1 and axial T2 weighted images of the lumbar spine obtained without contrast.     COMPARISON:  04/12/2016     FINDINGS:  Lumbar spine alignment is within normal limits. The vertebral body heights are well maintained, with no fracture.  Degenerative fatty metaplasia endplate changes at L5-S1 and L1-2.  Otherwise, bone marrow signal is normal.     The conus is normal in appearance.  The adjacent soft tissue structures show no significant abnormalities.     There are multiple broad-based spur and disc complex is without spinal stenosis at T11-12 and T12-L1.     L1-L2: No focal disc " herniation.  Broad-based disc osteophyte complex without central canal or foraminal stenosis.No significant central canal or neural foraminal narrowing.     L2-L3: There is no focal disc herniation. No significant central canal or neural foraminal narrowing.     L3-L4: There is no focal disc herniation. No significant central canal or neural foraminal narrowing.     L4-L5: Broad-based disc bulging.  Severe facet arthritis.  No central canal stenosis.  No foraminal stenosis.     L5-S1: Broad-based disc osteophyte complex and severe facet arthritis produce mild medial foraminal narrowing bilaterally.  No central canal stenosis.     Impression:     Degenerative changes of the lumbar spine mostly involving the lower lumbar facets.  Mild L5-S1 foraminal encroachment bilaterally.        Electronically signed by: Chaz Beth Jr  Date:                                            05/25/2021  Time:                                           16:14       Assessment:       Encounter Diagnosis   Name Primary?    Post herpetic neuralgia Yes     Plan:       Aracelis was seen today for pain.    Diagnoses and all orders for this visit:    Post herpetic neuralgia      Aracelislilia Martinez is a 68 y.o. female with post herpetic neuralgia pain located from her right flank to under her right breast.    Prior records reviewed.    Continue Lyrica 200 mg t.i.d. per PCP  Discussed trial of additional medications including nortriptyline or Cymbalta.  Patient deferred at this time as she would like to avoid use of other medications.  May reconsider in the future.  Schedule for Qutenza treatment #1.  Two patches total.  Return to clinic for Qutenza.     Ignacio Childress PA-C  [facility]  Interventional Pain Management   10/10/2023    This note was created by combination of typed  and M-Modal dictation.  Transcription and phonetic errors may be present.  If there are any questions, please contact me.

## 2023-10-16 NOTE — TELEPHONE ENCOUNTER
She is booked w/ pain management.    I called her about swelling on left side of face , near ear. .      I had told her this am to get to urgent care and she didn't do..     I told her that is best bet.  Warm compress on area for now.     She wants to see if pain dr tomorrow can gonzalez dle. I told her if not,  Urgent care, we dont' have any openings here at [place] tomorrow.  Someone needs to evaluate in person.

## 2023-10-17 DIAGNOSIS — B02.29 POST HERPETIC NEURALGIA: Primary | ICD-10-CM

## 2023-10-23 ENCOUNTER — TELEPHONE (OUTPATIENT)
Dept: OPTOMETRY | Facility: CLINIC | Age: 68
End: 2023-10-23
Payer: MEDICARE

## 2023-10-23 ENCOUNTER — TELEPHONE (OUTPATIENT)
Dept: PAIN MEDICINE | Facility: CLINIC | Age: 68
End: 2023-10-23
Payer: MEDICARE

## 2023-10-23 NOTE — TELEPHONE ENCOUNTER
----- Message from Cammie Lopez sent at 10/23/2023  9:20 AM CDT -----  Regarding: appt tomorrow: 10/23/23 @ 9am  Contact: PT @ [phone]  Pt is calling asking to speak with someone in the office to discuss appt for tomorrow. Pt states that this is for after pain from the shingles. Pt states that she does not want to make another blank trip like last time, because her appt was not authorized by her insurance.     I do see that the appt is authorized but pt is asking to speak directly with someone in the office to confirm. Thanks.

## 2023-10-23 NOTE — TELEPHONE ENCOUNTER
Attempted to contact pt to inform her that from what I can see the referral for the Qutenza has been authorized- no answer or option to LVM.  Msg sent via [facility].

## 2023-10-24 ENCOUNTER — OFFICE VISIT (OUTPATIENT)
Dept: PAIN MEDICINE | Facility: CLINIC | Age: 68
End: 2023-10-24
Payer: MEDICARE

## 2023-10-24 VITALS — HEIGHT: 66 IN | BODY MASS INDEX: 34.78 KG/M2 | WEIGHT: 216.38 LBS

## 2023-10-24 DIAGNOSIS — G89.4 CHRONIC PAIN SYNDROME: ICD-10-CM

## 2023-10-24 DIAGNOSIS — B02.29 POST HERPETIC NEURALGIA: Primary | ICD-10-CM

## 2023-10-24 PROCEDURE — 3066F PR DOCUMENTATION OF TREATMENT FOR NEPHROPATHY: ICD-10-PCS | Mod: HCNC,CPTII,S$GLB,

## 2023-10-24 PROCEDURE — 99999 PR PBB SHADOW E&M-EST. PATIENT-LVL III: CPT | Mod: PBBFAC,HCNC,,

## 2023-10-24 PROCEDURE — 64999 UNLISTED PX NERVOUS SYSTEM: CPT | Mod: HCNC,S$GLB,,

## 2023-10-24 PROCEDURE — 4010F PR ACE/ARB THEARPY RXD/TAKEN: ICD-10-PCS | Mod: HCNC,CPTII,S$GLB,

## 2023-10-24 PROCEDURE — 4010F ACE/ARB THERAPY RXD/TAKEN: CPT | Mod: HCNC,CPTII,S$GLB,

## 2023-10-24 PROCEDURE — 3044F PR MOST RECENT HEMOGLOBIN A1C LEVEL <7.0%: ICD-10-PCS | Mod: HCNC,CPTII,S$GLB,

## 2023-10-24 PROCEDURE — 3060F POS MICROALBUMINURIA REV: CPT | Mod: HCNC,CPTII,S$GLB,

## 2023-10-24 PROCEDURE — 1159F PR MEDICATION LIST DOCUMENTED IN MEDICAL RECORD: ICD-10-PCS | Mod: HCNC,CPTII,S$GLB,

## 2023-10-24 PROCEDURE — 1125F PR PAIN SEVERITY QUANTIFIED, PAIN PRESENT: ICD-10-PCS | Mod: HCNC,CPTII,S$GLB,

## 2023-10-24 PROCEDURE — 3044F HG A1C LEVEL LT 7.0%: CPT | Mod: HCNC,CPTII,S$GLB,

## 2023-10-24 PROCEDURE — 64999 PR QUTENZA APPLICATION: ICD-10-PCS | Mod: HCNC,S$GLB,,

## 2023-10-24 PROCEDURE — 99999 PR PBB SHADOW E&M-EST. PATIENT-LVL III: ICD-10-PCS | Mod: PBBFAC,HCNC,,

## 2023-10-24 PROCEDURE — 3008F BODY MASS INDEX DOCD: CPT | Mod: HCNC,CPTII,S$GLB,

## 2023-10-24 PROCEDURE — 1159F MED LIST DOCD IN RCRD: CPT | Mod: HCNC,CPTII,S$GLB,

## 2023-10-24 PROCEDURE — 3066F NEPHROPATHY DOC TX: CPT | Mod: HCNC,CPTII,S$GLB,

## 2023-10-24 PROCEDURE — 3008F PR BODY MASS INDEX (BMI) DOCUMENTED: ICD-10-PCS | Mod: HCNC,CPTII,S$GLB,

## 2023-10-24 PROCEDURE — 1160F RVW MEDS BY RX/DR IN RCRD: CPT | Mod: HCNC,CPTII,S$GLB,

## 2023-10-24 PROCEDURE — 3060F PR POS MICROALBUMINURIA RESULT DOCUMENTED/REVIEW: ICD-10-PCS | Mod: HCNC,CPTII,S$GLB,

## 2023-10-24 PROCEDURE — 1160F PR REVIEW ALL MEDS BY PRESCRIBER/CLIN PHARMACIST DOCUMENTED: ICD-10-PCS | Mod: HCNC,CPTII,S$GLB,

## 2023-10-24 PROCEDURE — 1125F AMNT PAIN NOTED PAIN PRSNT: CPT | Mod: HCNC,CPTII,S$GLB,

## 2023-10-24 NOTE — PROGRESS NOTES
Subjective:     Patient ID: Aracelis Martinez is a 68 y.o. female    Chief Complaint: Foot Pain (Qutenza application)        Referred by: Ignacio Childress PA-C      HPI:    Interval History PA (10/24/2023):  Patient returns to clinic for follow up and Qutenza treatment #1 for postherpetic neuralgia.  Patient denies any changes in the quality or location of her pain.  Continues to endorse pain/burning over her right flank/axillary region around to underneath her right breast.  She continues to take Lyrica 200 mg t.i.d. with minimal benefit, denies any adverse effects from this medication.  No other concerns at this time.    Interval History PA (10/10/2023):  Patient returns to clinic for follow up.  Since previous visit patient had a shingles outbreak in August 2023.  She was treated with valacyclovir and increased dose of Lyrica.  Once the rash resolved patient reports pain continued and has been worsening.  Reports pain/burning sensation at the site of her previous rash.  Located from her right flank to beneath her right breast region with a dermatomal pattern.  Notes that her PCP increased her Lyrica to 200 mg t.i.d. which has been minimally beneficial.  Denies any adverse effects from this medication.  Reports pain continues to be severe and limiting to her daily activities.    Interval History PA (07/18/2023):  Patient returns to clinic for follow up.  Patient denies any changes in the quality or location of her pain since previous visit.  Denies any new or worsening symptoms.  She continues to take Lyrica 75 mg b.i.d. with continued benefit.  Notes that this medication has provided significant improvement in her overall symptoms and that her pain continues to be well-controlled at this time.  Denies any adverse effects from the medication.  No other concerns at this time.    Interval History (1/3/23):    The patient location is:  Home  The chief complaint leading to consultation is:  Follow-up  Visit type:  Virtual visit with synchronous audio and video  Total time spent with patient:  8 minutes  Each patient to whom he or she provides medical services by telemedicine is:  (1) informed of the relationship between the physician and patient and the respective role of any other health care provider with respect to management of the patient; and (2) notified that he or she may decline to receive medical services by telemedicine and may withdraw from such care at any time.      She returns today for follow up.  She reports that Lyrica 75 mg b.i.d. has been helpful for the fibromyalgia pain.  She reports a very significant improvement in her symptoms and states that her pain is very well controlled.  She denies any adverse effects.  She does request that additional prescriptions be sent to mail order pharmacy.      Initial Encounter (11/15/22):  Aracelis Martinez is a 68 y.o. female who presents today with fairly widespread pain likely related to fibromyalgia.  Has previously been treated by my colleague at [facility].  He is leaving his practice, and patient is now seeking to transition her care to my clinic.  She has undergone multiple interventional procedures for her low back and lower extremity pain.  States these have helped somewhat but she is not very interested in additional interventional procedures at this time.  Currently the majority of her pain is located across the upper back and into the upper extremities.  She denies any associated numbness, tingling, weakness, bowel bladder dysfunction.  She is unable to say if this pain is related to fibromyalgia or some other source of pain.  She is currently taking Lyrica 25 mg b.i.d. provided by her primary care physician.  She does not feel this medication is providing as much relief as it was in the past.  She denies any adverse effects of this medication.   This pain is described in detail below.    Physical Therapy:  No.    Non-pharmacologic Treatment:  Rest  helps         TENS?  No    Pain Medications:         Currently taking:  Lyrica 200 mg t.i.d., meloxicam    Has tried in the past:  Tramadol (upset stomach).  NSAIDs, Tylenol    Has not tried:   Muscle relaxants, TCAs, SNRIs, topical creams    Blood thinners:  None    Interventional Therapies:                -12/16/2021  Left L4-5 and L5-S1 Lumbar Medial Branch Radiofrequency Ablation              - 11/09/2021 Right L4-5 and L5-S1 Lumbar Medial Branch Radiofrequency Ablation              - 8/24/2021Lumbar Transforaminal Epidural Steroid Injection, Right L5-S1              - 8/17/2021Lumbar Transforaminal Epidural Steroid Injection, Right L5-S1              - 6/15/2021  Lumbar Epidural Steroid Injection at L5-S1 50% relief    Relevant Surgeries:  None    Affecting sleep?  Yes    Affecting daily activities? yes    Depressive symptoms? no          SI/HI? No    Work status: Retired    Pain Scores:    Best:       8/10  Worst:     10/10  Usually:   10/10  Today:    10/10    Pain Disability Index  Family/Home Responsibilities:: 5  Recreation:: 5  Social Activity:: 5  Occupation:: 5  Sexual Behavior:: 5  Self Care:: 5  Life-Support Activities:: 5  Pain Disability Index (PDI): 35    Review of Systems   Constitutional:  Negative for activity change, appetite change, chills, fatigue, fever and unexpected weight change.   HENT:  Negative for hearing loss.    Eyes:  Negative for visual disturbance.   Respiratory:  Negative for chest tightness and shortness of breath.    Cardiovascular:  Negative for chest pain.   Gastrointestinal:  Negative for abdominal pain, constipation, diarrhea, nausea and vomiting.   Genitourinary:  Negative for difficulty urinating.   Musculoskeletal:  Positive for arthralgias, back pain, gait problem, myalgias, neck pain and neck stiffness.   Skin:  Negative for rash.   Neurological:  Negative for dizziness, weakness, light-headedness, numbness and headaches.   Psychiatric/Behavioral:  Positive for sleep  disturbance. Negative for hallucinations and suicidal ideas. The patient is not nervous/anxious.        Past Medical History:   Diagnosis Date    Abdominal pain 2015    Anemia     Anxiety     Arthritis     Bone spur of finger IP joint 10/22/2018    Chronic back pain     Chronic pain 06/15/2021    Colon cancer screening 3/13/2019    Decreased ROM of lumbar spine 2022    Diabetes mellitus 2014    Diverticulosis     Diverticulosis     Effusion of right hand 2018    Finger pain, right 10/22/2018    Finger stiffness, right 2018    Functional belching disorder 2020    History of pancreatic cancer 2021    Hypertension     Knee pain 2014    Lumbar pain 2022    Nausea 2019    Neuroendocrine tumor of pancreas 2015    Obesity     Pancreatic cancer 2015    Range of motion deficit 2018    Renal cyst     left    Special screening for malignant neoplasms, colon 2014    Status post arthroscopy of left knee 2014    Stiffness of right hand, not elsewhere classified 2019    Thyroid disease     Trouble in sleeping     Type 2 diabetes mellitus     Type 2 diabetes mellitus with ophthalmic manifestations        Past Surgical History:   Procedure Laterality Date     SECTION      COLONOSCOPY N/A 3/13/2019    Procedure: COLONOSCOPY;  Surgeon: Cem Lamar MD;  Location: [address]);  Service: Endoscopy;  Laterality: N/A;  previous order cx    COLONOSCOPY N/A 2022    Procedure: COLONOSCOPY Suprep;  Surgeon: Hiren Newberry MD;  Location: [facility] ENDO;  Service: Endoscopy;  Laterality: N/A;    EPIDURAL STEROID INJECTION INTO LUMBAR SPINE N/A 6/15/2021    Procedure: Injection-steroid-epidural-lumbar-L5-S1;  Surgeon: Saranya Cornelius Jr., MD;  Location: [facility] PAIN MGT;  Service: Pain Management;  Laterality: N/A;    ESOPHAGOGASTRODUODENOSCOPY N/A 2019    Procedure: ESOPHAGOGASTRODUODENOSCOPY (EGD);  Surgeon: Jonathan Oneill MD;  Location: [facility]  ENDO (4TH FLR);  Service: Endoscopy;  Laterality: N/A;    ESOPHAGOGASTRODUODENOSCOPY N/A 6/2/2020    Procedure: EGD (ESOPHAGOGASTRODUODENOSCOPY);  Surgeon: Shady Costa MD;  Location: [facility] (2ND FLR);  Service: Endoscopy;  Laterality: N/A;  Please schedule patient as soon as possible in the 2nd floor history of a Whipple surgery for neuroendocrine pancreatic tumor many years ago with now constant belching and regurgitation.  May need airway protection.  Rule out celiac sprue rule out lact    ESOPHAGOGASTRODUODENOSCOPY N/A 4/19/2022    Procedure: EGD (ESOPHAGOGASTRODUODENOSCOPY);  Surgeon: Hiren Newberry MD;  Location: [facility];  Service: Endoscopy;  Laterality: N/A;    EXCISION OF GANGLION CYST OF HAND Right 10/22/2018    Procedure: EXCISION, GANGLION CYST, HAND- RIGHT, LONG AND INDEX FINGER;  Surgeon: Sowmya Schmidt MD;  Location: [facility];  Service: Orthopedics;  Laterality: Right;  Stretcher; Supine; Hand Pan 1 & [place] 2; Dr. Loja's Rasp    HYSTERECTOMY  2015    ISMAEL    INJECTION OF ANESTHETIC AGENT AROUND MEDIAL BRANCH NERVES INNERVATING LUMBAR FACET JOINT Bilateral 9/28/2021    Procedure: Block-nerve-medial branch-lumbar-bilateral L4-5 and L5-S1;  Surgeon: Saranya Cornelius Jr., MD;  Location: [facility] PAIN MGT;  Service: Pain Management;  Laterality: Bilateral;    INJECTION OF ANESTHETIC AGENT AROUND MEDIAL BRANCH NERVES INNERVATING LUMBAR FACET JOINT Bilateral 10/12/2021    Procedure: Bilateral Lumbar Medial Branch Block L4-5 L5-S1- (No Sedation);  Surgeon: Saranya Cornelius Jr., MD;  Location: [facility] PAIN MGT;  Service: Pain Management;  Laterality: Bilateral;    KNEE ARTHROSCOPY  4/18/2014    LATS/left    OOPHORECTOMY      PANCREAS SURGERY  2015    MD Ritchie    RADIOFREQUENCY THERMOCOAGULATION Bilateral 11/9/2021    Procedure: Radiofrequency Themocoagulation of medial branches: L4-5 and L5-S1;  Surgeon: Saranya Cornelius Jr., MD;  Location: [facility] PAIN MGT;  Service: Pain Management;   Laterality: Bilateral;  Patient is diabetic.     RADIOFREQUENCY THERMOCOAGULATION Left 2021    Procedure: RADIOFREQUENCY THERMAL COAGULATION LEFT L4-5, L5-S1 (IV Sedation);  Surgeon: Saranya Cornelius Jr., MD;  Location: [facility] PAIN MGT;  Service: Pain Management;  Laterality: Left;  diabetic    TONSILLECTOMY      TRANSFORAMINAL EPIDURAL INJECTION OF STEROID Right 2021    Procedure: Injection,steroid,epidural,transforaminal approach; Levels: L5-S1;  Surgeon: Saranya Cornelius Jr., MD;  Location: [facility] PAIN MGT;  Service: Pain Management;  Laterality: Right;  No pacemaker. Patient is diabetic.    TRANSFORAMINAL EPIDURAL INJECTION OF STEROID Right 2021    Procedure: Injection,steroid,epidural,transforaminal approach; Levels: L5-S1;  Surgeon: Saranya Cornelius Jr., MD;  Location: [facility] PAIN MGT;  Service: Pain Management;  Laterality: Right;  No pacemaker. Patient is diabetic.        Social History     Socioeconomic History    Marital status:    Tobacco Use    Smoking status: Former     Current packs/day: 0.00     Average packs/day: 0.3 packs/day for 10.0 years (2.5 ttl pk-yrs)     Types: Cigarettes     Start date: 1972     Quit date: 1982     Years since quittin.8    Smokeless tobacco: Never    Tobacco comments:     Totally quit per  visit.   Substance and Sexual Activity    Alcohol use: Yes     Comment: occasional use    Drug use: Never    Sexual activity: Yes     Partners: Male     Birth control/protection: None     Social Determinants of Health     Financial Resource Strain: Low Risk  (2023)    Overall Financial Resource Strain (CARDIA)     Difficulty of Paying Living Expenses: Not hard at all   Food Insecurity: No Food Insecurity (2023)    Hunger Vital Sign     Worried About Running Out of Food in the Last Year: Never true     Ran Out of Food in the Last Year: Never true   Transportation Needs: No Transportation Needs (2023)    PRAPARE - Transportation     Lack of  Transportation (Medical): No     Lack of Transportation (Non-Medical): No   Physical Activity: Insufficiently Active (8/28/2023)    Exercise Vital Sign     Days of Exercise per Week: 2 days     Minutes of Exercise per Session: 10 min   Stress: Stress Concern Present (8/28/2023)    [place] [employer] - Occupational Stress Questionnaire     Feeling of Stress : Very much   Social Connections: Socially Integrated (8/28/2023)    Social Connection and Isolation Panel [NHANES]     Frequency of Communication with Friends and Family: More than three times a week     Frequency of Social Gatherings with Friends and Family: Patient refused     Attends Confucianist Services: More than 4 times per year     Active Member of Clubs or Organizations: Yes     Attends Club or Organization Meetings: More than 4 times per year     Marital Status:    Housing Stability: Low Risk  (8/28/2023)    Housing Stability Vital Sign     Unable to Pay for Housing in the Last Year: No     Number of Places Lived in the Last Year: 1     Unstable Housing in the Last Year: No       Review of patient's allergies indicates:   Allergen Reactions    Erythromycin base        Current Outpatient Medications on File Prior to Visit   Medication Sig Dispense Refill    alcohol swabs (DROPSAFE ALCOHOL PREP PADS) PadM Use once daily to clean finger prior to glucose check for diabetes 100 each 3    amLODIPine (NORVASC) 10 MG tablet TAKE 1 TABLET EVERY DAY 90 tablet 3    blood-glucose meter kit Use as instructed 1 each 0    glipiZIDE 5 MG TR24 TAKE 1 TABLET(5 MG) BY MOUTH DAILY WITH BREAKFAST FOR DIABETES 90 tablet 3    hydrOXYzine pamoate (VISTARIL) 25 MG Cap TAKE 1 CAPSULE TWICE DAILY AS NEEDED FOR ANXIETY 180 capsule 1    lancets Misc To check BG 1 time daily, to use with insurance preferred meter 100 each 3    levothyroxine (SYNTHROID) 75 MCG tablet Take 1 tablet (75 mcg total) by mouth before breakfast. 90 tablet 3    losartan (COZAAR) 50  "MG tablet TAKE 1 TABLET EVERY DAY 90 tablet 3    meloxicam (MOBIC) 15 MG tablet TAKE 1 TABLET BY MOUTH EVERY DAY FOR ANKLE PAIN OR SWELLING 90 tablet 3    pantoprazole (PROTONIX) 40 MG tablet TAKE 1 TABLET BY MOUTH DAILY 90 tablet 3    pregabalin (LYRICA) 200 MG Cap Take 1 capsule (200 mg total) by mouth 3 (three) times daily. For shingles pain. 90 capsule 2    TRUE METRIX GLUCOSE TEST STRIP Strp TEST BLOOD SUGAR EVERY  strip 3    TRUEPLUS LANCETS 33 gauge Misc TEST BLOOD SUGAR EVERY  each 3    zolpidem (AMBIEN) 10 mg Tab Take 1 tablet (10 mg total) by mouth nightly as needed (Insomnia). 30 tablet 2     No current facility-administered medications on file prior to visit.       Objective:      Ht 5' 6" (1.676 m)   Wt 98.1 kg (216 lb 6.1 oz)   LMP  (LMP Unknown)   BMI 34.92 kg/m²     Exam:  GEN:  Well developed, well nourished.  No acute distress.   HEENT:  No trauma.  Mucous membranes moist.  Nares patent bilaterally.  PSYCH: Normal affect. Thought content appropriate.  CHEST:  Breathing symmetric.  No audible wheezing.  ABD: Soft, non-distended.  SKIN:  Warm, pink, dry.  No rash on exposed areas.    EXT:  No cyanosis, clubbing, or edema.  No color change or changes in nail or hair growth.  NEURO/MUSCULOSKELETAL:  Fully alert, oriented, and appropriate. Speech normal amarilis. No cranial nerve deficits.   Gait:  Normal.  No focal motor deficits.         Imaging:      Narrative & Impression    EXAMINATION:  MRI LUMBAR SPINE WITHOUT CONTRAST     CLINICAL HISTORY:  Dorsalgia, unspecifiedBack pain or radiculopathy, > 6 wks;     TECHNIQUE:  Sagittal T1, sagittal T2, sagittal STIR, axial T1 and axial T2 weighted images of the lumbar spine obtained without contrast.     COMPARISON:  04/12/2016     FINDINGS:  Lumbar spine alignment is within normal limits. The vertebral body heights are well maintained, with no fracture.  Degenerative fatty metaplasia endplate changes at L5-S1 and L1-2.  Otherwise, bone " marrow signal is normal.     The conus is normal in appearance.  The adjacent soft tissue structures show no significant abnormalities.     There are multiple broad-based spur and disc complex is without spinal stenosis at T11-12 and T12-L1.     L1-L2: No focal disc herniation.  Broad-based disc osteophyte complex without central canal or foraminal stenosis.No significant central canal or neural foraminal narrowing.     L2-L3: There is no focal disc herniation. No significant central canal or neural foraminal narrowing.     L3-L4: There is no focal disc herniation. No significant central canal or neural foraminal narrowing.     L4-L5: Broad-based disc bulging.  Severe facet arthritis.  No central canal stenosis.  No foraminal stenosis.     L5-S1: Broad-based disc osteophyte complex and severe facet arthritis produce mild medial foraminal narrowing bilaterally.  No central canal stenosis.     Impression:     Degenerative changes of the lumbar spine mostly involving the lower lumbar facets.  Mild L5-S1 foraminal encroachment bilaterally.        Electronically signed by: Chaz Beth Jr  Date:                                            05/25/2021  Time:                                           16:14       Assessment:       Encounter Diagnoses   Name Primary?    Post herpetic neuralgia Yes    Chronic pain syndrome      Plan:       Aracelis was seen today for foot pain.    Diagnoses and all orders for this visit:    Post herpetic neuralgia  -     capsaicin-skin cleanser patch 2 patch    Chronic pain syndrome      Aracelis Martinez is a 68 y.o. female with post herpetic neuralgia pain located from her right flank to under her right breast.    Prior records reviewed.    Continue Lyrica 200 mg t.i.d. per PCP  Discussed trial of additional medications including nortriptyline or Cymbalta.  Patient deferred at this time.  May reconsider in the future.  Qutenza treatment #1.  Two patches total.  See note below for  details.  Return to clinic in 1 month to discuss efficacy.  May consider repeating at 91 day intervals.      PATIENT NAME: Aracelis Martinez    MRN: [id number]     DATE OF PROCEDURE:  10/24/2023    Diagnosis:  Postherpetic neuralgia B02.29     Postprocedural Diagnosis: Same     Complications: None     Informed Consent:  The patient's condition and proposed procedures, risks (including complications of nerve damage, skin irritation, infection, and failure of pain relief), and alternatives were discussed with the patient or responsible party.  The patient's/responsible party's questions were answered.  The patient/responsible party appeared to understand and chose to proceed.       Procedural Pause:  A procedural pause verifying correct patient, medical record number, allergies, medications to be administered, current vital signs, and proper application site was performed immediately prior to beginning the procedure.     Procedure in Detail:  The patient was placed in a supine position. 2 patches were used for the procedure today.  The patches were applied to the target area and secured with tape.  A coban was used to further secure the patches in place.  The patient was allowed to rest in a comfortable position, and monitored by staff every 10-15 minutes to ensure comfort. The patches remained in place for 60 minutes.  The patches were then removed and the cleaning gel was applied and allowed to sit for an additional 60 seconds, after which the area was wiped clean.      The patient was then discharged in stable condition.        DISCUSSION:  A Qutenza patch was applied today with the purpose of treating the patients nerve pain. They were informed that they may have some burning of the skin for a few days, and should not take a hot shower for 2-3 days as that may irritate the area.  They were informed that the area may feel like they had a sunburn. They were informed that it can take about 2-4 weeks for the effects of  the patch to be fully realized.    I will see the patient for follow up in 1 month.    Plan to repeat every 91 days.    Ignacio Childress PA-C  [facility]  Interventional Pain Management   10/24/2023    This note was created by combination of typed  and M-Modal dictation.  Transcription and phonetic errors may be present.  If there are any questions, please contact me.

## 2023-10-25 ENCOUNTER — PATIENT MESSAGE (OUTPATIENT)
Dept: INTERNAL MEDICINE | Facility: CLINIC | Age: 68
End: 2023-10-25
Payer: MEDICARE

## 2023-10-25 ENCOUNTER — TELEPHONE (OUTPATIENT)
Dept: PAIN MEDICINE | Facility: CLINIC | Age: 68
End: 2023-10-25
Payer: MEDICARE

## 2023-10-26 ENCOUNTER — TELEPHONE (OUTPATIENT)
Dept: PAIN MEDICINE | Facility: CLINIC | Age: 68
End: 2023-10-26
Payer: MEDICARE

## 2023-10-26 NOTE — TELEPHONE ENCOUNTER
Called and spoke with patient regarding MyChart message.  Patient reported significantly increased pain following Qutenza treatment.  Noted a burning sensation to the area that was treated.  Notes that she applied nervi cream which contains lidocaine.  Pain gradually started to resolve throughout the day.  Notes her pain has been improving and feels there has been some benefit from the treatment itself.  Continues to manage her pain with lidocaine cream.  Continue with 30 day follow up.

## 2023-11-09 ENCOUNTER — PATIENT MESSAGE (OUTPATIENT)
Dept: PAIN MEDICINE | Facility: CLINIC | Age: 68
End: 2023-11-09
Payer: MEDICARE

## 2023-11-09 ENCOUNTER — PATIENT MESSAGE (OUTPATIENT)
Dept: INTERNAL MEDICINE | Facility: CLINIC | Age: 68
End: 2023-11-09
Payer: MEDICARE

## 2023-11-28 ENCOUNTER — OFFICE VISIT (OUTPATIENT)
Dept: PAIN MEDICINE | Facility: CLINIC | Age: 68
End: 2023-11-28
Payer: MEDICARE

## 2023-11-28 DIAGNOSIS — B02.29 POST HERPETIC NEURALGIA: Primary | ICD-10-CM

## 2023-11-28 PROCEDURE — 1160F PR REVIEW ALL MEDS BY PRESCRIBER/CLIN PHARMACIST DOCUMENTED: ICD-10-PCS | Mod: HCNC,CPTII,95,

## 2023-11-28 PROCEDURE — 3066F PR DOCUMENTATION OF TREATMENT FOR NEPHROPATHY: ICD-10-PCS | Mod: HCNC,CPTII,95,

## 2023-11-28 PROCEDURE — 99213 PR OFFICE/OUTPT VISIT, EST, LEVL III, 20-29 MIN: ICD-10-PCS | Mod: HCNC,95,,

## 2023-11-28 PROCEDURE — 1160F RVW MEDS BY RX/DR IN RCRD: CPT | Mod: HCNC,CPTII,95,

## 2023-11-28 PROCEDURE — 4010F PR ACE/ARB THEARPY RXD/TAKEN: ICD-10-PCS | Mod: HCNC,CPTII,95,

## 2023-11-28 PROCEDURE — 1159F MED LIST DOCD IN RCRD: CPT | Mod: HCNC,CPTII,95,

## 2023-11-28 PROCEDURE — 3066F NEPHROPATHY DOC TX: CPT | Mod: HCNC,CPTII,95,

## 2023-11-28 PROCEDURE — 3060F PR POS MICROALBUMINURIA RESULT DOCUMENTED/REVIEW: ICD-10-PCS | Mod: HCNC,CPTII,95,

## 2023-11-28 PROCEDURE — 99213 OFFICE O/P EST LOW 20 MIN: CPT | Mod: HCNC,95,,

## 2023-11-28 PROCEDURE — 1159F PR MEDICATION LIST DOCUMENTED IN MEDICAL RECORD: ICD-10-PCS | Mod: HCNC,CPTII,95,

## 2023-11-28 PROCEDURE — 3044F PR MOST RECENT HEMOGLOBIN A1C LEVEL <7.0%: ICD-10-PCS | Mod: HCNC,CPTII,95,

## 2023-11-28 PROCEDURE — 3060F POS MICROALBUMINURIA REV: CPT | Mod: HCNC,CPTII,95,

## 2023-11-28 PROCEDURE — 4010F ACE/ARB THERAPY RXD/TAKEN: CPT | Mod: HCNC,CPTII,95,

## 2023-11-28 PROCEDURE — 3044F HG A1C LEVEL LT 7.0%: CPT | Mod: HCNC,CPTII,95,

## 2023-11-28 NOTE — PROGRESS NOTES
Subjective:     Patient ID: Aracelis Martinez is a 68 y.o. female    Chief Complaint: No chief complaint on file.        Referred by: No ref. provider found      HPI:    Interval History PA (11/28/2023):  The patient location is:  Home  The chief complaint leading to consultation is:  Follow up  Visit type: Virtual visit with synchronous audio and video  Total time spent with patient: 9 minutes  Each patient to whom he or she provides medical services by telemedicine is:  (1) informed of the relationship between the physician and patient and the respective role of any other health care provider with respect to management of the patient; and (2) notified that he or she may decline to receive medical services by telemedicine and may withdraw from such care at any time.     Patient returns to clinic for follow up.  Patient is s/p Qutenza #1 completed on 10/24/2023 for post herpetic neuralgia.  Patient noting significant improvement overall, approximately 95% relief.  Notes minimal continued pain over her right flank/axillary region, and underneath her right breast.  She does note some continued sensitivity over the area but overall symptoms have improved.  She continues to take Lyrica 100 mg b.i.d. with benefit, denies any adverse effects from this medication.  No other concerns at this time.    Interval History PA (10/24/2023):  Patient returns to clinic for follow up and Qutenza treatment #1 for postherpetic neuralgia.  Patient denies any changes in the quality or location of her pain.  Continues to endorse pain/burning over her right flank/axillary region around to underneath her right breast.  She continues to take Lyrica 200 mg t.i.d. with minimal benefit, denies any adverse effects from this medication.  No other concerns at this time.    Interval History PA (10/10/2023):  Patient returns to clinic for follow up.  Since previous visit patient had a shingles outbreak in August 2023.  She was treated with  valacyclovir and increased dose of Lyrica.  Once the rash resolved patient reports pain continued and has been worsening.  Reports pain/burning sensation at the site of her previous rash.  Located from her right flank to beneath her right breast region with a dermatomal pattern.  Notes that her PCP increased her Lyrica to 200 mg t.i.d. which has been minimally beneficial.  Denies any adverse effects from this medication.  Reports pain continues to be severe and limiting to her daily activities.    Interval History PA (07/18/2023):  Patient returns to clinic for follow up.  Patient denies any changes in the quality or location of her pain since previous visit.  Denies any new or worsening symptoms.  She continues to take Lyrica 75 mg b.i.d. with continued benefit.  Notes that this medication has provided significant improvement in her overall symptoms and that her pain continues to be well-controlled at this time.  Denies any adverse effects from the medication.  No other concerns at this time.    Interval History (1/3/23):    The patient location is:  Home  The chief complaint leading to consultation is:  Follow-up  Visit type: Virtual visit with synchronous audio and video  Total time spent with patient:  8 minutes  Each patient to whom he or she provides medical services by telemedicine is:  (1) informed of the relationship between the physician and patient and the respective role of any other health care provider with respect to management of the patient; and (2) notified that he or she may decline to receive medical services by telemedicine and may withdraw from such care at any time.      She returns today for follow up.  She reports that Lyrica 75 mg b.i.d. has been helpful for the fibromyalgia pain.  She reports a very significant improvement in her symptoms and states that her pain is very well controlled.  She denies any adverse effects.  She does request that additional prescriptions be sent to mail order  pharmacy.      Initial Encounter (11/15/22):  Aracelis Martinez is a 68 y.o. female who presents today with fairly widespread pain likely related to fibromyalgia.  Has previously been treated by my colleague at [facility].  He is leaving his practice, and patient is now seeking to transition her care to my clinic.  She has undergone multiple interventional procedures for her low back and lower extremity pain.  States these have helped somewhat but she is not very interested in additional interventional procedures at this time.  Currently the majority of her pain is located across the upper back and into the upper extremities.  She denies any associated numbness, tingling, weakness, bowel bladder dysfunction.  She is unable to say if this pain is related to fibromyalgia or some other source of pain.  She is currently taking Lyrica 25 mg b.i.d. provided by her primary care physician.  She does not feel this medication is providing as much relief as it was in the past.  She denies any adverse effects of this medication.   This pain is described in detail below.    Physical Therapy:  No.    Non-pharmacologic Treatment:  Rest helps         TENS?  No    Pain Medications:         Currently taking:  Lyrica 200 mg t.i.d., meloxicam    Has tried in the past:  Tramadol (upset stomach).  NSAIDs, Tylenol    Has not tried:   Muscle relaxants, TCAs, SNRIs, topical creams    Blood thinners:  None    Interventional Therapies:                -12/16/2021  Left L4-5 and L5-S1 Lumbar Medial Branch Radiofrequency Ablation              - 11/09/2021 Right L4-5 and L5-S1 Lumbar Medial Branch Radiofrequency Ablation              - 8/24/2021Lumbar Transforaminal Epidural Steroid Injection, Right L5-S1              - 8/17/2021Lumbar Transforaminal Epidural Steroid Injection, Right L5-S1              - 6/15/2021  Lumbar Epidural Steroid Injection at L5-S1 50% relief    10/24/2023 - Qutenza #1 - 95% relief    Relevant Surgeries:   None    Affecting sleep?  Yes    Affecting daily activities? yes    Depressive symptoms? no          SI/HI? No    Work status: Retired    Pain Scores:    Best:       0/10  Worst:     2/10  Usually:   1/10  Today:    1/10         Review of Systems   Constitutional:  Negative for activity change, appetite change, chills, fatigue, fever and unexpected weight change.   HENT:  Negative for hearing loss.    Eyes:  Negative for visual disturbance.   Respiratory:  Negative for chest tightness and shortness of breath.    Cardiovascular:  Negative for chest pain.   Gastrointestinal:  Negative for abdominal pain, constipation, diarrhea, nausea and vomiting.   Genitourinary:  Negative for difficulty urinating.   Musculoskeletal:  Positive for arthralgias, back pain, gait problem, myalgias, neck pain and neck stiffness.   Skin:  Negative for rash.   Neurological:  Negative for dizziness, weakness, light-headedness, numbness and headaches.   Psychiatric/Behavioral:  Positive for sleep disturbance. Negative for hallucinations and suicidal ideas. The patient is not nervous/anxious.        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    History of pancreatic cancer 7/2/2021    Hypertension     Knee pain 04/16/2014    Lumbar pain 1/21/2022    Nausea 11/19/2019    Neuroendocrine tumor of pancreas 2015    Obesity     Pancreatic cancer 2015    Range of motion deficit 11/9/2018    Renal cyst     left    Special screening for malignant neoplasms, colon 7/28/2014    Status post arthroscopy of left  knee 2014    Stiffness of right hand, not elsewhere classified 2019    Thyroid disease     Trouble in sleeping     Type 2 diabetes mellitus     Type 2 diabetes mellitus with ophthalmic manifestations        Past Surgical History:   Procedure Laterality Date     SECTION      COLONOSCOPY N/A 3/13/2019    Procedure: COLONOSCOPY;  Surgeon: Cem Lamar MD;  Location: [facility] (4TH FLR);  Service: Endoscopy;  Laterality: N/A;  previous order cx    COLONOSCOPY N/A 2022    Procedure: COLONOSCOPY Suprep;  Surgeon: Hiren Newberry MD;  Location: [facility];  Service: Endoscopy;  Laterality: N/A;    EPIDURAL STEROID INJECTION INTO LUMBAR SPINE N/A 6/15/2021    Procedure: Injection-steroid-epidural-lumbar-L5-S1;  Surgeon: Saranya Cornelius Jr., MD;  Location: [facility] PAIN MGT;  Service: Pain Management;  Laterality: N/A;    ESOPHAGOGASTRODUODENOSCOPY N/A 2019    Procedure: ESOPHAGOGASTRODUODENOSCOPY (EGD);  Surgeon: Jonathan Oneill MD;  Location: [facility] (4TH FLR);  Service: Endoscopy;  Laterality: N/A;    ESOPHAGOGASTRODUODENOSCOPY N/A 2020    Procedure: EGD (ESOPHAGOGASTRODUODENOSCOPY);  Surgeon: Shady Costa MD;  Location: [facility] (2ND FLR);  Service: Endoscopy;  Laterality: N/A;  Please schedule patient as soon as possible in the 2nd floor history of a Whipple surgery for neuroendocrine pancreatic tumor many years ago with now constant belching and regurgitation.  May need airway protection.  Rule out celiac sprue rule out lact    ESOPHAGOGASTRODUODENOSCOPY N/A 2022    Procedure: EGD (ESOPHAGOGASTRODUODENOSCOPY);  Surgeon: Hiren Newberry MD;  Location: [facility];  Service: Endoscopy;  Laterality: N/A;    EXCISION OF GANGLION CYST OF HAND Right 10/22/2018    Procedure: EXCISION, GANGLION CYST, HAND- RIGHT, LONG AND INDEX FINGER;  Surgeon: Sowmya Schmidt MD;  Location: [facility] OR;  Service: Orthopedics;  Laterality: Right;  Stretcher; Supine; Hand Pan 1 & Pan 2;   [facility]    HYSTERECTOMY  2015    ISMAEL    INJECTION OF ANESTHETIC AGENT AROUND MEDIAL BRANCH NERVES INNERVATING LUMBAR FACET JOINT Bilateral 9/28/2021    Procedure: Block-nerve-medial branch-lumbar-bilateral L4-5 and L5-S1;  Surgeon: Saranya Cornelius Jr., MD;  Location: [facility] PAIN MGT;  Service: Pain Management;  Laterality: Bilateral;    INJECTION OF ANESTHETIC AGENT AROUND MEDIAL BRANCH NERVES INNERVATING LUMBAR FACET JOINT Bilateral 10/12/2021    Procedure: Bilateral Lumbar Medial Branch Block L4-5 L5-S1- (No Sedation);  Surgeon: Saranya Cornelius Jr., MD;  Location: [facility] PAIN MGT;  Service: Pain Management;  Laterality: Bilateral;    KNEE ARTHROSCOPY  4/18/2014    LATS/left    OOPHORECTOMY      PANCREAS SURGERY  2015    MD Ritchie    RADIOFREQUENCY THERMOCOAGULATION Bilateral 11/9/2021    Procedure: Radiofrequency Themocoagulation of medial branches: L4-5 and L5-S1;  Surgeon: Saranya Cornelius Jr., MD;  Location: [facility] PAIN MGT;  Service: Pain Management;  Laterality: Bilateral;  Patient is diabetic.     RADIOFREQUENCY THERMOCOAGULATION Left 12/16/2021    Procedure: RADIOFREQUENCY THERMAL COAGULATION LEFT L4-5, L5-S1 (IV Sedation);  Surgeon: Saranya Cornelius Jr., MD;  Location: [facility] PAIN MGT;  Service: Pain Management;  Laterality: Left;  diabetic    TONSILLECTOMY      TRANSFORAMINAL EPIDURAL INJECTION OF STEROID Right 8/17/2021    Procedure: Injection,steroid,epidural,transforaminal approach; Levels: L5-S1;  Surgeon: Saranya Cornelius Jr., MD;  Location: [facility] PAIN MGT;  Service: Pain Management;  Laterality: Right;  No pacemaker. Patient is diabetic.    TRANSFORAMINAL EPIDURAL INJECTION OF STEROID Right 8/24/2021    Procedure: Injection,steroid,epidural,transforaminal approach; Levels: L5-S1;  Surgeon: Saranya Cornelius Jr., MD;  Location: [facility] PAIN MGT;  Service: Pain Management;  Laterality: Right;  No pacemaker. Patient is diabetic.        Social History     Socioeconomic History    Marital status:     Tobacco Use    Smoking status: Former     Current packs/day: 0.00     Average packs/day: 0.3 packs/day for 10.0 years (2.5 ttl pk-yrs)     Types: Cigarettes     Start date: 1972     Quit date: 1982     Years since quittin.9    Smokeless tobacco: Never    Tobacco comments:     Totally quit per  visit.   Substance and Sexual Activity    Alcohol use: Yes     Comment: occasional use    Drug use: Never    Sexual activity: Yes     Partners: Male     Birth control/protection: None     Social Determinants of Health     Financial Resource Strain: Low Risk  (2023)    Overall Financial Resource Strain (CARDIA)     Difficulty of Paying Living Expenses: Not hard at all   Food Insecurity: No Food Insecurity (2023)    Hunger Vital Sign     Worried About Running Out of Food in the Last Year: Never true     Ran Out of Food in the Last Year: Never true   Transportation Needs: No Transportation Needs (2023)    PRAPARE - Transportation     Lack of Transportation (Medical): No     Lack of Transportation (Non-Medical): No   Physical Activity: Insufficiently Active (2023)    Exercise Vital Sign     Days of Exercise per Week: 2 days     Minutes of Exercise per Session: 10 min   Stress: Stress Concern Present (2023)    [employer] - Occupational Stress Questionnaire     Feeling of Stress : Very much   Social Connections: Socially Integrated (2023)    Social Connection and Isolation Panel [NHANES]     Frequency of Communication with Friends and Family: More than three times a week     Frequency of Social Gatherings with Friends and Family: Patient refused     Attends Sikhism Services: More than 4 times per year     Active Member of Clubs or Organizations: Yes     Attends Club or Organization Meetings: More than 4 times per year     Marital Status:    Housing Stability: Low Risk  (2023)    Housing Stability Vital Sign     Unable to Pay for  Housing in the Last Year: No     Number of Places Lived in the Last Year: 1     Unstable Housing in the Last Year: No       Review of patient's allergies indicates:   Allergen Reactions    Erythromycin base        Current Outpatient Medications on File Prior to Visit   Medication Sig Dispense Refill    alcohol swabs (DROPSAFE ALCOHOL PREP PADS) PadM Use once daily to clean finger prior to glucose check for diabetes 100 each 3    amLODIPine (NORVASC) 10 MG tablet TAKE 1 TABLET EVERY DAY 90 tablet 3    blood-glucose meter kit Use as instructed 1 each 0    glipiZIDE 5 MG TR24 TAKE 1 TABLET(5 MG) BY MOUTH DAILY WITH BREAKFAST FOR DIABETES 90 tablet 3    hydrOXYzine pamoate (VISTARIL) 25 MG Cap TAKE 1 CAPSULE TWICE DAILY AS NEEDED FOR ANXIETY 180 capsule 1    lancets Misc To check BG 1 time daily, to use with insurance preferred meter 100 each 3    levothyroxine (SYNTHROID) 75 MCG tablet Take 1 tablet (75 mcg total) by mouth before breakfast. 90 tablet 3    losartan (COZAAR) 50 MG tablet TAKE 1 TABLET EVERY DAY 90 tablet 3    meloxicam (MOBIC) 15 MG tablet TAKE 1 TABLET BY MOUTH EVERY DAY FOR ANKLE PAIN OR SWELLING 90 tablet 3    pantoprazole (PROTONIX) 40 MG tablet TAKE 1 TABLET BY MOUTH DAILY 90 tablet 3    pregabalin (LYRICA) 200 MG Cap Take 1 capsule (200 mg total) by mouth 3 (three) times daily. For shingles pain. 90 capsule 2    TRUE METRIX GLUCOSE TEST STRIP Strp TEST BLOOD SUGAR EVERY  strip 3    TRUEPLUS LANCETS 33 gauge Misc TEST BLOOD SUGAR EVERY  each 3    zolpidem (AMBIEN) 10 mg Tab Take 1 tablet (10 mg total) by mouth nightly as needed (Insomnia). 30 tablet 2     No current facility-administered medications on file prior to visit.       Objective:      LMP  (LMP Unknown)     Exam:  PHYSICAL EXAMINATION:    General appearance: Well appearing, in no acute distress, alert and oriented x3.  Psych:  Mood and affect appropriate.  Skin: Skin color normal, no rashes or lesions, in both upper and lower  body.  Extremities: Moves all visualized extremities freely.  Gait: Normal.         Imaging:      Narrative & Impression    EXAMINATION:  MRI LUMBAR SPINE WITHOUT CONTRAST     CLINICAL HISTORY:  Dorsalgia, unspecifiedBack pain or radiculopathy, > 6 wks;     TECHNIQUE:  Sagittal T1, sagittal T2, sagittal STIR, axial T1 and axial T2 weighted images of the lumbar spine obtained without contrast.     COMPARISON:  04/12/2016     FINDINGS:  Lumbar spine alignment is within normal limits. The vertebral body heights are well maintained, with no fracture.  Degenerative fatty metaplasia endplate changes at L5-S1 and L1-2.  Otherwise, bone marrow signal is normal.     The conus is normal in appearance.  The adjacent soft tissue structures show no significant abnormalities.     There are multiple broad-based spur and disc complex is without spinal stenosis at T11-12 and T12-L1.     L1-L2: No focal disc herniation.  Broad-based disc osteophyte complex without central canal or foraminal stenosis.No significant central canal or neural foraminal narrowing.     L2-L3: There is no focal disc herniation. No significant central canal or neural foraminal narrowing.     L3-L4: There is no focal disc herniation. No significant central canal or neural foraminal narrowing.     L4-L5: Broad-based disc bulging.  Severe facet arthritis.  No central canal stenosis.  No foraminal stenosis.     L5-S1: Broad-based disc osteophyte complex and severe facet arthritis produce mild medial foraminal narrowing bilaterally.  No central canal stenosis.     Impression:     Degenerative changes of the lumbar spine mostly involving the lower lumbar facets.  Mild L5-S1 foraminal encroachment bilaterally.        Electronically signed by: Chaz Beth Jr  Date:                                            05/25/2021  Time:                                           16:14       Assessment:       Encounter Diagnosis   Name Primary?    Post herpetic neuralgia Yes        Plan:       Diagnoses and all orders for this visit:    Post herpetic neuralgia        Aracelis Faina Martinez is a 68 y.o. female with post herpetic neuralgia pain located from her right flank to under her right breast.    Prior records reviewed.    Patient is s/p Qutenza treatment #1 with significant 95% relief.  Minimal continued pain at this time.  Consider repeating in the future for maintenance therapy.  Continue Lyrica 100 mg b.i.d. per PCP  Return to clinic as needed.      Ignacio Childress PA-C  [facility]  Interventional Pain Management   11/28/2023    This note was created by combination of typed  and M-Modal dictation.  Transcription and phonetic errors may be present.  If there are any questions, please contact me.

## 2023-11-30 ENCOUNTER — PATIENT MESSAGE (OUTPATIENT)
Dept: INTERNAL MEDICINE | Facility: CLINIC | Age: 68
End: 2023-11-30
Payer: MEDICARE

## 2023-12-01 NOTE — TELEPHONE ENCOUNTER
See her msg.  Pain from shingles is finally improving.    Should she get the shingles vaccine soon or give more time??

## 2023-12-01 NOTE — TELEPHONE ENCOUNTER
Patients who are recovering from shingles can get the shingles vaccine when their symptoms related to the infection have resolved.

## 2023-12-09 ENCOUNTER — PATIENT MESSAGE (OUTPATIENT)
Dept: OPTOMETRY | Facility: CLINIC | Age: 68
End: 2023-12-09
Payer: MEDICARE

## 2023-12-15 RX ORDER — GLIPIZIDE 5 MG/1
TABLET, FILM COATED, EXTENDED RELEASE ORAL
Qty: 90 TABLET | Refills: 0 | Status: SHIPPED | OUTPATIENT
Start: 2023-12-15 | End: 2024-02-19

## 2023-12-15 RX ORDER — LEVOTHYROXINE SODIUM 75 UG/1
TABLET ORAL
Qty: 90 TABLET | Refills: 1 | Status: SHIPPED | OUTPATIENT
Start: 2023-12-15

## 2023-12-15 NOTE — TELEPHONE ENCOUNTER
Refill Decision Note   Aracelis Martinez  is requesting a refill authorization.  Brief Assessment and Rationale for Refill:  Approve     Medication Therapy Plan: FLOS      Comments:     Note composed:12:08 PM 12/15/2023

## 2023-12-15 NOTE — TELEPHONE ENCOUNTER
Care Due:                  Date            Visit Type   Department     Provider  --------------------------------------------------------------------------------                                EP -                              PRIMARY      [facility] INTERNAL  Last Visit: 08-      CARE ([facility])   MEDICINE       Alenjosias Damico                              EP -                              PRIMARY      [facility] INTERNAL  Next Visit: 02-      [facility] ([facility])   MEDICINE       Alen SAMANTHA Damico                                                            Last  Test          Frequency    Reason                     Performed    Due Date  --------------------------------------------------------------------------------    HBA1C.......  6 months...  glipiZIDE................  08- 02-    Health [facility] Embedded Care Due Messages. Reference number: [id number].   12/15/2023 2:03:40 AM CST

## 2023-12-26 NOTE — TELEPHONE ENCOUNTER
Refill Routing Note   Medication(s) are not appropriate for processing by [facility] for the following reason(s):        Outside of protocol    [facility] action(s):  Route               Appointments  past 12m or future 3m with PCP    Date Provider   Last Visit   8/29/2023 Alen Damico MD   Next Visit   2/19/2024 Alen Damico MD   ED visits in past 90 days: 0        Note composed:9:08 AM 12/26/2023

## 2023-12-27 RX ORDER — HYDROXYZINE PAMOATE 25 MG/1
CAPSULE ORAL
Qty: 180 CAPSULE | Refills: 3 | Status: SHIPPED | OUTPATIENT
Start: 2023-12-27

## 2023-12-31 NOTE — TELEPHONE ENCOUNTER
----- Message from Kayleigh Hobson sent at 1/13/2017 10:53 AM CST -----  Contact: Patient - [phone]  Gauri,    Patient call to ask Dr. Damico could he please call in some cough medicine for her.  Patient cannot sleep and has a constant cough.  Please give patient a call at [phone] if called in.    Thanks Stephanie   unkn

## 2024-01-13 RX ORDER — ZOLPIDEM TARTRATE 10 MG/1
10 TABLET ORAL NIGHTLY PRN
Qty: 30 TABLET | Refills: 2 | Status: CANCELLED | OUTPATIENT
Start: 2024-01-13 | End: 2024-07-13

## 2024-01-13 NOTE — TELEPHONE ENCOUNTER
Care Due:                  Date            Visit Type   Department     Provider  --------------------------------------------------------------------------------                                EP -                              PRIMARY      [facility] INTERNAL  Last Visit: 08-      CARE ([facility])   MEDICINE       Alenjosias Damico                              EP -                              PRIMARY      [facility] INTERNAL  Next Visit: 02-      [facility] ([facility])   MEDICINE       Alenmirna Damico                                                            Last  Test          Frequency    Reason                     Performed    Due Date  --------------------------------------------------------------------------------    CBC.........  12 months..  meloxicam................  03- 03-    Health [facility] Embedded Care Due Messages. Reference number: [id number].   1/13/2024 9:58:12 AM CST

## 2024-01-13 NOTE — TELEPHONE ENCOUNTER
No care due was identified.  Health [facility] Embedded Care Due Messages. Reference number: [id number].   1/13/2024 12:53:40 PM CST

## 2024-01-16 RX ORDER — ZOLPIDEM TARTRATE 10 MG/1
10 TABLET ORAL NIGHTLY PRN
Qty: 30 TABLET | Refills: 2 | Status: SHIPPED | OUTPATIENT
Start: 2024-01-16 | End: 2024-04-14 | Stop reason: SDUPTHER

## 2024-01-23 ENCOUNTER — PATIENT MESSAGE (OUTPATIENT)
Dept: INTERNAL MEDICINE | Facility: CLINIC | Age: 69
End: 2024-01-23
Payer: MEDICARE

## 2024-01-24 ENCOUNTER — OFFICE VISIT (OUTPATIENT)
Dept: INTERNAL MEDICINE | Facility: CLINIC | Age: 69
End: 2024-01-24
Payer: MEDICARE

## 2024-01-24 ENCOUNTER — TELEPHONE (OUTPATIENT)
Dept: INTERNAL MEDICINE | Facility: CLINIC | Age: 69
End: 2024-01-24
Payer: MEDICARE

## 2024-01-24 DIAGNOSIS — E11.9 TYPE 2 DIABETES MELLITUS WITHOUT COMPLICATION, WITHOUT LONG-TERM CURRENT USE OF INSULIN: Chronic | ICD-10-CM

## 2024-01-24 DIAGNOSIS — R14.2 BELCHING: ICD-10-CM

## 2024-01-24 DIAGNOSIS — R14.0 ABDOMINAL BLOATING: Primary | ICD-10-CM

## 2024-01-24 PROCEDURE — 99213 OFFICE O/P EST LOW 20 MIN: CPT | Mod: HCNC,95,, | Performed by: INTERNAL MEDICINE

## 2024-01-24 RX ORDER — ONDANSETRON 4 MG/1
4 TABLET, ORALLY DISINTEGRATING ORAL EVERY 6 HOURS PRN
Qty: 20 TABLET | Refills: 1 | Status: SHIPPED | OUTPATIENT
Start: 2024-01-24

## 2024-01-24 NOTE — PROGRESS NOTES
The patient location is: [place]  The chief complaint leading to consultation is:     Visit type: audiovisual    Face to Face time with patient: 15   minutes of total time spent on the encounter, which includes face to face time and non-face to face time preparing to see the patient (eg, review of tests), Obtaining and/or reviewing separately obtained history, Documenting clinical information in the electronic or other health record, Independently interpreting results (not separately reported) and communicating results to the patient/family/caregiver, or Care coordination (not separately reported).         Each patient to whom he or she provides medical services by telemedicine is:  (1) informed of the relationship between the physician and patient and the respective role of any other health care provider with respect to management of the patient; and (2) notified that he or she may decline to receive medical services by telemedicine and may withdraw from such care at any time.    Notes:      [age] lady established patient of Dr. Damico.  Audiovisual visit.  The patient with a history of hypertension, diabetes mellitus, pancreatectomy and others.  She is having some recurrent issues with abdominal bloating belching and some nausea and vomiting.  This started several days ago.  Symptoms wax and wane.  She has a chronic issue of a similar nature.  Previous EGD not remarkable.  She currently takes pantoprazole.  She has not having any overt abdominal pain.  No fever no chills.  No constipation.  No melena no hematochezia.  No fever no chills.    Current medications:  Medication list noted reviewed.    Video in inspection:   The patient appears to be alert oriented and in no acute distress.  She speaks freely in full sentences.    There is no evidence of respiratory changes or pressured speech.    Data:  Reviewed her electronic medical record previous lab data imaging studies and other evaluations.         Impression:   Recent flare belching bloating and nausea.  In that this is seems to be a chronic recurring issue I suspect she has gastroparesis.      Plan:  Check CBC, chemistry profile glycohemoglobin.    Abdominal ultrasound.    Continue PPI.    Zofran p.r.n. for nausea   Advise if symptoms do not resolve worsen or any changes.

## 2024-02-01 RX ORDER — PANTOPRAZOLE SODIUM 40 MG/1
TABLET, DELAYED RELEASE ORAL
Qty: 90 TABLET | Refills: 1 | Status: SHIPPED | OUTPATIENT
Start: 2024-02-01

## 2024-02-01 NOTE — TELEPHONE ENCOUNTER
Refill Decision Note   Aracelis Martinez  is requesting a refill authorization.  Brief Assessment and Rationale for Refill:  Approve     Medication Therapy Plan:         Comments:     Note composed:1:52 PM 02/01/2024

## 2024-02-01 NOTE — TELEPHONE ENCOUNTER
No care due was identified.  [facility] Embedded Care Due Messages. Reference number: [id number].   2/01/2024 1:46:08 PM CST

## 2024-02-09 ENCOUNTER — TELEPHONE (OUTPATIENT)
Dept: ADMINISTRATIVE | Facility: CLINIC | Age: 69
End: 2024-02-09
Payer: MEDICARE

## 2024-02-09 NOTE — TELEPHONE ENCOUNTER
Called pt; no answer; left message informing patient I was calling to confirm her virtual EAWV on 2/12/24 at 11:00am and to see if she needed any help with setting up for virtual appt or e-pre check and to login 10 minutes prior to scheduled appt; left my name & number for pt to return my call if she has any questions or concerns

## 2024-02-12 ENCOUNTER — OFFICE VISIT (OUTPATIENT)
Dept: HOME HEALTH SERVICES | Facility: CLINIC | Age: 69
End: 2024-02-12
Payer: MEDICARE

## 2024-02-12 ENCOUNTER — TELEPHONE (OUTPATIENT)
Dept: ADMINISTRATIVE | Facility: CLINIC | Age: 69
End: 2024-02-12
Payer: MEDICARE

## 2024-02-12 VITALS — BODY MASS INDEX: 34.72 KG/M2 | WEIGHT: 216 LBS | HEIGHT: 66 IN

## 2024-02-12 DIAGNOSIS — D3A.8 NEUROENDOCRINE TUMOR OF PANCREAS: ICD-10-CM

## 2024-02-12 DIAGNOSIS — M47.26 OSTEOARTHRITIS OF SPINE WITH RADICULOPATHY, LUMBAR REGION: ICD-10-CM

## 2024-02-12 DIAGNOSIS — D47.3 ESSENTIAL (HEMORRHAGIC) THROMBOCYTHEMIA: ICD-10-CM

## 2024-02-12 DIAGNOSIS — M46.1 SACROILIITIS: ICD-10-CM

## 2024-02-12 DIAGNOSIS — I10 ESSENTIAL HYPERTENSION: Chronic | ICD-10-CM

## 2024-02-12 DIAGNOSIS — D33.3 BENIGN NEOPLASM OF CRANIAL NERVES: ICD-10-CM

## 2024-02-12 DIAGNOSIS — M79.7 FIBROMYALGIA: ICD-10-CM

## 2024-02-12 DIAGNOSIS — E11.9 TYPE 2 DIABETES MELLITUS WITHOUT COMPLICATION, WITHOUT LONG-TERM CURRENT USE OF INSULIN: Chronic | ICD-10-CM

## 2024-02-12 DIAGNOSIS — Z00.00 ENCOUNTER FOR PREVENTIVE HEALTH EXAMINATION: Primary | ICD-10-CM

## 2024-02-12 PROCEDURE — 1160F RVW MEDS BY RX/DR IN RCRD: CPT | Mod: CPTII,95,, | Performed by: NURSE PRACTITIONER

## 2024-02-12 PROCEDURE — 1159F MED LIST DOCD IN RCRD: CPT | Mod: CPTII,95,, | Performed by: NURSE PRACTITIONER

## 2024-02-12 PROCEDURE — 1170F FXNL STATUS ASSESSED: CPT | Mod: CPTII,95,, | Performed by: NURSE PRACTITIONER

## 2024-02-12 PROCEDURE — 1158F ADVNC CARE PLAN TLK DOCD: CPT | Mod: CPTII,95,, | Performed by: NURSE PRACTITIONER

## 2024-02-12 PROCEDURE — G0439 PPPS, SUBSEQ VISIT: HCPCS | Mod: 95,,, | Performed by: NURSE PRACTITIONER

## 2024-02-12 PROCEDURE — 3008F BODY MASS INDEX DOCD: CPT | Mod: CPTII,95,, | Performed by: NURSE PRACTITIONER

## 2024-02-12 PROCEDURE — 3288F FALL RISK ASSESSMENT DOCD: CPT | Mod: CPTII,95,, | Performed by: NURSE PRACTITIONER

## 2024-02-12 PROCEDURE — 1101F PT FALLS ASSESS-DOCD LE1/YR: CPT | Mod: CPTII,95,, | Performed by: NURSE PRACTITIONER

## 2024-02-12 PROCEDURE — 99499 UNLISTED E&M SERVICE: CPT | Mod: 95,,, | Performed by: NURSE PRACTITIONER

## 2024-02-12 NOTE — PROGRESS NOTES
"The patient location is: [place]  The chief complaint leading to consultation is: AWV    Visit type: audiovisual    Face to Face time with patient: 60   minutes of total time spent on the encounter, which includes face to face time and non-face to face time preparing to see the patient (eg, review of tests), Obtaining and/or reviewing separately obtained history, Documenting clinical information in the electronic or other health record, Independently interpreting results (not separately reported) and communicating results to the patient/family/caregiver, or Care coordination (not separately reported).         Each patient to whom he or she provides medical services by telemedicine is:  (1) informed of the relationship between the physician and patient and the respective role of any other health care provider with respect to management of the patient; and (2) notified that he or she may decline to receive medical services by telemedicine and may withdraw from such care at any time.    Notes:       Aracelis Martinez presented for a follow-up Medicare AWV today. The following components were reviewed and updated:    Medical history  Family History  Social history  Allergies and Current Medications  Health Risk Assessment  Health Maintenance  Care Team    **See Completed Assessments for Annual Wellness visit with in the encounter summary    The following assessments were completed:  Depression Screening  Cognitive function Screening  Timed Get Up Test  Whisper Test      Opioid documentation:      Patient does not have a current opioid prescription.          Vitals:    02/12/24 1057   Weight: 98 kg (216 lb)   Height: 5' 6" (1.676 m)     Body mass index is 34.86 kg/m².       Physical Exam  Constitutional:       Appearance: Normal appearance.   Pulmonary:      Effort: Pulmonary effort is normal.   Musculoskeletal:      Cervical back: Normal range of motion.   Neurological:      Mental Status: She is alert and oriented to " person, place, and time.   Psychiatric:         Mood and Affect: Mood normal.         Behavior: Behavior normal.         Thought Content: Thought content normal.         Judgment: Judgment normal.           Diagnoses and health risks identified today and associated recommendations/orders:  1. Encounter for preventive health examination  Assessments completed.  HM recommendations reviewed.  F/u with PCP as instructed.        2. Osteoarthritis of spine with radiculopathy, lumbar region  Stable    3. Essential hypertension  Chronic, stable on current regimen. Followed by PCP      4. Fibromyalgia  Chronic, stable on current regimen. Followed by PCP      5. Type 2 diabetes mellitus without complication, without long-term current use of insulin  Chronic, stable on current regimen. Followed by PCP      6. Benign neoplasm of cranial nerves  Chronic, stable on current regimen. Followed by PCP      7. Sacroiliitis  Chronic, stable on current regimen. Followed by PCP      8. Neuroendocrine tumor of pancreas  Stable      9. Essential (hemorrhagic) thrombocythemia  Chronic, stable on current regimen. Followed by PCP        Provided Aracelis with a 5-10 year written screening schedule and personal prevention plan. Recommendations were developed using the USPSTF age appropriate recommendations. Education, counseling, and referrals were provided as needed.  After Visit Summary printed and given to patient which includes a list of additional screenings\tests needed.    Follow up in about 1 year (around 2/12/2025) for Medicare AWV.      Bridgette Strickland NP  I offered to discuss advanced care planning, including how to pick a person who would make decisions for you if you were unable to make them for yourself, called a health care power of , and what kind of decisions you might make such as use of life sustaining treatments such as ventilators and tube feeding when faced with a life limiting illness recorded on a living  will that they will need to know. (How you want to be cared for as you near the end of your natural life)     X Patient is interested in learning more about how to make advanced directives.

## 2024-02-14 ENCOUNTER — LAB VISIT (OUTPATIENT)
Dept: LAB | Facility: HOSPITAL | Age: 69
End: 2024-02-14
Payer: MEDICARE

## 2024-02-14 DIAGNOSIS — E78.49 OTHER HYPERLIPIDEMIA: ICD-10-CM

## 2024-02-14 DIAGNOSIS — I10 ESSENTIAL HYPERTENSION: ICD-10-CM

## 2024-02-14 DIAGNOSIS — E11.9 TYPE 2 DIABETES MELLITUS WITHOUT COMPLICATION, WITHOUT LONG-TERM CURRENT USE OF INSULIN: ICD-10-CM

## 2024-02-14 LAB
ALBUMIN SERPL BCP-MCNC: 3.6 G/DL (ref 3.5–5.2)
ALP SERPL-CCNC: 114 U/L (ref 55–135)
ALT SERPL W/O P-5'-P-CCNC: 23 U/L (ref 10–44)
ANION GAP SERPL CALC-SCNC: 8 MMOL/L (ref 8–16)
AST SERPL-CCNC: 29 U/L (ref 10–40)
BILIRUB SERPL-MCNC: 0.5 MG/DL (ref 0.1–1)
BUN SERPL-MCNC: 19 MG/DL (ref 8–23)
CALCIUM SERPL-MCNC: 9.5 MG/DL (ref 8.7–10.5)
CHLORIDE SERPL-SCNC: 106 MMOL/L (ref 95–110)
CHOLEST SERPL-MCNC: 166 MG/DL (ref 120–199)
CHOLEST/HDLC SERPL: 4.3 {RATIO} (ref 2–5)
CO2 SERPL-SCNC: 25 MMOL/L (ref 23–29)
CREAT SERPL-MCNC: 0.9 MG/DL (ref 0.5–1.4)
EST. GFR  (NO RACE VARIABLE): >60 ML/MIN/1.73 M^2
ESTIMATED AVG GLUCOSE: 186 MG/DL (ref 68–131)
GLUCOSE SERPL-MCNC: 183 MG/DL (ref 70–110)
HBA1C MFR BLD: 8.1 % (ref 4–5.6)
HDLC SERPL-MCNC: 39 MG/DL (ref 40–75)
HDLC SERPL: 23.5 % (ref 20–50)
LDLC SERPL CALC-MCNC: 108.6 MG/DL (ref 63–159)
NONHDLC SERPL-MCNC: 127 MG/DL
POTASSIUM SERPL-SCNC: 3.7 MMOL/L (ref 3.5–5.1)
PROT SERPL-MCNC: 6.9 G/DL (ref 6–8.4)
SODIUM SERPL-SCNC: 139 MMOL/L (ref 136–145)
TRIGL SERPL-MCNC: 92 MG/DL (ref 30–150)

## 2024-02-14 PROCEDURE — 80053 COMPREHEN METABOLIC PANEL: CPT | Mod: HCNC | Performed by: INTERNAL MEDICINE

## 2024-02-14 PROCEDURE — 83036 HEMOGLOBIN GLYCOSYLATED A1C: CPT | Mod: HCNC | Performed by: INTERNAL MEDICINE

## 2024-02-14 PROCEDURE — 80061 LIPID PANEL: CPT | Mod: HCNC | Performed by: INTERNAL MEDICINE

## 2024-02-14 PROCEDURE — 36415 COLL VENOUS BLD VENIPUNCTURE: CPT | Mod: HCNC,PO | Performed by: INTERNAL MEDICINE

## 2024-02-19 ENCOUNTER — OFFICE VISIT (OUTPATIENT)
Dept: INTERNAL MEDICINE | Facility: CLINIC | Age: 69
End: 2024-02-19
Payer: MEDICARE

## 2024-02-19 ENCOUNTER — PATIENT MESSAGE (OUTPATIENT)
Dept: ADMINISTRATIVE | Facility: HOSPITAL | Age: 69
End: 2024-02-19
Payer: MEDICARE

## 2024-02-19 VITALS
WEIGHT: 219.38 LBS | HEIGHT: 66 IN | HEART RATE: 61 BPM | RESPIRATION RATE: 16 BRPM | DIASTOLIC BLOOD PRESSURE: 80 MMHG | SYSTOLIC BLOOD PRESSURE: 128 MMHG | OXYGEN SATURATION: 97 % | BODY MASS INDEX: 35.26 KG/M2 | TEMPERATURE: 98 F

## 2024-02-19 DIAGNOSIS — Z12.31 ENCOUNTER FOR SCREENING MAMMOGRAM FOR BREAST CANCER: ICD-10-CM

## 2024-02-19 DIAGNOSIS — Z78.0 POSTMENOPAUSAL: ICD-10-CM

## 2024-02-19 DIAGNOSIS — I10 ESSENTIAL HYPERTENSION: Chronic | ICD-10-CM

## 2024-02-19 DIAGNOSIS — E78.49 OTHER HYPERLIPIDEMIA: ICD-10-CM

## 2024-02-19 DIAGNOSIS — E11.9 TYPE 2 DIABETES MELLITUS WITHOUT COMPLICATION, WITHOUT LONG-TERM CURRENT USE OF INSULIN: Primary | Chronic | ICD-10-CM

## 2024-02-19 PROCEDURE — 1159F MED LIST DOCD IN RCRD: CPT | Mod: HCNC,CPTII,S$GLB, | Performed by: INTERNAL MEDICINE

## 2024-02-19 PROCEDURE — 3288F FALL RISK ASSESSMENT DOCD: CPT | Mod: HCNC,CPTII,S$GLB, | Performed by: INTERNAL MEDICINE

## 2024-02-19 PROCEDURE — 1126F AMNT PAIN NOTED NONE PRSNT: CPT | Mod: HCNC,CPTII,S$GLB, | Performed by: INTERNAL MEDICINE

## 2024-02-19 PROCEDURE — 3074F SYST BP LT 130 MM HG: CPT | Mod: HCNC,CPTII,S$GLB, | Performed by: INTERNAL MEDICINE

## 2024-02-19 PROCEDURE — 99214 OFFICE O/P EST MOD 30 MIN: CPT | Mod: HCNC,S$GLB,, | Performed by: INTERNAL MEDICINE

## 2024-02-19 PROCEDURE — 1101F PT FALLS ASSESS-DOCD LE1/YR: CPT | Mod: HCNC,CPTII,S$GLB, | Performed by: INTERNAL MEDICINE

## 2024-02-19 PROCEDURE — 3008F BODY MASS INDEX DOCD: CPT | Mod: HCNC,CPTII,S$GLB, | Performed by: INTERNAL MEDICINE

## 2024-02-19 PROCEDURE — 3052F HG A1C>EQUAL 8.0%<EQUAL 9.0%: CPT | Mod: HCNC,CPTII,S$GLB, | Performed by: INTERNAL MEDICINE

## 2024-02-19 PROCEDURE — 1160F RVW MEDS BY RX/DR IN RCRD: CPT | Mod: HCNC,CPTII,S$GLB, | Performed by: INTERNAL MEDICINE

## 2024-02-19 PROCEDURE — 99999 PR PBB SHADOW E&M-EST. PATIENT-LVL V: CPT | Mod: PBBFAC,HCNC,, | Performed by: INTERNAL MEDICINE

## 2024-02-19 PROCEDURE — 3079F DIAST BP 80-89 MM HG: CPT | Mod: HCNC,CPTII,S$GLB, | Performed by: INTERNAL MEDICINE

## 2024-02-19 RX ORDER — COVID-19 VACCINE, MRNA 0.04 MG/.418ML
INJECTION, SUSPENSION INTRAMUSCULAR
COMMUNITY
Start: 2023-10-10

## 2024-02-19 RX ORDER — ZOSTER VACCINE RECOMBINANT, ADJUVANTED 50 MCG/0.5
KIT INTRAMUSCULAR
COMMUNITY
Start: 2024-01-11

## 2024-02-19 RX ORDER — INFLUENZA A VIRUS A/VICTORIA/4897/2022 IVR-238 (H1N1) ANTIGEN (FORMALDEHYDE INACTIVATED), INFLUENZA A VIRUS A/DARWIN/6/2021 IVR-227 (H3N2) ANTIGEN (FORMALDEHYDE INACTIVATED), INFLUENZA B VIRUS B/AUSTRIA/1359417/2021 BVR-26 ANTIGEN (FORMALDEHYDE INACTIVATED), INFLUENZA B VIRUS B/PHUKET/3073/2013 BVR-1B ANTIGEN (FORMALDEHYDE INACTIVATED) 15; 15; 15; 15 UG/.5ML; UG/.5ML; UG/.5ML; UG/.5ML
INJECTION, SUSPENSION INTRAMUSCULAR
COMMUNITY
Start: 2023-10-10

## 2024-02-19 RX ORDER — GLIPIZIDE 10 MG/1
TABLET, FILM COATED, EXTENDED RELEASE ORAL
Qty: 90 TABLET | Refills: 3 | Status: SHIPPED | OUTPATIENT
Start: 2024-02-19

## 2024-02-19 NOTE — PROGRESS NOTES
Subjective:       Patient ID: Aracelis Martinez is a 68 y.o. female.    Chief Complaint: Follow-up    HPI  Patient presents for follow-up of medical conditions.  The patient reports she [facility].    The patient has chronic insomnia.  She currently uses Ambien with good results.  No daytime drowsiness or memory impairment has been noted.    The patient has been recovering from episode of shingles.  She states she had symptoms for over 4 months.  Symptoms have finally resolved after the capsaicin procedure was performed by pain specialists.  The patient states she has not been exercising.  She actually felt depressed while dealing with a condition.    The patient states blood sugar levels have been elevated with her type 2 diabetes mellitus.  No hypoglycemia has been noted.  She has hypertension but has not been monitoring blood pressures.    Fibromyalgia has been controlled with Lyrica.  She feels symptom control is fair.    She has GERD.  Symptoms have been controlled with regular use of Protonix due to breakthrough symptoms off of the medication.    Review of Systems   Constitutional:  Positive for activity change and appetite change. Negative for unexpected weight change.   Eyes:  Negative for visual disturbance.   Respiratory:  Negative for shortness of breath.    Cardiovascular:  Negative for chest pain, palpitations and leg swelling.   Gastrointestinal:  Negative for abdominal pain, blood in stool and diarrhea.   Genitourinary:  Negative for dysuria, frequency, hematuria and urgency.   Musculoskeletal:  Positive for arthralgias and myalgias. Negative for back pain and joint swelling.   Neurological:  Negative for weakness, numbness and headaches.   Psychiatric/Behavioral:  Positive for sleep disturbance.             Physical Exam  Vitals and nursing note reviewed.   Constitutional:       General: She is not in acute distress.     Appearance: Normal  appearance. She is well-developed.   HENT:      Head: Normocephalic and atraumatic.   Eyes:      General: No scleral icterus.     Extraocular Movements: Extraocular movements intact.      Conjunctiva/sclera: Conjunctivae normal.   Neck:      Thyroid: No thyromegaly.      Vascular: No JVD.   Cardiovascular:      Rate and Rhythm: Normal rate and regular rhythm.      Heart sounds: Normal heart sounds. No murmur heard.     No friction rub. No gallop.   Pulmonary:      Effort: Pulmonary effort is normal. No respiratory distress.      Breath sounds: Normal breath sounds. No wheezing or rales.   Abdominal:      General: Bowel sounds are normal.      Palpations: Abdomen is soft. There is no mass.      Tenderness: There is no abdominal tenderness. There is no right CVA tenderness or left CVA tenderness.   Musculoskeletal:         General: No tenderness. Normal range of motion.      Cervical back: Normal range of motion and neck supple.      Right lower leg: No edema.      Left lower leg: No edema.   Lymphadenopathy:      Cervical: No cervical adenopathy.   Skin:     General: Skin is warm and dry.      Findings: No rash.      Comments: No foot lesions are present.   Neurological:      Mental Status: She is alert and oriented to person, place, and time.      Cranial Nerves: No cranial nerve deficit.      Comments: Sensory exam is intact in both feet on monofilament and vibration testing.   Psychiatric:         Mood and Affect: Mood normal.         Behavior: Behavior normal.       Protective Sensation (w/ 10 gram monofilament):  Right: Intact  Left: Intact    Visual Inspection:  Bilateral bunions present    Pedal Pulses:   Right: Present  Left: Present    Posterior Tibialis Pulses:   Right:Present  Left: Present      Lab Visit on 02/14/2024   Component Date Value Ref Range Status    Sodium 02/14/2024 139  136 - 145 mmol/L Final    Potassium 02/14/2024 3.7  3.5 - 5.1 mmol/L Final    Chloride 02/14/2024 106  95 - 110 mmol/L Final     CO2 02/14/2024 25  23 - 29 mmol/L Final    Glucose 02/14/2024 183 (H)  70 - 110 mg/dL Final    BUN 02/14/2024 19  8 - 23 mg/dL Final    Creatinine 02/14/2024 0.9  0.5 - 1.4 mg/dL Final    Calcium 02/14/2024 9.5  8.7 - 10.5 mg/dL Final    Total Protein 02/14/2024 6.9  6.0 - 8.4 g/dL Final    Albumin 02/14/2024 3.6  3.5 - 5.2 g/dL Final    Total Bilirubin 02/14/2024 0.5  0.1 - 1.0 mg/dL Final    Comment: For infants and newborns, interpretation of results should be based  on gestational age, weight and in agreement with clinical  observations.    Premature Infant recommended reference ranges:  Up to 24 hours.............<8.0 mg/dL  Up to 48 hours............<12.0 mg/dL  3-5 days..................<15.0 mg/dL  6-29 days.................<15.0 mg/dL      Alkaline Phosphatase 02/14/2024 114  55 - 135 U/L Final    AST 02/14/2024 29  10 - 40 U/L Final    ALT 02/14/2024 23  10 - 44 U/L Final    eGFR 02/14/2024 >60.0  >60 mL/min/1.73 m^2 Final    Anion Gap 02/14/2024 8  8 - 16 mmol/L Final    Cholesterol 02/14/2024 166  120 - 199 mg/dL Final    Comment: The National Cholesterol Education Program (NCEP) has set the  following guidelines (reference ranges) for Cholesterol:  Optimal.....................<200 mg/dL  Borderline High.............200-239 mg/dL  High........................> or = 240 mg/dL      Triglycerides 02/14/2024 92  30 - 150 mg/dL Final    Comment: The National Cholesterol Education Program (NCEP) has set the  following guidelines (reference values) for triglycerides:  Normal......................<150 mg/dL  Borderline High.............150-199 mg/dL  High........................200-499 mg/dL      HDL 02/14/2024 39 (L)  40 - 75 mg/dL Final    Comment: The National Cholesterol Education Program (NCEP) has set the  following guidelines (reference values) for HDL Cholesterol:  Low...............<40 mg/dL  Optimal...........>60 mg/dL      LDL Cholesterol 02/14/2024 108.6  63.0 - 159.0 mg/dL Final    Comment: The  National Cholesterol Education Program (NCEP) has set the  following guidelines (reference values) for LDL Cholesterol:  Optimal.......................<130 mg/dL  Borderline High...............130-159 mg/dL  High..........................160-189 mg/dL  Very High.....................>190 mg/dL      HDL/Cholesterol Ratio 02/14/2024 23.5  20.0 - 50.0 % Final    Total Cholesterol/HDL Ratio 02/14/2024 4.3  2.0 - 5.0 Final    Non-HDL Cholesterol 02/14/2024 127  mg/dL Final    Comment: Risk category and Non-HDL cholesterol goals:  Coronary heart disease (CHD)or equivalent (10-year risk of CHD >20%):  Non-HDL cholesterol goal     <130 mg/dL  Two or more CHD risk factors and 10-year risk of CHD <= 20%:  Non-HDL cholesterol goal     <160 mg/dL  0 to 1 CHD risk factor:  Non-HDL cholesterol goal     <190 mg/dL      Hemoglobin A1C 02/14/2024 8.1 (H)  4.0 - 5.6 % Final    Comment: ADA Screening Guidelines:  5.7-6.4%  Consistent with prediabetes  >or=6.5%  Consistent with diabetes    High levels of fetal hemoglobin interfere with the HbA1C  assay. Heterozygous hemoglobin variants (HbS, HgC, etc)do  not significantly interfere with this assay.   However, presence of multiple variants may affect accuracy.      Estimated Avg Glucose 02/14/2024 186 (H)  68 - 131 mg/dL Final       Assessment & Plan:      Aracelis was seen today for follow-up.  Screening mammogram and bone density study will be ordered.    Lab studies will be repeated in 4 months.    The dose of glipizide XR will be increased to 10 mg daily.    Diagnoses and all orders for this visit:    Type 2 diabetes mellitus without complication, without long-term current use of insulin  -     Comprehensive Metabolic Panel; Future  -     Lipid Panel; Future  -     CBC Auto Differential; Future  -     TSH; Future  -     Hemoglobin A1C; Future  -     Microalbumin/Creatinine Ratio, Urine; Future    Essential hypertension  -     Comprehensive Metabolic Panel; Future  -     Lipid Panel;  Future  -     CBC Auto Differential; Future  -     TSH; Future  -     Hemoglobin A1C; Future  -     Microalbumin/Creatinine Ratio, Urine; Future    Other hyperlipidemia  -     Comprehensive Metabolic Panel; Future  -     Lipid Panel; Future  -     CBC Auto Differential; Future  -     TSH; Future  -     Hemoglobin A1C; Future  -     Microalbumin/Creatinine Ratio, Urine; Future    Postmenopausal  -     DXA Bone Density Axial Skeleton 1 or more sites; Future  -     Comprehensive Metabolic Panel; Future  -     Lipid Panel; Future  -     CBC Auto Differential; Future  -     TSH; Future  -     Hemoglobin A1C; Future  -     Microalbumin/Creatinine Ratio, Urine; Future    Encounter for screening mammogram for breast cancer  -     Mammo Digital Screening Bilat w/ [facility]; Future    Other orders  -     glipiZIDE (GLUCOTROL) 10 MG TR24; TAKE 1 TABLET EVERY DAY WITH BREAKFAST FOR DIABETES         Follow up in about 4 months (around 6/19/2024).     Alen Damico MD

## 2024-02-21 ENCOUNTER — PATIENT MESSAGE (OUTPATIENT)
Dept: INTERNAL MEDICINE | Facility: CLINIC | Age: 69
End: 2024-02-21
Payer: MEDICARE

## 2024-02-29 ENCOUNTER — TELEPHONE (OUTPATIENT)
Dept: INTERNAL MEDICINE | Facility: CLINIC | Age: 69
End: 2024-02-29
Payer: MEDICARE

## 2024-02-29 DIAGNOSIS — E11.9 TYPE 2 DIABETES MELLITUS WITHOUT COMPLICATION, WITHOUT LONG-TERM CURRENT USE OF INSULIN: Primary | Chronic | ICD-10-CM

## 2024-02-29 DIAGNOSIS — H53.8 BLURRY VISION: ICD-10-CM

## 2024-02-29 NOTE — TELEPHONE ENCOUNTER
----- Message from Dania Guillen sent at 2/29/2024  1:10 PM CST -----  Regarding: Referral Request  Patient called in regards to getting a referral sent to [facility]  for DM- Advised yearly DFE. Pt states she would like to be seen by a ophthalmologist .    Please call back to further assist- [phone]        
How Severe Are Your Spot(S)?: mild
Pt requesting referral to Ophthalmology.  Regarding yearly DFE.  
What Type Of Note Output Would You Prefer (Optional)?: Standard Output
What Is The Reason For Today's Visit?: Full Body Skin Examination
What Is The Reason For Today's Visit? (Being Monitored For X): concerning skin lesions on an annual basis
Additional History: Patient presents for a  TBE and skin cancer surveillance due to a history of chronic sun exposure and new skin lesions over the years. Patient reports he’s never had a TBE. Patient also reports rough, itchy rash to back, chest with dryness and flaking to forehead and scalp. Patient states this has been going on f several years but he’s never had treatment. Patient reports he doesn’t typically use lotion.

## 2024-03-01 ENCOUNTER — PATIENT OUTREACH (OUTPATIENT)
Dept: ADMINISTRATIVE | Facility: HOSPITAL | Age: 69
End: 2024-03-01
Payer: MEDICARE

## 2024-03-01 NOTE — PROGRESS NOTES
Population Health Chart Review & Patient Outreach Details      Further Action Needed If Patient Returns Outreach:        Health Maintenance Due   Topic Date Due    RSV Vaccine (Age 60+ and Pregnant patients) (1 - 1-dose 60+ series) Never done    Eye Exam  10/13/2023    Mammogram  2024    Diabetes Urine Screening  2024           Updates Requested / Reviewed:     [x]  Care Everywhere    []     []  External Sources ([facility], [facility], DIS, etc.)    [] [facility]   [] [facility]   [] Other:    []  Care Team Updated   []  Removed  or Duplicate Orders   []  Immunization Reconciliation Completed / Queried    [] [place]   [] [place]   [] [place]   [] [place]      Health Maintenance Topics Addressed and Outreach Outcomes / Actions Taken:             Breast Cancer Screening []  Mammogram Order Placed    []  Mammogram Screening Scheduled    []  External Records Requested & Care Team Updated if Applicable    []  External Records Uploaded & Care Team Updated if Applicable    []  Pt Declined Scheduling Mammogram    []  Pt Will Schedule with External Provider / Order Routed & Care Team Updated if Applicable              Cervical Cancer Screening []  Pap Smear Scheduled in Primary Care or OBGYN    []  External Records Requested & Care Team Updated if Applicable       []  External Records Uploaded, Care Team Updated, & History Updated if Applicable    []  Patient Declined Scheduling Pap Smear    []  Patient Will Schedule with External Provider & Care Team Updated if Applicable                  Colorectal Cancer Screening []  Colonoscopy Case Request / Referral / Home Test Order Placed    []  External Records Requested & Care Team Updated if Applicable    []  External Records Uploaded, Care Team Updated, & History Updated if Applicable    []  Patient Declined Completing Colon Cancer Screening    []  Patient Will Schedule with External Provider & Care Team Updated if Applicable    []  Fit Kit Mailed (add the  Forrest under additional notes)    []  Reminded Patient to Complete Home Test                Diabetic Eye Exam []  Eye Exam Screening Order Placed    [x]  Eye Camera Scheduled or Optometry/Ophthalmology Referral Placed    []  External Records Requested & Care Team Updated if Applicable    []  External Records Uploaded, Care Team Updated, & History Updated if Applicable    []  Patient Declined Scheduling Eye Exam    []  Patient Will Schedule with External Provider & Care Team Updated if Applicable             Blood Pressure Control []  Primary Care Follow Up Visit Scheduled     []  Remote Blood Pressure Reading Captured    []  Patient Declined Remote Reading or Scheduling Appt - Escalated to PCP    []  Patient Will Call Back or Send Portal Message with Reading                 HbA1c & Other Labs []  Overdue Lab(s) Ordered    []  Overdue Lab(s) Scheduled    []  External Records Uploaded & Care Team Updated if Applicable    []  Primary Care Follow Up Visit Scheduled     []  Reminded Patient to Complete A1c Home Test    []  Patient Declined Scheduling Labs or Will Call Back to Schedule    []  Patient Will Schedule with External Provider / Order Routed, & Care Team Updated if Applicable           Primary Care Appointment []  Primary Care Appt Scheduled    []  Patient Declined Scheduling or Will Call Back to Schedule    []  Pt Established with External Provider, Updated Care Team, & Informed Pt to Notify Payor if Applicable           Medication Adherence /    Statin Use []  Primary Care Appointment Scheduled    []  Patient Reminded to  Prescription    []  Patient Declined, Provider Notified if Needed    []  Sent Provider Message to Review to Evaluate Pt for Statin, Add Exclusion Dx Codes, Document   Exclusion in Problem List, Change Statin Intensity Level to Moderate or High Intensity if Applicable                Osteoporosis Screening []  Dexa Order Placed    []  Dexa Appointment Scheduled    []  External  Records Requested & Care Team Updated    []  External Records Uploaded, Care Team Updated, & History Updated if Applicable    []  Patient Declined Scheduling Dexa or Will Call Back to Schedule    []  Patient Will Schedule with External Provider / Order Routed & Care Team Updated if Applicable       Additional Notes:

## 2024-03-05 ENCOUNTER — HOSPITAL ENCOUNTER (OUTPATIENT)
Dept: RADIOLOGY | Facility: HOSPITAL | Age: 69
Discharge: HOME | End: 2024-03-05
Attending: INTERNAL MEDICINE
Payer: MEDICARE

## 2024-03-05 DIAGNOSIS — Z12.31 ENCOUNTER FOR SCREENING MAMMOGRAM FOR BREAST CANCER: ICD-10-CM

## 2024-03-05 PROCEDURE — 77063 BREAST TOMOSYNTHESIS BI: CPT | Mod: 26,HCNC,, | Performed by: RADIOLOGY

## 2024-03-05 PROCEDURE — 77067 SCR MAMMO BI INCL CAD: CPT | Mod: 26,HCNC,, | Performed by: RADIOLOGY

## 2024-03-05 PROCEDURE — 77067 SCR MAMMO BI INCL CAD: CPT | Mod: TC,HCNC,PO

## 2024-04-15 RX ORDER — ZOLPIDEM TARTRATE 10 MG/1
10 TABLET ORAL NIGHTLY PRN
Qty: 30 TABLET | Refills: 2 | Status: SHIPPED | OUTPATIENT
Start: 2024-04-15

## 2024-04-15 NOTE — TELEPHONE ENCOUNTER
Care Due:                  Date            Visit Type   Department     Provider  --------------------------------------------------------------------------------                                EP -                              PRIMARY      [facility] INTERNAL  Last Visit: 02-      CARE ([facility])   MEDICINE       Alenjosias Damico                              [facility] INTERNAL  Next Visit: 07-      PATIENT      MEDICINE       Alenmirna Damico                                                            Last  Test          Frequency    Reason                     Performed    Due Date  --------------------------------------------------------------------------------    CBC.........  12 months..  meloxicam................  03- 03-    [employer] Embedded Care Due Messages. Reference number: [id number].   4/14/2024 8:16:02 PM CDT

## 2024-04-16 ENCOUNTER — PATIENT MESSAGE (OUTPATIENT)
Dept: ADMINISTRATIVE | Facility: OTHER | Age: 69
End: 2024-04-16
Payer: MEDICARE

## 2024-04-24 ENCOUNTER — PATIENT MESSAGE (OUTPATIENT)
Dept: ADMINISTRATIVE | Facility: OTHER | Age: 69
End: 2024-04-24
Payer: MEDICARE

## 2024-05-10 ENCOUNTER — PATIENT MESSAGE (OUTPATIENT)
Dept: ADMINISTRATIVE | Facility: OTHER | Age: 69
End: 2024-05-10
Payer: MEDICARE

## 2024-05-12 NOTE — TELEPHONE ENCOUNTER
Refill Routing Note   Medication(s) are not appropriate for processing by [facility] for the following reason(s):        Required labs outdated    [facility] action(s):  Defer               Appointments  past 12m or future 3m with PCP    Date Provider   Last Visit   2/19/2024 Alen Damico MD   Next Visit   7/22/2024 Alen Damico MD   ED visits in past 90 days: 0        Note composed:2:56 AM 05/12/2024

## 2024-05-12 NOTE — TELEPHONE ENCOUNTER
No care due was identified.  [facility] Embedded Care Due Messages. Reference number: [id number].   5/12/2024 1:38:22 AM CDT

## 2024-05-13 RX ORDER — LEVOTHYROXINE SODIUM 75 UG/1
75 TABLET ORAL
Qty: 90 TABLET | Refills: 3 | Status: SHIPPED | OUTPATIENT
Start: 2024-05-13

## 2024-05-22 RX ORDER — MELOXICAM 15 MG/1
TABLET ORAL
Qty: 90 TABLET | Refills: 3 | Status: SHIPPED | OUTPATIENT
Start: 2024-05-22

## 2024-05-22 NOTE — TELEPHONE ENCOUNTER
Care Due:                  Date            Visit Type   Department     Provider  --------------------------------------------------------------------------------                                EP -                              PRIMARY      [facility] INTERNAL  Last Visit: 02-      CARE ([facility])   MEDICINE       Alenjosias DIAZ   [facility] INTERNAL  Next Visit: 07-      PATIENT      MEDICINE       Alen SAMANTHA Damico                                                            Last  Test          Frequency    Reason                     Performed    Due Date  --------------------------------------------------------------------------------    HBA1C.......  6 months...  glipiZIDE................  02- 08-    [employer] Embedded Care Due Messages. Reference number: [id number].   5/22/2024 2:32:46 AM CDT

## 2024-05-22 NOTE — TELEPHONE ENCOUNTER
Refill Routing Note   Medication(s) are not appropriate for processing by [facility] for the following reason(s):        Outside of protocol    [facility] action(s):  Route        Medication Therapy Plan: FLOS; FOVS      Appointments  past 12m or future 3m with PCP    Date Provider   Last Visit   2/19/2024 Alen Damico MD   Next Visit   7/22/2024 Alen Damico MD   ED visits in past 90 days: 0        Note composed:1:44 PM 05/22/2024

## 2024-05-28 ENCOUNTER — PATIENT MESSAGE (OUTPATIENT)
Dept: INTERNAL MEDICINE | Facility: CLINIC | Age: 69
End: 2024-05-28
Payer: MEDICARE

## 2024-06-04 ENCOUNTER — PATIENT MESSAGE (OUTPATIENT)
Dept: ADMINISTRATIVE | Facility: HOSPITAL | Age: 69
End: 2024-06-04
Payer: MEDICARE

## 2024-06-06 ENCOUNTER — PATIENT OUTREACH (OUTPATIENT)
Dept: ADMINISTRATIVE | Facility: HOSPITAL | Age: 69
End: 2024-06-06
Payer: MEDICARE

## 2024-06-06 NOTE — LETTER
AUTHORIZATION FOR RELEASE OF   CONFIDENTIAL INFORMATION      We are seeing Aracelis Martinez, date of birth 1955, in the clinic at [facility] INTERNAL MEDICINE. Alen Damico MD is the patient's PCP. Aracelis Martinez has an outstanding lab/procedure at the time we reviewed her chart. In order to help keep her health information updated, she has authorized us to request the following medical record(s):        (  )  MAMMOGRAM                                      (  )  COLONOSCOPY      (  )  PAP SMEAR                                          (  )  OUTSIDE LAB RESULTS     (  )  DEXA SCAN                                          ( x )  EYE EXAM            (  )  FOOT EXAM                                          (  )  ENTIRE RECORD     (  )  OUTSIDE IMMUNIZATIONS                 (  )  _______________         Please fax records to [facility], Green, Dwight A., MD, [phone]     If you have any questions, please contact Lalitha at [phone].           Patient Name: Aracelis Martinez  : 1955  Patient Phone #: [phone]

## 2024-06-08 ENCOUNTER — PATIENT MESSAGE (OUTPATIENT)
Dept: INTERNAL MEDICINE | Facility: CLINIC | Age: 69
End: 2024-06-08
Payer: MEDICARE

## 2024-06-08 DIAGNOSIS — F41.9 ANXIETY: Primary | ICD-10-CM

## 2024-06-27 ENCOUNTER — PATIENT MESSAGE (OUTPATIENT)
Dept: PSYCHIATRY | Facility: CLINIC | Age: 69
End: 2024-06-27
Payer: MEDICARE

## 2024-06-28 ENCOUNTER — PATIENT MESSAGE (OUTPATIENT)
Dept: INTERNAL MEDICINE | Facility: CLINIC | Age: 69
End: 2024-06-28
Payer: MEDICARE

## 2024-06-28 ENCOUNTER — PATIENT MESSAGE (OUTPATIENT)
Dept: PAIN MEDICINE | Facility: CLINIC | Age: 69
End: 2024-06-28
Payer: MEDICARE

## 2024-06-28 ENCOUNTER — PATIENT MESSAGE (OUTPATIENT)
Dept: ADMINISTRATIVE | Facility: OTHER | Age: 69
End: 2024-06-28
Payer: MEDICARE

## 2024-07-02 ENCOUNTER — PATIENT MESSAGE (OUTPATIENT)
Dept: PSYCHIATRY | Facility: CLINIC | Age: 69
End: 2024-07-02
Payer: MEDICARE

## 2024-07-09 ENCOUNTER — PATIENT OUTREACH (OUTPATIENT)
Dept: ADMINISTRATIVE | Facility: HOSPITAL | Age: 69
End: 2024-07-09
Payer: MEDICARE

## 2024-07-09 ENCOUNTER — OFFICE VISIT (OUTPATIENT)
Dept: PAIN MEDICINE | Facility: CLINIC | Age: 69
End: 2024-07-09
Payer: MEDICARE

## 2024-07-09 VITALS
SYSTOLIC BLOOD PRESSURE: 132 MMHG | DIASTOLIC BLOOD PRESSURE: 73 MMHG | HEART RATE: 59 BPM | BODY MASS INDEX: 35.8 KG/M2 | WEIGHT: 221.81 LBS

## 2024-07-09 DIAGNOSIS — M79.7 FIBROMYALGIA: Primary | ICD-10-CM

## 2024-07-09 DIAGNOSIS — G89.4 CHRONIC PAIN SYNDROME: ICD-10-CM

## 2024-07-09 PROCEDURE — 99999 PR PBB SHADOW E&M-EST. PATIENT-LVL III: CPT | Mod: PBBFAC,HCNC,,

## 2024-07-09 RX ORDER — PREGABALIN 150 MG/1
150 CAPSULE ORAL 3 TIMES DAILY
Qty: 90 CAPSULE | Refills: 5 | Status: SHIPPED | OUTPATIENT
Start: 2024-07-09 | End: 2025-01-05

## 2024-07-09 NOTE — PROGRESS NOTES
Population Health Chart Review & Patient Outreach Details      Additional [facility] Health Notes:               Updates Requested / Reviewed:      Care Everywhere, , and Immunizations Reconciliation Completed or Queried: [place]         Health Maintenance Topics Overdue:      [facility] Score: 1     Eye Exam    RSV Vaccine                  Health Maintenance Topic(s) Outreach Outcomes & Actions Taken:    Primary Care Appt - Outreach Outcomes & Actions Taken  : Primary Care Appt Scheduled    Lab(s) - Outreach Outcomes & Actions Taken  : Overdue Lab(s) Scheduled      
Male

## 2024-07-09 NOTE — PROGRESS NOTES
Subjective:     Patient ID: Aracelis Martinez is a 69 y.o. female    Chief Complaint: Fibromyalgia        Referred by: No ref. provider found      HPI:    Interval History PA (07/09/2024):  Patient returns to clinic for follow up.  Patient reports generalized worsening of symptoms related to fibromyalgia.  Notes ongoing flare up of diffuse widespread musculoskeletal pain.  Unable to localize pain at this time as she notes it is diffuse across her cervical mid back and lower lumbar regions.  Previously patient was doing well with Lyrica.  Currently taking 100 mg b.i.d. with mild benefit, denies any adverse effects from this medication.  Patient notes over the past month or so her pain has generally been worsening.  She also notes increased stress factors.  No other concerns at this time.    Interval History PA (11/28/2023):  The patient location is:  Home  The chief complaint leading to consultation is:  Follow up  Visit type: Virtual visit with synchronous audio and video  Total time spent with patient: 9 minutes  Each patient to whom he or she provides medical services by telemedicine is:  (1) informed of the relationship between the physician and patient and the respective role of any other health care provider with respect to management of the patient; and (2) notified that he or she may decline to receive medical services by telemedicine and may withdraw from such care at any time.     Patient returns to clinic for follow up.  Patient is s/p Qutenza #1 completed on 10/24/2023 for post herpetic neuralgia.  Patient noting significant improvement overall, approximately 95% relief.  Notes minimal continued pain over her right flank/axillary region, and underneath her right breast.  She does note some continued sensitivity over the area but overall symptoms have improved.  She continues to take Lyrica 100 mg b.i.d. with benefit, denies any adverse effects from this medication.  No other concerns at this  time.    Interval History PA (10/24/2023):  Patient returns to clinic for follow up and Qutenza treatment #1 for postherpetic neuralgia.  Patient denies any changes in the quality or location of her pain.  Continues to endorse pain/burning over her right flank/axillary region around to underneath her right breast.  She continues to take Lyrica 200 mg t.i.d. with minimal benefit, denies any adverse effects from this medication.  No other concerns at this time.    Interval History PA (10/10/2023):  Patient returns to clinic for follow up.  Since previous visit patient had a shingles outbreak in August 2023.  She was treated with valacyclovir and increased dose of Lyrica.  Once the rash resolved patient reports pain continued and has been worsening.  Reports pain/burning sensation at the site of her previous rash.  Located from her right flank to beneath her right breast region with a dermatomal pattern.  Notes that her PCP increased her Lyrica to 200 mg t.i.d. which has been minimally beneficial.  Denies any adverse effects from this medication.  Reports pain continues to be severe and limiting to her daily activities.    Interval History PA (07/18/2023):  Patient returns to clinic for follow up.  Patient denies any changes in the quality or location of her pain since previous visit.  Denies any new or worsening symptoms.  She continues to take Lyrica 75 mg b.i.d. with continued benefit.  Notes that this medication has provided significant improvement in her overall symptoms and that her pain continues to be well-controlled at this time.  Denies any adverse effects from the medication.  No other concerns at this time.    Interval History (1/3/23):    The patient location is:  Home  The chief complaint leading to consultation is:  Follow-up  Visit type: Virtual visit with synchronous audio and video  Total time spent with patient:  8 minutes  Each patient to whom he or she provides medical services by telemedicine is:   (1) informed of the relationship between the physician and patient and the respective role of any other health care provider with respect to management of the patient; and (2) notified that he or she may decline to receive medical services by telemedicine and may withdraw from such care at any time.      She returns today for follow up.  She reports that Lyrica 75 mg b.i.d. has been helpful for the fibromyalgia pain.  She reports a very significant improvement in her symptoms and states that her pain is very well controlled.  She denies any adverse effects.  She does request that additional prescriptions be sent to mail order pharmacy.      Initial Encounter (11/15/22):  Aracelis Martinez is a 69 y.o. female who presents today with fairly widespread pain likely related to fibromyalgia.  Has previously been treated by my colleague at [facility].  He is leaving his practice, and patient is now seeking to transition her care to my clinic.  She has undergone multiple interventional procedures for her low back and lower extremity pain.  States these have helped somewhat but she is not very interested in additional interventional procedures at this time.  Currently the majority of her pain is located across the upper back and into the upper extremities.  She denies any associated numbness, tingling, weakness, bowel bladder dysfunction.  She is unable to say if this pain is related to fibromyalgia or some other source of pain.  She is currently taking Lyrica 25 mg b.i.d. provided by her primary care physician.  She does not feel this medication is providing as much relief as it was in the past.  She denies any adverse effects of this medication.   This pain is described in detail below.    Physical Therapy:  No.    Non-pharmacologic Treatment:  Rest helps         TENS?  No    Pain Medications:         Currently taking:  Lyrica 200 mg t.i.d., meloxicam    Has tried in the past:  Tramadol (upset stomach).  NSAIDs,  Tylenol    Has not tried:   Muscle relaxants, TCAs, SNRIs, topical creams    Blood thinners:  None    Interventional Therapies:                -12/16/2021  Left L4-5 and L5-S1 Lumbar Medial Branch Radiofrequency Ablation              - 11/09/2021 Right L4-5 and L5-S1 Lumbar Medial Branch Radiofrequency Ablation              - 8/24/2021Lumbar Transforaminal Epidural Steroid Injection, Right L5-S1              - 8/17/2021Lumbar Transforaminal Epidural Steroid Injection, Right L5-S1              - 6/15/2021  Lumbar Epidural Steroid Injection at L5-S1 50% relief    10/24/2023 - Qutenza #1 - 95% relief    Relevant Surgeries:  None    Affecting sleep?  Yes    Affecting daily activities? yes    Depressive symptoms? no          SI/HI? No    Work status: Retired    Pain Scores:    Best:       0/10  Worst:     7/10  Usually:   5/10  Today:    6/10    Pain Disability Index  Family/Home Responsibilities:: 7  Recreation:: 5  Social Activity:: 5  Occupation:: 5  Sexual Behavior:: 0  Self Care:: 0  Life-Support Activities:: 6  Pain Disability Index (PDI): 28    Review of Systems   Constitutional:  Negative for activity change, appetite change, chills, fatigue, fever and unexpected weight change.   HENT:  Negative for hearing loss.    Eyes:  Negative for visual disturbance.   Respiratory:  Negative for chest tightness and shortness of breath.    Cardiovascular:  Negative for chest pain.   Gastrointestinal:  Negative for abdominal pain, constipation, diarrhea, nausea and vomiting.   Genitourinary:  Negative for difficulty urinating.   Musculoskeletal:  Positive for arthralgias, back pain, gait problem, myalgias, neck pain and neck stiffness.   Skin:  Negative for rash.   Neurological:  Negative for dizziness, weakness, light-headedness, numbness and headaches.   Psychiatric/Behavioral:  Positive for sleep disturbance. Negative for hallucinations and suicidal ideas. The patient is not nervous/anxious.        Past Medical History:    Diagnosis Date    Abdominal pain 2015    Anemia     Anxiety     Arthritis     Bone spur of finger IP joint 10/22/2018    Chronic back pain     Chronic pain 06/15/2021    Colon cancer screening 3/13/2019    Decreased ROM of lumbar spine 2022    Diabetes mellitus 2014    Diverticulosis     Diverticulosis     Effusion of right hand 2018    Finger pain, right 10/22/2018    Finger stiffness, right 2018    Functional belching disorder 2020    History of pancreatic cancer 2021    Hypertension     Knee pain 2014    Lumbar pain 2022    Nausea 2019    Neuroendocrine tumor of pancreas 2015    Obesity     Pancreatic cancer 2015    Range of motion deficit 2018    Renal cyst     left    Special screening for malignant neoplasms, colon 2014    Status post arthroscopy of left knee 2014    Stiffness of right hand, not elsewhere classified 2019    Thyroid disease     Trouble in sleeping     Type 2 diabetes mellitus     Type 2 diabetes mellitus with ophthalmic manifestations        Past Surgical History:   Procedure Laterality Date     SECTION      COLONOSCOPY N/A 3/13/2019    Procedure: COLONOSCOPY;  Surgeon: Cem Lamar MD;  Location: [address]);  Service: Endoscopy;  Laterality: N/A;  previous order cx    COLONOSCOPY N/A 2022    Procedure: COLONOSCOPY Suprep;  Surgeon: Hiren Newberry MD;  Location: [facility];  Service: Endoscopy;  Laterality: N/A;    EPIDURAL STEROID INJECTION INTO LUMBAR SPINE N/A 6/15/2021    Procedure: Injection-steroid-epidural-lumbar-L5-S1;  Surgeon: Saranya Cornelius Jr., MD;  Location: [facility] PAIN MGT;  Service: Pain Management;  Laterality: N/A;    ESOPHAGOGASTRODUODENOSCOPY N/A 2019    Procedure: ESOPHAGOGASTRODUODENOSCOPY (EGD);  Surgeon: Jonathan Oneill MD;  Location: [facility] ([address] [place] [address]);  Service: Endoscopy;  Laterality: N/A;    ESOPHAGOGASTRODUODENOSCOPY N/A 2020    Procedure: EGD  (ESOPHAGOGASTRODUODENOSCOPY);  Surgeon: Shady Costa MD;  [facility]  Service: Endoscopy;  Laterality: N/A;  Please schedule patient as soon as possible in the 2nd floor history of a Whipple surgery for neuroendocrine pancreatic tumor many years ago with now constant belching and regurgitation.  May need airway protection.  Rule out celiac sprue rule out lact    ESOPHAGOGASTRODUODENOSCOPY N/A 4/19/2022    Procedure: EGD (ESOPHAGOGASTRODUODENOSCOPY);  Surgeon: Hiren Newberry MD;  Location: [facility] ENDO;  Service: Endoscopy;  Laterality: N/A;    EXCISION OF GANGLION CYST OF HAND Right 10/22/2018    Procedure: EXCISION, GANGLION CYST, HAND- RIGHT, LONG AND INDEX FINGER;  Surgeon: Sowmya Schmidt MD;  Location: [facility] OR;  Service: Orthopedics;  Laterality: Right;  Stretcher; Supine; Hand Pan 1 & [place] 2; Dr. Loja's Rasp    HYSTERECTOMY  2015    [facility]    INJECTION OF ANESTHETIC AGENT AROUND MEDIAL BRANCH NERVES INNERVATING LUMBAR FACET JOINT Bilateral 9/28/2021    Procedure: Block-nerve-medial branch-lumbar-bilateral L4-5 and L5-S1;  Surgeon: Saranya Cornelius Jr., MD;  [place] [facility] [place] [facility]  Service: Pain Management;  Laterality: Bilateral;    INJECTION OF ANESTHETIC AGENT AROUND MEDIAL BRANCH NERVES INNERVATING LUMBAR FACET JOINT Bilateral 10/12/2021    Procedure: Bilateral Lumbar Medial Branch Block L4-5 L5-S1- (No Sedation);  Surgeon: Saranya Cornelius Jr., MD;  Location: [facility] PAIN T;  Service: Pain Management;  Laterality: Bilateral;    KNEE ARTHROSCOPY  4/18/2014    LATS/left    OOPHORECTOMY      PANCREAS SURGERY  2015    [facility]    RADIOFREQUENCY THERMOCOAGULATION Bilateral 11/9/2021    Procedure: Radiofrequency Themocoagulation of medial branches: L4-5 and L5-S1;  Surgeon: Saranya Cornelius Jr., MD;  Location: [facility] PAIN T;  Service: Pain Management;  Laterality: Bilateral;  Patient is diabetic.     RADIOFREQUENCY THERMOCOAGULATION Left 12/16/2021    Procedure:  RADIOFREQUENCY THERMAL COAGULATION LEFT L4-5, L5-S1 (IV Sedation);  Surgeon: Saranya Cornelius Jr., MD;  Location: [facility] PAIN MGT;  Service: Pain Management;  Laterality: Left;  diabetic    TONSILLECTOMY      TRANSFORAMINAL EPIDURAL INJECTION OF STEROID Right 2021    Procedure: Injection,steroid,epidural,transforaminal approach; Levels: L5-S1;  Surgeon: Saranya Cornelius Jr., MD;  Location: [facility] PAIN MGT;  Service: Pain Management;  Laterality: Right;  No pacemaker. Patient is diabetic.    TRANSFORAMINAL EPIDURAL INJECTION OF STEROID Right 2021    Procedure: Injection,steroid,epidural,transforaminal approach; Levels: L5-S1;  Surgeon: Saranya Cornelius Jr., MD;  Location: [facility] PAIN MGT;  Service: Pain Management;  Laterality: Right;  No pacemaker. Patient is diabetic.        Social History     Socioeconomic History    Marital status:    Tobacco Use    Smoking status: Former     Current packs/day: 0.00     Average packs/day: 0.3 packs/day for 10.0 years (2.5 ttl pk-yrs)     Types: Cigarettes     Start date: 1972     Quit date: 1982     Years since quittin.5    Smokeless tobacco: Never    Tobacco comments:     Totally quit per  visit.   Substance and Sexual Activity    Alcohol use: Yes     Comment: occasional use    Drug use: Never    Sexual activity: Yes     Partners: Male     Birth control/protection: None     Social Determinants of Health     Financial Resource Strain: Low Risk  (2024)    Overall Financial Resource Strain (CARDIA)     Difficulty of Paying Living Expenses: Not hard at all   Food Insecurity: No Food Insecurity (2024)    Hunger Vital Sign     Worried About Running Out of Food in the Last Year: Never true     Ran Out of Food in the Last Year: Never true   Transportation Needs: No Transportation Needs (2024)    PRAPARE - Transportation     Lack of Transportation (Medical): No     Lack of Transportation (Non-Medical): No   Physical Activity: Insufficiently  Active (1/24/2024)    Exercise Vital Sign     Days of Exercise per Week: 2 days     Minutes of Exercise per Session: 20 min   Stress: Stress Concern Present (1/24/2024)    [place] [employer] - Occupational Stress Questionnaire     Feeling of Stress : Very much   Housing Stability: Low Risk  (1/24/2024)    Housing Stability Vital Sign     Unable to Pay for Housing in the Last Year: No     Number of Places Lived in the Last Year: 1     Unstable Housing in the Last Year: No       Review of patient's allergies indicates:   Allergen Reactions    Erythromycin base        Current Outpatient Medications on File Prior to Visit   Medication Sig Dispense Refill    alcohol swabs (DROPSAFE ALCOHOL PREP PADS) PadM Use once daily to clean finger prior to glucose check for diabetes 100 each 3    amLODIPine (NORVASC) 10 MG tablet TAKE 1 TABLET EVERY DAY 90 tablet 3    glipiZIDE (GLUCOTROL) 10 MG TR24 TAKE 1 TABLET EVERY DAY WITH BREAKFAST FOR DIABETES 90 tablet 3    hydrOXYzine pamoate (VISTARIL) 25 MG Cap TAKE 1 CAPSULE TWICE DAILY AS NEEDED FOR ANXIETY 180 capsule 3    lancets Misc To check BG 1 time daily, to use with insurance preferred meter 100 each 3    levothyroxine (SYNTHROID) 75 MCG tablet TAKE 1 TABLET EVERY DAY BEFORE BREAKFAST 90 tablet 3    losartan (COZAAR) 50 MG tablet TAKE 1 TABLET EVERY DAY 90 tablet 3    meloxicam (MOBIC) 15 MG tablet TAKE 1 TABLET BY MOUTH EVERY DAY FOR ANKLE PAIN OR SWELLING 90 tablet 3    pantoprazole (PROTONIX) 40 MG tablet TAKE 1 TABLET EVERY DAY 90 tablet 1    SHINGRIX, PF, 50 mcg/0.5 mL injection       TRUE METRIX GLUCOSE TEST STRIP Strp TEST BLOOD SUGAR EVERY  strip 3    TRUEPLUS LANCETS 33 gauge Misc TEST BLOOD SUGAR EVERY  each 3    zolpidem (AMBIEN) 10 mg Tab Take 1 tablet (10 mg total) by mouth nightly as needed (insomnia). 30 tablet 2    blood-glucose meter kit Use as instructed 1 each 0    COMIRNATY 2023-24, 12Y UP,,PF, 30 mcg/0.3 mL inection        FLUAD QUAD 2023-24,65Y UP,,PF, 60 mcg (15 mcg x 4)/0.5 mL Syrg       ondansetron (ZOFRAN-ODT) 4 MG TbDL Take 1 tablet (4 mg total) by mouth every 6 (six) hours as needed (nausea). 20 tablet 1    pregabalin (LYRICA) 200 MG Cap Take 1 capsule (200 mg total) by mouth 3 (three) times daily. For shingles pain. 90 capsule 2     No current facility-administered medications on file prior to visit.       Objective:      /73   Pulse (!) 59   Wt 100.6 kg (221 lb 12.5 oz)   LMP  (LMP Unknown)   BMI 35.80 kg/m²     Exam:  GEN:  Well developed, well nourished.  No acute distress.   HEENT:  No trauma.  Mucous membranes moist.  Nares patent bilaterally.  PSYCH: Normal affect. Thought content appropriate.  CHEST:  Breathing symmetric.  No audible wheezing.  ABD: Soft, non-distended.  SKIN:  Warm, pink, dry.  No rash on exposed areas.    EXT:  No cyanosis, clubbing, or edema.  No color change or changes in nail or hair growth.  NEURO/MUSCULOSKELETAL:  Fully alert, oriented, and appropriate. Speech normal amarilis. No cranial nerve deficits.   Gait:  Antalgic.  No focal motor deficits.           Imaging:      Narrative & Impression    EXAMINATION:  MRI LUMBAR SPINE WITHOUT CONTRAST     CLINICAL HISTORY:  Dorsalgia, unspecifiedBack pain or radiculopathy, > 6 wks;     TECHNIQUE:  Sagittal T1, sagittal T2, sagittal STIR, axial T1 and axial T2 weighted images of the lumbar spine obtained without contrast.     COMPARISON:  04/12/2016     FINDINGS:  Lumbar spine alignment is within normal limits. The vertebral body heights are well maintained, with no fracture.  Degenerative fatty metaplasia endplate changes at L5-S1 and L1-2.  Otherwise, bone marrow signal is normal.     The conus is normal in appearance.  The adjacent soft tissue structures show no significant abnormalities.     There are multiple broad-based spur and disc complex is without spinal stenosis at T11-12 and T12-L1.     L1-L2: No focal disc herniation.  Broad-based  disc osteophyte complex without central canal or foraminal stenosis.No significant central canal or neural foraminal narrowing.     L2-L3: There is no focal disc herniation. No significant central canal or neural foraminal narrowing.     L3-L4: There is no focal disc herniation. No significant central canal or neural foraminal narrowing.     L4-L5: Broad-based disc bulging.  Severe facet arthritis.  No central canal stenosis.  No foraminal stenosis.     L5-S1: Broad-based disc osteophyte complex and severe facet arthritis produce mild medial foraminal narrowing bilaterally.  No central canal stenosis.     Impression:     Degenerative changes of the lumbar spine mostly involving the lower lumbar facets.  Mild L5-S1 foraminal encroachment bilaterally.        Electronically signed by: Chaz Beth Jr  Date:                                            05/25/2021  Time:                                           16:14       Assessment:       Encounter Diagnoses   Name Primary?    Fibromyalgia Yes    Chronic pain syndrome          Plan:       Aracelis was seen today for fibromyalgia.    Diagnoses and all orders for this visit:    Fibromyalgia    Chronic pain syndrome          Aracelis Martinez is a 69 y.o. female with chronic neck and lower back pain related to fibromyalgia.    Prior records reviewed.    Increase Lyrica to 150 mg b.i.d. x1 week.  After which can increase to 150 mg t.i.d. as tolerated/needed.  Refills provided.  Return to clinic in 1 month or sooner if needed.  At that time we will discuss efficacy of medication changes and make any necessary adjustments.  May also consider adding on Cymbalta at that time.        Ignacio Childress PA-C  [facility]  Interventional Pain Management   07/09/2024    This note was created by combination of typed  and M-Modal dictation.  Transcription and phonetic errors may be present.  If there are any questions, please contact me.

## 2024-07-11 ENCOUNTER — PATIENT MESSAGE (OUTPATIENT)
Dept: PSYCHIATRY | Facility: CLINIC | Age: 69
End: 2024-07-11
Payer: MEDICARE

## 2024-07-15 ENCOUNTER — LAB VISIT (OUTPATIENT)
Dept: LAB | Facility: HOSPITAL | Age: 69
End: 2024-07-15
Attending: INTERNAL MEDICINE
Payer: MEDICARE

## 2024-07-15 DIAGNOSIS — E11.9 TYPE 2 DIABETES MELLITUS WITHOUT COMPLICATION, WITHOUT LONG-TERM CURRENT USE OF INSULIN: Chronic | ICD-10-CM

## 2024-07-15 DIAGNOSIS — I10 ESSENTIAL HYPERTENSION: Chronic | ICD-10-CM

## 2024-07-15 DIAGNOSIS — E78.49 OTHER HYPERLIPIDEMIA: ICD-10-CM

## 2024-07-15 DIAGNOSIS — Z78.0 POSTMENOPAUSAL: ICD-10-CM

## 2024-07-15 LAB
ALBUMIN SERPL BCP-MCNC: 3.7 G/DL (ref 3.5–5.2)
ALP SERPL-CCNC: 123 U/L (ref 55–135)
ALT SERPL W/O P-5'-P-CCNC: 17 U/L (ref 10–44)
ANION GAP SERPL CALC-SCNC: 12 MMOL/L (ref 8–16)
AST SERPL-CCNC: 17 U/L (ref 10–40)
BASOPHILS # BLD AUTO: 0.04 K/UL (ref 0–0.2)
BASOPHILS NFR BLD: 0.5 % (ref 0–1.9)
BILIRUB SERPL-MCNC: 0.4 MG/DL (ref 0.1–1)
BUN SERPL-MCNC: 17 MG/DL (ref 8–23)
CALCIUM SERPL-MCNC: 9.5 MG/DL (ref 8.7–10.5)
CHLORIDE SERPL-SCNC: 107 MMOL/L (ref 95–110)
CHOLEST SERPL-MCNC: 184 MG/DL (ref 120–199)
CHOLEST/HDLC SERPL: 3.5 {RATIO} (ref 2–5)
CO2 SERPL-SCNC: 21 MMOL/L (ref 23–29)
CREAT SERPL-MCNC: 0.9 MG/DL (ref 0.5–1.4)
DIFFERENTIAL METHOD BLD: ABNORMAL
EOSINOPHIL # BLD AUTO: 0.3 K/UL (ref 0–0.5)
EOSINOPHIL NFR BLD: 3.2 % (ref 0–8)
ERYTHROCYTE [DISTWIDTH] IN BLOOD BY AUTOMATED COUNT: 15.9 % (ref 11.5–14.5)
EST. GFR  (NO RACE VARIABLE): >60 ML/MIN/1.73 M^2
ESTIMATED AVG GLUCOSE: 180 MG/DL (ref 68–131)
GLUCOSE SERPL-MCNC: 153 MG/DL (ref 70–110)
HBA1C MFR BLD: 7.9 % (ref 4–5.6)
HCT VFR BLD AUTO: 43.8 % (ref 37–48.5)
HDLC SERPL-MCNC: 52 MG/DL (ref 40–75)
HDLC SERPL: 28.3 % (ref 20–50)
HGB BLD-MCNC: 13.4 G/DL (ref 12–16)
IMM GRANULOCYTES # BLD AUTO: 0.03 K/UL (ref 0–0.04)
IMM GRANULOCYTES NFR BLD AUTO: 0.4 % (ref 0–0.5)
LDLC SERPL CALC-MCNC: 107.4 MG/DL (ref 63–159)
LYMPHOCYTES # BLD AUTO: 4.5 K/UL (ref 1–4.8)
LYMPHOCYTES NFR BLD: 54.8 % (ref 18–48)
MCH RBC QN AUTO: 26.1 PG (ref 27–31)
MCHC RBC AUTO-ENTMCNC: 30.6 G/DL (ref 32–36)
MCV RBC AUTO: 85 FL (ref 82–98)
MONOCYTES # BLD AUTO: 0.5 K/UL (ref 0.3–1)
MONOCYTES NFR BLD: 6.6 % (ref 4–15)
NEUTROPHILS # BLD AUTO: 2.8 K/UL (ref 1.8–7.7)
NEUTROPHILS NFR BLD: 34.5 % (ref 38–73)
NONHDLC SERPL-MCNC: 132 MG/DL
NRBC BLD-RTO: 0 /100 WBC
PLATELET # BLD AUTO: 412 K/UL (ref 150–450)
PMV BLD AUTO: 10.6 FL (ref 9.2–12.9)
POTASSIUM SERPL-SCNC: 4 MMOL/L (ref 3.5–5.1)
PROT SERPL-MCNC: 6.9 G/DL (ref 6–8.4)
RBC # BLD AUTO: 5.13 M/UL (ref 4–5.4)
SODIUM SERPL-SCNC: 140 MMOL/L (ref 136–145)
TRIGL SERPL-MCNC: 123 MG/DL (ref 30–150)
TSH SERPL DL<=0.005 MIU/L-ACNC: 3.23 UIU/ML (ref 0.4–4)
WBC # BLD AUTO: 8.16 K/UL (ref 3.9–12.7)

## 2024-07-15 PROCEDURE — 85025 COMPLETE CBC W/AUTO DIFF WBC: CPT | Mod: HCNC | Performed by: INTERNAL MEDICINE

## 2024-07-15 PROCEDURE — 80053 COMPREHEN METABOLIC PANEL: CPT | Mod: HCNC | Performed by: INTERNAL MEDICINE

## 2024-07-15 PROCEDURE — 36415 COLL VENOUS BLD VENIPUNCTURE: CPT | Mod: HCNC,PO | Performed by: INTERNAL MEDICINE

## 2024-07-15 PROCEDURE — 84443 ASSAY THYROID STIM HORMONE: CPT | Mod: HCNC | Performed by: INTERNAL MEDICINE

## 2024-07-15 PROCEDURE — 80061 LIPID PANEL: CPT | Mod: HCNC | Performed by: INTERNAL MEDICINE

## 2024-07-15 PROCEDURE — 83036 HEMOGLOBIN GLYCOSYLATED A1C: CPT | Mod: HCNC | Performed by: INTERNAL MEDICINE

## 2024-07-17 ENCOUNTER — PATIENT MESSAGE (OUTPATIENT)
Dept: INTERNAL MEDICINE | Facility: CLINIC | Age: 69
End: 2024-07-17
Payer: MEDICARE

## 2024-07-17 RX ORDER — ZOLPIDEM TARTRATE 10 MG/1
10 TABLET ORAL NIGHTLY PRN
Qty: 30 TABLET | Refills: 3 | Status: SHIPPED | OUTPATIENT
Start: 2024-07-17

## 2024-07-17 NOTE — TELEPHONE ENCOUNTER
No care due was identified.  [facility] Embedded Care Due Messages. Reference number: [id number].   7/17/2024 3:37:26 PM CDT

## 2024-07-22 ENCOUNTER — OFFICE VISIT (OUTPATIENT)
Dept: INTERNAL MEDICINE | Facility: CLINIC | Age: 69
End: 2024-07-22
Payer: MEDICARE

## 2024-07-22 VITALS
SYSTOLIC BLOOD PRESSURE: 134 MMHG | TEMPERATURE: 97 F | HEIGHT: 65 IN | WEIGHT: 224.19 LBS | DIASTOLIC BLOOD PRESSURE: 72 MMHG | OXYGEN SATURATION: 97 % | BODY MASS INDEX: 37.35 KG/M2 | HEART RATE: 54 BPM | RESPIRATION RATE: 16 BRPM

## 2024-07-22 DIAGNOSIS — M79.7 FIBROMYALGIA: ICD-10-CM

## 2024-07-22 DIAGNOSIS — M19.90 ARTHRITIS: ICD-10-CM

## 2024-07-22 DIAGNOSIS — E11.9 TYPE 2 DIABETES MELLITUS WITHOUT COMPLICATION, WITHOUT LONG-TERM CURRENT USE OF INSULIN: Primary | ICD-10-CM

## 2024-07-22 DIAGNOSIS — E66.01 SEVERE OBESITY (BMI 35.0-39.9) WITH COMORBIDITY: ICD-10-CM

## 2024-07-22 DIAGNOSIS — E78.49 OTHER HYPERLIPIDEMIA: ICD-10-CM

## 2024-07-22 DIAGNOSIS — I10 ESSENTIAL HYPERTENSION: ICD-10-CM

## 2024-07-22 PROCEDURE — 3008F BODY MASS INDEX DOCD: CPT | Mod: HCNC,CPTII,S$GLB, | Performed by: INTERNAL MEDICINE

## 2024-07-22 PROCEDURE — 1160F RVW MEDS BY RX/DR IN RCRD: CPT | Mod: HCNC,CPTII,S$GLB, | Performed by: INTERNAL MEDICINE

## 2024-07-22 PROCEDURE — 99214 OFFICE O/P EST MOD 30 MIN: CPT | Mod: HCNC,S$GLB,, | Performed by: INTERNAL MEDICINE

## 2024-07-22 PROCEDURE — 3078F DIAST BP <80 MM HG: CPT | Mod: HCNC,CPTII,S$GLB, | Performed by: INTERNAL MEDICINE

## 2024-07-22 PROCEDURE — 99999 PR PBB SHADOW E&M-EST. PATIENT-LVL V: CPT | Mod: PBBFAC,HCNC,, | Performed by: INTERNAL MEDICINE

## 2024-07-22 PROCEDURE — 1125F AMNT PAIN NOTED PAIN PRSNT: CPT | Mod: HCNC,CPTII,S$GLB, | Performed by: INTERNAL MEDICINE

## 2024-07-22 PROCEDURE — 1159F MED LIST DOCD IN RCRD: CPT | Mod: HCNC,CPTII,S$GLB, | Performed by: INTERNAL MEDICINE

## 2024-07-22 PROCEDURE — 4010F ACE/ARB THERAPY RXD/TAKEN: CPT | Mod: HCNC,CPTII,S$GLB, | Performed by: INTERNAL MEDICINE

## 2024-07-22 PROCEDURE — 1101F PT FALLS ASSESS-DOCD LE1/YR: CPT | Mod: HCNC,CPTII,S$GLB, | Performed by: INTERNAL MEDICINE

## 2024-07-22 PROCEDURE — 3060F POS MICROALBUMINURIA REV: CPT | Mod: HCNC,CPTII,S$GLB, | Performed by: INTERNAL MEDICINE

## 2024-07-22 PROCEDURE — 3288F FALL RISK ASSESSMENT DOCD: CPT | Mod: HCNC,CPTII,S$GLB, | Performed by: INTERNAL MEDICINE

## 2024-07-22 PROCEDURE — G2211 COMPLEX E/M VISIT ADD ON: HCPCS | Mod: HCNC,S$GLB,, | Performed by: INTERNAL MEDICINE

## 2024-07-22 PROCEDURE — 3051F HG A1C>EQUAL 7.0%<8.0%: CPT | Mod: HCNC,CPTII,S$GLB, | Performed by: INTERNAL MEDICINE

## 2024-07-22 PROCEDURE — 3066F NEPHROPATHY DOC TX: CPT | Mod: HCNC,CPTII,S$GLB, | Performed by: INTERNAL MEDICINE

## 2024-07-22 PROCEDURE — 3075F SYST BP GE 130 - 139MM HG: CPT | Mod: HCNC,CPTII,S$GLB, | Performed by: INTERNAL MEDICINE

## 2024-07-22 NOTE — PROGRESS NOTES
Subjective:       Patient ID: Aracelis Martinez is a 69 y.o. female.    Chief Complaint: Follow-up (4 month)    HPI  The patient presents for follow-up of medical conditions.  Active medical conditions include type 2 diabetes mellitus, hypertension, obesity, chronic insomnia.    The patient reports her frustration with not being able to lose weight.  We discussed of the GLP-1 agonists which are used for diabetes and weight loss.  However the patient does have history of neuroendocrine tumor involving her pancreas.  I am hesitant to prescribe this particular medication.  The patient has used Jardiance in the past but had to discontinue this medication due to recurrent yeast infections.  The patient reports her blood sugar levels have been elevated from 140 up to 212 recently.  She does complain of chronic numbness in her feet.  She has been using an OTC medication for nerve pain call Nervine.  She does note some relief of her symptoms with this.    The patient has chronic insomnia.  She uses Ambien on a regular basis which works very well.  She has not experiencing any daytime sleepiness.    She does complain of increased domestic stress.  She plans to see a psychologist next month to get established for ongoing follow-up.    She has fibromyalgia.  Symptoms have been better controlled with higher doses of Lyrica.  She is currently using 150 mg 3 times daily.    The patient has hypertension.  She does not routinely check her blood pressures.  She is tolerating her current medications losartan and amlodipine well without side effects.    Review of Systems   Constitutional:  Positive for activity change and unexpected weight change.   HENT:  Negative for hearing loss, rhinorrhea and trouble swallowing.    Eyes:  Positive for visual disturbance. Negative for discharge.   Respiratory:  Negative for chest tightness and wheezing.    Cardiovascular:  Negative for chest pain and palpitations.   Gastrointestinal:  Negative for  blood in stool, constipation, diarrhea and vomiting.   Endocrine: Positive for polyuria. Negative for polydipsia.   Genitourinary:  Negative for difficulty urinating, dysuria, hematuria and menstrual problem.   Musculoskeletal:  Positive for arthralgias, joint swelling and neck pain.   Neurological:  Negative for weakness and headaches.   Psychiatric/Behavioral:  Positive for dysphoric mood. Negative for confusion.             Physical Exam  Vitals and nursing note reviewed.   Constitutional:       General: She is not in acute distress.     Appearance: Normal appearance. She is well-developed.   HENT:      Head: Normocephalic and atraumatic.   Eyes:      General: No scleral icterus.     Extraocular Movements: Extraocular movements intact.      Conjunctiva/sclera: Conjunctivae normal.   Neck:      Thyroid: No thyromegaly.      Vascular: No JVD.   Cardiovascular:      Rate and Rhythm: Normal rate and regular rhythm.      Heart sounds: Normal heart sounds. No murmur heard.     No friction rub. No gallop.   Pulmonary:      Effort: Pulmonary effort is normal. No respiratory distress.      Breath sounds: Normal breath sounds. No wheezing or rales.   Abdominal:      General: Bowel sounds are normal.      Palpations: Abdomen is soft. There is no mass.      Tenderness: There is no abdominal tenderness.   Musculoskeletal:         General: No tenderness. Normal range of motion.      Cervical back: Normal range of motion and neck supple.   Lymphadenopathy:      Cervical: No cervical adenopathy.   Skin:     General: Skin is warm and dry.      Findings: No rash.      Comments: No foot lesions are present.   Neurological:      Mental Status: She is alert and oriented to person, place, and time.      Cranial Nerves: No cranial nerve deficit.      Comments: Sensory exam is intact in both feet on monofilament testing.   Psychiatric:         Mood and Affect: Mood normal.         Behavior: Behavior normal.       Protective Sensation  (w/ 10 gram monofilament):  Right: Intact  Left: Intact    Visual Inspection:  Normal -  Bilateral    Pedal Pulses:   Right: Present  Left: Present    Posterior Tibialis Pulses:   Right:Present  Left: Present      Lab Visit on 07/15/2024   Component Date Value Ref Range Status    Microalbumin, Urine 07/15/2024 29.0  ug/mL Final    Creatinine, Urine 07/15/2024 82.0  15.0 - 325.0 mg/dL Final    Microalb/Creat Ratio 07/15/2024 35.4 (H)  0.0 - 30.0 ug/mg Final   Lab Visit on 07/15/2024   Component Date Value Ref Range Status    Sodium 07/15/2024 140  136 - 145 mmol/L Final    Potassium 07/15/2024 4.0  3.5 - 5.1 mmol/L Final    Chloride 07/15/2024 107  95 - 110 mmol/L Final    CO2 07/15/2024 21 (L)  23 - 29 mmol/L Final    Glucose 07/15/2024 153 (H)  70 - 110 mg/dL Final    BUN 07/15/2024 17  8 - 23 mg/dL Final    Creatinine 07/15/2024 0.9  0.5 - 1.4 mg/dL Final    Calcium 07/15/2024 9.5  8.7 - 10.5 mg/dL Final    Total Protein 07/15/2024 6.9  6.0 - 8.4 g/dL Final    Albumin 07/15/2024 3.7  3.5 - 5.2 g/dL Final    Total Bilirubin 07/15/2024 0.4  0.1 - 1.0 mg/dL Final    Comment: For infants and newborns, interpretation of results should be based  on gestational age, weight and in agreement with clinical  observations.    Premature Infant recommended reference ranges:  Up to 24 hours.............<8.0 mg/dL  Up to 48 hours............<12.0 mg/dL  3-5 days..................<15.0 mg/dL  6-29 days.................<15.0 mg/dL      Alkaline Phosphatase 07/15/2024 123  55 - 135 U/L Final    AST 07/15/2024 17  10 - 40 U/L Final    ALT 07/15/2024 17  10 - 44 U/L Final    eGFR 07/15/2024 >60.0  >60 mL/min/1.73 m^2 Final    Anion Gap 07/15/2024 12  8 - 16 mmol/L Final    Cholesterol 07/15/2024 184  120 - 199 mg/dL Final    Comment: The National Cholesterol Education Program (NCEP) has set the  following guidelines (reference ranges) for Cholesterol:  Optimal.....................<200 mg/dL  Borderline High.............200-239  mg/dL  High........................> or = 240 mg/dL      Triglycerides 07/15/2024 123  30 - 150 mg/dL Final    Comment: The National Cholesterol Education Program (NCEP) has set the  following guidelines (reference values) for triglycerides:  Normal......................<150 mg/dL  Borderline High.............150-199 mg/dL  High........................200-499 mg/dL      HDL 07/15/2024 52  40 - 75 mg/dL Final    Comment: The National Cholesterol Education Program (NCEP) has set the  following guidelines (reference values) for HDL Cholesterol:  Low...............<40 mg/dL  Optimal...........>60 mg/dL      LDL Cholesterol 07/15/2024 107.4  63.0 - 159.0 mg/dL Final    Comment: The National Cholesterol Education Program (NCEP) has set the  following guidelines (reference values) for LDL Cholesterol:  Optimal.......................<130 mg/dL  Borderline High...............130-159 mg/dL  High..........................160-189 mg/dL  Very High.....................>190 mg/dL      HDL/Cholesterol Ratio 07/15/2024 28.3  20.0 - 50.0 % Final    Total Cholesterol/HDL Ratio 07/15/2024 3.5  2.0 - 5.0 Final    Non-HDL Cholesterol 07/15/2024 132  mg/dL Final    Comment: Risk category and Non-HDL cholesterol goals:  Coronary heart disease (CHD)or equivalent (10-year risk of CHD >20%):  Non-HDL cholesterol goal     <130 mg/dL  Two or more CHD risk factors and 10-year risk of CHD <= 20%:  Non-HDL cholesterol goal     <160 mg/dL  0 to 1 CHD risk factor:  Non-HDL cholesterol goal     <190 mg/dL      WBC 07/15/2024 8.16  3.90 - 12.70 K/uL Final    RBC 07/15/2024 5.13  4.00 - 5.40 M/uL Final    Hemoglobin 07/15/2024 13.4  12.0 - 16.0 g/dL Final    Hematocrit 07/15/2024 43.8  37.0 - 48.5 % Final    MCV 07/15/2024 85  82 - 98 fL Final    MCH 07/15/2024 26.1 (L)  27.0 - 31.0 pg Final    MCHC 07/15/2024 30.6 (L)  32.0 - 36.0 g/dL Final    RDW 07/15/2024 15.9 (H)  11.5 - 14.5 % Final    Platelets 07/15/2024 412  150 - 450 K/uL Final    MPV  07/15/2024 10.6  9.2 - 12.9 fL Final    Immature Granulocytes 07/15/2024 0.4  0.0 - 0.5 % Final    Gran # (ANC) 07/15/2024 2.8  1.8 - 7.7 K/uL Final    Immature Grans (Abs) 07/15/2024 0.03  0.00 - 0.04 K/uL Final    Comment: Mild elevation in immature granulocytes is non specific and   can be seen in a variety of conditions including stress response,   acute inflammation, trauma and pregnancy. Correlation with other   laboratory and clinical findings is essential.      Lymph # 07/15/2024 4.5  1.0 - 4.8 K/uL Final    Mono # 07/15/2024 0.5  0.3 - 1.0 K/uL Final    Eos # 07/15/2024 0.3  0.0 - 0.5 K/uL Final    Baso # 07/15/2024 0.04  0.00 - 0.20 K/uL Final    nRBC 07/15/2024 0  0 /100 WBC Final    Gran % 07/15/2024 34.5 (L)  38.0 - 73.0 % Final    Lymph % 07/15/2024 54.8 (H)  18.0 - 48.0 % Final    Mono % 07/15/2024 6.6  4.0 - 15.0 % Final    Eosinophil % 07/15/2024 3.2  0.0 - 8.0 % Final    Basophil % 07/15/2024 0.5  0.0 - 1.9 % Final    Differential Method 07/15/2024 Automated   Final    TSH 07/15/2024 3.229  0.400 - 4.000 uIU/mL Final    Hemoglobin A1C 07/15/2024 7.9 (H)  4.0 - 5.6 % Final    Comment: ADA Screening Guidelines:  5.7-6.4%  Consistent with prediabetes  >or=6.5%  Consistent with diabetes    High levels of fetal hemoglobin interfere with the HbA1C  assay. Heterozygous hemoglobin variants (HbS, HgC, etc)do  not significantly interfere with this assay.   However, presence of multiple variants may affect accuracy.      Estimated Avg Glucose 07/15/2024 180 (H)  68 - 131 mg/dL Final       Assessment & Plan:      Aracelis was seen today for follow-up.  Januvia will be added to her current diabetes medication regimen.  She is currently using glipizide.  The patient will also be referred to nutritionist for further counseling regarding management of her diabetes and management of her weight.  Her current BMI is 37.31.    Updated labs will be obtained in 4 months.  The patient has been encouraged to increase her  exercise level.    Diagnoses and all orders for this visit:    Type 2 diabetes mellitus without complication, without long-term current use of insulin  -     Cancel: Hemoglobin A1C; Future  -     Cancel: Comprehensive Metabolic Panel; Future  -     Ambulatory referral/consult to Nutrition Services; Future  -     Comprehensive Metabolic Panel; Future  -     Hemoglobin A1C; Future    Essential hypertension    Other hyperlipidemia    Fibromyalgia    Arthritis    Severe obesity (BMI 35.0-39.9) with comorbidity  -     Ambulatory referral/consult to Nutrition Services; Future    Other orders  -     SITagliptin phosphate (JANUVIA) 25 MG Tab; Take 1 tablet (25 mg total) by mouth once daily. For diabetes control.         Follow up in about 4 months (around 11/22/2024).     Alen Damico MD

## 2024-07-25 RX ORDER — PANTOPRAZOLE SODIUM 40 MG/1
TABLET, DELAYED RELEASE ORAL
Qty: 90 TABLET | Refills: 3 | Status: SHIPPED | OUTPATIENT
Start: 2024-07-25

## 2024-07-25 NOTE — TELEPHONE ENCOUNTER
No care due was identified.  [facility] Embedded Care Due Messages. Reference number: [id number].   7/25/2024 11:32:45 AM CDT

## 2024-07-25 NOTE — TELEPHONE ENCOUNTER
Refill Decision Note   Aracelis Martinez  is requesting a refill authorization.  Brief Assessment and Rationale for Refill:  Approve     Medication Therapy Plan:         Comments:     Note composed:5:43 PM 07/25/2024

## 2024-08-06 ENCOUNTER — OFFICE VISIT (OUTPATIENT)
Dept: PAIN MEDICINE | Facility: CLINIC | Age: 69
End: 2024-08-06
Payer: MEDICARE

## 2024-08-06 DIAGNOSIS — M79.7 FIBROMYALGIA: Primary | ICD-10-CM

## 2024-08-06 DIAGNOSIS — M54.16 LUMBAR RADICULOPATHY: ICD-10-CM

## 2024-08-06 DIAGNOSIS — M51.36 DDD (DEGENERATIVE DISC DISEASE), LUMBAR: ICD-10-CM

## 2024-08-06 DIAGNOSIS — G89.4 CHRONIC PAIN SYNDROME: ICD-10-CM

## 2024-08-06 PROCEDURE — 3066F NEPHROPATHY DOC TX: CPT | Mod: HCNC,CPTII,95,

## 2024-08-06 PROCEDURE — 1159F MED LIST DOCD IN RCRD: CPT | Mod: HCNC,CPTII,95,

## 2024-08-06 PROCEDURE — 99214 OFFICE O/P EST MOD 30 MIN: CPT | Mod: HCNC,95,,

## 2024-08-06 PROCEDURE — 3060F POS MICROALBUMINURIA REV: CPT | Mod: HCNC,CPTII,95,

## 2024-08-06 PROCEDURE — 1160F RVW MEDS BY RX/DR IN RCRD: CPT | Mod: HCNC,CPTII,95,

## 2024-08-06 PROCEDURE — 3051F HG A1C>EQUAL 7.0%<8.0%: CPT | Mod: HCNC,CPTII,95,

## 2024-08-06 PROCEDURE — 4010F ACE/ARB THERAPY RXD/TAKEN: CPT | Mod: HCNC,CPTII,95,

## 2024-08-06 RX ORDER — DULOXETIN HYDROCHLORIDE 30 MG/1
30 CAPSULE, DELAYED RELEASE ORAL DAILY
Qty: 30 CAPSULE | Refills: 5 | Status: SHIPPED | OUTPATIENT
Start: 2024-08-06 | End: 2025-02-02

## 2024-08-17 NOTE — TELEPHONE ENCOUNTER
----- Message from Carla Banegas sent at 1/20/2020  2:30 PM CST -----  Patient has a referral can you please schedule patient, thank you  
We are not currently seeing pts with belching.  We are seeing pts w swallowing problems.   
No

## 2024-08-19 ENCOUNTER — PATIENT MESSAGE (OUTPATIENT)
Dept: ADMINISTRATIVE | Facility: OTHER | Age: 69
End: 2024-08-19
Payer: MEDICARE

## 2024-08-19 ENCOUNTER — PATIENT MESSAGE (OUTPATIENT)
Dept: ADMINISTRATIVE | Facility: HOSPITAL | Age: 69
End: 2024-08-19
Payer: MEDICARE

## 2024-08-23 ENCOUNTER — NUTRITION (OUTPATIENT)
Dept: NUTRITION | Facility: CLINIC | Age: 69
End: 2024-08-23
Payer: MEDICARE

## 2024-08-23 VITALS — HEIGHT: 65 IN | BODY MASS INDEX: 37.98 KG/M2 | WEIGHT: 227.94 LBS

## 2024-08-23 DIAGNOSIS — E66.01 SEVERE OBESITY (BMI 35.0-39.9) WITH COMORBIDITY: ICD-10-CM

## 2024-08-23 DIAGNOSIS — Z71.3 DIETARY COUNSELING: ICD-10-CM

## 2024-08-23 DIAGNOSIS — I10 HYPERTENSION, UNSPECIFIED TYPE: ICD-10-CM

## 2024-08-23 DIAGNOSIS — E11.9 TYPE 2 DIABETES MELLITUS WITHOUT COMPLICATION, WITHOUT LONG-TERM CURRENT USE OF INSULIN: Primary | ICD-10-CM

## 2024-08-23 PROCEDURE — 99999 PR PBB SHADOW E&M-EST. PATIENT-LVL III: CPT | Mod: PBBFAC,HCNC,,

## 2024-08-23 NOTE — PATIENT INSTRUCTIONS
Carbohydrate controlled, low fat, high fiber diet.  Avoid sugar/sugary beverages.  Exercise goal:  30 minutes per day, 3-5 days per week.

## 2024-08-23 NOTE — PROGRESS NOTES
"Referring Physician:Alen Damico MD     Reason for visit:  Chief Complaint   Patient presents with    Diabetes    Obesity    Hypertension    Nutrition Counseling      Initial Visit    :1955     Allergies Reviewed  Meds Reviewed    Anthropometrics  Weight:103.4 kg (227 lb 15.3 oz)  Height:5' 5" (1.651 m)  BMI:Body mass index is 37.93 kg/m².   IBW: 57 kg  +/-10%    Meds:  Outpatient Medications Prior to Visit   Medication Sig Dispense Refill    alcohol swabs (DROPSAFE ALCOHOL PREP PADS) PadM Use once daily to clean finger prior to glucose check for diabetes 100 each 3    amLODIPine (NORVASC) 10 MG tablet TAKE 1 TABLET EVERY DAY 90 tablet 3    blood-glucose meter kit Use as instructed 1 each 0    DULoxetine (CYMBALTA) 30 MG capsule Take 1 capsule (30 mg total) by mouth once daily. 30 capsule 5    glipiZIDE (GLUCOTROL) 10 MG TR24 TAKE 1 TABLET EVERY DAY WITH BREAKFAST FOR DIABETES 90 tablet 3    hydrOXYzine pamoate (VISTARIL) 25 MG Cap TAKE 1 CAPSULE TWICE DAILY AS NEEDED FOR ANXIETY 180 capsule 3    lancets Misc To check BG 1 time daily, to use with insurance preferred meter 100 each 3    levothyroxine (SYNTHROID) 75 MCG tablet TAKE 1 TABLET EVERY DAY BEFORE BREAKFAST 90 tablet 3    meloxicam (MOBIC) 15 MG tablet TAKE 1 TABLET BY MOUTH EVERY DAY FOR ANKLE PAIN OR SWELLING 90 tablet 3    olmesartan (BENICAR) 20 MG tablet Take 1 tablet (20 mg total) by mouth once daily. 90 tablet 1    pantoprazole (PROTONIX) 40 MG tablet TAKE 1 TABLET EVERY DAY 90 tablet 3    pregabalin (LYRICA) 150 MG capsule Take 1 capsule (150 mg total) by mouth 3 (three) times daily. 90 capsule 5    SITagliptin phosphate (JANUVIA) 25 MG Tab Take 1 tablet (25 mg total) by mouth once daily. For diabetes control. 30 tablet 6    TRUE METRIX GLUCOSE TEST STRIP Strp TEST BLOOD SUGAR EVERY  strip 3    TRUEPLUS LANCETS 33 gauge Misc TEST BLOOD SUGAR EVERY  each 3    zolpidem (AMBIEN) 10 mg Tab Take 1 tablet (10 mg total) by mouth " "nightly as needed (insomnia). 30 tablet 3     No facility-administered medications prior to visit.       Food/Drug Interactions Noted:  n/a    Vitamins/Supplements/Herbs:  n/a    Labs: HgbA1c  7.9     Nutrition Prescription: 1710 Kcals/day( 30 kcal/kg IBW),  46-57 g protein( 0.8-1.0 g/kg IBW)     Support System:  pt and her  Mike present for today's encounter.  They both grocery shop and cook.    Diet Hx:  pt states she "struggles" with what to eat, and how much carbohydrate she should be eating/day.  Is unsure of what carbohydrates are.  States she "treats" herself 1-2 times/week with sweet tea; avoids fruit juices and sodas.  Snacks:  nuts & popcorn.  Does read food labels.     Breakfast: varies; may have pancake with syrup or Chex cereal with 1% or 2% milk.  Drinks coffee with powdered creamer and raw sugar or honey.  Lunch: varies; doesn't always eat lunch.  May fix a "smoothie" with bag of frozen probiotic mixture (fruits/avocado) blended with yogurt.  Water.  Dinner:  yesterday at 4 pm had link of boudin, baked chicken leg, carrot/pea/corn vegetable blend.  Water.    Current activity level and/or physical limitations:   30 minutes/day, 7 days/week on home treadmill    Motivation to make changes/anticipated barriers and/or expected adherence:  no barriers to adherence identified    Nutrition-Focus Physical Findings:  pt appears well nourished    Assessment:  pt attentive and asked relevant questions about foods/beverages/sweeteners recommended and to avoid; sample meal plans and menus; portion control; reading food labels; carbohydrate counting/recommended amount of carbohydrate/day; grocery shopping and cooking tips; healthy snacking.  All of her questions were answered, and she verbalized understanding of information.  She stated diabetic diet regimen was going to be "quite a change".    Nutrition Diagnosis: Food- and nutrition-related knowledge deficit RT lack of prior exposure to information AEB dx " diabetes    Recommendations: Carbohydrate controlled, low fat, high fiber diet. Avoid sugar/sugary beverages. Exercise goal:  30 minutes per day, 3-5 days per week.  Handouts provided and reviewed:  My Plate Planner; 1500 Calorie Sample 5-Day Menus; Servings of Carbohydrates for Meal Planning; Reading A Food Label; Diabetes Management Guide (30-45 gm CHO meal plan); Snack List for Diabetes; Eat Fit felicitas info    Strategies Implemented:  avoid all sugar/sugary beverages/syrup; read food labels    Consultation Time:35 minutes.  Communicated with referring healthcare provider:  Consult note available in pt's Epic chart per MD discretion  Follow Up:  Pt provided with dietitian contact information and advised to contact me with questions or make future appointment if further intervention needed.

## 2024-08-29 ENCOUNTER — PATIENT MESSAGE (OUTPATIENT)
Dept: PAIN MEDICINE | Facility: CLINIC | Age: 69
End: 2024-08-29
Payer: MEDICARE

## 2024-08-30 ENCOUNTER — PATIENT MESSAGE (OUTPATIENT)
Dept: NUTRITION | Facility: CLINIC | Age: 69
End: 2024-08-30
Payer: MEDICARE

## 2024-08-30 NOTE — TELEPHONE ENCOUNTER
Spoke with patient. Stated that she has been experiencing the nausea, dizziness and headache about this past Saturday. It would last for a few hours when she woke up and then would pass. She was concerned that the symptoms she was having is related to the Cymbalta. She stopped taking the medication on Tuesday. Said the symptoms have eased since then but are still present. Informed the medication is not likely the cause of the symptoms but she is to continue to hold the medication for now. This message will be forwarded to the provider for his review. Verbalized understanding. No further issues discussed.

## 2024-08-31 ENCOUNTER — PATIENT MESSAGE (OUTPATIENT)
Dept: INTERNAL MEDICINE | Facility: CLINIC | Age: 69
End: 2024-08-31
Payer: MEDICARE

## 2024-09-03 ENCOUNTER — OFFICE VISIT (OUTPATIENT)
Dept: PSYCHIATRY | Facility: CLINIC | Age: 69
End: 2024-09-03
Payer: MEDICARE

## 2024-09-03 DIAGNOSIS — F41.1 GAD (GENERALIZED ANXIETY DISORDER): Primary | ICD-10-CM

## 2024-09-03 DIAGNOSIS — F43.21 UNRESOLVED GRIEF: ICD-10-CM

## 2024-09-03 PROCEDURE — 3051F HG A1C>EQUAL 7.0%<8.0%: CPT | Mod: HCNC,CPTII,S$GLB, | Performed by: SOCIAL WORKER

## 2024-09-03 PROCEDURE — 3066F NEPHROPATHY DOC TX: CPT | Mod: HCNC,CPTII,S$GLB, | Performed by: SOCIAL WORKER

## 2024-09-03 PROCEDURE — 99999 PR PBB SHADOW E&M-EST. PATIENT-LVL I: CPT | Mod: PBBFAC,HCNC,, | Performed by: SOCIAL WORKER

## 2024-09-03 PROCEDURE — 90791 PSYCH DIAGNOSTIC EVALUATION: CPT | Mod: HCNC,S$GLB,, | Performed by: SOCIAL WORKER

## 2024-09-03 PROCEDURE — 4010F ACE/ARB THERAPY RXD/TAKEN: CPT | Mod: HCNC,CPTII,S$GLB, | Performed by: SOCIAL WORKER

## 2024-09-03 PROCEDURE — 3060F POS MICROALBUMINURIA REV: CPT | Mod: HCNC,CPTII,S$GLB, | Performed by: SOCIAL WORKER

## 2024-09-03 NOTE — PROGRESS NOTES
"[facility] Psychiatry Initial Visit (PhD/LCSW)    Patient Name:  Aracelis Martinez   Date: 09/03/2024   Site: [facility]  Referral source: Alen Damico MD    Chief complaint/reason for encounter: Psychological Evaluation to assess suitability for admission to the [facility]  Clinical status of patient: Outpatient  Met with: Patient  CPT Code: 41179    Before this evaluation was initiated, the purposes and process of the assessment and the limits of confidentiality were discussed with the patient who expressed understanding of these issues and verbally consented to proceed with the evaluation.    History of present illness: Ms. Martinez is a 69-year-old female who is pursuing psychotherapy to improve anxiety, depressed mood and unresolved grief. The patient reported symptoms of emotional exhaustion and social withdrawal, stating she has "reached her breaking point." She describes a history of supporting others while experiencing significant family-related stressors, including strained relationships with her adult son, step-daughter, and siblings. The patient is struggling with unresolved grief from multiple losses, including her grandson's death in February 2023 and the deaths of her mother and sister in 2002. She expresses feelings of isolation from family events despite attempts to reconcile relationships. The patient reports the onset of a breakdown in June 2024, characterized by a desire to isolate, avoid communication, and withdraw from previously enjoyed activities such as [employer] involvement. She acknowledges only maintaining contact with five close friends and believes her inner turmoil is contributing to her medical conditions and health concerns.      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    History of pancreatic cancer 7/2/2021    Hypertension     Knee pain 04/16/2014    Lumbar pain 1/21/2022    Nausea 11/19/2019    Neuroendocrine tumor of pancreas 2015    Obesity     Pancreatic cancer 2015    Range of motion deficit 11/9/2018    Renal cyst     left    Special screening for malignant neoplasms, colon 7/28/2014    Status post arthroscopy of left knee 4/16/2014 4/22/2014    Stiffness of right hand, not elsewhere classified 2/26/2019    Thyroid disease     Trouble in sleeping     Type 2 diabetes mellitus     Type 2 diabetes mellitus with ophthalmic manifestations         Psychiatric symptoms:  Depression: She endorsed episodes of depressed mood or depression-related anhedonia, lack of motivation, lethargy, difficulty concentrating, feelings of guilt, appetite changes, or psychomotor changes. She denied suicidal ideation.  Suha/Hypomania: Denied. She denied periods of elevated mood or abnormally increased energy or goal-directed activity.  Anxiety: She endorsed experiencing excessive, exaggerated anxiety that was unmanageable. Patient reported she is constantly ruminating over strained relationships with her only son and her siblings.   Thoughts: Denied delusions, hallucinations.  Suicidal thoughts/behaviors: Denied.  Self-injury: Denied.    Current psychosocial stressors: Strained familial relationships, managing her overall health (Getting diabetes and blood pressure under control).   Report of coping skills: Enjoys crafts, making jewelry, solving puzzles, and playing games.   Support system: Reported five close friends who are supportive.   Strengths and Liabilities: Provides encouragement to others and struggles with  prioritizing self.     Current and past substance use:  Alcohol: 2-3 Glasses once a month. Denied history of abuse or dependency.  Drugs: Daily marijuana use for physical relief/pain management (1-2 hits). Denied history of abuse or dependency.  Tobacco: Denied current use.  Caffeine: One cup of coffee daily.     Current Psychiatric Treatment:  Medications: Vistaril, 25mg daily   Psychotherapy: None reported.     Psychiatric history:  Previous diagnosis: CROW  Previous hospitalizations: None reported   History of outpatient treatment: None reported   Previous suicide attempt: Denied.  Family history of psychiatric illness: None reported     Trauma history: Denied.    Social history: Ms. Martinez was born and raised in [place] by her biological mother along with her 5 siblings. She described her childhood as normal. She denied childhood trauma, abuse, and neglect. She earned a high school diploma. She is currently retired. She denied  service. She is not on disability. She has been  to her  for 43 years. She has one adult son and one adult step-daughter. She currently lives with . Jain. She identifies as [employer].     Legal history: She denied history of arrests and convictions. She is not currently involved in civil or criminal litigation.    Access to guns: none     Mental Status Exam:  General appearance: Appears stated age, neatly dressed, well groomed  Speech: Normal rate, normal tone, normal pitch, normal volume  Level of cooperation: Cooperative  Thought processes: Logical, goal-directed  Mood: Euthymic  Thought content: No illusions, no visual hallucinations, no auditory hallucinations, no delusions, no active or passive homicidal thoughts, no active or passive suicidal ideation, no obsessions, no compulsions, no violence  Affect: Appropriate  Orientation: Oriented to person, place, and date  Memory:  Recent memory: 3 of 3 objects after brief delay  Remote memory:  Intact  Attention span and concentration: Spelled HOUSE forward and backwards  Fund of general knowledge: 3 of 3 recent presidents  Abstract reasoning:   Similarities: Abstract  Proverbs: Abstract  Judgment and insight: Fair  Language: Intact    Diagnostic impression:    ICD-10-CM ICD-9-CM   1. CROW (generalized anxiety disorder)  F41.1 300.02   2. Unresolved grief  F43.21 309.1        Plan: Ms. Martinez will be admitted to the [facility]. She understood [facility] guidelines and signed the [facility] Informed Consent and [facility] Partnership Agreement. She was provided with information about [facility] treatments and will proceed with treatment.       Length of Session: 60 minutes     Minor Trammell PsyD  Adult Psychology Fellow   [facility]

## 2024-09-04 ENCOUNTER — PATIENT OUTREACH (OUTPATIENT)
Dept: ADMINISTRATIVE | Facility: HOSPITAL | Age: 69
End: 2024-09-04
Payer: MEDICARE

## 2024-09-04 NOTE — LETTER
AUTHORIZATION FOR RELEASE OF   CONFIDENTIAL INFORMATION    Dear [facility],    We are seeing Aracelis Martinez, date of birth 1955, in the clinic at [facility]. Alen Damico MD is the patient's PCP. Aracelis Martinez has an outstanding lab/procedure at the time we reviewed her chart. In order to help keep her health information updated, she has authorized us to request the following medical record(s):        (  )  MAMMOGRAM                                      (  )  COLONOSCOPY      (  )  PAP SMEAR                                          (  )  OUTSIDE LAB RESULTS     (  )  DEXA SCAN                                          ( x )  EYE EXAM (report)         (  )  FOOT EXAM                                          (  )  ENTIRE RECORD     (  )  OUTSIDE IMMUNIZATIONS                 (  )  _______________         Please fax records to Alen Damico MD, [phone]      Patient Name: Aracelis Martinez  : 1955  Patient Phone #: [phone]

## 2024-09-09 ENCOUNTER — PATIENT MESSAGE (OUTPATIENT)
Dept: PAIN MEDICINE | Facility: CLINIC | Age: 69
End: 2024-09-09
Payer: MEDICARE

## 2024-09-10 ENCOUNTER — TELEPHONE (OUTPATIENT)
Dept: PAIN MEDICINE | Facility: CLINIC | Age: 69
End: 2024-09-10
Payer: MEDICARE

## 2024-09-10 ENCOUNTER — OFFICE VISIT (OUTPATIENT)
Dept: PSYCHIATRY | Facility: CLINIC | Age: 69
End: 2024-09-10
Payer: MEDICARE

## 2024-09-10 DIAGNOSIS — F41.1 GAD (GENERALIZED ANXIETY DISORDER): Primary | ICD-10-CM

## 2024-09-10 DIAGNOSIS — F43.21 UNRESOLVED GRIEF: ICD-10-CM

## 2024-09-10 PROCEDURE — 90837 PSYTX W PT 60 MINUTES: CPT | Mod: HCNC,S$GLB,, | Performed by: SOCIAL WORKER

## 2024-09-10 PROCEDURE — 3066F NEPHROPATHY DOC TX: CPT | Mod: HCNC,CPTII,S$GLB, | Performed by: SOCIAL WORKER

## 2024-09-10 PROCEDURE — 4010F ACE/ARB THERAPY RXD/TAKEN: CPT | Mod: HCNC,CPTII,S$GLB, | Performed by: SOCIAL WORKER

## 2024-09-10 PROCEDURE — 3051F HG A1C>EQUAL 7.0%<8.0%: CPT | Mod: HCNC,CPTII,S$GLB, | Performed by: SOCIAL WORKER

## 2024-09-10 PROCEDURE — 3060F POS MICROALBUMINURIA REV: CPT | Mod: HCNC,CPTII,S$GLB, | Performed by: SOCIAL WORKER

## 2024-09-10 NOTE — TELEPHONE ENCOUNTER
----- Message from Jelena Orozco MA sent at 9/10/2024 10:38 AM CDT -----  Regarding: FW: Medication  Contact: [phone]    ----- Message -----  From: Armando William  Sent: 9/10/2024  10:21 AM CDT  To: Ignacio Childress Staff  Subject: Medication                                       Pt is calling to speak with provider about medication   DULoxetine (CYMBALTA) 30 MG capsule.  Hiren asked pt to call back. Patient Requesting Call Back @ [phone]

## 2024-09-10 NOTE — PROGRESS NOTES
Individual Psychotherapy (PhD/LCSW)    9/10/2024    Site:  [facility]         Therapeutic Intervention: Met with patient.  Outpatient - Insight oriented psychotherapy 60 min - CPT code 77165 and Outpatient - Behavior modifying psychotherapy 60 min - CPT code 89081    Chief complaint/reason for encounter: Unresolved grief, depressed mood, anxiety      Interval history and content of current session: Ms. Martinez arrived on time for her scheduled appointment.      [facility], Session 1 (Treatment Initiation)  Session Focus:   Brief check-in   Set agenda   Collect rating scales        CROW-7 Score: (P) 16  Interpretation: (P) Severe Anxiety        PHQ8 Score : (P) 11  PHQ8 Interpretation: (P) Moderate   Introduction to Therapy and CBT   Treatment Plan/Goals   Review Values and Aspirations   Address patient concerns   Summarize session   Feedback about session      Action Plan:   Review therapy materials   Intervention practice:    Use Cognitive-Behavioral Model Diagram for 3 situations   Complete the Bridging Sessions worksheet for Session 2       Treatment plan:  Target symptoms: depression  Why chosen therapy is appropriate versus another modality: relevant to diagnosis  Outcome monitoring methods: self-report  Therapeutic intervention type: insight oriented psychotherapy, behavior modifying psychotherapy    Risk parameters:  Patient reports no suicidal ideation  Patient reports no homicidal ideation  Patient reports no self-injurious behavior  Patient reports no violent behavior    Verbal deficits: None    Patient's response to intervention:  The patient's response to intervention is accepting.    Progress toward goals and other mental status changes:  The patient's progress toward goals is fair .    Diagnosis:     ICD-10-CM ICD-9-CM   1. CROW (generalized anxiety disorder)  F41.1 300.02   2. Unresolved grief  F43.21 309.1        Plan:  individual psychotherapy    Return to clinic: 1 week    Length of Service  (minutes): 60    Minor Trammell PsyD  Adult Psychology Fellow   [facility]

## 2024-09-10 NOTE — TELEPHONE ENCOUNTER
Spoke with patient about her medication. Stated that she stopped the medication. Since then the euphoric/hangover feeling stopped. The pain is still ongoing. Discussed scheduling an appointment to be seen in clinic to go over her options regarding medications. Appointment set for date/time selected by patient. No further issues discussed.

## 2024-09-17 ENCOUNTER — OFFICE VISIT (OUTPATIENT)
Dept: PSYCHIATRY | Facility: CLINIC | Age: 69
End: 2024-09-17
Payer: MEDICARE

## 2024-09-17 DIAGNOSIS — F43.21 UNRESOLVED GRIEF: ICD-10-CM

## 2024-09-17 DIAGNOSIS — F41.1 GAD (GENERALIZED ANXIETY DISORDER): Primary | ICD-10-CM

## 2024-09-17 PROCEDURE — 3051F HG A1C>EQUAL 7.0%<8.0%: CPT | Mod: HCNC,CPTII,S$GLB, | Performed by: SOCIAL WORKER

## 2024-09-17 PROCEDURE — 90837 PSYTX W PT 60 MINUTES: CPT | Mod: HCNC,S$GLB,, | Performed by: SOCIAL WORKER

## 2024-09-17 PROCEDURE — 4010F ACE/ARB THERAPY RXD/TAKEN: CPT | Mod: HCNC,CPTII,S$GLB, | Performed by: SOCIAL WORKER

## 2024-09-17 PROCEDURE — 3066F NEPHROPATHY DOC TX: CPT | Mod: HCNC,CPTII,S$GLB, | Performed by: SOCIAL WORKER

## 2024-09-17 PROCEDURE — 3060F POS MICROALBUMINURIA REV: CPT | Mod: HCNC,CPTII,S$GLB, | Performed by: SOCIAL WORKER

## 2024-09-17 NOTE — PROGRESS NOTES
Individual Psychotherapy (PhD/LCSW)    9/17/2024    Site:  [facility]         Therapeutic Intervention: Met with patient.  Outpatient - Insight oriented psychotherapy 60 min - CPT code 39483 and Outpatient - Behavior modifying psychotherapy 60 min - CPT code 65661    Chief complaint/reason for encounter: Unresolved grief, depressed mood, anxiety      Interval history and content of current session: Ms. Martinez arrived on time for her scheduled appointment.      [facility], Session 2 (Intermediate Sessions)  Session Focus:   Brief check-in   Set agenda   Collect rating scales        CROW-7 Score: (P) 12  Interpretation: (P) Moderate Anxiety        PHQ8 Score : (P) 5  PHQ8 Interpretation: (P) Minimal   Review Values and Aspirations   Review Action Plan   Identify unhelpful thinking, Unhelpful thinking patterns  Intervention techniques: Cognitive restructuring  Summarize session   Feedback about session      Action Plan:   Review therapy materials   Intervention practice:    Use the triangle diagram for 5 situations  Keep a record of automatic thoughts    Treatment plan:  Target symptoms: depression  Why chosen therapy is appropriate versus another modality: relevant to diagnosis  Outcome monitoring methods: self-report  Therapeutic intervention type: insight oriented psychotherapy, behavior modifying psychotherapy    Risk parameters:  Patient reports no suicidal ideation  Patient reports no homicidal ideation  Patient reports no self-injurious behavior  Patient reports no violent behavior    Verbal deficits: None    Patient's response to intervention:  The patient's response to intervention is accepting.    Progress toward goals and other mental status changes:  The patient's progress toward goals is fair .    Diagnosis:     ICD-10-CM ICD-9-CM   1. CROW (generalized anxiety disorder)  F41.1 300.02   2. Unresolved grief  F43.21 309.1           Plan:  individual psychotherapy    Return to clinic: 1 week    Length  of Service (minutes): 60    Minor Trammell PsyD  Adult Psychology Fellow   [facility]

## 2024-09-24 ENCOUNTER — OFFICE VISIT (OUTPATIENT)
Dept: PAIN MEDICINE | Facility: CLINIC | Age: 69
End: 2024-09-24
Payer: MEDICARE

## 2024-09-24 ENCOUNTER — OFFICE VISIT (OUTPATIENT)
Dept: PSYCHIATRY | Facility: CLINIC | Age: 69
End: 2024-09-24
Payer: MEDICARE

## 2024-09-24 VITALS
BODY MASS INDEX: 38.62 KG/M2 | HEIGHT: 65 IN | HEART RATE: 60 BPM | WEIGHT: 231.81 LBS | DIASTOLIC BLOOD PRESSURE: 66 MMHG | SYSTOLIC BLOOD PRESSURE: 111 MMHG

## 2024-09-24 DIAGNOSIS — M79.7 FIBROMYALGIA: Primary | ICD-10-CM

## 2024-09-24 DIAGNOSIS — G89.4 CHRONIC PAIN SYNDROME: ICD-10-CM

## 2024-09-24 DIAGNOSIS — F41.1 GAD (GENERALIZED ANXIETY DISORDER): Primary | ICD-10-CM

## 2024-09-24 DIAGNOSIS — M54.16 LUMBAR RADICULOPATHY: ICD-10-CM

## 2024-09-24 DIAGNOSIS — F43.21 UNRESOLVED GRIEF: ICD-10-CM

## 2024-09-24 DIAGNOSIS — M51.36 DDD (DEGENERATIVE DISC DISEASE), LUMBAR: ICD-10-CM

## 2024-09-24 PROCEDURE — 99999 PR PBB SHADOW E&M-EST. PATIENT-LVL IV: CPT | Mod: PBBFAC,HCNC,,

## 2024-09-24 RX ORDER — CYCLOBENZAPRINE HCL 10 MG
10 TABLET ORAL 3 TIMES DAILY PRN
Qty: 90 TABLET | Refills: 2 | Status: SHIPPED | OUTPATIENT
Start: 2024-09-24 | End: 2024-12-23

## 2024-09-24 NOTE — PROGRESS NOTES
Individual Psychotherapy (PhD/LCSW)    9/24/2024    Site:  [facility]         Therapeutic Intervention: Met with patient.  Outpatient - Insight oriented psychotherapy 60 min - CPT code 34062 and Outpatient - Behavior modifying psychotherapy 60 min - CPT code 93682    Chief complaint/reason for encounter: Unresolved grief, depressed mood, anxiety      Interval history and content of current session: Ms. Martinez arrived on time for her scheduled appointment.      [facility], Session 3 (Intermediate Sessions)  Session Focus:   Brief check-in   Set agenda   Collect rating scales        CROW-7 Score: (P) 13  Interpretation: (P) Moderate Anxiety        PHQ8 Score : (P) 5  PHQ8 Interpretation: (P) Mild   Review Action Plan   Unhelpful thinking patterns, communication issues, Codependency   Intervention techniques: Cognitive restructuring, [place] setting   Summarize session   Feedback about session      Action Plan:   Review therapy materials   Intervention practice:    Responsibility assessment exercise     Treatment plan:  Target symptoms: depression  Why chosen therapy is appropriate versus another modality: relevant to diagnosis  Outcome monitoring methods: self-report  Therapeutic intervention type: insight oriented psychotherapy, behavior modifying psychotherapy    Risk parameters:  Patient reports no suicidal ideation  Patient reports no homicidal ideation  Patient reports no self-injurious behavior  Patient reports no violent behavior    Verbal deficits: None    Patient's response to intervention:  The patient's response to intervention is accepting.    Progress toward goals and other mental status changes:  The patient's progress toward goals is fair .    Diagnosis:     ICD-10-CM ICD-9-CM   1. CROW (generalized anxiety disorder)  F41.1 300.02   2. Unresolved grief  F43.21 309.1           Plan:  individual psychotherapy    Return to clinic: 1 week    Length of Service (minutes): 60    Minor Trammell  Robert  Adult Psychology Fellow   [facility]

## 2024-09-24 NOTE — PROGRESS NOTES
Subjective:     Patient ID: Aracelis Martinez is a 69 y.o. female    Chief Complaint: Follow-up (Discuss medication options)        Referred by: No ref. provider found      HPI:    Interval History PA (09/24/2024):  Patient returns to clinic for follow up.  Patient with continued pain as before.  Diffuse widespread pain related to fibromyalgia.  Recently we did change medication and troponin.  Added Cymbalta 30 mg daily, patient reporting adverse effects since starting this medication.  Reports N/V as well as dizziness and confusion.  She has since discontinued this medication as the symptoms have resolved.  Patient inquiring about any alternative medications we can trial.    Interval History PA (08/06/2024):  The patient location is:  Home  The chief complaint leading to consultation is:  Follow up  Visit type: Virtual visit with synchronous audio and video  Total time spent with patient: 8 minutes  Each patient to whom he or she provides medical services by telemedicine is:  (1) informed of the relationship between the physician and patient and the respective role of any other health care provider with respect to management of the patient; and (2) notified that he or she may decline to receive medical services by telemedicine and may withdraw from such care at any time.     Patient returns for follow up.  Patient recently presented with genital worsening of symptoms related to fibromyalgia including diffuse widespread musculoskeletal pain.  We recently increased her Lyrica to 150 mg b.i.d. which she notes has been beneficial.  Notes her pain has slightly improved overall since previous encounter.  She does continue to note significant symptoms.  Currently pain is at a 6/10.  She is able to perform daily functions although his interested in any other medications are able to help with her pain.  Previously we did briefly discuss trial of Cymbalta and she would like to proceed with this option.    Interval History PA  (07/09/2024):  Patient returns to clinic for follow up.  Patient reports generalized worsening of symptoms related to fibromyalgia.  Notes ongoing flare up of diffuse widespread musculoskeletal pain.  Unable to localize pain at this time as she notes it is diffuse across her cervical mid back and lower lumbar regions.  Previously patient was doing well with Lyrica.  Currently taking 100 mg b.i.d. with mild benefit, denies any adverse effects from this medication.  Patient notes over the past month or so her pain has generally been worsening.  She also notes increased stress factors.  No other concerns at this time.    Interval History PA (11/28/2023):  The patient location is:  Home  The chief complaint leading to consultation is:  Follow up  Visit type: Virtual visit with synchronous audio and video  Total time spent with patient: 9 minutes  Each patient to whom he or she provides medical services by telemedicine is:  (1) informed of the relationship between the physician and patient and the respective role of any other health care provider with respect to management of the patient; and (2) notified that he or she may decline to receive medical services by telemedicine and may withdraw from such care at any time.     Patient returns to clinic for follow up.  Patient is s/p Qutenza #1 completed on 10/24/2023 for post herpetic neuralgia.  Patient noting significant improvement overall, approximately 95% relief.  Notes minimal continued pain over her right flank/axillary region, and underneath her right breast.  She does note some continued sensitivity over the area but overall symptoms have improved.  She continues to take Lyrica 100 mg b.i.d. with benefit, denies any adverse effects from this medication.  No other concerns at this time.    Interval History PA (10/24/2023):  Patient returns to clinic for follow up and Qutenza treatment #1 for postherpetic neuralgia.  Patient denies any changes in the quality or  location of her pain.  Continues to endorse pain/burning over her right flank/axillary region around to underneath her right breast.  She continues to take Lyrica 200 mg t.i.d. with minimal benefit, denies any adverse effects from this medication.  No other concerns at this time.    Interval History PA (10/10/2023):  Patient returns to clinic for follow up.  Since previous visit patient had a shingles outbreak in August 2023.  She was treated with valacyclovir and increased dose of Lyrica.  Once the rash resolved patient reports pain continued and has been worsening.  Reports pain/burning sensation at the site of her previous rash.  Located from her right flank to beneath her right breast region with a dermatomal pattern.  Notes that her PCP increased her Lyrica to 200 mg t.i.d. which has been minimally beneficial.  Denies any adverse effects from this medication.  Reports pain continues to be severe and limiting to her daily activities.    Interval History PA (07/18/2023):  Patient returns to clinic for follow up.  Patient denies any changes in the quality or location of her pain since previous visit.  Denies any new or worsening symptoms.  She continues to take Lyrica 75 mg b.i.d. with continued benefit.  Notes that this medication has provided significant improvement in her overall symptoms and that her pain continues to be well-controlled at this time.  Denies any adverse effects from the medication.  No other concerns at this time.    Interval History (1/3/23):    The patient location is:  Home  The chief complaint leading to consultation is:  Follow-up  Visit type: Virtual visit with synchronous audio and video  Total time spent with patient:  8 minutes  Each patient to whom he or she provides medical services by telemedicine is:  (1) informed of the relationship between the physician and patient and the respective role of any other health care provider with respect to management of the patient; and (2)  notified that he or she may decline to receive medical services by telemedicine and may withdraw from such care at any time.      She returns today for follow up.  She reports that Lyrica 75 mg b.i.d. has been helpful for the fibromyalgia pain.  She reports a very significant improvement in her symptoms and states that her pain is very well controlled.  She denies any adverse effects.  She does request that additional prescriptions be sent to mail order pharmacy.      Initial Encounter (11/15/22):  Aracelis Martinez is a 69 y.o. female who presents today with fairly widespread pain likely related to fibromyalgia.  Has previously been treated by my colleague at [facility].  He is leaving his practice, and patient is now seeking to transition her care to my clinic.  She has undergone multiple interventional procedures for her low back and lower extremity pain.  States these have helped somewhat but she is not very interested in additional interventional procedures at this time.  Currently the majority of her pain is located across the upper back and into the upper extremities.  She denies any associated numbness, tingling, weakness, bowel bladder dysfunction.  She is unable to say if this pain is related to fibromyalgia or some other source of pain.  She is currently taking Lyrica 25 mg b.i.d. provided by her primary care physician.  She does not feel this medication is providing as much relief as it was in the past.  She denies any adverse effects of this medication.   This pain is described in detail below.    Physical Therapy:  No.    Non-pharmacologic Treatment:  Rest helps         TENS?  No    Pain Medications:         Currently taking:  Lyrica 150 mg b.i.d.., meloxicam    Has tried in the past:  Tramadol (upset stomach).  NSAIDs, Tylenol, Cymbalta (adverse effects)    Has not tried:   Muscle relaxants, TCAs, SNRIs, topical creams    Blood thinners:  None    Interventional Therapies:                -12/16/2021   Left L4-5 and L5-S1 Lumbar Medial Branch Radiofrequency Ablation              - 11/09/2021 Right L4-5 and L5-S1 Lumbar Medial Branch Radiofrequency Ablation              - 8/24/2021Lumbar Transforaminal Epidural Steroid Injection, Right L5-S1              - 8/17/2021Lumbar Transforaminal Epidural Steroid Injection, Right L5-S1              - 6/15/2021  Lumbar Epidural Steroid Injection at L5-S1 50% relief    10/24/2023 - Qutenza #1 - 95% relief    Relevant Surgeries:  None    Affecting sleep?  Yes    Affecting daily activities? yes    Depressive symptoms? no          SI/HI? No    Work status: Retired    Pain Scores:    Best:       0/10  Worst:     7/10  Usually:   5/10  Today:    6/10         Review of Systems   Constitutional:  Negative for activity change, appetite change, chills, fatigue, fever and unexpected weight change.   HENT:  Negative for hearing loss.    Eyes:  Negative for visual disturbance.   Respiratory:  Negative for chest tightness and shortness of breath.    Cardiovascular:  Negative for chest pain.   Gastrointestinal:  Negative for abdominal pain, constipation, diarrhea, nausea and vomiting.   Genitourinary:  Negative for difficulty urinating.   Musculoskeletal:  Positive for arthralgias, back pain, gait problem, myalgias, neck pain and neck stiffness.   Skin:  Negative for rash.   Neurological:  Negative for dizziness, weakness, light-headedness, numbness and headaches.   Psychiatric/Behavioral:  Positive for sleep disturbance. Negative for hallucinations and suicidal ideas. The patient is not nervous/anxious.        Past Medical History:   Diagnosis Date    Abdominal pain 04/20/2015    Anemia     Anxiety     Arthritis     Bone spur of finger IP joint 10/22/2018    Chronic back pain     Chronic pain 06/15/2021    Colon cancer screening 3/13/2019    Decreased ROM of lumbar spine 1/21/2022    Diabetes mellitus 07/2014    Diverticulosis     Diverticulosis     Effusion of right hand 11/9/2018     Finger pain, right 10/22/2018    Finger stiffness, right 2018    Functional belching disorder 2020    History of pancreatic cancer 2021    Hypertension     Knee pain 2014    Lumbar pain 2022    Nausea 2019    Neuroendocrine tumor of pancreas 2015    Obesity     Pancreatic cancer 2015    Range of motion deficit 2018    Renal cyst     left    Special screening for malignant neoplasms, colon 2014    Status post arthroscopy of left knee 2014    Stiffness of right hand, not elsewhere classified 2019    Thyroid disease     Trouble in sleeping     Type 2 diabetes mellitus     Type 2 diabetes mellitus with ophthalmic manifestations        Past Surgical History:   Procedure Laterality Date     SECTION      COLONOSCOPY N/A 3/13/2019    Procedure: COLONOSCOPY;  Surgeon: Cem Lamar MD;  Location: [facility] (4TH FLR);  Service: Endoscopy;  Laterality: N/A;  previous order cx    COLONOSCOPY N/A 2022    Procedure: COLONOSCOPY Suprep;  Surgeon: Hiren Newberry MD;  Location: [facility] ENDO;  Service: Endoscopy;  Laterality: N/A;    EPIDURAL STEROID INJECTION INTO LUMBAR SPINE N/A 6/15/2021    Procedure: Injection-steroid-epidural-lumbar-L5-S1;  Surgeon: Saranya Cornelius Jr., MD;  Location: [facility] PAIN MGT;  Service: Pain Management;  Laterality: N/A;    ESOPHAGOGASTRODUODENOSCOPY N/A 2019    Procedure: ESOPHAGOGASTRODUODENOSCOPY (EGD);  Surgeon: Jonathan Oneill MD;  Location: [facility] (4TH FLR);  Service: Endoscopy;  Laterality: N/A;    ESOPHAGOGASTRODUODENOSCOPY N/A 2020    Procedure: EGD (ESOPHAGOGASTRODUODENOSCOPY);  Surgeon: Shady Costa MD;  Location: [facility] (2ND FLR);  Service: Endoscopy;  Laterality: N/A;  Please schedule patient as soon as possible in the 2nd floor history of a Whipple surgery for neuroendocrine pancreatic tumor many years ago with now constant belching and regurgitation.  May need airway protection.  Rule out celiac  sprue rule out lact    ESOPHAGOGASTRODUODENOSCOPY N/A 4/19/2022    Procedure: EGD (ESOPHAGOGASTRODUODENOSCOPY);  Surgeon: Hiren Newberry MD;  Location: [facility] ENDO;  Service: Endoscopy;  Laterality: N/A;    EXCISION OF GANGLION CYST OF HAND Right 10/22/2018    Procedure: EXCISION, GANGLION CYST, HAND- RIGHT, LONG AND INDEX FINGER;  Surgeon: Sowmya Schmidt MD;  Location: [facility] OR;  Service: Orthopedics;  Laterality: Right;  Stretcher; Supine; Hand Pan 1 & [place] 2; Dr. Loja's Rasp    HYSTERECTOMY  2015    [facility]    INJECTION OF ANESTHETIC AGENT AROUND MEDIAL BRANCH NERVES INNERVATING LUMBAR FACET JOINT Bilateral 9/28/2021    Procedure: Block-nerve-medial branch-lumbar-bilateral L4-5 and L5-S1;  Surgeon: Saranya Cornelius Jr., MD;  Location: [facility] PAIN MGT;  Service: Pain Management;  Laterality: Bilateral;    INJECTION OF ANESTHETIC AGENT AROUND MEDIAL BRANCH NERVES INNERVATING LUMBAR FACET JOINT Bilateral 10/12/2021    Procedure: Bilateral Lumbar Medial Branch Block L4-5 L5-S1- (No Sedation);  Surgeon: Saranya Cornelius Jr., MD;  [facility]: [facility];  Service: Pain Management;  Laterality: Bilateral;    KNEE ARTHROSCOPY  4/18/2014    LATS/left    OOPHORECTOMY      PANCREAS SURGERY  2015    [facility]    RADIOFREQUENCY THERMOCOAGULATION Bilateral 11/9/2021    Procedure: Radiofrequency Themocoagulation of medial branches: L4-5 and L5-S1;  Surgeon: Saranya Cornelius Jr., MD;  Location: [facility] PAIN MGT;  Service: Pain Management;  Laterality: Bilateral;  Patient is diabetic.     RADIOFREQUENCY THERMOCOAGULATION Left 12/16/2021    Procedure: RADIOFREQUENCY THERMAL COAGULATION LEFT L4-5, L5-S1 (IV Sedation);  Surgeon: Saranya Cornelius Jr., MD;  Location: [facility] PAIN MGT;  Service: Pain Management;  Laterality: Left;  diabetic    TONSILLECTOMY      TRANSFORAMINAL EPIDURAL INJECTION OF STEROID Right 8/17/2021    Procedure: Injection,steroid,epidural,transforaminal approach; Levels: L5-S1;  Surgeon: Saranya Cornelius  MD [facility];  Location: [facility] PAIN [facility];  Service: Pain Management;  Laterality: Right;  No pacemaker. Patient is diabetic.    TRANSFORAMINAL EPIDURAL INJECTION OF STEROID Right 2021    Procedure: Injection,steroid,epidural,transforaminal approach; Levels: L5-S1;  Surgeon: Saranya Cornelius Jr., MD;  Location: [facility];  Service: Pain Management;  Laterality: Right;  No pacemaker. Patient is diabetic.        Social History     Socioeconomic History    Marital status:    Tobacco Use    Smoking status: Former     Current packs/day: 0.00     Average packs/day: 0.3 packs/day for 10.0 years (2.5 ttl pk-yrs)     Types: Cigarettes     Start date: 1972     Quit date: 1982     Years since quittin.7    Smokeless tobacco: Never    Tobacco comments:     Totally quit per  visit.   Substance and Sexual Activity    Alcohol use: Yes     Comment: occasional use    Drug use: Never    Sexual activity: Yes     Partners: Male     Birth control/protection: None     Social Determinants of Health     Financial Resource Strain: Low Risk  (2024)    Overall Financial Resource Strain (CARDIA)     Difficulty of Paying Living Expenses: Not hard at all   Food Insecurity: No Food Insecurity (2024)    Hunger Vital Sign     Worried About Running Out of Food in the Last Year: Never true     Ran Out of Food in the Last Year: Never true   Transportation Needs: No Transportation Needs (2024)    PRAPARE - Transportation     Lack of Transportation (Medical): No     Lack of Transportation (Non-Medical): No   Physical Activity: Insufficiently Active (2024)    Exercise Vital Sign     Days of Exercise per Week: 2 days     Minutes of Exercise per Session: 20 min   Stress: Stress Concern Present (2024)    [employer] - Occupational Stress Questionnaire     Feeling of Stress : Very much   Housing Stability: Low Risk  (2024)    Housing Stability Vital Sign     Unable to Pay for  Housing in the Last Year: No     Number of Places Lived in the Last Year: 1     Unstable Housing in the Last Year: No       Review of patient's allergies indicates:   Allergen Reactions    Erythromycin base        Current Outpatient Medications on File Prior to Visit   Medication Sig Dispense Refill    alcohol swabs (DROPSAFE ALCOHOL PREP PADS) PadM Use once daily to clean finger prior to glucose check for diabetes 100 each 3    amLODIPine (NORVASC) 10 MG tablet TAKE 1 TABLET EVERY DAY 90 tablet 3    blood-glucose meter kit Use as instructed 1 each 0    glipiZIDE (GLUCOTROL) 10 MG TR24 TAKE 1 TABLET EVERY DAY WITH BREAKFAST FOR DIABETES 90 tablet 3    hydrOXYzine pamoate (VISTARIL) 25 MG Cap TAKE 1 CAPSULE TWICE DAILY AS NEEDED FOR ANXIETY 180 capsule 3    lancets Misc To check BG 1 time daily, to use with insurance preferred meter 100 each 3    levothyroxine (SYNTHROID) 75 MCG tablet TAKE 1 TABLET EVERY DAY BEFORE BREAKFAST 90 tablet 3    meloxicam (MOBIC) 15 MG tablet TAKE 1 TABLET BY MOUTH EVERY DAY FOR ANKLE PAIN OR SWELLING 90 tablet 3    olmesartan (BENICAR) 20 MG tablet Take 1 tablet (20 mg total) by mouth once daily. 90 tablet 1    pantoprazole (PROTONIX) 40 MG tablet TAKE 1 TABLET EVERY DAY 90 tablet 3    pregabalin (LYRICA) 150 MG capsule Take 1 capsule (150 mg total) by mouth 3 (three) times daily. 90 capsule 5    SITagliptin phosphate (JANUVIA) 100 MG Tab Take 1 tablet (100 mg total) by mouth once daily. For diabetes control. 30 tablet 5    TRUE METRIX GLUCOSE TEST STRIP Strp TEST BLOOD SUGAR EVERY  strip 3    TRUEPLUS LANCETS 33 gauge Misc TEST BLOOD SUGAR EVERY  each 3    zolpidem (AMBIEN) 10 mg Tab Take 1 tablet (10 mg total) by mouth nightly as needed (insomnia). 30 tablet 3    DULoxetine (CYMBALTA) 30 MG capsule Take 1 capsule (30 mg total) by mouth once daily. (Patient not taking: Reported on 9/24/2024) 30 capsule 5     No current facility-administered medications on file prior to  "visit.       Objective:      /66   Pulse 60   Ht 5' 5" (1.651 m)   Wt 105.2 kg (231 lb 13 oz)   LMP  (LMP Unknown)   BMI 38.58 kg/m²     Exam:  GEN:  Well developed, well nourished.  No acute distress.   HEENT:  No trauma.  Mucous membranes moist.  Nares patent bilaterally.  PSYCH: Normal affect. Thought content appropriate.  CHEST:  Breathing symmetric.  No audible wheezing.  ABD: Soft, non-distended.  SKIN:  Warm, pink, dry.  No rash on exposed areas.    EXT:  No cyanosis, clubbing, or edema.  No color change or changes in nail or hair growth.  NEURO/MUSCULOSKELETAL:  Fully alert, oriented, and appropriate. Speech normal amarilis. No cranial nerve deficits.   Gait:  Normal.  No focal motor deficits.           Imaging:      Narrative & Impression    EXAMINATION:  MRI LUMBAR SPINE WITHOUT CONTRAST     CLINICAL HISTORY:  Dorsalgia, unspecifiedBack pain or radiculopathy, > 6 wks;     TECHNIQUE:  Sagittal T1, sagittal T2, sagittal STIR, axial T1 and axial T2 weighted images of the lumbar spine obtained without contrast.     COMPARISON:  04/12/2016     FINDINGS:  Lumbar spine alignment is within normal limits. The vertebral body heights are well maintained, with no fracture.  Degenerative fatty metaplasia endplate changes at L5-S1 and L1-2.  Otherwise, bone marrow signal is normal.     The conus is normal in appearance.  The adjacent soft tissue structures show no significant abnormalities.     There are multiple broad-based spur and disc complex is without spinal stenosis at T11-12 and T12-L1.     L1-L2: No focal disc herniation.  Broad-based disc osteophyte complex without central canal or foraminal stenosis.No significant central canal or neural foraminal narrowing.     L2-L3: There is no focal disc herniation. No significant central canal or neural foraminal narrowing.     L3-L4: There is no focal disc herniation. No significant central canal or neural foraminal narrowing.     L4-L5: Broad-based disc " bulging.  Severe facet arthritis.  No central canal stenosis.  No foraminal stenosis.     L5-S1: Broad-based disc osteophyte complex and severe facet arthritis produce mild medial foraminal narrowing bilaterally.  No central canal stenosis.     Impression:     Degenerative changes of the lumbar spine mostly involving the lower lumbar facets.  Mild L5-S1 foraminal encroachment bilaterally.        Electronically signed by: Chaz Beth Jr  Date:                                            05/25/2021  Time:                                           16:14       Assessment:       Encounter Diagnoses   Name Primary?    Fibromyalgia Yes    Chronic pain syndrome     DDD (degenerative disc disease), lumbar     Lumbar radiculopathy          Plan:       Aracelis was seen today for follow-up.    Diagnoses and all orders for this visit:    Fibromyalgia  -     cyclobenzaprine (FLEXERIL) 10 MG tablet; Take 1 tablet (10 mg total) by mouth 3 (three) times daily as needed for Muscle spasms.  -     Ambulatory referral/consult to Rheumatology; Future    Chronic pain syndrome  -     cyclobenzaprine (FLEXERIL) 10 MG tablet; Take 1 tablet (10 mg total) by mouth 3 (three) times daily as needed for Muscle spasms.  -     Ambulatory referral/consult to Rheumatology; Future    DDD (degenerative disc disease), lumbar    Lumbar radiculopathy          Aracelis Martinez is a 69 y.o. female with chronic neck and lower back pain related to fibromyalgia.    Prior records reviewed.    Continue Lyrica 150 mg b.i.d..  Patient taking with benefit, denies adverse effects.  Discontinue Cymbalta.  Patient reports adverse effects.  We did discuss switching to an alternative SNRI.  However I do not typically utilize other antidepressants for fibromyalgia and am not familiar with these medications.  Patient is established with Psychology and following up next week.  Advised patient to discuss any alternative antidepressants with her psychiatrist.  Start Flexeril  10 mg t.i.d. p.r.n..  Referral placed to rheumatology.  Return to clinic as needed.          Ignacio Childress PA-C  [facility]  Interventional Pain Management   09/24/2024    This note was created by combination of typed  and M-Modal dictation.  Transcription and phonetic errors may be present.  If there are any questions, please contact me.

## 2024-10-08 ENCOUNTER — OFFICE VISIT (OUTPATIENT)
Dept: PSYCHIATRY | Facility: CLINIC | Age: 69
End: 2024-10-08
Payer: MEDICARE

## 2024-10-08 DIAGNOSIS — F43.21 UNRESOLVED GRIEF: ICD-10-CM

## 2024-10-08 DIAGNOSIS — F41.1 GAD (GENERALIZED ANXIETY DISORDER): Primary | ICD-10-CM

## 2024-10-08 NOTE — PROGRESS NOTES
Individual Psychotherapy (PhD/LCSW)    10/8/2024    Site:  [facility]         Therapeutic Intervention: Met with patient.  Outpatient - Insight oriented psychotherapy 60 min - CPT code 48160 and Outpatient - Behavior modifying psychotherapy 60 min - CPT code 80814    Chief complaint/reason for encounter: Unresolved grief, depressed mood, anxiety      Interval history and content of current session: Ms. Martinez arrived on time for her scheduled appointment.      [facility], Session 4 (Intermediate Sessions)  Session Focus:   Brief check-in   Set agenda   Collect rating scales        CROW-7 Score: (P) 9  Interpretation: (P) Mild       PHQ8 Score : (P) 4  PHQ8 Interpretation: (P) Minimal  Review Action Plan   Interpersonal challenges, self-concept, life transition issues   Intervention techniques: Values identification   Summarize session   Feedback about session      Action Plan:   Review therapy materials   Intervention practice:    Identify values worksheet     Treatment plan:  Target symptoms: depression  Why chosen therapy is appropriate versus another modality: relevant to diagnosis  Outcome monitoring methods: self-report  Therapeutic intervention type: insight oriented psychotherapy, behavior modifying psychotherapy    Risk parameters:  Patient reports no suicidal ideation  Patient reports no homicidal ideation  Patient reports no self-injurious behavior  Patient reports no violent behavior    Verbal deficits: None    Patient's response to intervention:  The patient's response to intervention is accepting.    Progress toward goals and other mental status changes:  The patient's progress toward goals is fair .    Diagnosis:     ICD-10-CM ICD-9-CM   1. CROW (generalized anxiety disorder)  F41.1 300.02   2. Unresolved grief  F43.21 309.1              Plan:  individual psychotherapy    Return to clinic: 1 week    Length of Service (minutes): 60    Minor Trammell PsyD  Adult Psychology Fellow   [facility]

## 2024-10-15 ENCOUNTER — OFFICE VISIT (OUTPATIENT)
Dept: PSYCHIATRY | Facility: CLINIC | Age: 69
End: 2024-10-15
Payer: MEDICARE

## 2024-10-15 DIAGNOSIS — F41.1 GAD (GENERALIZED ANXIETY DISORDER): Primary | ICD-10-CM

## 2024-10-15 PROCEDURE — 90837 PSYTX W PT 60 MINUTES: CPT | Mod: HCNC,S$GLB,, | Performed by: SOCIAL WORKER

## 2024-10-15 PROCEDURE — 3060F POS MICROALBUMINURIA REV: CPT | Mod: HCNC,CPTII,S$GLB, | Performed by: SOCIAL WORKER

## 2024-10-15 PROCEDURE — 3066F NEPHROPATHY DOC TX: CPT | Mod: HCNC,CPTII,S$GLB, | Performed by: SOCIAL WORKER

## 2024-10-15 PROCEDURE — 3051F HG A1C>EQUAL 7.0%<8.0%: CPT | Mod: HCNC,CPTII,S$GLB, | Performed by: SOCIAL WORKER

## 2024-10-15 PROCEDURE — 4010F ACE/ARB THERAPY RXD/TAKEN: CPT | Mod: HCNC,CPTII,S$GLB, | Performed by: SOCIAL WORKER

## 2024-10-15 NOTE — PROGRESS NOTES
Individual Psychotherapy (PhD/LCSW)    10/15/2024    Site:  [facility]         Therapeutic Intervention: Met with patient.  Outpatient - Insight oriented psychotherapy 60 min - CPT code 00231 and Outpatient - Behavior modifying psychotherapy 60 min - CPT code 71232    Chief complaint/reason for encounter: Unresolved grief, depressed mood, anxiety      Interval history and content of current session: Ms. Martinez arrived on time for her scheduled appointment.      [facility], Session 5 (Intermediate Sessions)  Session Focus:   Brief check-in   Set agenda   Collect rating scales        CROW-7 Score: (P) 10  Interpretation: (P) Mild       PHQ8 Score : (P) 3  PHQ8 Interpretation: (P) Minimal  Review Action Plan   Interpersonal challenges, self-concept, life transition issues   Intervention techniques: Values exploration, self-reflection   Summarize session   Feedback about session      Action Plan:   Review therapy materials   Intervention practice:    Values based activity exploration (personal, leisure)    Treatment plan:  Target symptoms: depression  Why chosen therapy is appropriate versus another modality: relevant to diagnosis  Outcome monitoring methods: self-report  Therapeutic intervention type: insight oriented psychotherapy, behavior modifying psychotherapy    Risk parameters:  Patient reports no suicidal ideation  Patient reports no homicidal ideation  Patient reports no self-injurious behavior  Patient reports no violent behavior    Verbal deficits: None    Patient's response to intervention:  The patient's response to intervention is accepting.    Progress toward goals and other mental status changes:  The patient's progress toward goals is fair .    Diagnosis:     ICD-10-CM ICD-9-CM   1. CROW (generalized anxiety disorder)  F41.1 300.02        Plan:  individual psychotherapy    Return to clinic: 1 week    Length of Service (minutes): 60    Minor Trammell PsyD  Adult Psychology Fellow   [facility]

## 2024-10-16 DIAGNOSIS — E11.9 TYPE 2 DIABETES MELLITUS WITHOUT COMPLICATION, WITHOUT LONG-TERM CURRENT USE OF INSULIN: Chronic | ICD-10-CM

## 2024-10-16 RX ORDER — AMLODIPINE BESYLATE 10 MG/1
10 TABLET ORAL
Qty: 90 TABLET | Refills: 3 | Status: SHIPPED | OUTPATIENT
Start: 2024-10-16

## 2024-10-16 RX ORDER — LOSARTAN POTASSIUM 50 MG/1
50 TABLET ORAL
Qty: 90 TABLET | OUTPATIENT
Start: 2024-10-16

## 2024-10-16 RX ORDER — CALCIUM CITRATE/VITAMIN D3 200MG-6.25
TABLET ORAL
Qty: 100 STRIP | Refills: 3 | Status: SHIPPED | OUTPATIENT
Start: 2024-10-16

## 2024-10-16 RX ORDER — ISOPROPYL ALCOHOL 70 ML/100ML
SWAB TOPICAL
Qty: 100 EACH | Refills: 3 | Status: SHIPPED | OUTPATIENT
Start: 2024-10-16

## 2024-10-16 RX ORDER — HYDROXYZINE PAMOATE 25 MG/1
CAPSULE ORAL
Qty: 180 CAPSULE | Refills: 3 | Status: SHIPPED | OUTPATIENT
Start: 2024-10-16

## 2024-10-16 RX ORDER — LANCETS 33 GAUGE
EACH MISCELLANEOUS
Qty: 100 EACH | Refills: 3 | Status: SHIPPED | OUTPATIENT
Start: 2024-10-16

## 2024-10-16 NOTE — TELEPHONE ENCOUNTER
Refill Routing Note   Medication(s) are not appropriate for processing by [facility] for the following reason(s):        Outside of protocol    [facility] action(s):  Quick Discontinue  Route  Approve   Requires labs : Yes  - A1c     Medication Therapy Plan: Provider discontinued Losartan on 08/22/24 and began therapy with olmesartan      Appointments  past 12m or future 3m with PCP    Date Provider   Last Visit   7/22/2024 Alen Damico MD   Next Visit   12/9/2024 Alen Damico MD   ED visits in past 90 days: 0        Note composed:10:51 AM 10/16/2024

## 2024-10-16 NOTE — TELEPHONE ENCOUNTER
Care Due:                  Date            Visit Type   Department     Provider  --------------------------------------------------------------------------------                                ESTABLISHED   MET INTERNAL  Last Visit: 07-      PATIENT      MEDICINE       Alen Damico                               -                              PRIMARY      [facility] INTERNAL  Next Visit: 12-      CARE ([facility])   MEDICINE       Alen Damico                                                            Last  Test          Frequency    Reason                     Performed    Due Date  --------------------------------------------------------------------------------    HBA1C.......  6 months...  SITagliptin, glipiZIDE...  07-   01-    [facility] Embedded Care Due Messages. Reference number: [id number].   10/16/2024 2:21:45 AM CDT

## 2024-10-17 ENCOUNTER — PATIENT MESSAGE (OUTPATIENT)
Dept: PSYCHIATRY | Facility: CLINIC | Age: 69
End: 2024-10-17
Payer: MEDICARE

## 2024-10-22 ENCOUNTER — OFFICE VISIT (OUTPATIENT)
Dept: PSYCHIATRY | Facility: CLINIC | Age: 69
End: 2024-10-22
Payer: MEDICARE

## 2024-10-22 DIAGNOSIS — F43.21 UNRESOLVED GRIEF: ICD-10-CM

## 2024-10-22 DIAGNOSIS — F41.1 GAD (GENERALIZED ANXIETY DISORDER): Primary | ICD-10-CM

## 2024-10-22 NOTE — PROGRESS NOTES
Individual Psychotherapy (PhD/LCSW)    10/22/2024    Site:  [facility]         Therapeutic Intervention: Met with patient.  Outpatient - Insight oriented psychotherapy 60 min - CPT code 79253 and Outpatient - Behavior modifying psychotherapy 60 min - CPT code 50386    Chief complaint/reason for encounter: Unresolved grief, depressed mood, anxiety      Interval history and content of current session: Ms. Martinez arrived on time for her scheduled appointment.      [facility], Session 6 (Intermediate Sessions)  Session Focus:   Brief check-in   Set agenda   Collect rating scales        CROW-7 Score: (P) 4  Interpretation: (P) Minimal        PHQ8 Score : (P) 2  PHQ8 Interpretation: (P) Minimal  Review Action Plan   Interpersonal challenges, life transition issues, anticipatory anxiety    Intervention techniques: Values exploration, Cognitive restructuring   Summarize session   Feedback about session      Action Plan:   Review therapy materials   Intervention practice:    Values based activity exploration (marriage)  List of fears    Treatment plan:  Target symptoms: depression  Why chosen therapy is appropriate versus another modality: relevant to diagnosis  Outcome monitoring methods: self-report  Therapeutic intervention type: insight oriented psychotherapy, behavior modifying psychotherapy    Risk parameters:  Patient reports no suicidal ideation  Patient reports no homicidal ideation  Patient reports no self-injurious behavior  Patient reports no violent behavior    Verbal deficits: None    Patient's response to intervention:  The patient's response to intervention is accepting.    Progress toward goals and other mental status changes:  The patient's progress toward goals is fair .    Diagnosis:     ICD-10-CM ICD-9-CM   1. CROW (generalized anxiety disorder)  F41.1 300.02   2. Unresolved grief  F43.21 309.1           Plan:  individual psychotherapy    Return to clinic: 1 week    Length of Service (minutes):  60    Minor Trammell PsyD  Adult Psychology Fellow   [facility]

## 2024-10-24 ENCOUNTER — PATIENT OUTREACH (OUTPATIENT)
Dept: ADMINISTRATIVE | Facility: HOSPITAL | Age: 69
End: 2024-10-24
Payer: MEDICARE

## 2024-11-05 ENCOUNTER — PATIENT MESSAGE (OUTPATIENT)
Dept: PSYCHIATRY | Facility: CLINIC | Age: 69
End: 2024-11-05
Payer: MEDICARE

## 2024-11-05 ENCOUNTER — OFFICE VISIT (OUTPATIENT)
Dept: PSYCHIATRY | Facility: CLINIC | Age: 69
End: 2024-11-05
Payer: MEDICARE

## 2024-11-05 DIAGNOSIS — F43.21 UNRESOLVED GRIEF: ICD-10-CM

## 2024-11-05 DIAGNOSIS — F41.1 GAD (GENERALIZED ANXIETY DISORDER): Primary | ICD-10-CM

## 2024-11-05 NOTE — PROGRESS NOTES
The patient location is: Patient's home, [place], [place]  The chief complaint leading to consultation is: Anxiety and depression     Visit type: audiovisual    Face to Face time with patient: 60 minutes   70 minutes of total time spent on the encounter, which includes face to face time and non-face to face time preparing to see the patient (eg, review of tests), Obtaining and/or reviewing separately obtained history, Documenting clinical information in the electronic or other health record, Independently interpreting results (not separately reported) and communicating results to the patient/family/caregiver, or Care coordination (not separately reported).     Each patient to whom he or she provides medical services by telemedicine is:  (1) informed of the relationship between the physician and patient and the respective role of any other health care provider with respect to management of the patient; and (2) notified that he or she may decline to receive medical services by telemedicine and may withdraw from such care at any time.    Notes:   Individual Psychotherapy (PhD/LCSW)    11/5/2024    Site:  [facility]         Therapeutic Intervention: Met with patient.  Outpatient - Insight oriented psychotherapy 60 min - CPT code 62507 and Outpatient - Behavior modifying psychotherapy 60 min - CPT code 01974    Chief complaint/reason for encounter: Unresolved grief, depressed mood, anxiety      Interval history and content of current session: Ms. Martinez arrived on time for her scheduled appointment.      [facility], Session 7 (Intermediate Sessions)  Session Focus:   Brief check-in   Set agenda   Collect rating scales        CROW-7 Score: (P) 10  Interpretation: (P) Moderate       PHQ8 Score : (P) 4  PHQ8 Interpretation: (P) Minimal  Review Action Plan   Interpersonal challenges, Marital conflict    Intervention techniques: Cognitive restructuring, interpersonal effectiveness   Summarize session   Feedback about  session      Action Plan:   Review therapy materials   Intervention practice:    Communication exercise     Treatment plan:  Target symptoms: depression  Why chosen therapy is appropriate versus another modality: relevant to diagnosis  Outcome monitoring methods: self-report  Therapeutic intervention type: insight oriented psychotherapy, behavior modifying psychotherapy    Risk parameters:  Patient reports no suicidal ideation  Patient reports no homicidal ideation  Patient reports no self-injurious behavior  Patient reports no violent behavior    Verbal deficits: None    Patient's response to intervention:  The patient's response to intervention is reluctant.    Progress toward goals and other mental status changes:  The patient's progress toward goals is fair .    Diagnosis:     ICD-10-CM ICD-9-CM   1. CROW (generalized anxiety disorder)  F41.1 300.02   2. Unresolved grief  F43.21 309.1        Plan:  individual psychotherapy    Return to clinic: 1 week    Length of Service (minutes): 60    Minor Trammell PsyD  Adult Psychology Fellow   [facility]

## 2024-11-06 ENCOUNTER — PATIENT MESSAGE (OUTPATIENT)
Dept: ADMINISTRATIVE | Facility: OTHER | Age: 69
End: 2024-11-06
Payer: MEDICARE

## 2024-11-10 ENCOUNTER — PATIENT MESSAGE (OUTPATIENT)
Dept: INTERNAL MEDICINE | Facility: CLINIC | Age: 69
End: 2024-11-10
Payer: MEDICARE

## 2024-11-12 ENCOUNTER — OFFICE VISIT (OUTPATIENT)
Dept: PSYCHIATRY | Facility: CLINIC | Age: 69
End: 2024-11-12
Payer: MEDICARE

## 2024-11-12 DIAGNOSIS — F41.1 GAD (GENERALIZED ANXIETY DISORDER): Primary | ICD-10-CM

## 2024-11-12 DIAGNOSIS — F43.21 UNRESOLVED GRIEF: ICD-10-CM

## 2024-11-12 PROCEDURE — 3051F HG A1C>EQUAL 7.0%<8.0%: CPT | Mod: HCNC,CPTII,S$GLB, | Performed by: SOCIAL WORKER

## 2024-11-12 PROCEDURE — 3060F POS MICROALBUMINURIA REV: CPT | Mod: HCNC,CPTII,S$GLB, | Performed by: SOCIAL WORKER

## 2024-11-12 PROCEDURE — 3066F NEPHROPATHY DOC TX: CPT | Mod: HCNC,CPTII,S$GLB, | Performed by: SOCIAL WORKER

## 2024-11-12 PROCEDURE — 4010F ACE/ARB THERAPY RXD/TAKEN: CPT | Mod: HCNC,CPTII,S$GLB, | Performed by: SOCIAL WORKER

## 2024-11-12 PROCEDURE — 90837 PSYTX W PT 60 MINUTES: CPT | Mod: HCNC,S$GLB,, | Performed by: SOCIAL WORKER

## 2024-11-12 NOTE — PROGRESS NOTES
Individual Psychotherapy (PhD/LCSW)    11/12/2024    Site:  [facility]         Therapeutic Intervention: Met with patient.  Outpatient - Insight oriented psychotherapy 60 min - CPT code 71784 and Outpatient - Behavior modifying psychotherapy 60 min - CPT code 23479    Chief complaint/reason for encounter: Unresolved grief, depressed mood, anxiety      Interval history and content of current session: Ms. Martinez arrived on time for her scheduled appointment.      [facility], Session 8 (Intermediate Sessions)  Session Focus:   Brief check-in   Set agenda   Collect rating scales        CROW-7 Score: (P) 14  Interpretation: (P) Moderate       PHQ8 Score : (P) 5  PHQ8 Interpretation: (P) Minimal  Review Action Plan   Marital stressors, potential medical dx impact, grief   Intervention techniques: Cognitive restructuring, validation, emotional regulation  Summarize session   Feedback about session      Action Plan:   Review therapy materials   -challenge negative thoughts  -opposite action       Treatment plan:  Target symptoms: depression  Why chosen therapy is appropriate versus another modality: relevant to diagnosis  Outcome monitoring methods: self-report  Therapeutic intervention type: insight oriented psychotherapy, behavior modifying psychotherapy    Risk parameters:  Patient reports no suicidal ideation  Patient reports no homicidal ideation  Patient reports no self-injurious behavior  Patient reports no violent behavior    Verbal deficits: None    Patient's response to intervention:  The patient's response to intervention is accepting.    Progress toward goals and other mental status changes:  The patient's progress toward goals is fair .    Diagnosis:     ICD-10-CM ICD-9-CM   1. CROW (generalized anxiety disorder)  F41.1 300.02   2. Unresolved grief  F43.21 309.1        Plan:  individual psychotherapy    Return to clinic: 1 week    Length of Service (minutes): 60    Minor Trammell PsyD  Adult  Psychology Fellow   [facility]

## 2024-11-14 DIAGNOSIS — F51.01 PRIMARY INSOMNIA: Primary | ICD-10-CM

## 2024-11-14 NOTE — TELEPHONE ENCOUNTER
No care due was identified.  [facility] Embedded Care Due Messages. Reference number: [id number].   11/14/2024 1:00:13 PM CST

## 2024-11-15 RX ORDER — ZOLPIDEM TARTRATE 10 MG/1
10 TABLET ORAL NIGHTLY PRN
Qty: 30 TABLET | Refills: 3 | Status: SHIPPED | OUTPATIENT
Start: 2024-11-15

## 2024-11-18 ENCOUNTER — PATIENT MESSAGE (OUTPATIENT)
Dept: ADMINISTRATIVE | Facility: HOSPITAL | Age: 69
End: 2024-11-18
Payer: MEDICARE

## 2024-11-19 ENCOUNTER — OFFICE VISIT (OUTPATIENT)
Dept: PSYCHIATRY | Facility: CLINIC | Age: 69
End: 2024-11-19
Payer: MEDICARE

## 2024-11-19 DIAGNOSIS — F43.21 UNRESOLVED GRIEF: ICD-10-CM

## 2024-11-19 DIAGNOSIS — F41.1 GAD (GENERALIZED ANXIETY DISORDER): Primary | ICD-10-CM

## 2024-11-19 PROCEDURE — 3066F NEPHROPATHY DOC TX: CPT | Mod: HCNC,CPTII,95, | Performed by: SOCIAL WORKER

## 2024-11-19 PROCEDURE — 4010F ACE/ARB THERAPY RXD/TAKEN: CPT | Mod: HCNC,CPTII,95, | Performed by: SOCIAL WORKER

## 2024-11-19 PROCEDURE — 3060F POS MICROALBUMINURIA REV: CPT | Mod: HCNC,CPTII,95, | Performed by: SOCIAL WORKER

## 2024-11-19 PROCEDURE — 3051F HG A1C>EQUAL 7.0%<8.0%: CPT | Mod: HCNC,CPTII,95, | Performed by: SOCIAL WORKER

## 2024-11-19 PROCEDURE — 90837 PSYTX W PT 60 MINUTES: CPT | Mod: HCNC,95,, | Performed by: SOCIAL WORKER

## 2024-11-19 NOTE — PROGRESS NOTES
The patient location is: Patient's home, [facility]  The chief complaint leading to consultation is: Anxiety and Depression    Visit type: audiovisual    Face to Face time with patient: 60 minutes    70 minutes of total time spent on the encounter, which includes face to face time and non-face to face time preparing to see the patient (eg, review of tests), Obtaining and/or reviewing separately obtained history, Documenting clinical information in the electronic or other health record, Independently interpreting results (not separately reported) and communicating results to the patient/family/caregiver, or Care coordination (not separately reported).         Each patient to whom he or she provides medical services by telemedicine is:  (1) informed of the relationship between the physician and patient and the respective role of any other health care provider with respect to management of the patient; and (2) notified that he or she may decline to receive medical services by telemedicine and may withdraw from such care at any time.    Notes:   Individual Psychotherapy (PhD/LCSW)    11/19/2024    Site[facility]  [facility]         Therapeutic Intervention: Met with patient.  Outpatient - Insight oriented psychotherapy 60 min - CPT code 49036 and Outpatient - Behavior modifying psychotherapy 60 min - CPT code 20188    Chief complaint/reason for encounter: Unresolved grief, depressed mood, anxiety      Interval history and content of current session: Ms. Martinez arrived on time for her scheduled appointment.      [facility], Session 9 (Intermediate Sessions)  Session Focus:   Brief check-in   Set agenda   Collect rating scales        CROW-7 Score: (P) 14  Interpretation: (P) Moderate       PHQ8 Score : (P) 5  PHQ8 Interpretation: (P) Minimal  Review Action Plan   Marital stressors, Managing new role as caregiver   Intervention techniques: Cognitive restructuring, validation, distress tolerance  Summarize session   Feedback about  session      Action Plan:   Review therapy materials   -Letter to self (identifying resentment)  -Engage in self-care activities       Treatment plan:  Target symptoms: depression  Why chosen therapy is appropriate versus another modality: relevant to diagnosis  Outcome monitoring methods: self-report  Therapeutic intervention type: insight oriented psychotherapy, behavior modifying psychotherapy    Risk parameters:  Patient reports no suicidal ideation  Patient reports no homicidal ideation  Patient reports no self-injurious behavior  Patient reports no violent behavior    Verbal deficits: None    Patient's response to intervention:  The patient's response to intervention is accepting.    Progress toward goals and other mental status changes:  The patient's progress toward goals is fair .    Diagnosis:     ICD-10-CM ICD-9-CM   1. CROW (generalized anxiety disorder)  F41.1 300.02   2. Unresolved grief  F43.21 309.1           Plan:  individual psychotherapy    Return to clinic: 2 weeks    Length of Service (minutes): 60    Minor Trammell PsyD  Adult Psychology Fellow   [facility]         Patient

## 2024-12-02 ENCOUNTER — PATIENT OUTREACH (OUTPATIENT)
Dept: ADMINISTRATIVE | Facility: HOSPITAL | Age: 69
End: 2024-12-02
Payer: MEDICARE

## 2024-12-02 ENCOUNTER — LAB VISIT (OUTPATIENT)
Dept: LAB | Facility: HOSPITAL | Age: 69
End: 2024-12-02
Attending: INTERNAL MEDICINE
Payer: MEDICARE

## 2024-12-02 DIAGNOSIS — E11.9 TYPE 2 DIABETES MELLITUS WITHOUT COMPLICATION, WITHOUT LONG-TERM CURRENT USE OF INSULIN: ICD-10-CM

## 2024-12-02 LAB
ALBUMIN SERPL BCP-MCNC: 3.6 G/DL (ref 3.5–5.2)
ALP SERPL-CCNC: 128 U/L (ref 40–150)
ALT SERPL W/O P-5'-P-CCNC: 16 U/L (ref 10–44)
ANION GAP SERPL CALC-SCNC: 13 MMOL/L (ref 8–16)
AST SERPL-CCNC: 16 U/L (ref 10–40)
BILIRUB SERPL-MCNC: 0.4 MG/DL (ref 0.1–1)
BUN SERPL-MCNC: 20 MG/DL (ref 8–23)
CALCIUM SERPL-MCNC: 9.4 MG/DL (ref 8.7–10.5)
CHLORIDE SERPL-SCNC: 108 MMOL/L (ref 95–110)
CO2 SERPL-SCNC: 20 MMOL/L (ref 23–29)
CREAT SERPL-MCNC: 1 MG/DL (ref 0.5–1.4)
EST. GFR  (NO RACE VARIABLE): >60 ML/MIN/1.73 M^2
ESTIMATED AVG GLUCOSE: 194 MG/DL (ref 68–131)
GLUCOSE SERPL-MCNC: 199 MG/DL (ref 70–110)
HBA1C MFR BLD: 8.4 % (ref 4–5.6)
POTASSIUM SERPL-SCNC: 4.6 MMOL/L (ref 3.5–5.1)
PROT SERPL-MCNC: 6.8 G/DL (ref 6–8.4)
SODIUM SERPL-SCNC: 141 MMOL/L (ref 136–145)

## 2024-12-02 PROCEDURE — 80053 COMPREHEN METABOLIC PANEL: CPT | Mod: HCNC | Performed by: INTERNAL MEDICINE

## 2024-12-02 PROCEDURE — 83036 HEMOGLOBIN GLYCOSYLATED A1C: CPT | Mod: HCNC | Performed by: INTERNAL MEDICINE

## 2024-12-02 NOTE — PROGRESS NOTES
Chart reviewed  Immunizations reconciled   Scheduled eye exam      H&P reviewed. The patient was examined and there are no changes to the H&P.      Temp:  [97.1 °F (36.2 °C)] 97.1 °F (36.2 °C)  Heart Rate:  [112] 112  Resp:  [20] 20

## 2024-12-03 ENCOUNTER — OFFICE VISIT (OUTPATIENT)
Dept: PSYCHIATRY | Facility: CLINIC | Age: 69
End: 2024-12-03
Payer: MEDICARE

## 2024-12-03 DIAGNOSIS — Z63.6 CAREGIVER BURDEN: ICD-10-CM

## 2024-12-03 DIAGNOSIS — F41.1 GAD (GENERALIZED ANXIETY DISORDER): Primary | ICD-10-CM

## 2024-12-03 SDOH — SOCIAL DETERMINANTS OF HEALTH (SDOH): DEPENDENT RELATIVE NEEDING CARE AT HOME: Z63.6

## 2024-12-03 NOTE — PROGRESS NOTES
Individual Psychotherapy (PhD/LCSW)    12/3/2024    Site:  [facility]         Therapeutic Intervention: Met with patient.  Outpatient - Insight oriented psychotherapy 60 min - CPT code 92979 and Outpatient - Behavior modifying psychotherapy 60 min - CPT code 80693    Chief complaint/reason for encounter: Unresolved grief, depressed mood, anxiety      Interval history and content of current session: Ms. Martinez arrived on time for her scheduled appointment.      [facility], Session 10 (Intermediate Sessions)  Session Focus:   Brief check-in   Set agenda   Collect rating scales        CROW-7 Score: (P) 11  Interpretation: (P) Moderate       PHQ8 Score : (P) 2  PHQ8 Interpretation: (P) Minimal  Review Action Plan    Managing new role as caregiver, challenges with implementing new boundaries   Intervention techniques: Cognitive restructuring, validation, Emotional regulation, boundary setting  Summarize session   Feedback about session      Action Plan:   Review therapy materials   -Letter to self (identifying resentment)  -identify boundaries      Treatment plan:  Target symptoms: depression  Why chosen therapy is appropriate versus another modality: relevant to diagnosis  Outcome monitoring methods: self-report  Therapeutic intervention type: insight oriented psychotherapy, behavior modifying psychotherapy    Risk parameters:  Patient reports no suicidal ideation  Patient reports no homicidal ideation  Patient reports no self-injurious behavior  Patient reports no violent behavior    Verbal deficits: None    Patient's response to intervention:  The patient's response to intervention is accepting.    Progress toward goals and other mental status changes:  The patient's progress toward goals is fair .    Diagnosis:     ICD-10-CM ICD-9-CM   1. CROW (generalized anxiety disorder)  F41.1 300.02   2. Caregiver burden  Z63.6 V61.49        Plan:  individual psychotherapy    Return to clinic: 1 week    Length of Service  (minutes): 60    Minor Trammell PsyD  Adult Psychology Fellow   [facility]

## 2024-12-07 DIAGNOSIS — E11.9 TYPE 2 DIABETES MELLITUS WITHOUT COMPLICATION, WITHOUT LONG-TERM CURRENT USE OF INSULIN: Primary | ICD-10-CM

## 2024-12-07 NOTE — TELEPHONE ENCOUNTER
No care due was identified.  Health [facility] Embedded Care Due Messages. Reference number: [id number].   12/07/2024 1:12:39 AM CST

## 2024-12-08 RX ORDER — GLIPIZIDE 10 MG/1
TABLET, FILM COATED, EXTENDED RELEASE ORAL
Qty: 90 TABLET | Refills: 1 | Status: SHIPPED | OUTPATIENT
Start: 2024-12-08

## 2024-12-08 NOTE — TELEPHONE ENCOUNTER
Refill Decision Note   Aracelis Martinez  is requesting a refill authorization.  Brief Assessment and Rationale for Refill:  Approve     Medication Therapy Plan:         Comments:     Note composed:1:28 PM 12/08/2024

## 2024-12-09 ENCOUNTER — OFFICE VISIT (OUTPATIENT)
Dept: INTERNAL MEDICINE | Facility: CLINIC | Age: 69
End: 2024-12-09
Payer: MEDICARE

## 2024-12-09 VITALS
RESPIRATION RATE: 18 BRPM | SYSTOLIC BLOOD PRESSURE: 120 MMHG | DIASTOLIC BLOOD PRESSURE: 66 MMHG | WEIGHT: 227.75 LBS | HEIGHT: 65 IN | TEMPERATURE: 98 F | HEART RATE: 77 BPM | OXYGEN SATURATION: 96 % | BODY MASS INDEX: 37.95 KG/M2

## 2024-12-09 DIAGNOSIS — F51.01 PRIMARY INSOMNIA: ICD-10-CM

## 2024-12-09 DIAGNOSIS — I10 ESSENTIAL HYPERTENSION: ICD-10-CM

## 2024-12-09 DIAGNOSIS — E11.9 TYPE 2 DIABETES MELLITUS WITHOUT COMPLICATION, WITHOUT LONG-TERM CURRENT USE OF INSULIN: Primary | ICD-10-CM

## 2024-12-09 DIAGNOSIS — D3A.8 NEUROENDOCRINE TUMOR OF PANCREAS: ICD-10-CM

## 2024-12-09 DIAGNOSIS — F41.9 ANXIETY: ICD-10-CM

## 2024-12-09 DIAGNOSIS — M19.90 ARTHRITIS: ICD-10-CM

## 2024-12-09 PROCEDURE — 3060F POS MICROALBUMINURIA REV: CPT | Mod: HCNC,CPTII,S$GLB, | Performed by: INTERNAL MEDICINE

## 2024-12-09 PROCEDURE — 3066F NEPHROPATHY DOC TX: CPT | Mod: HCNC,CPTII,S$GLB, | Performed by: INTERNAL MEDICINE

## 2024-12-09 PROCEDURE — 1101F PT FALLS ASSESS-DOCD LE1/YR: CPT | Mod: HCNC,CPTII,S$GLB, | Performed by: INTERNAL MEDICINE

## 2024-12-09 PROCEDURE — 3078F DIAST BP <80 MM HG: CPT | Mod: HCNC,CPTII,S$GLB, | Performed by: INTERNAL MEDICINE

## 2024-12-09 PROCEDURE — 3074F SYST BP LT 130 MM HG: CPT | Mod: HCNC,CPTII,S$GLB, | Performed by: INTERNAL MEDICINE

## 2024-12-09 PROCEDURE — 99999 PR PBB SHADOW E&M-EST. PATIENT-LVL IV: CPT | Mod: PBBFAC,HCNC,, | Performed by: INTERNAL MEDICINE

## 2024-12-09 PROCEDURE — 99214 OFFICE O/P EST MOD 30 MIN: CPT | Mod: HCNC,S$GLB,, | Performed by: INTERNAL MEDICINE

## 2024-12-09 PROCEDURE — 3008F BODY MASS INDEX DOCD: CPT | Mod: HCNC,CPTII,S$GLB, | Performed by: INTERNAL MEDICINE

## 2024-12-09 PROCEDURE — 3288F FALL RISK ASSESSMENT DOCD: CPT | Mod: HCNC,CPTII,S$GLB, | Performed by: INTERNAL MEDICINE

## 2024-12-09 PROCEDURE — 1126F AMNT PAIN NOTED NONE PRSNT: CPT | Mod: HCNC,CPTII,S$GLB, | Performed by: INTERNAL MEDICINE

## 2024-12-09 PROCEDURE — 3052F HG A1C>EQUAL 8.0%<EQUAL 9.0%: CPT | Mod: HCNC,CPTII,S$GLB, | Performed by: INTERNAL MEDICINE

## 2024-12-09 PROCEDURE — 1159F MED LIST DOCD IN RCRD: CPT | Mod: HCNC,CPTII,S$GLB, | Performed by: INTERNAL MEDICINE

## 2024-12-09 PROCEDURE — 1160F RVW MEDS BY RX/DR IN RCRD: CPT | Mod: HCNC,CPTII,S$GLB, | Performed by: INTERNAL MEDICINE

## 2024-12-09 PROCEDURE — 4010F ACE/ARB THERAPY RXD/TAKEN: CPT | Mod: HCNC,CPTII,S$GLB, | Performed by: INTERNAL MEDICINE

## 2024-12-09 RX ORDER — HYDROXYZINE PAMOATE 50 MG/1
CAPSULE ORAL
Qty: 60 CAPSULE | Refills: 3 | Status: SHIPPED | OUTPATIENT
Start: 2024-12-09

## 2024-12-09 RX ORDER — INSULIN GLARGINE 100 [IU]/ML
12 INJECTION, SOLUTION SUBCUTANEOUS NIGHTLY
Qty: 4 EACH | Refills: 5 | Status: SHIPPED | OUTPATIENT
Start: 2024-12-09

## 2024-12-09 NOTE — PROGRESS NOTES
Subjective:       Patient ID: Aracelis Martinez is a 69 y.o. female.    Chief Complaint: Follow-up (4 month)    History of Present Illness    Aracelis presents today for follow up of diabetes management and other medical conditions which include hypertension, anxiety, chronic insomnia, obesity.  The patient also has fibromyalgia.  Symptoms have been fairly well controlled with Lyrica.    She has hypertension.  She monitor his blood pressures at home.  Current blood pressure medications include olmesartan 20 mg daily and amlodipine 10 mg daily.  She is not experiencing any side effects from low blood pressure medication..    DIABETES MANAGEMENT:  She reports experiencing high blood sugar recently. Her hemoglobin A1C was 8.4%, and fasting glucose was 199 mg/dL on December 2nd. She expresses uncertainty about the cause of her elevated blood sugar. She is currently taking glipizide 10 mg and Januvia 100 mg daily for diabetes management. She denies experiencing symptoms of hypoglycemia. She reports checking her blood sugar in the mornings, noting inconsistent results with some days being within normal range and others running high. She expresses frustration and uncertainty about how to manage her fluctuating blood sugar. She demonstrates familiarity with insulin administration due to past experience with her mother and is open to potential adjustments in her diabetes management, including the possibility of starting insulin therapy.  The patient was intolerant of Jardiance due to recurrent severe yeast infections.  Patient has history of neuroendocrine tumor involving the pancreas we would prefer to avoid use of GLP 1 antagonists.    ANXIETY:  Her current anxiety medication, hydroxyzine 25 mg, is no longer effective. She has been on this medication for an extended period and feels it is not providing the necessary therapeutic benefit. She requests to switch to a different medication or explore alternative options for managing  her anxiety.    PERIPHERAL NEUROPATHY:  She reports intermittent swelling in her feet and ankles, occurring occasionally but not daily. She experiences a burning sensation in her left foot but denies any burning or discomfort in her right foot or toes.    VACCINATIONS:  She received the RSV vaccine in 2023 and reports having received both doses of the shingles vaccine.      ROS:  Psychiatric: +anxiety              Physical Exam  Vitals and nursing note reviewed.   Constitutional:       General: She is not in acute distress.     Appearance: Normal appearance. She is well-developed.   HENT:      Head: Normocephalic and atraumatic.   Eyes:      General: No scleral icterus.     Extraocular Movements: Extraocular movements intact.      Conjunctiva/sclera: Conjunctivae normal.   Neck:      Thyroid: No thyromegaly.      Vascular: No JVD.   Cardiovascular:      Rate and Rhythm: Normal rate and regular rhythm.      Heart sounds: Normal heart sounds. No murmur heard.     No friction rub. No gallop.   Pulmonary:      Effort: Pulmonary effort is normal. No respiratory distress.      Breath sounds: Normal breath sounds. No wheezing or rales.   Abdominal:      General: Bowel sounds are normal.      Palpations: Abdomen is soft. There is no mass.      Tenderness: There is no abdominal tenderness. There is no right CVA tenderness or left CVA tenderness.   Musculoskeletal:         General: No tenderness. Normal range of motion.      Cervical back: Normal range of motion and neck supple.      Right lower leg: No edema.      Left lower leg: No edema.   Lymphadenopathy:      Cervical: No cervical adenopathy.   Skin:     General: Skin is warm and dry.      Findings: No rash.      Comments: No foot lesions are present.   Neurological:      Mental Status: She is alert and oriented to person, place, and time.      Cranial Nerves: No cranial nerve deficit.      Comments: Sensory exam is intact in both feet on monofilament and vibration  testing.   Psychiatric:         Mood and Affect: Mood normal.         Behavior: Behavior normal.       Protective Sensation (w/ 10 gram monofilament):  Right: Intact  Left: Intact    Visual Inspection:  Normal -  Bilateral except for mild bunion deformities bilaterally.    Pedal Pulses:   Right: Present  Left: Present    Posterior Tibialis Pulses:   Right:Present  Left: Present      Lab Visit on 12/02/2024   Component Date Value Ref Range Status    Sodium 12/02/2024 141  136 - 145 mmol/L Final    Potassium 12/02/2024 4.6  3.5 - 5.1 mmol/L Final    Chloride 12/02/2024 108  95 - 110 mmol/L Final    CO2 12/02/2024 20 (L)  23 - 29 mmol/L Final    Glucose 12/02/2024 199 (H)  70 - 110 mg/dL Final    BUN 12/02/2024 20  8 - 23 mg/dL Final    Creatinine 12/02/2024 1.0  0.5 - 1.4 mg/dL Final    Calcium 12/02/2024 9.4  8.7 - 10.5 mg/dL Final    Total Protein 12/02/2024 6.8  6.0 - 8.4 g/dL Final    Albumin 12/02/2024 3.6  3.5 - 5.2 g/dL Final    Total Bilirubin 12/02/2024 0.4  0.1 - 1.0 mg/dL Final    Comment: For infants and newborns, interpretation of results should be based  on gestational age, weight and in agreement with clinical  observations.    Premature Infant recommended reference ranges:  Up to 24 hours.............<8.0 mg/dL  Up to 48 hours............<12.0 mg/dL  3-5 days..................<15.0 mg/dL  6-29 days.................<15.0 mg/dL      Alkaline Phosphatase 12/02/2024 128  40 - 150 U/L Final    AST 12/02/2024 16  10 - 40 U/L Final    ALT 12/02/2024 16  10 - 44 U/L Final    eGFR 12/02/2024 >60.0  >60 mL/min/1.73 m^2 Final    Anion Gap 12/02/2024 13  8 - 16 mmol/L Final    Hemoglobin A1C 12/02/2024 8.4 (H)  4.0 - 5.6 % Final    Comment: ADA Screening Guidelines:  5.7-6.4%  Consistent with prediabetes  >or=6.5%  Consistent with diabetes    High levels of fetal hemoglobin interfere with the HbA1C  assay. Heterozygous hemoglobin variants (HbS, HgC, etc)do  not significantly interfere with this assay.   However,  presence of multiple variants may affect accuracy.      Estimated Avg Glucose 12/02/2024 194 (H)  68 - 131 mg/dL Final       Assessment & Plan:     Assessment & Plan     Elevated glucose (199) and HbA1c (8.4%) indicate suboptimal diabetes control   Considering long-acting insulin (Lantus) for better fasting glucose control   Kidney function, electrolytes, and liver function tests within normal range   Blood pressure well-controlled at 120/66   Minimal weight change since July (3 lb increase)   Some neuropathic symptoms noted in left foot    TYPE 2 DIABETES MELLITUS:   Educated on carbohydrate sources and their impact on blood sugar, including pasta, rice, bread, and potatoes.   Explained that sweet potatoes are a better alternative to white potatoes for glycemic control.   Discussed hypoglycemia threshold (below 70 mg/dL) and symptoms to watch for.   Started Lantus 12 units subcutaneously every evening after dinner or at bedtime.   Continued Januvia at current dose.   Continued glipizide 10 mg, with instructions to decrease to 5 mg if blood sugar drops quickly or approaches 70 mg/dL.    ANXIETY DISORDER:   Increased hydroxyzine from 25 mg to 50 mg.       Note:  The patient reports she did receive the RSV vaccine on 08/11/2023 at her [facility] pharmacy in [place].  She also received the latest COVID-19 vaccine on 09/07/2024 at her [facility] pharmacy.  These vaccine entries were not recorded in the [facility] registry.    Follow up in about 5 months (around 5/9/2025).     Alen Damico MD

## 2024-12-10 ENCOUNTER — PATIENT MESSAGE (OUTPATIENT)
Dept: PSYCHIATRY | Facility: CLINIC | Age: 69
End: 2024-12-10
Payer: MEDICARE

## 2024-12-10 ENCOUNTER — OFFICE VISIT (OUTPATIENT)
Dept: PSYCHIATRY | Facility: CLINIC | Age: 69
End: 2024-12-10
Payer: MEDICARE

## 2024-12-10 DIAGNOSIS — F41.1 GAD (GENERALIZED ANXIETY DISORDER): Primary | ICD-10-CM

## 2024-12-10 DIAGNOSIS — Z63.6 CAREGIVER BURDEN: ICD-10-CM

## 2024-12-10 DIAGNOSIS — F43.21 UNRESOLVED GRIEF: ICD-10-CM

## 2024-12-10 SDOH — SOCIAL DETERMINANTS OF HEALTH (SDOH): DEPENDENT RELATIVE NEEDING CARE AT HOME: Z63.6

## 2024-12-10 NOTE — PROGRESS NOTES
Individual Psychotherapy (PhD/LCSW)    12/10/2024    Site:  [facility]         Therapeutic Intervention: Met with patient.  Outpatient - Insight oriented psychotherapy 60 min - CPT code 74750 and Outpatient - Behavior modifying psychotherapy 60 min - CPT code 20519    Chief complaint/reason for encounter: Unresolved grief, depressed mood, anxiety      Interval history and content of current session: Ms. Martinez arrived on time for her scheduled appointment.      [facility], Session 10 (Intermediate Sessions)  Session Focus:   Brief check-in   Set agenda   Collect rating scales        CROW-7 Score: (P) 12  Interpretation: (P) Moderate       PHQ8 Score : (P) 4  PHQ8 Interpretation: (P) Minimal  Review Action Plan    Managing new role as caregiver, marital conflict   Intervention techniques: Cognitive restructuring, validation, Interpersonal effectiveness, distress tolerance  Summarize session   Feedback about session      Action Plan:   Review therapy materials   -challenge negative thoughts  -Review community resources (marital)  -Self care activities       Treatment plan:  Target symptoms: depression  Why chosen therapy is appropriate versus another modality: relevant to diagnosis  Outcome monitoring methods: self-report  Therapeutic intervention type: insight oriented psychotherapy, behavior modifying psychotherapy    Risk parameters:  Patient reports no suicidal ideation  Patient reports no homicidal ideation  Patient reports no self-injurious behavior  Patient reports no violent behavior    Verbal deficits: None    Patient's response to intervention:  The patient's response to intervention is accepting.    Progress toward goals and other mental status changes:  The patient's progress toward goals is fair .    Diagnosis:     ICD-10-CM ICD-9-CM   1. CROW (generalized anxiety disorder)  F41.1 300.02   2. Caregiver burden  Z63.6 V61.49   3. Unresolved grief  F43.21 309.1           Plan:  individual  psychotherapy    Return to clinic: 1 week    Length of Service (minutes): 60    Minor Trammell PsyD  Adult Psychology Fellow   [facility]

## 2024-12-11 ENCOUNTER — PATIENT MESSAGE (OUTPATIENT)
Dept: INTERNAL MEDICINE | Facility: CLINIC | Age: 69
End: 2024-12-11
Payer: MEDICARE

## 2024-12-11 DIAGNOSIS — E11.9 TYPE 2 DIABETES MELLITUS WITHOUT COMPLICATION, WITH LONG-TERM CURRENT USE OF INSULIN: Primary | Chronic | ICD-10-CM

## 2024-12-11 DIAGNOSIS — Z79.4 TYPE 2 DIABETES MELLITUS WITHOUT COMPLICATION, WITH LONG-TERM CURRENT USE OF INSULIN: Primary | Chronic | ICD-10-CM

## 2024-12-11 RX ORDER — PEN NEEDLE, DIABETIC 33 GX5/32"
NEEDLE, DISPOSABLE MISCELLANEOUS
Qty: 100 EACH | Refills: 3 | Status: SHIPPED | OUTPATIENT
Start: 2024-12-11

## 2024-12-17 ENCOUNTER — OFFICE VISIT (OUTPATIENT)
Dept: PSYCHIATRY | Facility: CLINIC | Age: 69
End: 2024-12-17
Payer: MEDICARE

## 2024-12-17 DIAGNOSIS — F41.1 GAD (GENERALIZED ANXIETY DISORDER): Primary | ICD-10-CM

## 2024-12-17 DIAGNOSIS — Z63.6 CAREGIVER BURDEN: ICD-10-CM

## 2024-12-17 PROCEDURE — 3066F NEPHROPATHY DOC TX: CPT | Mod: HCNC,CPTII,S$GLB, | Performed by: SOCIAL WORKER

## 2024-12-17 PROCEDURE — 3060F POS MICROALBUMINURIA REV: CPT | Mod: HCNC,CPTII,S$GLB, | Performed by: SOCIAL WORKER

## 2024-12-17 PROCEDURE — 3052F HG A1C>EQUAL 8.0%<EQUAL 9.0%: CPT | Mod: HCNC,CPTII,S$GLB, | Performed by: SOCIAL WORKER

## 2024-12-17 PROCEDURE — 4010F ACE/ARB THERAPY RXD/TAKEN: CPT | Mod: HCNC,CPTII,S$GLB, | Performed by: SOCIAL WORKER

## 2024-12-17 PROCEDURE — 90837 PSYTX W PT 60 MINUTES: CPT | Mod: HCNC,S$GLB,, | Performed by: SOCIAL WORKER

## 2024-12-17 SDOH — SOCIAL DETERMINANTS OF HEALTH (SDOH): DEPENDENT RELATIVE NEEDING CARE AT HOME: Z63.6

## 2024-12-17 NOTE — PROGRESS NOTES
Individual Psychotherapy (PhD/LCSW)    12/17/2024    Site:  [facility] [address]         Therapeutic Intervention: Met with patient.  Outpatient - Insight oriented psychotherapy 60 min - CPT code 76792 and Outpatient - Behavior modifying psychotherapy 60 min - CPT code 90372    Chief complaint/reason for encounter: Unresolved grief, depressed mood, anxiety      Interval history and content of current session: Ms. Martinez arrived on time for her scheduled appointment.      [facility], Session 11 (Intermediate Sessions)  Session Focus:   Brief check-in   Set agenda   Collect rating scales        CROW-7 Score: (P) 7  Interpretation: (P) Mild       PHQ8 Score : (P) 2  PHQ8 Interpretation: (P) Minimal  Review Action Plan   Challenges to creating alternative perspectives, marital conflict, Addressing barriers to acceptance  Intervention techniques: Cognitive restructuring, validation, distress tolerance  Summarize session   Feedback about session      Action Plan:   Review therapy materials   - Appreciation reflection activity   -Review community resources (ACT)  -Self care activities       Treatment plan:  Target symptoms: depression  Why chosen therapy is appropriate versus another modality: relevant to diagnosis  Outcome monitoring methods: self-report  Therapeutic intervention type: insight oriented psychotherapy, behavior modifying psychotherapy    Risk parameters:  Patient reports no suicidal ideation  Patient reports no homicidal ideation  Patient reports no self-injurious behavior  Patient reports no violent behavior    Verbal deficits: None    Patient's response to intervention:  The patient's response to intervention is accepting.    Progress toward goals and other mental status changes:  The patient's progress toward goals is fair .    Diagnosis:     ICD-10-CM ICD-9-CM   1. CROW (generalized anxiety disorder)  F41.1 300.02   2. Caregiver burden  Z63.6 V61.49              Plan:  individual psychotherapy    Return to  clinic: 1 week    Length of Service (minutes): 60    Minor Trammell PsyD  Adult Psychology Fellow   [facility]

## 2025-01-07 ENCOUNTER — OFFICE VISIT (OUTPATIENT)
Dept: PSYCHIATRY | Facility: CLINIC | Age: 70
End: 2025-01-07
Payer: MEDICARE

## 2025-01-07 ENCOUNTER — PATIENT MESSAGE (OUTPATIENT)
Dept: ADMINISTRATIVE | Facility: OTHER | Age: 70
End: 2025-01-07
Payer: MEDICARE

## 2025-01-07 ENCOUNTER — PATIENT MESSAGE (OUTPATIENT)
Dept: PSYCHIATRY | Facility: CLINIC | Age: 70
End: 2025-01-07
Payer: MEDICARE

## 2025-01-07 DIAGNOSIS — F41.1 GAD (GENERALIZED ANXIETY DISORDER): Primary | ICD-10-CM

## 2025-01-07 DIAGNOSIS — Z63.6 CAREGIVER BURDEN: ICD-10-CM

## 2025-01-07 SDOH — SOCIAL DETERMINANTS OF HEALTH (SDOH): DEPENDENT RELATIVE NEEDING CARE AT HOME: Z63.6

## 2025-01-07 NOTE — PROGRESS NOTES
The patient location is: Patient is located in [place], [place]   The chief complaint leading to consultation is: Caregiver burden, anxiety     Visit type: audiovisual    Face to Face time with patient: 54 minutes   64 minutes of total time spent on the encounter, which includes face to face time and non-face to face time preparing to see the patient (eg, review of tests), Obtaining and/or reviewing separately obtained history, Documenting clinical information in the electronic or other health record, Independently interpreting results (not separately reported) and communicating results to the patient/family/caregiver, or Care coordination (not separately reported).     Each patient to whom he or she provides medical services by telemedicine is:  (1) informed of the relationship between the physician and patient and the respective role of any other health care provider with respect to management of the patient; and (2) notified that he or she may decline to receive medical services by telemedicine and may withdraw from such care at any time.    Notes:     Individual Psychotherapy (PhD/LCSW)    1/7/2025    Site:  [facility]         Therapeutic Intervention: Met with patient.  Outpatient - Insight oriented psychotherapy 60 min - CPT code 99352 and Outpatient - Behavior modifying psychotherapy 60 min - CPT code 97632    Chief complaint/reason for encounter: Unresolved grief, depressed mood, anxiety      Interval history and content of current session: Ms. Martinez arrived on time for her scheduled appointment.      [facility], Session 12 (Intermediate Sessions)  Session Focus:   Brief check-in   Set agenda   Collect rating scales        CROW-7 Score: (P) 4  Interpretation: (P) Minimal        PHQ8 Score : (P) 1  PHQ8 Interpretation: (P) Minimal  Review Action Plan   Reinforcing alternative perspectives, maintaining boundaries, engaging in conflict resolution   Intervention techniques: Cognitive restructuring, validation,  boundary setting, interpersonal effectiveness  Summarize session   Feedback about session      Action Plan:   Review therapy materials   - engage in Respond vs React   - Gratitude reflection   - Maintain self care activities       Treatment plan:  Target symptoms: depression  Why chosen therapy is appropriate versus another modality: relevant to diagnosis  Outcome monitoring methods: self-report  Therapeutic intervention type: insight oriented psychotherapy, behavior modifying psychotherapy    Risk parameters:  Patient reports no suicidal ideation  Patient reports no homicidal ideation  Patient reports no self-injurious behavior  Patient reports no violent behavior    Verbal deficits: None    Patient's response to intervention:  The patient's response to intervention is accepting.    Progress toward goals and other mental status changes:  The patient's progress toward goals is excellent.    Diagnosis:     ICD-10-CM ICD-9-CM   1. CROW (generalized anxiety disorder)  F41.1 300.02   2. Caregiver burden  Z63.6 V61.49          Plan:  individual psychotherapy, 3 remaining sessions    Return to clinic: 1 week    Length of Service (minutes): 60    Minor Trammell PsyD  Adult Psychology Fellow   [facility]

## 2025-01-14 ENCOUNTER — OFFICE VISIT (OUTPATIENT)
Dept: PSYCHIATRY | Facility: CLINIC | Age: 70
End: 2025-01-14
Payer: MEDICARE

## 2025-01-14 DIAGNOSIS — Z63.6 CAREGIVER BURDEN: ICD-10-CM

## 2025-01-14 DIAGNOSIS — F41.1 GAD (GENERALIZED ANXIETY DISORDER): Primary | ICD-10-CM

## 2025-01-14 PROCEDURE — 90837 PSYTX W PT 60 MINUTES: CPT | Mod: HCNC,S$GLB,, | Performed by: SOCIAL WORKER

## 2025-01-14 SDOH — SOCIAL DETERMINANTS OF HEALTH (SDOH): DEPENDENT RELATIVE NEEDING CARE AT HOME: Z63.6

## 2025-01-14 NOTE — PROGRESS NOTES
Individual Psychotherapy (PhD/LCSW)    1/14/2025    Site:  [facility]         Therapeutic Intervention: Met with patient.  Outpatient - Insight oriented psychotherapy 60 min - CPT code 28418 and Outpatient - Behavior modifying psychotherapy 60 min - CPT code 14181    Chief complaint/reason for encounter: Unresolved grief, depressed mood, anxiety      Interval history and content of current session: Ms. Martinez arrived on time for her scheduled appointment.      [facility], Session 13 (Intermediate Sessions)  Session Focus:   Brief check-in   Set agenda   Collect rating scales        CROW-7 Score: (P) 3  Interpretation: (P) Minimal        PHQ8 Score : (P) 1  PHQ8 Interpretation: (P) Minimal  Review Action Plan   Reinforcing alternative perspectives, Addressing barriers to acceptance (spouse related)  Intervention techniques: Cognitive restructuring, validation, interpersonal effectiveness  Summarize session   Feedback about session      Action Plan:   Review therapy materials   - Reflection activity (purpose)  - Maintain self care activities       Treatment plan:  Target symptoms: depression  Why chosen therapy is appropriate versus another modality: relevant to diagnosis  Outcome monitoring methods: self-report  Therapeutic intervention type: insight oriented psychotherapy, behavior modifying psychotherapy    Risk parameters:  Patient reports no suicidal ideation  Patient reports no homicidal ideation  Patient reports no self-injurious behavior  Patient reports no violent behavior    Verbal deficits: None    Patient's response to intervention:  The patient's response to intervention is accepting.    Progress toward goals and other mental status changes:  The patient's progress toward goals is excellent.    Diagnosis:     ICD-10-CM ICD-9-CM   1. CROW (generalized anxiety disorder)  F41.1 300.02   2. Caregiver burden  Z63.6 V61.49          Plan:  individual psychotherapy, 2 remaining sessions    Return to  clinic: 1 week    Length of Service (minutes): 60    Minor Trammell PsyD  Adult Psychology Fellow   [facility]

## 2025-01-15 DIAGNOSIS — G89.4 CHRONIC PAIN SYNDROME: ICD-10-CM

## 2025-01-15 DIAGNOSIS — M79.7 FIBROMYALGIA: ICD-10-CM

## 2025-01-15 RX ORDER — PREGABALIN 150 MG/1
150 CAPSULE ORAL 3 TIMES DAILY
Qty: 90 CAPSULE | Refills: 5 | Status: SHIPPED | OUTPATIENT
Start: 2025-01-15 | End: 2025-07-14

## 2025-01-19 ENCOUNTER — PATIENT MESSAGE (OUTPATIENT)
Dept: PSYCHIATRY | Facility: CLINIC | Age: 70
End: 2025-01-19
Payer: MEDICARE

## 2025-01-20 ENCOUNTER — PATIENT MESSAGE (OUTPATIENT)
Dept: ADMINISTRATIVE | Facility: OTHER | Age: 70
End: 2025-01-20
Payer: MEDICARE

## 2025-01-21 ENCOUNTER — OFFICE VISIT (OUTPATIENT)
Dept: PSYCHIATRY | Facility: CLINIC | Age: 70
End: 2025-01-21
Payer: MEDICARE

## 2025-01-21 DIAGNOSIS — F41.1 GAD (GENERALIZED ANXIETY DISORDER): Primary | ICD-10-CM

## 2025-01-21 DIAGNOSIS — Z63.6 CAREGIVER BURDEN: ICD-10-CM

## 2025-01-21 PROCEDURE — 90837 PSYTX W PT 60 MINUTES: CPT | Mod: HCNC,95,, | Performed by: SOCIAL WORKER

## 2025-01-21 SDOH — SOCIAL DETERMINANTS OF HEALTH (SDOH): DEPENDENT RELATIVE NEEDING CARE AT HOME: Z63.6

## 2025-01-21 NOTE — PROGRESS NOTES
The patient location is: [facility]   The chief complaint leading to consultation is: Anxiety and Grief     Visit type: audiovisual    Face to Face time with patient: 55 minutes  65 minutes of total time spent on the encounter, which includes face to face time and non-face to face time preparing to see the patient (eg, review of tests), Obtaining and/or reviewing separately obtained history, Documenting clinical information in the electronic or other health record, Independently interpreting results (not separately reported) and communicating results to the patient/family/caregiver, or Care coordination (not separately reported).       Each patient to whom he or she provides medical services by telemedicine is:  (1) informed of the relationship between the physician and patient and the respective role of any other health care provider with respect to management of the patient; and (2) notified that he or she may decline to receive medical services by telemedicine and may withdraw from such care at any time.    Notes:   Individual Psychotherapy (PhD/LCSW)    1/21/2025    [facility]         Therapeutic Intervention: Met with patient.  Outpatient - Insight oriented psychotherapy 60 min - CPT code 20263 and Outpatient - Behavior modifying psychotherapy 60 min - CPT code 49057    Chief complaint/reason for encounter: Unresolved grief, depressed mood, anxiety      Interval history and content of current session: Ms. Martinez arrived on time for her scheduled appointment.      [facility], Session 14 (Intermediate Sessions)  Session Focus:   Brief check-in   Set agenda   Collect rating scales        CROW-7 Score: (P) 4  Interpretation: (P) Minimal        PHQ8 Score : (P) 1  PHQ8 Interpretation: (P) Minimal  Review Action Plan   Reinforcing alternative perspectives, Decreasing negative self-talk, engaging in support systems, B in CBT, Behavioral Activation, Identifying Activities  Intervention  techniques: Cognitive restructuring, validation, behavioral activation  Summarize session   Feedback about session      Action Plan:   Review therapy materials   - behavioral activation   - Alternative thoughts       Treatment plan:  Target symptoms: depression  Why chosen therapy is appropriate versus another modality: relevant to diagnosis  Outcome monitoring methods: self-report  Therapeutic intervention type: insight oriented psychotherapy, behavior modifying psychotherapy    Risk parameters:  Patient reports no suicidal ideation  Patient reports no homicidal ideation  Patient reports no self-injurious behavior  Patient reports no violent behavior    Verbal deficits: None    Patient's response to intervention:  The patient's response to intervention is accepting.    Progress toward goals and other mental status changes:  The patient's progress toward goals is excellent.    Diagnosis:     ICD-10-CM ICD-9-CM   1. CROW (generalized anxiety disorder)  F41.1 300.02   2. Caregiver burden  Z63.6 V61.49        Plan:  individual psychotherapy, 1 remaining session    Return to clinic: 1 week    Length of Service (minutes): 60    Minor Trammell PsyD  Adult Psychology Fellow   [facility]

## 2025-01-28 ENCOUNTER — OFFICE VISIT (OUTPATIENT)
Dept: PSYCHIATRY | Facility: CLINIC | Age: 70
End: 2025-01-28
Payer: MEDICARE

## 2025-01-28 DIAGNOSIS — F41.1 GAD (GENERALIZED ANXIETY DISORDER): Primary | ICD-10-CM

## 2025-01-28 DIAGNOSIS — Z63.6 CAREGIVER BURDEN: ICD-10-CM

## 2025-01-28 PROCEDURE — 90837 PSYTX W PT 60 MINUTES: CPT | Mod: HCNC,S$GLB,, | Performed by: SOCIAL WORKER

## 2025-01-28 SDOH — SOCIAL DETERMINANTS OF HEALTH (SDOH): DEPENDENT RELATIVE NEEDING CARE AT HOME: Z63.6

## 2025-01-28 NOTE — PROGRESS NOTES
Individual Psychotherapy (PhD/LCSW)    1/28/2025    Site:  [facility]         Therapeutic Intervention: Met with patient.  Outpatient - Insight oriented psychotherapy 60 min - CPT code 81974 and Outpatient - Behavior modifying psychotherapy 60 min - CPT code 63085    Chief complaint/reason for encounter: Unresolved grief, depressed mood, anxiety      Interval history and content of current session: Ms. Martinez arrived on time for her scheduled appointment.     [facility], Session 15 (Final Session, Treatment Graduation)  Session Focus:  Brief check-in  Set agenda  Collect rating scales        CROW-7 Score: (P) 1  Interpretation: (P) Minimal        PHQ8 Score : (P) 1   PHQ8 Interpretation: (P) Miminal   Review action plan  Review treatment plan and treatment progress   Review Brief CBT  Relapse Prevention (summarize treatment and interventions)  Plan for the future  Graduate treatment    Post-[facility] Graduation:  Review therapy materials as needed  Continue intervention techniques as needed      Treatment plan:  Target symptoms: depression  Why chosen therapy is appropriate versus another modality: relevant to diagnosis  Outcome monitoring methods: self-report  Therapeutic intervention type: insight oriented psychotherapy, behavior modifying psychotherapy    Risk parameters:  Patient reports no suicidal ideation  Patient reports no homicidal ideation  Patient reports no self-injurious behavior  Patient reports no violent behavior    Verbal deficits: None    Patient's response to intervention:  The patient's response to intervention is accepting.    Progress toward goals and other mental status changes:  The patient's progress toward goals is excellent.    Diagnosis:     ICD-10-CM ICD-9-CM   1. CROW (generalized anxiety disorder)  F41.1 300.02   2. Caregiver burden  Z63.6 V61.49         Plan:  Patient completed treatment     Return to clinic: Patient completed treatment     Length of Service (minutes): 60    Justice  DICK Trammell PsyD  Adult Psychology Fellow   [facility]

## 2025-02-01 DIAGNOSIS — M79.7 FIBROMYALGIA: ICD-10-CM

## 2025-02-01 DIAGNOSIS — G89.4 CHRONIC PAIN SYNDROME: ICD-10-CM

## 2025-02-03 RX ORDER — CYCLOBENZAPRINE HCL 10 MG
10 TABLET ORAL
Qty: 90 TABLET | Refills: 2 | Status: SHIPPED | OUTPATIENT
Start: 2025-02-03

## 2025-02-12 DIAGNOSIS — I10 ESSENTIAL HYPERTENSION: ICD-10-CM

## 2025-02-12 RX ORDER — OLMESARTAN MEDOXOMIL 20 MG/1
TABLET ORAL
Qty: 90 TABLET | Refills: 1 | Status: CANCELLED | OUTPATIENT
Start: 2025-02-12

## 2025-02-12 NOTE — TELEPHONE ENCOUNTER
No care due was identified.  [facility] Embedded Care Due Messages. Reference number: [id number].   2/12/2025 2:38:15 PM CST

## 2025-02-20 RX ORDER — INSULIN GLARGINE 100 [IU]/ML
INJECTION, SOLUTION SUBCUTANEOUS
Refills: 0 | OUTPATIENT
Start: 2025-02-20

## 2025-02-20 RX ORDER — SITAGLIPTIN 25 MG/1
TABLET, FILM COATED ORAL
Refills: 0 | OUTPATIENT
Start: 2025-02-20

## 2025-02-20 NOTE — TELEPHONE ENCOUNTER
[facility] Note  Quick DC. Inappropriate Request   Refill request requires further review by MD: NO   Medication Therapy Plan: Pharmacy is requesting new script(s) for the following medications without required information, (sig/ frequency/qty/etc)     [facility] action(s):  Quick Discontinue      Duplicate Pended Encounter(s)/ Last Prescribed Details:    Pharmacies have been requesting medications for patients without required information, (sig, frequency, qty, etc.). In addition, requests are sent for medication(s) pt. are currently not taking, and medications patients have never taken.    We have spoken to the pharmacies about these request types and advised their teams previously that we are unable to assess these New Script requests and require all details for these requests. This is a known issue and has been reported.        Medication related problems are not assessed for QDC.   Medication Reconciliation Completed? NO Were there pending details that required adjustment? NO     Automatic Epic Generated Protocol Data Below:   Requested Prescriptions     Pending Prescriptions Disp Refills    LANTUS SOLOSTAR U-100 INSULIN 100 unit/mL (3 mL) InPn pen [Pharmacy Med Name: LANTUS U100 SOLOSTAR INJ ([id number])]  0     Sig: UNKNOWN    JANUVIA 25 mg Tab [Pharmacy Med Name: JANUVIA  25MG TAB]  0     Sig: UNKNOWN              Appointments      Date Provider   Last Visit   12/9/2024 Alen Damico MD   Next Visit   5/13/2025 Alen Damico MD        Note composed:2:21 PM 02/20/2025

## 2025-02-20 NOTE — TELEPHONE ENCOUNTER
No care due was identified.  [facility] Embedded Care Due Messages. Reference number: [id number].   2/20/2025 11:41:44 AM CST

## 2025-03-01 RX ORDER — LEVOTHYROXINE SODIUM 75 UG/1
75 TABLET ORAL
Qty: 90 TABLET | Refills: 1 | Status: SHIPPED | OUTPATIENT
Start: 2025-03-01

## 2025-03-01 NOTE — TELEPHONE ENCOUNTER
No care due was identified.  [facility] Embedded Care Due Messages. Reference number: [id number].   3/01/2025 1:29:40 AM CST

## 2025-03-01 NOTE — TELEPHONE ENCOUNTER
Refill Decision Note   Aracelis Martinez  is requesting a refill authorization.  Brief Assessment and Rationale for Refill:  Approve     Medication Therapy Plan:         Comments:     Note composed:7:13 AM 03/01/2025

## 2025-03-05 ENCOUNTER — PATIENT OUTREACH (OUTPATIENT)
Dept: ADMINISTRATIVE | Facility: HOSPITAL | Age: 70
End: 2025-03-05
Payer: MEDICARE

## 2025-03-05 NOTE — PROGRESS NOTES
Chart reviewed  Immunizations reconciled   Release sent to [facility] eye exam  Portal message sent

## 2025-03-05 NOTE — LETTER
AUTHORIZATION FOR RELEASE OF   CONFIDENTIAL INFORMATION        We are seeing Aracelis Martinez, date of birth 1955, in the clinic at [facility] INTERNAL MEDICINE. Alen Damico MD is the patient's PCP. Aracelis Martinez has an outstanding lab/procedure at the time we reviewed her chart. In order to help keep her health information updated, she has authorized us to request the following medical record(s):        (  )  MAMMOGRAM                                      (  )  COLONOSCOPY      (  )  PAP SMEAR                                          (  )  OUTSIDE LAB RESULTS     (  )  DEXA SCAN                                          (  x)  EYE EXAM            (  )  FOOT EXAM                                          (  )  ENTIRE RECORD     (  )  OUTSIDE IMMUNIZATIONS                 (  )  _______________         Please fax records to [facility], Green, Dwight A., MD, [phone]     If you have any questions, please contact Lalitha at [phone].           Patient Name: Aracelis Martinez  : 1955  Patient Phone #: [phone]

## 2025-03-16 NOTE — TELEPHONE ENCOUNTER
No care due was identified.  [facility] Embedded Care Due Messages. Reference number: [id number].   3/16/2025 12:43:41 PM CDT

## 2025-03-17 ENCOUNTER — PATIENT MESSAGE (OUTPATIENT)
Dept: INTERNAL MEDICINE | Facility: CLINIC | Age: 70
End: 2025-03-17
Payer: MEDICARE

## 2025-03-17 DIAGNOSIS — F51.01 PRIMARY INSOMNIA: ICD-10-CM

## 2025-03-17 RX ORDER — ZOLPIDEM TARTRATE 10 MG/1
10 TABLET ORAL NIGHTLY PRN
Qty: 30 TABLET | Refills: 3 | Status: SHIPPED | OUTPATIENT
Start: 2025-03-17

## 2025-03-17 RX ORDER — MELOXICAM 15 MG/1
TABLET ORAL
Qty: 90 TABLET | Refills: 3 | Status: SHIPPED | OUTPATIENT
Start: 2025-03-17

## 2025-03-17 NOTE — TELEPHONE ENCOUNTER
Refill Routing Note   Medication(s) are not appropriate for processing by [facility] for the following reason(s):        Outside of protocol    [facility] action(s):  Route               Appointments  past 12m or future 3m with PCP    Date Provider   Last Visit   12/9/2024 Alen Damico MD   Next Visit   5/13/2025 Alen Damico MD   ED visits in past 90 days: 0        Note composed:9:03 AM 03/17/2025

## 2025-03-17 NOTE — TELEPHONE ENCOUNTER
No care due was identified.  [facility] Embedded Care Due Messages. Reference number: [id number].   3/17/2025 11:24:16 AM CDT

## 2025-03-25 ENCOUNTER — OFFICE VISIT (OUTPATIENT)
Dept: OPTOMETRY | Facility: CLINIC | Age: 70
End: 2025-03-25
Payer: MEDICARE

## 2025-03-25 ENCOUNTER — PATIENT MESSAGE (OUTPATIENT)
Dept: ADMINISTRATIVE | Facility: OTHER | Age: 70
End: 2025-03-25
Payer: MEDICARE

## 2025-03-25 DIAGNOSIS — E11.9 TYPE 2 DIABETES MELLITUS WITHOUT RETINOPATHY: Primary | ICD-10-CM

## 2025-03-25 DIAGNOSIS — H52.4 PRESBYOPIA: ICD-10-CM

## 2025-03-25 DIAGNOSIS — Z13.5 GLAUCOMA SCREENING: ICD-10-CM

## 2025-03-25 PROCEDURE — 1101F PT FALLS ASSESS-DOCD LE1/YR: CPT | Mod: HCNC,CPTII,S$GLB, | Performed by: OPTOMETRIST

## 2025-03-25 PROCEDURE — 2023F DILAT RTA XM W/O RTNOPTHY: CPT | Mod: HCNC,CPTII,S$GLB, | Performed by: OPTOMETRIST

## 2025-03-25 PROCEDURE — 4010F ACE/ARB THERAPY RXD/TAKEN: CPT | Mod: HCNC,CPTII,S$GLB, | Performed by: OPTOMETRIST

## 2025-03-25 PROCEDURE — 1160F RVW MEDS BY RX/DR IN RCRD: CPT | Mod: HCNC,CPTII,S$GLB, | Performed by: OPTOMETRIST

## 2025-03-25 PROCEDURE — 3288F FALL RISK ASSESSMENT DOCD: CPT | Mod: HCNC,CPTII,S$GLB, | Performed by: OPTOMETRIST

## 2025-03-25 PROCEDURE — 92014 COMPRE OPH EXAM EST PT 1/>: CPT | Mod: HCNC,S$GLB,, | Performed by: OPTOMETRIST

## 2025-03-25 PROCEDURE — 1126F AMNT PAIN NOTED NONE PRSNT: CPT | Mod: HCNC,CPTII,S$GLB, | Performed by: OPTOMETRIST

## 2025-03-25 PROCEDURE — 1159F MED LIST DOCD IN RCRD: CPT | Mod: HCNC,CPTII,S$GLB, | Performed by: OPTOMETRIST

## 2025-03-25 PROCEDURE — 92015 DETERMINE REFRACTIVE STATE: CPT | Mod: HCNC,S$GLB,, | Performed by: OPTOMETRIST

## 2025-03-25 PROCEDURE — 99999 PR PBB SHADOW E&M-EST. PATIENT-LVL III: CPT | Mod: PBBFAC,HCNC,, | Performed by: OPTOMETRIST

## 2025-03-25 NOTE — PROGRESS NOTES
HPI    70 Y/o female is here for diabetic eye exam with C/o pt say's when she   reading something looking down and she looks up it atkes a while for her   vision to focus, pt also say's when looking at the computer she notices   that her vision seems to have a film over it, and she cannot see clearly   for a brief moment.   Pt denies pain and discomfort   No f/f    Eye med: no gtt   Last edited by David Ramirez MA on 3/25/2025  2:55 PM.            Assessment /Plan     For exam results, see Encounter Report.    Type 2 diabetes mellitus without retinopathy    Glaucoma screening    Presbyopia        1. Sp pciol OU--pt wishes new Rx  2. DM- WITHOUT RETINOPATHY.  Advised yearly DFE  3. Pt has pigmented area of optic disc OS. Stable  4. KVNG--advised SYSTANE COMPLETE Ats TID+    PLAN:    rtc 1 yr

## 2025-04-14 RX ORDER — HYDROXYZINE PAMOATE 50 MG/1
CAPSULE ORAL
Qty: 60 CAPSULE | Refills: 3 | Status: SHIPPED | OUTPATIENT
Start: 2025-04-14

## 2025-04-29 DIAGNOSIS — E11.9 TYPE 2 DIABETES MELLITUS WITHOUT COMPLICATION, WITHOUT LONG-TERM CURRENT USE OF INSULIN: ICD-10-CM

## 2025-04-29 RX ORDER — GLIPIZIDE 10 MG/1
TABLET, FILM COATED, EXTENDED RELEASE ORAL
Qty: 90 TABLET | Refills: 0 | Status: SHIPPED | OUTPATIENT
Start: 2025-04-29

## 2025-04-29 NOTE — TELEPHONE ENCOUNTER
Care Due:                  Date            Visit Type   Department     Provider  --------------------------------------------------------------------------------                                EP -                              PRIMARY      MET INTERNAL  Last Visit: 12-      CARE ([facility])   MEDICINE       Alenjosias Damico                              EP -                              PRIMARY      [facility] INTERNAL  Next Visit: 05-      [facility] ([facility])   MEDICINE       Alen SAMANTHA Damico                                                            Last  Test          Frequency    Reason                     Performed    Due Date  --------------------------------------------------------------------------------    CBC.........  12 months..  meloxicam................  07-   07-    [facility] Embedded Care Due Messages. Reference number: [id number].   4/29/2025 12:07:19 AM CDT

## 2025-04-29 NOTE — TELEPHONE ENCOUNTER
Refill Decision Note   Aracelis Martinez  is requesting a refill authorization.  Brief Assessment and Rationale for Refill:  Approve     Medication Therapy Plan:  FLOS      Comments:     Note composed:12:34 AM 04/29/2025

## 2025-05-02 ENCOUNTER — HOSPITAL ENCOUNTER (OUTPATIENT)
Dept: RADIOLOGY | Facility: HOSPITAL | Age: 70
Discharge: HOME | End: 2025-05-02
Attending: INTERNAL MEDICINE
Payer: MEDICARE

## 2025-05-02 DIAGNOSIS — Z12.31 ENCOUNTER FOR SCREENING MAMMOGRAM FOR BREAST CANCER: ICD-10-CM

## 2025-05-02 PROCEDURE — 77067 SCR MAMMO BI INCL CAD: CPT | Mod: 26,HCNC,, | Performed by: RADIOLOGY

## 2025-05-02 PROCEDURE — 77063 BREAST TOMOSYNTHESIS BI: CPT | Mod: TC,HCNC

## 2025-05-02 PROCEDURE — 77063 BREAST TOMOSYNTHESIS BI: CPT | Mod: 26,HCNC,, | Performed by: RADIOLOGY

## 2025-05-02 PROCEDURE — 77067 SCR MAMMO BI INCL CAD: CPT | Mod: TC,HCNC

## 2025-05-06 ENCOUNTER — LAB VISIT (OUTPATIENT)
Dept: LAB | Facility: HOSPITAL | Age: 70
End: 2025-05-06
Attending: INTERNAL MEDICINE
Payer: MEDICARE

## 2025-05-06 DIAGNOSIS — F51.01 PRIMARY INSOMNIA: ICD-10-CM

## 2025-05-06 DIAGNOSIS — I10 ESSENTIAL HYPERTENSION: ICD-10-CM

## 2025-05-06 DIAGNOSIS — E11.9 TYPE 2 DIABETES MELLITUS WITHOUT COMPLICATION, WITHOUT LONG-TERM CURRENT USE OF INSULIN: ICD-10-CM

## 2025-05-06 LAB
ABSOLUTE EOSINOPHIL (OHS): 0.2 K/UL
ABSOLUTE MONOCYTE (OHS): 0.68 K/UL (ref 0.3–1)
ABSOLUTE NEUTROPHIL COUNT (OHS): 3.57 K/UL (ref 1.8–7.7)
ALBUMIN SERPL BCP-MCNC: 3.6 G/DL (ref 3.5–5.2)
ALP SERPL-CCNC: 145 UNIT/L (ref 40–150)
ALT SERPL W/O P-5'-P-CCNC: 15 UNIT/L (ref 10–44)
ANION GAP (OHS): 9 MMOL/L (ref 8–16)
AST SERPL-CCNC: 13 UNIT/L (ref 11–45)
BASOPHILS # BLD AUTO: 0.05 K/UL
BASOPHILS NFR BLD AUTO: 0.5 %
BILIRUB SERPL-MCNC: 0.4 MG/DL (ref 0.1–1)
BUN SERPL-MCNC: 21 MG/DL (ref 8–23)
CALCIUM SERPL-MCNC: 9.1 MG/DL (ref 8.7–10.5)
CHLORIDE SERPL-SCNC: 104 MMOL/L (ref 95–110)
CHOLEST SERPL-MCNC: 211 MG/DL (ref 120–199)
CHOLEST/HDLC SERPL: 4.7 {RATIO} (ref 2–5)
CO2 SERPL-SCNC: 26 MMOL/L (ref 23–29)
CREAT SERPL-MCNC: 1.1 MG/DL (ref 0.5–1.4)
EAG (OHS): 266 MG/DL (ref 68–131)
ERYTHROCYTE [DISTWIDTH] IN BLOOD BY AUTOMATED COUNT: 15.1 % (ref 11.5–14.5)
GFR SERPLBLD CREATININE-BSD FMLA CKD-EPI: 54 ML/MIN/1.73/M2
GLUCOSE SERPL-MCNC: 216 MG/DL (ref 70–110)
HBA1C MFR BLD: 10.9 % (ref 4–5.6)
HCT VFR BLD AUTO: 43.6 % (ref 37–48.5)
HDLC SERPL-MCNC: 45 MG/DL (ref 40–75)
HDLC SERPL: 21.3 % (ref 20–50)
HGB BLD-MCNC: 13.8 GM/DL (ref 12–16)
IMM GRANULOCYTES # BLD AUTO: 0.03 K/UL (ref 0–0.04)
IMM GRANULOCYTES NFR BLD AUTO: 0.3 % (ref 0–0.5)
LDLC SERPL CALC-MCNC: 144.4 MG/DL (ref 63–159)
LYMPHOCYTES # BLD AUTO: 4.57 K/UL (ref 1–4.8)
MCH RBC QN AUTO: 25.7 PG (ref 27–31)
MCHC RBC AUTO-ENTMCNC: 31.7 G/DL (ref 32–36)
MCV RBC AUTO: 81 FL (ref 82–98)
NONHDLC SERPL-MCNC: 166 MG/DL
NUCLEATED RBC (/100WBC) (OHS): 0 /100 WBC
PLATELET # BLD AUTO: 448 K/UL (ref 150–450)
PMV BLD AUTO: 10.2 FL (ref 9.2–12.9)
POTASSIUM SERPL-SCNC: 4.1 MMOL/L (ref 3.5–5.1)
PROT SERPL-MCNC: 6.9 GM/DL (ref 6–8.4)
RBC # BLD AUTO: 5.36 M/UL (ref 4–5.4)
RELATIVE EOSINOPHIL (OHS): 2.2 %
RELATIVE LYMPHOCYTE (OHS): 50.2 % (ref 18–48)
RELATIVE MONOCYTE (OHS): 7.5 % (ref 4–15)
RELATIVE NEUTROPHIL (OHS): 39.3 % (ref 38–73)
SODIUM SERPL-SCNC: 139 MMOL/L (ref 136–145)
TRIGL SERPL-MCNC: 108 MG/DL (ref 30–150)
WBC # BLD AUTO: 9.1 K/UL (ref 3.9–12.7)

## 2025-05-06 PROCEDURE — 82040 ASSAY OF SERUM ALBUMIN: CPT | Mod: HCNC

## 2025-05-06 PROCEDURE — 85025 COMPLETE CBC W/AUTO DIFF WBC: CPT | Mod: HCNC

## 2025-05-06 PROCEDURE — 80061 LIPID PANEL: CPT | Mod: HCNC

## 2025-05-06 PROCEDURE — 83036 HEMOGLOBIN GLYCOSYLATED A1C: CPT | Mod: HCNC

## 2025-05-06 PROCEDURE — 36415 COLL VENOUS BLD VENIPUNCTURE: CPT | Mod: HCNC,PO

## 2025-05-13 ENCOUNTER — TELEPHONE (OUTPATIENT)
Dept: INTERNAL MEDICINE | Facility: CLINIC | Age: 70
End: 2025-05-13

## 2025-05-13 ENCOUNTER — OFFICE VISIT (OUTPATIENT)
Dept: INTERNAL MEDICINE | Facility: CLINIC | Age: 70
End: 2025-05-13
Payer: MEDICARE

## 2025-05-13 VITALS
HEIGHT: 65 IN | OXYGEN SATURATION: 97 % | HEART RATE: 70 BPM | WEIGHT: 231.5 LBS | SYSTOLIC BLOOD PRESSURE: 124 MMHG | DIASTOLIC BLOOD PRESSURE: 74 MMHG | BODY MASS INDEX: 38.57 KG/M2 | TEMPERATURE: 98 F | RESPIRATION RATE: 18 BRPM

## 2025-05-13 DIAGNOSIS — D3A.8 NEUROENDOCRINE TUMOR OF PANCREAS: ICD-10-CM

## 2025-05-13 DIAGNOSIS — M17.11 PRIMARY OSTEOARTHRITIS OF RIGHT KNEE: ICD-10-CM

## 2025-05-13 DIAGNOSIS — E11.9 TYPE 2 DIABETES MELLITUS WITHOUT COMPLICATION, WITHOUT LONG-TERM CURRENT USE OF INSULIN: ICD-10-CM

## 2025-05-13 DIAGNOSIS — F51.01 PRIMARY INSOMNIA: ICD-10-CM

## 2025-05-13 DIAGNOSIS — I10 ESSENTIAL HYPERTENSION: ICD-10-CM

## 2025-05-13 DIAGNOSIS — I10 ESSENTIAL HYPERTENSION: Primary | ICD-10-CM

## 2025-05-13 DIAGNOSIS — E11.9 TYPE 2 DIABETES MELLITUS WITHOUT COMPLICATION, WITHOUT LONG-TERM CURRENT USE OF INSULIN: Chronic | ICD-10-CM

## 2025-05-13 DIAGNOSIS — E78.49 OTHER HYPERLIPIDEMIA: ICD-10-CM

## 2025-05-13 DIAGNOSIS — Z79.4 TYPE 2 DIABETES MELLITUS WITHOUT COMPLICATION, WITH LONG-TERM CURRENT USE OF INSULIN: ICD-10-CM

## 2025-05-13 DIAGNOSIS — E11.65 UNCONTROLLED TYPE 2 DIABETES MELLITUS WITH HYPERGLYCEMIA: ICD-10-CM

## 2025-05-13 DIAGNOSIS — E11.9 TYPE 2 DIABETES MELLITUS WITHOUT COMPLICATION, WITH LONG-TERM CURRENT USE OF INSULIN: ICD-10-CM

## 2025-05-13 PROCEDURE — 1125F AMNT PAIN NOTED PAIN PRSNT: CPT | Mod: CPTII,HCNC,S$GLB, | Performed by: INTERNAL MEDICINE

## 2025-05-13 PROCEDURE — 3008F BODY MASS INDEX DOCD: CPT | Mod: CPTII,HCNC,S$GLB, | Performed by: INTERNAL MEDICINE

## 2025-05-13 PROCEDURE — 3288F FALL RISK ASSESSMENT DOCD: CPT | Mod: CPTII,HCNC,S$GLB, | Performed by: INTERNAL MEDICINE

## 2025-05-13 PROCEDURE — 3046F HEMOGLOBIN A1C LEVEL >9.0%: CPT | Mod: CPTII,HCNC,S$GLB, | Performed by: INTERNAL MEDICINE

## 2025-05-13 PROCEDURE — 99214 OFFICE O/P EST MOD 30 MIN: CPT | Mod: HCNC,S$GLB,, | Performed by: INTERNAL MEDICINE

## 2025-05-13 PROCEDURE — 1101F PT FALLS ASSESS-DOCD LE1/YR: CPT | Mod: CPTII,HCNC,S$GLB, | Performed by: INTERNAL MEDICINE

## 2025-05-13 PROCEDURE — 99999 PR PBB SHADOW E&M-EST. PATIENT-LVL V: CPT | Mod: PBBFAC,HCNC,, | Performed by: INTERNAL MEDICINE

## 2025-05-13 PROCEDURE — 4010F ACE/ARB THERAPY RXD/TAKEN: CPT | Mod: CPTII,HCNC,S$GLB, | Performed by: INTERNAL MEDICINE

## 2025-05-13 PROCEDURE — 3074F SYST BP LT 130 MM HG: CPT | Mod: CPTII,HCNC,S$GLB, | Performed by: INTERNAL MEDICINE

## 2025-05-13 PROCEDURE — 3078F DIAST BP <80 MM HG: CPT | Mod: CPTII,HCNC,S$GLB, | Performed by: INTERNAL MEDICINE

## 2025-05-13 RX ORDER — INSULIN GLARGINE 100 [IU]/ML
20 INJECTION, SOLUTION SUBCUTANEOUS DAILY
Start: 2025-05-13 | End: 2025-05-19 | Stop reason: SDUPTHER

## 2025-05-13 RX ORDER — BLOOD-GLUCOSE SENSOR
EACH MISCELLANEOUS
Qty: 1 EACH | Refills: 6 | Status: SHIPPED | OUTPATIENT
Start: 2025-05-13

## 2025-05-13 NOTE — PROGRESS NOTES
Subjective:       Patient ID: Aracelis Martinez is a 70 y.o. female.    Chief Complaint: Follow-up (5 mo) and Knee Pain (Right knee)    History of Present Illness      The patient presents for follow-up of active medical conditions which include type 2 diabetes mellitus, essential hypertension, osteoarthritis of the knees, chronic insomnia, anxiety, obesity, fibromyalgia.    Type 2 diabetes mellitus:  The patient states blood sugar levels have been elevated at home.  Fasting values have ranged from 174-180 recently.  She is not experiencing any fasting glucose values less than 130.  Current diabetes medications include Januvia, glipizide, and Lantus insulin 15 units at bedtime.  No hypoglycemia has been noted.    Essential hypertension:  She states blood pressure levels have been good on home monitoring.  Antihypertensive medications include amlodipine 10 mg daily, olmesartan 20 mg daily.  No medication side effects have been noted.    Fibromyalgia:   Symptoms have been fairly well controlled with Lyrica 150 mg 3 times daily.    Osteoarthritis of the knee:  Right knee pain is reported.  She notes more frequent pain and swelling.  There is no history of injury.  She states over 5 years ago she did receive a knee injection which was temporarily helpful.  She describes a toothache-like throbbing pain in the joint.             Physical Exam  Vitals and nursing note reviewed.   Constitutional:       General: She is not in acute distress.     Appearance: Normal appearance. She is well-developed.   HENT:      Head: Normocephalic and atraumatic.   Eyes:      General: No scleral icterus.     Extraocular Movements: Extraocular movements intact.      Conjunctiva/sclera: Conjunctivae normal.   Neck:      Thyroid: No thyromegaly.      Vascular: No JVD.   Cardiovascular:      Rate and Rhythm: Normal rate and regular rhythm.      Heart sounds: Normal heart sounds. No murmur heard.     No friction rub. No gallop.   Pulmonary:       Effort: Pulmonary effort is normal. No respiratory distress.      Breath sounds: Normal breath sounds. No wheezing or rales.   Abdominal:      General: Bowel sounds are normal.      Palpations: Abdomen is soft. There is no mass.      Tenderness: There is no abdominal tenderness. There is no right CVA tenderness or left CVA tenderness.   Musculoskeletal:         General: Swelling and tenderness present. Normal range of motion.      Cervical back: Normal range of motion and neck supple.      Right lower leg: No edema.      Left lower leg: No edema.      Comments: Right knee: + effusion present. Tender on ROM testing.    Left knee: normal ROM; no tenderness.   Lymphadenopathy:      Cervical: No cervical adenopathy.   Skin:     General: Skin is warm and dry.      Findings: No rash.      Comments: No foot lesions are present.   Neurological:      Mental Status: She is alert and oriented to person, place, and time.      Cranial Nerves: No cranial nerve deficit.      Comments: Sensory exam is intact in both feet on monofilament and vibration testing.   Psychiatric:         Mood and Affect: Mood normal.         Behavior: Behavior normal.         Protective Sensation (w/ 10 gram monofilament):  Right: Intact  Left: Intact    Visual Inspection:  Normal -  Bilateral    Pedal Pulses:   Right: Present  Left: Present    Posterior Tibialis Pulses:   Right:Present  Left: Present    Lab Visit on 05/06/2025   Component Date Value Ref Range Status    Sodium 05/06/2025 139  136 - 145 mmol/L Final    Potassium 05/06/2025 4.1  3.5 - 5.1 mmol/L Final    Chloride 05/06/2025 104  95 - 110 mmol/L Final    CO2 05/06/2025 26  23 - 29 mmol/L Final    Glucose 05/06/2025 216 (H)  70 - 110 mg/dL Final    BUN 05/06/2025 21  8 - 23 mg/dL Final    Creatinine 05/06/2025 1.1  0.5 - 1.4 mg/dL Final    Calcium 05/06/2025 9.1  8.7 - 10.5 mg/dL Final    Protein Total 05/06/2025 6.9  6.0 - 8.4 gm/dL Final    Albumin 05/06/2025 3.6  3.5 - 5.2 g/dL Final     Bilirubin Total 05/06/2025 0.4  0.1 - 1.0 mg/dL Final    For infants and newborns, interpretation of results should be based   on gestational age, weight and in agreement with clinical   observations.    Premature Infant recommended reference ranges:   0-24 hours:  <8.0 mg/dL   24-48 hours: <12.0 mg/dL   3-5 days:    <15.0 mg/dL   6-29 days:   <15.0 mg/dL    ALP 05/06/2025 145  40 - 150 unit/L Final    AST 05/06/2025 13  11 - 45 unit/L Final    ALT 05/06/2025 15  10 - 44 unit/L Final    Anion Gap 05/06/2025 9  8 - 16 mmol/L Final    eGFR 05/06/2025 54 (L)  >60 mL/min/1.73/m2 Final    Estimated GFR calculated using the CKD-EPI creatinine (2021) equation.    Cholesterol Total 05/06/2025 211 (H)  120 - 199 mg/dL Final    The National Cholesterol Education Program (NCEP) has set the  following guidelines (reference ranges) for Cholesterol:  Optimal.....................<200 mg/dL  Borderline High.............200-239 mg/dL  High........................> or = 240 mg/dL    Triglyceride 05/06/2025 108  30 - 150 mg/dL Final    The National Cholesterol Education Program (NCEP) has set the  following guidelines (reference values) for triglycerides:  Normal......................<150 mg/dL  Borderline High.............150-199 mg/dL  High........................200-499 mg/dL    HDL Cholesterol 05/06/2025 45  40 - 75 mg/dL Final    The National Cholesterol Education Program (NCEP) has set the   following guidelines (reference values) for HDL Cholesterol:   Low...............<40 mg/dL   Optimal...........>60 mg/dL    LDL Cholesterol 05/06/2025 144.4  63.0 - 159.0 mg/dL Final    The National Cholesterol Education Program (NCEP) has set the  following guidelines (reference values) for LDL Cholesterol:  Optimal.......................<130 mg/dL  Borderline High...............130-159 mg/dL  High..........................160-189 mg/dL  Very High.....................>190 mg/dL  LDL calculated using the Friedewald equation.     HDL/Cholesterol Ratio 05/06/2025 21.3  20.0 - 50.0 % Final    Cholesterol/HDL Ratio 05/06/2025 4.7  2.0 - 5.0 Final    Non HDL Cholesterol 05/06/2025 166  mg/dL Final    Risk category and Non-HDL cholesterol goals:  Coronary heart disease (CHD)or equivalent (10-year risk of CHD >20%):  Non-HDL cholesterol goal     <130 mg/dL  Two or more CHD risk factors and 10-year risk of CHD <= 20%:  Non-HDL cholesterol goal     <160 mg/dL  0 to 1 CHD risk factor:  Non-HDL cholesterol goal     <190 mg/dL    Hemoglobin A1c 05/06/2025 10.9 (H)  4.0 - 5.6 % Final    ADA Screening Guidelines:  5.7-6.4%  Consistent with prediabetes  >=6.5%  Consistent with diabetes    High levels of fetal hemoglobin interfere with the HbA1C  assay. Heterozygous hemoglobin variants (HbS, HgC, etc)do  not significantly interfere with this assay.   However, presence of multiple variants may affect accuracy.    Estimated Average Glucose 05/06/2025 266 (H)  68 - 131 mg/dL Final    WBC 05/06/2025 9.10  3.90 - 12.70 K/uL Final    RBC 05/06/2025 5.36  4.00 - 5.40 M/uL Final    HGB 05/06/2025 13.8  12.0 - 16.0 gm/dL Final    HCT 05/06/2025 43.6  37.0 - 48.5 % Final    MCV 05/06/2025 81 (L)  82 - 98 fL Final    MCH 05/06/2025 25.7 (L)  27.0 - 31.0 pg Final    MCHC 05/06/2025 31.7 (L)  32.0 - 36.0 g/dL Final    RDW 05/06/2025 15.1 (H)  11.5 - 14.5 % Final    Platelet Count 05/06/2025 448  150 - 450 K/uL Final    MPV 05/06/2025 10.2  9.2 - 12.9 fL Final    Nucleated RBC 05/06/2025 0  <=0 /100 WBC Final    Neut % 05/06/2025 39.3  38 - 73 % Final    Lymph % 05/06/2025 50.2 (H)  18 - 48 % Final    Mono % 05/06/2025 7.5  4 - 15 % Final    Eos % 05/06/2025 2.2  <=8 % Final    Basophil % 05/06/2025 0.5  <=1.9 % Final    Imm Grans % 05/06/2025 0.3  0.0 - 0.5 % Final    Neut # 05/06/2025 3.57  1.8 - 7.7 K/uL Final    Lymph # 05/06/2025 4.57  1 - 4.8 K/uL Final    Mono # 05/06/2025 0.68  0.3 - 1 K/uL Final    Eos # 05/06/2025 0.20  <=0.5 K/uL Final    Baso # 05/06/2025  0.05  <=0.2 K/uL Final    Imm Grans # 05/06/2025 0.03  0.00 - 0.04 K/uL Final    Mild elevation in immature granulocytes is non specific and can be seen in a variety of conditions including stress response, acute inflammation, trauma and pregnancy. Correlation with other laboratory and clinical findings is essential.       Assessment & Plan:     Assessment & Plan      Endocrinology consultation will be obtained for further assessment of the patient's diabetes management.  Lantus insulin will be increased to 20 units daily.  A prescription will be given for the Dexcom G7 continuous glucose monitor as requested by the patient.    Orthopedic consultation will be obtained regarding right knee pain.    Updated lab studies will be obtained in 4 months.    Current medications for hypertension will be continued.        No follow-ups on file.     Alen Damico MD

## 2025-05-15 RX ORDER — PANTOPRAZOLE SODIUM 40 MG/1
40 TABLET, DELAYED RELEASE ORAL
Qty: 90 TABLET | Refills: 3 | Status: SHIPPED | OUTPATIENT
Start: 2025-05-15

## 2025-05-15 NOTE — TELEPHONE ENCOUNTER
No care due was identified.  [facility] Embedded Care Due Messages. Reference number: [id number].   5/15/2025 1:48:59 AM CDT

## 2025-05-15 NOTE — TELEPHONE ENCOUNTER
Refill Decision Note   Aracelis Martinez  is requesting a refill authorization.  Brief Assessment and Rationale for Refill:  Approve     Medication Therapy Plan:        Comments:     Note composed:7:27 AM 05/15/2025

## 2025-05-18 ENCOUNTER — PATIENT MESSAGE (OUTPATIENT)
Dept: INTERNAL MEDICINE | Facility: CLINIC | Age: 70
End: 2025-05-18
Payer: MEDICARE

## 2025-05-18 DIAGNOSIS — E11.9 TYPE 2 DIABETES MELLITUS WITHOUT COMPLICATION, WITHOUT LONG-TERM CURRENT USE OF INSULIN: Chronic | ICD-10-CM

## 2025-05-19 RX ORDER — INSULIN GLARGINE 100 [IU]/ML
20 INJECTION, SOLUTION SUBCUTANEOUS DAILY
Qty: 4 ML | Refills: 11 | Status: SHIPPED | OUTPATIENT
Start: 2025-05-19 | End: 2025-05-22 | Stop reason: SDUPTHER

## 2025-05-19 NOTE — TELEPHONE ENCOUNTER
No care due was identified.  [facility] Embedded Care Due Messages. Reference number: [id number].   5/19/2025 10:01:41 AM CDT

## 2025-05-21 NOTE — TELEPHONE ENCOUNTER
Patient scheduled soonest available with diabetic provider DINH Coyne. Patient voiced understanding of appointment time and date.

## 2025-05-22 ENCOUNTER — PATIENT MESSAGE (OUTPATIENT)
Dept: INTERNAL MEDICINE | Facility: CLINIC | Age: 70
End: 2025-05-22
Payer: MEDICARE

## 2025-05-22 DIAGNOSIS — E11.9 TYPE 2 DIABETES MELLITUS WITHOUT COMPLICATION, WITHOUT LONG-TERM CURRENT USE OF INSULIN: Chronic | ICD-10-CM

## 2025-05-22 RX ORDER — INSULIN GLARGINE 100 [IU]/ML
20 INJECTION, SOLUTION SUBCUTANEOUS DAILY
Qty: 4 EACH | Refills: 3 | Status: SHIPPED | OUTPATIENT
Start: 2025-05-22 | End: 2026-05-22

## 2025-05-22 RX ORDER — LEVOTHYROXINE SODIUM 75 UG/1
75 TABLET ORAL
Qty: 90 TABLET | Refills: 0 | Status: SHIPPED | OUTPATIENT
Start: 2025-05-22

## 2025-05-22 NOTE — TELEPHONE ENCOUNTER
No care due was identified.  [facility] Embedded Care Due Messages. Reference number: [id number].   5/22/2025 2:42:47 PM CDT

## 2025-05-22 NOTE — TELEPHONE ENCOUNTER
Refill Decision Note   Aracelis Martinez  is requesting a refill authorization.  Brief Assessment and Rationale for Refill:  Approve     Medication Therapy Plan: TSH-WNL      Comments:     Note composed:11:24 AM 05/22/2025

## 2025-05-22 NOTE — TELEPHONE ENCOUNTER
No care due was identified.  [facility] Embedded Care Due Messages. Reference number: [id number].   5/22/2025 10:32:38 AM CDT

## 2025-05-22 NOTE — TELEPHONE ENCOUNTER
Recent qty was not correct (patient requested but not correct)    Rx pended for corrected qty.  I called pharmacy to clarify.  Please resend as pended.      Thanks angel    insulin glargine U-100, Lantus, (LANTUS SOLOSTAR U-100 INSULIN) 100 unit/mL (3 mL) InPn pen 4 mL 11 5/19/2025 5/19/2026 No   Sig - Route: Inject 20 Units into the skin once daily. - Subcutaneous   Sent to pharmacy as: insulin glargine U-100, Lantus, (LANTUS SOLOSTAR U-100 INSULIN) 100 unit/mL (3 mL) InPn pen   Class: Normal   Order: [id number]   Date/Time Signed: 5/19/2025 22:47       E-Prescribing Status: Receipt confirmed by pharmacy (5/19/2025 10:47 PM CDT)     Pharmacy    [id number] - [place], [place] - [address] AT [facility] [address]

## 2025-05-26 ENCOUNTER — TELEPHONE (OUTPATIENT)
Dept: SPORTS MEDICINE | Facility: CLINIC | Age: 70
End: 2025-05-26
Payer: MEDICARE

## 2025-05-26 NOTE — TELEPHONE ENCOUNTER
Called pt to inquire if she would like to return to Dr. Bren Deng for her knee pain - pt elected to r/s to 6/13 with Dr. Deng.    Sarah Ibanez MS, OTC  Clinical Assistant to Dr. Bela Platt

## 2025-05-27 ENCOUNTER — OFFICE VISIT (OUTPATIENT)
Dept: INTERNAL MEDICINE | Facility: CLINIC | Age: 70
End: 2025-05-27
Payer: MEDICARE

## 2025-05-27 VITALS
SYSTOLIC BLOOD PRESSURE: 132 MMHG | WEIGHT: 233.25 LBS | HEIGHT: 65 IN | BODY MASS INDEX: 38.86 KG/M2 | OXYGEN SATURATION: 97 % | DIASTOLIC BLOOD PRESSURE: 68 MMHG | HEART RATE: 71 BPM

## 2025-05-27 DIAGNOSIS — E78.2 MIXED HYPERLIPIDEMIA: Chronic | ICD-10-CM

## 2025-05-27 DIAGNOSIS — Z97.8 USES SELF-APPLIED CONTINUOUS GLUCOSE MONITORING DEVICE: ICD-10-CM

## 2025-05-27 DIAGNOSIS — E11.65 TYPE 2 DIABETES MELLITUS WITH HYPERGLYCEMIA, WITHOUT LONG-TERM CURRENT USE OF INSULIN: Primary | ICD-10-CM

## 2025-05-27 PROCEDURE — 99999 PR PBB SHADOW E&M-EST. PATIENT-LVL V: CPT | Mod: PBBFAC,HCNC,,

## 2025-05-27 RX ORDER — SEMAGLUTIDE 0.68 MG/ML
0.25 INJECTION, SOLUTION SUBCUTANEOUS
Qty: 3 ML | Refills: 3 | Status: SHIPPED | OUTPATIENT
Start: 2025-05-27

## 2025-05-27 NOTE — ASSESSMENT & PLAN NOTE
-Dexcom G7 readings reviewed with patient.  -Interpretation:   -Diabetes not controlled; BG below target range goal; FBG not at goal; PPG excursions present.   -Medication regimen adjusted according to current findings.   -See glucose readings below.  -Re-eval upon next visit.

## 2025-05-27 NOTE — ASSESSMENT & PLAN NOTE
-Has HX pancreatic cancer s/p [place] 2015  -Intolerance to Jardiance at this time due to high BG  -Reviewed last A1c. 10.9%  -Discussed A1c goal <7%.  -Discussed BG goals, provided copy.  -Continue POC. Advised to bring BG log or glucometer to every visit.  -Cont / start CGM monitoring.  -Discussed importance of making lifestyle changes.  -Provided brochures / handout on meal planning.  -Advised to start an exercise regimen.   -Stop Januvia.  -Cont.  Lantus 26 units at bedtime  -Cont Glipizide TR 10 mg daily with breakfast.  -Start Ozempic 0.25mg weekly. Discussed MOA, SE, and dosage.  -Discussed plan to keep Ozempic dose only 0.25mg to decrease chance of pancreatitis.   -Discussed how to treat hypoglycemia if experienced.  -Discussed goal to wean insulin to a minimal dose w/o experiencing hypo or hyperglycemia.

## 2025-05-27 NOTE — PATIENT INSTRUCTIONS
Continue monitoring your blood sugars using your Dexcom G7.      Blood glucose goals:   Fasting blood glucose goal:  mg/dL.  Premeal blood glucose goal:  mg/dL.  Postmeal blood glucose goal ( 2 hrs): <180 mg/dL.  Bedtime / overnight blood glucose goal:  mg/dL.       Stop Januvia       Continue taking Glipizide TR  10 mg daily with breakfast.        Continue Lantus 26 units at bedtime.           Start taking Ozempic 0.25mg weekly.   *Be sure to take your injection on the same day every week. You can take your injection with or without food.   *Please notify the office if you start to experience severe nausea, vomiting, stomach or back pain after taking this medication.          When taking Ozempic , you should limit foods that can increase the risk of side effects like nausea, vomiting, diarrhea, and stomach pain. These foods include:   High-fat foods: Fried foods, burgers, pizza, doughnuts, and other greasy foods   Sugary foods and drinks: Soda, juice, cakes, cookies, and other sweetened beverages   Refined carbohydrates: White bread, crackers, white flour, and white rice   Processed foods: Chips, pastries, and other ultra-processed foods   Spicy foods: Hot sauce, salsa, hot peppers, and other spicy foods   High glycemic index foods: Potatoes, cereal, pretzels, sports drinks, and other foods that raise blood sugar quickly   Acidic foods: Tomato sauce, citrus fruits, and coffee   Foods that contain caffeine: Coffee, carbonated beverages, and other caffeinated drinks   You should also limit high-fiber foods if you experience diarrhea or abdominal pain.          If you start to experience hypoglycemia after taking Ozempic, stop Glipizide.      If your blood sugar is low, follow the 15-15 rule: Have 15 grams of carbs, then wait 15 minutes. If the blood glucose level is less than 70, treat with 15 g of fast-acting carbohydrate, such as 4 oz of fruit juice or three or four glucose tablets; and  recheck  the blood glucose level after 15 minutes to ensure that it has normalized.  Check your blood sugar again. If it's still less than 70 mg/dL, repeat this process.        Refer to the food brochures when planning meals.        Start exercising for 30 minutes at least 3 times / week as tolerates. Be sure to mix cardio with strength training.         Follow up on 7/1/2025 @ 11 am.

## 2025-05-27 NOTE — PROGRESS NOTES
CHIEF COMPLAINT: Type 2 Diabetes    Referred by PCP: Dr. Damico  Previously managed by: PCP  CDE person of contact (if needed): Montse Balderas       HPI: Mrs. Aracelis Martinez is a 70 y.o. female who was diagnosed with Type 2 DM.     Last A1c: 10.9%    Hx of hospitalization for DM? No   Hx of HTN? Yes   Hx of CKD? No   Hx of CHF? No   Hx of CVA? No   Hx of MI? No   Hx of Pancreatitis? No   Hx of DM neuropathy? No   Hx DR? No   Hx of Gastroparesis? No        VISIT SUMMARY:  Has Hx of Pancreatic CA with whipple 2015  Currently not on statin therapy.  Has Dexcom G7  Labs reviewed: A1c  ; UACR   ; GFR  ; Lipid   Reports Lantus increased to 26u on yesterday   Dexcom readings showed diabetes not controlled on current regimen  FBG during visit 202  Discussed attempting Ozempic at very low dose  Discussed SE of pancreatitis while on Ozempic   Suggested lifestyle changes with diet and exercise and keep Ozempic at 0.25mg dose to slow chance of pancreatitis.  Provided brochures and handout on meal planning.   Reports intolerance to Jardiance- yeast infection  Discussed attempting Jardiance in the future once blood glucose better controlled.        BG monitoring:   Name: Dexcom   How often: Continuous   BG av: 200      Lifestyle:   Diet: 2 meals daily   Exercise: no regular exercise        Previous Diabetes Meds Tried:  Metformin   Jardiance - yeast infection        Current Diabetic Meds:   Januvia 100 mg daily  Lantus 26 units daily  Glipizide TR 10 mg daily with breakfast      Diabetes Management Status:  Statin: Not taking  ACE/ARB: Taking      Foot exam due: 5/13/2026  Eye exam due: 3/25/2026      Screening or Prevention Patient's value Goal Complete/Controlled?   HgA1C Testing and Control   Lab Results   Component Value Date    HGBA1C 10.9 (H) 05/06/2025      Annually/Less than 8% No   Lipid profile : 05/06/2025 Annually Yes   LDL control Lab Results   Component Value Date    LDLCALC 144.4 05/06/2025    Annually/Less than 100  "mg/dl  No   Nephropathy screening Lab Results   Component Value Date    LABMICR 29.0 07/15/2024     Lab Results   Component Value Date    PROTEINUA Trace (A) 03/20/2023    Annually Yes   Blood pressure BP Readings from Last 1 Encounters:   05/27/25 132/68    Less than 140/90 Yes   Dilated retinal exam : 03/25/2025 Annually Yes   Foot exam   : 05/13/2025 Annually Yes     REVIEW OF SYSTEMS  ROS   General: Denies weakness, fatigue, or weight changes.   Eyes: Denies double or blurred vision, eye pain, or redness.  Cardiovascular: Denies CP, palpitations, or edema.   Respiratory: Denies cough or dyspnea.   GI: Denies heartburn, n/v, or changes in bowel patterns.   Skin: Denies rash, lesions, itching, or injection site problems.  Neuro: Denies severe HAs, numbness, tingling, tremors, or vertigo.   Endocrine: Denies polyuria, polydipsia, polyphagia, heat or cold intolerance.       Vital Signs  /68 (BP Location: Right arm, Patient Position: Sitting)   Pulse 71   Ht 5' 5" (1.651 m)   Wt 105.8 kg (233 lb 4 oz)   LMP  (LMP Unknown)   SpO2 97%   BMI 38.81 kg/m²     Hemoglobin A1C   Date Value Ref Range Status   12/02/2024 8.4 (H) 4.0 - 5.6 % Final     Comment:     ADA Screening Guidelines:  5.7-6.4%  Consistent with prediabetes  >or=6.5%  Consistent with diabetes    High levels of fetal hemoglobin interfere with the HbA1C  assay. Heterozygous hemoglobin variants (HbS, HgC, etc)do  not significantly interfere with this assay.   However, presence of multiple variants may affect accuracy.     07/15/2024 7.9 (H) 4.0 - 5.6 % Final     Comment:     ADA Screening Guidelines:  5.7-6.4%  Consistent with prediabetes  >or=6.5%  Consistent with diabetes    High levels of fetal hemoglobin interfere with the HbA1C  assay. Heterozygous hemoglobin variants (HbS, HgC, etc)do  not significantly interfere with this assay.   However, presence of multiple variants may affect accuracy.     02/14/2024 8.1 (H) 4.0 - 5.6 % Final     " Comment:     ADA Screening Guidelines:  5.7-6.4%  Consistent with prediabetes  >or=6.5%  Consistent with diabetes    High levels of fetal hemoglobin interfere with the HbA1C  assay. Heterozygous hemoglobin variants (HbS, HgC, etc)do  not significantly interfere with this assay.   However, presence of multiple variants may affect accuracy.       Hemoglobin A1c   Date Value Ref Range Status   05/06/2025 10.9 (H) 4.0 - 5.6 % Final     Comment:     ADA Screening Guidelines:  5.7-6.4%  Consistent with prediabetes  >=6.5%  Consistent with diabetes    High levels of fetal hemoglobin interfere with the HbA1C  assay. Heterozygous hemoglobin variants (HbS, HgC, etc)do  not significantly interfere with this assay.   However, presence of multiple variants may affect accuracy.        Chemistry        Component Value Date/Time     05/06/2025 0718     12/02/2024 0711    K 4.1 05/06/2025 0718    K 4.6 12/02/2024 0711     05/06/2025 0718     12/02/2024 0711    CO2 26 05/06/2025 0718    CO2 20 (L) 12/02/2024 0711    BUN 21 05/06/2025 0718    CREATININE 1.1 05/06/2025 0718     (H) 05/06/2025 0718     (H) 12/02/2024 0711        Component Value Date/Time    CALCIUM 9.1 05/06/2025 0718    CALCIUM 9.4 12/02/2024 0711    ALKPHOS 145 05/06/2025 0718    ALKPHOS 128 12/02/2024 0711    AST 13 05/06/2025 0718    AST 16 12/02/2024 0711    ALT 15 05/06/2025 0718    ALT 16 12/02/2024 0711    BILITOT 0.4 05/06/2025 0718    BILITOT 0.4 12/02/2024 0711    ESTGFRAFRICA >60.0 05/26/2022 0825    EGFRNONAA >60.0 05/26/2022 0825           Lab Results   Component Value Date    TSH 3.229 07/15/2024      Lab Results   Component Value Date    CHOL 211 (H) 05/06/2025    CHOL 184 07/15/2024    CHOL 166 02/14/2024     Lab Results   Component Value Date    HDL 45 05/06/2025    HDL 52 07/15/2024    HDL 39 (L) 02/14/2024     Lab Results   Component Value Date    LDLCALC 144.4 05/06/2025    LDLCALC 107.4 07/15/2024    LDLCALC  108.6 02/14/2024     Lab Results   Component Value Date    TRIG 108 05/06/2025    TRIG 123 07/15/2024    TRIG 92 02/14/2024     Lab Results   Component Value Date    CHOLHDL 21.3 05/06/2025    CHOLHDL 28.3 07/15/2024    CHOLHDL 23.5 02/14/2024         PHYSICAL EXAMINATION  Physical Exam   Constitutional: Appears well, no distress.  Eyes: Sclera white, PERRLA, EOMs intact.  Neck: Supple, trachea midline.   Respiratory: No cough, SOB, nor ANDERSON.  Cardiovascular: RRR; no edema.  Skin: Warm and dry; no rash, lesions, acanthosis nigricans, nor injection site reactions.  Neuro: AAOx4, steady gait  Psychiatric: No unusual, disruptive, or inappropriate behavior.       Assessment/Plan    I spent a total of 75 minutes on the day of the visit. This includes face to face time and non-face to face time preparing to see the patient (eg, review of tests), obtaining and/or reviewing separately obtained history, documenting clinical information in the electronic or other health record, independently interpreting results and communicating results to the patient/family/caregiver, or care coordinator.       1. Type 2 diabetes mellitus with hyperglycemia, without long-term current use of insulin  Assessment & Plan:  -Has HX pancreatic cancer s/p whipple 2015  -Intolerance to Jardiance at this time due to high BG  -Reviewed last A1c. 10.9%  -Discussed A1c goal <7%.  -Discussed BG goals, provided copy.  -Continue POC. Advised to bring BG log or glucometer to every visit.  -Cont / start CGM monitoring.  -Discussed importance of making lifestyle changes.  -Provided brochures / handout on meal planning.  -Advised to start an exercise regimen.   -Stop Januvia.  -Cont.  Lantus 26 units at bedtime  -Cont Glipizide TR 10 mg daily with breakfast.  -Start Ozempic 0.25mg weekly. Discussed MOA, SE, and dosage.  -Discussed plan to keep Ozempic dose only 0.25mg to decrease chance of pancreatitis.   -Discussed how to treat hypoglycemia if  experienced.  -Discussed goal to wean insulin to a minimal dose w/o experiencing hypo or hyperglycemia.      Orders:  -     semaglutide (OZEMPIC) 0.25 mg or 0.5 mg (2 mg/3 mL) pen injector; Inject 0.25 mg into the skin every 7 days.  Dispense: 3 mL; Refill: 3    2. Mixed hyperlipidemia  Overview:  LDL in 130s  With DM would prefer LDL of 70 or below  Likely would benefit from statin    Assessment & Plan:  -Reviewed Lipid panel 211/108/45/144  -Discussed LDL goal <70.  -Discussed lifestyle changes.  -Not on statin therapy  -Discussed importance of lipid control in setting of diabetes  -Discussed the importance of taking statin every night and lifestyle changes to slow the risk of a cardiovascular event such as CVA or MI.        3. Uses self-applied continuous glucose monitoring device  Assessment & Plan:  -Dexcom G7 readings reviewed with patient.  -Interpretation:   -Diabetes not controlled; BG below target range goal; FBG not at goal; PPG excursions present.   -Medication regimen adjusted according to current findings.   -See glucose readings below.  -Re-eval upon next visit.              FOLLOW UP  Follow up in about 5 weeks (around 7/1/2025) for CGM readings.

## 2025-05-27 NOTE — ASSESSMENT & PLAN NOTE
-Reviewed Lipid panel 211/108/45/144  -Discussed LDL goal <70.  -Discussed lifestyle changes.  -Not on statin therapy  -Discussed importance of lipid control in setting of diabetes  -Discussed the importance of taking statin every night and lifestyle changes to slow the risk of a cardiovascular event such as CVA or MI.

## 2025-06-12 NOTE — PROGRESS NOTES
Subjective:     Aracelis Martinez     Chief Complaint   Patient presents with    Right Knee - Pain     HPI    Aracelis is a 70 y.o. female coming in today for right knee pain. She was last seen by me in 2/2022. She received excellent relief with Euflexxa injections up until the past few months. Pt notes aching pain in the R>L knee.  Pt. describes the pain as a 3/10 achy pain that does not radiate. There was not a fall/injury/ or trauma associated with the onset of symptoms. The pain is better with rest, Euflexxa and worse with activity, standing, walking, stairs. Pt. Denies any other musculoskeletal complaints at this time.     Joint instability? no  Mechanical locking/clicking? yes  Affecting ADL's? yes  Affecting sleep? no    Procedures reviewed:   2018 B knee CSI- <8weeks relief  11/01/18- Euflexxa L knee  10/21/21- Euflexxa B knee- 75% improvement x 3 years    Review of Systems   Constitutional:  Negative for chills and fever.   Musculoskeletal:  Positive for joint pain. Negative for back pain, falls, myalgias and neck pain.   Neurological:  Negative for dizziness, tingling, focal weakness, weakness and headaches.     PAST MEDICAL HISTORY:   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/02/2020    History of pancreatic cancer 07/02/2021    Hypertension     Knee pain 04/16/2014    Lumbar pain 01/21/2022    Nausea 11/19/2019    Neuroendocrine tumor of pancreas 2015    Obesity     Pancreatic cancer 2015    Range of motion deficit 11/09/2018    Renal cyst     left    Special screening for  malignant neoplasms, colon 2014    Status post arthroscopy of left knee 2014    Stiffness of right hand, not elsewhere classified 2019    Thyroid disease     Trouble in sleeping     Type 2 diabetes mellitus     Type 2 diabetes mellitus with hyperglycemia, without long-term current use of insulin 2025    Type 2 diabetes mellitus with ophthalmic manifestations     Uses self-applied continuous glucose monitoring device 2025     PAST SURGICAL HISTORY:   Past Surgical History:   Procedure Laterality Date     SECTION      COLONOSCOPY N/A 3/13/2019    Procedure: COLONOSCOPY;  Surgeon: Cem Lamar MD;  Location: [facility] (4TH FLR);  Service: Endoscopy;  Laterality: N/A;  previous order cx    COLONOSCOPY N/A 2022    Procedure: COLONOSCOPY Suprep;  Surgeon: Hiren Newberry MD;  Location: [facility];  Service: Endoscopy;  Laterality: N/A;    EPIDURAL STEROID INJECTION INTO LUMBAR SPINE N/A 6/15/2021    Procedure: Injection-steroid-epidural-lumbar-L5-S1;  Surgeon: Saranya Cornelius Jr., MD;  [facility];  Service: Pain Management;  Laterality: N/A;    ESOPHAGOGASTRODUODENOSCOPY N/A 2019    Procedure: ESOPHAGOGASTRODUODENOSCOPY (EGD);  Surgeon: Jonathan Oneill MD;  Location: [facility] (4TH FLR);  Service: Endoscopy;  Laterality: N/A;    ESOPHAGOGASTRODUODENOSCOPY N/A 2020    Procedure: EGD (ESOPHAGOGASTRODUODENOSCOPY);  Surgeon: Shady Costa MD;  Location: [facility] (2ND FLR);  Service: Endoscopy;  Laterality: N/A;  Please schedule patient as soon as possible in the 2nd floor history of a Whipple surgery for neuroendocrine pancreatic tumor many years ago with now constant belching and regurgitation.  May need airway protection.  Rule out celiac sprue rule out lact    ESOPHAGOGASTRODUODENOSCOPY N/A 2022    Procedure: EGD (ESOPHAGOGASTRODUODENOSCOPY);  Surgeon: Hiren Newberry MD;  Location: [facility] ENDO;  Service: Endoscopy;  Laterality: N/A;     EXCISION OF GANGLION CYST OF HAND Right 10/22/2018    Procedure: EXCISION, GANGLION CYST, HAND- RIGHT, LONG AND INDEX FINGER;  Surgeon: Sowmya Schmidt MD;  Location: [facility];  Service: Orthopedics;  Laterality: Right;  Stretcher; Supine; Hand Pan 1 & [place] 2; Dr. Loja's Rasp    HYSTERECTOMY  2015    ISMAEL    INJECTION OF ANESTHETIC AGENT AROUND MEDIAL BRANCH NERVES INNERVATING LUMBAR FACET JOINT Bilateral 9/28/2021    Procedure: Block-nerve-medial branch-lumbar-bilateral L4-5 and L5-S1;  Surgeon: Saranya Cornelius Jr., MD;  Location: [facility] PAIN MGT;  Service: Pain Management;  Laterality: Bilateral;    INJECTION OF ANESTHETIC AGENT AROUND MEDIAL BRANCH NERVES INNERVATING LUMBAR FACET JOINT Bilateral 10/12/2021    Procedure: Bilateral Lumbar Medial Branch Block L4-5 L5-S1- (No Sedation);  Surgeon: Saranya Cornelius Jr., MD;  Location: [facility];  Service: Pain Management;  Laterality: Bilateral;    KNEE ARTHROSCOPY  4/18/2014    LATS/left    OOPHORECTOMY      PANCREAS SURGERY  2015    MD Ritchie    RADIOFREQUENCY THERMOCOAGULATION Bilateral 11/9/2021    Procedure: Radiofrequency Themocoagulation of medial branches: L4-5 and L5-S1;  Surgeon: Saranya Cornelius Jr., MD;  Location: [facility];  Service: Pain Management;  Laterality: Bilateral;  Patient is diabetic.     RADIOFREQUENCY THERMOCOAGULATION Left 12/16/2021    Procedure: RADIOFREQUENCY THERMAL COAGULATION LEFT L4-5, L5-S1 (IV Sedation);  Surgeon: Saranya Cornelius Jr., MD;  Location: [facility];  Service: Pain Management;  Laterality: Left;  diabetic    TONSILLECTOMY      TRANSFORAMINAL EPIDURAL INJECTION OF STEROID Right 8/17/2021    Procedure: Injection,steroid,epidural,transforaminal approach; Levels: L5-S1;  Surgeon: Saranya Cornelius Jr., MD;  Location: [facility];  Service: Pain Management;  Laterality: Right;  No pacemaker. Patient is diabetic.    TRANSFORAMINAL EPIDURAL INJECTION OF STEROID Right 8/24/2021    Procedure:  "Injection,steroid,epidural,transforaminal approach; Levels: L5-S1;  Surgeon: Saranya Cornelius Jr., MD;  Location: [facility];  Service: Pain Management;  Laterality: Right;  No pacemaker. Patient is diabetic.      FAMILY HISTORY:   Family History   Problem Relation Name Age of Onset    Hypertension Mother      Diabetes Mother      Heart disease Mother      Stroke Mother      Hypertension Sister      Stroke Sister      Breast cancer Sister      Hypertension Brother      Colon cancer Neg Hx      Ovarian cancer Neg Hx       SOCIAL HISTORY:   Social History[1]    MEDICATIONS:   Current Medications[2]  ALLERGIES:   Review of patient's allergies indicates:   Allergen Reactions    Erythromycin base        Objective:   VITAL SIGNS: BP (!) 145/69 (BP Location: Right arm, Patient Position: Sitting)   Pulse 64   Ht 5' 5" (1.651 m)   Wt 105.2 kg (231 lb 14.8 oz)   LMP  (LMP Unknown)   BMI 38.59 kg/m²    General    Nursing note and vitals reviewed.  Constitutional: She is oriented to person, place, and time. She appears well-developed and well-nourished.   HENT:   Head: Normocephalic and atraumatic.   no nasal discharge, no external ear redness or discharge   Eyes:   EOM is full and smooth  No eye redness or discharge   Neck: Neck supple. No tracheal deviation present.   Cardiovascular:  Normal rate.            2+ Radial pulse bilaterally  2+ Dorsalis Pedis pulse bilaterally  No LE edema appreciated   Pulmonary/Chest: Effort normal. No respiratory distress.   Abdominal: She exhibits no distension.   No rigidity   Neurological: She is alert and oriented to person, place, and time. She exhibits normal muscle tone. Coordination normal.   See details below   Psychiatric: She has a normal mood and affect. Her behavior is normal.               MUSCULOSKELETAL EXAM  bilateral KNEE EXAMINATION   Affected side is compared to contralateral knee     Observation:  No edema, erythema, ecchymosis, or effusion noted.  No muscle " atrophy of the thighs and calves noted.  No obvious bony deformities noted.   No Genu valgus/varum noted.  No recurvatum noted.    No tibial internal/external torsion.    Posture:  Posterior pelvis tilt with loss of lumbar lordosis  Gait: Right antalgic     Tenderness:  Patella - none    Lateral joint line - + bilaterally  Quad tendon - none   Medial joint line - + bilaterally  Patellar tendon - none  Medial plica - none  Tibial tubercle - none   Lateral plica - none  Pes anserine - none   MCL prox - none  Distal ITB - none   MCL distal - none  MFC - none    LCL prox - none  LFC - none    LCL distal - none  Tibia - none    Fibula - none    No obvious bursae, plicae, popliteal cysts, or tendon derangement palpated.          ROM (* = with pain):   Active extension to 0° on left without hyperextension, lag, crepitus, or patellar J sign.   Active extension to 0° on right without hyperextension, lag, crepitus, or patellar J sign.  Active flexion to 135° on left* and 135° on right*    Strength(* = with pain):  Knee Flexion - 5/5 on left and 5/5 on right  Knee Extension - 5/5 on left and 5/5 on right  Hip Flexion - 5/5 on left and 5/5 on right  Hip Extension - 5/5 on left and 5/5 on right  Ankle dorsiflexion - 5/5 on left and 5/5 on right  Ankle Plantarflexion - 5/5 on left and 5/5 on right    Patellofemoral Exam:   Patellar ballottement - mildly positive on right  Bulge sign - mildly positive on right  Patellar grind - negative    No patellar laxity with medial and lateral translation   No apprehension with medial and lateral patellar translation.     Meniscus Testing:     + pain with terminal  flexion at medial joint lines.  Olgas test - negative   Bounce home test - negative    Ligament Testing:  Lachman's test - negative  No laxity with anterior drawer.  No laxity with posterior drawer.    No laxity with varus testing at 0 and 30 degrees.  No laxity with valgus testing at 0 and 30 degrees.    IT band  testing:  Noble Compression test - negative    Neurovascular Examination:   Sensation intact to light touch in the obturator, lateral/intermediate/medial/posterior femoral cutaneous, saphenous, and common peroneal nerves bilaterally.  Motor Function:    Fully intact motor function at hip, knee, foot and ankle.  Negative seated straight leg raise bilaterally.    Pulses intact at the DP and PT arteries bilaterally.    Capillary refill intact <2 seconds in all toes bilaterally.    TART (Tissue texture abnormality, Asymmetry,  Restriction of motion and/or Tenderness) changes:     Lumbar Spine   L1 Neutral   L2 Neutral   L3 Neutral   L4 FRS LEFT   L5 FRS LEFT     Pelvis:  Innominate:Right superior shear  Pubic bone:Right superior pubic shear    Sacrum:Right on Left sacral torsion    Key   F= Flexed   E = Extended   R = Rotated   S = Sidebent   TTA = tissue texture abnormality     IMAGIN. X-ray ordered due to bilateral knee pain. (AP bilateral standing, PA bilateral standing in flexion, bilateral merchants, and  bilateral lateral views) taken today.   2. X-ray images were reviewed personally by me and then directly with patient.  3. FINDINGS: X-ray images obtained demonstrate:  Right knee:     Preserved bone density.  No acute fracture.  Reconfirmed narrowed medial compartment and mild genu varum.  Preserved lateral compartment.  Reconfirmed mild narrowing lateral patellofemoral compartment.  Stable periarticular spurring about tibiofemoral and patellofemoral compartments.  No suprapatellar bursal effusion or prepatellar soft tissue swelling.  Right knee findings do not appear significantly changed to earlier exam.     Left knee:     Preserved bone density.  No acute fracture.  Reconfirmed narrowed medial compartment and genu varum.  Preserved lateral compartment.  Reconfirmed mild narrowing lateral patellofemoral compartment.  Stable periarticular spurring about tibiofemoral and patellofemoral compartments.  No  suprapatellar bursal effusion or prepatellar soft tissue swelling.  Left knee findings do not appear significantly changed to earlier exam.  4. IMPRESSION: Overall stable Kellgren-Bryn grade 3 OA bilaterally      Assessment:      Encounter Diagnoses   Name Primary?    Bilateral primary osteoarthritis of knee Yes    Effusion of right knee     Somatic dysfunction of lumbar region     Sacral region somatic dysfunction     Somatic dysfunction of pelvic region     Myalgia         Plan:   1. Chronic bilateral  knee pain secondary to DJD changes as noted on x-ray with significant relief from previous bilateral Euflexxa injection series completed on 10/21/21.  - Discussed conservative therapy of OA with compression brace wear, HEP, injection therapy (repeat viscosupplementation), Ice up to 20 minutes at a time, and Mobic 15 mg po qday for breakthrough pain vs. Referral to orthopedic surgery for discussion on TKA. Pt. Would like to continue with conservative treatment at this time with repeat bilateral Euflexxa injection series.   - OMT performed today to address associated biomechanical restrictions  and HEP started.   - continue low load aerobic activity with walking and stationary biking  -  X-ray images of bilateral knee taken 9/23/21 (AP bilateral standing, PA bilateral standing in flexion, bilateral merchants, and  bilateral lateral views) showed Kellgren-Bryn grade 3 OA changes bilaterally, slightly progressed from previous imaging on 10/18/2018.- X-ray images of bilateral knee taken today (AP bilateral standing, PA bilateral standing in flexion, bilateral merchants, and  bilateral lateral views) showed overall stable Kellgren-Bryn grade 3 OA bilaterally. Images were personally reviewed with patient.    2. OMT 3-4 regions. Oral consent obtained.  Reviewed benefits and potential side effects.   - OMT indicated today due to signs and symptoms as well as local and remote somatic dysfunction findings and their  related neurokinetic, lymphatic, fascial and/or arteriovenous body connections.   - OMT techniques used: Myofascial Release, Muscle Energy, and Articulatory   - Treatment was tolerated well. Improvement noted in segmental mobility post-treatment in dysfunctional regions. There were no adverse events and no complications immediately following treatment.     3. Pt. Given the following HEP:  A)  Pelvic clock exercises given to do from the 6-12 o'clock positions:10-15 reps, twice daily. Hand out of exercise also given.   B) Supine lumbar rotation exercise with flexed knees:  Repeat 10 times, twice daily.  Handout given.  C) Bilateral seated quadriceps strengthening exercise: Straight leg raises with hip neural, hip externally rotated, and then hip internally rotated. 10-15 reps in each plane, twice daily.    HOME EXERCISE PROGRAM (HEP):  The patient was taught a homegoing physical therapy regimen as described above. The patient demonstrated understanding of the exercises and proper technique of their execution. This interaction took 15 minutes.     4.  Follow-up in 2 weeks for repeat bilateral US guided Euflexxa injection series, as discussed above     5. Patient agreeable to today's plan and all questions were answered     This note is dictated using the M*Modal Fluency Direct word recognition program. There are word recognition mistakes that are occasionally missed on review.                       [1]   Social History  Socioeconomic History    Marital status:    Tobacco Use    Smoking status: Former     Current packs/day: 0.00     Average packs/day: 0.3 packs/day for 10.0 years (2.5 ttl pk-yrs)     Types: Cigarettes     Start date: 1972     Quit date: 1982     Years since quittin.4    Smokeless tobacco: Never    Tobacco comments:     Totally quit per  visit.   Substance and Sexual Activity    Alcohol use: Yes     Comment: occasional use    Drug use: Never    Sexual activity: Yes     Partners:  Male     Birth control/protection: None     Social Drivers of Health     Financial Resource Strain: Low Risk  (5/21/2025)    Overall Financial Resource Strain (CARDIA)     Difficulty of Paying Living Expenses: Not hard at all   Food Insecurity: No Food Insecurity (5/21/2025)    Hunger Vital Sign     Worried About Running Out of Food in the Last Year: Never true     Ran Out of Food in the Last Year: Never true   Transportation Needs: No Transportation Needs (5/21/2025)    PRAPARE - Transportation     Lack of Transportation (Medical): No     Lack of Transportation (Non-Medical): No   Physical Activity: Insufficiently Active (5/21/2025)    Exercise Vital Sign     Days of Exercise per Week: 2 days     Minutes of Exercise per Session: 30 min   Stress: Stress Concern Present (5/21/2025)    [employer] - Occupational Stress Questionnaire     Feeling of Stress : Very much   Housing Stability: Low Risk  (5/21/2025)    Housing Stability Vital Sign     Unable to Pay for Housing in the Last Year: No     Number of Times Moved in the Last Year: 0     Homeless in the Last Year: No   [2]   Current Outpatient Medications:     alcohol swabs (DROPSAFE ALCOHOL PREP PADS) PadM, USE EVERY DAY TO CLEAN FINGER PRIOR TO GLUCOSE CHECK FOR DIABETES, Disp: 100 each, Rfl: 3    amLODIPine (NORVASC) 10 MG tablet, TAKE 1 TABLET EVERY DAY, Disp: 90 tablet, Rfl: 3    blood-glucose sensor (DEXCOM G7 SENSOR) Bere, Use daily for continuous glucose monitoring., Disp: 1 each, Rfl: 6    cyclobenzaprine (FLEXERIL) 10 MG tablet, TAKE 1 TABLET(10 MG) BY MOUTH THREE TIMES DAILY AS NEEDED FOR MUSCLE SPASMS, Disp: 90 tablet, Rfl: 2    glipiZIDE (GLUCOTROL) 10 MG TR24, TAKE 1 TABLET EVERY DAY WITH BREAKFAST FOR DIABETES, Disp: 90 tablet, Rfl: 0    hydrOXYzine pamoate (VISTARIL) 50 MG Cap, TAKE 1 CAPSULE BY MOUTH TWICE DAILY AS NEEDED FOR ANXIETY, Disp: 60 capsule, Rfl: 3    insulin glargine U-100, Lantus, (LANTUS SOLOSTAR U-100  "INSULIN) 100 unit/mL (3 mL) InPn pen, Inject 26 Units into the skin once daily. (Patient taking differently: Inject 26 Units into the skin every evening.), Disp: 7.8 mL, Rfl: 5    levothyroxine (SYNTHROID) 75 MCG tablet, TAKE 1 TABLET EVERY DAY BEFORE BREAKFAST, Disp: 90 tablet, Rfl: 0    meloxicam (MOBIC) 15 MG tablet, TAKE 1 TABLET BY MOUTH EVERY DAY FOR ANKLE PAIN OR SWELLING, Disp: 90 tablet, Rfl: 3    olmesartan (BENICAR) 20 MG tablet, TAKE 1 TABLET(20 MG) BY MOUTH DAILY, Disp: 90 tablet, Rfl: 1    pantoprazole (PROTONIX) 40 MG tablet, TAKE 1 TABLET EVERY DAY, Disp: 90 tablet, Rfl: 3    pen needle, diabetic (COMFORT EZ PEN NEEDLES) 33 gauge x 1/4" Ndle, Use daily with insulin pen., Disp: 100 each, Rfl: 3    pregabalin (LYRICA) 150 MG capsule, Take 1 capsule (150 mg total) by mouth 3 (three) times daily., Disp: 90 capsule, Rfl: 5    semaglutide (OZEMPIC) 0.25 mg or 0.5 mg (2 mg/3 mL) pen injector, Inject 0.25 mg into the skin every 7 days., Disp: 3 mL, Rfl: 3    TRUE METRIX GLUCOSE TEST STRIP Strp, TEST BLOOD SUGAR EVERY DAY, Disp: 100 strip, Rfl: 3    TRUEPLUS LANCETS 33 gauge Misc, TEST BLOOD SUGAR EVERY DAY, Disp: 100 each, Rfl: 3    zolpidem (AMBIEN) 10 mg Tab, Take 1 tablet (10 mg total) by mouth nightly as needed (insomnia)., Disp: 30 tablet, Rfl: 3    blood-glucose meter kit, Use as instructed, Disp: 1 each, Rfl: 0    "

## 2025-06-13 ENCOUNTER — OFFICE VISIT (OUTPATIENT)
Dept: SPORTS MEDICINE | Facility: CLINIC | Age: 70
End: 2025-06-13
Payer: MEDICARE

## 2025-06-13 ENCOUNTER — HOSPITAL ENCOUNTER (OUTPATIENT)
Dept: RADIOLOGY | Facility: HOSPITAL | Age: 70
Discharge: HOME | End: 2025-06-13
Attending: NEUROMUSCULOSKELETAL MEDICINE & OMM
Payer: MEDICARE

## 2025-06-13 VITALS
BODY MASS INDEX: 38.64 KG/M2 | WEIGHT: 231.94 LBS | DIASTOLIC BLOOD PRESSURE: 69 MMHG | SYSTOLIC BLOOD PRESSURE: 145 MMHG | HEIGHT: 65 IN | HEART RATE: 64 BPM

## 2025-06-13 DIAGNOSIS — M79.10 MYALGIA: ICD-10-CM

## 2025-06-13 DIAGNOSIS — M25.461 EFFUSION OF RIGHT KNEE: ICD-10-CM

## 2025-06-13 DIAGNOSIS — M99.04 SACRAL REGION SOMATIC DYSFUNCTION: ICD-10-CM

## 2025-06-13 DIAGNOSIS — M99.03 SOMATIC DYSFUNCTION OF LUMBAR REGION: ICD-10-CM

## 2025-06-13 DIAGNOSIS — M17.0 BILATERAL PRIMARY OSTEOARTHRITIS OF KNEE: Primary | ICD-10-CM

## 2025-06-13 DIAGNOSIS — M99.05 SOMATIC DYSFUNCTION OF PELVIC REGION: ICD-10-CM

## 2025-06-13 DIAGNOSIS — M17.11 PRIMARY OSTEOARTHRITIS OF RIGHT KNEE: ICD-10-CM

## 2025-06-13 PROCEDURE — 99999 PR PBB SHADOW E&M-EST. PATIENT-LVL IV: CPT | Mod: PBBFAC,HCNC,, | Performed by: NEUROMUSCULOSKELETAL MEDICINE & OMM

## 2025-06-13 PROCEDURE — 73564 X-RAY EXAM KNEE 4 OR MORE: CPT | Mod: 26,50,HCNC, | Performed by: RADIOLOGY

## 2025-06-13 PROCEDURE — 73564 X-RAY EXAM KNEE 4 OR MORE: CPT | Mod: TC,50,HCNC

## 2025-06-25 ENCOUNTER — TELEPHONE (OUTPATIENT)
Dept: SPORTS MEDICINE | Facility: CLINIC | Age: 70
End: 2025-06-25
Payer: MEDICARE

## 2025-06-25 DIAGNOSIS — M17.0 BILATERAL PRIMARY OSTEOARTHRITIS OF KNEE: Primary | ICD-10-CM

## 2025-06-25 NOTE — TELEPHONE ENCOUNTER
Changed to [employer] per insurance preference.    Cherelle Linn MS, ATC, OTC  Clinical Assistant to Dr. Bren Deng

## 2025-07-01 ENCOUNTER — OFFICE VISIT (OUTPATIENT)
Dept: INTERNAL MEDICINE | Facility: CLINIC | Age: 70
End: 2025-07-01
Payer: MEDICARE

## 2025-07-01 VITALS
HEIGHT: 65 IN | OXYGEN SATURATION: 96 % | HEART RATE: 73 BPM | WEIGHT: 233.25 LBS | BODY MASS INDEX: 38.86 KG/M2 | SYSTOLIC BLOOD PRESSURE: 120 MMHG | DIASTOLIC BLOOD PRESSURE: 62 MMHG

## 2025-07-01 DIAGNOSIS — F51.01 PRIMARY INSOMNIA: ICD-10-CM

## 2025-07-01 DIAGNOSIS — Z97.8 USES SELF-APPLIED CONTINUOUS GLUCOSE MONITORING DEVICE: ICD-10-CM

## 2025-07-01 DIAGNOSIS — E78.2 MIXED HYPERLIPIDEMIA: ICD-10-CM

## 2025-07-01 DIAGNOSIS — E11.65 TYPE 2 DIABETES MELLITUS WITH HYPERGLYCEMIA, WITHOUT LONG-TERM CURRENT USE OF INSULIN: Primary | ICD-10-CM

## 2025-07-01 PROCEDURE — 99999 PR PBB SHADOW E&M-EST. PATIENT-LVL V: CPT | Mod: PBBFAC,HCNC,,

## 2025-07-01 RX ORDER — ZOLPIDEM TARTRATE 10 MG/1
10 TABLET ORAL NIGHTLY PRN
Qty: 30 TABLET | Refills: 3 | Status: SHIPPED | OUTPATIENT
Start: 2025-07-01

## 2025-07-01 RX ORDER — ROSUVASTATIN CALCIUM 5 MG/1
5 TABLET, COATED ORAL NIGHTLY
Qty: 30 TABLET | Refills: 3 | Status: SHIPPED | OUTPATIENT
Start: 2025-07-01

## 2025-07-01 NOTE — TELEPHONE ENCOUNTER
No care due was identified.  [facility] Embedded Care Due Messages. Reference number: [id number].   7/01/2025 1:54:47 PM CDT

## 2025-07-01 NOTE — PROGRESS NOTES
CHIEF COMPLAINT: Type 2 Diabetes    Referred by PCP: Dr. Damico  Previously managed by: PCP  CDE person of contact (if needed): Montse Balderas       HPI: Mrs. Aracelis Martinez is a 70 y.o. female who was diagnosed with Type 2 DM.     Last A1c: 10.9%    Hx of hospitalization for DM? No   Hx of HTN? Yes   Hx of CKD? No   Hx of CHF? No   Hx of CVA? No   Hx of MI? No   Hx of Pancreatitis? No   Hx of DM neuropathy? No   Hx DR? No   Hx of Gastroparesis? No        VISIT SUMMARY:  Has Hx of Pancreatic CA with whipple 2015  Discussed SE of pancreatitis while on Ozempic   Suggested lifestyle changes with diet and exercise and keep Ozempic at 0.25mg dose to slow chance of pancreatitis    Currently not on statin therapy.  Has Dexcom G7  Labs reviewed: A1c 10.9% ; UACR ?; GFR 54; Lipid 211/108/45/144  Dexcom readings showed diabetes better controlled on current regimen  BG av decreased from 200 to 141  BG during visit 123  Reports stopped Glipizide  Reports tolerating Ozempic 0.25mg well  Discussed weaning Lantus to 20 units at bedtime  Cont Ozempic 0.25mg weekly  , discussed starting statin therapy (rosuvastatin) to achieve LDL  goal < 70.       BG monitoring:   Name: Dexcom   How often: Continuous   BG av: 141      Lifestyle:   Diet: 2 meals daily   Exercise: cycling 20min 3xs weekly        Previous Diabetes Meds Tried:  Metformin   Jardiance - yeast infection  Glipizide- no longer needed  Januvia- alt therapy        Current Diabetic Meds:   Ozempic 0.25mg weekly (Wednesday)  Lantus 26 units daily        Diabetes Management Status:  Statin: Not taking  ACE/ARB: Taking      Foot exam due: 5/13/2026  Eye exam due: 3/25/2026      Screening or Prevention Patient's value Goal Complete/Controlled?   HgA1C Testing and Control   Lab Results   Component Value Date    HGBA1C 10.9 (H) 05/06/2025      Annually/Less than 8% No   Lipid profile : 05/06/2025 Annually Yes   LDL control Lab Results   Component Value Date    LDLCALC 144.4  "05/06/2025    Annually/Less than 100 mg/dl  No   Nephropathy screening Lab Results   Component Value Date    LABMICR 29.0 07/15/2024     Lab Results   Component Value Date    PROTEINUA Trace (A) 03/20/2023    Annually Yes   Blood pressure BP Readings from Last 1 Encounters:   07/01/25 120/62    Less than 140/90 Yes   Dilated retinal exam : 03/25/2025 Annually Yes   Foot exam   : 05/13/2025 Annually Yes     REVIEW OF SYSTEMS  ROS   General: Denies weakness, fatigue, or weight changes.   Eyes: Denies double or blurred vision, eye pain, or redness.  Cardiovascular: Denies CP, palpitations, or edema.   Respiratory: Denies cough or dyspnea.   GI: Denies heartburn, n/v, or changes in bowel patterns.   Skin: Denies rash, lesions, itching, or injection site problems.  Neuro: Denies severe HAs, numbness, tingling, tremors, or vertigo.   Endocrine: Denies polyuria, polydipsia, polyphagia, heat or cold intolerance.       Vital Signs  /62 (BP Location: Right arm, Patient Position: Sitting)   Pulse 73   Ht 5' 5" (1.651 m)   Wt 105.8 kg (233 lb 4 oz)   LMP  (LMP Unknown)   SpO2 96%   BMI 38.81 kg/m²     Hemoglobin A1C   Date Value Ref Range Status   12/02/2024 8.4 (H) 4.0 - 5.6 % Final     Comment:     ADA Screening Guidelines:  5.7-6.4%  Consistent with prediabetes  >or=6.5%  Consistent with diabetes    High levels of fetal hemoglobin interfere with the HbA1C  assay. Heterozygous hemoglobin variants (HbS, HgC, etc)do  not significantly interfere with this assay.   However, presence of multiple variants may affect accuracy.     07/15/2024 7.9 (H) 4.0 - 5.6 % Final     Comment:     ADA Screening Guidelines:  5.7-6.4%  Consistent with prediabetes  >or=6.5%  Consistent with diabetes    High levels of fetal hemoglobin interfere with the HbA1C  assay. Heterozygous hemoglobin variants (HbS, HgC, etc)do  not significantly interfere with this assay.   However, presence of multiple variants may affect accuracy.     02/14/2024 " 8.1 (H) 4.0 - 5.6 % Final     Comment:     ADA Screening Guidelines:  5.7-6.4%  Consistent with prediabetes  >or=6.5%  Consistent with diabetes    High levels of fetal hemoglobin interfere with the HbA1C  assay. Heterozygous hemoglobin variants (HbS, HgC, etc)do  not significantly interfere with this assay.   However, presence of multiple variants may affect accuracy.       Hemoglobin A1c   Date Value Ref Range Status   05/06/2025 10.9 (H) 4.0 - 5.6 % Final     Comment:     ADA Screening Guidelines:  5.7-6.4%  Consistent with prediabetes  >=6.5%  Consistent with diabetes    High levels of fetal hemoglobin interfere with the HbA1C  assay. Heterozygous hemoglobin variants (HbS, HgC, etc)do  not significantly interfere with this assay.   However, presence of multiple variants may affect accuracy.        Chemistry        Component Value Date/Time     05/06/2025 0718     12/02/2024 0711    K 4.1 05/06/2025 0718    K 4.6 12/02/2024 0711     05/06/2025 0718     12/02/2024 0711    CO2 26 05/06/2025 0718    CO2 20 (L) 12/02/2024 0711    BUN 21 05/06/2025 0718    CREATININE 1.1 05/06/2025 0718     (H) 05/06/2025 0718     (H) 12/02/2024 0711        Component Value Date/Time    CALCIUM 9.1 05/06/2025 0718    CALCIUM 9.4 12/02/2024 0711    ALKPHOS 145 05/06/2025 0718    ALKPHOS 128 12/02/2024 0711    AST 13 05/06/2025 0718    AST 16 12/02/2024 0711    ALT 15 05/06/2025 0718    ALT 16 12/02/2024 0711    BILITOT 0.4 05/06/2025 0718    BILITOT 0.4 12/02/2024 0711    ESTGFRAFRICA >60.0 05/26/2022 0825    EGFRNONAA >60.0 05/26/2022 0825           Lab Results   Component Value Date    TSH 3.229 07/15/2024      Lab Results   Component Value Date    CHOL 211 (H) 05/06/2025    CHOL 184 07/15/2024    CHOL 166 02/14/2024     Lab Results   Component Value Date    HDL 45 05/06/2025    HDL 52 07/15/2024    HDL 39 (L) 02/14/2024     Lab Results   Component Value Date    LDLCALC 144.4 05/06/2025    LDLCALC  107.4 07/15/2024    LDLCALC 108.6 02/14/2024     Lab Results   Component Value Date    TRIG 108 05/06/2025    TRIG 123 07/15/2024    TRIG 92 02/14/2024     Lab Results   Component Value Date    CHOLHDL 21.3 05/06/2025    CHOLHDL 28.3 07/15/2024    CHOLHDL 23.5 02/14/2024         PHYSICAL EXAMINATION  Physical Exam   Constitutional: Appears well, no distress.  Eyes: Sclera white, PERRLA, EOMs intact.  Neck: Supple, trachea midline.   Respiratory: No cough, SOB, nor ANDERSON.  Cardiovascular: RRR; no edema.  Skin: Warm and dry; no rash, lesions, acanthosis nigricans, nor injection site reactions.  Neuro: AAOx4, steady gait  Psychiatric: No unusual, disruptive, or inappropriate behavior.       Assessment/Plan    1. Type 2 diabetes mellitus with hyperglycemia, without long-term current use of insulin  Assessment & Plan:  -Has HX pancreatic cancer s/p whipple 2015  -Intolerance to Jardiance at this time due to high BG  -Reviewed last A1c. 10.9%  -Discussed A1c goal <7%.  -Discussed BG goals, provided copy.  -Cont Dexcom G7 CGM monitoring.  -Discussed importance of making lifestyle changes.  -Refer to brochures / handout on meal planning.  -Advised to start an exercise regimen.               -Decrease Lantus 20 units at bedtime              -Cont Ozempic 0.25mg weekly.   -Discussed plan to keep Ozempic dose only 0.25mg to decrease chance of pancreatitis.   -Discussed how to treat hypoglycemia if experienced.  -Discussed goal to wean insulin to a minimal dose w/o experiencing hypo or hyperglycemia.        2. Mixed hyperlipidemia  Overview:  LDL in 130s  With DM would prefer LDL of 70 or below  Likely would benefit from statin    Assessment & Plan:  -Reviewed Lipid panel 211/108/45/144  -Discussed LDL goal <70.  -Discussed lifestyle changes.  -Not on statin therapy  -Discussed importance of lipid control in setting of diabetes  -Discussed the importance of taking statin every night and lifestyle changes to slow the risk of a  cardiovascular event such as CVA or MI.       Orders:  -     rosuvastatin (CRESTOR) 5 MG tablet; Take 1 tablet (5 mg total) by mouth every evening.  Dispense: 30 tablet; Refill: 3        3. Uses self-applied continuous glucose monitoring device  -Dexcom G7 readings reviewed with patient.  -Interpretation:   -Diabetes better controlled; BG above target range goal; FBG at goal; PPG excursions absent.   -Medication regimen adjusted according to current findings.   -See glucose readings below.  -Re-eval upon next visit.             FOLLOW UP  Follow up in about 5 weeks (around 8/5/2025) for CGM readings.

## 2025-07-01 NOTE — PATIENT INSTRUCTIONS
Continue monitoring your blood sugars using your Dexcom G7.        Blood glucose goals:   Fasting blood glucose goal:  mg/dL.  Premeal blood glucose goal:  mg/dL.  Postmeal blood glucose goal ( 2 hrs): <180 mg/dL.  Bedtime / overnight blood glucose goal:  mg/dL.              Decrease Lantus 20 units at bedtime.         If your morning numbers are staying above 140 x3 days in a row, increase Lantus to 23 units.            Continue taking Ozempic 0.25mg weekly.   *Be sure to take your injection on the same day every week. You can take your injection with or without food.   *Please notify the office if you start to experience severe nausea, vomiting, stomach or back pain after taking this medication.             When taking Ozempic , you should limit foods that can increase the risk of side effects like nausea, vomiting, diarrhea, and stomach pain. These foods include:   High-fat foods: Fried foods, burgers, pizza, doughnuts, and other greasy foods   Sugary foods and drinks: Soda, juice, cakes, cookies, and other sweetened beverages   Refined carbohydrates: White bread, crackers, white flour, and white rice   Processed foods: Chips, pastries, and other ultra-processed foods   Spicy foods: Hot sauce, salsa, hot peppers, and other spicy foods   High glycemic index foods: Potatoes, cereal, pretzels, sports drinks, and other foods that raise blood sugar quickly   Acidic foods: Tomato sauce, citrus fruits, and coffee   Foods that contain caffeine: Coffee, carbonated beverages, and other caffeinated drinks   You should also limit high-fiber foods if you experience diarrhea or abdominal pain.            If your blood sugar is low, follow the 15-15 rule: Have 15 grams of carbs, then wait 15 minutes. If the blood glucose level is less than 70, treat with 15 g of fast-acting carbohydrate, such as 4 oz of fruit juice or three or four glucose tablets; and  recheck the blood glucose level after 15 minutes to  ensure that it has normalized.  Check your blood sugar again. If it's still less than 70 mg/dL, repeat this process.          Refer to the food brochures when planning meals.           Start exercising for 30 minutes at least 3 times / week as tolerates. Be sure to mix cardio with strength training.            Follow up on 8/5/2025 @ 11 am.

## 2025-07-06 ENCOUNTER — PATIENT MESSAGE (OUTPATIENT)
Dept: INTERNAL MEDICINE | Facility: CLINIC | Age: 70
End: 2025-07-06
Payer: MEDICARE

## 2025-07-07 NOTE — TELEPHONE ENCOUNTER
Pt states:      Over this weekend the dexcom have been alerting low. I'm wondering, how accurate do you think it is. I'm really trying to get this under control.

## 2025-07-11 DIAGNOSIS — E11.9 TYPE 2 DIABETES MELLITUS WITHOUT COMPLICATION, WITHOUT LONG-TERM CURRENT USE OF INSULIN: ICD-10-CM

## 2025-07-11 NOTE — TELEPHONE ENCOUNTER
No care due was identified.  [facility] Embedded Care Due Messages. Reference number: [id number].   7/11/2025 1:29:52 AM CDT

## 2025-07-11 NOTE — TELEPHONE ENCOUNTER
Refill Routing Note   Medication(s) are not appropriate for processing by [facility] for the following reason(s):        No active prescription written by provider  Required labs abnormal    [facility] action(s):  Defer               Appointments  past 12m or future 3m with PCP    Date Provider   Last Visit   5/13/2025 Alen Damico MD   Next Visit   10/13/2025 Alen Damico MD   ED visits in past 90 days: 0        Note composed:11:37 AM 07/11/2025

## 2025-07-14 RX ORDER — GLIPIZIDE 10 MG/1
TABLET, FILM COATED, EXTENDED RELEASE ORAL
Qty: 90 TABLET | Refills: 3 | Status: SHIPPED | OUTPATIENT
Start: 2025-07-14

## 2025-07-14 NOTE — PROGRESS NOTES
"Subjective:     Aracelis Martinez     Chief Complaint   Patient presents with    Left Knee - Pain    Right Knee - Pain     Aracelis is a 70 y.o. female coming in today for their 1st Orthovisc injection to the bilateral knees.     Objective:     VITAL SIGNS: BP (!) 143/79 (BP Location: Left arm)   Pulse 65   Wt 104.9 kg (231 lb 6 oz)   LMP  (LMP Unknown)   BMI 38.50 kg/m²      Orthovisc Injection Procedure #1     A time out was performed, including verification of patient ID, procedure, site and side, availability of information and equipment, review of safety issues, and agreement with consent, the procedure site was marked.    Location: Knee joint, bilateral     Procedure: The patient was prepped aseptically with alcohol and chlorhexidine. Ethyl Chloride spray was used prior to each skin puncture to help numb the superficial skin. After cold spray was applied, 2 cc's of 0.2% Naropin was injected into the skin and superficial tissue at the injection site using a 21 G, 2" needle to form an anesthetic tunnel and ensure proper needle placement into each knee joint space. Using a hemostat, the syringe was exchanged with the Orthovisc syringe, and 2cc of Orthovisc  was injected into each knee joint. The patient was in the supine position during the duration of the procedure and the injection approach was from the superolateral aspect for each knee.     Ultrasound guidance was used for needle localization with SonoSite Edge 2, 9-L MHz linear probe(s). Images were saved and stored for documentation. The bilateral knee joints were well visualized. Dynamic visualization of the 21 G, 2" needle(s) was continuous throughout the procedure and maintained good position and correct needle placement.        Patient tolerance: The patient tolerated the procedure well with no immediate complications. There were no adverse reactions to the medication. Patient was instructed to apply ice to the joint for up to 20 minutes at a time and " avoid strenuous activities for 24-36 hours following the injection. The patient was warned of possible blood pressure changes during that time. Following that time, the patient can resume activities as prior to the injection.     The patient was reminded to call the clinic immediately for any adverse side effects as explained in clinic today    Bilateral Orthovisc:  Lot: [id number]  Exp: 01/22/2027    Assessment:      Encounter Diagnosis   Name Primary?    Bilateral primary osteoarthritis of knee Yes        Plan:   1.first Orthovisc injection of bilateral knee received today (see procedure note above)  2. Follow-up in 1 week for 2nd injection of 3 injection series  3. Patient agreeable to today's plan and all questions were answered     This note is dictated using the M*Modal Fluency Direct word recognition program. There are word recognition mistakes that are occasionally missed on review.

## 2025-07-15 ENCOUNTER — OFFICE VISIT (OUTPATIENT)
Dept: SPORTS MEDICINE | Facility: CLINIC | Age: 70
End: 2025-07-15
Payer: MEDICARE

## 2025-07-15 VITALS
SYSTOLIC BLOOD PRESSURE: 143 MMHG | HEART RATE: 65 BPM | DIASTOLIC BLOOD PRESSURE: 79 MMHG | BODY MASS INDEX: 38.5 KG/M2 | WEIGHT: 231.38 LBS

## 2025-07-15 DIAGNOSIS — M17.0 BILATERAL PRIMARY OSTEOARTHRITIS OF KNEE: Primary | ICD-10-CM

## 2025-07-15 PROCEDURE — 99999 PR PBB SHADOW E&M-EST. PATIENT-LVL IV: CPT | Mod: PBBFAC,HCNC,, | Performed by: NEUROMUSCULOSKELETAL MEDICINE & OMM

## 2025-07-15 PROCEDURE — 20611 DRAIN/INJ JOINT/BURSA W/US: CPT | Mod: 50,HCNC,S$GLB, | Performed by: NEUROMUSCULOSKELETAL MEDICINE & OMM

## 2025-07-15 PROCEDURE — 99499 UNLISTED E&M SERVICE: CPT | Mod: HCNC,S$GLB,, | Performed by: NEUROMUSCULOSKELETAL MEDICINE & OMM

## 2025-07-21 ENCOUNTER — PATIENT MESSAGE (OUTPATIENT)
Dept: INTERNAL MEDICINE | Facility: CLINIC | Age: 70
End: 2025-07-21
Payer: MEDICARE

## 2025-07-22 ENCOUNTER — OFFICE VISIT (OUTPATIENT)
Dept: SPORTS MEDICINE | Facility: CLINIC | Age: 70
End: 2025-07-22
Payer: MEDICARE

## 2025-07-22 VITALS
WEIGHT: 231.25 LBS | DIASTOLIC BLOOD PRESSURE: 76 MMHG | HEART RATE: 59 BPM | SYSTOLIC BLOOD PRESSURE: 138 MMHG | BODY MASS INDEX: 38.48 KG/M2

## 2025-07-22 DIAGNOSIS — M17.0 BILATERAL PRIMARY OSTEOARTHRITIS OF KNEE: Primary | ICD-10-CM

## 2025-07-22 PROCEDURE — 20611 DRAIN/INJ JOINT/BURSA W/US: CPT | Mod: 50,HCNC,S$GLB, | Performed by: NEUROMUSCULOSKELETAL MEDICINE & OMM

## 2025-07-22 PROCEDURE — 99999 PR PBB SHADOW E&M-EST. PATIENT-LVL IV: CPT | Mod: PBBFAC,HCNC,, | Performed by: NEUROMUSCULOSKELETAL MEDICINE & OMM

## 2025-07-22 PROCEDURE — 99499 UNLISTED E&M SERVICE: CPT | Mod: HCNC,S$GLB,, | Performed by: NEUROMUSCULOSKELETAL MEDICINE & OMM

## 2025-07-22 NOTE — PROGRESS NOTES
"Subjective:     Aracelis Martinez     Chief Complaint   Patient presents with    Right Knee - Pain    Left Knee - Pain     Aracelis is a 70 y.o. female coming in today for their 2nd Orthovisc injection to the bilateral knees.     Objective:     VITAL SIGNS: /76   Pulse (!) 59   Wt 104.9 kg (231 lb 4.2 oz)   LMP  (LMP Unknown)   BMI 38.48 kg/m²      Orthovisc Injection Procedure #2     A time out was performed, including verification of patient ID, procedure, site and side, availability of information and equipment, review of safety issues, and agreement with consent, the procedure site was marked.    Location: Knee joint, bilateral     Procedure: The patient was prepped aseptically with alcohol and chlorhexidine. Ethyl Chloride spray was used prior to each skin puncture to help numb the superficial skin. After cold spray was applied, 2 cc's of 0.2% Naropin was injected into the skin and superficial tissue at the injection site using a 21 G, 2" needle to form an anesthetic tunnel and ensure proper needle placement into each knee joint space. Using a hemostat, the syringe was exchanged with the Orthovisc syringe, and 2cc of Orthovisc  was injected into each knee joint. The patient was in the supine position during the duration of the procedure and the injection approach was from the superolateral aspect for each knee.     Ultrasound guidance was used for needle localization with SonACSIANte Edge 2, 9-L MHz linear probe(s). Images were saved and stored for documentation. The bilateral knee joints were well visualized. Dynamic visualization of the 21 G, 2" needle(s) was continuous throughout the procedure and maintained good position and correct needle placement.        Patient tolerance: The patient tolerated the procedure well with no immediate complications. There were no adverse reactions to the medication. Patient was instructed to apply ice to the joint for up to 20 minutes at a time and avoid strenuous " activities for 24-36 hours following the injection. The patient was warned of possible blood pressure changes during that time. Following that time, the patient can resume activities as prior to the injection.     The patient was reminded to call the clinic immediately for any adverse side effects as explained in clinic today    Bilateral Orthovisc:  Lot: [id number]  Exp: 01/22/2027    Assessment:      Encounter Diagnosis   Name Primary?    Bilateral primary osteoarthritis of knee Yes        Plan:   1.second Orthovisc injection of bilateral knee received today (see procedure note above)  2. Follow-up in 1 week for 3rd injection of 3 injection series  3. Patient agreeable to today's plan and all questions were answered     This note is dictated using the M*Modal Fluency Direct word recognition program. There are word recognition mistakes that are occasionally missed on review.

## 2025-07-28 ENCOUNTER — PATIENT MESSAGE (OUTPATIENT)
Dept: INTERNAL MEDICINE | Facility: CLINIC | Age: 70
End: 2025-07-28
Payer: MEDICARE

## 2025-07-29 ENCOUNTER — OFFICE VISIT (OUTPATIENT)
Dept: SPORTS MEDICINE | Facility: CLINIC | Age: 70
End: 2025-07-29
Payer: MEDICARE

## 2025-07-29 ENCOUNTER — PATIENT MESSAGE (OUTPATIENT)
Dept: INTERNAL MEDICINE | Facility: CLINIC | Age: 70
End: 2025-07-29
Payer: MEDICARE

## 2025-07-29 VITALS
DIASTOLIC BLOOD PRESSURE: 74 MMHG | HEIGHT: 65 IN | WEIGHT: 226.19 LBS | BODY MASS INDEX: 37.69 KG/M2 | SYSTOLIC BLOOD PRESSURE: 127 MMHG | HEART RATE: 61 BPM

## 2025-07-29 DIAGNOSIS — M17.0 BILATERAL PRIMARY OSTEOARTHRITIS OF KNEE: Primary | ICD-10-CM

## 2025-07-29 PROCEDURE — 20611 DRAIN/INJ JOINT/BURSA W/US: CPT | Mod: 50,HCNC,S$GLB, | Performed by: NEUROMUSCULOSKELETAL MEDICINE & OMM

## 2025-07-29 PROCEDURE — 99999 PR PBB SHADOW E&M-EST. PATIENT-LVL IV: CPT | Mod: PBBFAC,HCNC,, | Performed by: NEUROMUSCULOSKELETAL MEDICINE & OMM

## 2025-07-29 PROCEDURE — 99499 UNLISTED E&M SERVICE: CPT | Mod: HCNC,S$GLB,, | Performed by: NEUROMUSCULOSKELETAL MEDICINE & OMM

## 2025-07-29 NOTE — PROGRESS NOTES
"Subjective:     Aracelis Martinez     Chief Complaint   Patient presents with    Left Knee - Injections    Right Knee - Injections     Aracelis is a 70 y.o. female coming in today for their 3rd Orthovisc injection to the bilateral knees.     Objective:     VITAL SIGNS: /74   Pulse 61   Ht 5' 5" (1.651 m)   Wt 102.6 kg (226 lb 3.1 oz)   LMP  (LMP Unknown)   BMI 37.64 kg/m²      Orthovisc Injection Procedure #3     A time out was performed, including verification of patient ID, procedure, site and side, availability of information and equipment, review of safety issues, and agreement with consent, the procedure site was marked.    Location: Knee joint, bilateral     Procedure: The patient was prepped aseptically with alcohol and chlorhexidine. Ethyl Chloride spray was used prior to each skin puncture to help numb the superficial skin. After cold spray was applied, 2 cc's of 0.2% Naropin was injected into the skin and superficial tissue at the injection site using a 21 G, 2" needle to form an anesthetic tunnel and ensure proper needle placement into each knee joint space. Using a hemostat, the syringe was exchanged with the Orthovisc syringe, and 2cc of Orthovisc  was injected into each knee joint. The patient was in the supine position during the duration of the procedure and the injection approach was from the superolateral aspect for each knee.     Ultrasound guidance was used for needle localization with SonoSite Edge 2, 9-L MHz linear probe(s). Images were saved and stored for documentation. The bilateral knee joints were well visualized. Dynamic visualization of the 21 G, 2" needle(s) was continuous throughout the procedure and maintained good position and correct needle placement.        Patient tolerance: The patient tolerated the procedure well with no immediate complications. There were no adverse reactions to the medication. Patient was instructed to apply ice to the joint for up to 20 minutes at a time " and avoid strenuous activities for 24-36 hours following the injection. The patient was warned of possible blood pressure changes during that time. Following that time, the patient can resume activities as prior to the injection.     The patient was reminded to call the clinic immediately for any adverse side effects as explained in clinic today    Right Orthovisc:  Lot: [id number]   Exp: 01/22/2027    Left Orthovisc:  Lot: [id number]  Exp: 02/05/2027    Assessment:      Encounter Diagnosis   Name Primary?    Bilateral primary osteoarthritis of knee Yes          Plan:   1.third Orthovisc injection of bilateral knee received today (see procedure note above)  2. Follow-up in 6 months for reevaluation   3. Patient agreeable to today's plan and all questions were answered     This note is dictated using the M*Modal Fluency Direct word recognition program. There are word recognition mistakes that are occasionally missed on review.

## 2025-07-31 ENCOUNTER — PATIENT MESSAGE (OUTPATIENT)
Dept: HEMATOLOGY/ONCOLOGY | Facility: CLINIC | Age: 70
End: 2025-07-31
Payer: MEDICARE

## 2025-08-04 RX ORDER — LEVOTHYROXINE SODIUM 75 UG/1
75 TABLET ORAL
Qty: 90 TABLET | Refills: 3 | Status: SHIPPED | OUTPATIENT
Start: 2025-08-04

## 2025-08-04 NOTE — TELEPHONE ENCOUNTER
Care Due:                  Date            Visit Type   Department     Provider  --------------------------------------------------------------------------------                                EP -                              PRIMARY      [facility] INTERNAL  Last Visit: 05-      [facility] ([facility])   MEDICINE       Alenjosias Damico                              EP -                              PRIMARY      [facility] INTERNAL  Next Visit: 10-      [facility] ([facility])   MEDICINE       Alenmirna Damico                                                            Last  Test          Frequency    Reason                     Performed    Due Date  --------------------------------------------------------------------------------    HBA1C.......  6 months...  insulin..................  05- 11-    [facility] Embedded Care Due Messages. Reference number: [id number].   8/04/2025 4:44:26 PM CDT

## 2025-08-04 NOTE — TELEPHONE ENCOUNTER
Refill Routing Note   Medication(s) are not appropriate for processing by [facility] for the following reason(s):        Required labs outdated    [facility] action(s):  Defer      Medication Therapy Plan: FLOS      Appointments  past 12m or future 3m with PCP    Date Provider   Last Visit   5/13/2025 Alen Damico MD   Next Visit   10/13/2025 Alen Damico MD   ED visits in past 90 days: 0        Note composed:4:47 PM 08/04/2025

## 2025-08-06 ENCOUNTER — PATIENT MESSAGE (OUTPATIENT)
Dept: PAIN MEDICINE | Facility: HOSPITAL | Age: 70
End: 2025-08-06
Payer: MEDICARE

## 2025-08-06 ENCOUNTER — OFFICE VISIT (OUTPATIENT)
Dept: INTERNAL MEDICINE | Facility: CLINIC | Age: 70
End: 2025-08-06
Payer: MEDICARE

## 2025-08-06 VITALS
HEIGHT: 65 IN | OXYGEN SATURATION: 96 % | HEART RATE: 68 BPM | WEIGHT: 229.94 LBS | DIASTOLIC BLOOD PRESSURE: 64 MMHG | SYSTOLIC BLOOD PRESSURE: 118 MMHG | BODY MASS INDEX: 38.31 KG/M2

## 2025-08-06 DIAGNOSIS — Z97.8 USES SELF-APPLIED CONTINUOUS GLUCOSE MONITORING DEVICE: ICD-10-CM

## 2025-08-06 DIAGNOSIS — E11.65 TYPE 2 DIABETES MELLITUS WITH HYPERGLYCEMIA, WITHOUT LONG-TERM CURRENT USE OF INSULIN: Primary | ICD-10-CM

## 2025-08-06 DIAGNOSIS — E78.2 MIXED HYPERLIPIDEMIA: ICD-10-CM

## 2025-08-06 DIAGNOSIS — E66.01 SEVERE OBESITY (BMI 35.0-39.9) WITH COMORBIDITY: ICD-10-CM

## 2025-08-06 PROCEDURE — G0447 BEHAVIOR COUNSEL OBESITY 15M: HCPCS | Mod: HCNC,59,S$GLB,

## 2025-08-06 PROCEDURE — 1126F AMNT PAIN NOTED NONE PRSNT: CPT | Mod: CPTII,HCNC,S$GLB,

## 2025-08-06 PROCEDURE — 3078F DIAST BP <80 MM HG: CPT | Mod: CPTII,HCNC,S$GLB,

## 2025-08-06 PROCEDURE — 4010F ACE/ARB THERAPY RXD/TAKEN: CPT | Mod: CPTII,HCNC,S$GLB,

## 2025-08-06 PROCEDURE — 95251 CONT GLUC MNTR ANALYSIS I&R: CPT | Mod: HCNC,S$GLB,,

## 2025-08-06 PROCEDURE — 3074F SYST BP LT 130 MM HG: CPT | Mod: CPTII,HCNC,S$GLB,

## 2025-08-06 PROCEDURE — 99214 OFFICE O/P EST MOD 30 MIN: CPT | Mod: HCNC,25,S$GLB,

## 2025-08-06 PROCEDURE — 3008F BODY MASS INDEX DOCD: CPT | Mod: CPTII,HCNC,S$GLB,

## 2025-08-06 PROCEDURE — 3046F HEMOGLOBIN A1C LEVEL >9.0%: CPT | Mod: CPTII,HCNC,S$GLB,

## 2025-08-06 PROCEDURE — 3288F FALL RISK ASSESSMENT DOCD: CPT | Mod: CPTII,HCNC,S$GLB,

## 2025-08-06 PROCEDURE — 1101F PT FALLS ASSESS-DOCD LE1/YR: CPT | Mod: CPTII,HCNC,S$GLB,

## 2025-08-06 PROCEDURE — 99999 PR PBB SHADOW E&M-EST. PATIENT-LVL V: CPT | Mod: PBBFAC,HCNC,,

## 2025-08-06 PROCEDURE — 1160F RVW MEDS BY RX/DR IN RCRD: CPT | Mod: CPTII,HCNC,S$GLB,

## 2025-08-06 PROCEDURE — 1159F MED LIST DOCD IN RCRD: CPT | Mod: CPTII,HCNC,S$GLB,

## 2025-08-06 NOTE — PROGRESS NOTES
CHIEF COMPLAINT: Type 2 Diabetes    Referred by PCP: Dr. Damico  Previously managed by: PCP  CDE person of contact (if needed): Montse Balderas       HPI: Mrs. Aracelis Martinez is a 70 y.o. female who was diagnosed with Type 2 DM.     Last A1c: 10.9%    Hx of hospitalization for DM? No   Hx of HTN? Yes   Hx of CKD? No   Hx of CHF? No   Hx of CVA? No   Hx of MI? No   Hx of Pancreatitis? No   Hx of DM neuropathy? No   Hx DR? No   Hx of Gastroparesis? No        VISIT SUMMARY:  Has Hx of Pancreatic CA with whipple 2015  Discussed SE of pancreatitis while on Ozempic   Suggested lifestyle changes with diet and exercise and keep Ozempic at 0.25mg dose to slow chance of pancreatitis    Currently not on statin therapy.  Has Dexcom G7  Labs reviewed: A1c 10.9% ; UACR ?; GFR 54; Lipid 211/108/45/144  Dexcom readings showed diabetes better controlled on current regimen  BG av increased from 141 to 153  BG during visit 163  Reports tolerating Ozempic 0.25mg well  Discussed increasing Lantus to 22 units at bedtime  Cont Ozempic 0.25mg weekly  Wt down 4# since LOV.        BG monitoring:   Name: Dexcom   How often: Continuous   BG av: 153      Lifestyle:   Diet: 2 meals daily   Exercise: cycling 20min 3xs weekly        Previous Diabetes Meds Tried:  Metformin   Jardiance - yeast infection  Glipizide- no longer needed  Januvia- alt therapy        Current Diabetic Meds:   Ozempic 0.25mg weekly (Wednesday)  Lantus 20 units daily        Diabetes Management Status:  Statin: Not taking  ACE/ARB: Taking      Foot exam due: 5/13/2026  Eye exam due: 3/25/2026      Screening or Prevention Patient's value Goal Complete/Controlled?   HgA1C Testing and Control   Lab Results   Component Value Date    HGBA1C 10.9 (H) 05/06/2025      Annually/Less than 8% No   Lipid profile : 05/06/2025 Annually Yes   LDL control Lab Results   Component Value Date    LDLCALC 144.4 05/06/2025    Annually/Less than 100 mg/dl  No   Nephropathy screening Lab Results  "  Component Value Date    LABMICR 29.0 07/15/2024     Lab Results   Component Value Date    PROTEINUA Trace (A) 03/20/2023    Annually Yes   Blood pressure BP Readings from Last 1 Encounters:   08/06/25 118/64    Less than 140/90 Yes   Dilated retinal exam : 03/25/2025 Annually Yes   Foot exam   : 05/13/2025 Annually Yes     REVIEW OF SYSTEMS  ROS   General: Denies weakness, fatigue, or weight changes.   Eyes: Denies double or blurred vision, eye pain, or redness.  Cardiovascular: Denies CP, palpitations, or edema.   Respiratory: Denies cough or dyspnea.   GI: Denies heartburn, n/v, or changes in bowel patterns.   Skin: Denies rash, lesions, itching, or injection site problems.  Neuro: Denies severe HAs, numbness, tingling, tremors, or vertigo.   Endocrine: Denies polyuria, polydipsia, polyphagia, heat or cold intolerance.       Vital Signs  /64 (BP Location: Right arm, Patient Position: Sitting)   Pulse 68   Ht 5' 5" (1.651 m)   Wt 104.3 kg (229 lb 15 oz)   LMP  (LMP Unknown)   SpO2 96%   BMI 38.26 kg/m²     Hemoglobin A1C   Date Value Ref Range Status   12/02/2024 8.4 (H) 4.0 - 5.6 % Final     Comment:     ADA Screening Guidelines:  5.7-6.4%  Consistent with prediabetes  >or=6.5%  Consistent with diabetes    High levels of fetal hemoglobin interfere with the HbA1C  assay. Heterozygous hemoglobin variants (HbS, HgC, etc)do  not significantly interfere with this assay.   However, presence of multiple variants may affect accuracy.     07/15/2024 7.9 (H) 4.0 - 5.6 % Final     Comment:     ADA Screening Guidelines:  5.7-6.4%  Consistent with prediabetes  >or=6.5%  Consistent with diabetes    High levels of fetal hemoglobin interfere with the HbA1C  assay. Heterozygous hemoglobin variants (HbS, HgC, etc)do  not significantly interfere with this assay.   However, presence of multiple variants may affect accuracy.     02/14/2024 8.1 (H) 4.0 - 5.6 % Final     Comment:     ADA Screening Guidelines:  5.7-6.4%  " Consistent with prediabetes  >or=6.5%  Consistent with diabetes    High levels of fetal hemoglobin interfere with the HbA1C  assay. Heterozygous hemoglobin variants (HbS, HgC, etc)do  not significantly interfere with this assay.   However, presence of multiple variants may affect accuracy.       Hemoglobin A1c   Date Value Ref Range Status   05/06/2025 10.9 (H) 4.0 - 5.6 % Final     Comment:     ADA Screening Guidelines:  5.7-6.4%  Consistent with prediabetes  >=6.5%  Consistent with diabetes    High levels of fetal hemoglobin interfere with the HbA1C  assay. Heterozygous hemoglobin variants (HbS, HgC, etc)do  not significantly interfere with this assay.   However, presence of multiple variants may affect accuracy.        Chemistry        Component Value Date/Time     05/06/2025 0718     12/02/2024 0711    K 4.1 05/06/2025 0718    K 4.6 12/02/2024 0711     05/06/2025 0718     12/02/2024 0711    CO2 26 05/06/2025 0718    CO2 20 (L) 12/02/2024 0711    BUN 21 05/06/2025 0718    CREATININE 1.1 05/06/2025 0718     (H) 05/06/2025 0718     (H) 12/02/2024 0711        Component Value Date/Time    CALCIUM 9.1 05/06/2025 0718    CALCIUM 9.4 12/02/2024 0711    ALKPHOS 145 05/06/2025 0718    ALKPHOS 128 12/02/2024 0711    AST 13 05/06/2025 0718    AST 16 12/02/2024 0711    ALT 15 05/06/2025 0718    ALT 16 12/02/2024 0711    BILITOT 0.4 05/06/2025 0718    BILITOT 0.4 12/02/2024 0711    ESTGFRAFRICA >60.0 05/26/2022 0825    EGFRNONAA >60.0 05/26/2022 0825           Lab Results   Component Value Date    TSH 3.229 07/15/2024      Lab Results   Component Value Date    CHOL 211 (H) 05/06/2025    CHOL 184 07/15/2024    CHOL 166 02/14/2024     Lab Results   Component Value Date    HDL 45 05/06/2025    HDL 52 07/15/2024    HDL 39 (L) 02/14/2024     Lab Results   Component Value Date    LDLCALC 144.4 05/06/2025    LDLCALC 107.4 07/15/2024    LDLCALC 108.6 02/14/2024     Lab Results   Component Value  Date    TRIG 108 05/06/2025    TRIG 123 07/15/2024    TRIG 92 02/14/2024     Lab Results   Component Value Date    CHOLHDL 21.3 05/06/2025    CHOLHDL 28.3 07/15/2024    CHOLHDL 23.5 02/14/2024         PHYSICAL EXAMINATION  Physical Exam   Constitutional: Appears well, no distress.  Eyes: Sclera white, PERRLA, EOMs intact.  Neck: Supple, trachea midline.   Respiratory: No cough, SOB, nor ANDERSON.  Cardiovascular: RRR; no edema.  Skin: Warm and dry; no rash, lesions, acanthosis nigricans, nor injection site reactions.  Neuro: AAOx4, steady gait  Psychiatric: No unusual, disruptive, or inappropriate behavior.       Assessment/Plan      1. Type 2 diabetes mellitus with hyperglycemia, without long-term current use of insulin  -     Microalbumin/Creatinine Ratio, Urine; Future; Expected date: 09/06/2025  -     Comprehensive Metabolic Panel; Future; Expected date: 09/06/2025  -     Hemoglobin A1C; Future; Expected date: 09/06/2025      -Has HX pancreatic cancer s/p Belva 2015  -Intolerance to Jardiance at this time due to high BG  -Reviewed last A1c. 10.9%  -Discussed A1c goal <7%.  -Discussed BG goals, provided copy.  -Cont Dexcom G7 CGM monitoring.  -Discussed importance of making lifestyle changes.  -Refer to brochures / handout on meal planning.  -Advised to start an exercise regimen.               -Increase Lantus 22 units at bedtime              -Cont Ozempic 0.25mg weekly.   -Discussed plan to keep Ozempic dose only 0.25mg to decrease chance of pancreatitis.   -Discussed how to treat hypoglycemia if experienced.  -Discussed goal to wean insulin to a minimal dose w/o experiencing hypo or hyperglycemia.        2. Mixed hyperlipidemia  Overview:  LDL in 130s  With DM would prefer LDL of 70 or below      Orders:  -     Lipid Panel; Future; Expected date: 09/06/2025      -Reviewed Lipid panel 211/108/45/144  -Discussed LDL goal <70.  -Discussed lifestyle changes.  -Cont rosuvastatin 5mg nightly.  -Discussed importance of  "lipid control in setting of diabetes  -Discussed the importance of taking statin every night and lifestyle changes to slow the risk of a cardiovascular event such as CVA or MI.            3. Severe obesity (BMI 35.0-39.9) with comorbidity    Behavioral counseling for obesity.  History:  - Patient's BMI: Estimated body mass index is 38.26 kg/m² as calculated from the following:    Height as of this encounter: 5' 5" (1.651 m).    Weight as of this encounter: 104.3 kg (229 lb 15 oz).  - Patient reports challenges related to weight management.  - Patient meets criteria for obesity counseling: BMI >= 30 kg/m² or >= 25 kg/m² with associated comorbidities.  - Discussion focused on the benefits of sustained weight loss and its impact on health outcomes.  Plan:  Dietary Guidance: Recommended caloric intake tailored to patient needs, emphasizing nutrient-dense, low-calorie foods. Discussed portion control and meal planning strategies.  Physical Activity: Encouraged moderate physical activity for at least 150 minutes per week, tailored to patients current physical abilities and goals.  Behavioral Strategies: Addressed behaviors contributing to weight gain and set actionable goals, including [food diary, mindful eating, avoiding late-night snacks, etc.]  Motivational Counseling: Discussed the importance of intrinsic and extrinsic motivators in achieving sustained weight management. Reinforced positive behavior changes.  Follow-Up Plan: Patient instructed to return for follow-up for progress evaluation and continued counseling.  Time Spent: A total of 16 minutes was spent in face-to-face behavioral counseling focused on weight loss and obesity management.  Patient Understanding: Patient actively participated in the session and verbalized understanding of the discussed plan.           4. Uses self-applied continuous glucose monitoring device  -Dexcom G7 readings reviewed with patient.  -Interpretation:   -Diabetes controlled; BG " above target range goal; FBG at goal; PPG excursions absent.   -Medication regimen adjusted according to current findings.   -See glucose readings below.  -Re-eval upon next visit.               FOLLOW UP  Follow up in 5 weeks (on 9/10/2025) for CGM readings.

## 2025-08-06 NOTE — PATIENT INSTRUCTIONS
Continue monitoring your blood sugars using your Dexcom G7.        Blood glucose goals:   Fasting blood glucose goal:  mg/dL.  Premeal blood glucose goal:  mg/dL.  Postmeal blood glucose goal ( 2 hrs): <180 mg/dL.  Bedtime / overnight blood glucose goal:  mg/dL.               Increase Lantus 22 units at bedtime.            Continue taking Ozempic 0.25mg weekly.   *Be sure to take your injection on the same day every week. You can take your injection with or without food.   *Please notify the office if you start to experience severe nausea, vomiting, stomach or back pain after taking this medication.             When taking Ozempic , you should limit foods that can increase the risk of side effects like nausea, vomiting, diarrhea, and stomach pain. These foods include:   High-fat foods: Fried foods, burgers, pizza, doughnuts, and other greasy foods   Sugary foods and drinks: Soda, juice, cakes, cookies, and other sweetened beverages   Refined carbohydrates: White bread, crackers, white flour, and white rice   Processed foods: Chips, pastries, and other ultra-processed foods   Spicy foods: Hot sauce, salsa, hot peppers, and other spicy foods   High glycemic index foods: Potatoes, cereal, pretzels, sports drinks, and other foods that raise blood sugar quickly   Acidic foods: Tomato sauce, citrus fruits, and coffee   Foods that contain caffeine: Coffee, carbonated beverages, and other caffeinated drinks   You should also limit high-fiber foods if you experience diarrhea or abdominal pain.             If your blood sugar is low, follow the 15-15 rule: Have 15 grams of carbs, then wait 15 minutes. If the blood glucose level is less than 70, treat with 15 g of fast-acting carbohydrate, such as 4 oz of fruit juice or three or four glucose tablets; and  recheck the blood glucose level after 15 minutes to ensure that it has normalized.  Check your blood sugar again. If it's still less than 70 mg/dL, repeat  this process.          Refer to the food brochures when planning meals.          Replace supper with juicing.           Continue current exercise regimen.      Get labs drawn on 9/8/2025.  Be sure not to eat past midnight. You may take medications with sips of water in the morning before getting labs drawn.            Follow up on 9/10/2025 @ 11 am.

## 2025-08-07 ENCOUNTER — PATIENT MESSAGE (OUTPATIENT)
Dept: PAIN MEDICINE | Facility: CLINIC | Age: 70
End: 2025-08-07

## 2025-08-07 ENCOUNTER — OFFICE VISIT (OUTPATIENT)
Dept: PAIN MEDICINE | Facility: CLINIC | Age: 70
End: 2025-08-07
Payer: MEDICARE

## 2025-08-07 DIAGNOSIS — M51.362 DEGENERATION OF INTERVERTEBRAL DISC OF LUMBAR REGION WITH DISCOGENIC BACK PAIN AND LOWER EXTREMITY PAIN: ICD-10-CM

## 2025-08-07 DIAGNOSIS — M54.16 LUMBAR RADICULOPATHY: ICD-10-CM

## 2025-08-07 DIAGNOSIS — G89.4 CHRONIC PAIN SYNDROME: ICD-10-CM

## 2025-08-07 DIAGNOSIS — B02.29 POST HERPETIC NEURALGIA: ICD-10-CM

## 2025-08-07 DIAGNOSIS — M79.7 FIBROMYALGIA: ICD-10-CM

## 2025-08-07 DIAGNOSIS — M79.7 FIBROMYALGIA: Primary | ICD-10-CM

## 2025-08-07 RX ORDER — PREGABALIN 150 MG/1
150 CAPSULE ORAL 2 TIMES DAILY
Qty: 60 CAPSULE | Refills: 5 | Status: SHIPPED | OUTPATIENT
Start: 2025-08-07 | End: 2026-02-03

## 2025-08-07 NOTE — PROGRESS NOTES
Subjective:     Patient ID: Aracelis Martinez is a 70 y.o. female    Chief Complaint: No chief complaint on file.        Referred by: No ref. provider found      HPI:    Interval History PA(8/7/2025):  The patient location is:  Home  The chief complaint leading to consultation is:  Follow up  Visit type: Virtual visit with synchronous audio and video  Total time spent with patient: 8 minutes  Each patient to whom he or she provides medical services by telemedicine is:  (1) informed of the relationship between the physician and patient and the respective role of any other health care provider with respect to management of the patient; and (2) notified that he or she may decline to receive medical services by telemedicine and may withdraw from such care at any time.    Patient returns today for follow up.  Patient reporting continued pain that she feels has become more manageable.  She reports that Lyrica and Flexeril has been helpful for the pain management.  She reports taking Lyrica at least once a day with good benefit.  Denies any adverse effects.  She was unable to get an appointment with Rheumatology, she would like to hold off for now as she feels her pain is more manageable than it has been in the past.  She did speak with her psychiatrist about antidepressant alternatives to Cymbalta but it was decided that no medication needed to be added.     Interval History PA (09/24/2024):  Patient returns to clinic for follow up.  Patient with continued pain as before.  Diffuse widespread pain related to fibromyalgia.  Recently we did change medication and troponin.  Added Cymbalta 30 mg daily, patient reporting adverse effects since starting this medication.  Reports N/V as well as dizziness and confusion.  She has since discontinued this medication as the symptoms have resolved.  Patient inquiring about any alternative medications we can trial.    Interval History PA (08/06/2024):  The patient location is:  Home  The  chief complaint leading to consultation is:  Follow up  Visit type: Virtual visit with synchronous audio and video  Total time spent with patient: 8 minutes  Each patient to whom he or she provides medical services by telemedicine is:  (1) informed of the relationship between the physician and patient and the respective role of any other health care provider with respect to management of the patient; and (2) notified that he or she may decline to receive medical services by telemedicine and may withdraw from such care at any time.     Patient returns for follow up.  Patient recently presented with genital worsening of symptoms related to fibromyalgia including diffuse widespread musculoskeletal pain.  We recently increased her Lyrica to 150 mg b.i.d. which she notes has been beneficial.  Notes her pain has slightly improved overall since previous encounter.  She does continue to note significant symptoms.  Currently pain is at a 6/10.  She is able to perform daily functions although his interested in any other medications are able to help with her pain.  Previously we did briefly discuss trial of Cymbalta and she would like to proceed with this option.    Interval History PA (07/09/2024):  Patient returns to clinic for follow up.  Patient reports generalized worsening of symptoms related to fibromyalgia.  Notes ongoing flare up of diffuse widespread musculoskeletal pain.  Unable to localize pain at this time as she notes it is diffuse across her cervical mid back and lower lumbar regions.  Previously patient was doing well with Lyrica.  Currently taking 100 mg b.i.d. with mild benefit, denies any adverse effects from this medication.  Patient notes over the past month or so her pain has generally been worsening.  She also notes increased stress factors.  No other concerns at this time.    Interval History PA (11/28/2023):  The patient location is:  Home  The chief complaint leading to consultation is:  Follow  up  Visit type: Virtual visit with synchronous audio and video  Total time spent with patient: 9 minutes  Each patient to whom he or she provides medical services by telemedicine is:  (1) informed of the relationship between the physician and patient and the respective role of any other health care provider with respect to management of the patient; and (2) notified that he or she may decline to receive medical services by telemedicine and may withdraw from such care at any time.     Patient returns to clinic for follow up.  Patient is s/p Qutenza #1 completed on 10/24/2023 for post herpetic neuralgia.  Patient noting significant improvement overall, approximately 95% relief.  Notes minimal continued pain over her right flank/axillary region, and underneath her right breast.  She does note some continued sensitivity over the area but overall symptoms have improved.  She continues to take Lyrica 100 mg b.i.d. with benefit, denies any adverse effects from this medication.  No other concerns at this time.    Interval History PA (10/24/2023):  Patient returns to clinic for follow up and Qutenza treatment #1 for postherpetic neuralgia.  Patient denies any changes in the quality or location of her pain.  Continues to endorse pain/burning over her right flank/axillary region around to underneath her right breast.  She continues to take Lyrica 200 mg t.i.d. with minimal benefit, denies any adverse effects from this medication.  No other concerns at this time.    Interval History PA (10/10/2023):  Patient returns to clinic for follow up.  Since previous visit patient had a shingles outbreak in August 2023.  She was treated with valacyclovir and increased dose of Lyrica.  Once the rash resolved patient reports pain continued and has been worsening.  Reports pain/burning sensation at the site of her previous rash.  Located from her right flank to beneath her right breast region with a dermatomal pattern.  Notes that her PCP  increased her Lyrica to 200 mg t.i.d. which has been minimally beneficial.  Denies any adverse effects from this medication.  Reports pain continues to be severe and limiting to her daily activities.    Interval History PA (07/18/2023):  Patient returns to clinic for follow up.  Patient denies any changes in the quality or location of her pain since previous visit.  Denies any new or worsening symptoms.  She continues to take Lyrica 75 mg b.i.d. with continued benefit.  Notes that this medication has provided significant improvement in her overall symptoms and that her pain continues to be well-controlled at this time.  Denies any adverse effects from the medication.  No other concerns at this time.    Interval History (1/3/23):    The patient location is:  Home  The chief complaint leading to consultation is:  Follow-up  Visit type: Virtual visit with synchronous audio and video  Total time spent with patient:  8 minutes  Each patient to whom he or she provides medical services by telemedicine is:  (1) informed of the relationship between the physician and patient and the respective role of any other health care provider with respect to management of the patient; and (2) notified that he or she may decline to receive medical services by telemedicine and may withdraw from such care at any time.      She returns today for follow up.  She reports that Lyrica 75 mg b.i.d. has been helpful for the fibromyalgia pain.  She reports a very significant improvement in her symptoms and states that her pain is very well controlled.  She denies any adverse effects.  She does request that additional prescriptions be sent to mail order pharmacy.      Initial Encounter (11/15/22):  Aracelis Martinez is a 70 y.o. female who presents today with fairly widespread pain likely related to fibromyalgia.  Has previously been treated by my colleague at [facility].  He is leaving his practice, and patient is now seeking to transition her  care to my clinic.  She has undergone multiple interventional procedures for her low back and lower extremity pain.  States these have helped somewhat but she is not very interested in additional interventional procedures at this time.  Currently the majority of her pain is located across the upper back and into the upper extremities.  She denies any associated numbness, tingling, weakness, bowel bladder dysfunction.  She is unable to say if this pain is related to fibromyalgia or some other source of pain.  She is currently taking Lyrica 25 mg b.i.d. provided by her primary care physician.  She does not feel this medication is providing as much relief as it was in the past.  She denies any adverse effects of this medication.   This pain is described in detail below.    Physical Therapy:  No.    Non-pharmacologic Treatment:  Rest helps         TENS?  No    Pain Medications:         Currently taking:  Lyrica 150 mg b.i.d.., meloxicam    Has tried in the past:  Tramadol (upset stomach).  NSAIDs, Tylenol, Cymbalta (adverse effects)    Has not tried:   Muscle relaxants, TCAs, SNRIs, topical creams    Blood thinners:  None    Interventional Therapies:                -12/16/2021  Left L4-5 and L5-S1 Lumbar Medial Branch Radiofrequency Ablation              - 11/09/2021 Right L4-5 and L5-S1 Lumbar Medial Branch Radiofrequency Ablation              - 8/24/2021Lumbar Transforaminal Epidural Steroid Injection, Right L5-S1              - 8/17/2021Lumbar Transforaminal Epidural Steroid Injection, Right L5-S1              - 6/15/2021  Lumbar Epidural Steroid Injection at L5-S1 50% relief    10/24/2023 - Qutenza #1 - 95% relief    Relevant Surgeries:  None    Affecting sleep?  Yes    Affecting daily activities? yes    Depressive symptoms? no          SI/HI? No    Work status: Retired    Pain Scores:    Best:       0/10  Worst:     7/10  Usually:   5/10  Today:    6/10         Review of Systems   Constitutional:  Negative for  activity change, appetite change, chills, fatigue, fever and unexpected weight change.   HENT:  Negative for hearing loss.    Eyes:  Negative for visual disturbance.   Respiratory:  Negative for chest tightness and shortness of breath.    Cardiovascular:  Negative for chest pain.   Gastrointestinal:  Negative for abdominal pain, constipation, diarrhea, nausea and vomiting.   Genitourinary:  Negative for difficulty urinating.   Musculoskeletal:  Positive for arthralgias, back pain, gait problem, myalgias, neck pain and neck stiffness.   Skin:  Negative for rash.   Neurological:  Negative for dizziness, weakness, light-headedness, numbness and headaches.   Psychiatric/Behavioral:  Positive for sleep disturbance. Negative for hallucinations and suicidal ideas. The patient is not nervous/anxious.        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/02/2020    History of pancreatic cancer 07/02/2021    Hypertension     Knee pain 04/16/2014    Lumbar pain 01/21/2022    Nausea 11/19/2019    Neuroendocrine tumor of pancreas 2015    Obesity     Pancreatic cancer 2015    Range of motion deficit 11/09/2018    Renal cyst     left    Special screening for malignant neoplasms, colon 07/28/2014    Status post arthroscopy of left knee 4/16/2014 04/22/2014    Stiffness of right hand, not elsewhere classified 02/26/2019    Thyroid disease     Trouble in sleeping     Type 2 diabetes mellitus     Type 2 diabetes mellitus with hyperglycemia, without long-term current use of insulin 05/27/2025    Type 2  diabetes mellitus with ophthalmic manifestations     Uses self-applied continuous glucose monitoring device 2025       Past Surgical History:   Procedure Laterality Date     SECTION      COLONOSCOPY N/A 3/13/2019    Procedure: COLONOSCOPY;  Surgeon: Cem Lamar MD;  Location: [facility] (4TH FLR);  Service: Endoscopy;  Laterality: N/A;  previous order cx    COLONOSCOPY N/A 2022    Procedure: COLONOSCOPY Suprep;  Surgeon: Hiren Newberry MD;  Location: [facility] ENDO;  Service: Endoscopy;  Laterality: N/A;    EPIDURAL STEROID INJECTION INTO LUMBAR SPINE N/A 6/15/2021    Procedure: Injection-steroid-epidural-lumbar-L5-S1;  Surgeon: Saranya Cornelius Jr., MD;  Location: [facility] PAIN MGT;  Service: Pain Management;  Laterality: N/A;    ESOPHAGOGASTRODUODENOSCOPY N/A 2019    Procedure: ESOPHAGOGASTRODUODENOSCOPY (EGD);  Surgeon: Jonathan Oneill MD;  Location: [facility] (4TH FLR);  Service: Endoscopy;  Laterality: N/A;    ESOPHAGOGASTRODUODENOSCOPY N/A 2020    Procedure: EGD (ESOPHAGOGASTRODUODENOSCOPY);  Surgeon: Shady Costa MD;  Location: [facility] (2ND FLR);  Service: Endoscopy;  Laterality: N/A;  Please schedule patient as soon as possible in the 2nd floor history of a Whipple surgery for neuroendocrine pancreatic tumor many years ago with now constant belching and regurgitation.  May need airway protection.  Rule out celiac sprue rule out lact    ESOPHAGOGASTRODUODENOSCOPY N/A 2022    Procedure: EGD (ESOPHAGOGASTRODUODENOSCOPY);  Surgeon: Hiren Newberry MD;  Location: [facility] ENDO;  Service: Endoscopy;  Laterality: N/A;    EXCISION OF GANGLION CYST OF HAND Right 10/22/2018    Procedure: EXCISION, GANGLION CYST, HAND- RIGHT, LONG AND INDEX FINGER;  Surgeon: Sowmya Schmidt MD;  Location: [facility] OR;  Service: Orthopedics;  Laterality: Right;  Stretcher; Supine; Hand Pan 1 & [place] 2; Dr. Loja's Rasp    HYSTERECTOMY      ISMAEL    INJECTION OF ANESTHETIC AGENT AROUND MEDIAL BRANCH  NERVES INNERVATING LUMBAR FACET JOINT Bilateral 9/28/2021    Procedure: Block-nerve-medial branch-lumbar-bilateral L4-5 and L5-S1;  Surgeon: Saranya Cornelius Jr., MD;  Location: [facility] PAIN MGT;  Service: Pain Management;  Laterality: Bilateral;    INJECTION OF ANESTHETIC AGENT AROUND MEDIAL BRANCH NERVES INNERVATING LUMBAR FACET JOINT Bilateral 10/12/2021    Procedure: Bilateral Lumbar Medial Branch Block L4-5 L5-S1- (No Sedation);  Surgeon: Saranya Cornelius Jr., MD;  Location: [facility] PAIN MGT;  Service: Pain Management;  Laterality: Bilateral;    KNEE ARTHROSCOPY  4/18/2014    LATS/left    OOPHORECTOMY      PANCREAS SURGERY  2015    MD Ritchie    RADIOFREQUENCY THERMOCOAGULATION Bilateral 11/9/2021    Procedure: Radiofrequency Themocoagulation of medial branches: L4-5 and L5-S1;  Surgeon: Saranya Cornelius Jr., MD;  Location: [facility] PAIN MGT;  Service: Pain Management;  Laterality: Bilateral;  Patient is diabetic.     RADIOFREQUENCY THERMOCOAGULATION Left 12/16/2021    Procedure: RADIOFREQUENCY THERMAL COAGULATION LEFT L4-5, L5-S1 (IV Sedation);  Surgeon: Saranya Cornelius Jr., MD;  Location: [facility] PAIN MGT;  Service: Pain Management;  Laterality: Left;  diabetic    TONSILLECTOMY      TRANSFORAMINAL EPIDURAL INJECTION OF STEROID Right 8/17/2021    Procedure: Injection,steroid,epidural,transforaminal approach; Levels: L5-S1;  Surgeon: Saranya Cornelius Jr., MD;  Location: [facility] PAIN MGT;  Service: Pain Management;  Laterality: Right;  No pacemaker. Patient is diabetic.    TRANSFORAMINAL EPIDURAL INJECTION OF STEROID Right 8/24/2021    Procedure: Injection,steroid,epidural,transforaminal approach; Levels: L5-S1;  Surgeon: Saranya Cornelius Jr., MD;  Location: [facility] PAIN MGT;  Service: Pain Management;  Laterality: Right;  No pacemaker. Patient is diabetic.        Social History     Socioeconomic History    Marital status:    Tobacco Use    Smoking status: Former     Current packs/day: 0.00     Average packs/day:  0.3 packs/day for 10.0 years (2.5 ttl pk-yrs)     Types: Cigarettes     Start date: 1972     Quit date: 1982     Years since quittin.6    Smokeless tobacco: Never    Tobacco comments:     Totally quit per  visit.   Substance and Sexual Activity    Alcohol use: Yes     Comment: occasional use    Drug use: Never    Sexual activity: Yes     Partners: Male     Birth control/protection: None     Social Drivers of Health     Financial Resource Strain: Low Risk  (2025)    Overall Financial Resource Strain (CARDIA)     Difficulty of Paying Living Expenses: Not hard at all   Food Insecurity: No Food Insecurity (2025)    Hunger Vital Sign     Worried About Running Out of Food in the Last Year: Never true     Ran Out of Food in the Last Year: Never true   Transportation Needs: No Transportation Needs (2025)    PRAPARE - Transportation     Lack of Transportation (Medical): No     Lack of Transportation (Non-Medical): No   Physical Activity: Insufficiently Active (2025)    Exercise Vital Sign     Days of Exercise per Week: 2 days     Minutes of Exercise per Session: 30 min   Stress: Stress Concern Present (2025)    [employer] - Occupational Stress Questionnaire     Feeling of Stress : Very much   Housing Stability: Low Risk  (2025)    Housing Stability Vital Sign     Unable to Pay for Housing in the Last Year: No     Number of Times Moved in the Last Year: 0     Homeless in the Last Year: No       Review of patient's allergies indicates:   Allergen Reactions    Erythromycin base        Current Outpatient Medications on File Prior to Visit   Medication Sig Dispense Refill    alcohol swabs (DROPSAFE ALCOHOL PREP PADS) PadM USE EVERY DAY TO CLEAN FINGER PRIOR TO GLUCOSE CHECK FOR DIABETES 100 each 3    amLODIPine (NORVASC) 10 MG tablet TAKE 1 TABLET EVERY DAY 90 tablet 3    blood-glucose meter kit Use as instructed 1 each 0    blood-glucose sensor (DEXCOM  "G7 SENSOR) Bere Use daily for continuous glucose monitoring. 1 each 6    cyclobenzaprine (FLEXERIL) 10 MG tablet TAKE 1 TABLET(10 MG) BY MOUTH THREE TIMES DAILY AS NEEDED FOR MUSCLE SPASMS 90 tablet 2    hydrOXYzine pamoate (VISTARIL) 50 MG Cap TAKE 1 CAPSULE BY MOUTH TWICE DAILY AS NEEDED FOR ANXIETY 60 capsule 3    insulin glargine U-100, Lantus, (LANTUS SOLOSTAR U-100 INSULIN) 100 unit/mL (3 mL) InPn pen Inject 20 Units into the skin once daily. 7.8 mL 5    levothyroxine (SYNTHROID) 75 MCG tablet TAKE 1 TABLET EVERY DAY BEFORE BREAKFAST 90 tablet 3    meloxicam (MOBIC) 15 MG tablet TAKE 1 TABLET BY MOUTH EVERY DAY FOR ANKLE PAIN OR SWELLING 90 tablet 3    olmesartan (BENICAR) 20 MG tablet TAKE 1 TABLET(20 MG) BY MOUTH DAILY 90 tablet 1    pantoprazole (PROTONIX) 40 MG tablet TAKE 1 TABLET EVERY DAY 90 tablet 3    pen needle, diabetic (COMFORT EZ PEN NEEDLES) 33 gauge x 1/4" Ndle Use daily with insulin pen. 100 each 3    pregabalin (LYRICA) 150 MG capsule Take 1 capsule (150 mg total) by mouth 3 (three) times daily. 90 capsule 5    rosuvastatin (CRESTOR) 5 MG tablet Take 1 tablet (5 mg total) by mouth every evening. 30 tablet 3    semaglutide (OZEMPIC) 0.25 mg or 0.5 mg (2 mg/3 mL) pen injector Inject 0.25 mg into the skin every 7 days. 3 mL 3    TRUE METRIX GLUCOSE TEST STRIP Strp TEST BLOOD SUGAR EVERY  strip 3    TRUEPLUS LANCETS 33 gauge Misc TEST BLOOD SUGAR EVERY  each 3    zolpidem (AMBIEN) 10 mg Tab Take 1 tablet (10 mg total) by mouth nightly as needed (insomnia). 30 tablet 3     No current facility-administered medications on file prior to visit.       Objective:      LMP  (LMP Unknown)     Exam:  PHYSICAL EXAMINATION:     General appearance: Well appearing, in no acute distress, alert and oriented x3.  Psych:  Mood and affect appropriate.  Skin: Skin color normal, no rashes or lesions, in both upper and lower body.  Extremities: Moves all visualized extremities freely.  Gait: Normal. "       Imaging:      Narrative & Impression    EXAMINATION:  MRI LUMBAR SPINE WITHOUT CONTRAST     CLINICAL HISTORY:  Dorsalgia, unspecifiedBack pain or radiculopathy, > 6 wks;     TECHNIQUE:  Sagittal T1, sagittal T2, sagittal STIR, axial T1 and axial T2 weighted images of the lumbar spine obtained without contrast.     COMPARISON:  04/12/2016     FINDINGS:  Lumbar spine alignment is within normal limits. The vertebral body heights are well maintained, with no fracture.  Degenerative fatty metaplasia endplate changes at L5-S1 and L1-2.  Otherwise, bone marrow signal is normal.     The conus is normal in appearance.  The adjacent soft tissue structures show no significant abnormalities.     There are multiple broad-based spur and disc complex is without spinal stenosis at T11-12 and T12-L1.     L1-L2: No focal disc herniation.  Broad-based disc osteophyte complex without central canal or foraminal stenosis.No significant central canal or neural foraminal narrowing.     L2-L3: There is no focal disc herniation. No significant central canal or neural foraminal narrowing.     L3-L4: There is no focal disc herniation. No significant central canal or neural foraminal narrowing.     L4-L5: Broad-based disc bulging.  Severe facet arthritis.  No central canal stenosis.  No foraminal stenosis.     L5-S1: Broad-based disc osteophyte complex and severe facet arthritis produce mild medial foraminal narrowing bilaterally.  No central canal stenosis.     Impression:     Degenerative changes of the lumbar spine mostly involving the lower lumbar facets.  Mild L5-S1 foraminal encroachment bilaterally.        Electronically signed by: Chaz Beth Jr  Date:                                            05/25/2021  Time:                                           16:14       Assessment:       Encounter Diagnoses   Name Primary?    Fibromyalgia Yes    Degeneration of intervertebral disc of lumbar region with discogenic back pain and  lower extremity pain     Post herpetic neuralgia     Lumbar radiculopathy     Chronic pain syndrome        Plan:       Diagnoses and all orders for this visit:    Fibromyalgia    Degeneration of intervertebral disc of lumbar region with discogenic back pain and lower extremity pain    Post herpetic neuralgia    Lumbar radiculopathy    Chronic pain syndrome      Aracelis Martinez is a 70 y.o. female with chronic neck and lower back pain related to fibromyalgia.    Prior records reviewed.    Continue Lyrica 150 mg b.i.d..  Can continue taking once a day and increase to twice a day as tolerated. Patient taking with benefit, denies adverse effects.  Continue Flexeril 10 mg t.i.d. p.r.n..  Referral placed to rheumatology at last visit.  We will hold off on a rheumatology appointment for now as patient feels her pain is more manageable.   Return to clinic in 6 months for Lyrica refill or sooner if needed.       Monica Santoyo PA-C  [facility]  Interventional Pain Management   08/07/2025    This note was created by combination of typed  and M-Modal dictation.  Transcription and phonetic errors may be present.  If there are any questions, please contact me.

## 2025-08-08 ENCOUNTER — PATIENT MESSAGE (OUTPATIENT)
Dept: PAIN MEDICINE | Facility: CLINIC | Age: 70
End: 2025-08-08
Payer: MEDICARE

## 2025-08-08 ENCOUNTER — PATIENT MESSAGE (OUTPATIENT)
Dept: INTERNAL MEDICINE | Facility: CLINIC | Age: 70
End: 2025-08-08
Payer: MEDICARE

## 2025-08-10 DIAGNOSIS — E11.9 TYPE 2 DIABETES MELLITUS WITHOUT COMPLICATION, WITHOUT LONG-TERM CURRENT USE OF INSULIN: Chronic | ICD-10-CM

## 2025-08-11 ENCOUNTER — PATIENT MESSAGE (OUTPATIENT)
Dept: INTERNAL MEDICINE | Facility: CLINIC | Age: 70
End: 2025-08-11
Payer: MEDICARE

## 2025-08-11 ENCOUNTER — OFFICE VISIT (OUTPATIENT)
Dept: ENDOCRINOLOGY | Facility: CLINIC | Age: 70
End: 2025-08-11
Payer: MEDICARE

## 2025-08-11 VITALS
BODY MASS INDEX: 38.19 KG/M2 | RESPIRATION RATE: 18 BRPM | SYSTOLIC BLOOD PRESSURE: 130 MMHG | HEART RATE: 61 BPM | WEIGHT: 229.19 LBS | HEIGHT: 65 IN | OXYGEN SATURATION: 98 % | DIASTOLIC BLOOD PRESSURE: 74 MMHG

## 2025-08-11 DIAGNOSIS — E11.9 TYPE 2 DIABETES MELLITUS WITHOUT COMPLICATION, WITHOUT LONG-TERM CURRENT USE OF INSULIN: Primary | Chronic | ICD-10-CM

## 2025-08-11 DIAGNOSIS — D3A.8 NEUROENDOCRINE TUMOR OF PANCREAS: ICD-10-CM

## 2025-08-11 PROCEDURE — G2211 COMPLEX E/M VISIT ADD ON: HCPCS | Mod: HCNC,S$GLB,, | Performed by: NURSE PRACTITIONER

## 2025-08-11 PROCEDURE — 1126F AMNT PAIN NOTED NONE PRSNT: CPT | Mod: CPTII,HCNC,S$GLB, | Performed by: NURSE PRACTITIONER

## 2025-08-11 PROCEDURE — 1160F RVW MEDS BY RX/DR IN RCRD: CPT | Mod: CPTII,HCNC,S$GLB, | Performed by: NURSE PRACTITIONER

## 2025-08-11 PROCEDURE — 3078F DIAST BP <80 MM HG: CPT | Mod: CPTII,HCNC,S$GLB, | Performed by: NURSE PRACTITIONER

## 2025-08-11 PROCEDURE — 1159F MED LIST DOCD IN RCRD: CPT | Mod: CPTII,HCNC,S$GLB, | Performed by: NURSE PRACTITIONER

## 2025-08-11 PROCEDURE — 99204 OFFICE O/P NEW MOD 45 MIN: CPT | Mod: HCNC,S$GLB,, | Performed by: NURSE PRACTITIONER

## 2025-08-11 PROCEDURE — 99999 PR PBB SHADOW E&M-EST. PATIENT-LVL V: CPT | Mod: PBBFAC,HCNC,, | Performed by: NURSE PRACTITIONER

## 2025-08-11 PROCEDURE — 3046F HEMOGLOBIN A1C LEVEL >9.0%: CPT | Mod: CPTII,HCNC,S$GLB, | Performed by: NURSE PRACTITIONER

## 2025-08-11 PROCEDURE — 3288F FALL RISK ASSESSMENT DOCD: CPT | Mod: CPTII,HCNC,S$GLB, | Performed by: NURSE PRACTITIONER

## 2025-08-11 PROCEDURE — 3075F SYST BP GE 130 - 139MM HG: CPT | Mod: CPTII,HCNC,S$GLB, | Performed by: NURSE PRACTITIONER

## 2025-08-11 PROCEDURE — 1101F PT FALLS ASSESS-DOCD LE1/YR: CPT | Mod: CPTII,HCNC,S$GLB, | Performed by: NURSE PRACTITIONER

## 2025-08-11 PROCEDURE — 4010F ACE/ARB THERAPY RXD/TAKEN: CPT | Mod: CPTII,HCNC,S$GLB, | Performed by: NURSE PRACTITIONER

## 2025-08-11 PROCEDURE — 3008F BODY MASS INDEX DOCD: CPT | Mod: CPTII,HCNC,S$GLB, | Performed by: NURSE PRACTITIONER

## 2025-08-11 RX ORDER — SEMAGLUTIDE 0.68 MG/ML
0.25 INJECTION, SOLUTION SUBCUTANEOUS
Qty: 3 ML | Refills: 3 | Status: CANCELLED | OUTPATIENT
Start: 2025-08-11

## 2025-08-11 RX ORDER — AMLODIPINE BESYLATE 10 MG/1
10 TABLET ORAL
Qty: 90 TABLET | Refills: 3 | Status: SHIPPED | OUTPATIENT
Start: 2025-08-11

## 2025-08-11 RX ORDER — LANCETS 33 GAUGE
EACH MISCELLANEOUS
Qty: 100 EACH | Refills: 3 | Status: SHIPPED | OUTPATIENT
Start: 2025-08-11

## 2025-08-11 RX ORDER — ISOPROPYL ALCOHOL 70 ML/100ML
SWAB TOPICAL
Qty: 100 EACH | Refills: 3 | Status: SHIPPED | OUTPATIENT
Start: 2025-08-11

## 2025-08-11 RX ORDER — CALCIUM CITRATE/VITAMIN D3 200MG-6.25
TABLET ORAL
Qty: 100 STRIP | Refills: 3 | Status: SHIPPED | OUTPATIENT
Start: 2025-08-11

## 2025-08-11 RX ORDER — INSULIN GLARGINE 100 [IU]/ML
20 INJECTION, SOLUTION SUBCUTANEOUS DAILY
Qty: 7.8 ML | Refills: 5 | Status: CANCELLED | OUTPATIENT
Start: 2025-08-11 | End: 2026-08-11

## 2025-08-12 RX ORDER — HYDROXYZINE PAMOATE 50 MG/1
CAPSULE ORAL
Qty: 60 CAPSULE | Refills: 3 | Status: SHIPPED | OUTPATIENT
Start: 2025-08-12

## 2025-08-17 ENCOUNTER — PATIENT MESSAGE (OUTPATIENT)
Dept: SPORTS MEDICINE | Facility: CLINIC | Age: 70
End: 2025-08-17
Payer: MEDICARE

## 2025-08-20 ENCOUNTER — PATIENT MESSAGE (OUTPATIENT)
Dept: SPORTS MEDICINE | Facility: CLINIC | Age: 70
End: 2025-08-20
Payer: MEDICARE

## 2025-08-20 ENCOUNTER — PATIENT MESSAGE (OUTPATIENT)
Dept: OBSTETRICS AND GYNECOLOGY | Facility: CLINIC | Age: 70
End: 2025-08-20
Payer: MEDICARE

## 2025-08-21 ENCOUNTER — OFFICE VISIT (OUTPATIENT)
Dept: SPORTS MEDICINE | Facility: CLINIC | Age: 70
End: 2025-08-21
Payer: MEDICARE

## 2025-08-21 VITALS — SYSTOLIC BLOOD PRESSURE: 134 MMHG | HEART RATE: 72 BPM | DIASTOLIC BLOOD PRESSURE: 72 MMHG

## 2025-08-21 DIAGNOSIS — M17.12 PRIMARY OSTEOARTHRITIS OF LEFT KNEE: ICD-10-CM

## 2025-08-21 DIAGNOSIS — G89.29 CHRONIC PAIN OF RIGHT KNEE: ICD-10-CM

## 2025-08-21 DIAGNOSIS — M25.561 CHRONIC PAIN OF RIGHT KNEE: ICD-10-CM

## 2025-08-21 DIAGNOSIS — M17.11 PRIMARY OSTEOARTHRITIS OF RIGHT KNEE: Primary | ICD-10-CM

## 2025-08-21 PROCEDURE — 99999 PR PBB SHADOW E&M-EST. PATIENT-LVL III: CPT | Mod: PBBFAC,HCNC,, | Performed by: NEUROMUSCULOSKELETAL MEDICINE & OMM

## 2025-08-25 ENCOUNTER — OFFICE VISIT (OUTPATIENT)
Dept: OBSTETRICS AND GYNECOLOGY | Facility: CLINIC | Age: 70
End: 2025-08-25
Payer: MEDICARE

## 2025-08-25 VITALS
DIASTOLIC BLOOD PRESSURE: 70 MMHG | WEIGHT: 226 LBS | BODY MASS INDEX: 37.65 KG/M2 | SYSTOLIC BLOOD PRESSURE: 124 MMHG | HEIGHT: 65 IN

## 2025-08-25 DIAGNOSIS — Z90.710 HISTORY OF HYSTERECTOMY: ICD-10-CM

## 2025-08-25 DIAGNOSIS — Z01.419 ENCOUNTER FOR GYNECOLOGICAL EXAMINATION WITHOUT ABNORMAL FINDING: Primary | ICD-10-CM

## 2025-08-25 DIAGNOSIS — N94.10 DYSPAREUNIA IN FEMALE: ICD-10-CM

## 2025-08-25 DIAGNOSIS — Z78.0 POSTMENOPAUSAL: ICD-10-CM

## 2025-08-25 DIAGNOSIS — Z85.07 HISTORY OF PANCREATIC CANCER: ICD-10-CM

## 2025-08-25 DIAGNOSIS — Z87.19 HISTORY OF DIVERTICULOSIS: ICD-10-CM

## 2025-08-25 DIAGNOSIS — N95.2 VAGINAL ATROPHY: ICD-10-CM

## 2025-08-25 PROCEDURE — 99999 PR PBB SHADOW E&M-EST. PATIENT-LVL III: CPT | Mod: PBBFAC,HCNC,, | Performed by: OBSTETRICS & GYNECOLOGY

## 2025-08-25 RX ORDER — ESTRADIOL 0.1 MG/G
1 CREAM VAGINAL
Qty: 42.5 G | Refills: 3 | Status: SHIPPED | OUTPATIENT
Start: 2025-08-25 | End: 2026-08-25

## 2025-08-26 ENCOUNTER — TELEPHONE (OUTPATIENT)
Dept: HEMATOLOGY/ONCOLOGY | Facility: CLINIC | Age: 70
End: 2025-08-26
Payer: MEDICARE

## 2025-08-28 ENCOUNTER — PATIENT MESSAGE (OUTPATIENT)
Dept: HEMATOLOGY/ONCOLOGY | Facility: CLINIC | Age: 70
End: 2025-08-28
Payer: MEDICARE

## 2025-08-29 ENCOUNTER — OFFICE VISIT (OUTPATIENT)
Dept: SPORTS MEDICINE | Facility: CLINIC | Age: 70
End: 2025-08-29

## 2025-08-29 VITALS
HEIGHT: 65 IN | HEART RATE: 66 BPM | SYSTOLIC BLOOD PRESSURE: 147 MMHG | WEIGHT: 226 LBS | DIASTOLIC BLOOD PRESSURE: 77 MMHG | BODY MASS INDEX: 37.65 KG/M2

## 2025-08-29 DIAGNOSIS — M17.11 PRIMARY OSTEOARTHRITIS OF RIGHT KNEE: Primary | ICD-10-CM

## 2025-08-29 DIAGNOSIS — M25.461 EFFUSION OF RIGHT KNEE: ICD-10-CM

## 2025-08-29 PROCEDURE — 99999 PR PBB SHADOW E&M-EST. PATIENT-LVL IV: CPT | Mod: PBBFAC,,, | Performed by: NEUROMUSCULOSKELETAL MEDICINE & OMM

## 2025-09-02 ENCOUNTER — OFFICE VISIT (OUTPATIENT)
Dept: HEMATOLOGY/ONCOLOGY | Facility: CLINIC | Age: 70
End: 2025-09-02
Payer: MEDICARE

## 2025-09-02 DIAGNOSIS — Z71.83 ENCOUNTER FOR NONPROCREATIVE GENETIC COUNSELING: Primary | ICD-10-CM

## 2025-09-02 DIAGNOSIS — Z80.3 FAMILY HISTORY OF BREAST CANCER: ICD-10-CM

## 2025-09-02 DIAGNOSIS — Z85.89 HISTORY OF NEUROENDOCRINE CANCER: ICD-10-CM

## 2025-09-02 DIAGNOSIS — Z91.89 INCREASED RISK OF BREAST CANCER: ICD-10-CM

## 2025-09-02 DIAGNOSIS — Z87.891 STOPPED SMOKING WITH GREATER THAN 20 PACK YEAR HISTORY: ICD-10-CM

## 2025-09-02 DIAGNOSIS — R92.333 HETEROGENEOUSLY DENSE TISSUE OF BOTH BREASTS ON MAMMOGRAPHY: ICD-10-CM

## 2025-09-04 ENCOUNTER — PATIENT MESSAGE (OUTPATIENT)
Dept: SPORTS MEDICINE | Facility: CLINIC | Age: 70
End: 2025-09-04
Payer: MEDICARE

## (undated) DEVICE — NDL 18GA X1 1/2 REG BEVEL

## (undated) DEVICE — DRESSING N ADH OIL EMUL 3X3

## (undated) DEVICE — SPLINT PLASTER F.S 4INX15IN

## (undated) DEVICE — SYR 10CC LUER LOCK

## (undated) DEVICE — DRESSING LEUKOPLAST FLEX 1X3IN

## (undated) DEVICE — BANDAGE PLASTER FAST 3 X 3YD

## (undated) DEVICE — GLOVE BIOGEL SKINSENSE PI 7.0

## (undated) DEVICE — DRESSING ADAPTIC TOUCH 3X4

## (undated) DEVICE — PACK UPPER EXTREMITY BAPTIST

## (undated) DEVICE — SUT ETHILON 3-0 FS-1 30

## (undated) DEVICE — UNDERGLOVES BIOGEL PI SZ 7 LF

## (undated) DEVICE — TOURNIQUET SB QC SP 18X4IN

## (undated) DEVICE — SUT MCRYL PLUS 4-0 PS2 27IN

## (undated) DEVICE — TRAY DRY SKIN SCRUB PREP

## (undated) DEVICE — NDL SAFETY 22G X 1.5 ECLIPSE

## (undated) DEVICE — GAUZE SPONGE 4X4 12PLY

## (undated) DEVICE — PAD CAST SPECIALIST STRL 4

## (undated) DEVICE — DRAPE SURG W/TWL 17 5/8X23

## (undated) DEVICE — SEE MEDLINE ITEM 146308

## (undated) DEVICE — GAUZE SPONGE 4'X4 12 PLY

## (undated) DEVICE — PAD UNDERPAD 30X30

## (undated) DEVICE — SOL PVP-I SCRUB 7.5% 4OZ

## (undated) DEVICE — SEE MEDLINE ITEM 157110

## (undated) DEVICE — BLADE SURG STAINLESS STEEL #15

## (undated) DEVICE — SEE MEDLINE ITEM 157131

## (undated) DEVICE — PAD CAST SPECIALIST STRL 3

## (undated) DEVICE — CORD FOR BIPOLAR FORCEPS 12

## (undated) DEVICE — SUT ETHILON 4-0 BLK MONO

## (undated) DEVICE — SCRUB 10% POVIDONE IODINE 4OZ

## (undated) DEVICE — SEE MEDLINE ITEM 152514

## (undated) DEVICE — PADDING CAST SPECIALIST 3X4YD

## (undated) DEVICE — FORCEP STRAIGHT DISP

## (undated) DEVICE — TAPE SURG DURAPORE 2 SGL USE

## (undated) DEVICE — CORD BIPOLAR 12 FT STERILE

## (undated) DEVICE — APPLICATOR CHLORAPREP ORN 26ML